# Patient Record
Sex: MALE | Race: ASIAN | NOT HISPANIC OR LATINO | Employment: OTHER | URBAN - METROPOLITAN AREA
[De-identification: names, ages, dates, MRNs, and addresses within clinical notes are randomized per-mention and may not be internally consistent; named-entity substitution may affect disease eponyms.]

---

## 2017-01-02 ENCOUNTER — ALLIED HEALTH/NURSE VISIT (OUTPATIENT)
Dept: SURGERY | Facility: CLINIC | Age: 77
End: 2017-01-02

## 2017-01-02 ENCOUNTER — OFFICE VISIT (OUTPATIENT)
Dept: SURGERY | Facility: CLINIC | Age: 77
End: 2017-01-02

## 2017-01-02 VITALS
TEMPERATURE: 98.1 F | BODY MASS INDEX: 25.27 KG/M2 | HEIGHT: 62 IN | HEART RATE: 70 BPM | DIASTOLIC BLOOD PRESSURE: 79 MMHG | SYSTOLIC BLOOD PRESSURE: 153 MMHG | WEIGHT: 137.3 LBS | RESPIRATION RATE: 12 BRPM | OXYGEN SATURATION: 95 %

## 2017-01-02 DIAGNOSIS — Z01.818 PRE-OP EXAMINATION: ICD-10-CM

## 2017-01-02 DIAGNOSIS — J98.59 MEDIASTINAL MASS: Primary | ICD-10-CM

## 2017-01-02 DIAGNOSIS — J98.59 MEDIASTINAL MASS: ICD-10-CM

## 2017-01-02 RX ORDER — IBUPROFEN 600 MG/1
TABLET, FILM COATED ORAL EVERY 6 HOURS PRN
COMMUNITY
End: 2017-01-04

## 2017-01-02 RX ORDER — IBUPROFEN 400 MG/1
400-600 TABLET, FILM COATED ORAL EVERY 6 HOURS PRN
Status: ON HOLD | COMMUNITY
End: 2019-09-21

## 2017-01-02 NOTE — H&P
Pre-Operative H & P     CC:  Preoperative exam to assess for increased cardiopulmonary risk while undergoing surgery and anesthesia.    Date of Encounter: 1/2/2017  Primary Care Physician:  Karol Alvarez  Travis Peres is a 76 year old male who presents for pre-operative H & P in preparation for Transcervical Extended Mediastinal Lymphadenectomy, Possible Thymectomy with Dr. Armstrong on 1/3/17 at [facility]. Mr. Peres was diagnosed with Papillary thyroid cancer in 2007 and underwent thyroidectomy followed by radioactive iodine therapy.  Subsequently, he underwent left cervical lymph node dissection for management of metastatic nodes.  He has had a mediastinal mass that has been followed with an increase in size over time with now increasing levels of T4 and low TSH.   Of significance, Mr. Peres was evaluated on 10/4/16 in the ED with c/o chest pain.  Serial troponins were negative and dobutamine stress echo did not show any evidence of ischemia.  Patient was started on Protonix, recommended follow-up with PCP and GI as outpatient and discharged to home.    Mr. Peres presents to PAC clinic with his wife and Chinese  and reports to be in his usual state of health.  Mr. Peres is very hard of hearing so communication somewhat difficult.  He reports that he is no longer on Cozaar as blood pressure has been stable and infrequently uses his rescue inhaler and does not use his combivent inhaler at all for his COPD. He has been holding his aspirin in preparation for his surgery    PAC referral for risk assessment and optimization of anesthesia with comorbid conditions of: HTN; HLD; COPD; GERD; history of macro-pituitary adenoma, s/p resection and  shunt placed for hydrocephalus with resultant secondary adrenal insufficiency and hypogonadotropic hypogonadism; hypothyroidism 2/2 thyroidectomy; osteopenia; depression/anxiety/insomnia and BPH.    History is obtained  from the patient, patient's wife and EMR.    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     Vocal cord paralysis      right     Hypertension      no current meds     Uncomplicated asthma      Arthritis        Past Surgical History  Past Surgical History   Procedure Laterality Date     Thyroidectomy  11/27/07     Hernia repair Right      Esophagoscopy, gastroscopy, duodenoscopy (egd), combined  6/20/2013     Procedure: COMBINED ESOPHAGOSCOPY, GASTROSCOPY, DUODENOSCOPY (EGD), BIOPSY SINGLE OR MULTIPLE;  gastroscopy;  Surgeon: Leslie Guadarrama MD;  Location: [facility]     Right selective neck dissection level 2, 3, 4  11/3/09     Transnasal endoscopic resection of pituitary adenoma  8/13/2007     Right  shunt placement  6/28/07     Lipoma resection chest wall Right 11/09     Cymetra injection         Hx of Blood transfusions/reactions: denies     Hx of abnormal bleeding or anti-platelet use: yes, but on hold for surgery    Menstrual history: No LMP for male patient.: na    Steroid use in the last year: deneis    Personal or FH with difficulty with Anesthesia:  denies    Prior to Admission Medications  Current Outpatient Prescriptions   Medication Sig Dispense Refill     ibuprofen (ADVIL/MOTRIN) 400 MG tablet Take 400 mg by mouth every 6 hours as needed for moderate pain       ibuprofen (ADVIL/MOTRIN) 600 MG tablet Take by mouth every 6 hours as needed for moderate pain       levothyroxine (SYNTHROID/LEVOTHROID) 137 MCG tablet Take 1 tablet (137 mcg) by  mouth daily (Patient taking differently: Take 137 mcg by mouth every morning ) 120 tablet 3     hydrocortisone (CORTEF) 10 MG tablet Take one tablet by mouth daily in the morning, 1/2 tablet at 2  tablet 3     alendronate (FOSAMAX) 70 MG tablet Take 1 tablet (70 mg) by mouth every 7 days 16 tablet 3     Ipratropium-Albuterol (COMBIVENT RESPIMAT)  MCG/ACT inhaler Inhale 1 puff into the lungs as needed        traZODone (DESYREL) 100 MG tablet Take 100 mg by mouth At Bedtime        GuaiFENesin (MUCUS RELIEF ADULT PO) Take by mouth as needed        fluticasone (VERAMYST) 27.5 MCG/SPRAY nasal spray Spray 2 sprays into both nostrils At Bedtime        TAMSULOSIN HCL PO Take 0.4 mg by mouth At Bedtime        AMITRIPTYLINE HCL PO Take 25 mg by mouth At Bedtime       ranitidine HCl 300 MG CAPS Take 1 capsule by mouth nightly as needed        DIPHENHYDRAMINE HCL 50 mg take one every 6 hours as needed for itching         Allergies  Allergies   Allergen Reactions     Metoprolol Unknown and Shortness Of Breath     Not true allergy.  Bradycardia, fatigue, COPD worsening.       Fenofibric Acid      Lisinopril Cough     Losartan Cough     Niacin      Simvastatin Cramps       Social History  Social History     Social History     Marital Status:      Spouse Name: N/A     Number of Children: N/A     Years of Education: N/A     Occupational History     Not on file.     Social History Main Topics     Smoking status: Never Smoker      Smokeless tobacco: Not on file     Alcohol Use: No     Drug Use: No     Sexual Activity: Not on file     Other Topics Concern     Not on file     Social History Narrative    Mom  at age 93 from a fall    Dad  at age 98 from old age     40 plus years    Two adult children in good health.    Occupation: retired from running a [employer]    Originally from [place]           Family History  Family History   Problem Relation Age of Onset     CANCER No family hx of      no skin  "cancer     Glaucoma No family hx of      Macular Degeneration No family hx of      ROS/MED HX    ENT/Pulmonary: Comment: Mediastinal mass  Right vocal cord paralysis  Xerostomia 2/2 radiation    (+)Intermittent COPD, , . .    Neurologic:  - neg neurologic ROS     Cardiovascular:     (+) Dyslipidemia, hypertension----. : . . . :. . Previous cardiac testing date:results:Stress Testdate:10/04/16 results:ECG reviewed date:10/04/16 results:   date: results:          METS/Exercise Tolerance:  METs >4   Hematologic:  - neg hematologic  ROS       Musculoskeletal:  - neg musculoskeletal ROS       GI/Hepatic:     (+) GERD Asymptomatic on medication,       Renal/Genitourinary:  - ROS Renal section negative       Endo: Comment: Hypopituitarism s/p resection  Adrenal insufficiency 2/2 hypopituitarism    (+) thyroid problem (papillary thyroid ca s/p resection and radioactive iodine) hypothyroidism, Chronic steroid usage for Other Date most recently used: Daily. history of macro-pituitary adenoma, s/p resection and  shunt placed for hydrocephalus with resultant secondary adrenal insufficiency and hypogonadotropic hypogonadism,.      Psychiatric:  - neg psychiatric ROS       Infectious Disease:  - neg infectious disease ROS       Malignancy:   (+) Malignancy History of Other  Other CA papillary thyroid ca s/p resection and radiation Remission status post Surgery and Radiation         Other:    (+) No chance of pregnancy C-spine cleared: N/A, no H/O Chronic Pain,no other significant disability   - neg other ROS       Temp: 98.1  F (36.7  C) Temp src: Oral BP: 153/79 mmHg Pulse: 70   Resp: 12 SpO2: 95 %         137 lbs 4.8 oz  5' 2\"   Body mass index is 25.11 kg/(m^2).       Physical Exam  Constitutional: Awake, alert, cooperative, no apparent distress, and appears stated age.  Eyes: Pupils equal, round and reactive to light, extra ocular muscles intact, sclera clear, conjunctiva normal.  HENT: Normocephalic, oral pharynx with " moist mucus membranes, dentition in poor repair with multiple missing upper teeth with removal upper partial plate.  Bottom removal denture.  No goiter appreciated.   Respiratory: Clear to auscultation bilaterally, no crackles or wheezing.  Cardiovascular: Regular rate and rhythm, normal S1 and S2, and no murmur noted.  Carotids +2, no bruits. No edema. Palpable pulses to radial  DP and PT arteries.   GI: Normal bowel sounds, soft, non-distended, non-tender, no masses palpated, no hepatosplenomegaly.  Well healed right upper quadrant transverse scar ~ 3 cm.  Lymph/Hematologic: No cervical lymphadenopathy and no supraclavicular lymphadenopathy.  Genitourinary:  na  Skin: Warm and dry.  No rashes at anticipated surgical site.   Musculoskeletal: Full ROM of neck. There is no redness, warmth, or swelling of the joints. Gross motor strength is normal.    Neurologic: Awake, alert, oriented to name, place and time. Cranial nerves II-XII are grossly intact. Gait is normal.   Neuropsychiatric: Calm, cooperative. Normal affect.     Labs: (personally reviewed)  WBC      5.1   10/4/2016  RBC     5.20   10/4/2016  HGB     13.5   10/4/2016  HCT     40.5   10/4/2016  MCV       78   10/4/2016  MCH     26.0   10/4/2016  MCHC     33.3   10/4/2016  RDW     13.3   10/4/2016  PLT      140   10/4/2016    Last Basic Metabolic Panel:  NA      140   10/4/2016   POTASSIUM      3.4   10/4/2016  CHLORIDE      104   10/4/2016  NIHARIKA      8.4   10/4/2016  CO2       31   10/4/2016  BUN       14   10/4/2016  CR     0.93   10/4/2016  GLC      109   10/4/2016  GLC       98   6/19/2013    TSH <0.01 (L), T4 free 1.46 (12/9/16)  Type and Screen completed    Procedures:    ECG: Sinus rhythm 60 BPM.  Voltage criteria for left ventricular hypertrophy.  Prolonged QT QT/QTc 462/462    Dobutamine Stress Echocardiogram 10/4/16  Interpretation Summary  Negative dobutamine stress echocardiogram.  No wall motion abnormalities at rest and with infusion of  dobutamine.  Normal LV size and function. The LVEF is 55-60% at rest and increases  appropriately with dobutamine infusion to approximately 70-75%.  Low risk dobutamine stress echocardiogram.  Normal blood pressure response to dobutamine infusion.  Negative T waves noted in V4-V6 at rest with dynamic changes during  dobutamine infusion.  No chest pain at rest and with infusion of dobutamine.  No significant valvular disease noted on routine screening color flow Doppler  and pulsed Doppler examination. The ascending aortic segment is normal.  Echocardiogram 7/30/2014  Interpretation Summary  Left ventricular function is normal.  The EF is 55-60%. Left ventricular size   is normal. The right ventricle is normal size. Aortic valve is normal in   structure and function. Right ventricular systolic pressure is 21mmHg above   the right atrial pressure. The inferior vena cava was normal in size with   preserved respiratory variability. No pericardial effusion is present.  Pulmonary Function Tests (10/28/2009)  INTERPRETATION:  The FVC, FEV1, FEV1/FVC ratio and XZK55-20% are reduced indicating airway  obstruction.  Following administration of bronchodilators, there is a  significant response.  Decreased diffusing capacity even when corrected for  hemoglobin.   Flow volume loop suggests airflow obstruction.  No previous exam   for comparison.  TLC normal and RV elevated - suggesting airtrapping.  IMPRESSION:  Severe Airflow Obstruction  with significant response to bronchodilators.  Overdistended with air trapping.  Decreased diffusion capacity that corrects for alveolar ventilation.    Brain/ Pituitary MRI without and with contrast 12/17/16  Impression:  1. Stable findings since 7/15/2014. Postsurgical changes from  transsphenoidal sellar mass resection with unchanged residual mass in  the left aspect of the sella.  2. Stable position of the right posterior approach ventriculostomy  catheter with unchanged ventricular size.  No hydrocephalus.    EXAM:  CT CHEST W/O CONTRAST . 12/2/2016 11:22 AM    Impression:    1. Waxing and waning nodular and tree-in-bud pulmonary opacities  involving predominantly the posterior aspect of the right upper lobe  and lateral right lower lobe. These likely represents infection  including atypical infection like mycobacterial infection.  2. No significant change in the 2.6 x 1.5 cm hypodense cystic lesion  in the anterior mediastinum as compared to 12/16/2015, however it  increased in size as compared to 6/2/2009 exam, recommend continued  follow-up to ensure stability.  3. Stable calcified and noncalcified pulmonary nodules.  4. Cholelithiasis without CT evidence of acute cholecystitis.       ASSESSMENT and PLAN  Travis Peres is a 76 year old male scheduled to undergo Transcervical Extended Mediastinal Lymphadenectomy, Possible Thymectomy with Dr. Armstrong on 1/3/17 at the [facility] under General anesthesia with Block..     Pre-operative considerations:  - RCRI : No serious cardiac risks.  0.4% risk of major adverse cardiac event.   - Anesthesia considerations:  Refer to PAC assessment in anesthesia records  - Functional Status: METs>4  - Airway: history of right sided vocal cord paralysis  - VTE risk: 1.8%  - FILEMON # of risks 2/8 = low risk  - Risk of PONV score = 1.  If > 2, anti-emetic intervention recommended.  - Post-op delirium risk: elevated given age.    He has the following specific operation considerations:  - Cardiac:  HTN- patient reports no longer taking ARB as BP has improved. Recommend closely monitoring blood pressure and reassess need for antihypertensive therapy. Patient has been holding his ASA (primary prevention) in preparation for surgery.  HLD-not treated as intolerant to statins because of myopathy.  Dobutamine stress Echo 10/14/16 is within normal limits with EF 55-60%.  - Pulmonary:  No smoking history.  COPD, take inhalers as prescribed DOS.  Bring inhalers with to  hospital. Last PFT's 2009 with significnat response to bronchodilators.  - GERD:  take [id number] DOS  - History of macro-pituitary adenoma, s/p resection and  shunt placed for hydrocephalus with resultant secondary adrenal insufficiency and hypogonadotropic hypogonadism, take hydrocortisone DOS.  Stress dose steroids will be considered by anesthesia DOS.  - Hypothyroidism 2/2 thyroidectomy:  take levothyroxine DOS.  TSH <0.01, T4free 1.46.  - Osteopenia:  take alendronate as prescribed.  Hold calcium and vitamin D for 7 days prior to procedure.  - Depression/anxiety/insomnia: take amitriptyline, trazodone and ambien as prescribed  - BPH: take tamsulosin DOS.  Patient was discussed with Dr DEL Vela.    GEORGIA Mc CNP  Preoperative Assessment Center  [facility]  Phone: [phone]  Fax: [phone]

## 2017-01-02 NOTE — PATIENT INSTRUCTIONS
Preparing for Your Surgery      Name:  Travis Peres   MRN:  [id number]   :  1940   Today's Date:  2017     Arriving for surgery:  Surgery date: 1/3/2017  Surgery time:  830 am  Arrival time:  630 am    Please come to:       [facility] [address]  [address] [place]  [address]    -   parking is available in front of the hospital from 5:15 am to 8:00 pm    -  Stop at the Information Desk in the lobby    -   Inform the information person that you are here for surgery. An escort to 3c will be provided. If you would not like an escort, please proceed to 3C on the 3rd floor. [phone]     What can I eat or drink?  -  You may have solid food or milk products until 8 hours prior to your surgery; until 12 midnight  -  You may have water, apple juice or 7up/Sprite until 2 hours prior to your surgery; until 630 am    Which medicines can I take    -  Stop all vitamins one week before surgery  -  Please take these medications the day of surgery:  Levothyroxine, hydrocortisone,         How do I prepare myself?  -  Take two showers: one the night before surgery; and one the morning of surgery.         Use Scrubcare or Hibiclens to wash from neck down.  You may use your own shampoo and conditioner. No other hair products.   -  Do NOT use lotion, powder, deodorant, or antiperspirant the day of your surgery.  -  Do NOT wear any makeup, fingernail polish or jewelry.  -  Begin using Incentive Spirometer 1 week prior to surgery.  Use 4 times per day, up to 5-10 breaths each time.  Bring Incentive Spirometer to hospital.  -Do not bring your own medications to the hospital, except for inhalers and eye drops.  -  Bring your ID and insurance card.    Questions or Concerns:  If you have questions or concerns, please call the  Preoperative Assessment Center, Monday-Friday 7AM-7PM:  [phone]    AFTER YOUR SURGERY  Breathing exercises   Breathing exercises help you recover  faster. Take deep breaths and let the air out slowly. This will:     Help you wake up after surgery.    Help prevent complications like pneumonia.  Preventing complications will help you go home sooner.   We may give you a breathing device (incentive spirometer) to encourage you to breathe deeply.   Nausea and vomiting   You may feel sick to your stomach after surgery; if so, let your nurse know.    Pain control:  After surgery, you may have pain. Our goal is to help you manage your pain. Pain medicine will help you feel comfortable enough to do activities that will help you heal.  These activities may include breathing exercises, walking and physical therapy.   To help your health care team treat your pain we will ask: 1) If you have pain  2) where it is located 3) describe your pain in your words  Methods of pain control include medications given by mouth, vein or by nerve block for some surgeries.  We may give you a pain control pump that will:  1) Deliver the medicine through a tube placed in your vein  2) Control the amount of medicine you receive  3) Allow you to push a button to deliver a dose of pain medicine  Sequential Compression Device (SCD) or Pneumo Boots:  You may need to wear SCD S on your legs or feet. These are wraps connected to a machine that pumps in air and releases it. The repeated pumping helps prevent blood clots from forming.

## 2017-01-02 NOTE — MR AVS SNAPSHOT
After Visit Summary   2017    Travis Peres    MRN: [id number]           Patient Information     Date Of Birth          1940        Visit Information        Provider Department      2017 12:15 PM Anson Van; Rn, [facility]        Care Instructions    Preparing for Your Surgery      Name:  Travis Peres   MRN:  [id number]   :  1940   Today's Date:  2017     Arriving for surgery:  Surgery date: 1/3/2017  Surgery time:  830 am  Arrival time:  630 am    Please come to:       [facility] [address]  [address] [place]  [address]    -   parking is available in front of the hospital from 5:15 am to 8:00 pm    -  Stop at the Information Desk in the lobby    -   Inform the information person that you are here for surgery. An escort to 3c will be provided. If you would not like an escort, please proceed to 3C on the 3rd floor. [phone]     What can I eat or drink?  -  You may have solid food or milk products until 8 hours prior to your surgery; until 12 midnight  -  You may have water, apple juice or 7up/Sprite until 2 hours prior to your surgery; until 630 am    Which medicines can I take    -  Stop all vitamins one week before surgery  -  Please take these medications the day of surgery:  Levothyroxine, hydrocortisone,         How do I prepare myself?  -  Take two showers: one the night before surgery; and one the morning of surgery.         Use Scrubcare or Hibiclens to wash from neck down.  You may use your own shampoo and conditioner. No other hair products.   -  Do NOT use lotion, powder, deodorant, or antiperspirant the day of your surgery.  -  Do NOT wear any makeup, fingernail polish or jewelry.  -  Begin using Incentive Spirometer 1 week prior to surgery.  Use 4 times per day, up to 5-10 breaths each time.  Bring Incentive Spirometer to hospital.  -Do not bring your own medications to the hospital,  except for inhalers and eye drops.  -  Bring your ID and insurance card.    Questions or Concerns:  If you have questions or concerns, please call the  Preoperative [facility] Center, Monday-Friday 7AM-7PM:  [phone]    AFTER YOUR SURGERY  Breathing exercises   Breathing exercises help you recover faster. Take deep breaths and let the air out slowly. This will:     Help you wake up after surgery.    Help prevent complications like pneumonia.  Preventing complications will help you go home sooner.   We may give you a breathing device (incentive spirometer) to encourage you to breathe deeply.   Nausea and vomiting   You may feel sick to your stomach after surgery; if so, let your nurse know.    Pain control:  After surgery, you may have pain. Our goal is to help you manage your pain. Pain medicine will help you feel comfortable enough to do activities that will help you heal.  These activities may include breathing exercises, walking and physical therapy.   To help your health care team treat your pain we will ask: 1) If you have pain  2) where it is located 3) describe your pain in your words  Methods of pain control include medications given by mouth, vein or by nerve block for some surgeries.  We may give you a pain control pump that will:  1) Deliver the medicine through a tube placed in your vein  2) Control the amount of medicine you receive  3) Allow you to push a button to deliver a dose of pain medicine  Sequential Compression Device (SCD) or Pneumo Boots:  You may need to wear SCD S on your legs or feet. These are wraps connected to a machine that pumps in air and releases it. The repeated pumping helps prevent blood clots from forming.               Follow-ups after your visit        Your next 10 appointments already scheduled     Jan 02, 2017  2:00 PM   PAC Anesthesia Consult with  Pac Anesthesiologist   [facility] ([facility])    [address]  4th Floor  [facility] [id number] [phone]            Jan 02, 2017  2:45 PM   LAB with  LAB    Health Lab ([facility])    [address] [facility]  [address] [facility] [id number] [phone]           Patient must bring picture ID.  Patient should be prepared to give a urine specimen  Please do not eat 10-12 hours before your appointment if you are coming in fasting for labs on lipids, cholesterol, or glucose (sugar).  Pregnant women should follow their Care Team instructions. Water with medications is okay. Do not drink coffee or other fluids.   If you have concerns about taking  your medications, please ask at office or if scheduling via [employer], send a message by clicking on Secure Messaging, Message Your Care Team.            Jan 03, 2017  6:30 AM   Test/Procedure with Devan Stephen    Services Department ([facility])    [address] [place] [phone]               Jan 03, 2017   Procedure with Ernesto Armstrong MD   [facility] [place], Same Day Surgery (--)    [address] [facility] [id number] [phone]            Jan 03, 2017 11:30 AM   [facility]patient with Devan Stephen    Services Department ([facility])    [address] [place] [phone]               Jan 06, 2017  9:30 AM   RETURN RETINA with Rola Robert MD, Anson Van   Eye Clinic ([facility])    [address]  [address] [facility]  [address]th [address] Clin [address]  [facility] [id number] [phone]              Who to contact     Please call your clinic at [phone] to:    Ask questions about your health    Make or cancel appointments    Discuss your medicines    Learn about your test results    Speak to your doctor   If you have compliments or concerns about an experience at your clinic, or if you wish to file a complaint, please  contact [facility] Patient Relations at [phone] or email us at [email or web address]         Additional Information About Your Visit        [employer] Information     [employer] is an electronic gateway that provides easy, online access to your medical records. With [employer], you can request a clinic appointment, read your test results, renew a prescription or communicate with your care team.     To sign up for [employer] visit the website at [email or web address]   You will be asked to enter the access code listed below, as well as some personal information. Please follow the directions to create your username and password.     Your access code is: [email or web address]  Expires: 2017  6:30 AM     Your access code will  in 90 days. If you need help or a new code, please contact your [facility] or call [phone] for assistance.         Blood Pressure from Last 3 Encounters:   17 160/88   16 154/76   16 145/85    Weight from Last 3 Encounters:   17 62.279 kg (137 lb 4.8 oz)   16 62.551 kg (137 lb 14.4 oz)   16 62.052 kg (136 lb 12.8 oz)              Today, you had the following     No orders found for display         Today's Medication Changes          These changes are accurate as of: 17  1:19 PM.  If you have any questions, ask your nurse or doctor.               These medicines have changed or have updated prescriptions.        Dose/Directions    levothyroxine 137 MCG tablet   Commonly known as:  SYNTHROID/LEVOTHROID   This may have changed:  when to take this   Used for:  Postsurgical hypothyroidism, Osteopenia, Hypopituitarism (H), Pituitary adenoma (H), Papillary thyroid carcinoma (H)        Dose:  137 mcg   Take 1 tablet (137 mcg) by mouth daily   Quantity:  120 tablet   Refills:  3                Primary Care Provider Office Phone # Fax #    Karol Kim [phone]        [address] [address]        Thank you!     Thank you for choosing [facility]  for your care. Our goal is always to provide you with excellent care. Hearing back from our patients is one way we can continue to improve our services. Please take a few minutes to complete the written survey that you may receive in the mail after your visit with us. Thank you!             Your Updated Medication List - Protect others around you: Learn how to safely use, store and throw away your medicines at www.disposemymeds.org.          This list is accurate as of: 1/2/17  1:19 PM.  Always use your most recent med list.                   Brand Name Dispense Instructions for use    alendronate 70 MG tablet    FOSAMAX    16 tablet    Take 1 tablet (70 mg) by mouth every 7 days       AMITRIPTYLINE HCL PO      Take 25 mg by mouth At Bedtime       COMBIVENT RESPIMAT  MCG/ACT inhaler   Generic drug:  Ipratropium-Albuterol      Inhale 1 puff into the lungs as needed       DIPHENHYDRAMINE HCL      50 mg take one every 6 hours as needed for itching       fluticasone 27.5 MCG/SPRAY spray    VERAMYST     Spray 2 sprays into both nostrils At Bedtime       hydrocortisone 10 MG tablet    CORTEF    180 tablet    Take one tablet by mouth daily in the morning, 1/2 tablet at 2 PM       * ibuprofen 400 MG tablet    ADVIL/MOTRIN     Take 400 mg by mouth every 6 hours as needed for moderate pain       * ibuprofen 600 MG tablet    ADVIL/MOTRIN     Take by mouth every 6 hours as needed for moderate pain       levothyroxine 137 MCG tablet    SYNTHROID/LEVOTHROID    120 tablet    Take 1 tablet (137 mcg) by mouth daily       MUCUS RELIEF ADULT PO      Take by mouth as needed       ranitidine HCl 300 MG Caps      Take 1 capsule by mouth nightly as needed       TAMSULOSIN HCL PO      Take 0.4 mg by mouth At Bedtime       traZODone 100 MG tablet    DESYREL     Take 100 mg by mouth At  Bedtime       * Notice:  This list has 2 medication(s) that are the same as other medications prescribed for you. Read the directions carefully, and ask your doctor or other care provider to review them with you.

## 2017-01-03 ENCOUNTER — ANESTHESIA (OUTPATIENT)
Dept: SURGERY | Facility: CLINIC | Age: 77
DRG: 853 | End: 2017-01-03
Payer: MEDICARE

## 2017-01-03 ENCOUNTER — APPOINTMENT (OUTPATIENT)
Dept: GENERAL RADIOLOGY | Facility: CLINIC | Age: 77
DRG: 853 | End: 2017-01-03
Attending: THORACIC SURGERY (CARDIOTHORACIC VASCULAR SURGERY)
Payer: MEDICARE

## 2017-01-03 LAB
ABO + RH BLD: NORMAL
ABO + RH BLD: NORMAL
BLD GP AB SCN SERPL QL: NORMAL
BLD PROD TYP BPU: NORMAL
BLOOD BANK CMNT PATIENT-IMP: NORMAL
NUM BPU REQUESTED: 2
SPECIMEN EXP DATE BLD: NORMAL

## 2017-01-03 PROCEDURE — 0WBC0ZZ EXCISION OF MEDIASTINUM, OPEN APPROACH: ICD-10-PCS | Performed by: THORACIC SURGERY (CARDIOTHORACIC VASCULAR SURGERY)

## 2017-01-03 RX ORDER — EPHEDRINE SULFATE 50 MG/ML
INJECTION, SOLUTION INTRAMUSCULAR; INTRAVENOUS; SUBCUTANEOUS PRN
Status: DISCONTINUED | OUTPATIENT
Start: 2017-01-03 | End: 2017-01-03

## 2017-01-03 RX ORDER — ONDANSETRON 2 MG/ML
INJECTION INTRAMUSCULAR; INTRAVENOUS PRN
Status: DISCONTINUED | OUTPATIENT
Start: 2017-01-03 | End: 2017-01-03

## 2017-01-03 RX ORDER — LIDOCAINE HYDROCHLORIDE 20 MG/ML
INJECTION, SOLUTION INFILTRATION; PERINEURAL PRN
Status: DISCONTINUED | OUTPATIENT
Start: 2017-01-03 | End: 2017-01-03

## 2017-01-03 RX ORDER — NITROGLYCERIN 10 MG/100ML
INJECTION INTRAVENOUS PRN
Status: DISCONTINUED | OUTPATIENT
Start: 2017-01-03 | End: 2017-01-03

## 2017-01-03 RX ORDER — SODIUM CHLORIDE, SODIUM LACTATE, POTASSIUM CHLORIDE, CALCIUM CHLORIDE 600; 310; 30; 20 MG/100ML; MG/100ML; MG/100ML; MG/100ML
INJECTION, SOLUTION INTRAVENOUS CONTINUOUS PRN
Status: DISCONTINUED | OUTPATIENT
Start: 2017-01-03 | End: 2017-01-03

## 2017-01-03 RX ORDER — NALOXONE HYDROCHLORIDE 0.4 MG/ML
INJECTION, SOLUTION INTRAMUSCULAR; INTRAVENOUS; SUBCUTANEOUS PRN
Status: DISCONTINUED | OUTPATIENT
Start: 2017-01-03 | End: 2017-01-03

## 2017-01-03 RX ORDER — PROPOFOL 10 MG/ML
INJECTION, EMULSION INTRAVENOUS PRN
Status: DISCONTINUED | OUTPATIENT
Start: 2017-01-03 | End: 2017-01-03

## 2017-01-03 RX ADMIN — ROCURONIUM BROMIDE 100 MG: 10 INJECTION INTRAVENOUS at 08:44

## 2017-01-03 RX ADMIN — FENTANYL CITRATE 50 MCG: 50 INJECTION, SOLUTION INTRAMUSCULAR; INTRAVENOUS at 10:15

## 2017-01-03 RX ADMIN — NALOXONE HYDROCHLORIDE 0.02 MG: 0.4 INJECTION, SOLUTION INTRAMUSCULAR; INTRAVENOUS; SUBCUTANEOUS at 12:29

## 2017-01-03 RX ADMIN — PHENYLEPHRINE HYDROCHLORIDE 50 MCG: 10 INJECTION, SOLUTION INTRAMUSCULAR; INTRAVENOUS; SUBCUTANEOUS at 11:20

## 2017-01-03 RX ADMIN — FENTANYL CITRATE 50 MCG: 50 INJECTION, SOLUTION INTRAMUSCULAR; INTRAVENOUS at 10:56

## 2017-01-03 RX ADMIN — PHENYLEPHRINE HYDROCHLORIDE 50 MCG: 10 INJECTION, SOLUTION INTRAMUSCULAR; INTRAVENOUS; SUBCUTANEOUS at 11:24

## 2017-01-03 RX ADMIN — FENTANYL CITRATE 50 MCG: 50 INJECTION, SOLUTION INTRAMUSCULAR; INTRAVENOUS at 09:51

## 2017-01-03 RX ADMIN — SUGAMMADEX 240 MG: 100 INJECTION, SOLUTION INTRAVENOUS at 12:00

## 2017-01-03 RX ADMIN — PHENYLEPHRINE HYDROCHLORIDE 100 MCG: 10 INJECTION, SOLUTION INTRAMUSCULAR; INTRAVENOUS; SUBCUTANEOUS at 08:53

## 2017-01-03 RX ADMIN — NITROGLYCERIN 100 MCG: 10 INJECTION INTRAVENOUS at 12:27

## 2017-01-03 RX ADMIN — NITROGLYCERIN 100 MCG: 10 INJECTION INTRAVENOUS at 12:24

## 2017-01-03 RX ADMIN — FENTANYL CITRATE 100 MCG: 50 INJECTION, SOLUTION INTRAMUSCULAR; INTRAVENOUS at 08:44

## 2017-01-03 RX ADMIN — CEFAZOLIN SODIUM 1 G: 2 INJECTION, SOLUTION INTRAVENOUS at 11:03

## 2017-01-03 RX ADMIN — FENTANYL CITRATE 50 MCG: 50 INJECTION, SOLUTION INTRAMUSCULAR; INTRAVENOUS at 11:12

## 2017-01-03 RX ADMIN — SODIUM CHLORIDE, POTASSIUM CHLORIDE, SODIUM LACTATE AND CALCIUM CHLORIDE: 600; 310; 30; 20 INJECTION, SOLUTION INTRAVENOUS at 11:22

## 2017-01-03 RX ADMIN — HYDROMORPHONE HYDROCHLORIDE 0.2 MG: 1 INJECTION, SOLUTION INTRAMUSCULAR; INTRAVENOUS; SUBCUTANEOUS at 11:05

## 2017-01-03 RX ADMIN — FENTANYL CITRATE 50 MCG: 50 INJECTION, SOLUTION INTRAMUSCULAR; INTRAVENOUS at 11:33

## 2017-01-03 RX ADMIN — PHENYLEPHRINE HYDROCHLORIDE 100 MCG: 10 INJECTION, SOLUTION INTRAMUSCULAR; INTRAVENOUS; SUBCUTANEOUS at 09:23

## 2017-01-03 RX ADMIN — PROPOFOL 70 MG: 10 INJECTION, EMULSION INTRAVENOUS at 11:12

## 2017-01-03 RX ADMIN — Medication 10 MG: at 11:38

## 2017-01-03 RX ADMIN — SODIUM CHLORIDE, POTASSIUM CHLORIDE, SODIUM LACTATE AND CALCIUM CHLORIDE: 600; 310; 30; 20 INJECTION, SOLUTION INTRAVENOUS at 08:30

## 2017-01-03 RX ADMIN — ROCURONIUM BROMIDE 20 MG: 10 INJECTION INTRAVENOUS at 09:46

## 2017-01-03 RX ADMIN — ROCURONIUM BROMIDE 10 MG: 10 INJECTION INTRAVENOUS at 10:32

## 2017-01-03 RX ADMIN — NALOXONE HYDROCHLORIDE 0.02 MG: 0.4 INJECTION, SOLUTION INTRAMUSCULAR; INTRAVENOUS; SUBCUTANEOUS at 12:23

## 2017-01-03 RX ADMIN — PROPOFOL 20 MG: 10 INJECTION, EMULSION INTRAVENOUS at 11:16

## 2017-01-03 RX ADMIN — ONDANSETRON 4 MG: 2 INJECTION INTRAMUSCULAR; INTRAVENOUS at 12:00

## 2017-01-03 RX ADMIN — FENTANYL CITRATE 50 MCG: 50 INJECTION, SOLUTION INTRAMUSCULAR; INTRAVENOUS at 10:16

## 2017-01-03 RX ADMIN — PROPOFOL 30 MG: 10 INJECTION, EMULSION INTRAVENOUS at 11:14

## 2017-01-03 RX ADMIN — CEFAZOLIN SODIUM 2 G: 2 INJECTION, SOLUTION INTRAVENOUS at 08:55

## 2017-01-03 RX ADMIN — PROPOFOL 10 MG: 10 INJECTION, EMULSION INTRAVENOUS at 11:28

## 2017-01-03 RX ADMIN — PROPOFOL 100 MG: 10 INJECTION, EMULSION INTRAVENOUS at 08:44

## 2017-01-03 RX ADMIN — NITROGLYCERIN 100 MCG: 10 INJECTION INTRAVENOUS at 12:31

## 2017-01-03 RX ADMIN — PHENYLEPHRINE HYDROCHLORIDE 100 MCG: 10 INJECTION, SOLUTION INTRAMUSCULAR; INTRAVENOUS; SUBCUTANEOUS at 08:54

## 2017-01-03 RX ADMIN — ROCURONIUM BROMIDE 10 MG: 10 INJECTION INTRAVENOUS at 11:10

## 2017-01-03 RX ADMIN — LIDOCAINE HYDROCHLORIDE 100 MG: 20 INJECTION, SOLUTION INFILTRATION; PERINEURAL at 08:44

## 2017-01-03 RX ADMIN — PROPOFOL 30 MG: 10 INJECTION, EMULSION INTRAVENOUS at 11:07

## 2017-01-03 RX ADMIN — FENTANYL CITRATE 50 MCG: 50 INJECTION, SOLUTION INTRAMUSCULAR; INTRAVENOUS at 10:39

## 2017-01-03 RX ADMIN — FENTANYL CITRATE 50 MCG: 50 INJECTION, SOLUTION INTRAMUSCULAR; INTRAVENOUS at 09:53

## 2017-01-03 RX ADMIN — Medication 10 MG: at 10:44

## 2017-01-03 RX ADMIN — HYDROMORPHONE HYDROCHLORIDE 0.4 MG: 1 INJECTION, SOLUTION INTRAMUSCULAR; INTRAVENOUS; SUBCUTANEOUS at 10:23

## 2017-01-04 ENCOUNTER — APPOINTMENT (OUTPATIENT)
Dept: CT IMAGING | Facility: CLINIC | Age: 77
End: 2017-01-04
Attending: EMERGENCY MEDICINE
Payer: MEDICARE

## 2017-01-04 ENCOUNTER — HOSPITAL ENCOUNTER (EMERGENCY)
Facility: CLINIC | Age: 77
Discharge: ANOTHER HOSPITAL | End: 2017-01-04
Attending: EMERGENCY MEDICINE | Admitting: EMERGENCY MEDICINE
Payer: MEDICARE

## 2017-01-04 ENCOUNTER — APPOINTMENT (OUTPATIENT)
Dept: GENERAL RADIOLOGY | Facility: CLINIC | Age: 77
End: 2017-01-04
Attending: EMERGENCY MEDICINE
Payer: MEDICARE

## 2017-01-04 ENCOUNTER — HOSPITAL ENCOUNTER (INPATIENT)
Facility: CLINIC | Age: 77
LOS: 5 days | Discharge: HOME HEALTH | DRG: 853 | End: 2017-01-09
Attending: STUDENT IN AN ORGANIZED HEALTH CARE EDUCATION/TRAINING PROGRAM | Admitting: INTERNAL MEDICINE
Payer: MEDICARE

## 2017-01-04 VITALS
BODY MASS INDEX: 22.2 KG/M2 | HEART RATE: 111 BPM | TEMPERATURE: 101.8 F | WEIGHT: 133.4 LBS | RESPIRATION RATE: 19 BRPM | DIASTOLIC BLOOD PRESSURE: 53 MMHG | OXYGEN SATURATION: 98 % | SYSTOLIC BLOOD PRESSURE: 82 MMHG

## 2017-01-04 DIAGNOSIS — R65.21 SEPTIC SHOCK (H): ICD-10-CM

## 2017-01-04 DIAGNOSIS — E27.40 ADRENAL INSUFFICIENCY (H): ICD-10-CM

## 2017-01-04 DIAGNOSIS — R79.89 ELEVATED TROPONIN: ICD-10-CM

## 2017-01-04 DIAGNOSIS — C73 PAPILLARY THYROID CARCINOMA (H): ICD-10-CM

## 2017-01-04 DIAGNOSIS — N17.9 ACUTE RENAL FAILURE, UNSPECIFIED ACUTE RENAL FAILURE TYPE (H): ICD-10-CM

## 2017-01-04 DIAGNOSIS — A41.9 SEPTIC SHOCK (H): ICD-10-CM

## 2017-01-04 DIAGNOSIS — G89.18 ACUTE POST-OPERATIVE PAIN: ICD-10-CM

## 2017-01-04 DIAGNOSIS — J98.59 MEDIASTINAL MASS: Primary | ICD-10-CM

## 2017-01-04 LAB
ALBUMIN SERPL-MCNC: 3.4 G/DL (ref 3.4–5)
ALBUMIN UR-MCNC: 30 MG/DL
ALP SERPL-CCNC: 97 U/L (ref 40–150)
ALT SERPL W P-5'-P-CCNC: 145 U/L (ref 0–70)
AMORPH CRY #/AREA URNS HPF: ABNORMAL /HPF
ANION GAP SERPL CALCULATED.3IONS-SCNC: 13 MMOL/L (ref 3–14)
APPEARANCE UR: CLEAR
AST SERPL W P-5'-P-CCNC: 258 U/L (ref 0–45)
BASOPHILS # BLD AUTO: 0 10E9/L (ref 0–0.2)
BASOPHILS NFR BLD AUTO: 0.2 %
BILIRUB SERPL-MCNC: 1.4 MG/DL (ref 0.2–1.3)
BILIRUB UR QL STRIP: ABNORMAL
BUN SERPL-MCNC: 25 MG/DL (ref 7–30)
CALCIUM SERPL-MCNC: 8.3 MG/DL (ref 8.5–10.1)
CHLORIDE SERPL-SCNC: 104 MMOL/L (ref 94–109)
CO2 BLDCOV-SCNC: 21 MMOL/L (ref 21–28)
CO2 BLDCOV-SCNC: 23 MMOL/L (ref 21–28)
CO2 SERPL-SCNC: 24 MMOL/L (ref 20–32)
COLOR UR AUTO: YELLOW
CREAT SERPL-MCNC: 3.45 MG/DL (ref 0.66–1.25)
DIFFERENTIAL METHOD BLD: ABNORMAL
EOSINOPHIL # BLD AUTO: 0.1 10E9/L (ref 0–0.7)
EOSINOPHIL NFR BLD AUTO: 0.7 %
ERYTHROCYTE [DISTWIDTH] IN BLOOD BY AUTOMATED COUNT: 13.8 % (ref 10–15)
GFR SERPL CREATININE-BSD FRML MDRD: 17 ML/MIN/1.7M2
GLUCOSE BLDC GLUCOMTR-MCNC: 69 MG/DL (ref 70–99)
GLUCOSE SERPL-MCNC: 90 MG/DL (ref 70–99)
GLUCOSE UR STRIP-MCNC: NEGATIVE MG/DL
HCT VFR BLD AUTO: 44.3 % (ref 40–53)
HGB BLD-MCNC: 14.8 G/DL (ref 13.3–17.7)
HGB UR QL STRIP: ABNORMAL
HYALINE CASTS #/AREA URNS LPF: ABNORMAL /LPF (ref 0–2)
IMM GRANULOCYTES # BLD: 0.1 10E9/L (ref 0–0.4)
IMM GRANULOCYTES NFR BLD: 0.7 %
KETONES UR STRIP-MCNC: NEGATIVE MG/DL
LACTATE BLD-SCNC: 2.4 MMOL/L (ref 0.7–2.1)
LACTATE SERPL-SCNC: 5.3 MMOL/L (ref 0.4–2)
LEUKOCYTE ESTERASE UR QL STRIP: NEGATIVE
LYMPHOCYTES # BLD AUTO: 2.7 10E9/L (ref 0.8–5.3)
LYMPHOCYTES NFR BLD AUTO: 18.4 %
MCH RBC QN AUTO: 27.1 PG (ref 26.5–33)
MCHC RBC AUTO-ENTMCNC: 33.4 G/DL (ref 31.5–36.5)
MCV RBC AUTO: 81 FL (ref 78–100)
MONOCYTES # BLD AUTO: 0.8 10E9/L (ref 0–1.3)
MONOCYTES NFR BLD AUTO: 5.6 %
MUCOUS THREADS #/AREA URNS LPF: PRESENT /LPF
NEUTROPHILS # BLD AUTO: 11.1 10E9/L (ref 1.6–8.3)
NEUTROPHILS NFR BLD AUTO: 74.4 %
NITRATE UR QL: NEGATIVE
NRBC # BLD AUTO: 0 10*3/UL
NRBC BLD AUTO-RTO: 0 /100
NT-PROBNP SERPL-MCNC: ABNORMAL PG/ML (ref 0–1800)
PCO2 BLDV: 54 MM HG (ref 40–50)
PCO2 BLDV: 60 MM HG (ref 40–50)
PH BLDV: 7.15 PH (ref 7.32–7.43)
PH BLDV: 7.24 PH (ref 7.32–7.43)
PH UR STRIP: 5 PH (ref 5–7)
PLATELET # BLD AUTO: 189 10E9/L (ref 150–450)
PO2 BLDV: 62 MM HG (ref 25–47)
PO2 BLDV: 63 MM HG (ref 25–47)
POTASSIUM SERPL-SCNC: 3.7 MMOL/L (ref 3.4–5.3)
PROT SERPL-MCNC: 7.5 G/DL (ref 6.8–8.8)
RBC # BLD AUTO: 5.47 10E12/L (ref 4.4–5.9)
RBC #/AREA URNS AUTO: ABNORMAL /HPF (ref 0–2)
SAO2 % BLDV FROM PO2: 84 %
SAO2 % BLDV FROM PO2: 87 %
SODIUM SERPL-SCNC: 141 MMOL/L (ref 133–144)
SP GR UR STRIP: 1.02 (ref 1–1.03)
TROPONIN I SERPL-MCNC: 12.13 UG/L (ref 0–0.04)
URN SPEC COLLECT METH UR: ABNORMAL
UROBILINOGEN UR STRIP-ACNC: 0.2 EU/DL (ref 0.2–1)
WBC # BLD AUTO: 14.9 10E9/L (ref 4–11)
WBC #/AREA URNS AUTO: ABNORMAL /HPF (ref 0–2)

## 2017-01-04 PROCEDURE — 87641 MR-STAPH DNA AMP PROBE: CPT | Performed by: SURGERY

## 2017-01-04 PROCEDURE — 96375 TX/PRO/DX INJ NEW DRUG ADDON: CPT

## 2017-01-04 PROCEDURE — 85025 COMPLETE CBC W/AUTO DIFF WBC: CPT | Performed by: EMERGENCY MEDICINE

## 2017-01-04 PROCEDURE — 83880 ASSAY OF NATRIURETIC PEPTIDE: CPT | Performed by: EMERGENCY MEDICINE

## 2017-01-04 PROCEDURE — 93005 ELECTROCARDIOGRAM TRACING: CPT

## 2017-01-04 PROCEDURE — 00000146 ZZHCL STATISTIC GLUCOSE BY METER IP

## 2017-01-04 PROCEDURE — 25000125 ZZHC RX 250: Performed by: EMERGENCY MEDICINE

## 2017-01-04 PROCEDURE — 87640 STAPH A DNA AMP PROBE: CPT | Performed by: SURGERY

## 2017-01-04 PROCEDURE — 84484 ASSAY OF TROPONIN QUANT: CPT | Performed by: EMERGENCY MEDICINE

## 2017-01-04 PROCEDURE — 86900 BLOOD TYPING SEROLOGIC ABO: CPT | Performed by: SURGERY

## 2017-01-04 PROCEDURE — 80053 COMPREHEN METABOLIC PANEL: CPT | Performed by: EMERGENCY MEDICINE

## 2017-01-04 PROCEDURE — 99223 1ST HOSP IP/OBS HIGH 75: CPT | Mod: GC | Performed by: INTERNAL MEDICINE

## 2017-01-04 PROCEDURE — 25000128 H RX IP 250 OP 636: Performed by: EMERGENCY MEDICINE

## 2017-01-04 PROCEDURE — 74176 CT ABD & PELVIS W/O CONTRAST: CPT

## 2017-01-04 PROCEDURE — 70450 CT HEAD/BRAIN W/O DYE: CPT

## 2017-01-04 PROCEDURE — 36415 COLL VENOUS BLD VENIPUNCTURE: CPT | Performed by: EMERGENCY MEDICINE

## 2017-01-04 PROCEDURE — 96365 THER/PROPH/DIAG IV INF INIT: CPT

## 2017-01-04 PROCEDURE — 99285 EMERGENCY DEPT VISIT HI MDM: CPT | Mod: 25

## 2017-01-04 PROCEDURE — 85027 COMPLETE CBC AUTOMATED: CPT | Performed by: SURGERY

## 2017-01-04 PROCEDURE — 96366 THER/PROPH/DIAG IV INF ADDON: CPT

## 2017-01-04 PROCEDURE — 25000125 ZZHC RX 250

## 2017-01-04 PROCEDURE — 83605 ASSAY OF LACTIC ACID: CPT

## 2017-01-04 PROCEDURE — 87081 CULTURE SCREEN ONLY: CPT | Performed by: INTERNAL MEDICINE

## 2017-01-04 PROCEDURE — 96361 HYDRATE IV INFUSION ADD-ON: CPT

## 2017-01-04 PROCEDURE — A9270 NON-COVERED ITEM OR SERVICE: HCPCS | Mod: GY | Performed by: EMERGENCY MEDICINE

## 2017-01-04 PROCEDURE — 20000004 ZZH R&B ICU UMMC

## 2017-01-04 PROCEDURE — 71010 XR CHEST PORT 1 VW: CPT

## 2017-01-04 PROCEDURE — 25000132 ZZH RX MED GY IP 250 OP 250 PS 637: Mod: GY | Performed by: EMERGENCY MEDICINE

## 2017-01-04 PROCEDURE — 81001 URINALYSIS AUTO W/SCOPE: CPT | Performed by: EMERGENCY MEDICINE

## 2017-01-04 PROCEDURE — 93010 ELECTROCARDIOGRAM REPORT: CPT | Performed by: INTERNAL MEDICINE

## 2017-01-04 PROCEDURE — 36415 COLL VENOUS BLD VENIPUNCTURE: CPT

## 2017-01-04 PROCEDURE — 87040 BLOOD CULTURE FOR BACTERIA: CPT | Performed by: EMERGENCY MEDICINE

## 2017-01-04 PROCEDURE — 83605 ASSAY OF LACTIC ACID: CPT | Mod: 91 | Performed by: EMERGENCY MEDICINE

## 2017-01-04 PROCEDURE — 96367 TX/PROPH/DG ADDL SEQ IV INF: CPT

## 2017-01-04 PROCEDURE — 82803 BLOOD GASES ANY COMBINATION: CPT

## 2017-01-04 RX ORDER — POTASSIUM CHLORIDE 7.45 MG/ML
10 INJECTION INTRAVENOUS
Status: DISCONTINUED | OUTPATIENT
Start: 2017-01-04 | End: 2017-01-07

## 2017-01-04 RX ORDER — NALOXONE HYDROCHLORIDE 0.4 MG/ML
INJECTION, SOLUTION INTRAMUSCULAR; INTRAVENOUS; SUBCUTANEOUS
Status: COMPLETED
Start: 2017-01-04 | End: 2017-01-04

## 2017-01-04 RX ORDER — ACETAMINOPHEN 650 MG/1
650 SUPPOSITORY RECTAL ONCE
Status: COMPLETED | OUTPATIENT
Start: 2017-01-04 | End: 2017-01-04

## 2017-01-04 RX ORDER — SODIUM CHLORIDE 9 MG/ML
INJECTION, SOLUTION INTRAVENOUS CONTINUOUS
Status: DISCONTINUED | OUTPATIENT
Start: 2017-01-04 | End: 2017-01-04 | Stop reason: HOSPADM

## 2017-01-04 RX ORDER — PIPERACILLIN SODIUM, TAZOBACTAM SODIUM 2; .25 G/10ML; G/10ML
2.25 INJECTION, POWDER, LYOPHILIZED, FOR SOLUTION INTRAVENOUS ONCE
Status: COMPLETED | OUTPATIENT
Start: 2017-01-04 | End: 2017-01-04

## 2017-01-04 RX ORDER — MAGNESIUM SULFATE HEPTAHYDRATE 40 MG/ML
4 INJECTION, SOLUTION INTRAVENOUS EVERY 4 HOURS PRN
Status: DISCONTINUED | OUTPATIENT
Start: 2017-01-04 | End: 2017-01-09 | Stop reason: HOSPADM

## 2017-01-04 RX ORDER — ONDANSETRON 4 MG/1
4 TABLET, ORALLY DISINTEGRATING ORAL EVERY 6 HOURS PRN
Status: DISCONTINUED | OUTPATIENT
Start: 2017-01-04 | End: 2017-01-05

## 2017-01-04 RX ORDER — FLUTICASONE PROPIONATE 50 MCG
2 SPRAY, SUSPENSION (ML) NASAL DAILY
Status: ON HOLD | COMMUNITY
End: 2020-10-29

## 2017-01-04 RX ORDER — HYDROMORPHONE HCL/0.9% NACL/PF 0.2MG/0.2
0.2 SYRINGE (ML) INTRAVENOUS
Status: DISCONTINUED | OUTPATIENT
Start: 2017-01-04 | End: 2017-01-05

## 2017-01-04 RX ORDER — LIDOCAINE 40 MG/G
CREAM TOPICAL
Status: DISCONTINUED | OUTPATIENT
Start: 2017-01-04 | End: 2017-01-04 | Stop reason: HOSPADM

## 2017-01-04 RX ORDER — POTASSIUM CHLORIDE 29.8 MG/ML
20 INJECTION INTRAVENOUS
Status: DISCONTINUED | OUTPATIENT
Start: 2017-01-04 | End: 2017-01-07

## 2017-01-04 RX ORDER — POTASSIUM CHLORIDE 750 MG/1
20-40 TABLET, EXTENDED RELEASE ORAL
Status: DISCONTINUED | OUTPATIENT
Start: 2017-01-04 | End: 2017-01-07

## 2017-01-04 RX ORDER — NALOXONE HYDROCHLORIDE 0.4 MG/ML
0.4 INJECTION, SOLUTION INTRAMUSCULAR; INTRAVENOUS; SUBCUTANEOUS ONCE
Status: COMPLETED | OUTPATIENT
Start: 2017-01-04 | End: 2017-01-04

## 2017-01-04 RX ORDER — ONDANSETRON 2 MG/ML
4 INJECTION INTRAMUSCULAR; INTRAVENOUS EVERY 6 HOURS PRN
Status: DISCONTINUED | OUTPATIENT
Start: 2017-01-04 | End: 2017-01-05

## 2017-01-04 RX ORDER — ONDANSETRON 2 MG/ML
INJECTION INTRAMUSCULAR; INTRAVENOUS
Status: COMPLETED
Start: 2017-01-04 | End: 2017-01-04

## 2017-01-04 RX ORDER — POTASSIUM CHLORIDE 1.5 G/1.58G
20-40 POWDER, FOR SOLUTION ORAL
Status: DISCONTINUED | OUTPATIENT
Start: 2017-01-04 | End: 2017-01-07

## 2017-01-04 RX ORDER — ONDANSETRON 2 MG/ML
4 INJECTION INTRAMUSCULAR; INTRAVENOUS ONCE
Status: COMPLETED | OUTPATIENT
Start: 2017-01-04 | End: 2017-01-04

## 2017-01-04 RX ORDER — NALOXONE HYDROCHLORIDE 0.4 MG/ML
.1-.4 INJECTION, SOLUTION INTRAMUSCULAR; INTRAVENOUS; SUBCUTANEOUS
Status: DISCONTINUED | OUTPATIENT
Start: 2017-01-04 | End: 2017-01-09 | Stop reason: HOSPADM

## 2017-01-04 RX ORDER — PIPERACILLIN SODIUM, TAZOBACTAM SODIUM 2; .25 G/10ML; G/10ML
2.25 INJECTION, POWDER, LYOPHILIZED, FOR SOLUTION INTRAVENOUS EVERY 6 HOURS
Status: DISCONTINUED | OUTPATIENT
Start: 2017-01-05 | End: 2017-01-05

## 2017-01-04 RX ADMIN — NALOXONE HYDROCHLORIDE 0.4 MG: 0.4 INJECTION, SOLUTION INTRAMUSCULAR; INTRAVENOUS; SUBCUTANEOUS at 16:16

## 2017-01-04 RX ADMIN — ONDANSETRON 4 MG: 2 INJECTION INTRAMUSCULAR; INTRAVENOUS at 16:36

## 2017-01-04 RX ADMIN — PIPERACILLIN SODIUM,TAZOBACTAM SODIUM 2.25 G: 2; .25 INJECTION, POWDER, FOR SOLUTION INTRAVENOUS at 18:32

## 2017-01-04 RX ADMIN — ONDANSETRON HYDROCHLORIDE 4 MG: 2 SOLUTION INTRAMUSCULAR; INTRAVENOUS at 16:36

## 2017-01-04 RX ADMIN — Medication 650 MG: at 18:04

## 2017-01-04 RX ADMIN — SODIUM CHLORIDE 1000 ML: 9 INJECTION, SOLUTION INTRAVENOUS at 19:35

## 2017-01-04 RX ADMIN — VANCOMYCIN HYDROCHLORIDE 1500 MG: 5 INJECTION, POWDER, LYOPHILIZED, FOR SOLUTION INTRAVENOUS at 19:14

## 2017-01-04 RX ADMIN — SODIUM CHLORIDE 1000 ML: 9 INJECTION, SOLUTION INTRAVENOUS at 16:59

## 2017-01-04 RX ADMIN — HYDROCORTISONE SODIUM SUCCINATE 100 MG: 100 INJECTION, POWDER, FOR SOLUTION INTRAMUSCULAR; INTRAVENOUS at 18:34

## 2017-01-04 RX ADMIN — SODIUM CHLORIDE 2000 ML: 9 INJECTION, SOLUTION INTRAVENOUS at 17:23

## 2017-01-04 RX ADMIN — SODIUM CHLORIDE: 9 INJECTION, SOLUTION INTRAVENOUS at 18:48

## 2017-01-04 NOTE — ED PROVIDER NOTES
"  History     Chief Complaint:  Altered mental status       HPI   Travis Peres is a Cantonese speaking 76 year old male who presents via EMS for evaluation of altered mental status. The patient underwent a same-day Transcervical Extended Mediastinal Lymphadenectomy, Possible Thymectomy yesterday as performed by Dr. Armstrong at [facility]. The patient was discharged yesterday and per the EMS report, he became unresponsive today. When they arrived to the house, he was laying on the floor incontinent of urine and stool. En route, he had a systolic blood pressure of 96 which his wife later states is his baseline.     The patient's wife arrived later during the patient's ED course and explained that the patient received a combination of a pain pill and a sleeping pill earlier this morning, as the patient was complaining of pain to his incision site. He otherwise was not complaining of abdominal pain, chest pain, shortness of breath, diarrhea, or other symptoms.     Through an over the phone interpretor the following history was obtained:    The patient underwent surgery from 0900 to 1200. He awoke from surgery around 1230 and had itching at 1300. Upon waking from the anesthesia, the patient was complaining of pain and itching; he was provided Benadryl and was discharged to home later in the evening. The patient asked to be discharged with something to help him sleep, though he was told that this would be unable to provide because the patient would, \"sleep too much.\" He was discharged to home around 1700 and at discharge, he was complaining of itching and pain. Upon returning to home, the patient was complaining of pain and was given 2 oxycodone and 1 sleeping pill. No more medications were provided today. The patient feel asleep around 2300 last night.  He was found sleeping at 0900 this morning. He has been in bed since that time until around 1500 when he seemed to wake and " "needed to have a bowel movement. She attempted to assist him to the bathroom, however he was too \"confused and weak,\" and he subsequently feel to the ground. She was unable to get him to stand and EMS was subsequently called.     Allergies:  Metoprolol  Fenofibric Acid  Lisinopril, cough   Losartan, cough  Niacin  Simvastatin, cramps      Medications:    Levothyroxine   Alendronate  Trazodone  Amitriptyline     Past Medical History:    Asthma  Hypertension  Depression   Papillary thyroid carcinoma     Past Surgical History:    Transcervical Extended Mediastinal Lymphadenectomy 01/03/2017  Hernia repair  Neck dissection level 2,3,4   shunt, right    Family History:    No past pertinent family history.     Social History:  The patient is negative for tobacco and alcohol use.    Marital Status:   [2]    Review of Systems   Unable to perform ROS: Mental status change     Physical Exam   First Vitals:  Patient Vitals for the past 24 hrs:   BP Temp Temp src Pulse Heart Rate Resp SpO2 Weight   01/04/17 2130 (!) 82/53 mmHg - - - - - - -   01/04/17 1905 91/62 mmHg - - - 117 19 96 % -   01/04/17 1900 95/55 mmHg - - - 115 16 100 % -   01/04/17 1857 106/58 mmHg - - - 117 17 100 % -   01/04/17 1850 99/63 mmHg - - - 124 23 (!) 89 % -   01/04/17 1700 (!) 86/64 mmHg - - - 115 21 - -   01/04/17 1658 - 101.8  F (38.8  C) Rectal - - - - 60.51 kg (133 lb 6.4 oz)   01/04/17 1654 (!) 80/59 mmHg - - - 116 23 - -   01/04/17 1610 104/68 mmHg - - - 125 20 90 % -   01/04/17 1606 - - - - 126 24 - -   01/04/17 1605 104/68 mmHg - - - - - - -      Physical Exam  GENERAL: confused, incontinent of urine and stool  HEAD: atraumatic  EYES: pinpoint pupils reactive, extraocular muscles intact, conjunctivae normal  ENT:  mucus membranes moist  NECK:  trachea midline, normal range of motion  RESPIRATORY: no tachypnea, breath sounds clear to auscultation   CVS: normal S1/S2, no murmurs, intact distal pulses  ABDOMEN: soft, nontender, " nondistention  MUSCULOSKELETAL: no deformities  SKIN: warm and dry, Recent horizontal sternotomy over the upper sternum with glue and no signs of infection   NEURO: confused, moves all extremities spontaneously     PSYCH:  Unable to assess      Emergency Department Course   ECG:  Indication: altered level of consciousness.   Time: 1609  Vent. Rate 126 bpm. WV interval 138. QRS duration 92. QT/QTc 402/582. P-R-T axis 66 78 30. Sinus tachycardia, right  bundle branch block incomplete, nonspecific ST abnormality. Abnormal ECG. Read time: 1613.      Imaging:  Radiographic findings were communicated with the patient's wife who voiced understanding of the findings.    XR Chest, portable:   Negative. As per radiology.     CT Head without contrast:   1. Atrophy and chronic white matter disease.  2. Shunt tube in place. No evidence for hydrocephalus.   3. No interval change.  4. Sinus disease as described, also not significantly changed. As per radiology.    CT Chest/Abdomen/Pelvis without IV contrast:   1. Findings concerning for possible infectious or inflammatory  colitis. Colon is decompressed, but there does appear to be colonic  wall thickening. No significant mesenteric inflammation is  appreciated. Follow up as clinically warranted.  2. Postoperative changes from anterior mediastinal mass resection. No  evidence of mediastinal hematoma.  3. Subtle reticulonodular changes in the right lung as described.  Findings may be related to old granulomatous disease or atypical  infection. No significant infiltrate or lung consolidation is present  to suggest pneumonia. As per radiology.     Laboratory:  CBC: WBC: 14.9(H), HGB: 14.8, PLT: 189  CMP: Creatinine 3.45(H), GFR 17(L), Calcium 8.3(L), Bilirubin total 1.4(H), (H), (H), o/w WNL.     Troponin: 12.135(HH)   BNP: 89290(H)    1626 Lactic acid: 5.3(HH)  1644 ISTAT VBG: pH 7.24(L) / PCO2 54(H) / PO2 63(H) / Bicarb 23    2002 ISTAT VBG: pH 7.15(LL) / PCO2 60(H) /  PO2 63(H) / Bicarb 21   Lactic acid: 2.4    UA with micro: small bilin, moderate blood, protein albumin 30, hyaline casts 5-10, moderate amorphous crystals, mucous present, o/w negative.      Blood cultures: pending X2 (drawn prior to antibiotic administration)      Interventions:  1616 Narcan 0.4mg IV  1636 Zofran, 4 mg, IV injection  1659 NS 1L IV     1723 NS 2L IV  1804 Tylenol 650mg suppository    1832 Zosyn 2.25g IV  1834 Solu-CORTEF 100mg IV   1848 NS 150mL/HR    1914 Vancocin 1500mg IV   1935 NS 1L IV    Emergency Department Course:    1602 I met the patient in stabilization room 02    The patient was transferred to the hospital bed and a hands off history was obtained   En route the patient had a 22 ana rosa established in his left hand, they was unable to draw blood.    The patient was found to be incontinent of urine and stool en route.     History; the patient had a thoracic surgery yesterday at the [facility]     1604 The patient was placed on continuous blood pressure monitoring and pulse oximetry.    HR: 126, BP: 104/68    Preparations made for EKG   RT presented to the room  1605 Blood drawn. This was sent to the lab for further testing, results above.   Initial examination performed    1606 Outside records reviewed from the [facility]  1608 Detailed examination performed.   1612 0.4mg of Narcan IV     1614 The patient's wife presented to the ED and I attempted to obtain a more thorough history with use of an over-the-phone interpretor.  See above:     1627 Portable chest xray performed.     1634 The patient began to vomit and have profuse diarrhea; this was sent for a C. Diff culture.     1636 Zofran, 4 mg, IV injection    1751 I consulted with Dr. Campa, cardiothoracic surgeon.     1811 I had a conversation regarding the patient's code status with the patient's wife.     The patient provided a urine sample here in the emergency department. This was sent for laboratory testing, findings above.     The patient was  sent for a CT scan of his abdomen/pelvis and head while in the emergency department, findings above.      1920 I had a discussion with the patient's wife though the  regarding the patient's ED course. Blood pressures have been running lower and they have since stopped his blood pressure medications. She states that latterly, the patient has had difficulty keeping his pressures above 100 systolic.     2154 I consulted with EMS regarding this patient and medications appropriate for transfer.     Findings and plan explained to the patient's wife. Patient will be transferred to the [facility] via EMS. Discussed the case with Dr. Campa, who will admit the patient to a monitored bed for further monitoring, evaluation, and treatment.      Impression & Plan    CMS Diagnoses:   The patient has signs of Septic Shock as evidenced by:    1. 2 SIRS criteria, AND  2. Suspected infection, AND   3. Hypotension despite adequate fluid administration or Lactic Acid level >4  Time sepsis present = present on arrival    3 Hour Septic Shock Bundle Completion:  1. Initial Lactic Acid Result:   Lab Test  01/04/17 2002 01/04/17   1626   LACT  2.4*  5.3*     2. Blood Cultures before Antibiotics: Yes  3. Broad Spectrum Antibiotics Administered: Yes; Vancomycin and Zosyn     4. 3000 ml of IV fluids.    Medical Decision Making:  Travis Peres is a 76 year old male who presents with altered mental status and fever. History is gathered through an  and the wife. The patient had surgery yesterday, operative note is not completed. I did speak with Dr. Campa and then the thoracic surgical resident multiple times. Labs show an elevation of troponin without any ST changes indicating ischemia or an acute myocardial infarction, BNP elevated as well. CT chest/abdomen/pelvis without IV contrast show possible colitis, however the surgical team was unclear how significant this is as there is some mild motion artifact as well. Creatinine is also  quite elevated. The patient is on daily steroids and I can not see a stress dose given during surgery and this was provided here with Solu-cortef. He was given IV fluids per sepsis protocol and repeat lactic shows improvement.     His blood pressures have been slightly low, although clinically he is looking much improved. He is now asking for something to eat and asking if we have any pancakes. He is much more alert, looking around, not having any specific complaints. The patient was given broad spectrum antibiotics and IV fluids. I contemplated doing a central line, but with him clinically improving and his wife stating that his blood pressure is less than 100 recently and that his doctors have told him to stop taking any blood pressure medications; I have held off on a central line given that he is improving both clinically and with his mental status. The patient is full-code. He does not look to be having any increase in his work of breathing.  I did count out his oxycodone and counted 3 pills missing.      Overall, it is an unusual presentation.  With lack of specific findings on CT chest abdomen pelvis and no clear source of infection, adrenal crisis seems most likely.  Up to date lists adrenal crisis findings including dehydration, hypotension and sepsis out of proportion to clinical scenario, unexplained fever, nausea and vomiting, amongst other signs and symptoms.     The elevated troponin is likely secondary to stress response.  No mention of pericardial findings on CT and speaking to thoracic surgery, this wasn't a concern with the procedure performed.      >> Critical Care time: was 75 minutes for this patient excluding procedures <<    Diagnosis:  1. (A41.9,  R65.21) Septic shock (H)  2. (E27.40) Adrenal insufficiency (H), possible adrenal crisis  3. (R79.89) Elevated troponin  4. (N17.9) Acute renal failure, unspecified acute renal failure type (H)  5. Recent mediastinal mass  resection    Plan:  Transfer to the [facility] to the ICU for further care and treatment.     I, Cooper Carmona, am serving as a scribe on 1/4/2017 at 4:03 PM to personally document services performed by Ruddy Grayson MD based on my observations and the provider's statements to me.      Cooper Carmona  1/4/2017    EMERGENCY DEPARTMENT        Ruddy Grayson MD  01/04/17 0106

## 2017-01-04 NOTE — LETTER
Hand-off for Care Transitions to Next Level of Care Provider  Name: Travis Peres  MRN #: [id number]    Primary Care Provider: Karol Alvarez  Primary Clinic: [address] [address]        Reason for Hospitalization:  Sepsis  Elevated troponin  Admit Date/Time: 1/4/2017 10:32 PM  Discharge Date: 1/9/2017    Reason for Communication Hand-off Referral: Other continuity of care    Discharge Plan:  Discharged to: Home with homecare                   Patient agreeable to post-hospital support suggestions:  Yes  Discharge Plan:             Patient is on new medications:   Yes                 Patient will receive  HOME CARE  at:  home    Follow-up specialty is recommended: Yes  This would communicate all referrals e.g. , CORE clinic, Homecare, Cardiac Rehab    Follow-up plan:  Future Appointments  Date Time Provider Department Center   1/9/2017 1:30 PM Open, [facility]   1/9/2017 2:00 PM Syeda Beasley, PT [facility]   1/9/2017 8:00 PM Open, [facility]   1/10/2017 8:00 AM Open, [facility]   1/10/2017 1:00 PM Devan [facility]   1/11/2017 8:00 AM Needed, MD [facility]   1/11/2017 1:30 PM Needed, MD [facility]   1/12/2017 8:00 AM Needed, MD [facility]   1/12/2017 1:30 PM Needed, MD [facility]   1/13/2017 8:00 AM Needed, MD [facility]   1/13/2017 1:30 PM Needed, MD [facility]   1/18/2017 2:30 PM Nadiya Allen MD [facility]       Any outstanding tests or procedures:        Referrals     Future Labs/Procedures    Home Care OT Referral for Hospital Discharge     Comments:    _______________________  [facility]  Phone  [phone]  Fax  [phone]  ______________________    OT to eval and treat    Patient speaks Cantonese and will need an .      Your  provider has ordered home care - occupational therapy. If you have not been contacted within 2 days of your discharge please call the department phone number listed on the top of this document.    Home Care PT Referral for Hospital Discharge     Comments:    _______________________  [facility]  Phone  [phone]  Fax  [phone]  ______________________      PT to eval and treat    Patient speaks Cantonese and will need an .     Your provider has ordered home care - physical therapy. If you have not been contacted within 2 days of your discharge please call the department phone number listed on the top of this document.            Key Recommendations:    Diet       Regular            Discharge Instructions      THORACIC SURGERY DISCHARGE INSTRUCTIONS       If your plans upon discharge include prolonged periods of sitting (i.e a lengthy car or plane ride), it is highly recommended to get up and walk at least once per hour to help prevent swelling and blood clots.     You may get incision wet. Do not submerge, soak, or scrub incision or swim until seen in follow-up or after two weeks.     Activity as tolerated, no strenous activity until seen in follow-up, no lifting greater than 20 pounds for the next 2 weeks.     Stay hydrated. Take over the counter fiber (metamucil or benefiber) and stool softeners (Miralax, docusate or senna) if becoming constipated with narcotic use.       Call for fever greater than 101.5, chills, increased size of incision, red skin around incision, vision changes, muscle strength changes, sensation changes, shortness of breath, or other concerns.     No driving while taking narcotic pain medication.     Transition to ibuprofen or tylenol/acetaminophen for pain control. Do not take tylenol/acetaminophen and acetaminophen containing narcotic (e.g., percocet or vicodin) at the same time. If you have known ulcer problems, or kidney trouble (elevated creatinine) do not take  the ibuprofen.     In emergencies, call [phone]     For other Questions or Concerns;   A.) During weekday working hours (Monday through Friday 8am to 4:30pm) call [phone] and ask to speak to a clinical nurse specialist.     B.) At nights (after 4:30pm), on weekends, or if urgent call [phone] and tell the   I would like to page job code [id number], the thoracic surgery fellow on call, please.        1. Follow up will be arranged by the Thoracic office, we will contact you to set up the appointment.  You may call [phone] if you wish to schedule before you are contacted.                     Follow-up Appointments      Adult [facility] Follow-up and recommended labs and tests      Follow up with Dr. Armstrong in one week for hospital follow- up. No follow up labs or test are needed.     Appointments on [place] and/or [facility] (with [facility] or [facility] provider or service). Call [phone] if you haven't heard regarding these appointments within 7 days of discharge               Manju Hernandez, RN Care Coordinator  [phone]

## 2017-01-04 NOTE — IP AVS SNAPSHOT
[address] [address]    Phone:  [phone]                                       After Visit Summary   1/4/2017    Travis Peres    MRN: [id number]           After Visit Summary Signature Page     I have received my discharge instructions, and my questions have been answered. I have discussed any challenges I see with this plan with the nurse or doctor.    ..........................................................................................................................................  Patient/Patient Representative Signature      ..........................................................................................................................................  Patient Representative Print Name and Relationship to Patient    ..................................................               ................................................  Date                                            Time    ..........................................................................................................................................  Reviewed by Signature/Title    ...................................................              ..............................................  Date                                                            Time

## 2017-01-04 NOTE — ED NOTES
Spouse at bedside. States took a pain pill and sleeping pill last night.    Per spouse patient had a 3 hour surgery under general anesthesia yesterday. Patient woke up from surgery and complained of pain and itching, was administered benadryl at that time. Wife states she tried to encourage patient to stay the night but patient insisted on going home after the procedure. Last night patient was complaining of pain and difficulty falling asleep. Wife gave patient 2 pain pills and a sleeping pill around 1900. Patient has been in bed since then per wife.    Today patient became confused and weak and went to the ground. Wife was unable to assist patient in getting up. 911 called and patient presented to ED.

## 2017-01-04 NOTE — ED NOTES
DATE:  1/4/2017   TIME OF RECEIPT FROM LAB:  6257  LAB TEST:  Troponin  LAB VALUE:  12.135  RESULTS GIVEN WITH READ-BACK TO (PROVIDER):  Dr. Grayson  TIME LAB VALUE REPORTED TO PROVIDER:   9707

## 2017-01-04 NOTE — LETTER
[address] [facility] [address] [address]  Phone: [phone]    January 8, 2017        Travis Peres  [address]  [facility] [address]          To whom it may concern:    RE: Travis Peres    Patient was treated in the hospital for adrenal insufficiency and global hypoperfusion from 1/4/2017 to 1/9/2017. He would not be medically cleared for flight for the next two weeks. Please reschedule his flight as needed.    Please contact me for questions or concerns.      Sincerely,        Osmani Schaeffer MD

## 2017-01-04 NOTE — IP AVS SNAPSHOT
MRN:[id number]                      After Visit Summary   1/4/2017    Travis Peres    MRN: [id number]           Thank you!     Thank you for choosing [facility] for your care. Our goal is always to provide you with excellent care. Hearing back from our patients is one way we can continue to improve our services. Please take a few minutes to complete the written survey that you may receive in the mail after you visit with us. Thank you!        Patient Information     Date Of Birth          1940        About your hospital stay     You were admitted on:  January 4, 2017 You last received care in the:  [address] [facility]    You were discharged on:  January 9, 2017       Who to Call     For medical emergencies, please call 911.  For non-urgent questions about your medical care, please call your primary care provider or clinic, [phone]          Attending Provider     Provider    Jerri Campa MD Andrade, Rafael Santiago, MD       Primary Care Provider Office Phone # Fax #    Karol Alvarez [phone]       [place] [address]        After Care Instructions     Diet       Regular            Discharge Instructions       THORACIC SURGERY DISCHARGE INSTRUCTIONS      If your plans upon discharge include prolonged periods of sitting (i.e a lengthy car or plane ride), it is highly recommended to get up and walk at least once per hour to help prevent swelling and blood clots.     You may get incision wet. Do not submerge, soak, or scrub incision or swim until seen in follow-up or after two weeks.     Activity as tolerated, no strenous activity until seen in follow-up, no lifting greater than 20 pounds for the next 2 weeks.    Stay hydrated. Take over the counter fiber (metamucil or benefiber) and stool softeners (Miralax, docusate or senna) if becoming constipated with narcotic use.      Call for fever greater than 101.5, chills,  increased size of incision, red skin around incision, vision changes, muscle strength changes, sensation changes, shortness of breath, or other concerns.    No driving while taking narcotic pain medication.    Transition to ibuprofen or tylenol/acetaminophen for pain control. Do not take tylenol/acetaminophen and acetaminophen containing narcotic (e.g., percocet or vicodin) at the same time. If you have known ulcer problems, or kidney trouble (elevated creatinine) do not take the ibuprofen.    In emergencies, call [phone]    For other Questions or Concerns;   A.) During weekday working hours (Monday through Friday 8am to 4:30pm) call [phone] and ask to speak to a clinical nurse specialist.     B.) At nights (after 4:30pm), on weekends, or if urgent call [phone] and tell the   I would like to page job code [phone], the thoracic surgery fellow on call, please.       1. Follow up will be arranged by the Thoracic office, we will contact you to set up the appointment.  You may call [phone] if you wish to schedule before you are contacted.                  Follow-up Appointments     Adult [facility] Follow-up and recommended labs and tests       Follow up with Dr. Armstrong in one week for hospital follow- up. No follow up labs or test are needed.    Appointments on [place] and/or [facility] (with [facility] or [facility] provider or service). Call [phone] if you haven't heard regarding these appointments within 7 days of discharge.                  Your next 10 appointments already scheduled     Jan 09, 2017  1:30 PM   [place] Inpatient with Assignments Open    Services Department ([facility])    [address] [place] [phone]               Jan 09, 2017  8:00 PM   [place] Inpatient with Assignments Open    Services Department ([facility])    [address] [place] [address]  [facility] [address]               [place] 10, 2017  8:00 AM   [place] Inpatient with Assignments Open    Services Department ([facility])    [address] [place] [address]               [place] 10, 2017  1:00 PM   [place] Inpatient with Bin Stephen    Services Department ([facility])    [address] [place] [phone]               Jan 11, 2017  8:00 AM   [place] Inpatient with  Needed, MD    Services Department ([facility])    [address] [place] [address]               Jan 11, 2017  1:30 PM   [place] Inpatient with  Needed, MD    Services Department ([facility])    [address] [place] [address]               Jan 12, 2017  8:00 AM   [place] Inpatient with  Needed, MD    Services Department ([facility])    [address] [place] [phone]               Jan 12, 2017  1:30 PM   [place] Inpatient with  Needed, MD    Services Department ([facility])    [address] [place] [address]               Jan 13, 2017  8:00 AM   [place] Inpatient with  Needed, MD    Services Department ([facility])    [address] [place] [address]               Jan 13, 2017  1:30 PM   [place] Inpatient with  Needed, MD    Services Department ([facility])    [address] [place] [address]                 Additional Services     Home Care OT Referral for Hospital  "Discharge       _______________________  [facility]  Phone  [phone]  Fax  [phone]  ______________________    OT to eval and treat    Patient speaks Cantonese and will need an .      Your provider has ordered home care - occupational therapy. If you have not been contacted within 2 days of your discharge please call the department phone number listed on the top of this document.            Home Care PT Referral for Hospital Discharge       _______________________  [facility]  Phone  [phone]  Fax  [phone]  ______________________      PT to eval and treat    Patient speaks Cantonese and will need an .     Your provider has ordered home care - physical therapy. If you have not been contacted within 2 days of your discharge please call the department phone number listed on the top of this document.                  Pending Results     Date and Time Order Name Status Description    1/8/2017 0753 EKG 12-lead, tracing only Preliminary     1/4/2017 1809 Blood culture Preliminary     1/4/2017 1651 Blood culture Preliminary             Statement of Approval     Ordered          01/09/17 0727  I have reviewed and agree with all the recommendations and orders detailed in this document.   EFFECTIVE NOW     Approved and electronically signed by:  Osmani Schaeffer MD             Admission Information        Provider Department Dept Phone    1/4/2017 Ernesto Armstrong MD [facility] [phone]      Your Vitals Were     Blood Pressure Temperature Respirations    166/101 mmHg 98.4  F (36.9  C) (Oral) 16    Height Weight BMI (Body Mass Index)    1.651 m (5' 5\") 63.6 kg (140 lb 3.4 oz) 23.33 kg/m2    Pulse Oximetry          96%        MyChart Information     [employer] lets you send messages to your doctor, view your test results, renew your prescriptions, schedule appointments and more. To sign up, go to [email or web address] . Click on \"Log in\" on the left side of the " "screen, which will take you to the Welcome page. Then click on \"Sign up Now\" on the right side of the page.     You will be asked to enter the access code listed below, as well as some personal information. Please follow the directions to create your username and password.     Your access code is: [email or web address]  Expires: 2017  6:30 AM     Your access code will  in 90 days. If you need help or a new code, please call your [facility] or [phone].        Care EveryWhere ID     This is your Care EveryWhere ID. This could be used by other organizations to access your [facility] medical records  [id number]           Review of your medicines      CONTINUE these medicines which may have CHANGED, or have new prescriptions. If we are uncertain of the size of tablets/capsules you have at home, strength may be listed as something that might have changed.        Dose / Directions    levothyroxine 137 MCG tablet   Commonly known as:  SYNTHROID/LEVOTHROID   This may have changed:  when to take this   Used for:  Postsurgical hypothyroidism, Osteopenia, Hypopituitarism (H), Pituitary adenoma (H), Papillary thyroid carcinoma (H)        Dose:  137 mcg   Take 1 tablet (137 mcg) by mouth daily   Quantity:  120 tablet   Refills:  3       oxyCODONE 5 MG IR tablet   Commonly known as:  ROXICODONE   This may have changed:    - how much to take  - when to take this   Used for:  Acute post-operative pain        Dose:  5 mg   Take 1 tablet (5 mg) by mouth 3 times daily as needed for pain or other (Moderate to Severe)   Quantity:  30 tablet   Refills:  0         CONTINUE these medicines which have NOT CHANGED        Dose / Directions    alendronate 70 MG tablet   Commonly known as:  FOSAMAX   Used for:  Postsurgical hypothyroidism, Papillary thyroid carcinoma (H), Pituitary adenoma (H), Osteopenia, Hypopituitarism (H)        Dose:  70 mg   Take 1 tablet (70 mg) by mouth every 7 days   Quantity:  16 tablet   Refills:  3       " AMITRIPTYLINE HCL PO        Dose:  25 mg   Take 25 mg by mouth At Bedtime   Refills:  0       COMBIVENT RESPIMAT  MCG/ACT inhaler   Generic drug:  Ipratropium-Albuterol        Dose:  1 puff   Inhale 1 puff into the lungs 4 times daily as needed   Refills:  0       DIPHENHYDRAMINE HCL   Used for:  Cancer of thyroid (H), Postsurgical hypothyroidism, Hypopituitarism (H), Osteopenia, Polyglandular dysfunction, unspecified (H)        50 mg take one every 6 hours as needed for itching   Refills:  0       fluticasone 50 MCG/ACT spray   Commonly known as:  FLONASE        Dose:  2 spray   Spray 2 sprays into both nostrils daily   Refills:  0       hydrocortisone 10 MG tablet   Commonly known as:  CORTEF   Used for:  Postsurgical hypothyroidism, Hypopituitarism (H), Osteopenia, Pituitary adenoma (H), Papillary thyroid carcinoma (H)        Take one tablet by mouth daily in the morning, 1/2 tablet at 2 PM   Quantity:  180 tablet   Refills:  3       ibuprofen 400 MG tablet   Commonly known as:  ADVIL/MOTRIN        Dose:  400-600 mg   Take 400-600 mg by mouth every 6 hours as needed for moderate pain   Refills:  0       MUCUS RELIEF ADULT PO   Used for:  Papillary thyroid carcinoma (H), Postsurgical hypothyroidism, Malignant neoplasm of thyroid gland (H), Cancer of thyroid (H), 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   Quantity:  30 tablet   Refills:  0       TAMSULOSIN HCL PO        Dose:  0.4  mg   Take 0.4 mg by mouth At Bedtime   Refills:  0         STOP taking     traZODone 100 MG tablet   Commonly known as:  DESYREL                    Protect others around you: Learn how to safely use, store and throw away your medicines at www.disposemymeds.org.             Medication List: This is a list of all your medications and when to take them. Check marks below indicate your daily home schedule. Keep this list as a reference.      Medications           Morning Afternoon Evening Bedtime As Needed    alendronate 70 MG tablet   Commonly known as:  FOSAMAX   Take 1 tablet (70 mg) by mouth every 7 days                                AMITRIPTYLINE HCL PO   Take 25 mg by mouth At Bedtime                                COMBIVENT RESPIMAT  MCG/ACT inhaler   Inhale 1 puff into the lungs 4 times daily as needed   Generic drug:  Ipratropium-Albuterol                                DIPHENHYDRAMINE HCL   50 mg take one every 6 hours as needed for itching                                fluticasone 50 MCG/ACT spray   Commonly known as:  FLONASE   Spray 2 sprays into both nostrils daily                                hydrocortisone 10 MG tablet   Commonly known as:  CORTEF   Take one tablet by mouth daily in the morning, 1/2 tablet at 2 PM   Last time this was given:  10 mg on 1/9/2017  8:31 AM                                ibuprofen 400 MG tablet   Commonly known as:  ADVIL/MOTRIN   Take 400-600 mg by mouth every 6 hours as needed for moderate pain                                levothyroxine 137 MCG tablet   Commonly known as:  SYNTHROID/LEVOTHROID   Take 1 tablet (137 mcg) by mouth daily   Last time this was given:  137 mcg on 1/9/2017  8:31 AM                                MUCUS RELIEF ADULT PO   Take 400 mg by mouth 2 times daily as needed                                oxyCODONE 5 MG IR tablet   Commonly known as:  ROXICODONE   Take 1 tablet (5 mg) by mouth 3 times daily as needed for pain or other (Moderate to  Severe)   Last time this was given:  2.5 mg on 1/7/2017  7:16 PM                                ranitidine HCl 300 MG Caps   Take 300 mg by mouth nightly as needed                                senna-docusate 8.6-50 MG per tablet   Commonly known as:  SENOKOT-S;PERICOLACE   Take 1-2 tablets by mouth 2 times daily Take while on oral narcotics to prevent or treat constipation.                                TAMSULOSIN HCL PO   Take 0.4 mg by mouth At Bedtime

## 2017-01-04 NOTE — ED NOTES
Bed: ST02  Expected date:   Expected time:   Means of arrival:   Comments:  411  77 m altered /glucose 96  1600

## 2017-01-05 ENCOUNTER — APPOINTMENT (OUTPATIENT)
Dept: CARDIOLOGY | Facility: CLINIC | Age: 77
DRG: 853 | End: 2017-01-05
Attending: STUDENT IN AN ORGANIZED HEALTH CARE EDUCATION/TRAINING PROGRAM
Payer: MEDICARE

## 2017-01-05 LAB
ABO + RH BLD: NORMAL
ABO + RH BLD: NORMAL
ALBUMIN SERPL-MCNC: 2.4 G/DL (ref 3.4–5)
ALBUMIN UR-MCNC: 10 MG/DL
ALP SERPL-CCNC: 64 U/L (ref 40–150)
ALT SERPL W P-5'-P-CCNC: 114 U/L (ref 0–70)
ANION GAP SERPL CALCULATED.3IONS-SCNC: 10 MMOL/L (ref 3–14)
ANION GAP SERPL CALCULATED.3IONS-SCNC: 10 MMOL/L (ref 3–14)
APPEARANCE UR: ABNORMAL
AST SERPL W P-5'-P-CCNC: 227 U/L (ref 0–45)
BACTERIA #/AREA URNS HPF: ABNORMAL /HPF
BACTERIA SPEC CULT: NORMAL
BASE DEFICIT BLDA-SCNC: 6.4 MMOL/L
BASE DEFICIT BLDA-SCNC: 7.2 MMOL/L
BILIRUB SERPL-MCNC: 0.6 MG/DL (ref 0.2–1.3)
BILIRUB UR QL STRIP: NEGATIVE
BLD GP AB SCN SERPL QL: NORMAL
BLOOD BANK CMNT PATIENT-IMP: NORMAL
BUN SERPL-MCNC: 21 MG/DL (ref 7–30)
BUN SERPL-MCNC: 22 MG/DL (ref 7–30)
C DIFF TOX B STL QL: NORMAL
CA-I BLD-MCNC: 4 MG/DL (ref 4.4–5.2)
CALCIUM SERPL-MCNC: 6.4 MG/DL (ref 8.5–10.1)
CALCIUM SERPL-MCNC: 6.6 MG/DL (ref 8.5–10.1)
CHLORIDE SERPL-SCNC: 113 MMOL/L (ref 94–109)
CHLORIDE SERPL-SCNC: 115 MMOL/L (ref 94–109)
CK SERPL-CCNC: 3938 U/L (ref 30–300)
CO2 SERPL-SCNC: 21 MMOL/L (ref 20–32)
CO2 SERPL-SCNC: 21 MMOL/L (ref 20–32)
COLOR UR AUTO: YELLOW
CREAT SERPL-MCNC: 2.21 MG/DL (ref 0.66–1.25)
CREAT SERPL-MCNC: 2.42 MG/DL (ref 0.66–1.25)
ERYTHROCYTE [DISTWIDTH] IN BLOOD BY AUTOMATED COUNT: 13.9 % (ref 10–15)
ERYTHROCYTE [DISTWIDTH] IN BLOOD BY AUTOMATED COUNT: 14.1 % (ref 10–15)
FIBRINOGEN PPP-MCNC: 398 MG/DL (ref 200–420)
GFR SERPL CREATININE-BSD FRML MDRD: 26 ML/MIN/1.7M2
GFR SERPL CREATININE-BSD FRML MDRD: 29 ML/MIN/1.7M2
GLUCOSE BLDC GLUCOMTR-MCNC: 112 MG/DL (ref 70–99)
GLUCOSE BLDC GLUCOMTR-MCNC: 182 MG/DL (ref 70–99)
GLUCOSE BLDC GLUCOMTR-MCNC: 196 MG/DL (ref 70–99)
GLUCOSE BLDC GLUCOMTR-MCNC: 200 MG/DL (ref 70–99)
GLUCOSE SERPL-MCNC: 118 MG/DL (ref 70–99)
GLUCOSE SERPL-MCNC: 184 MG/DL (ref 70–99)
GLUCOSE UR STRIP-MCNC: 30 MG/DL
HCO3 BLD-SCNC: 20 MMOL/L (ref 21–28)
HCO3 BLD-SCNC: 20 MMOL/L (ref 21–28)
HCT VFR BLD AUTO: 36.1 % (ref 40–53)
HCT VFR BLD AUTO: 36.6 % (ref 40–53)
HGB BLD-MCNC: 11.4 G/DL (ref 13.3–17.7)
HGB BLD-MCNC: 11.5 G/DL (ref 13.3–17.7)
HGB UR QL STRIP: ABNORMAL
INR PPP: 1.51 (ref 0.86–1.14)
KETONES UR STRIP-MCNC: 10 MG/DL
LACTATE BLD-SCNC: 1.3 MMOL/L (ref 0.7–2.1)
LACTATE BLD-SCNC: 1.3 MMOL/L (ref 0.7–2.1)
LACTATE BLD-SCNC: 1.5 MMOL/L (ref 0.7–2.1)
LACTATE BLD-SCNC: 1.9 MMOL/L (ref 0.7–2.1)
LEUKOCYTE ESTERASE UR QL STRIP: NEGATIVE
LMWH PPP CHRO-ACNC: 0.1 IU/ML
LMWH PPP CHRO-ACNC: 0.24 IU/ML
MAGNESIUM SERPL-MCNC: 1.8 MG/DL (ref 1.6–2.3)
MAGNESIUM SERPL-MCNC: 1.8 MG/DL (ref 1.6–2.3)
MCH RBC QN AUTO: 25.8 PG (ref 26.5–33)
MCH RBC QN AUTO: 25.9 PG (ref 26.5–33)
MCHC RBC AUTO-ENTMCNC: 31.4 G/DL (ref 31.5–36.5)
MCHC RBC AUTO-ENTMCNC: 31.6 G/DL (ref 31.5–36.5)
MCV RBC AUTO: 82 FL (ref 78–100)
MCV RBC AUTO: 82 FL (ref 78–100)
MICRO REPORT STATUS: NORMAL
MRSA DNA SPEC QL NAA+PROBE: NORMAL
MUCOUS THREADS #/AREA URNS LPF: PRESENT /LPF
NITRATE UR QL: NEGATIVE
O2/TOTAL GAS SETTING VFR VENT: 35 %
O2/TOTAL GAS SETTING VFR VENT: ABNORMAL %
OXYHGB MFR BLD: 96 % (ref 92–100)
PCO2 BLD: 47 MM HG (ref 35–45)
PCO2 BLD: 52 MM HG (ref 35–45)
PH BLD: 7.2 PH (ref 7.35–7.45)
PH BLD: 7.25 PH (ref 7.35–7.45)
PH UR STRIP: 5 PH (ref 5–7)
PHOSPHATE SERPL-MCNC: 2.9 MG/DL (ref 2.5–4.5)
PHOSPHATE SERPL-MCNC: 3.2 MG/DL (ref 2.5–4.5)
PLATELET # BLD AUTO: 119 10E9/L (ref 150–450)
PLATELET # BLD AUTO: 125 10E9/L (ref 150–450)
PO2 BLD: 107 MM HG (ref 80–105)
PO2 BLD: 122 MM HG (ref 80–105)
POTASSIUM SERPL-SCNC: 4.1 MMOL/L (ref 3.4–5.3)
POTASSIUM SERPL-SCNC: 4.4 MMOL/L (ref 3.4–5.3)
PROT SERPL-MCNC: 5.4 G/DL (ref 6.8–8.8)
RBC # BLD AUTO: 4.4 10E12/L (ref 4.4–5.9)
RBC # BLD AUTO: 4.45 10E12/L (ref 4.4–5.9)
RBC #/AREA URNS AUTO: 4 /HPF (ref 0–2)
SODIUM SERPL-SCNC: 144 MMOL/L (ref 133–144)
SODIUM SERPL-SCNC: 146 MMOL/L (ref 133–144)
SP GR UR STRIP: 1.01 (ref 1–1.03)
SPECIMEN EXP DATE BLD: NORMAL
SPECIMEN SOURCE: NORMAL
SQUAMOUS #/AREA URNS AUTO: <1 /HPF (ref 0–1)
TRANS CELLS #/AREA URNS HPF: <1 /HPF (ref 0–1)
TROPONIN I SERPL-MCNC: 12.13 UG/L (ref 0–0.04)
TROPONIN I SERPL-MCNC: 15.18 UG/L (ref 0–0.04)
TROPONIN I SERPL-MCNC: 9.23 UG/L (ref 0–0.04)
TROPONIN I SERPL-MCNC: 9.73 UG/L (ref 0–0.04)
URN SPEC COLLECT METH UR: ABNORMAL
UROBILINOGEN UR STRIP-MCNC: NORMAL MG/DL (ref 0–2)
VANCOMYCIN SERPL-MCNC: 7.6 MG/L
WBC # BLD AUTO: 18.4 10E9/L (ref 4–11)
WBC # BLD AUTO: 20.1 10E9/L (ref 4–11)
WBC #/AREA URNS AUTO: 10 /HPF (ref 0–2)

## 2017-01-05 PROCEDURE — 25500064 ZZH RX 255 OP 636: Performed by: THORACIC SURGERY (CARDIOTHORACIC VASCULAR SURGERY)

## 2017-01-05 PROCEDURE — 84484 ASSAY OF TROPONIN QUANT: CPT | Performed by: SURGERY

## 2017-01-05 PROCEDURE — 99233 SBSQ HOSP IP/OBS HIGH 50: CPT | Mod: GC | Performed by: INTERNAL MEDICINE

## 2017-01-05 PROCEDURE — 82330 ASSAY OF CALCIUM: CPT | Performed by: SURGERY

## 2017-01-05 PROCEDURE — 85610 PROTHROMBIN TIME: CPT | Performed by: THORACIC SURGERY (CARDIOTHORACIC VASCULAR SURGERY)

## 2017-01-05 PROCEDURE — 85520 HEPARIN ASSAY: CPT | Performed by: INTERNAL MEDICINE

## 2017-01-05 PROCEDURE — 25000128 H RX IP 250 OP 636: Performed by: THORACIC SURGERY (CARDIOTHORACIC VASCULAR SURGERY)

## 2017-01-05 PROCEDURE — 25000125 ZZHC RX 250: Performed by: STUDENT IN AN ORGANIZED HEALTH CARE EDUCATION/TRAINING PROGRAM

## 2017-01-05 PROCEDURE — 82550 ASSAY OF CK (CPK): CPT | Performed by: THORACIC SURGERY (CARDIOTHORACIC VASCULAR SURGERY)

## 2017-01-05 PROCEDURE — 27210136 ZZH KIT CATH ARTERIAL EXT SUPPLY

## 2017-01-05 PROCEDURE — 25000132 ZZH RX MED GY IP 250 OP 250 PS 637: Mod: GY | Performed by: SURGERY

## 2017-01-05 PROCEDURE — 25000125 ZZHC RX 250

## 2017-01-05 PROCEDURE — 87493 C DIFF AMPLIFIED PROBE: CPT | Performed by: SURGERY

## 2017-01-05 PROCEDURE — 80048 BASIC METABOLIC PNL TOTAL CA: CPT | Performed by: SURGERY

## 2017-01-05 PROCEDURE — 40000014 ZZH STATISTIC ARTERIAL MONITORING DAILY

## 2017-01-05 PROCEDURE — 94640 AIRWAY INHALATION TREATMENT: CPT | Mod: 76

## 2017-01-05 PROCEDURE — 40000275 ZZH STATISTIC RCP TIME EA 10 MIN

## 2017-01-05 PROCEDURE — 93005 ELECTROCARDIOGRAM TRACING: CPT

## 2017-01-05 PROCEDURE — 25000125 ZZHC RX 250: Performed by: SURGERY

## 2017-01-05 PROCEDURE — 86850 RBC ANTIBODY SCREEN: CPT | Performed by: THORACIC SURGERY (CARDIOTHORACIC VASCULAR SURGERY)

## 2017-01-05 PROCEDURE — 80053 COMPREHEN METABOLIC PANEL: CPT | Performed by: THORACIC SURGERY (CARDIOTHORACIC VASCULAR SURGERY)

## 2017-01-05 PROCEDURE — 85520 HEPARIN ASSAY: CPT | Performed by: SURGERY

## 2017-01-05 PROCEDURE — 83735 ASSAY OF MAGNESIUM: CPT | Performed by: INTERNAL MEDICINE

## 2017-01-05 PROCEDURE — 00000146 ZZHCL STATISTIC GLUCOSE BY METER IP

## 2017-01-05 PROCEDURE — 84484 ASSAY OF TROPONIN QUANT: CPT | Performed by: THORACIC SURGERY (CARDIOTHORACIC VASCULAR SURGERY)

## 2017-01-05 PROCEDURE — 93010 ELECTROCARDIOGRAM REPORT: CPT | Performed by: INTERNAL MEDICINE

## 2017-01-05 PROCEDURE — 80202 ASSAY OF VANCOMYCIN: CPT | Performed by: THORACIC SURGERY (CARDIOTHORACIC VASCULAR SURGERY)

## 2017-01-05 PROCEDURE — 82805 BLOOD GASES W/O2 SATURATION: CPT | Performed by: SURGERY

## 2017-01-05 PROCEDURE — 81001 URINALYSIS AUTO W/SCOPE: CPT | Performed by: SURGERY

## 2017-01-05 PROCEDURE — 94640 AIRWAY INHALATION TREATMENT: CPT

## 2017-01-05 PROCEDURE — A9270 NON-COVERED ITEM OR SERVICE: HCPCS | Mod: GY | Performed by: SURGERY

## 2017-01-05 PROCEDURE — 86901 BLOOD TYPING SEROLOGIC RH(D): CPT | Performed by: THORACIC SURGERY (CARDIOTHORACIC VASCULAR SURGERY)

## 2017-01-05 PROCEDURE — 25000125 ZZHC RX 250: Performed by: THORACIC SURGERY (CARDIOTHORACIC VASCULAR SURGERY)

## 2017-01-05 PROCEDURE — 83605 ASSAY OF LACTIC ACID: CPT | Performed by: SURGERY

## 2017-01-05 PROCEDURE — 85384 FIBRINOGEN ACTIVITY: CPT | Performed by: THORACIC SURGERY (CARDIOTHORACIC VASCULAR SURGERY)

## 2017-01-05 PROCEDURE — 83605 ASSAY OF LACTIC ACID: CPT | Performed by: STUDENT IN AN ORGANIZED HEALTH CARE EDUCATION/TRAINING PROGRAM

## 2017-01-05 PROCEDURE — 86900 BLOOD TYPING SEROLOGIC ABO: CPT | Performed by: THORACIC SURGERY (CARDIOTHORACIC VASCULAR SURGERY)

## 2017-01-05 PROCEDURE — 85027 COMPLETE CBC AUTOMATED: CPT | Performed by: SURGERY

## 2017-01-05 PROCEDURE — 84100 ASSAY OF PHOSPHORUS: CPT | Performed by: INTERNAL MEDICINE

## 2017-01-05 PROCEDURE — 84100 ASSAY OF PHOSPHORUS: CPT | Performed by: THORACIC SURGERY (CARDIOTHORACIC VASCULAR SURGERY)

## 2017-01-05 PROCEDURE — 40000264 ECHO COMPLETE WITH OPTISON

## 2017-01-05 PROCEDURE — 85027 COMPLETE CBC AUTOMATED: CPT | Performed by: INTERNAL MEDICINE

## 2017-01-05 PROCEDURE — 25000128 H RX IP 250 OP 636: Performed by: SURGERY

## 2017-01-05 PROCEDURE — 93306 TTE W/DOPPLER COMPLETE: CPT | Mod: 26 | Performed by: INTERNAL MEDICINE

## 2017-01-05 PROCEDURE — 85027 COMPLETE CBC AUTOMATED: CPT | Performed by: THORACIC SURGERY (CARDIOTHORACIC VASCULAR SURGERY)

## 2017-01-05 PROCEDURE — 87086 URINE CULTURE/COLONY COUNT: CPT | Performed by: INTERNAL MEDICINE

## 2017-01-05 PROCEDURE — 84100 ASSAY OF PHOSPHORUS: CPT | Performed by: SURGERY

## 2017-01-05 PROCEDURE — 20000004 ZZH R&B ICU UMMC

## 2017-01-05 PROCEDURE — 94660 CPAP INITIATION&MGMT: CPT

## 2017-01-05 PROCEDURE — 99291 CRITICAL CARE FIRST HOUR: CPT | Mod: GC | Performed by: SURGERY

## 2017-01-05 PROCEDURE — 83735 ASSAY OF MAGNESIUM: CPT | Performed by: SURGERY

## 2017-01-05 PROCEDURE — 83735 ASSAY OF MAGNESIUM: CPT | Performed by: THORACIC SURGERY (CARDIOTHORACIC VASCULAR SURGERY)

## 2017-01-05 PROCEDURE — 82803 BLOOD GASES ANY COMBINATION: CPT | Performed by: SURGERY

## 2017-01-05 PROCEDURE — 25000132 ZZH RX MED GY IP 250 OP 250 PS 637: Mod: GY | Performed by: STUDENT IN AN ORGANIZED HEALTH CARE EDUCATION/TRAINING PROGRAM

## 2017-01-05 PROCEDURE — 80048 BASIC METABOLIC PNL TOTAL CA: CPT | Performed by: INTERNAL MEDICINE

## 2017-01-05 RX ORDER — ACETAMINOPHEN 10 MG/ML
1000 INJECTION, SOLUTION INTRAVENOUS EVERY 8 HOURS PRN
Status: DISCONTINUED | OUTPATIENT
Start: 2017-01-05 | End: 2017-01-05

## 2017-01-05 RX ORDER — ASPIRIN 81 MG/1
324 TABLET, CHEWABLE ORAL DAILY
Status: DISCONTINUED | OUTPATIENT
Start: 2017-01-05 | End: 2017-01-05

## 2017-01-05 RX ORDER — HEPARIN SODIUM 10000 [USP'U]/100ML
0-3500 INJECTION, SOLUTION INTRAVENOUS CONTINUOUS
Status: DISCONTINUED | OUTPATIENT
Start: 2017-01-05 | End: 2017-01-06

## 2017-01-05 RX ORDER — DEXTROSE MONOHYDRATE 25 G/50ML
25-50 INJECTION, SOLUTION INTRAVENOUS
Status: DISCONTINUED | OUTPATIENT
Start: 2017-01-05 | End: 2017-01-09 | Stop reason: HOSPADM

## 2017-01-05 RX ORDER — ASPIRIN 81 MG/1
81 TABLET, CHEWABLE ORAL DAILY
Status: DISCONTINUED | OUTPATIENT
Start: 2017-01-05 | End: 2017-01-09 | Stop reason: HOSPADM

## 2017-01-05 RX ORDER — NICOTINE POLACRILEX 4 MG
15-30 LOZENGE BUCCAL
Status: DISCONTINUED | OUTPATIENT
Start: 2017-01-05 | End: 2017-01-09 | Stop reason: HOSPADM

## 2017-01-05 RX ORDER — ATORVASTATIN CALCIUM 20 MG/1
20 TABLET, FILM COATED ORAL DAILY
Status: DISCONTINUED | OUTPATIENT
Start: 2017-01-05 | End: 2017-01-09 | Stop reason: HOSPADM

## 2017-01-05 RX ORDER — PIPERACILLIN SODIUM, TAZOBACTAM SODIUM 3; .375 G/15ML; G/15ML
3.38 INJECTION, POWDER, LYOPHILIZED, FOR SOLUTION INTRAVENOUS EVERY 6 HOURS
Status: DISCONTINUED | OUTPATIENT
Start: 2017-01-05 | End: 2017-01-06

## 2017-01-05 RX ORDER — ASPIRIN 300 MG/1
300 SUPPOSITORY RECTAL DAILY
Status: DISCONTINUED | OUTPATIENT
Start: 2017-01-06 | End: 2017-01-05

## 2017-01-05 RX ORDER — ACETAMINOPHEN 10 MG/ML
1000 INJECTION, SOLUTION INTRAVENOUS EVERY 8 HOURS
Status: DISCONTINUED | OUTPATIENT
Start: 2017-01-05 | End: 2017-01-05

## 2017-01-05 RX ORDER — ASPIRIN 325 MG
325 TABLET ORAL DAILY
Status: DISCONTINUED | OUTPATIENT
Start: 2017-01-06 | End: 2017-01-05

## 2017-01-05 RX ORDER — ACETAMINOPHEN 10 MG/ML
650 INJECTION, SOLUTION INTRAVENOUS ONCE
Status: COMPLETED | OUTPATIENT
Start: 2017-01-05 | End: 2017-01-05

## 2017-01-05 RX ORDER — LEVOTHYROXINE SODIUM ANHYDROUS 100 UG/5ML
68 INJECTION, POWDER, LYOPHILIZED, FOR SOLUTION INTRAVENOUS DAILY
Status: DISCONTINUED | OUTPATIENT
Start: 2017-01-05 | End: 2017-01-06

## 2017-01-05 RX ORDER — IPRATROPIUM BROMIDE AND ALBUTEROL SULFATE 2.5; .5 MG/3ML; MG/3ML
3 SOLUTION RESPIRATORY (INHALATION) EVERY 6 HOURS
Status: DISCONTINUED | OUTPATIENT
Start: 2017-01-05 | End: 2017-01-06

## 2017-01-05 RX ORDER — LEVOTHYROXINE SODIUM 137 UG/1
137 TABLET ORAL EVERY MORNING
Status: DISCONTINUED | OUTPATIENT
Start: 2017-01-05 | End: 2017-01-05

## 2017-01-05 RX ADMIN — DEXTROSE AND SODIUM CHLORIDE: 5; 900 INJECTION, SOLUTION INTRAVENOUS at 00:00

## 2017-01-05 RX ADMIN — PANTOPRAZOLE SODIUM 40 MG: 40 INJECTION, POWDER, FOR SOLUTION INTRAVENOUS at 08:55

## 2017-01-05 RX ADMIN — HEPARIN SODIUM 900 UNITS/HR: 10000 INJECTION, SOLUTION INTRAVENOUS at 22:40

## 2017-01-05 RX ADMIN — HEPARIN SODIUM 750 UNITS/HR: 10000 INJECTION, SOLUTION INTRAVENOUS at 01:07

## 2017-01-05 RX ADMIN — IPRATROPIUM BROMIDE AND ALBUTEROL SULFATE 3 ML: .5; 3 SOLUTION RESPIRATORY (INHALATION) at 20:23

## 2017-01-05 RX ADMIN — PIPERACILLIN AND TAZOBACTAM 3.38 G: 3; .375 INJECTION, POWDER, FOR SOLUTION INTRAVENOUS at 19:38

## 2017-01-05 RX ADMIN — PIPERACILLIN AND TAZOBACTAM 3.38 G: 3; .375 INJECTION, POWDER, FOR SOLUTION INTRAVENOUS at 13:59

## 2017-01-05 RX ADMIN — DEXTROSE AND SODIUM CHLORIDE: 5; 900 INJECTION, SOLUTION INTRAVENOUS at 05:29

## 2017-01-05 RX ADMIN — HYDROCORTISONE SODIUM SUCCINATE 50 MG: 100 INJECTION, POWDER, FOR SOLUTION INTRAMUSCULAR; INTRAVENOUS at 01:58

## 2017-01-05 RX ADMIN — ASPIRIN 81 MG CHEWABLE TABLET 324 MG: 81 TABLET CHEWABLE at 05:17

## 2017-01-05 RX ADMIN — HUMAN ALBUMIN MICROSPHERES AND PERFLUTREN 6 ML: 10; .22 INJECTION, SOLUTION INTRAVENOUS at 11:45

## 2017-01-05 RX ADMIN — Medication 2 G: at 05:29

## 2017-01-05 RX ADMIN — ACETAMINOPHEN 650 MG: 10 INJECTION, SOLUTION INTRAVENOUS at 21:42

## 2017-01-05 RX ADMIN — INSULIN ASPART 2 UNITS: 100 INJECTION, SOLUTION INTRAVENOUS; SUBCUTANEOUS at 18:06

## 2017-01-05 RX ADMIN — INSULIN ASPART 1 UNITS: 100 INJECTION, SOLUTION INTRAVENOUS; SUBCUTANEOUS at 21:09

## 2017-01-05 RX ADMIN — PIPERACILLIN AND TAZOBACTAM 3.38 G: 3; .375 INJECTION, POWDER, FOR SOLUTION INTRAVENOUS at 08:55

## 2017-01-05 RX ADMIN — INSULIN ASPART 2 UNITS: 100 INJECTION, SOLUTION INTRAVENOUS; SUBCUTANEOUS at 09:36

## 2017-01-05 RX ADMIN — HYDROCORTISONE SODIUM SUCCINATE 50 MG: 100 INJECTION, POWDER, FOR SOLUTION INTRAMUSCULAR; INTRAVENOUS at 08:55

## 2017-01-05 RX ADMIN — IPRATROPIUM BROMIDE AND ALBUTEROL SULFATE 3 ML: .5; 3 SOLUTION RESPIRATORY (INHALATION) at 13:45

## 2017-01-05 RX ADMIN — SODIUM CHLORIDE 500 ML: 9 INJECTION, SOLUTION INTRAVENOUS at 22:40

## 2017-01-05 RX ADMIN — INSULIN ASPART 1 UNITS: 100 INJECTION, SOLUTION INTRAVENOUS; SUBCUTANEOUS at 14:03

## 2017-01-05 RX ADMIN — VANCOMYCIN HYDROCHLORIDE 1250 MG: 10 INJECTION, POWDER, LYOPHILIZED, FOR SOLUTION INTRAVENOUS at 18:06

## 2017-01-05 RX ADMIN — HYDROCORTISONE SODIUM SUCCINATE 50 MG: 100 INJECTION, POWDER, FOR SOLUTION INTRAMUSCULAR; INTRAVENOUS at 13:59

## 2017-01-05 RX ADMIN — LEVOTHYROXINE SODIUM ANHYDROUS 68 MCG: 100 INJECTION, POWDER, LYOPHILIZED, FOR SOLUTION INTRAVENOUS at 12:46

## 2017-01-05 RX ADMIN — IPRATROPIUM BROMIDE AND ALBUTEROL SULFATE 3 ML: .5; 3 SOLUTION RESPIRATORY (INHALATION) at 08:47

## 2017-01-05 RX ADMIN — PIPERACILLIN SODIUM,TAZOBACTAM SODIUM 2.25 G: 2; .25 INJECTION, POWDER, FOR SOLUTION INTRAVENOUS at 01:31

## 2017-01-05 RX ADMIN — HYDROCORTISONE SODIUM SUCCINATE 50 MG: 100 INJECTION, POWDER, FOR SOLUTION INTRAMUSCULAR; INTRAVENOUS at 19:38

## 2017-01-05 RX ADMIN — DEXTROSE AND SODIUM CHLORIDE: 5; 900 INJECTION, SOLUTION INTRAVENOUS at 18:00

## 2017-01-05 ASSESSMENT — ACTIVITIES OF DAILY LIVING (ADL)
AMBULATION: 0-->INDEPENDENT
DRESS: 0-->INDEPENDENT
SWALLOWING: 2-->DIFFICULTY SWALLOWING FOODS
FALL_HISTORY_WITHIN_LAST_SIX_MONTHS: NO
TRANSFERRING: 0-->INDEPENDENT
RETIRED_COMMUNICATION: 0-->UNDERSTANDS/COMMUNICATES WITHOUT DIFFICULTY
RETIRED_EATING: 0-->INDEPENDENT
BATHING: 0-->INDEPENDENT
TOILETING: 0-->INDEPENDENT
COGNITION: 0 - NO COGNITION ISSUES REPORTED

## 2017-01-05 NOTE — H&P
THORACIC SURGERY HISTORY AND PHYSICAL    ASSESSMENT/PLAN: Mr. Travis Peres is a 76-year-old male with history of thyroid cancer and pituitary macroadenoma now POD#1 after transcervical resection of retrosternal mediastinal lesion with Dr. Armstrong who presents in transfer with concern for septic shock of unclear source, colitis (ischemic vs infectious), elevated troponin likely secondary to demand ischemia, acute kidney injury, and altered mental status. His overall clinical picture is consistent with septic shock and global hypoperfusion though no clear infectious source is identified other than his colitis. His incision site looks clean and there is no mediastinal pathology on CT scan.    -- Continue ICU cares, fluid resuscitation  -- Trend lactate, WBC  -- STAT cardiology consult  -- Continue stress dose steroids  -- Continue broad-spectrum antibiotics  -- Monitor MILI, Cr trend, urine output    Seen and discussed with Dr. Adams, who will discuss with staff.    Cooper Martinez MD  PGY-2, General Surgery  Pager: [phone]    - - - - - - - - - -    CHIEF COMPLAINT: altered mental status    HISTORY OF PRESENT ILLNESS: Mr. Travis Peres is a 76-year-old male with history of thyroid cancer and pituitary macroadenoma now POD#1 after transcervical resection of retrosternal mediastinal lesion with Dr. Armstrong who presents in transfer with shock and elevated troponin. He was discharged from same-day surgery without issues. He took 10 mg oxycodone and a sleeping pill overnight and was in bed until this afternoon. He became weak and seemed confused when he tried to get up to go to the bathroom. His wife was unable to move him and he soiled himself. EMS were called and he was brought to the [facility] ED. He was hypotensive and tachycardic and was febrile to 101.8 F. His white count was elevated to 14,000. He was given 3 L of normal saline, naloxone, and stress-dose steroids (100 mg hydrocortisone) due to concern for adrenal crisis.  He was also started on Zosyn and vancomycin. His troponin was found to be grossly elevated at 12 and CT scan did not show mediastinal or cardiac pathology but was concerning for colitis. He was prepared for transfer to [facility]. This was delayed and he became more hypotensive. He was started on dopamine en route by EMS. He had decreased respiratory rate to 4-6 breaths per minute, hypoxia, and was bagged. He was diverted to the ED due to concern for airway compromise. He was placed on non-rebreather mask and given naloxone and had sats in the high 90s. At this point he was brought up to the ICU for further cares.     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)      Pituitary macroadenoma with extrasellar extension (H) 2007     causing obstructive hydrocephalus     Hypovitaminosis D      Depression      BPH (benign prostatic hyperplasia)      Xerostomia      due to radiation exposures     Vocal cord paralysis      right     Hypertension      no current meds     Uncomplicated asthma      Arthritis        SURGICAL HISTORY:   Past Surgical History   Procedure Laterality Date     Thyroidectomy  11/27/07     Hernia repair Right      Esophagoscopy, gastroscopy, duodenoscopy (egd), combined  6/20/2013     Procedure: COMBINED ESOPHAGOSCOPY, GASTROSCOPY, DUODENOSCOPY (EGD), BIOPSY SINGLE OR MULTIPLE;  gastroscopy;  Surgeon: Leslie Guadarrama MD;  Location: [facility]     Right selective neck dissection level 2, 3, 4  11/3/09     Transnasal endoscopic resection of pituitary adenoma  8/13/2007     Right  shunt placement  6/28/07     Lipoma resection chest wall Right 11/09     Cymetra injection       Transcervical extended mediastinal lymphadenectomy  N/A 1/3/2017     Procedure: TRANSCERVICAL EXTENDED MEDIASTINAL LYMPHADENECTOMY;  Surgeon: Ernesto Armstrong MD;  Location: [facility]     Thymectomy N/A 1/3/2017     Procedure: THYMECTOMY;  Surgeon: Ernesto Armstrong MD;  Location: [facility]       SOCIAL HISTORY:   Social History     Social History     Marital Status:      Spouse Name: N/A     Number of Children: N/A     Years of Education: N/A     Social History Main Topics     Smoking status: Never Smoker      Smokeless tobacco: Not on file     Alcohol Use: No     Drug Use: No     Sexual Activity: Not on file     Other Topics Concern     Not on file     Social History Narrative    Mom  at age 93 from a fall    Dad  at age 98 from old age     40 plus years    Two adult children in good health.    Occupation: retired from running a [employer]    Originally from [place]             FAMILY HISTORY:   Family History   Problem Relation Age of Onset     CANCER No family hx of      no skin cancer     Glaucoma No family hx of      Macular Degeneration No family hx of        ALLERGIES:      Allergies   Allergen Reactions     Metoprolol Unknown and Shortness Of Breath     Not true allergy.  Bradycardia, fatigue, COPD worsening.       Fenofibric Acid      Lisinopril Cough     Losartan Cough     Niacin      Simvastatin Cramps       MEDICATIONS:    Current Facility-Administered Medications on File Prior to Encounter:  [COMPLETED] naloxone (NARCAN) injection 0.4 mg   lidocaine 1 % 1 mL   lidocaine (LMX4) cream   sodium chloride (PF) 0.9% PF flush 3 mL   sodium chloride (PF) 0.9% PF flush 3 mL   [COMPLETED] ondansetron (ZOFRAN) injection 4 mg   [COMPLETED] 0.9% sodium chloride BOLUS   [COMPLETED] 0.9% sodium chloride BOLUS   0.9% sodium chloride infusion   [COMPLETED] vancomycin (VANCOCIN) 1500 mg in 0.9% NaCl 250 mL PREMIX   [COMPLETED] acetaminophen (TYLENOL) Suppository 650 mg   [COMPLETED] piperacillin-tazobactam (ZOSYN) 2.25 g vial to attach to NS  100 ml bag   [COMPLETED] hydrocortisone sodium succinate PF (Solu-CORTEF) injection 100 mg   [COMPLETED] 0.9% sodium chloride BOLUS   [DISCONTINUED] piperacillin-tazobactam (ZOSYN) intermittent infusion 2.25 g     Current Outpatient Prescriptions on File Prior to Encounter:  fluticasone (FLONASE) 50 MCG/ACT spray Spray 2 sprays into both nostrils daily   oxyCODONE (ROXICODONE) 5 MG IR tablet Take 1-2 tablets (5-10 mg) by mouth every 3 hours as needed for pain or other (Moderate to Severe)   senna-docusate (SENOKOT-S;PERICOLACE) 8.6-50 MG per tablet Take 1-2 tablets by mouth 2 times daily Take while on oral narcotics to prevent or treat constipation.   ibuprofen (ADVIL/MOTRIN) 400 MG tablet Take 400-600 mg by mouth every 6 hours as needed for moderate pain    levothyroxine (SYNTHROID/LEVOTHROID) 137 MCG tablet Take 1 tablet (137 mcg) by mouth daily (Patient taking differently: Take 137 mcg by mouth every morning )   hydrocortisone (CORTEF) 10 MG tablet Take one tablet by mouth daily in the morning, 1/2 tablet at 2 PM   alendronate (FOSAMAX) 70 MG tablet Take 1 tablet (70 mg) by mouth every 7 days   Ipratropium-Albuterol (COMBIVENT RESPIMAT)  MCG/ACT inhaler Inhale 1 puff into the lungs 4 times daily as needed    traZODone (DESYREL) 100 MG tablet Take 100 mg by mouth At Bedtime    GuaiFENesin (MUCUS RELIEF ADULT PO) Take 400 mg by mouth 2 times daily as needed    TAMSULOSIN HCL PO Take 0.4 mg by mouth At Bedtime    AMITRIPTYLINE HCL PO Take 25 mg by mouth At Bedtime   ranitidine HCl 300 MG CAPS Take 300 mg by mouth nightly as needed    DIPHENHYDRAMINE HCL 50 mg take one every 6 hours as needed for itching     PHYSICAL EXAMINATION:  Temp:  [97.4  F (36.3  C)-101.8  F (38.8  C)] 97.4  F (36.3  C)  Pulse:  [105-111] 111  Heart Rate:  [] 92  Resp:  [11-27] 13  BP: ()/() 96/61 mmHg  MAP:  [69 mmHg-98 mmHg] 83 mmHg  Arterial Line BP: ()/(53-88) 115/62 mmHg  FiO2 (%):  [35 %] 35 %  SpO2:  [89  %-100 %] 99 %  General: Male, lethargic.  HEENT: Normocephalic, atraumatic. Patent nares. PERRLA.  Neck: Trachea midline. Cervical incision without erythema or redness.  Chest wall: Symmetric thorax.    Respiratory: Non-labored breathing. Lung sounds clear to auscultation bilaterally.    Cardiovascular: Tachycardic, regular rhythm.    Gastrointestinal: Abdomen soft, non-distended, mildly tender to palpation. No rebound or guarding.  Genitourinary: Guzman in place  Extremities: Moving all four extremities. No limb deformities. No pedal edema.    Skin: As noted above. No rashes or lesions appreciated.    LABS: Reviewed.   Arterial Blood Gases     Recent Labs  Lab 01/05/17  0347 01/05/17  0018 01/03/17  1252 01/03/17  0903   PH 7.25* 7.20* 7.35 7.42   PCO2 47* 52* 49* 41   PO2 122* 107* 296* 172*   HCO3 20* 20* 27 27     Complete Blood Count     Recent Labs  Lab 01/05/17 0018 01/04/17  1626 01/03/17  1252 01/03/17  0903 01/03/17  0736   WBC 20.1* 14.9*  --   --  7.0   HGB 11.4* 14.8 11.6* 11.9* 13.1*   * 189  --   --  170     Basic Metabolic Panel    Recent Labs  Lab 01/05/17  0018 01/04/17  1626 01/03/17  1252 01/03/17  0903 01/03/17  0736    141 142 143 142   POTASSIUM 4.1 3.7 3.3* 3.9 3.6   CHLORIDE 113* 104  --   --  107   CO2 21 24  --   --  29   BUN 22 25  --   --  14   CR 2.42* 3.45*  --   --  1.00   * 90 102* 106* 95     Liver Function Tests    Recent Labs  Lab 01/05/17 0018 01/04/17  1626   * 258*   * 145*   ALKPHOS 64 97   BILITOTAL 0.6 1.4*   ALBUMIN 2.4* 3.4   INR 1.51*  --      Pancreatic Enzymes  No lab results found in last 7 days.  Coagulation Profile    Recent Labs  Lab 01/05/17 0018   INR 1.51*     Lactate  Invalid input(s): LACTATE    IMAGING:  Results for orders placed or performed during the hospital encounter of 01/04/17   XR Chest Port 1 View    Narrative    CHEST PORTABLE ONE VIEW   1/4/2017 4:36 PM     HISTORY: Chest pain.    COMPARISON:  1/3/2017.    FINDINGS: Shunt tubing projected over the right hemithorax. Heart and  lungs negative. No interval change.      Impression    IMPRESSION: Negative.    ABRAM NIELSON MD   CT Chest Abdomen Pelvis w/o Contrast    Narrative    CT CHEST, ABDOMEN AND PELVIS WITHOUT CONTRAST 1/4/2017 6:06 PM     HISTORY: Fever, mediastinal surgery yesterday, unclear source,  vomiting/diarrhea, altered mental status and confusion, sepsis,  elevated troponin.    COMPARISON: CT chest 12/2/2016.    TECHNIQUE: Axial images from the thoracic inlet to the symphysis are  performed with additional coronal reformatted images. No contrast is  utilized. Radiation dose for this scan was reduced using automated  exposure control, adjustment of the mA and/or kV according to patient  size, or iterative reconstruction technique.    FINDINGS:     Chest: Mild reticulonodular changes are noted in the posterior right  upper lobe possibly due to mucoid impactions or atypical infection.  Calcified granuloma in the anterior left upper lobe is stable. Mild  reticulonodular changes are also noted within the lateral right lower  lobe. Left lung is clear. No infiltrate or consolidation is otherwise  evident. A trace amount of pleural fluid is noted bilaterally.  Postsurgical changes are noted from resection of the patient's  anterior mediastinal lesion as seen on prior CT. Residual subcutaneous  air and mediastinal air is noted, consistent with recent surgery. No  enlarged lymph nodes. Heart is normal in size. No pericardial fluid.  No evidence of mediastinal hematoma.    Abdomen: Prominent upper pole left renal cyst is present, likely a  simple cyst. Calcified gallstones are noted, but no significant  surrounding gallbladder inflammation is appreciated. Hyperdense  gallbladder fluid is noted. This may be due to vicarious excretion of  intravenous contrast or possibly gallbladder sludge. Aorta  demonstrates scattered calcified plaque. No evidence of  aneurysm. No  enlarged lymph nodes. No evidence of bowel obstruction or  diverticulitis. Ventriculoperitoneal shunt catheter terminates in the  peritoneal cavity. Colon is decompressed, but there may be colonic  wall thickening from the ascending colon through the sigmoid region.  Correlate clinically for the possibility of an infectious or  inflammatory colitis. No free intraperitoneal air, ascites or abscess  collection.    Pelvis: The bladder and rectum are unremarkable. No enlarged pelvic  lymph nodes or free pelvic fluid. Bone window examination is within  normal limits.      Impression    IMPRESSION:  1. Findings concerning for possible infectious or inflammatory  colitis. Colon is decompressed, but there does appear to be colonic  wall thickening. No significant mesenteric inflammation is  appreciated. Follow up as clinically warranted.  2. Postoperative changes from anterior mediastinal mass resection. No  evidence of mediastinal hematoma.  3. Subtle reticulonodular changes in the right lung as described.  Findings may be related to old granulomatous disease or atypical  infection. No significant infiltrate or lung consolidation is present  to suggest pneumonia.    KRUPA BOOTHE MD   CT Head w/o Contrast    Narrative    CT HEAD WITHOUT CONTRAST  1/4/2017 6:00 PM    HISTORY: Confusion, altered mental status    TECHNIQUE: Scans were obtained through the head without IV contrast.  Radiation dose for this scan was reduced using automated exposure  control, adjustment of the mA and/or kV according to patient size, or  iterative reconstruction technique.    COMPARISON: CT 8/21/2015.    FINDINGS: Moderate atrophy. Shunt tube in place from a right parietal  approach with tip extending minimally beyond the left frontal horn in  the left frontal lobe, unchanged in the interval. Hydrocephalus. No  hemorrhage, mass lesion, or focal area of acute infarction identified.  Extensive membrane thickening in the sphenoid sinus.  No bony  abnormality. No change since previous CT.      Impression    IMPRESSION:   1. Atrophy and chronic white matter disease.  2. Shunt tube in place. No evidence for hydrocephalus.   3. No interval change.  4. Sinus disease as described, also not significantly changed.    ABRAM NIELSON MD       STAFF ADDENDUM:  I saw and examined Mr. Peres  and reviewed the assessment and plan with Dr. Martinez.  In summary, Mr. Peres is severely hypotensive for unclear reasons. The clinic picture does not entirely clear, but he needs to be admitted to ICU to sort it our and support him. No worrisome findings on chest CT.  I spent a total of 40 minutes with Mr. Peres, 35 of which were spent in counseling and coordination of care. The patient had all questions answered and was in agreement with the plan.  Ernesto Armstrong MD

## 2017-01-05 NOTE — H&P
SURGICAL ICU ADMISSION NOTE  1/4/2017      ASSESSMENT: Mr. Travis Peres is a 76-year-old male with history of thyroid cancer and pituitary macroadenoma now POD#1 after transcervical resection of retrosternal mediastinal lesion with Dr. Armstrong who presents in transfer with concern for septic shock of unclear source, colitis (ischemic vs infectious), elevated troponin likely secondary to demand ischemia, acute kidney injury, and altered mental status. His overall clinical picture is consistent with septic shock and global hypoperfusion.    PLAN:   Neuro/ pain/ sedation:  -Monitor neurological status. Notify the MD for any acute changes in exam.  -Low-dose hydromorphone for pain; avoid oversedation, may need more naloxone.  -Head CT without acute findings     Pulmonary care:   -Supplemental oxygen to keep saturation above 92 %.  -BiPAP given elevated CO2 on ABG     Cardiovascular:    -Monitor hemodynamic status.   -Place arterial line  -Not currently requiring pressors, MAP goal >65  -Cardiology consult STAT, appreciate recommendations: aspirin, heparin drip  -Bedside echo performed by cardiology, see note for details; formal echocardiogram in AM     GI care:   -NPO except ice chips and medications.  -Serial abdominal exams, trend lactate given concern for ischemic colitis vs infectious colitis     Fluids/ Electrolytes/ Nutrition:   -D5 LR at 125 cc for IV fluid hydration  -ICU electrolyte replacement protocol  -No indication for parenteral nutrition.     Renal/ Fluid Balance:    -Urine output is adequate so far.  -Will continue to monitor intake and output.  -Daily Cr to monitor MILI, currently downtrending  -Guzman in place for strict I/O     Endocrine:    -Continue home levothyroxine  -Stress dose steroids with 50 mg hydrocortisone Q6H  -Sliding scale for diabetes management.      ID/ Antibiotics:  -Zosyn/Vancomycin; renal dosing given MILI  -Trend WBC     Heme:     -Hemoglobin stable     Prophylaxis:    -Mechanical  prophylaxis for DVT.   -Heparin drip     Miscellaneous:    -PT/OT     Lines/ tubes/ drains:  -Guzman, PIVx2, arterial line     Disposition:  -Surgical ICU.     Patient seen, findings and plan discussed with surgical ICU staff Dr. Sky.    Cooper Martinez MD  PGY-2, General Surgery  [phone]    Attending note:  Patient seen, examined and discussed with the Resident physician. All data reviewed.  Agree with assessment and plan as outlined in the above note.  S/P biopsy of mediastinal mass om 1-3-17. Biopsy results negative.  Found by family to have decreased mental status, transferred to ED and found to have elevated troponin and concerns for ischemic bowel on CT.     Eboyn Sky MD  [phone]      - - - - - - - - - - - - - - - - - - - - - - - - - - - - - - - - - - - - - - - - - - - - - - - - - - - - - - - - - - - - - - - - - - - - - - - -     PRIMARY TEAM: Thoracic Surgery  PRIMARY PHYSICIAN: Dr. Armstrong    REASON FOR CRITICAL CARE ADMISSION: sepsis, elevated troponin   ADMITTING PHYSICIAN: Dr. Sky    HISTORY PRESENTING ILLNESS: Mr. Travis Peres is a 76-year-old male with history of thyroid cancer and pituitary macroadenoma now POD#1 after transcervical resection of retrosternal mediastinal lesion with Dr. Armstrong who presents in transfer with shock and elevated troponin. He was discharged from same-day surgery without issues. He took 10 mg oxycodone and a sleeping pill overnight and was in bed until this afternoon. He became weak and seemed confused when he tried to get up to go to the bathroom. His wife was unable to move him and he soiled himself. EMS were called and he was brought to the [facility] ED. He was hypotensive and tachycardic and was febrile to 101.8 F. His white count was elevated to 14,000. He was given 3 L of normal saline, naloxone, and stress-dose steroids (100 mg hydrocortisone) due to concern for adrenal crisis. He was also started on Zosyn and vancomycin. His troponin was found to be grossly  elevated at 12 and CT scan did not show mediastinal or cardiac pathology but was concerning for colitis. He was prepared for transfer to [facility]. This was delayed and he became more hypotensive. He was started on dopamine en route by EMS. He had decreased respiratory rate to 4-6 breaths per minute, hypoxia, and was bagged. He was diverted to the ED due to concern for airway compromise. He was placed on non-rebreather mask and given naloxone and had sats in the high 90s. At this point he was brought up to the ICU for further cares.     History limited by patient condition and language barrier.     REVIEW OF SYSTEMS: Unable to obtain.    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)      Pituitary macroadenoma with extrasellar extension (H) 2007     causing obstructive hydrocephalus     Hypovitaminosis D      Depression      BPH (benign prostatic hyperplasia)      Xerostomia      due to radiation exposures     Vocal cord paralysis      right     Hypertension      no current meds     Uncomplicated asthma      Arthritis        SURGICAL HISTORY:   Past Surgical History   Procedure Laterality Date     Thyroidectomy  11/27/07     Hernia repair Right      Esophagoscopy, gastroscopy, duodenoscopy (egd), combined  6/20/2013     Procedure: COMBINED ESOPHAGOSCOPY, GASTROSCOPY, DUODENOSCOPY (EGD), BIOPSY SINGLE OR MULTIPLE;  gastroscopy;  Surgeon: Leslie Guadarrama MD;  Location: [facility]     Right selective neck dissection level 2, 3, 4  11/3/09     Transnasal endoscopic resection of pituitary adenoma  8/13/2007     Right  shunt placement  6/28/07     Lipoma resection chest wall Right 11/09     Cymetra injection       Transcervical extended mediastinal lymphadenectomy N/A 1/3/2017      Procedure: TRANSCERVICAL EXTENDED MEDIASTINAL LYMPHADENECTOMY;  Surgeon: Ernesto Armstrong MD;  Location: [facility]     Thymectomy N/A 1/3/2017     Procedure: THYMECTOMY;  Surgeon: Ernesto Armstrong MD;  Location:  OR       SOCIAL HISTORY: Unable to obtain    FAMILY HISTORY: Unable to obtain    ALLERGIES:      Allergies   Allergen Reactions     Metoprolol Unknown and Shortness Of Breath     Not true allergy.  Bradycardia, fatigue, COPD worsening.       Fenofibric Acid      Lisinopril Cough     Losartan Cough     Niacin      Simvastatin Cramps       MEDICATIONS:    Current Facility-Administered Medications on File Prior to Encounter:  [COMPLETED] naloxone (NARCAN) injection 0.4 mg   [COMPLETED] ondansetron (ZOFRAN) injection 4 mg   [COMPLETED] 0.9% sodium chloride BOLUS   [COMPLETED] 0.9% sodium chloride BOLUS   [COMPLETED] vancomycin (VANCOCIN) 1500 mg in 0.9% NaCl 250 mL PREMIX   [COMPLETED] acetaminophen (TYLENOL) Suppository 650 mg   [COMPLETED] piperacillin-tazobactam (ZOSYN) 2.25 g vial to attach to  ml bag   [COMPLETED] hydrocortisone sodium succinate PF (Solu-CORTEF) injection 100 mg   [COMPLETED] 0.9% sodium chloride BOLUS   [DISCONTINUED] lidocaine 1 % 1 mL   [DISCONTINUED] lidocaine (LMX4) cream   [DISCONTINUED] sodium chloride (PF) 0.9% PF flush 3 mL   [DISCONTINUED] sodium chloride (PF) 0.9% PF flush 3 mL   [DISCONTINUED] 0.9% sodium chloride infusion   [DISCONTINUED] piperacillin-tazobactam (ZOSYN) intermittent infusion 2.25 g     Current Outpatient Prescriptions on File Prior to Encounter:  fluticasone (FLONASE) 50 MCG/ACT spray Spray 2 sprays into both nostrils daily   oxyCODONE (ROXICODONE) 5 MG IR tablet Take 1-2 tablets (5-10 mg) by mouth every 3 hours as needed for pain or other (Moderate to Severe)   senna-docusate (SENOKOT-S;PERICOLACE) 8.6-50 MG per tablet Take 1-2 tablets by mouth 2 times daily Take while on oral narcotics to prevent or treat constipation.   ibuprofen  (ADVIL/MOTRIN) 400 MG tablet Take 400-600 mg by mouth every 6 hours as needed for moderate pain    levothyroxine (SYNTHROID/LEVOTHROID) 137 MCG tablet Take 1 tablet (137 mcg) by mouth daily (Patient taking differently: Take 137 mcg by mouth every morning )   hydrocortisone (CORTEF) 10 MG tablet Take one tablet by mouth daily in the morning, 1/2 tablet at 2 PM   alendronate (FOSAMAX) 70 MG tablet Take 1 tablet (70 mg) by mouth every 7 days   Ipratropium-Albuterol (COMBIVENT RESPIMAT)  MCG/ACT inhaler Inhale 1 puff into the lungs 4 times daily as needed    traZODone (DESYREL) 100 MG tablet Take 100 mg by mouth At Bedtime    GuaiFENesin (MUCUS RELIEF ADULT PO) Take 400 mg by mouth 2 times daily as needed    TAMSULOSIN HCL PO Take 0.4 mg by mouth At Bedtime    AMITRIPTYLINE HCL PO Take 25 mg by mouth At Bedtime   ranitidine HCl 300 MG CAPS Take 300 mg by mouth nightly as needed    DIPHENHYDRAMINE HCL 50 mg take one every 6 hours as needed for itching       PHYSICAL EXAMINATION:  Temp:  [97.4  F (36.3  C)-101.8  F (38.8  C)] 97.4  F (36.3  C)  Pulse:  [105-111] 111  Heart Rate:  [] 92  Resp:  [11-27] 13  BP: ()/() 96/61 mmHg  MAP:  [69 mmHg-98 mmHg] 83 mmHg  Arterial Line BP: ()/(53-88) 115/62 mmHg  FiO2 (%):  [35 %] 35 %  SpO2:  [89 %-100 %] 99 %  General: Male, lethargic.  HEENT: Normocephalic, atraumatic. Patent nares. PERRLA.  Neck: Trachea midline. Cervical incision without erythema or redness.  Chest wall: Symmetric thorax.   Respiratory: Non-labored breathing. Lung sounds clear to auscultation bilaterally.   Cardiovascular: Tachycardic, regular rhythm.   Gastrointestinal: Abdomen soft, non-distended, mildly tender to palpation. No rebound or guarding.  Genitourinary: Guzman in place  Extremities: Moving all four extremities. No limb deformities. No pedal edema.   Skin: As noted above. No rashes or lesions appreciated.    LABS: Reviewed.   Arterial Blood Gases     Recent Labs  Lab  01/05/17  0018 01/03/17  1252 01/03/17  0903   PH 7.20* 7.35 7.42   PCO2 52* 49* 41   PO2 107* 296* 172*   HCO3 20* 27 27     Complete Blood Count     Recent Labs  Lab 01/05/17  0018 01/04/17  1626 01/03/17  1252 01/03/17  0903 01/03/17  0736   WBC 20.1* 14.9*  --   --  7.0   HGB 11.4* 14.8 11.6* 11.9* 13.1*   * 189  --   --  170     Basic Metabolic Panel    Recent Labs  Lab 01/05/17  0018 01/04/17  1626 01/03/17  1252 01/03/17  0903 01/03/17  0736    141 142 143 142   POTASSIUM 4.1 3.7 3.3* 3.9 3.6   CHLORIDE 113* 104  --   --  107   CO2 21 24  --   --  29   BUN 22 25  --   --  14   CR 2.42* 3.45*  --   --  1.00   * 90 102* 106* 95     Liver Function Tests    Recent Labs  Lab 01/05/17  0018 01/04/17  1626   * 258*   * 145*   ALKPHOS 64 97   BILITOTAL 0.6 1.4*   ALBUMIN 2.4* 3.4   INR 1.51*  --      Pancreatic Enzymes  No lab results found in last 7 days.  Coagulation Profile    Recent Labs  Lab 01/05/17  0018   INR 1.51*     Lactate  Invalid input(s): LACTATE    IMAGING:  Results for orders placed or performed during the hospital encounter of 01/03/17   XR Chest Port 1 View    Narrative    Exam:  XR CHEST PORT 1 VW, 1/3/2017 1:20 PM    History: Post TEMLA, eval for pneumothorax    Comparison:  Chest radiograph 10/4/2016    Findings:  Single AP view of the chest was obtained. Partially  visualized  shunt.    The cardiomediastinal silhouette and pulmonary vasculature are  unremarkable. No significant pleural effusion or pneumothorax. No  acute airspace opacities. No acute osseous abnormality.       Impression    Impression:  No acute cardiopulmonary abnormality.    I have personally reviewed the examination and initial interpretation  and I agree with the findings.    MANA TUCKER MD

## 2017-01-05 NOTE — PLAN OF CARE
Problem: Goal Outcome Summary  Goal: Goal Outcome Summary  PT evaluation cx due to femoral line.  Will attempt to evaluate on 1/6.

## 2017-01-05 NOTE — PHARMACY-VANCOMYCIN DOSING SERVICE
Pharmacy Vancomycin Note  Date of Service 2017  Patient's  1940   76 year old male    Indication: Sepsis  Goal Trough Level: 15-20 mg/L  Day of Therapy: 2  Current Vancomycin regimen: intermittent dosing due to MILI, first dose given last night at 19:14 was 1500mg (25mg/kg) IV vancomycin    Current estimated CrCl = Estimated Creatinine Clearance: 24.4 mL/min (based on Cr of 2.21).    Creatinine for last 3 days  1/3/2017:  7:36 AM Creatinine 1.00 mg/dL  2017:  4:26 PM Creatinine 3.45 mg/dL*  2017:  3:47 AM Creatinine 2.21 mg/dL*; 12:18 AM Creatinine 2.42 mg/dL*    Recent Vancomycin Levels (past 3 days)  2017:  1:17 PM Vancomycin Level 7.6 mg/L    Vancomycin IV Administrations (past 72 hours)                   vancomycin (VANCOCIN) 1500 mg in 0.9% NaCl 250 mL PREMIX (mg) 1,500 mg New Bag 17 1914                Nephrotoxins and other renal medications (Future)    Start     Dose/Rate Route Frequency Ordered Stop    17 1730  vancomycin (VANCOCIN) 1,250 mg in NaCl 0.9 % 250 mL intermittent infusion      1,250 mg Intravenous EVERY 18 HOURS 17 1624      17 0800  piperacillin-tazobactam (ZOSYN) 3.375 g vial to attach to  mL bag      3.375 g  over 1 Hours Intravenous EVERY 6 HOURS 17 0717          Contrast Orders - past 72 hours (72h ago through future)    Start     Dose/Rate Route Frequency Ordered Stop    17 1145  perflutren diluted 1mL to 2mL with saline (OPTISON) diluted injection 6 mL      6 mL Intravenous ONCE 17 1135 17 1145        Interpretation of levels and current regimen:  Level of 7.6mg/L approximately 18 hours post-dose is below goal, however patient has only received one dose thus far and has resolving MILI.    Has serum creatinine changed > 50% in last 72 hours: Yes    Urine output:  good urine output, 1mL/kg/hr through 16 hours today    Renal Function: Improving based on SCr trend and urine output    Plan:  1.  Initiate  scheduled vancomycin dosing with 1250mg (20mg/kg) IV vancomycin q18h.   2.  Pharmacy will check trough levels as appropriate in 2-4 days.    3. Serum creatinine levels will be ordered daily for the first week of therapy and at least twice weekly for subsequent weeks.      Claribel Villasenor, PharmD  PGY-2 Critical Care Pharmacy Resident

## 2017-01-05 NOTE — PROGRESS NOTES
"Cardiology Progress Note  January 5, 2017    Assessment/Plan: Travis Peres is a 76 year old Cantanese speaking man with PMH of hypertension, hyperlipidemia, COPD, GERD and thyroid cancer s/p thyroidectomy and radioactive iodine therapy in 2007 who underwent same-day transcervical mediastinal lymphadenectomy on 1/3 at [facility]. He returned to the hospital on 1/4 with severe sepsis, also noted to have a significantly elevated troponin which peaked at 15 however no cardiac symptoms, EKG changes or wall motion abnormalities on echo. Per chart review patient has no cardiac history, he underwent stress test in October 2016 which was negative for inducible ischemia.     Elevated troponin: Low suspicion for ACS in the absence of cardiac symptoms, ECG changes or wall motion abnormalities on echo. Likely type II MI secondary to supply demand mismatch in the setting of severe sepsis however can not definitively rule out NSTEMI. Recommend ongoing medical management of NSTEMI.   -- Continue heparin infusion for 48 hours  -- Initiate ASA 81 mg daily and atorvastatin 20 mg daily  -- When sepsis resolves start low dose metoprolol  -- Reconsider further ischemic evaluation as outpatient once recovered from acute illness  -- Will sign off please call with questions or concerns    Ayala Doty NP-C  [facility] Cardiology Consult Team  [phone] or [phone]    Physical exam:   /70 mmHg  Temp(Src) 97.8  F (36.6  C) (Oral)  Resp 12  Ht 1.651 m (5' 5\")  Wt 61.553 kg (135 lb 11.2 oz)  BMI 22.58 kg/m2  SpO2 98%    Intake/Output Summary (Last 24 hours) at 01/05/17 1610  Last data filed at 01/05/17 1600   Gross per 24 hour   Intake 1633.63 ml   Output   1120 ml   Net 513.63 ml     General: Alert, NAD  Respiratory: Breathing non-labored on face mask  CV: RRR  Extr: Warm, well perfused, no edema    Diagnostics:  Basic Metabolic Panel:  NA      146   1/5/2017   POTASSIUM      4.4   1/5/2017  CHLORIDE      115   1/5/2017  NIHARIKA      6.4   " 1/5/2017  CO2       21   1/5/2017  BUN       21   1/5/2017  CR     2.21   1/5/2017  GLC      184   1/5/2017  GLC       98   6/19/2013    Lab Results   Component Value Date    TROPI 9.235* 01/05/2017    TROPI 9.726* 01/05/2017    TROPI 12.129* 01/05/2017    TROPI 15.181* 01/05/2017    TROPI 12.135* 01/04/2017    TROPONIN <0.04 08/26/2007    TROPONIN <0.04 08/14/2007    TROPONIN 0.04 08/14/2007    TROPONIN 0.04 08/14/2007    TROPONIN <0.04 08/13/2007     Echo 1/5/17:  Interpretation Summary  Global and regional left ventricular function is normal with an EF of 55-60%.  No regional wall motion abnormalities are seen.  Mild right ventricular dilation is present. The inferior vena cava is normal.  Estimated mean right atrial pressure is 3-8 mmHg.  No pericardial effusion is present.  This study was compared with the study from 7/30/2014, there has been no change.    Dobutamine stress echo 10/4/16:  Interpretation Summary  Negative dobutamine stress echocardiogram.  No wall motion abnormalities at rest and with infusion of dobutamine.  Normal LV size and function. The LVEF is 55-60% at rest and increases  appropriately with dobutamine infusion to approximately 70-75%.  Low risk dobutamine stress echocardiogram.  Normal blood pressure response to dobutamine infusion.  Negative T waves noted in V4-V6 at rest with dynamic changes during  dobutamine infusion.  No chest pain at rest and with infusion of dobutamine.  No significant valvular disease noted on routine screening color flow Doppler  and pulsed Doppler examination. The ascending aortic segment is normal.    I interviewed and examined the patient with the house staff.  I agree with the assessment and plan as documented.    Nehemias Rodas MD, PhD  Professor of Medicine  Division of Cardiology

## 2017-01-05 NOTE — ED PROVIDER NOTES
[place] EMERGENCY DEPARTMENT ([facility])  January 4, 2017  10:18 PM   MRN# [id number]     Pt was brought directly to the [facility] ER due to concern for airway compromise.       Travis Peres presents with altered mental status and decreased respiration a history of thyroid cancer, pituitary macroadenoma and right vocal cord paralysis. He underwent same day surgery (transcervical extended mediastinal lymphadenectomy and thyroidectomy) with Dr. Nathaniel reilly yesterday and was discharged at 5pm yesterday. He was given 2 oxycodone and 1 sleeping pill last night when he got home. He slept through until 3pm today and got up to use the bathroom. Patient became confused and weak and fell to the ground. Family was unable to help patient up and EMS was called. He was brought to [facility] ER. He was found to be septic. He received 3 liters of normal saline, IV Zofran, Narcan at 4:16 PM, solu-cortef 100 mg, rectal Tylenol 650 mg at 6:04 pm, zosyn 2.25 mg at 7:05 pm and finally vancomycin 1500 mg. Plan was made to transfer from [facility] to our ICU as a direct admit. When paramedics arrived to [facility] they found that he was hypotensive and his blood pressure continued to drop while EMS was getting report from [facility] staff. Patient was not alert but easily arousable. When he was loaded into cart EMS started him on dopamine. En route his blood pressure continued to drop as well as his level of mentation. At one point his respiratory rate dropped to 4-6 breaths a minute and his end tidal O2 dropped as well. Paramedics started bagging patient en route and elected to divert to the ED due to concern for airway compromise.     Patient was placed in Stab Room 2 and placed on non-rebreather mask. He was given Narcan with improvement of his mentation and respiration.   Pt was able to moan and had normal sats and was breathing spontaneously.   Pt has not clinical indication for intubation.    The ICU team saw the pt in the ER and agreed that he did not need to be intubated.   Pt was transferred to the ICU without incident.     Signed:  Yenni Wills MD  January 5, 2017 at 12:25 AM               Arlette PEREZ, am serving as a trained medical scribe to document services personally performed by Yenni Wills MD, based on the provider's statements to me on January 4, 2017.  This document has been checked and approved by Dr. Wills.     Yenni PEREZ MD, was physically present and have reviewed and verified the accuracy of this note documented by Arlette Pham, medical scribe.      Yenni Wills MD  01/05/17 0026

## 2017-01-05 NOTE — PHARMACY-AMINOGLYCOSIDE DOSING SERVICE
Pharmacy Vancomycin Initial Note  Date of Service 2017  Patient's  1940  76 year old, male    Indication: Sepsis    Current estimated CrCl = Estimated Creatinine Clearance: 15.6 mL/min (based on Cr of 3.45).    Creatinine for last 3 days  1/3/2017:  7:36 AM Creatinine 1.00 mg/dL  2017:  4:26 PM Creatinine 3.45 mg/dL*    Recent Vancomycin Level(s) for last 3 days  No results found for requested labs within last 3 days.      Vancomycin IV Administrations (past 72 hours)                   vancomycin (VANCOCIN) 1500 mg in 0.9% NaCl 250 mL PREMIX (mg) 1,500 mg New Bag 17 191                Nephrotoxins and other renal medications (Future)    Start     Dose/Rate Route Frequency Ordered Stop    17 0030  piperacillin-tazobactam (ZOSYN) 2.25 g vial to attach to  ml bag      2.25 g  over 30 Minutes Intravenous EVERY 6 HOURS 17 2343      17 2352  vancomycin place curiel - receiving intermittent dosing      1 each Does not apply SEE ADMIN INSTRUCTIONS 17 2352            Contrast Orders - past 72 hours     None            Plan:  1.  Start vancomycin  1500 mg IV x 1 dose  2.  Goal Trough Level: 15-20 mg/L   3.  Pharmacy will check trough levels as appropriate in 1-3 Days.    4. Serum creatinine levels will be ordered daily for the first week of therapy and at least twice weekly for subsequent weeks.    5. Boncarbo method utilized to dose vancomycin therapy: Method 2    Mckenna Fox, pharmD, BCPS

## 2017-01-05 NOTE — ED NOTES
IV attempted 3 times without success. Phlebotomy stick attempted one time to collect second blood culture without success. Patient to CT scan and then lab to collect second set of blood cultures.

## 2017-01-05 NOTE — PROGRESS NOTES
SURGICAL ICU PROGRESS NOTE  January 5, 2017      CO-MORBIDITIES:   No diagnosis found.    ASSESSMENT: Mr. Travis Peres is a 76-year-old male with history of thyroid cancer and pituitary macroadenoma now POD#2 after transcervical resection of retrosternal mediastinal lesion with Dr. Armstrong who presents in transfer with concern for septic shock of unclear source, colitis (ischemic vs infectious), elevated troponin likely secondary to demand ischemia, acute kidney injury, and altered mental status. His overall clinical picture is consistent with  global hypoperfusion of unknown etiology. Clinically improving overnight with improvement in MAPs and downtrending HR. MILI is slowly improving as well, afebrile this morning.    PLAN:   Neuro/ pain/ sedation:  -Monitor neurological status. Notify the MD for any acute changes in exam.  -Low-dose hydromorphone for pain; no naloxone needed overnight.     Pulmonary care:   -Supplemental oxygen to keep saturation above 92 %.  -patient is off of BIPAP now.      Cardiovascular:    -Monitor hemodynamic status.    -Not currently requiring pressors, MAP goal >65  -formal echo this morning, no acute issues.  -monitoring troponins Q4h, appreciate cardiology recs    GI care:   -NPO except ice chips and medications.  -Serial abdominal exams, trend lactate given concern for ischemic colitis vs infectious colitis, lactate downtrending.  -thoracic surgery is following, appreciate recs     Fluids/ Electrolytes/ Nutrition:   -D5 NS at 125 cc for IV fluid hydration  -ICU electrolyte replacement protocol  -No indication for parenteral nutrition.    Renal/ Fluid Balance:    -Urine output is adequate so far.  -Will continue to monitor intake and output.  -Daily Cr to monitor MILI, currently downtrending  -Guzman in place for strict I/O     Endocrine:    -Continue home levothyroxine but at IV dose  -Stress dose steroids with 50 mg hydrocortisone Q6H  -Sliding scale for diabetes management. No basal  insulin while NPO     ID/ Antibiotics:  -Zosyn/Vancomycin; renal dosing given MILI  -Trend WBC 14.4 -> 20.1       Heme:     -Hemoglobin 14.8-> 11.4 which is likely dilutional, will follow up with another later this afternoon     Prophylaxis:    -Mechanical prophylaxis for DVT.    -Heparin drip Low intensity  - Protonix     Miscellaneous:     -PT/OT     Lines/ tubes/ drains:  -Guzman, PIVx2, femoral arterial line     Disposition:  -Surgical ICU.     Patient seen, findings and plan discussed with surgical ICU staff Dr. Boni Gonzáles MD  [facility]  [id number]    ====================================    TODAY'S PROGRESS:   SUBJECTIVE:   - clinically improving overnight, patient in no distress. Verbal and responsive but unable to communicate due to language barrier.        OBJECTIVE:   1. VITAL SIGNS:   Temp:  [97.3  F (36.3  C)-101.8  F (38.8  C)] 97.3  F (36.3  C)  Pulse:  [105-111] 111  Heart Rate:  [] 82  Resp:  [10-27] 10  BP: ()/() 110/70 mmHg  MAP:  [68 mmHg-99 mmHg] 68 mmHg  Arterial Line BP: ()/(49-88) 99/49 mmHg  FiO2 (%):  [35 %] 35 %  SpO2:  [89 %-100 %] 98 %  FiO2 (%): 35 %  Resp: 10    2. INTAKE/ OUTPUT:   I/O last 3 completed shifts:  In: 824.13 [I.V.:824.13]  Out: 620 [Urine:620]    3. PHYSICAL EXAMINATION:   General:   Neuro: Alert and responsive and conversive (in canontese), moving all extremities  Resp: Breathing non-labored, on face mask  CV: RRR  Abdomen: Soft, Non-tender, mildly distended  Incisions: c/d/i on neck  Extremities: warm and well perfused    4. INVESTIGATIONS:   Arterial Blood Gases     Recent Labs  Lab 01/05/17  0347 01/05/17  0018 01/03/17  1252 01/03/17  0903   PH 7.25* 7.20* 7.35 7.42   PCO2 47* 52* 49* 41   PO2 122* 107* 296* 172*   HCO3 20* 20* 27 27     Complete Blood Count     Recent Labs  Lab 01/05/17  0018 01/04/17  1626 01/03/17  1252 01/03/17  0903 01/03/17  0736   WBC 20.1* 14.9*  --   --  7.0   HGB 11.4* 14.8 11.6* 11.9* 13.1*   * 189  --   --   170     Basic Metabolic Panel    Recent Labs  Lab 01/05/17  0018 01/04/17  1626 01/03/17  1252 01/03/17  0903 01/03/17  0736    141 142 143 142   POTASSIUM 4.1 3.7 3.3* 3.9 3.6   CHLORIDE 113* 104  --   --  107   CO2 21 24  --   --  29   BUN 22 25  --   --  14   CR 2.42* 3.45*  --   --  1.00   * 90 102* 106* 95     Liver Function Tests    Recent Labs  Lab 01/05/17  0018 01/04/17  1626   * 258*   * 145*   ALKPHOS 64 97   BILITOTAL 0.6 1.4*   ALBUMIN 2.4* 3.4   INR 1.51*  --      Pancreatic Enzymes  No lab results found in last 7 days.  Coagulation Profile    Recent Labs  Lab 01/05/17  0018   INR 1.51*     Lactate  Invalid input(s): LACTATE    5. RADIOLOGY:   Recent Results (from the past 24 hour(s))   XR Chest Port 1 View    Narrative    CHEST PORTABLE ONE VIEW   1/4/2017 4:36 PM     HISTORY: Chest pain.    COMPARISON: 1/3/2017.    FINDINGS: Shunt tubing projected over the right hemithorax. Heart and  lungs negative. No interval change.      Impression    IMPRESSION: Negative.    ABRAM NIELSON MD   CT Head w/o Contrast    Narrative    CT HEAD WITHOUT CONTRAST  1/4/2017 6:00 PM    HISTORY: Confusion, altered mental status    TECHNIQUE: Scans were obtained through the head without IV contrast.  Radiation dose for this scan was reduced using automated exposure  control, adjustment of the mA and/or kV according to patient size, or  iterative reconstruction technique.    COMPARISON: CT 8/21/2015.    FINDINGS: Moderate atrophy. Shunt tube in place from a right parietal  approach with tip extending minimally beyond the left frontal horn in  the left frontal lobe, unchanged in the interval. Hydrocephalus. No  hemorrhage, mass lesion, or focal area of acute infarction identified.  Extensive membrane thickening in the sphenoid sinus. No bony  abnormality. No change since previous CT.      Impression    IMPRESSION:   1. Atrophy and chronic white matter disease.  2. Shunt tube in place. No evidence  for hydrocephalus.   3. No interval change.  4. Sinus disease as described, also not significantly changed.    ABRAM NIELSON MD   CT Chest Abdomen Pelvis w/o Contrast    Narrative    CT CHEST, ABDOMEN AND PELVIS WITHOUT CONTRAST 1/4/2017 6:06 PM     HISTORY: Fever, mediastinal surgery yesterday, unclear source,  vomiting/diarrhea, altered mental status and confusion, sepsis,  elevated troponin.    COMPARISON: CT chest 12/2/2016.    TECHNIQUE: Axial images from the thoracic inlet to the symphysis are  performed with additional coronal reformatted images. No contrast is  utilized. Radiation dose for this scan was reduced using automated  exposure control, adjustment of the mA and/or kV according to patient  size, or iterative reconstruction technique.    FINDINGS:     Chest: Mild reticulonodular changes are noted in the posterior right  upper lobe possibly due to mucoid impactions or atypical infection.  Calcified granuloma in the anterior left upper lobe is stable. Mild  reticulonodular changes are also noted within the lateral right lower  lobe. Left lung is clear. No infiltrate or consolidation is otherwise  evident. A trace amount of pleural fluid is noted bilaterally.  Postsurgical changes are noted from resection of the patient's  anterior mediastinal lesion as seen on prior CT. Residual subcutaneous  air and mediastinal air is noted, consistent with recent surgery. No  enlarged lymph nodes. Heart is normal in size. No pericardial fluid.  No evidence of mediastinal hematoma.    Abdomen: Prominent upper pole left renal cyst is present, likely a  simple cyst. Calcified gallstones are noted, but no significant  surrounding gallbladder inflammation is appreciated. Hyperdense  gallbladder fluid is noted. This may be due to vicarious excretion of  intravenous contrast or possibly gallbladder sludge. Aorta  demonstrates scattered calcified plaque. No evidence of aneurysm. No  enlarged lymph nodes. No evidence of  bowel obstruction or  diverticulitis. Ventriculoperitoneal shunt catheter terminates in the  peritoneal cavity. Colon is decompressed, but there may be colonic  wall thickening from the ascending colon through the sigmoid region.  Correlate clinically for the possibility of an infectious or  inflammatory colitis. No free intraperitoneal air, ascites or abscess  collection.    Pelvis: The bladder and rectum are unremarkable. No enlarged pelvic  lymph nodes or free pelvic fluid. Bone window examination is within  normal limits.      Impression    IMPRESSION:  1. Findings concerning for possible infectious or inflammatory  colitis. Colon is decompressed, but there does appear to be colonic  wall thickening. No significant mesenteric inflammation is  appreciated. Follow up as clinically warranted.  2. Postoperative changes from anterior mediastinal mass resection. No  evidence of mediastinal hematoma.  3. Subtle reticulonodular changes in the right lung as described.  Findings may be related to old granulomatous disease or atypical  infection. No significant infiltrate or lung consolidation is present  to suggest pneumonia.    KRUPA BOOTHE MD       =========================================

## 2017-01-05 NOTE — CONSULTS
Cardiology History and Physical      Patient Name: Travis Peres MRN# [id number]   Age: 76 year old YOB: 1940     Date of Admission:1/4/2017             Assessment and Plan:     76 year old Cantanese speaking man with PMH of HTN, HPL, thyroid ca s/p thyroidectomy in past, with lymph node resection again due to recurrence in past and now admitted for medistinal mass resection on 1/3.  He has developed shock in perioperative setting. We were consulted for elevated troponin of 12.    1. Hypotension-unclear etiology, likely sepsis from colitis,   2. Elevated troponin-likely demand ischemia, less likely ACS  - talked with CT fellow. No contraindications for anticoagulation from their perspective. Given degree of troponin elevation (in case of small chance of ACS), suggest Aspirin 324mg and heparin drip until troponin trends down (can be checked every 4 hours)  -please obtain comprehensive echo in morning to assess EF and any possible RWMA    3. Acute kidney injury    Mr. Peres's case will be discussed with Dr. Rodas in morning  Angel Rivero  General Cardiology Fellow, PGY4  Consult pager [phone]           Chief Complaint:   Elevated troponin         HPI:   76 year old Cantanese speaking man with PMH of HTN, HPL, thyroid ca s/p thyroidectomy in past, with lymph node resection again due to recurrence in past and now admitted for medistinal mass resection on 1/3. He was confused to history obtained from calling wife and chart.  After he went home, per wife he slept all day and looked very lethargic. He didn't complain of chest pain or dyspnea. When EMS arrived to the house, he was laying on the floor incontinent of urine and stool. Here he has fever, tachycardia and hypotension SBP 80s. Leucocytosis 14 Lactate 5 initially. CT showed possible colitis. Got 4L of fluid On vanco/zonsyn. Has MILI Cr 3  ECG now shows TWI in inferior leads and V3-V6 with ST segment depression in V5-V6. Troponin 12. Bedside echo by me  showed normal EF, no pericardial effusion and no obvious RWMA   Noted that he had normal dobutamine stress echo in October. At that time he had TWI in V4-V6 and flat T waves in inferior leads.     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     Hypovitaminosis D      Depression      BPH (benign prostatic hyperplasia)      Xerostomia      due to radiation exposures     Vocal cord paralysis      right     Hypertension      no current meds     Uncomplicated asthma      Arthritis               Past Surgical History:     Past Surgical History   Procedure Laterality Date     Thyroidectomy  11/27/07     Hernia repair Right      Esophagoscopy, gastroscopy, duodenoscopy (egd), combined  6/20/2013     Procedure: COMBINED ESOPHAGOSCOPY, GASTROSCOPY, DUODENOSCOPY (EGD), BIOPSY SINGLE OR MULTIPLE;  gastroscopy;  Surgeon: Leslie Guadarrama MD;  Location:  GI     Right selective neck dissection level 2, 3, 4  11/3/09     Transnasal endoscopic resection of pituitary adenoma  8/13/2007     Right  shunt placement  6/28/07     Lipoma resection chest wall Right 11/09     Cymetra injection       Transcervical extended mediastinal lymphadenectomy N/A 1/3/2017     Procedure: TRANSCERVICAL EXTENDED MEDIASTINAL LYMPHADENECTOMY;  Surgeon: Ernesto Armstrong MD;  Location: [facility] OR     Thymectomy N/A 1/3/2017     Procedure: THYMECTOMY;  Surgeon: Ernesto Armstrong MD;  Location: [facility] OR              Social History:     Social History     Social History     Marital Status:      Spouse Name: N/A     Number of Children: N/A     Years of  Education: N/A     Occupational History     Not on file.     Social History Main Topics     Smoking status: Never Smoker      Smokeless tobacco: Not on file     Alcohol Use: No     Drug Use: No     Sexual Activity: Not on file     Other Topics Concern     Not on file     Social History Narrative    Mom  at age 93 from a fall    Dad  at age 98 from old age     40 plus years    Two adult children in good health.    Occupation: retired from running a restaurant    Originally from [place]                Family History:     Family History   Problem Relation Age of Onset     CANCER No family hx of      no skin cancer     Glaucoma No family hx of      Macular Degeneration No family hx of                 Allergies:      Allergies   Allergen Reactions     Metoprolol Unknown and Shortness Of Breath     Not true allergy.  Bradycardia, fatigue, COPD worsening.       Fenofibric Acid      Lisinopril Cough     Losartan Cough     Niacin      Simvastatin Cramps            Medications:     (Not in a hospital admission)          Review of Systems:   A 14 point ROS was couldn't be obtained         Physical Exam:   Blood pressure 82/53, pulse 111, temperature 101.8  F (38.8  C), temperature source Rectal, resp. rate 19, weight 60.51 kg (133 lb 6.4 oz), SpO2 98 %.  Gen: in no acute distress, confused, somewhat drowsy    Head: atraumatic   Eyes: EOM intact   Mouth: no pharyngeal exudate   Lymph node: not enlarged on head or neck   CV: tachy, no murmurs/gallops, no peripheral edema  Lungs: crackles in bases b/l anteriorly. Otherwise clear   Abd: soft, BS present, he did seem to have generalized abd tenderness  Skin: warm        Tubes/Lines/Devices:   Peripheral IV 17 Left Hand (Active)   Site Assessment WDL 2017 11:00 PM   Line Status Infusing 2017 11:00 PM   Phlebitis Scale 0-->no symptoms 2017 11:00 PM   Infiltration Scale 0 2017  4:59 PM   Number of days:0       Peripheral IV 17 Left (Active)    Site Assessment WDL 1/4/2017 11:00 PM   Line Status Infusing 1/4/2017 11:00 PM   Phlebitis Scale 0-->no symptoms 1/4/2017 11:00 PM   Number of days:0            Data:   Laboratory data reviewed.     Cultures:   Invalid input(s): BC       Recent Labs  Lab 01/04/17  1831 01/04/17  1626   CULT No growth after 1 hour No growth after 1 hour       No results for input(s): [facility] in the last 168 hours.    Unresulted Labs Ordered in the Past 30 Days of this Admission     Date and Time Order Name Status Description    1/4/2017 2255 Methicillin resistant staph aureus cult In process     1/4/2017 1809 Blood culture Preliminary     1/4/2017 1651 Blood culture Preliminary           Imaging/Studies reviewed.       I interviewed and examined the patient with the house staff.  I agree with the assessment and plan as documented.    Nehemias Rodas MD, PhD  Professor of Medicine  Division of Cardiology

## 2017-01-05 NOTE — PROGRESS NOTES
"Thoracic surgery progress note  Date 01/05/2017    S/I: Transferred from [facility] last evening with hypotension, elevated troponin. Hemodynamically stable overnight. Urine output adequate.     /70 mmHg  Temp(Src) 97.3  F (36.3  C) (Axillary)  Resp 10  Ht 1.651 m (5' 5\")  Wt 61.553 kg (135 lb 11.2 oz)  BMI 22.58 kg/m2  SpO2 98%  Resting in bed, on BiPAP, wakes easily  Abdomen soft, non-tender  Incision clean and dry, no hematoma  Non-labored breathing  Regular heart rate, rhythm normal    I/O last 3 completed shifts:  In: 824.13 [I.V.:824.13]  Out: 620 [Urine:620]   since midnight    Labs reviewed: Notable for troponin 15 (12), Cr 2.4 (3.4), WBC 20 (14), lactate 1.3    ABG this morning 7.25/47/122    CT head, C/A/P reviewed      A/P: 76 year old man with history of thyroid cancer and adrenal insufficiency. He underwent transcervical resection of retrosternal mass on 1/3/2017. Reported weakness at home and presented to outside hospital with altered mental status and hypotension, now improved with fluids, IV steroids, and narcan.     Altered mental status: avoid sedating medications, narcotics  Elevated troponin: cardiology consulted, heparin infusion and ,formal echo today  Adrenal insufficiency: continue IV steroids, will give short taper if continuing to improve  MILI: monitor creatinine/UOP, avoid nephrotoxic agents  Colitis on CT: NPO this morning, possible slow advancement of diet if doing well    Mohan Weeks  [facility] surgery resident        "

## 2017-01-05 NOTE — ED NOTES
Dr kim updated NS bolus completed, 500 ml urine output. B/P 87/69. Pt lips pink, pt asleep camryn awakens to stimuli, interacts with wife. Awaiting further orders. Continue to monitor.

## 2017-01-05 NOTE — ED NOTES
Pt arrived to ED at 2215 and was being bagged by EMS [facility] staff. Report was not called to ED. EMS reported pt was a direct admit to  but RR had dropped to 5-6 breaths/min upon arrival to ED. EMS began bagging patient who was also difficult to arouse. Pt also arrived with a dopamine gt infusing. With Dr. Lindo and Dr. Wills at bedside, dopamine gtt was stopped upon arrival to ED per Dr. Wills verbal order. Respiratory was in ED and came to room for possible intubation. Upon removing the bamboo bag pt's sats were 99%. Pt was placed on rebreather mask. Dr. Wills ordered Narcan 0.8mg. This was given at 2220. Pt instantly became alert and talking. Surgery residents came down to ED to assess patient. Patient was stable to continue with direct admission to . Pt placed on transport monitor and brought upstairs by ZAHEER Henriquez and respiratory.     VSS:  10:15- T98, BP92/54, P104, 02sat 100%  10:18- BP 91/73, P107, 02sat 100%  10:20-  77, P110, 02sats 100%

## 2017-01-05 NOTE — ED NOTES
DATE:  1/4/2017   TIME OF RECEIPT FROM LAB:  2020  LAB TEST:  Venous PH   LAB VALUE:  7.15  RESULTS GIVEN WITH READ-BACK TO (PROVIDER):  Dr Grayson notified, no new orders at this time.  TIME LAB VALUE REPORTED TO PROVIDER:   2021

## 2017-01-05 NOTE — PLAN OF CARE
Problem: Goal Outcome Summary  Goal: Goal Outcome Summary  OT4A: Hold: Pt's trops elevated and pt still have femoral line. Not appropriate for evaluation today, will check back tomorrow.

## 2017-01-05 NOTE — PROCEDURES
PROCEDURE NOTE: Arterial Line    Mr. Peres required placement of an arterial line for hemodynamic monitoring due to shock.    The procedure was performed emergently and two-physician need for procedure was agreed to by Dr. Adams and myself. The left radial artery was chosen. The site was prepped and draped. US was utilized to identify the radial artery. Access was attempted x4 and we were unable to puncture the artery. The left femoral artery was selected as a backup site. The artery was accessed under ultrasound guidance with a large bore needle; placement was confirmed with pressurized filling of the syringe with blood and pulsatile bleeding from hub with removal of syringe. Guidewire was placed through the needle, and the needle was removed. The arterial catheter was placed over the guidewire until it was hubbed at the skin. The arterial waveform on the monitor was adequate. The hub was secured to the skin with silk suture. A sterile dressing were applied. The patient tolerated the procedure well.     Dr. Sky was immediately available.    Cooper Martinez MD  PGY-2, General Surgery

## 2017-01-05 NOTE — PLAN OF CARE
Problem: Goal Outcome Summary  Goal: Goal Outcome Summary  Outcome: Improving  Patient arrived near 2230 last evening direct from [facility] ER.  Mr Peres was given narcan in the ER for   Low respiratory rate and somnolence.  He arrived moaning and using rambling words in broken English with  slurred speech.   His blood pressure remains 100-110/60-70s without any further IV bolus' or pressors.  Thoracic fellow here and evaluated patient at bedside.  An oracio was placed, an EKG done and and ECHO   Was completed by cardiology.  His troponins and CK remains elevated, but creatinine, lactate and liver enzymes  Slowly improving.    Began IV antibiotics, solumedrol, and MIV fluid of 125 cc/hr.  Pt. Placed on Bipap due to acidosis of PH 7.20.  Lungs are diminished but patient's resp rate has been 12-14.  Baby aspirin given as well as Heparin gtt began.  Bedside swallow study was passed per this RN, and he chewed aspirin and swallowed without problems.   Labs are to be followed q 4-6 hours. Wife coming in this morning and  planned to come in some  Time today.

## 2017-01-05 NOTE — ED NOTES
Dr Grayson bedside, noted low B/P and assessing pt. With  Dr discussed Pt's LOC and wife reports Pt seems within his Baseline. Awaiting further orders

## 2017-01-05 NOTE — PHARMACY-ADMISSION MEDICATION HISTORY
Admission medication history interview status for the 1/4/2017  admission is complete. See EPIC admission navigator for prior to admission medications     Medication history source reliability:Moderate    Actions taken by pharmacist (provider contacted, etc): Wife brought in medications - discussed with her via . He has not taken meds except pain medication for last couple days given surgery 1/3/17.     Additional medication history information not noted on PTA med list :None    Medication reconciliation/reorder completed by provider prior to medication history? No    Time spent in this activity: 20 min    Prior to Admission medications    Medication Sig Last Dose Taking? Auth Provider   fluticasone (FLONASE) 50 MCG/ACT spray Spray 2 sprays into both nostrils daily Past Week at Unknown time Yes Unknown, Entered By History   oxyCODONE (ROXICODONE) 5 MG IR tablet Take 1-2 tablets (5-10 mg) by mouth every 3 hours as needed for pain or other (Moderate to Severe) 1/3/2017 at Unknown time Yes Ernesto Armstrong MD   senna-docusate (SENOKOT-S;PERICOLACE) 8.6-50 MG per tablet Take 1-2 tablets by mouth 2 times daily Take while on oral narcotics to prevent or treat constipation. Past Week at Unknown time Yes Ernesto Armstrong MD   ibuprofen (ADVIL/MOTRIN) 400 MG tablet Take 400-600 mg by mouth every 6 hours as needed for moderate pain  Past Week at Unknown time Yes Reported, Patient   levothyroxine (SYNTHROID/LEVOTHROID) 137 MCG tablet Take 1 tablet (137 mcg) by mouth daily  Patient taking differently: Take 137 mcg by mouth every morning  Past Week at Unknown time Yes Corina Potts MD   hydrocortisone (CORTEF) 10 MG tablet Take one tablet by mouth daily in the morning, 1/2 tablet at 2 PM Past Week at Unknown time Yes Corina Potts MD   alendronate (FOSAMAX) 70 MG tablet Take 1 tablet (70 mg) by mouth every 7 days Past Week at Unknown time Yes Corina Potts MD   Ipratropium-Albuterol  (COMBIVENT RESPIMAT)  MCG/ACT inhaler Inhale 1 puff into the lungs 4 times daily as needed  Past Week at Unknown time Yes Reported, Patient   traZODone (DESYREL) 100 MG tablet Take 100 mg by mouth At Bedtime  Past Week at Unknown time Yes Reported, Patient   GuaiFENesin (MUCUS RELIEF ADULT PO) Take 400 mg by mouth 2 times daily as needed  Past Week at Unknown time Yes Reported, Patient   TAMSULOSIN HCL PO Take 0.4 mg by mouth At Bedtime  Past Week at Unknown time Yes Reported, Patient   AMITRIPTYLINE HCL PO Take 25 mg by mouth At Bedtime Past Week at Unknown time Yes Reported, Patient   ranitidine HCl 300 MG CAPS Take 300 mg by mouth nightly as needed  Past Week at Unknown time Yes Reported, Patient   DIPHENHYDRAMINE HCL 50 mg take one every 6 hours as needed for itching Past Week at Unknown time Yes Reported, Patient

## 2017-01-06 ENCOUNTER — APPOINTMENT (OUTPATIENT)
Dept: SPEECH THERAPY | Facility: CLINIC | Age: 77
DRG: 853 | End: 2017-01-06
Attending: STUDENT IN AN ORGANIZED HEALTH CARE EDUCATION/TRAINING PROGRAM
Payer: MEDICARE

## 2017-01-06 ENCOUNTER — APPOINTMENT (OUTPATIENT)
Dept: OCCUPATIONAL THERAPY | Facility: CLINIC | Age: 77
DRG: 853 | End: 2017-01-06
Attending: STUDENT IN AN ORGANIZED HEALTH CARE EDUCATION/TRAINING PROGRAM
Payer: MEDICARE

## 2017-01-06 ENCOUNTER — APPOINTMENT (OUTPATIENT)
Dept: PHYSICAL THERAPY | Facility: CLINIC | Age: 77
DRG: 853 | End: 2017-01-06
Attending: STUDENT IN AN ORGANIZED HEALTH CARE EDUCATION/TRAINING PROGRAM
Payer: MEDICARE

## 2017-01-06 LAB
ANION GAP SERPL CALCULATED.3IONS-SCNC: 7 MMOL/L (ref 3–14)
ANION GAP SERPL CALCULATED.3IONS-SCNC: 8 MMOL/L (ref 3–14)
BACTERIA SPEC CULT: NO GROWTH
BLD PROD TYP BPU: NORMAL
BLD PROD TYP BPU: NORMAL
BLD UNIT ID BPU: 0
BLD UNIT ID BPU: 0
BLOOD PRODUCT CODE: NORMAL
BLOOD PRODUCT CODE: NORMAL
BPU ID: NORMAL
BPU ID: NORMAL
BUN SERPL-MCNC: 18 MG/DL (ref 7–30)
BUN SERPL-MCNC: 19 MG/DL (ref 7–30)
CALCIUM SERPL-MCNC: 6.5 MG/DL (ref 8.5–10.1)
CALCIUM SERPL-MCNC: 6.7 MG/DL (ref 8.5–10.1)
CHLORIDE SERPL-SCNC: 118 MMOL/L (ref 94–109)
CHLORIDE SERPL-SCNC: 119 MMOL/L (ref 94–109)
CO2 SERPL-SCNC: 23 MMOL/L (ref 20–32)
CO2 SERPL-SCNC: 23 MMOL/L (ref 20–32)
CREAT SERPL-MCNC: 1.41 MG/DL (ref 0.66–1.25)
CREAT SERPL-MCNC: 1.47 MG/DL (ref 0.66–1.25)
ERYTHROCYTE [DISTWIDTH] IN BLOOD BY AUTOMATED COUNT: 14.1 % (ref 10–15)
ERYTHROCYTE [DISTWIDTH] IN BLOOD BY AUTOMATED COUNT: 14.2 % (ref 10–15)
GFR SERPL CREATININE-BSD FRML MDRD: 46 ML/MIN/1.7M2
GFR SERPL CREATININE-BSD FRML MDRD: 49 ML/MIN/1.7M2
GLUCOSE BLDC GLUCOMTR-MCNC: 118 MG/DL (ref 70–99)
GLUCOSE BLDC GLUCOMTR-MCNC: 129 MG/DL (ref 70–99)
GLUCOSE BLDC GLUCOMTR-MCNC: 140 MG/DL (ref 70–99)
GLUCOSE BLDC GLUCOMTR-MCNC: 148 MG/DL (ref 70–99)
GLUCOSE BLDC GLUCOMTR-MCNC: 166 MG/DL (ref 70–99)
GLUCOSE BLDC GLUCOMTR-MCNC: 167 MG/DL (ref 70–99)
GLUCOSE SERPL-MCNC: 171 MG/DL (ref 70–99)
GLUCOSE SERPL-MCNC: 172 MG/DL (ref 70–99)
HCT VFR BLD AUTO: 30.2 % (ref 40–53)
HCT VFR BLD AUTO: 32 % (ref 40–53)
HGB BLD-MCNC: 10.3 G/DL (ref 13.3–17.7)
HGB BLD-MCNC: 9.9 G/DL (ref 13.3–17.7)
INTERPRETATION ECG - MUSE: NORMAL
INTERPRETATION ECG - MUSE: NORMAL
LMWH PPP CHRO-ACNC: 0.16 IU/ML
LMWH PPP CHRO-ACNC: 0.17 IU/ML
Lab: NORMAL
MAGNESIUM SERPL-MCNC: 2.6 MG/DL (ref 1.6–2.3)
MAGNESIUM SERPL-MCNC: 2.6 MG/DL (ref 1.6–2.3)
MCH RBC QN AUTO: 25.8 PG (ref 26.5–33)
MCH RBC QN AUTO: 26.1 PG (ref 26.5–33)
MCHC RBC AUTO-ENTMCNC: 32.2 G/DL (ref 31.5–36.5)
MCHC RBC AUTO-ENTMCNC: 32.8 G/DL (ref 31.5–36.5)
MCV RBC AUTO: 80 FL (ref 78–100)
MCV RBC AUTO: 80 FL (ref 78–100)
MICRO REPORT STATUS: NORMAL
PHOSPHATE SERPL-MCNC: 1.4 MG/DL (ref 2.5–4.5)
PHOSPHATE SERPL-MCNC: 1.4 MG/DL (ref 2.5–4.5)
PLATELET # BLD AUTO: 88 10E9/L (ref 150–450)
PLATELET # BLD AUTO: 93 10E9/L (ref 150–450)
POTASSIUM SERPL-SCNC: 3.7 MMOL/L (ref 3.4–5.3)
POTASSIUM SERPL-SCNC: 3.8 MMOL/L (ref 3.4–5.3)
RBC # BLD AUTO: 3.8 10E12/L (ref 4.4–5.9)
RBC # BLD AUTO: 4 10E12/L (ref 4.4–5.9)
SODIUM SERPL-SCNC: 148 MMOL/L (ref 133–144)
SODIUM SERPL-SCNC: 150 MMOL/L (ref 133–144)
SPECIMEN SOURCE: NORMAL
TRANSFUSION STATUS PATIENT QL: NORMAL
WBC # BLD AUTO: 13.6 10E9/L (ref 4–11)
WBC # BLD AUTO: 14.9 10E9/L (ref 4–11)

## 2017-01-06 PROCEDURE — 25000125 ZZHC RX 250: Performed by: SURGERY

## 2017-01-06 PROCEDURE — 97165 OT EVAL LOW COMPLEX 30 MIN: CPT | Mod: GO | Performed by: OCCUPATIONAL THERAPIST

## 2017-01-06 PROCEDURE — 25000132 ZZH RX MED GY IP 250 OP 250 PS 637: Mod: GY | Performed by: SURGERY

## 2017-01-06 PROCEDURE — 25000125 ZZHC RX 250: Performed by: THORACIC SURGERY (CARDIOTHORACIC VASCULAR SURGERY)

## 2017-01-06 PROCEDURE — 25000132 ZZH RX MED GY IP 250 OP 250 PS 637: Mod: GY | Performed by: STUDENT IN AN ORGANIZED HEALTH CARE EDUCATION/TRAINING PROGRAM

## 2017-01-06 PROCEDURE — 85027 COMPLETE CBC AUTOMATED: CPT | Performed by: SURGERY

## 2017-01-06 PROCEDURE — 80048 BASIC METABOLIC PNL TOTAL CA: CPT | Performed by: SURGERY

## 2017-01-06 PROCEDURE — A9270 NON-COVERED ITEM OR SERVICE: HCPCS | Mod: GY | Performed by: STUDENT IN AN ORGANIZED HEALTH CARE EDUCATION/TRAINING PROGRAM

## 2017-01-06 PROCEDURE — A9270 NON-COVERED ITEM OR SERVICE: HCPCS | Mod: GY | Performed by: SURGERY

## 2017-01-06 PROCEDURE — 25000125 ZZHC RX 250: Performed by: STUDENT IN AN ORGANIZED HEALTH CARE EDUCATION/TRAINING PROGRAM

## 2017-01-06 PROCEDURE — 92610 EVALUATE SWALLOWING FUNCTION: CPT | Mod: GN | Performed by: SPEECH-LANGUAGE PATHOLOGIST

## 2017-01-06 PROCEDURE — 40000193 ZZH STATISTIC PT WARD VISIT: Performed by: PHYSICAL THERAPIST

## 2017-01-06 PROCEDURE — 25000132 ZZH RX MED GY IP 250 OP 250 PS 637: Mod: GY | Performed by: THORACIC SURGERY (CARDIOTHORACIC VASCULAR SURGERY)

## 2017-01-06 PROCEDURE — 84100 ASSAY OF PHOSPHORUS: CPT | Performed by: SURGERY

## 2017-01-06 PROCEDURE — 40000133 ZZH STATISTIC OT WARD VISIT: Performed by: OCCUPATIONAL THERAPIST

## 2017-01-06 PROCEDURE — 40000225 ZZH STATISTIC SLP WARD VISIT: Performed by: SPEECH-LANGUAGE PATHOLOGIST

## 2017-01-06 PROCEDURE — 12000001 ZZH R&B MED SURG/OB UMMC

## 2017-01-06 PROCEDURE — 00000146 ZZHCL STATISTIC GLUCOSE BY METER IP

## 2017-01-06 PROCEDURE — 97161 PT EVAL LOW COMPLEX 20 MIN: CPT | Mod: GP | Performed by: PHYSICAL THERAPIST

## 2017-01-06 PROCEDURE — 85520 HEPARIN ASSAY: CPT | Performed by: SURGERY

## 2017-01-06 PROCEDURE — 97530 THERAPEUTIC ACTIVITIES: CPT | Mod: GP | Performed by: PHYSICAL THERAPIST

## 2017-01-06 PROCEDURE — A9270 NON-COVERED ITEM OR SERVICE: HCPCS | Mod: GY | Performed by: THORACIC SURGERY (CARDIOTHORACIC VASCULAR SURGERY)

## 2017-01-06 PROCEDURE — 83735 ASSAY OF MAGNESIUM: CPT | Performed by: SURGERY

## 2017-01-06 PROCEDURE — 97535 SELF CARE MNGMENT TRAINING: CPT | Mod: GO | Performed by: OCCUPATIONAL THERAPIST

## 2017-01-06 RX ORDER — LEVOTHYROXINE SODIUM 137 UG/1
137 TABLET ORAL
Status: DISCONTINUED | OUTPATIENT
Start: 2017-01-07 | End: 2017-01-09 | Stop reason: HOSPADM

## 2017-01-06 RX ORDER — PANTOPRAZOLE SODIUM 40 MG/1
40 TABLET, DELAYED RELEASE ORAL EVERY MORNING
Status: DISCONTINUED | OUTPATIENT
Start: 2017-01-07 | End: 2017-01-09 | Stop reason: HOSPADM

## 2017-01-06 RX ORDER — ACETAMINOPHEN 325 MG/1
325-650 TABLET ORAL EVERY 4 HOURS PRN
Status: DISCONTINUED | OUTPATIENT
Start: 2017-01-06 | End: 2017-01-09 | Stop reason: HOSPADM

## 2017-01-06 RX ORDER — HYDROCORTISONE 5 MG/1
5 TABLET ORAL DAILY
Status: DISCONTINUED | OUTPATIENT
Start: 2017-01-06 | End: 2017-01-09 | Stop reason: HOSPADM

## 2017-01-06 RX ORDER — FENTANYL CITRATE 50 UG/ML
25 INJECTION, SOLUTION INTRAMUSCULAR; INTRAVENOUS
Status: DISCONTINUED | OUTPATIENT
Start: 2017-01-06 | End: 2017-01-06

## 2017-01-06 RX ORDER — ACETAMINOPHEN 10 MG/ML
650 INJECTION, SOLUTION INTRAVENOUS ONCE
Status: COMPLETED | OUTPATIENT
Start: 2017-01-06 | End: 2017-01-06

## 2017-01-06 RX ORDER — HYDROCORTISONE 10 MG/1
10 TABLET ORAL EVERY MORNING
Status: DISCONTINUED | OUTPATIENT
Start: 2017-01-07 | End: 2017-01-09 | Stop reason: HOSPADM

## 2017-01-06 RX ADMIN — INSULIN ASPART 1 UNITS: 100 INJECTION, SOLUTION INTRAVENOUS; SUBCUTANEOUS at 12:29

## 2017-01-06 RX ADMIN — HYDROCORTISONE SODIUM SUCCINATE 50 MG: 100 INJECTION, POWDER, FOR SOLUTION INTRAMUSCULAR; INTRAVENOUS at 01:40

## 2017-01-06 RX ADMIN — POTASSIUM CHLORIDE 10 MEQ: 14.9 INJECTION, SOLUTION, CONCENTRATE PARENTERAL at 09:54

## 2017-01-06 RX ADMIN — SODIUM PHOSPHATE, MONOBASIC, MONOHYDRATE AND SODIUM PHOSPHATE, DIBASIC, ANHYDROUS 20 MMOL: 276; 142 INJECTION, SOLUTION INTRAVENOUS at 09:52

## 2017-01-06 RX ADMIN — DEXTROSE AND SODIUM CHLORIDE: 5; 900 INJECTION, SOLUTION INTRAVENOUS at 05:35

## 2017-01-06 RX ADMIN — INSULIN ASPART 1 UNITS: 100 INJECTION, SOLUTION INTRAVENOUS; SUBCUTANEOUS at 04:20

## 2017-01-06 RX ADMIN — HYDROCORTISONE SODIUM SUCCINATE 50 MG: 100 INJECTION, POWDER, FOR SOLUTION INTRAMUSCULAR; INTRAVENOUS at 08:04

## 2017-01-06 RX ADMIN — ACETAMINOPHEN 650 MG: 325 TABLET, FILM COATED ORAL at 19:49

## 2017-01-06 RX ADMIN — Medication 2.5 MG: at 22:50

## 2017-01-06 RX ADMIN — PIPERACILLIN AND TAZOBACTAM 3.38 G: 3; .375 INJECTION, POWDER, FOR SOLUTION INTRAVENOUS at 08:08

## 2017-01-06 RX ADMIN — POTASSIUM CHLORIDE 20 MEQ: 29.8 INJECTION, SOLUTION INTRAVENOUS at 06:10

## 2017-01-06 RX ADMIN — PANTOPRAZOLE SODIUM 40 MG: 40 INJECTION, POWDER, FOR SOLUTION INTRAVENOUS at 08:05

## 2017-01-06 RX ADMIN — ACETAMINOPHEN 650 MG: 10 INJECTION, SOLUTION INTRAVENOUS at 06:14

## 2017-01-06 RX ADMIN — PIPERACILLIN AND TAZOBACTAM 3.38 G: 3; .375 INJECTION, POWDER, FOR SOLUTION INTRAVENOUS at 01:38

## 2017-01-06 RX ADMIN — LEVOTHYROXINE SODIUM ANHYDROUS 68 MCG: 100 INJECTION, POWDER, LYOPHILIZED, FOR SOLUTION INTRAVENOUS at 09:56

## 2017-01-06 RX ADMIN — POTASSIUM CHLORIDE 10 MEQ: 14.9 INJECTION, SOLUTION, CONCENTRATE PARENTERAL at 06:21

## 2017-01-06 RX ADMIN — HYDROCORTISONE 5 MG: 5 TABLET ORAL at 16:58

## 2017-01-06 NOTE — PROGRESS NOTES
"Thoracic surgery progress note  Date 01/06/2017    S/I: Hemodynamically stable overnight.      /87 mmHg  Temp(Src) 97.2  F (36.2  C) (Axillary)  Resp 13  Ht 1.651 m (5' 5\")  Wt 63.6 kg (140 lb 3.4 oz)  BMI 23.33 kg/m2  SpO2 96%  Resting in chair, on RA, responds to questions via   Abdomen soft, non-tender  Incision clean and dry   Non-labored breathing  Regular heart rate, rhythm normal    I/O last 3 completed shifts:  In: 3121 [I.V.:2621; IV Piggyback:500]  Out: 1078 [Urine:1078]  UOP WNL    Labs reviewed: Trops, WBC, & Cr normalizing    A/P: 76 year old man with history of thyroid cancer and adrenal insufficiency. He underwent transcervical resection of retrosternal mass on 1/3/2017. Reported weakness at home and presented to outside hospital with altered mental status and hypotension, now improved with fluids, IV steroids, and narcan. Also with trop elevated to 15.2 likely secondary to demand ischemia per cards note, now downtrending.    Altered mental status: avoid sedating medications, narcotics  CV: cardiology consulted, heparin infusion x 48hrs, , formal echo unremarkable  Adrenal insufficiency: continue IV steroids, will give short taper if continuing to improve  MILI: monitor creatinine/UOP, avoid nephrotoxic agents  Colitis on CT: Lactate normalized, NPO for now    Fawad Lamb PA-C  P: [phone]         "

## 2017-01-06 NOTE — PROGRESS NOTES
01/06/17 1000   Quick Adds   Type of Visit Initial PT Evaluation       Present yes   Language (cantonese)   Living Environment   Lives With spouse   Living Arrangements house   Home Accessibility bed and bath are not on the first floor;stairs to enter home;stairs within home   Number of Stairs to Enter Home 10  (with rail)   Number of Stairs Within Home 7  (with rail, to access different floors)   Transportation Available family or friend will provide;other (see comments)  (wife drives patient as needed)   Living Environment Comment wife present to help at home   Self-Care   Usual Activity Tolerance good   Current Activity Tolerance fair   Regular Exercise yes   Activity/Exercise Type other (see comments)  (per chart does [place] daily)   Equipment Currently Used at Home walker, rolling;other (see comments)  (has walker but does not use it)   Functional Level Prior   Ambulation 0-->independent   Transferring 0-->independent   Fall history within last six months yes   Number of times patient has fallen within last six months 1   Prior Functional Level Comment independent with all functional mobility   General Information   Onset of Illness/Injury or Date of Surgery - Date 01/04/17   Referring Physician Cooper Martinez MD   Patient/Family Goals Statement return home   Pertinent History of Current Problem (include personal factors and/or comorbidities that impact the POC) Mr. Travis Peres is a 76-year-old male with history of thyroid cancer and pituitary macroadenoma now POD#2 after transcervical resection of retrosternal mediastinal lesion with Dr. Armstrong who presents in transfer with concern for septic shock of unclear source, colitis (ischemic vs infectious), elevated troponin likely secondary to demand ischemia, acute kidney injury, and altered mental status. His overall clinical picture is consistent with  global hypoperfusion of unknown etiology. Continuing to improve, troponin downtrending  "and stopped, lactate normal x 3 and exams normal, hemodynamics stable. Mentation improving and without focal neuro deficits.    General Info Comments extremely [place]   Cognitive Status Examination   Orientation person;place   Level of Consciousness lethargic/somnolent   Follows Commands and Answers Questions other (see comments)  (difficult to assess due to [place])   Personal Safety and Judgment impulsive   Posture    Posture Forward head position;Protracted shoulders   Range of Motion (ROM)   ROM Comment B LE grossly WNL   Strength   Strength Comments B LE appears grossly at least 4/5   Bed Mobility   Bed Mobility Comments supine > sit min assist   Transfer Skills   Transfer Comments sit > stand with min assist,    Gait   Gait Comments a few steps in room with min assist   Balance   Balance Comments requires UE support and wide SAL to maintain standing balance   Sensory Examination   Sensory Perception no deficits were identified   Clinical Impression   Criteria for Skilled Therapeutic Intervention yes, treatment indicated   PT Diagnosis impaired functional mobility   Influenced by the following impairments decreased balance, decreased functional endurance   Functional limitations due to impairments impaired bed mobility, impaired transfers, impaired gait   Clinical Presentation Stable/Uncomplicated   Clinical Presentation Rationale romberg, clinical judgment   Clinical Decision Making (Complexity) Low complexity   Therapy Frequency` daily   Predicted Duration of Therapy Intervention (days/wks) 1 week   Anticipated Discharge Disposition Transitional Care Facility   Risk & Benefits of therapy have been explained Yes   Patient, Family & other staff in agreement with plan of care Yes   [facility] AM-PAC  \"6 Clicks\" V.2 Basic Mobility Inpatient Short Form   1. Turning from your back to your side while in a flat bed without using bedrails? 3 - A Little   2. Moving from lying on your back to sitting on the side of a " flat bed without using bedrails? 3 - A Little   3. Moving to and from a bed to a chair (including a wheelchair)? 3 - A Little   4. Standing up from a chair using your arms (e.g., wheelchair, or bedside chair)? 3 - A Little   5. To walk in hospital room? 2 - A Lot   6. Climbing 3-5 steps with a railing? 2 - A Lot   Basic Mobility Raw Score (Score out of 24.Lower scores equate to lower levels of function) 16   Total Evaluation Time   Total Evaluation Time (Minutes) 10

## 2017-01-06 NOTE — PLAN OF CARE
"Problem: Goal Outcome Summary  Goal: Goal Outcome Summary  Outcome: Improving  Mayrae  finally arrived at 2000 from  Services.  Got an opportunity to finally examine patient   Thoroughly, discuss his needs, and connect Patient with MDs for any concerns.   Patient had his days and nights  Switched around thinking it was morning instead of evening.  Found out he has difficulty hearing in left ear, and he  Says his vision is always blurry at baseline.  He did not remember going home and coming back to the ER, and  Resident was able to explain what happened to him.    He said he had painful lower legs because they felt \"swollen.\"  Tylenol IV given to him and he is now sleeping well.  It was evident that he had a short term memory loss as the  needed to repeat herself over and over.  He knows he is NPO until a swallow study is to be performed tomorrow.  Education done related to pneumoboots,  And equipment such as oximeter probes, Yankauer suction, bed alarm, etc.   Will try to get  again for swallow study exam in am.        "

## 2017-01-06 NOTE — PROGRESS NOTES
01/06/17 1050   Quick Adds   Type of Visit Initial Occupational Therapy Evaluation   Living Environment   Lives With spouse  (Simultaneous filing. User may not have seen previous data.)   Living Arrangements house   Home Accessibility tub/shower is not walk in;stairs within home;stairs to enter home   Number of Stairs to Enter Home 7   Number of Stairs Within Home 18  (split level)   Transportation Available family or friend will provide   Living Environment Comment wife is home to help prn   Self-Care   Dominant Hand right   Usual Activity Tolerance moderate   Current Activity Tolerance fair   Regular Exercise yes   Activity/Exercise Type walking;swimming  ([employer])   Exercise Amount/Frequency 3-5 times/wk   Equipment Currently Used at Home shower chair   Activity/Exercise/Self-Care Comment very [place]   Functional Level Prior   Ambulation 0-->independent   Transferring 0-->independent   Toileting 0-->independent   Bathing 0-->independent   Dressing 0-->independent   Eating 0-->independent   Communication 0-->understands/communicates without difficulty   Swallowing 0-->swallows foods/liquids without difficulty   Cognition 0 - no cognition issues reported   Fall history within last six months yes   Number of times patient has fallen within last six months 1  (the fall that brought him to the hospital)   Which of the above functional risks had a recent onset or change? none       Present yes   General Information   Onset of Illness/Injury or Date of Surgery - Date 01/04/17   Referring Physician Cooper Martinez MD   Patient/Family Goals Statement To be I and get home   Additional Occupational Profile Info/Pertinent History of Current Problem Mr. Travis Peres is a 76-year-old male with history of thyroid cancer and pituitary macroadenoma now POD#2 after transcervical resection of retrosternal mediastinal lesion with Dr. Armstrong who presents in transfer with concern for septic shock of unclear source,  colitis (ischemic vs infectious), elevated troponin likely secondary to demand ischemia, acute kidney injury, and altered mental status. His overall clinical picture is consistent with  global hypoperfusion of unknown etiology. Continuing to improve, troponin downtrending and stopped, lactate normal x 3 and exams normal, hemodynamics stable. Mentation improving and without focal neuro deficits.    Precautions/Limitations no known precautions/limitations   Cognitive Status Examination   Orientation orientation to person, place and time   Level of Consciousness lethargic/somnolent   Visual Perception   Visual Perception No deficits were identified   Sensory Examination   Sensory Comments Pt denies deficits   Pain Assessment   Patient Currently in Pain No   Integumentary/Edema   Integumentary/Edema no deficits were identifed   Range of Motion (ROM)   ROM Comment WNL   Strength   Strength Comments WFL, generalized weakness   Muscle Tone Assessment   Muscle Tone Comments WNL   Coordination   Upper Extremity Coordination No deficits were identified   Transfer Skill: Sit to Stand   Level of [place]: Sit/Stand minimum assist (75% patients effort)   Lower Body Dressing   Level of [place]: Dress Lower Body maximum assist (25% patients effort)   Toileting   Level of [place]: Toilet maximum assist (25% patients effort)   Grooming   Level of [place]: Grooming stand-by assist   Instrumental Activities of Daily Living (IADL)   Previous Responsibilities (has A, wife does most)   Activities of Daily Living Analysis   Impairments Contributing to Impaired Activities of Daily Living strength decreased;cognition impaired;pain   General Therapy Interventions   Planned Therapy Interventions ADL retraining;home program guidelines;progressive activity/exercise   Clinical Impression   Criteria for Skilled Therapeutic Interventions Met yes, treatment indicated   OT Diagnosis decreased ADL I and tolerance   Influenced by  "the following impairments weakness, cognition, balance   Assessment of Occupational Performance 1-3 Performance Deficits   Identified Performance Deficits home mgmt, ADLs, leisure   Clinical Decision Making (Complexity) Low complexity   Therapy Frequency 5 times/wk   Predicted Duration of Therapy Intervention (days/wks) 1/20/17   Anticipated Equipment Needs at Discharge (TBD)   Anticipated Discharge Disposition Transitional Care Facility   Risks and Benefits of Treatment have been explained. Yes   Patient, Family & other staff in agreement with plan of care Yes   Clinical Impression Comments Pt would benefit from skilled OT to help increase ADL I and tolerance post op.    [facility] AM-PAC TM \"6 Clicks\"   2016, Trustees of [facility], under license to [employer].  All rights reserved.   6 Clicks Short Forms Daily Activity Inpatient Short Form   [facility] AM-PAC  \"6 Clicks\" Daily Activity Inpatient Short Form   1. Putting on and taking off regular lower body clothing? 2 - A Lot   2. Bathing (including washing, rinsing, drying)? 2 - A Lot   3. Toileting, which includes using toilet, bedpan or urinal? 2 - A Lot   4. Putting on and taking off regular upper body clothing? 3 - A Little   5. Taking care of personal grooming such as brushing teeth? 3 - A Little   6. Eating meals? 4 - None   Daily Activity Raw Score (Score out of 24.Lower scores equate to lower levels of function) 16   Total Evaluation Time   Total Evaluation Time (Minutes) 10     "

## 2017-01-06 NOTE — PROGRESS NOTES
01/06/17 1055   General Information   Onset Date 01/04/17   Start of Care Date 01/06/17   Referring Physician Dr. Trevin Brower   Patient Profile Review/OT: Additional Occupational Profile Info See Profile for full history and prior level of function   Patient/Family Goals Statement Pt demonstrated desire to eat/drink.    Swallowing Evaluation Bedside swallow evaluation   Behaviorial Observations Alert;Distractible   Mode of current nutrition NPO   Comments Orders received for clinical swallowing evaluation. 76-year-old male with history of thyroid cancer and pituitary macroadenoma now POD#2 after transcervical resection of retrosternal mediastinal lesion with Dr. Armstrong who presents in transfer with concern for septic shock of unclear source, colitis (ischemic vs infectious), elevated troponin likely secondary to demand ischemia, acute kidney injury, and altered mental status. His overall clinical picture is consistent with  global hypoperfusion of unknown etiology. Continuing to improve, troponin downtrending and stopped, lactate normal x 3 and exams normal, hemodynamics stable. Mentation improving and without focal neuro deficits.    Clinical Swallow Evaluation   Oral Musculature generally intact   Dentition present and adequate  (Some missing molars)   Mucosal Quality good   Mandibular Strength and Mobility intact   Oral Labial Strength and Mobility WFL   Lingual Strength and Mobility WFL   Velar Elevation intact   Buccal Strength and Mobility intact   Laryngeal Function Cough;Throat clear;Swallow;Voicing initiated   Clinical Swallow Eval: Thin Liquid Texture Trial   Mode of Presentation, Thin Liquids spoon;fed by clinician;cup;self-fed   Volume of Liquid or Food Presented tsp x3, cup x17   Oral Phase of Swallow WFL   Pharyngeal Phase of Swallow repeated swallows   Diagnostic Statement No overt clinical s/sx of aspiration/penetration noted on thin liquid trials.    Clinical Swallow Eval: Puree Solid  Texture Trial   Mode of Presentation, Puree spoon;fed by clinician;self-fed   Volume of Puree Presented 2 oz via tsp   Oral Phase, Puree WFL   Pharyngeal Phase, Puree repeated swallows   Diagnostic Statement No overt clinical s/sx of aspiration/penetration noted on puree consistency trials.    Clinical Swallow Eval: Solid Food Texture Trial   Mode of Presentation, Solid self-fed   Volume of Solid Food Presented half kina cracker in small bites   Oral Phase, Solid other (see comments)  (Prolonged mastication)   Pharyngeal Phase, Solid repeated swallows   Diagnostic Statement No overt clinical s/sx of aspiration/penetration demonstrated. Pt noted to take extra time to masticate and used liquid to clear from oral cavity.    Swallow Compensations   Swallow Compensations Alternate viscosity of consistencies   Results Oral difficulties only   Esophageal Phase of Swallow   Patient reports or presents with symptoms of esophageal dysphagia No   General Therapy Interventions   Planned Therapy Interventions Dysphagia Treatment   Dysphagia treatment Instruction of safe swallow strategies   Swallow Eval: Clinical Impressions   Skilled Criteria for Therapy Intervention Skilled criteria met.  Treatment indicated.   Functional Assessment Scale (FAS) 5   Treatment Diagnosis Mild oropharyngeal dysphagia   Diet texture recommendations Regular diet;Thin liquids   Recommended Feeding/Eating Techniques alternate between small bites and sips of food/liquid;maintain upright posture during/after eating for 30 mins;small sips/bites   Therapy Frequency 3 times/wk   Predicted Duration of Therapy Intervention (days/wks) 1 week   Anticipated Discharge Disposition other (see comments)  (Per PT/OT)   Risks and Benefits of Treatment have been explained. Yes   Patient, family and/or staff in agreement with Plan of Care Yes   Clinical Impression Comments Clinical swallowing evaluation completed per MD orders. Pt demonstrates mild oropharyngeal  dysphagia as characterized by prolonged mastication of cracker and multiple swallows. No overt clinical s/sx of aspiration/penetration noted. Adequate ROM and strength of oral mechanism. Prolonged mastication as pt with limited molars but he is able to compensate. Pt alternates bites/sips and uses slow rate when eating/drinking. Recommend regular consistency diet with thin liquids. Sit pt upright for po intake. If possible in a chair. Encourage small bites/sips and slow rate. SLP to follow per POC.    Total Evaluation Time   Total Evaluation Time (Minutes) 20

## 2017-01-06 NOTE — PLAN OF CARE
Problem: Goal Outcome Summary  Goal: Goal Outcome Summary  Clinical swallowing evaluation completed per MD orders. Pt demonstrates mild oropharyngeal dysphagia as characterized by prolonged mastication of cracker and multiple swallows. No overt clinical s/sx of aspiration/penetration noted. Adequate ROM and strength of oral mechanism. Prolonged mastication as pt with limited molars but he is able to compensate. Pt alternates bites/sips and uses slow rate when eating/drinking. Recommend regular consistency diet with thin liquids. Sit pt upright for po intake. If possible in a chair. Encourage small bites/sips and slow rate. SLP to follow per POC.

## 2017-01-06 NOTE — PROGRESS NOTES
SURGICAL ICU PROGRESS NOTE  January 6, 2017      CO-MORBIDITIES:   Mediastinal mass  (primary encounter diagnosis)    ASSESSMENT: Mr. Travis Peres is a 76-year-old male with history of thyroid cancer and pituitary macroadenoma now POD#2 after transcervical resection of retrosternal mediastinal lesion with Dr. Armstrong who presents in transfer with concern for septic shock of unclear source, colitis (ischemic vs infectious), elevated troponin likely secondary to demand ischemia, acute kidney injury, and altered mental status. His overall clinical picture is consistent with  global hypoperfusion of unknown etiology. Continuing to improve, troponin downtrending and stopped, lactate normal x 3 and exams normal, hemodynamics stable. Mentation improving and without focal neuro deficits.     PLAN:   Neuro/ pain/ sedation:  -Monitor neurological status. Notify the MD for any acute changes in exam.  -will switch to PO pain meds once he passes swallow eval.     Pulmonary care:   -Supplemental oxygen to keep saturation above 92 %.  -patient is off of any O2     Cardiovascular:    -Monitor hemodynamic status.    -Not currently requiring pressors, MAP goal >65  -Cardiology recommended stopping heparin drip at 48h, keep asa, and starting a statin with ischemia workup as an outpatient.     GI care:   -NPO except ice chips and medications. Enteral feeds once swallow eval is complete  -thoracic surgery is following, appreciate recs     Fluids/ Electrolytes/ Nutrition:   -will stop IVF, if not tolerating PO can switch to D5 1/2 NS because of hypernatremia   -ICU electrolyte replacement protocol  -No indication for parenteral nutrition, start a diet after swallow eval is complete.     Renal/ Fluid Balance:    -Urine output is adequate so far.  -Will continue to monitor intake and output.  -remove calhoun     Endocrine:    -levothyroxine IV, will switch to PO dose once tolerating PO  -Stop stress dose steroids, restart home steroid  regimen   -Sliding scale for diabetes management. No basal insulin while NPO, will need return to home regimen once eating.     ID/ Antibiotics:  -stop antibiotics.   - infection appears to be not ongoing.     Heme:     -Hemoglobin 9.9, no transfusion needed.      Prophylaxis:    -Mechanical prophylaxis for DVT.    -stopped heparin, just on aspirin per cardiology  - Protonix, stop once eating.     Miscellaneous:     -PT/OT     Lines/ tubes/ drains:  -plan to D/C Guzman     Disposition:  -TTF    Patient seen, findings and plan discussed with surgical ICU staff Dr. Boni Gonzáles MD  [facility]  [id number]  ====================================    TODAY'S PROGRESS:   SUBJECTIVE:   - no acute events overnight, some mild to moderate pain which is tolerable with tylenol but the patient has been off of opioids to facilitate neurological exams given the concern for global hypoperfusion and cerebrovascular insult. Otherwise having no worsening pain or issues.        OBJECTIVE:   1. VITAL SIGNS:   Temp:  [97.5  F (36.4  C)-98.1  F (36.7  C)] 98  F (36.7  C)  Heart Rate:  [] 96  Resp:  [9-23] 23  BP: ()/(59-92) 127/84 mmHg  MAP:  [67 mmHg-114 mmHg] 114 mmHg  Arterial Line BP: ()/(49-78) 140/76 mmHg  FiO2 (%):  [35 %] 35 %  SpO2:  [90 %-99 %] 98 %  FiO2 (%): 35 %  Resp: 23    2. INTAKE/ OUTPUT:   I/O last 3 completed shifts:  In: 2900.13 [I.V.:2900.13]  Out: 1320 [Urine:1320]    3. PHYSICAL EXAMINATION:   General:   Neuro: Alert and responsive, exam limited by language barrier. Moves all extremities, able to demonstrate CN II-XII intact bilaterally.  Resp: Breathing non-labored  CV: RRR  Abdomen: Soft, Non-distended, Non-tender  Incisions: c/d/i   Extremities: warm and well perfused    4. INVESTIGATIONS:   Arterial Blood Gases     Recent Labs  Lab 01/05/17  0347 01/05/17  0018 01/03/17  1252 01/03/17  0903   PH 7.25* 7.20* 7.35 7.42   PCO2 47* 52* 49* 41   PO2 122* 107* 296* 172*   HCO3 20* 20* 27 27     Complete  Blood Count     Recent Labs  Lab 01/06/17  0453 01/05/17  0347 01/05/17  0040 01/05/17  0018   WBC 13.6* 18.4* 14.9* 20.1*   HGB 9.9* 11.5* 10.3* 11.4*   PLT 88* 125* 93* 119*     Basic Metabolic Panel    Recent Labs  Lab 01/06/17  0453 01/05/17  0347 01/05/17  0040 01/05/17  0018   * 146* 148* 144   POTASSIUM 3.8 4.4 3.7 4.1   CHLORIDE 119* 115* 118* 113*   CO2 23 21 23 21   BUN 19 21 18 22   CR 1.41* 2.21* 1.47* 2.42*   * 184* 172* 118*     Liver Function Tests    Recent Labs  Lab 01/05/17  0018 01/04/17  1626   * 258*   * 145*   ALKPHOS 64 97   BILITOTAL 0.6 1.4*   ALBUMIN 2.4* 3.4   INR 1.51*  --      Pancreatic Enzymes  No lab results found in last 7 days.  Coagulation Profile    Recent Labs  Lab 01/05/17  0018   INR 1.51*     Lactate  Invalid input(s): LACTATE    5. RADIOLOGY:   No results found for this or any previous visit (from the past 24 hour(s)).    =========================================

## 2017-01-06 NOTE — PLAN OF CARE
Problem: Sepsis (Adult)  Goal: Signs and Symptoms of Listed Potential Problems Will be Absent or Manageable (Sepsis)  Signs and symptoms of listed potential problems will be absent or manageable by discharge/transition of care (reference Sepsis (Adult) CPG).   Patient transferred to  this afternoon. Patient had one incontinent bm. Guzman patent. Borderline urine output. Cantonese speaking. No English. Bed alarm on for safety. Impulsive per 4A RN. Neck incision with dermabond dry and intact.

## 2017-01-06 NOTE — PLAN OF CARE
Problem: Goal Outcome Summary  Goal: Goal Outcome Summary  PT 4A: Evaluation completed, treatment initiated.  Patient requiring min assist to transfer OOB.   Requires wide SAL and UE support to maintain standing balance.  Needed min assist to take a few small steps to transfer bed > chair.    Recommend transfer to TCU setting when stable due to dependence with functional mobility.

## 2017-01-06 NOTE — PLAN OF CARE
Problem: Goal Outcome Summary  Goal: Goal Outcome Summary  OT4A: Pt may benefit from short TCU stay before returning home. Pt limited by weakness, balance and cognition. Pt was able to stand with Min A, required Max A for hygiene after bowel incontinence. VSS on RA during seated ADLs.

## 2017-01-06 NOTE — PLAN OF CARE
Problem: Goal Outcome Summary  Goal: Goal Outcome Summary  Outcome: Improving  Appears to be neurologically intact.  Attempted to use  phone, unable to get a physical .  Patient is [place] and has slurred speech so it is difficult to understand.  Patient s wife at bedside and speaks some English.  Lung sounds clear, diminished in bases, on RA.  NSR, no BP issues.  NPO, formal swallow evaluation ordered.  Nurse attempted bedside swallow evaluation and patient continued to cough with minimal amount of water.  Guzman patent, adequate UOP.  Stool sample sent, cdiff negative.  No c/o pain, continues to ask for water and states he is hungry.  Femoral a-line d/c d.  Heparin gtt infusing at 900 units/hr, last recheck therapeutic. Up to the chair with AX2.

## 2017-01-06 NOTE — PLAN OF CARE
Problem: Goal Outcome Summary  Goal: Goal Outcome Summary  Outcome: Improving  N:  in room this morning to help communicate. No complaints of pain. Patient seemed to fluctuate between being oriented x 4 and slightly confused. Wife at bedside as well. Worked with PT/OT this am - up to chair.  CV: BP: slightly hypertensive (Systolic in 150's-160's - MD notified). NSR.   R: Maintaining sats > 92% on room air. Good, productive cough.  GI/: Guzman in place for borderline low urine output. Multiple loose BM's. Electrolytes monitored and replaced per protocol. Passed swallow study with speech - clear liquid diet - to be advanced as tolerated.  Lines: 2 piv's    Plan: Patient transferred to  by wheelchair. Wife notified. Verbal report given to 7B RN. Medications and belongings sent with.

## 2017-01-07 ENCOUNTER — APPOINTMENT (OUTPATIENT)
Dept: PHYSICAL THERAPY | Facility: CLINIC | Age: 77
DRG: 853 | End: 2017-01-07
Attending: STUDENT IN AN ORGANIZED HEALTH CARE EDUCATION/TRAINING PROGRAM
Payer: MEDICARE

## 2017-01-07 LAB
GLUCOSE BLDC GLUCOMTR-MCNC: 104 MG/DL (ref 70–99)
GLUCOSE BLDC GLUCOMTR-MCNC: 110 MG/DL (ref 70–99)
GLUCOSE BLDC GLUCOMTR-MCNC: 120 MG/DL (ref 70–99)
GLUCOSE BLDC GLUCOMTR-MCNC: 133 MG/DL (ref 70–99)
GLUCOSE BLDC GLUCOMTR-MCNC: 137 MG/DL (ref 70–99)
GLUCOSE BLDC GLUCOMTR-MCNC: 95 MG/DL (ref 70–99)

## 2017-01-07 PROCEDURE — 97530 THERAPEUTIC ACTIVITIES: CPT | Mod: GP

## 2017-01-07 PROCEDURE — 25000132 ZZH RX MED GY IP 250 OP 250 PS 637: Mod: GY | Performed by: STUDENT IN AN ORGANIZED HEALTH CARE EDUCATION/TRAINING PROGRAM

## 2017-01-07 PROCEDURE — 25000132 ZZH RX MED GY IP 250 OP 250 PS 637: Mod: GY | Performed by: THORACIC SURGERY (CARDIOTHORACIC VASCULAR SURGERY)

## 2017-01-07 PROCEDURE — 40000193 ZZH STATISTIC PT WARD VISIT

## 2017-01-07 PROCEDURE — A9270 NON-COVERED ITEM OR SERVICE: HCPCS | Mod: GY | Performed by: SURGERY

## 2017-01-07 PROCEDURE — A9270 NON-COVERED ITEM OR SERVICE: HCPCS | Mod: GY | Performed by: THORACIC SURGERY (CARDIOTHORACIC VASCULAR SURGERY)

## 2017-01-07 PROCEDURE — A9270 NON-COVERED ITEM OR SERVICE: HCPCS | Mod: GY | Performed by: STUDENT IN AN ORGANIZED HEALTH CARE EDUCATION/TRAINING PROGRAM

## 2017-01-07 PROCEDURE — 97116 GAIT TRAINING THERAPY: CPT | Mod: GP

## 2017-01-07 PROCEDURE — 12000008 ZZH R&B INTERMEDIATE UMMC

## 2017-01-07 PROCEDURE — 25000132 ZZH RX MED GY IP 250 OP 250 PS 637: Mod: GY | Performed by: SURGERY

## 2017-01-07 PROCEDURE — 00000146 ZZHCL STATISTIC GLUCOSE BY METER IP

## 2017-01-07 RX ORDER — OXYCODONE HYDROCHLORIDE 5 MG/1
5 TABLET ORAL 3 TIMES DAILY PRN
Qty: 30 TABLET | Refills: 0 | COMMUNITY
Start: 2017-01-07 | End: 2017-01-18

## 2017-01-07 RX ORDER — TRAZODONE HYDROCHLORIDE 100 MG/1
100 TABLET ORAL AT BEDTIME
Status: DISCONTINUED | OUTPATIENT
Start: 2017-01-07 | End: 2017-01-09 | Stop reason: HOSPADM

## 2017-01-07 RX ADMIN — ATORVASTATIN CALCIUM 20 MG: 20 TABLET, FILM COATED ORAL at 08:10

## 2017-01-07 RX ADMIN — Medication 3 MG: at 00:30

## 2017-01-07 RX ADMIN — Medication 2.5 MG: at 19:16

## 2017-01-07 RX ADMIN — LEVOTHYROXINE SODIUM 137 MCG: 137 TABLET ORAL at 08:10

## 2017-01-07 RX ADMIN — TRAZODONE HYDROCHLORIDE 100 MG: 100 TABLET ORAL at 22:18

## 2017-01-07 RX ADMIN — ASPIRIN 81 MG CHEWABLE TABLET 81 MG: 81 TABLET CHEWABLE at 08:10

## 2017-01-07 RX ADMIN — Medication 2.5 MG: at 02:47

## 2017-01-07 RX ADMIN — HYDROCORTISONE 5 MG: 5 TABLET ORAL at 13:52

## 2017-01-07 RX ADMIN — Medication 3 MG: at 21:40

## 2017-01-07 RX ADMIN — HYDROCORTISONE 10 MG: 10 TABLET ORAL at 08:10

## 2017-01-07 RX ADMIN — PANTOPRAZOLE SODIUM 40 MG: 40 TABLET, DELAYED RELEASE ORAL at 08:10

## 2017-01-07 ASSESSMENT — PAIN DESCRIPTION - DESCRIPTORS
DESCRIPTORS: DISCOMFORT
DESCRIPTORS: HEADACHE

## 2017-01-07 NOTE — PLAN OF CARE
Problem: Individualization  Goal: Patient Preferences  Afeb, vitals stable, 02 sats 95-97% on room air, c/o neck pain mid shift, oral oxycodone po with some relief, c/o not being able to sleep, melatonin given, did appear to rest/sleep at short peroids, neck incision dry, intact and dermabonded, glucoses 137 and 104, no insulin coverage needed, difficult to communicate with pt, using  phone, iv infusing, able to drink water without choking, offers no further c/o,

## 2017-01-07 NOTE — PLAN OF CARE
Problem: Goal Outcome Summary  Goal: Goal Outcome Summary  Outcome: No Change  Afebrile. VSS. A/Ox4. Primary language cantonese, able to speak some words of english.  phone used. C/o pain in neck and abd, tylenol given X1, oxycodone X1 w/adequate relief. Passed swallow eval today w/recommendations of regular consistency diet and thin liquids. Tolerating regular diet, denies nausea. Guzman catheter w/adequate urine output. Multiple loose stools. Neck incision CDI, w/dermabond. MIV infusing. Able to call appropriately, continue POC.

## 2017-01-07 NOTE — PROGRESS NOTES
"Thoracic surgery progress note  Date 01/07/2017    S/I: Uneventful evening, tolerating diet, pain control appears adequate    /88 mmHg  Temp(Src) 95.9  F (35.5  C) (Oral)  Resp 16  Ht 1.651 m (5' 5\")  Wt 63.6 kg (140 lb 3.4 oz)  BMI 23.33 kg/m2  SpO2 97%  Resting in bed, on RA, responds to questions appropriately  Incision clean and dry   Non-labored breathing  Regular heart rate, rhythm normal    I/O last 3 completed shifts:  In: 2626.05 [P.O.:600; I.V.:2026.05]  Out: 1085 [Urine:1085]  UOP WNL    Labs reviewed    A/P: 76 year old man with history of thyroid cancer and adrenal insufficiency. He underwent transcervical resection of retrosternal mass on 1/3/2017. Reported weakness at home and presented to outside hospital with altered mental status and hypotension, now improved with fluids, IV steroids, and narcan. Also with trop elevated to 15.2 likely secondary to demand ischemia per cards note, now downtrending.    Considerable improvement over past 24 hours. Now with regular diet, ambulating with therapy, mentation improved. Will discontinue calhoun and have trial of void. Anticipate discharge home this evening or tomorrow pending stability. Plan for one week follow up with Dr Nathaniel Weeks  [facility] surgery resident        "

## 2017-01-07 NOTE — PLAN OF CARE
Problem: Goal Outcome Summary  Goal: Goal Outcome Summary  PT 7B: Ambulates 10ft with B handhold on IV pole and CGAx1 with occasional min A to maintain balance.  Pt demonstrates impulsivity and poor command following.  Supine<>sit IND.  STS and bed to chair transfers with IV pole and CGAx1.  PT recommends TCU in order to maximize functional mobility.

## 2017-01-08 LAB
GLUCOSE BLDC GLUCOMTR-MCNC: 138 MG/DL (ref 70–99)
GLUCOSE BLDC GLUCOMTR-MCNC: 161 MG/DL (ref 70–99)
GLUCOSE BLDC GLUCOMTR-MCNC: 78 MG/DL (ref 70–99)
GLUCOSE BLDC GLUCOMTR-MCNC: 90 MG/DL (ref 70–99)
GLUCOSE BLDC GLUCOMTR-MCNC: 90 MG/DL (ref 70–99)
GLUCOSE BLDC GLUCOMTR-MCNC: 93 MG/DL (ref 70–99)
TROPONIN I SERPL-MCNC: 2.43 UG/L (ref 0–0.04)

## 2017-01-08 PROCEDURE — 25000132 ZZH RX MED GY IP 250 OP 250 PS 637: Mod: GY | Performed by: STUDENT IN AN ORGANIZED HEALTH CARE EDUCATION/TRAINING PROGRAM

## 2017-01-08 PROCEDURE — A9270 NON-COVERED ITEM OR SERVICE: HCPCS | Mod: GY | Performed by: STUDENT IN AN ORGANIZED HEALTH CARE EDUCATION/TRAINING PROGRAM

## 2017-01-08 PROCEDURE — 84484 ASSAY OF TROPONIN QUANT: CPT | Performed by: STUDENT IN AN ORGANIZED HEALTH CARE EDUCATION/TRAINING PROGRAM

## 2017-01-08 PROCEDURE — 25000132 ZZH RX MED GY IP 250 OP 250 PS 637: Mod: GY | Performed by: THORACIC SURGERY (CARDIOTHORACIC VASCULAR SURGERY)

## 2017-01-08 PROCEDURE — A9270 NON-COVERED ITEM OR SERVICE: HCPCS | Mod: GY | Performed by: SURGERY

## 2017-01-08 PROCEDURE — 36415 COLL VENOUS BLD VENIPUNCTURE: CPT | Performed by: STUDENT IN AN ORGANIZED HEALTH CARE EDUCATION/TRAINING PROGRAM

## 2017-01-08 PROCEDURE — T1013 SIGN LANG/ORAL INTERPRETER: HCPCS | Mod: U3

## 2017-01-08 PROCEDURE — 00000146 ZZHCL STATISTIC GLUCOSE BY METER IP

## 2017-01-08 PROCEDURE — A9270 NON-COVERED ITEM OR SERVICE: HCPCS | Mod: GY | Performed by: THORACIC SURGERY (CARDIOTHORACIC VASCULAR SURGERY)

## 2017-01-08 PROCEDURE — 12000008 ZZH R&B INTERMEDIATE UMMC

## 2017-01-08 PROCEDURE — 25000132 ZZH RX MED GY IP 250 OP 250 PS 637: Mod: GY | Performed by: SURGERY

## 2017-01-08 PROCEDURE — 93005 ELECTROCARDIOGRAM TRACING: CPT

## 2017-01-08 RX ORDER — AMOXICILLIN 250 MG
1-2 CAPSULE ORAL 2 TIMES DAILY
Status: DISCONTINUED | OUTPATIENT
Start: 2017-01-08 | End: 2017-01-08

## 2017-01-08 RX ORDER — POLYETHYLENE GLYCOL 3350 17 G/17G
17 POWDER, FOR SOLUTION ORAL DAILY PRN
Status: DISCONTINUED | OUTPATIENT
Start: 2017-01-08 | End: 2017-01-09 | Stop reason: HOSPADM

## 2017-01-08 RX ORDER — AMOXICILLIN 250 MG
1-2 CAPSULE ORAL 2 TIMES DAILY PRN
Status: DISCONTINUED | OUTPATIENT
Start: 2017-01-08 | End: 2017-01-09 | Stop reason: HOSPADM

## 2017-01-08 RX ORDER — POLYETHYLENE GLYCOL 3350 17 G/17G
17 POWDER, FOR SOLUTION ORAL DAILY
Status: DISCONTINUED | OUTPATIENT
Start: 2017-01-08 | End: 2017-01-08

## 2017-01-08 RX ADMIN — HYDROCORTISONE 5 MG: 5 TABLET ORAL at 14:33

## 2017-01-08 RX ADMIN — TRAZODONE HYDROCHLORIDE 100 MG: 100 TABLET ORAL at 21:27

## 2017-01-08 RX ADMIN — PANTOPRAZOLE SODIUM 40 MG: 40 TABLET, DELAYED RELEASE ORAL at 07:59

## 2017-01-08 RX ADMIN — ASPIRIN 81 MG CHEWABLE TABLET 81 MG: 81 TABLET CHEWABLE at 08:03

## 2017-01-08 RX ADMIN — HYDROCORTISONE 10 MG: 10 TABLET ORAL at 08:04

## 2017-01-08 RX ADMIN — LEVOTHYROXINE SODIUM 137 MCG: 137 TABLET ORAL at 08:03

## 2017-01-08 RX ADMIN — ATORVASTATIN CALCIUM 20 MG: 20 TABLET, FILM COATED ORAL at 08:03

## 2017-01-08 RX ADMIN — Medication 12.5 MG: at 11:30

## 2017-01-08 NOTE — PLAN OF CARE
Problem: Individualization  Goal: Patient Preferences  Afeb, vitals stable, 02 sats 98% on room air, unable to assess pain, no outward signs of pain, neck incision dry, intact and dermabonded, belly soft, positive bowel sounds and gas, no stools this shift, lungs clear, able to make needs known, up to commode with assist of 1-2, voiding in good amts, glucoses 90 and 78, no coverage needed, appeared to rest/sleep between cares,

## 2017-01-08 NOTE — PLAN OF CARE
Problem: Goal Outcome Summary  Goal: Goal Outcome Summary  Outcome: No Change  Afebrile, hypertensive. Providers updated. Hydralazine prn ordered x1 given. Sats 95-98%RA. Denies pain. Neck incision cdi.  Abdomen rounded soft, Passing gas and stool.  BG 90 and 160.  Up to commode with assist of 1. Plan for discharge tomorrow. Cont with POC

## 2017-01-08 NOTE — PLAN OF CARE
Problem: Goal Outcome Summary  Goal: Goal Outcome Summary  PT 7B: Cancel.  Per discussion with RN, pt not appropriate today due to significantly elevated BP values.  Will reschedule tomorrow .

## 2017-01-08 NOTE — PLAN OF CARE
Problem: Goal Outcome Summary  Goal: Goal Outcome Summary  Outcome: No Change  Afebrile,VSS,sats  95-98%RA. Denies pain. Neck incision cdi. Guzman removed voiding adequately using urinal.  Passing gas loose stool x3.  Tolerating regular diet. Ambulating with assist of 1.  BG with in normal range. Cont with POC

## 2017-01-08 NOTE — PROGRESS NOTES
"Thoracic surgery progress note  Date 01/08/2017    S/I: Uneventful evening, tolerating diet, pain control appears adequate    /91 mmHg  Temp(Src) 97.7  F (36.5  C) (Oral)  Resp 16  Ht 1.651 m (5' 5\")  Wt 63.6 kg (140 lb 3.4 oz)  BMI 23.33 kg/m2  SpO2 96%  Resting in bed, on RA, responds to questions appropriately  Incision clean and dry   Non-labored breathing  Regular heart rate, rhythm normal    I/O last 3 completed shifts:  In: 720 [P.O.:720]  Out: 2450 [Urine:1775; Emesis/NG output:625; Stool:50]  UOP WNL    Labs reviewed    A/P: 76 year old man with history of thyroid cancer and adrenal insufficiency. He underwent transcervical resection of retrosternal mass on 1/3/2017. Reported weakness at home and presented to outside hospital with altered mental status and hypotension, now improved with fluids, IV steroids, and narcan. Also with trop elevated to 15.2 likely secondary to demand ischemia per cards note, now downtrending, 2.4 from peak of 15.    Considerable improvement over past 48 hours. Now with regular diet, ambulating with therapy, mentation improved.     - Patient/family does not want to go to TCU for discharge  - Anticipate d/c home tomorrow AM with home PT  - Plan for one week follow up with Dr Nathaniel Schaeffer MD   Surgery PGY-1            "

## 2017-01-08 NOTE — PLAN OF CARE
Problem: Goal Outcome Summary  Goal: Goal Outcome Summary  Outcome: No Change  Afebrile, hypertensive on and off this evening, moonlighter notified.  Reports mild headache at shift change for which oxy was given x 1.  Mepilex placed on left heel as area very reddened.  Voiding frequently since Guzman DC'd.  Trazodone and melatonin given @ HS.

## 2017-01-09 ENCOUNTER — APPOINTMENT (OUTPATIENT)
Dept: OCCUPATIONAL THERAPY | Facility: CLINIC | Age: 77
DRG: 853 | End: 2017-01-09
Attending: STUDENT IN AN ORGANIZED HEALTH CARE EDUCATION/TRAINING PROGRAM
Payer: MEDICARE

## 2017-01-09 ENCOUNTER — APPOINTMENT (OUTPATIENT)
Dept: SPEECH THERAPY | Facility: CLINIC | Age: 77
DRG: 853 | End: 2017-01-09
Attending: STUDENT IN AN ORGANIZED HEALTH CARE EDUCATION/TRAINING PROGRAM
Payer: MEDICARE

## 2017-01-09 VITALS
DIASTOLIC BLOOD PRESSURE: 101 MMHG | HEIGHT: 65 IN | WEIGHT: 140.21 LBS | OXYGEN SATURATION: 96 % | RESPIRATION RATE: 16 BRPM | TEMPERATURE: 98.4 F | BODY MASS INDEX: 23.36 KG/M2 | SYSTOLIC BLOOD PRESSURE: 166 MMHG

## 2017-01-09 LAB
GLUCOSE BLDC GLUCOMTR-MCNC: 76 MG/DL (ref 70–99)
GLUCOSE BLDC GLUCOMTR-MCNC: 79 MG/DL (ref 70–99)
GLUCOSE BLDC GLUCOMTR-MCNC: 80 MG/DL (ref 70–99)

## 2017-01-09 PROCEDURE — A9270 NON-COVERED ITEM OR SERVICE: HCPCS | Mod: GY | Performed by: THORACIC SURGERY (CARDIOTHORACIC VASCULAR SURGERY)

## 2017-01-09 PROCEDURE — 25000132 ZZH RX MED GY IP 250 OP 250 PS 637: Mod: GY | Performed by: THORACIC SURGERY (CARDIOTHORACIC VASCULAR SURGERY)

## 2017-01-09 PROCEDURE — 40000225 ZZH STATISTIC SLP WARD VISIT

## 2017-01-09 PROCEDURE — 25000132 ZZH RX MED GY IP 250 OP 250 PS 637: Mod: GY | Performed by: SURGERY

## 2017-01-09 PROCEDURE — 97535 SELF CARE MNGMENT TRAINING: CPT | Mod: GO

## 2017-01-09 PROCEDURE — A9270 NON-COVERED ITEM OR SERVICE: HCPCS | Mod: GY | Performed by: STUDENT IN AN ORGANIZED HEALTH CARE EDUCATION/TRAINING PROGRAM

## 2017-01-09 PROCEDURE — 25000132 ZZH RX MED GY IP 250 OP 250 PS 637: Mod: GY | Performed by: STUDENT IN AN ORGANIZED HEALTH CARE EDUCATION/TRAINING PROGRAM

## 2017-01-09 PROCEDURE — 00000146 ZZHCL STATISTIC GLUCOSE BY METER IP

## 2017-01-09 PROCEDURE — A9270 NON-COVERED ITEM OR SERVICE: HCPCS | Mod: GY | Performed by: SURGERY

## 2017-01-09 PROCEDURE — T1013 SIGN LANG/ORAL INTERPRETER: HCPCS | Mod: U3

## 2017-01-09 PROCEDURE — 92526 ORAL FUNCTION THERAPY: CPT | Mod: GN

## 2017-01-09 PROCEDURE — 40000133 ZZH STATISTIC OT WARD VISIT

## 2017-01-09 RX ORDER — MAGNESIUM CARB/ALUMINUM HYDROX 105-160MG
296 TABLET,CHEWABLE ORAL ONCE
Status: COMPLETED | OUTPATIENT
Start: 2017-01-09 | End: 2017-01-09

## 2017-01-09 RX ADMIN — Medication 296 ML: at 08:31

## 2017-01-09 RX ADMIN — HYDROCORTISONE 10 MG: 10 TABLET ORAL at 08:31

## 2017-01-09 RX ADMIN — ATORVASTATIN CALCIUM 20 MG: 20 TABLET, FILM COATED ORAL at 08:31

## 2017-01-09 RX ADMIN — ASPIRIN 81 MG CHEWABLE TABLET 81 MG: 81 TABLET CHEWABLE at 08:31

## 2017-01-09 RX ADMIN — LEVOTHYROXINE SODIUM 137 MCG: 137 TABLET ORAL at 08:31

## 2017-01-09 RX ADMIN — PANTOPRAZOLE SODIUM 40 MG: 40 TABLET, DELAYED RELEASE ORAL at 08:31

## 2017-01-09 NOTE — PLAN OF CARE
Problem: Goal Outcome Summary  Goal: Goal Outcome Summary  Occupational Therapy Discharge Summary    Reason for therapy discharge:    Discharged to home.    Progress towards therapy goal(s). See goals on Care Plan in [facility] electronic health record for goal details.  Goals partially met.  Barriers to achieving goals:   discharge from facility.    Therapy recommendation(s):    Continued therapy is recommended.  Rationale/Recommendations:  pt continues to demonstrate decreased strength and activity tolerance as well as balance deficits impacting pt's ability to complete ADLs.

## 2017-01-09 NOTE — PROGRESS NOTES
Brief Social Work Note    Per chart review TCU is recommended for pt but pt/family are refusing. Per conversation with Thoracic team team discussed with pt the TCU recommendations and what TCU is but pt/family still refused and feel they have enough family support for pt to d/c to home.     Stella Kenney, REINIER, MSW  7B   [phone] (pager) [phone]  1/9/2017

## 2017-01-09 NOTE — PLAN OF CARE
Problem: Goal Outcome Summary  Goal: Goal Outcome Summary  SLP NOTE: Recommend the pt continue a regular texture diet and thin liquids. Pt generally eats soft foods from home (ie. Soup and rice). Discharge for home is anticipated today. Continued SLP tx at home will not be warranted as swallowing function is intact with use of safety strategies (small bites sips, slow pacing) and c/w baseline function.      Speech Language Therapy Discharge Summary    Reason for therapy discharge:    All goals and outcomes met, no further needs identified.  No further expectations of functional progress.    Progress towards therapy goal(s). See goals on Care Plan in [facility] electronic health record for goal details.  Goals met    Therapy recommendation(s):    No further therapy is recommended.

## 2017-01-09 NOTE — DISCHARGE SUMMARY
"NAME: Travis Peres   MRN: [id number]   : 1940     DATE OF ADMISSION: 2017     PRE/POSTOPERATIVE DIAGNOSES: Adrenal insufficiency, hypotension    PROCEDURES PERFORMED: None    PATHOLOGY RESULTS: None     CULTURE RESULTS: None     INTRAOPERATIVE COMPLICATIONS: None     POSTOPERATIVE COMPLICATIONS: None     DRAINS/TUBES PRESENT AT DISCHARGE: None    DATE OF DISCHARGE:  2017    HOSPITAL COURSE: Travis Peres is a 76 year old male who on 2017 was admitted to SICU for unresponsiveness, elevated cardiac enzymes, and presumed sepsis from unknown source. Further workup including cardiac echo is consistent with adrenal insufficiency leading to global hypoperfusion and demand ischemia of the heart. He was transferred out onto surgical floor on . Prior to discharge, his pain was controlled well, he was able to perform ADLs without difficulty, and had full return of bowel and bladder function. PT recommended TCU on discharge for rehab but patient and family refused. On 2017, he was discharged to home in stable condition.    Physical Exam:  /101 mmHg  Temp(Src) 98.4  F (36.9  C) (Oral)  Resp 16  Ht 1.651 m (5' 5\")  Wt 63.6 kg (140 lb 3.4 oz)  BMI 23.33 kg/m2  SpO2 96%  NAD, AOx3  Incision clean and dry    Non-labored breathing on RA  Regular heart rate, rhythm normal    DISCHARGE INSTRUCTIONS:    Discharge Procedure Orders  Home Care PT Referral for Hospital Discharge   Referral Type: Home Health Therapies & Aides     Home Care OT Referral for Hospital Discharge     Discharge Instructions   Order Comments: THORACIC SURGERY DISCHARGE INSTRUCTIONS      If your plans upon discharge include prolonged periods of sitting (i.e a lengthy car or plane ride), it is highly recommended to get up and walk at least once per hour to help prevent swelling and blood clots.     You may get incision wet. Do not submerge, soak, or scrub incision or swim until seen in follow-up or after two weeks.     Activity as " tolerated, no strenous activity until seen in follow-up, no lifting greater than 20 pounds for the next 2 weeks.    Stay hydrated. Take over the counter fiber (metamucil or benefiber) and stool softeners (Miralax, docusate or senna) if becoming constipated with narcotic use.      Call for fever greater than 101.5, chills, increased size of incision, red skin around incision, vision changes, muscle strength changes, sensation changes, shortness of breath, or other concerns.    No driving while taking narcotic pain medication.    Transition to ibuprofen or tylenol/acetaminophen for pain control. Do not take tylenol/acetaminophen and acetaminophen containing narcotic (e.g., percocet or vicodin) at the same time. If you have known ulcer problems, or kidney trouble (elevated creatinine) do not take the ibuprofen.    In emergencies, call [phone]    For other Questions or Concerns;  A.) During weekday working hours (Monday through Friday 8am to 4:30pm) call [phone] and ask to speak to a clinical nurse specialist.    B.) At nights (after 4:30pm), on weekends, or if urgent call [phone] and tell the   I would like to page job code [phone], the thoracic surgery fellow on call, please.       1. Follow up will be arranged by the Thoracic office, we will contact you to set up the appointment.  You may call [phone] if you wish to schedule before you are contacted.     Adult [facility] Follow-up and recommended labs and tests   Order Comments: Follow up with Dr. Armstrong in one week for hospital follow- up. No follow up labs or test are needed.    Appointments on [place] and/or [facility] (with [facility] or [facility] provider or service). Call [phone] if you haven't heard regarding these appointments within 7 days of discharge.     Diet   Order Comments: Regular   Order Specific Question Answer Comments   Is discharge order? Yes          DISCHARGE MEDICATIONS:    Travis Peres   Home Medication Instructions  [facility]:[id number]    Printed on:01/09/17 0518   Medication Information                      alendronate (FOSAMAX) 70 MG tablet  Take 1 tablet (70 mg) by mouth every 7 days             AMITRIPTYLINE HCL PO  Take 25 mg by mouth At Bedtime             DIPHENHYDRAMINE HCL  50 mg take one every 6 hours as needed for itching             fluticasone (FLONASE) 50 MCG/ACT spray  Spray 2 sprays into both nostrils daily             GuaiFENesin (MUCUS RELIEF ADULT PO)  Take 400 mg by mouth 2 times daily as needed              hydrocortisone (CORTEF) 10 MG tablet  Take one tablet by mouth daily in the morning, 1/2 tablet at 2 PM             ibuprofen (ADVIL/MOTRIN) 400 MG tablet  Take 400-600 mg by mouth every 6 hours as needed for moderate pain              Ipratropium-Albuterol (COMBIVENT RESPIMAT)  MCG/ACT inhaler  Inhale 1 puff into the lungs 4 times daily as needed              levothyroxine (SYNTHROID/LEVOTHROID) 137 MCG tablet  Take 1 tablet (137 mcg) by mouth daily             oxyCODONE (ROXICODONE) 5 MG IR tablet  Take 1 tablet (5 mg) by mouth 3 times daily as needed for pain or other (Moderate to Severe)             ranitidine HCl 300 MG CAPS  Take 300 mg by mouth nightly as needed              senna-docusate (SENOKOT-S;PERICOLACE) 8.6-50 MG per tablet  Take 1-2 tablets by mouth 2 times daily Take while on oral narcotics to prevent or treat constipation.             TAMSULOSIN HCL PO  Take 0.4 mg by mouth At Bedtime

## 2017-01-09 NOTE — PLAN OF CARE
Problem: Goal Outcome Summary  Goal: Goal Outcome Summary  Outcome: No Change  Afebrile, hypertensive, other VSS. O2 sats 96% on RA. A& Ox4. Denies SOB. Denies pain/discomfort. Up w/ assist-1. Ambulated to bathroom. Voiding adequately in urinal and bedside commode. + BS, + flatus. Incision on neck dermabonded, CDI. Reg diet, tolerating well, good appetite. Denies N/V. Per MD pt is adequate for discharge but before discharge pt will need to have a BM. Mag citrate ordered. Pt had 2 medium soft/loose BM. Care coordinator set for PT and OT at home. Pt and wife was agreeable. Pt showered before home. Discharge instructions were reviewed w/ pt and wife. Both stated understanding and questions and concerns were addressed. No further questions and concerns.  was utilized in all conversations.

## 2017-01-09 NOTE — PLAN OF CARE
Problem: Goal Outcome Summary  Goal: Goal Outcome Summary  Outcome: No Change  Afebrile, hypertensive. Sats 95-98%RA. Denies pain. Neck incision cdi. Voiding spont.  Abdomen rounded, soft. Passing gas no stool. Tolerating diet. Plan for discharge tomorrow.

## 2017-01-09 NOTE — PLAN OF CARE
Problem: Goal Outcome Summary  Goal: Goal Outcome Summary  Physical Therapy Discharge Summary    Reason for therapy discharge:    Discharged to home.    Progress towards therapy goal(s). See goals on Care Plan in [facility] electronic health record for goal details.  Goals not met.  Barriers to achieving goals:   discharge from facility.    Therapy recommendation(s):    Continued therapy is recommended.  Rationale/Recommendations:  to maximize IND and safety with functional mobility and gait..

## 2017-01-09 NOTE — PLAN OF CARE
Problem: Goal Outcome Summary  Goal: Goal Outcome Summary  OT/7B: Facilitated ADLs for increased independence. Pt SBA for UB and LB dressing, CGA for sit <> stand and close CGA for short walk to bathroom due to balance deficits. Able to complete standing grooming/hygiene with SBA and setup. Educated pt on safe bathing upon return home, verbalized understanding. Educated pt to have wife present for all transfers and mobility due to balance deficits/weakness, pt verbalized understanding.    Recommendation: Pt continues to be limited by balance deficits and decreased strength, would benefit from TCU stay to progress strength and activity tolerance needed to complete ADLs independently. Pt currently declining TCU stay, would recommend pt have 24 hour assist for all self cares and transfers.

## 2017-01-09 NOTE — PROGRESS NOTES
Care Coordinator Progress Note     Admission Date/Time:  1/4/2017  Attending MD:  Ernesto Armstrong*     Data  Chart reviewed, discussed with interdisciplinary team.   Patient was admitted for:    Mediastinal mass  Acute post-operative pain  Papillary thyroid carcinoma (H).    Concerns with insurance coverage for discharge needs: None.  Current Living Situation: Patient lives with spouse.  Support System: Supportive and Involved  Services Involved: no services involved prior to admission  Transportation: Family or Friend will provide  Barriers to Discharge: no barriers identified    Coordination of Care and Referrals: Provided patient/family with options for Home Care.        Assessment  Patient is a 76 year old man with history of thyroid cancer and adrenal insufficiency who is s/p resection of retrosternal mass.  PT/OT recommending TCU stay but patient and his family are declining.  Met with patient and spouse at bedside to introduce RNCC role and discuss discharge planning.  Patient is Cantonese speaking and  was present for whole interaction to assist with translation.  Discussed PT/OT recommendations, patient and wife feel like they have enough support at home to discharge to home.  Patients spouse will be available to assist with patient as needed.  Patient does have a walker at home.  Discussed home PT/OT for continued therapy and a home safety evaluation.  Patient agreeable to this plan.  After giving patient choice of agencies patient chose [facility](P: [phone], F: [phone]).    Referral made and orders placed.  Will continue to follow and assist with discharge planning as needed.        Plan  Anticipated Discharge Date: 1/9/2017  Anticipated Discharge Plan:  Home with [facility] for home PT/OT    Manju Hernandez, RNCC  [phone]

## 2017-01-09 NOTE — PLAN OF CARE
Problem: Individualization  Goal: Patient Preferences  Afeb, vitals stable, 02 sats 96% on room air, no c/o pain, neck incision dry, intact and dermabonded, belly soft, positive bowel sounds, no gas or stool, lungs clear, glucoses 80 and 76, no coverage needed, appeared to rest/sleep after 0100, used  for communication, hopes for dc today to home

## 2017-01-10 LAB
BACTERIA SPEC CULT: NO GROWTH
BACTERIA SPEC CULT: NO GROWTH
INTERPRETATION ECG - MUSE: NORMAL
Lab: NORMAL
Lab: NORMAL
MICRO REPORT STATUS: NORMAL
MICRO REPORT STATUS: NORMAL
SPECIMEN SOURCE: NORMAL
SPECIMEN SOURCE: NORMAL

## 2017-01-14 NOTE — ADDENDUM NOTE
Encounter addended by: Thierry Nguyen on: 1/14/2017  4:31 PM<BR>     Documentation filed: Charges VN, Flowsheet VN

## 2017-01-17 NOTE — PROGRESS NOTES
THORACIC SURGERY FOLLOW UP VISIT    Dear Dr. Alvarez,  I saw Mr. Travis Peres in follow-up today. The clinical summary follows:     PREOP DIAGNOSIS   1.  Papillary thyroid cancer, gradually rising thyroglobulinemia.    2.  Slowly growing anterior mediastinal cystic lesion.      PROCEDURE   Transcervical resection of retrosternal mediastinal lesion.    PATHOLOGY  Benign thymic cyst    DATE OF PROCEDURE  01/03/2016    COMPLICATIONS  Post op readmission unresponsiveness, hypotension, elevated troponins. This was believed to be due to adrenal insufficiency and demand ischemia.     INTERVAL STUDIES  1/4/17: CT head showed no evidence of acute disease. CT chest/abdomen/pelvis showed expected post operative intrathoracic changes.     ETOH NEGATIVE  TOB NEGATIVE  BMI 22    SUBJECTIVE  Mr. Peres is having a very hard time recovering. He was unsteady at the time of discharge and inpatient rehabilitation was recommended due to his problems with basic ADLs. However, he and his wife opted to go home. Unfortunately, 1 or 2 days after discharge, he fell and hurt his right lower rib cage. He has also noticed a significant RIGHT foot drop of unclear etiology. He says that it developed during his hospital stay, but I could not find any documentation to that avail in the PT notes. In summary, his problems are:    1) RIGHT foot drop with difficulty to walk, he is using a walker  2) Diarrhea: they have not been able to obtain a stool sample yet  3) RIGHT lower rib cage pain that is interfering with sleep. He is currently only taking 1 tylenol pill twice/day    Overall he does not seem to be thriving at home. Dr. Alvarez recommended going to the ER with a plan to admit him 2 days ago, but he was afraid of missing his Ophthalmology clinic appointment later today.  I conveyed the results of pathology and reassured him that it was benign.    From a personal perspective, july is here with his wife, Nazanin, they seem overwhelmed with  everything that is going on.    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 Travis Peres, his wife, and the , more than 50% of which were spent in counseling, coordination of care, and face-to-face time. I reviewed the plan as follows:  Follow-up care:  As we discussed on the phone today, Mr. Peres needs a comprehensive approach to his health problems and will likely need to be in-house as you recommended 2 days ago. He will see you the day after tomorrow and both our offices will call him tomorrow to relay that message.   They had all their questions answered and were in agreement with the plan.  I appreciate the opportunity to participate in the care of your patient and will keep you updated.  Sincerely,

## 2017-01-18 ENCOUNTER — OFFICE VISIT (OUTPATIENT)
Dept: SURGERY | Facility: CLINIC | Age: 77
End: 2017-01-18
Attending: THORACIC SURGERY (CARDIOTHORACIC VASCULAR SURGERY)
Payer: MEDICARE

## 2017-01-18 ENCOUNTER — OFFICE VISIT (OUTPATIENT)
Dept: OPHTHALMOLOGY | Facility: CLINIC | Age: 77
End: 2017-01-18
Attending: OPHTHALMOLOGY
Payer: MEDICARE

## 2017-01-18 VITALS
WEIGHT: 134.9 LBS | HEIGHT: 65 IN | OXYGEN SATURATION: 96 % | SYSTOLIC BLOOD PRESSURE: 160 MMHG | DIASTOLIC BLOOD PRESSURE: 89 MMHG | TEMPERATURE: 97.4 F | BODY MASS INDEX: 22.48 KG/M2 | HEART RATE: 77 BPM

## 2017-01-18 DIAGNOSIS — R19.7 DIARRHEA, UNSPECIFIED TYPE: ICD-10-CM

## 2017-01-18 DIAGNOSIS — H35.3290 EXUDATIVE SENILE MACULAR DEGENERATION OF RETINA (H): ICD-10-CM

## 2017-01-18 DIAGNOSIS — G57.31 PERONEAL NERVE PALSY, RIGHT: ICD-10-CM

## 2017-01-18 DIAGNOSIS — H35.3220 EXUDATIVE AGE-RELATED MACULAR DEGENERATION, LEFT EYE, STAGE UNSPECIFIED (H): Primary | ICD-10-CM

## 2017-01-18 DIAGNOSIS — E32.8 THYMIC CYST (H): Primary | ICD-10-CM

## 2017-01-18 PROCEDURE — 92134 CPTRZ OPH DX IMG PST SGM RTA: CPT | Mod: ZF | Performed by: OPHTHALMOLOGY

## 2017-01-18 PROCEDURE — 67028 INJECTION EYE DRUG: CPT | Mod: ZF | Performed by: OPHTHALMOLOGY

## 2017-01-18 PROCEDURE — C9257 BEVACIZUMAB INJECTION: HCPCS

## 2017-01-18 PROCEDURE — 99213 OFFICE O/P EST LOW 20 MIN: CPT | Mod: 25,ZF

## 2017-01-18 PROCEDURE — 99212 OFFICE O/P EST SF 10 MIN: CPT | Mod: 25,27

## 2017-01-18 PROCEDURE — 25000128 H RX IP 250 OP 636

## 2017-01-18 RX ORDER — METOPROLOL SUCCINATE 100 MG/1
TABLET, EXTENDED RELEASE ORAL
COMMUNITY
Start: 2016-05-10 | End: 2017-01-18

## 2017-01-18 RX ORDER — AMLODIPINE BESYLATE 10 MG/1
TABLET ORAL
COMMUNITY
Start: 2016-06-15 | End: 2017-01-18

## 2017-01-18 RX ORDER — TRAZODONE HYDROCHLORIDE 100 MG/1
TABLET ORAL
Refills: 4 | COMMUNITY
Start: 2016-12-17 | End: 2017-01-18

## 2017-01-18 RX ORDER — PILOCARPINE HYDROCHLORIDE 5 MG/1
TABLET, FILM COATED ORAL
COMMUNITY
Start: 2016-05-11 | End: 2017-01-18

## 2017-01-18 RX ORDER — ZOLPIDEM TARTRATE 10 MG/1
TABLET ORAL
COMMUNITY
Start: 2016-12-16 | End: 2017-01-18

## 2017-01-18 RX ORDER — LOSARTAN POTASSIUM 50 MG/1
TABLET ORAL
COMMUNITY
Start: 2016-06-07 | End: 2017-01-18

## 2017-01-18 RX ORDER — CHLORAL HYDRATE 500 MG
2 CAPSULE ORAL DAILY
Status: ON HOLD | COMMUNITY
End: 2020-10-29

## 2017-01-18 RX ORDER — IPRATROPIUM BROMIDE AND ALBUTEROL 20; 100 UG/1; UG/1
SPRAY, METERED RESPIRATORY (INHALATION)
Refills: 1 | COMMUNITY
Start: 2016-11-14 | End: 2019-07-02

## 2017-01-18 RX ORDER — OXYCODONE AND ACETAMINOPHEN 5; 325 MG/1; MG/1
TABLET ORAL
COMMUNITY
Start: 2016-07-27 | End: 2017-01-18

## 2017-01-18 RX ORDER — PAROXETINE 20 MG/1
TABLET, FILM COATED ORAL
COMMUNITY
Start: 2016-06-03 | End: 2017-01-18

## 2017-01-18 ASSESSMENT — VISUAL ACUITY
OD_PH_CC: 20/40
CORRECTION_TYPE: GLASSES
METHOD: SNELLEN - LINEAR
OS_CC: 20/125
OD_CC: 20/50
OD_CC+: +1

## 2017-01-18 ASSESSMENT — REFRACTION_WEARINGRX
OS_SPHERE: +1.00
SPECS_TYPE: BIFOCAL
OS_CYLINDER: +0.50
OD_ADD: +3.00
OD_CYLINDER: +0.75
OS_ADD: +3.00
OD_AXIS: 115
OS_AXIS: 005
OD_SPHERE: +3.75

## 2017-01-18 ASSESSMENT — ENCOUNTER SYMPTOMS
POOR WOUND HEALING: 1
MUSCLE WEAKNESS: 0
SKIN CHANGES: 0
BACK PAIN: 0
NECK PAIN: 0
EYE PAIN: 0
DOUBLE VISION: 0
ARTHRALGIAS: 0
NAIL CHANGES: 0
EYE REDNESS: 0
MUSCLE CRAMPS: 0
EYE WATERING: 0
JOINT SWELLING: 0
EYE IRRITATION: 0
STIFFNESS: 0
MYALGIAS: 0

## 2017-01-18 ASSESSMENT — SLIT LAMP EXAM - LIDS
COMMENTS: NORMAL
COMMENTS: NORMAL

## 2017-01-18 ASSESSMENT — CUP TO DISC RATIO
OD_RATIO: 0.5
OS_RATIO: 0.4

## 2017-01-18 ASSESSMENT — TONOMETRY
IOP_METHOD: TONOPEN
OS_IOP_MMHG: 14
OD_IOP_MMHG: 17

## 2017-01-18 ASSESSMENT — EXTERNAL EXAM - RIGHT EYE: OD_EXAM: NORMAL

## 2017-01-18 ASSESSMENT — PAIN SCALES - GENERAL: PAINLEVEL: MODERATE PAIN (5)

## 2017-01-18 ASSESSMENT — EXTERNAL EXAM - LEFT EYE: OS_EXAM: NORMAL

## 2017-01-18 NOTE — Clinical Note
1/18/2017      RE: Travis Peres  [address]  [place] [facility] [address]       THORACIC SURGERY FOLLOW UP VISIT    Dear Dr. Alvarez,  I saw Mr. Travis Peres in follow-up today. The clinical summary follows:     PREOP DIAGNOSIS   1.  Papillary thyroid cancer, gradually rising thyroglobulinemia.    2.  Slowly growing anterior mediastinal cystic lesion.      PROCEDURE   Transcervical resection of retrosternal mediastinal lesion.    PATHOLOGY  Benign thymic cyst    DATE OF PROCEDURE  01/03/2016    COMPLICATIONS  Post op readmission unresponsiveness, hypotension, elevated troponins. This was believed to be due to adrenal insufficiency and demand ischemia.     INTERVAL STUDIES  1/4/17: CT head showed no evidence of acute disease. CT chest/abdomen/pelvis showed expected post operative intrathoracic changes.     ETOH NEGATIVE  TOB NEGATIVE  BMI 22    SUBJECTIVE  Mr. Peres is having a very hard time recovering. He was unsteady at the time of discharge and inpatient rehabilitation was recommended due to his problems with basic ADLs. However, he and his wife opted to go home. Unfortunately, 1 or 2 days after discharge, he fell and hurt his right lower rib cage. He has also noticed a significant RIGHT foot drop of unclear etiology. He says that it developed during his hospital stay, but I could not find any documentation to that avail in the PT notes. In summary, his problems are:    1) RIGHT foot drop with difficulty to walk, he is using a walker  2) Diarrhea: they have not been able to obtain a stool sample yet  3) RIGHT lower rib cage pain that is interfering with sleep. He is currently only taking 1 tylenol pill twice/day    Overall he does not seem to be thriving at home. Dr. Alvarez recommended going to the ER with a plan to admit him 2 days ago, but he was afraid of missing his Ophthalmology clinic appointment later today.  I conveyed the results of pathology and reassured him that it was benign.    From a  personal perspective, july is here with his wife, Nazanin, they seem overwhelmed with everything that is going on.    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 Travis Peres, his wife, and the , more than 50% of which were spent in counseling, coordination of care, and face-to-face time. I reviewed the plan as follows:  Follow-up care:  As we discussed on the phone today, Mr. Peres needs a comprehensive approach to his health problems and will likely need to be in-house as you recommended 2 days ago. He will see you the day after tomorrow and both our offices will call him tomorrow to relay that message.   They had all their questions answered and were in agreement with the plan.  I appreciate the opportunity to participate in the care of your patient and will keep you updated.  Sincerely,    Ernesto Armstrong MD

## 2017-01-18 NOTE — ADDENDUM NOTE
Encounter addended by: Ernesto Armstrong MD on: 1/18/2017  8:01 AM<BR>     Documentation filed: Fast Note

## 2017-01-18 NOTE — MR AVS SNAPSHOT
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)            Follow-ups after your visit        Your next 10 appointments already scheduled     Mar 01, 2017  2:45 PM   RETURN RETINA with Nadiya Allen MD   Eye Clinic ([facility])    [address]  [address]  [place] [facility] [id number] [phone]              Who to contact     Please call your clinic at [phone] to:    Ask questions about your health    Make or cancel appointments    Discuss your medicines    Learn about your test results    Speak to your doctor   If you have compliments or concerns about an experience at your clinic, or if you wish to file a complaint, please contact [facility] Patient Relations at [phone] or email us at [email or web address]         Additional Information About Your Visit        MyChart Information     [employer] is an electronic gateway that provides easy, online access to your medical records. With Therative, you can request a clinic appointment, read your test results, renew a prescription or communicate with your care team.     To sign up for Gen[employer] visit the website at [email or web address]   You will be asked to enter the access code listed below, as well as some personal information. Please follow the directions to create your username and password.     Your access code is: [email or web address]  Expires: 2017  6:30 AM     Your access code will  in 90 days. If you need help or a new code, please contact your [facility] or call [phone] for assistance.        Care EveryWhere ID     This is your Care EveryWhere ID. This could be used by other organizations to access your [facility] medical records  [id number]         Blood Pressure from Last 3 Encounters:   01/18/17 160/89   01/09/17 166/101   01/04/17 82/53    Weight from Last 3 Encounters:   01/18/17 61.19 kg (134 lb 14.4 oz)   01/06/17 63.6 kg (140 lb 3.4 oz)   01/04/17 60.51 kg (133 lb 6.4 oz)              We Performed the Following     Avastin (Bevacizumab) 1.25MG Intravitreal Injection OS (left eye)     OCT Retina Spectralis OU (both eyes)          Today's Medication Changes          These changes are accurate as of: 1/18/17  4:41 PM.  If you have any questions, ask your nurse or doctor.               These medicines have changed or have updated prescriptions.        Dose/Directions    levothyroxine 137 MCG tablet   Commonly known as:  SYNTHROID/LEVOTHROID   This may have changed:  when to take this   Used for:  Postsurgical hypothyroidism, Osteopenia, Hypopituitarism (H), Pituitary adenoma (H), Papillary thyroid carcinoma (H)        Dose:  137 mcg   Take 1 tablet (137 mcg) by mouth daily   Quantity:  120 tablet   Refills:  3                Primary Care Provider Office Phone # Fax #    Karol Alvarez [phone]       [place] [address]        Thank you!     Thank you for choosing EYE CLINIC  for your care. Our goal is always to provide you with excellent care. Hearing back from our patients is one way we can continue to improve our services. Please take a few minutes to complete the written survey that you may receive in the mail after your visit with us. Thank you!             Your Updated Medication List - Protect others around you: Learn how to safely use, store and throw away your medicines at www.disposemymeds.org.          This list is accurate as of: 1/18/17  4:41 PM.  Always  use your most recent med list.                   Brand Name Dispense Instructions for use    alendronate 70 MG tablet    FOSAMAX    16 tablet    Take 1 tablet (70 mg) by mouth every 7 days       COMBIVENT RESPIMAT  MCG/ACT inhaler   Generic drug:  Ipratropium-Albuterol      INL 1 PUFF PO QID       fish oil-omega-3 fatty acids 1000 MG capsule      Take 2 g by mouth daily       fluticasone 50 MCG/ACT spray    FLONASE     Spray 2 sprays into both nostrils daily       hydrocortisone 10 MG tablet    CORTEF    180 tablet    Take one tablet by mouth daily in the morning, 1/2 tablet at 2 PM       ibuprofen 400 MG tablet    ADVIL/MOTRIN     Take 400-600 mg by mouth every 6 hours as needed for moderate pain       levothyroxine 137 MCG tablet    SYNTHROID/LEVOTHROID    120 tablet    Take 1 tablet (137 mcg) by mouth daily       MUCUS RELIEF ADULT PO      Take 400 mg by mouth 2 times daily as needed       ranitidine 300 MG tablet    ZANTAC         TAMSULOSIN HCL PO      Take 0.4 mg by mouth At Bedtime       VOLTAREN 1 % Gel topical gel   Generic drug:  diclofenac      Place onto the skin 4 times daily

## 2017-01-18 NOTE — Clinical Note
1/18/2017       RE: Travis Peres  [address] [place] [address]     Dear Colleague,    Thank you for referring your patient, Travis Peres, to the [facility]. Please see a copy of my visit note below.    THORACIC SURGERY FOLLOW UP VISIT    Dear  ***,  I saw M***. Travis Peres in follow-up today. The clinical summary follows:     PREOP DIAGNOSIS   ***  PROCEDURE   ***  DATE OF PROCEDURE  ***    COMPLICATIONS  None    INTERVAL STUDIES  ***  ETOH***  TOB***  BMI***  SUBJECTIVE  ***    From a personal perspective, ***    IMPRESSION No diagnosis found.  *** year-old *** with      PLAN  I spent a total of *** minutes with M***. Travis Peres and ***, more than 50% of which were spent in counseling, coordination of care, and face-to-face time. I reviewed the plan as follows:  1) ***  Necessary Tests & Appointments: ***  Pain Control Plan: ***  Anticoagulation Plan: ***  Smoking Cessation: ***  They had all their questions answered and were in agreement with the plan.  I appreciate the opportunity to participate in the care of your patient and will keep you updated.  Sincerely,    Again, thank you for allowing me to participate in the care of your patient.      Sincerely,    Ernesto Armstrong MD

## 2017-01-18 NOTE — PROGRESS NOTES
CC -  Wet AMD    HPI -   No change noted since last eye exam  Travis Peres is a  76 year old year-old patient presenting for referral for wet AMD OS from Dr. NADIYA Alford.  Noted vision loss OS for past few months leading up to August.  S/p Avastin injections x 4 left eye, last 12/6/16.    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 Reports stable vision since last eye exam.    H/o brain tumor seen by NADIYA Alford with VFD  OK with resident injections    PAST OCULAR HISTORY  No surgeries    RETINAL IMAGING:  OCT 1-18-17  right eye-  normal  left eye - large FVPED, likely subretinal CNVM, ?scant SRF, slightly improved    FA (10.7.16)  OD: normal  OS: (transit) early hyperfluorescence with late leakage c/w classic CNVM temporal to fovea with diffuse late leakage superior to fovea c/w occult CNVM    ICG (10.7.16)  OD: normal  OS: early hyperfluorescence with late leakage temporal to the fovea, likely CNVM net, no polyps     ASSESSMENT & PLAN  1. 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      - patient will be traveling to [place] Jan-May 2017   - advised to find local doctor to be seen 1-2 months after eval here in January    2.  PVD OU   - RT/RD precautions    3.  NS OU   - likely visually significant   -consider surgery in the future    4.  Brain tumor   - sees NADIYA Alford   - VFD OD > OS per family    RTC 6 weeks for Optical Coherence Tomography with Yesica  if traveling to [place] will need to find local doctor for further evaluation and treatment    Negrito Jackson MD  PGY3, Dept of  Ophthalmology  ~~~~~~~~~~~~~~~~~~~~~~~~~~~~~~~~~~~~~~~~~~~~~~~~~~~~~~~~~~~~~~~~~~~~~~~~~~~~~~~~~~  Attending Physician Attestation:  Complete documentation of historical and exam elements from today's encounter can be found in the full encounter summary report (not reduplicated in this progress note).  I personally obtained the chief complaint(s) and history of present illness.  I confirmed and edited as necessary the review of systems, past medical/surgical history, family history, social history, and examination findings as documented by others; and I examined the patient myself.  I personally reviewed the relevant tests, images, and reports as documented above and I agree with the interpretation as documented by the resident/fellow and edited by me.  I formulated and edited as necessary the assessment and plan and discussed the findings and management plan with the patient and family . In addition, when a procedure was performed, I was present for critical portions of the procedure and was immediately available for the entire procedure. - Nadiya Allen M.D.

## 2017-01-18 NOTE — NURSING NOTE
Chief Complaints and History of Present Illnesses   Patient presents with     Follow Up For     6 week follow up CNVM OS     HPI    Affected eye(s):  Both   Symptoms:     No floaters   No flashes   No redness   No Dryness         Do you have eye pain now?:  No      Comments:  Pt states vision is the same as last visit, blurry. No eye pain today.    Yenifer PATEL January 18, 2017 3:32 PM

## 2017-01-18 NOTE — NURSING NOTE
"Travis Peres is a 76 year old male who presents for:  Chief Complaint   Patient presents with     Oncology Clinic Visit     thyroid cancer, renal insufficiency         Initial Vitals:  /89 mmHg  Pulse 77  Temp(Src) 97.4  F (36.3  C) (Tympanic)  Ht 1.651 m (5' 5\")  Wt 61.19 kg (134 lb 14.4 oz)  BMI 22.45 kg/m2  SpO2 96% Estimated body mass index is 22.45 kg/(m^2) as calculated from the following:    Height as of this encounter: 1.651 m (5' 5\").    Weight as of this encounter: 61.19 kg (134 lb 14.4 oz).. Body surface area is 1.68 meters squared. BP completed using cuff size: regular  Moderate Pain (5) No LMP for male patient. Allergies and medications reviewed.     Medications: Medication refills not needed today.  Pharmacy name entered into [facility]: [facility] [address] [place] [address] & [address]    Comments: medication list reconciled with list provided by patient.    7 minutes for nursing intake (face to face time)   Yaneth Osborn CMA          "

## 2017-02-02 ENCOUNTER — OFFICE VISIT (OUTPATIENT)
Dept: NEUROSURGERY | Facility: CLINIC | Age: 77
End: 2017-02-02

## 2017-02-02 VITALS
OXYGEN SATURATION: 95 % | BODY MASS INDEX: 22.49 KG/M2 | RESPIRATION RATE: 20 BRPM | HEIGHT: 65 IN | DIASTOLIC BLOOD PRESSURE: 73 MMHG | WEIGHT: 135 LBS | SYSTOLIC BLOOD PRESSURE: 135 MMHG | TEMPERATURE: 97.6 F | HEART RATE: 70 BPM

## 2017-02-02 DIAGNOSIS — Z98.2 VP (VENTRICULOPERITONEAL) SHUNT STATUS: Primary | ICD-10-CM

## 2017-02-02 ASSESSMENT — PAIN SCALES - GENERAL: PAINLEVEL: SEVERE PAIN (6)

## 2017-02-02 NOTE — Clinical Note
2/2/2017       RE: Travis Peres  [address]  [place] [facility] [address]     Dear Colleague,    Thank you for referring your patient, Travis Peres, to the [facility]UROS[facility] at [facility]. Please see a copy of my visit note below.      NEUROSURGERY SHUNT CHECK  DATE OF VISIT   2/2/17    SHUNT TYPE  Strata  LAST SETTINGS:  2.5       DATE:        2015       SETTING   2.5    CC:  Recheck after MRI.    HPI: Entire interview very jumbled, done thru an online , plus pt very [place].  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  He is also status post ventriculoperitoneal shunt placement for hydrocephalus with the valve at 2.5.  He is being follolwed by serial MRI in about 5 year intervals and due again in about 2020.  He had an MRI brain at the request of Dr Potts in December 2016 for another issue.  He was supposed to come get his shunt re-set but his wife forgot about it.  His only .    New imaging>  stable post op changes at left sella, not changed.  Is asking for pain killer for headache pain in the back of head where there is a bonespur.   Explained thru  we will defer this medication request to the PCP.    EXAM:  INTERROGATED AT:  1.5  RE-SET TO:  2.5  Tender bump on back of head. Fixed, bony feeling.   CT imaging shows small bonespur there, No evidence neoplasm..      IMPRESSION/PLAN  *   Shunt dependent HCP  *   Stable imaging Re: Pituitary.  We can See him again in 5 years.  *    Reminder to wife to come in 1-2 days after any MRI.  I did show the new and correct setting to his wife.  *   He is discharged to PRN FU for the shunt, and 5 year FU for the MRI.       Alondra Martinez PA-C  [facility]  Department of Neurosurgery  Phone: [phone]  Fax: [phone]

## 2017-02-02 NOTE — NURSING NOTE
Chief Complaint   Patient presents with     RECHECK     [facility]- SHUNT ADJUSTED F/U     PT IS HAVING HEADACHES.

## 2017-02-02 NOTE — PROGRESS NOTES
NEUROSURGERY SHUNT CHECK  DATE OF VISIT   2/2/17    SHUNT TYPE  Strata  LAST SETTINGS:  2.5       DATE:        2015       SETTING   2.5    CC:  Recheck after MRI.    HPI: Entire interview very jumbled, done thru an online , plus pt very [place].  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  He is also status post ventriculoperitoneal shunt placement for hydrocephalus with the valve at 2.5.  He is being follolwed by serial MRI in about 5 year intervals and due again in about 2020.  He had an MRI brain at the request of Dr Potts in December 2016 for another issue.  He was supposed to come get his shunt re-set but his wife forgot about it.  His only .    New imaging>  stable post op changes at left sella, not changed.  Is asking for pain killer for headache pain in the back of head where there is a bonespur.   Explained thru  we will defer this medication request to the PCP.    EXAM:  INTERROGATED AT:  1.5  RE-SET TO:  2.5  Tender bump on back of head. Fixed, bony feeling.   CT imaging shows small bonespur there, No evidence neoplasm..      IMPRESSION/PLAN  *   Shunt dependent HCP  *   Stable imaging Re: Pituitary.  We can See him again in 5 years.  *    Reminder to wife to come in 1-2 days after any MRI.  I did show the new and correct setting to his wife.  *   He is discharged to PRN FU for the shunt, and 5 year FU for the MRI.       Alondra Martinez PA-C  [facility]  Department of Neurosurgery  Phone: [phone]  Fax: [phone]

## 2017-02-06 ENCOUNTER — HOSPITAL ENCOUNTER (OUTPATIENT)
Dept: PHYSICAL THERAPY | Facility: CLINIC | Age: 77
Setting detail: THERAPIES SERIES
End: 2017-02-06
Attending: THORACIC SURGERY (CARDIOTHORACIC VASCULAR SURGERY)
Payer: MEDICARE

## 2017-02-06 PROCEDURE — G8979 MOBILITY GOAL STATUS: HCPCS | Mod: GP,CJ | Performed by: PHYSICAL THERAPIST

## 2017-02-06 PROCEDURE — 40000185 ZZHC STATISTIC PT OUTPT VISIT: Performed by: PHYSICAL THERAPIST

## 2017-02-06 PROCEDURE — 97162 PT EVAL MOD COMPLEX 30 MIN: CPT | Mod: GP | Performed by: PHYSICAL THERAPIST

## 2017-02-06 PROCEDURE — 97112 NEUROMUSCULAR REEDUCATION: CPT | Mod: GP | Performed by: PHYSICAL THERAPIST

## 2017-02-06 PROCEDURE — G8978 MOBILITY CURRENT STATUS: HCPCS | Mod: GP,CK | Performed by: PHYSICAL THERAPIST

## 2017-02-07 NOTE — PROGRESS NOTES
[facility]        OUTPATIENT PHYSICAL THERAPY FUNCTIONAL EVALUATION  PLAN OF TREATMENT FOR OUTPATIENT REHABILITATION  (COMPLETE FOR INITIAL CLAIMS ONLY)  Patient's Last Name, First Name, M.I.  YOB: 1940  Travis Peres     Provider's Name   [facility]   Medical Record No.  [id number]     Start of Care Date:  02/06/17   Onset Date:  01/04/17   Type:     _X__PT   ____OT  ____SLP Medical Diagnosis:   Foot drop, R     PT Diagnosis:  R foot drop, deconditioning, difficulty with gait Visits from SOC:  1                              __________________________________________________________________________________  Plan of Treatment/Functional Goals:  ADL retraining, balance training, bed mobility training, gait training, neuromuscular re-education, ROM, strengthening, stretching, transfer training, manual therapy     Electrical Stimulation/Russion Stimulation     GOALS  TUG  The pt will complete the TUG in less than 45 seconds in order to demonstrate improved functional mobility and decreased risk of falling.  03/10/17    6MWT  The patient will improve his score on the 6MWT by 150' in order to demonstrate an increase in activity tolerance.  03/10/17                Therapy Frequency:  2 times/Week   Predicted Duration of Therapy Intervention:  60 days    Rafia Sanders, PT                                    I CERTIFY THE NEED FOR THESE SERVICES FURNISHED UNDER        THIS PLAN OF TREATMENT AND WHILE UNDER MY CARE .             Physician Signature               Date    X_____________________________________________________                      Certification Date From:      Certification Date To:       Referring Provider:  Karol Alvarez MD    Initial Assessment  See Epic Evaluation- Start of Care Date: 02/06/17

## 2017-02-07 NOTE — PROGRESS NOTES
02/06/17 1400   Quick Adds   Type of Visit Initial OP PT Evaluation       Present Yes   Language (Cantonese)   General Information   Start of Care Date 02/06/17   Referring Physician Karol Alvarez MD   Orders Evaluate and Treat as Indicated   Order Date 02/01/17   Medical Diagnosis Foot drop, R   Onset of illness/injury or Date of Surgery 01/04/17   Surgical/Medical history reviewed Yes   Pertinent history of current problem (include personal factors and/or comorbidities that impact the POC) The pt underwent a resection of a mediastinal lesion and was discharged home the same day, but was hospitalized the following day due to hypotension, adrenal insufficiency, weakness, and confusion after a fall.  In the hospital, the pt reports that he had another fall and developed R foot drop.  He was discharged home from the hospital on 1/9/17 and received home care PT.  His medical history is significant for thyroid cancer s/p transcervical resection of retrosternal mediastinal lesion, pituitary macroadenoma, colitis, COPD, GERD, depression, and hydrocephalus s/p   shunt placement.   Prior level of function comment Independent without AD   Patient role/Employment history Retired  (restaurant )   Living environment House/[facility]   Home/Community Accessibility Comments Stairs to get into house and once inside   Current Assistive Devices Front Wheeled Walker   Assistive Devices Comments Also owns cane, bedrail, grab bars, bath bench   Patient/Family Goals Statement The pt would like to be back to normal prior to his extended trip to [place] in one month   Fall Risk Screen   Fall screen completed by PT   Per patient - Fall 2 or more times in past year? Yes   Per patient - Fall with injury in past year? Yes   Timed Up and Go score (seconds) 53.43   Is patient a fall risk? Yes   Pain   Patient currently in pain Unable to assess   Cognitive Status Examination   Orientation orientation to person,  "place and time   Level of Consciousness alert   Follows Commands and Answers Questions 50% of the time;able to follow multistep instructions   Personal Safety and Judgment impulsive   Cognitive Comment Difficulty to assess due to language barrier   Range of Motion (ROM)   ROM Comment WNL except R ankle DF   Strength   Strength Comments Grossly 4/5 B hips, knees, and L ankle. R ankle dorsiflexion 0/5   Transfer Skills   Transfer Comments Able to sit to stand without use of hands   Gait   Gait Comments Slow speed, decreased foot clearance, using 2WW   Balance   Balance Comments Able to stand WBOS with no UE support   Balance Special Tests   Balance Special Tests Sit to stand reps   Balance Special Tests Sit to Stand Reps in 30 Seconds   Reps in 30 seconds 7   Height 18\"   Sensory Examination   Sensory Perception Comments Pt reports numbness in R lower leg, but is able to identify light touch   Modality Interventions   Planned Modality Interventions Electrical Stimulation/Russion Stimulation   Planned Therapy Interventions   Planned Therapy Interventions ADL retraining;balance training;bed mobility training;gait training;neuromuscular re-education;ROM;strengthening;stretching;transfer training;manual therapy   Clinical Impression   Criteria for Skilled Therapeutic Interventions Met yes, treatment indicated   PT Diagnosis R foot drop, deconditioning, difficulty with gait   Influenced by the following impairments Weakness, instability, limited activity tolerance   Functional limitations due to impairments Difficulty with ambulation, transfers, stairs, balance, and other functional mobility   Clinical Presentation Evolving/Changing   Clinical Presentation Rationale Unknown etiology of sudden foot drop, recent medical emergencies and hospitalization, hypotension, difficulty communicating   Clinical Decision Making (Complexity) Moderate complexity   Therapy Frequency 2 times/Week   Predicted Duration of Therapy Intervention " (days/wks) 60 days   Risk & Benefits of therapy have been explained Yes   Patient, Family & other staff in agreement with plan of care Yes   Clinical Impression Comments Required more time due to patient frustration with recent hospitalization and need for    Education Assessment   Preferred Learning Style Demonstration;Listening   Barriers to Learning Hearing;Cognitive;Cultural;Other  (Language)   GOALS   PT Eval Goals 1;2   Goal 1   Goal Identifier TUG   Goal Description The pt will complete the TUG in less than 45 seconds in order to demonstrate improved functional mobility and decreased risk of falling.   Target Date 03/10/17   Goal 2   Goal Identifier 6MWT   Goal Description The patient will improve his score on the 6MWT by 150' in order to demonstrate an increase in activity tolerance.   Target Date 03/10/17   Total Evaluation Time   Total Evaluation Time (Minutes) 50

## 2017-02-08 DIAGNOSIS — H35.3220 EXUDATIVE AGE-RELATED MACULAR DEGENERATION, LEFT EYE, STAGE UNSPECIFIED (H): Primary | ICD-10-CM

## 2017-02-09 ENCOUNTER — HOSPITAL ENCOUNTER (OUTPATIENT)
Dept: PHYSICAL THERAPY | Facility: CLINIC | Age: 77
Setting detail: THERAPIES SERIES
End: 2017-02-09
Attending: THORACIC SURGERY (CARDIOTHORACIC VASCULAR SURGERY)
Payer: MEDICARE

## 2017-02-09 PROCEDURE — 40000185 ZZHC STATISTIC PT OUTPT VISIT: Performed by: PHYSICAL THERAPIST

## 2017-02-09 PROCEDURE — 97112 NEUROMUSCULAR REEDUCATION: CPT | Mod: GP | Performed by: PHYSICAL THERAPIST

## 2017-02-09 PROCEDURE — 97110 THERAPEUTIC EXERCISES: CPT | Mod: GP | Performed by: PHYSICAL THERAPIST

## 2017-02-14 ENCOUNTER — HOSPITAL ENCOUNTER (OUTPATIENT)
Dept: PHYSICAL THERAPY | Facility: CLINIC | Age: 77
Setting detail: THERAPIES SERIES
End: 2017-02-14
Attending: THORACIC SURGERY (CARDIOTHORACIC VASCULAR SURGERY)
Payer: MEDICARE

## 2017-02-14 PROCEDURE — 97116 GAIT TRAINING THERAPY: CPT | Mod: GP | Performed by: PHYSICAL THERAPIST

## 2017-02-14 PROCEDURE — 40000185 ZZHC STATISTIC PT OUTPT VISIT: Performed by: PHYSICAL THERAPIST

## 2017-02-14 PROCEDURE — 97110 THERAPEUTIC EXERCISES: CPT | Mod: GP | Performed by: PHYSICAL THERAPIST

## 2017-02-14 PROCEDURE — 97112 NEUROMUSCULAR REEDUCATION: CPT | Mod: GP | Performed by: PHYSICAL THERAPIST

## 2017-02-16 ENCOUNTER — HOSPITAL ENCOUNTER (OUTPATIENT)
Dept: PHYSICAL THERAPY | Facility: CLINIC | Age: 77
Setting detail: THERAPIES SERIES
End: 2017-02-16
Attending: THORACIC SURGERY (CARDIOTHORACIC VASCULAR SURGERY)
Payer: MEDICARE

## 2017-02-16 PROCEDURE — 97110 THERAPEUTIC EXERCISES: CPT | Mod: GP | Performed by: PHYSICAL THERAPIST

## 2017-02-16 PROCEDURE — 97116 GAIT TRAINING THERAPY: CPT | Mod: GP | Performed by: PHYSICAL THERAPIST

## 2017-02-16 PROCEDURE — 40000185 ZZHC STATISTIC PT OUTPT VISIT: Performed by: PHYSICAL THERAPIST

## 2017-02-21 ENCOUNTER — HOSPITAL ENCOUNTER (OUTPATIENT)
Dept: PHYSICAL THERAPY | Facility: CLINIC | Age: 77
Setting detail: THERAPIES SERIES
End: 2017-02-21
Attending: THORACIC SURGERY (CARDIOTHORACIC VASCULAR SURGERY)
Payer: MEDICARE

## 2017-02-21 PROCEDURE — 97116 GAIT TRAINING THERAPY: CPT | Mod: GP | Performed by: PHYSICAL THERAPIST

## 2017-02-21 PROCEDURE — 97110 THERAPEUTIC EXERCISES: CPT | Mod: GP | Performed by: PHYSICAL THERAPIST

## 2017-02-21 PROCEDURE — 40000185 ZZHC STATISTIC PT OUTPT VISIT: Performed by: PHYSICAL THERAPIST

## 2017-02-22 ENCOUNTER — MEDICAL CORRESPONDENCE (OUTPATIENT)
Dept: HEALTH INFORMATION MANAGEMENT | Facility: CLINIC | Age: 77
End: 2017-02-22

## 2017-02-22 ENCOUNTER — TRANSFERRED RECORDS (OUTPATIENT)
Dept: HEALTH INFORMATION MANAGEMENT | Facility: CLINIC | Age: 77
End: 2017-02-22

## 2017-02-23 ENCOUNTER — HOSPITAL ENCOUNTER (OUTPATIENT)
Dept: PHYSICAL THERAPY | Facility: CLINIC | Age: 77
Setting detail: THERAPIES SERIES
End: 2017-02-23
Attending: THORACIC SURGERY (CARDIOTHORACIC VASCULAR SURGERY)
Payer: MEDICARE

## 2017-02-23 ENCOUNTER — TELEPHONE (OUTPATIENT)
Dept: DERMATOLOGY | Facility: CLINIC | Age: 77
End: 2017-02-23

## 2017-02-23 PROCEDURE — 97116 GAIT TRAINING THERAPY: CPT | Mod: GP | Performed by: PHYSICAL THERAPIST

## 2017-02-23 PROCEDURE — 40000185 ZZHC STATISTIC PT OUTPT VISIT: Performed by: PHYSICAL THERAPIST

## 2017-02-23 PROCEDURE — 97110 THERAPEUTIC EXERCISES: CPT | Mod: GP | Performed by: PHYSICAL THERAPIST

## 2017-02-23 NOTE — TELEPHONE ENCOUNTER
Records received from [facility]. Records placed in scanning for Dr. Sheffield to review. Appointment scheduled for 5-23.

## 2017-02-28 ENCOUNTER — HOSPITAL ENCOUNTER (OUTPATIENT)
Dept: PHYSICAL THERAPY | Facility: CLINIC | Age: 77
Setting detail: THERAPIES SERIES
End: 2017-02-28
Attending: THORACIC SURGERY (CARDIOTHORACIC VASCULAR SURGERY)
Payer: MEDICARE

## 2017-02-28 PROCEDURE — 40000185 ZZHC STATISTIC PT OUTPT VISIT: Performed by: PHYSICAL THERAPIST

## 2017-02-28 PROCEDURE — 97112 NEUROMUSCULAR REEDUCATION: CPT | Mod: GP | Performed by: PHYSICAL THERAPIST

## 2017-02-28 PROCEDURE — 97110 THERAPEUTIC EXERCISES: CPT | Mod: GP | Performed by: PHYSICAL THERAPIST

## 2017-02-28 PROCEDURE — 97116 GAIT TRAINING THERAPY: CPT | Mod: GP | Performed by: PHYSICAL THERAPIST

## 2017-03-01 ENCOUNTER — OFFICE VISIT (OUTPATIENT)
Dept: OPHTHALMOLOGY | Facility: CLINIC | Age: 77
End: 2017-03-01
Attending: OPHTHALMOLOGY
Payer: MEDICARE

## 2017-03-01 DIAGNOSIS — H35.3220 EXUDATIVE AGE-RELATED MACULAR DEGENERATION, LEFT EYE, STAGE UNSPECIFIED (H): ICD-10-CM

## 2017-03-01 PROCEDURE — 99213 OFFICE O/P EST LOW 20 MIN: CPT | Mod: 25,ZF

## 2017-03-01 PROCEDURE — 25000128 H RX IP 250 OP 636: Mod: ZF

## 2017-03-01 PROCEDURE — 92134 CPTRZ OPH DX IMG PST SGM RTA: CPT | Mod: ZF | Performed by: OPHTHALMOLOGY

## 2017-03-01 PROCEDURE — 67028 INJECTION EYE DRUG: CPT | Mod: ZF | Performed by: OPHTHALMOLOGY

## 2017-03-01 PROCEDURE — C9257 BEVACIZUMAB INJECTION: HCPCS | Mod: ZF

## 2017-03-01 ASSESSMENT — REFRACTION_WEARINGRX
OD_AXIS: 115
OS_AXIS: 005
OD_SPHERE: +3.75
OS_ADD: +3.00
OS_SPHERE: +1.00
OD_ADD: +3.00
OS_CYLINDER: +0.50
OD_CYLINDER: +0.75
SPECS_TYPE: BIFOCAL

## 2017-03-01 ASSESSMENT — TONOMETRY
IOP_METHOD: TONOPEN
OD_IOP_MMHG: 20
OS_IOP_MMHG: 15

## 2017-03-01 ASSESSMENT — VISUAL ACUITY
OS_CC: 20/200
OD_CC+: -2
METHOD: SNELLEN - LINEAR
CORRECTION_TYPE: GLASSES
OD_CC: 20/40
OD_PH_CC: 20/40+2

## 2017-03-01 ASSESSMENT — EXTERNAL EXAM - LEFT EYE: OS_EXAM: NORMAL

## 2017-03-01 ASSESSMENT — SLIT LAMP EXAM - LIDS
COMMENTS: NORMAL
COMMENTS: NORMAL

## 2017-03-01 ASSESSMENT — CUP TO DISC RATIO
OS_RATIO: 0.4
OD_RATIO: 0.5

## 2017-03-01 ASSESSMENT — EXTERNAL EXAM - RIGHT EYE: OD_EXAM: NORMAL

## 2017-03-01 NOTE — NURSING NOTE
Chief Complaints and History of Present Illnesses   Patient presents with     Follow Up For      6 week follow up CNVM OS     HPI    Affected eye(s):  Both   Symptoms:     No floaters   No flashes   No redness   No Dryness         Do you have eye pain now?:  No      Comments:  Pt states vision is the same as last visit.    Yenifer PATEL March 1, 2017 2:59 PM

## 2017-03-01 NOTE — LETTER
3/1/2017       RE: Travis Peres  [address]  [facility] [address]     Dear Colleague,    Thank you for referring your patient, Travis Peres, to the EYE CLINIC at [facility]. Please see a copy of my visit note below.    CC -  Wet AMD    HPI -   No change noted since last eye exam  Travis Peres is a  76 year old year-old patient presenting for referral for wet AMD OS from Dr. NADIYA lAford.  Noted vision loss OS for past few months leading up to August.  S/p Avastin injections x 4 left eye, last 12/6/16.    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 Reports stable vision since last eye exam.    H/o brain tumor seen by NADIYA Alford with VFD  OK with resident injections    PAST OCULAR HISTORY  No surgeries    RETINAL IMAGING:  OCT (3.1.17)  right eye-  normal  left eye - large FVPED, likely subretinal CNVM, ?scant SRF, improved    FA (10.7.16)  OD: normal  OS: (transit) early hyperfluorescence with late leakage c/w classic CNVM temporal to fovea with diffuse late leakage superior to fovea c/w occult CNVM    ICG (10.7.16)  OD: normal  OS: early hyperfluorescence with late leakage temporal to the fovea, likely CNVM net, no polyps     ASSESSMENT & PLAN  1. 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      - patient will be traveling to [place] 6 Mar- 15 May 2017   - advised to find local retina specialist in [place] to be evaluated in mid-April for likely avastin OS    2.  PVD OU   - RT/RD precautions    3.  NS OU   - likely visually significant - especially given little change in OCT, but declining  acuity   - TACO today: 20/60 OS   - consider cataract surgery OS in near future (after return from trip to [place])    4.  Brain tumor   - sees NADIYA Alford   - VFD OD > OS per family    RTC: May 2017 on return from trip to [place] (advised to find local retina specialist in [place] to be evaluated in mid-April for likely avastin OS)    Mohan West MD  Retina Fellow    ~~~~~~~~~~~~~~~~~~~~~~~~~~~~~~~~~~~~~~~~~~~~~~~~~~~~~~~~~~~~~~~~~~~~~~~~~~~~~~~~~~  Attending Physician Attestation:  Complete documentation of historical and exam elements from today's encounter can be found in the full encounter summary report (not reduplicated in this progress note).  I personally obtained the chief complaint(s) and history of present illness.  I confirmed and edited as necessary the review of systems, past medical/surgical history, family history, social history, and examination findings as documented by others; and I examined the patient myself.  I personally reviewed the relevant tests, images, and reports as documented above and I agree with the interpretation as documented by the resident/fellow and edited by me.  I formulated and edited as necessary the assessment and plan and discussed the findings and management plan with the patient and family . In addition, when a procedure was performed, I was present for critical portions of the procedure and was immediately available for the entire procedure. - Nadiya Allen M.D.

## 2017-03-01 NOTE — PROGRESS NOTES
CC -  Wet AMD    HPI -   No change noted since last eye exam  Travis Peres is a  76 year old year-old patient presenting for referral for wet AMD OS from Dr. NADIYA Alford.  Noted vision loss OS for past few months leading up to August.  S/p Avastin injections x 4 left eye, last 12/6/16.    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 Reports stable vision since last eye exam.    H/o brain tumor seen by NADIYA Alford with VFD  OK with resident injections    PAST OCULAR HISTORY  No surgeries    RETINAL IMAGING:  OCT (3.1.17)  right eye-  normal  left eye - large FVPED, likely subretinal CNVM, ?scant SRF, improved    FA (10.7.16)  OD: normal  OS: (transit) early hyperfluorescence with late leakage c/w classic CNVM temporal to fovea with diffuse late leakage superior to fovea c/w occult CNVM    ICG (10.7.16)  OD: normal  OS: early hyperfluorescence with late leakage temporal to the fovea, likely CNVM net, no polyps     ASSESSMENT & PLAN  1. 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      - patient will be traveling to [place] 6 Mar- 15 May 2017   - advised to find local retina specialist in [place] to be evaluated in mid-April for likely avastin OS    2.  PVD OU   - RT/RD precautions    3.  NS OU   - likely visually significant - especially given little change in OCT, but declining acuity   - TACO today: 20/60 OS   - consider cataract surgery OS in near future (after return from trip to [place])    4.  Brain tumor   - sees NADIYA Alford   - VFD OD > OS per family    RTC: May 2017 on return from trip to [place] (advised to find local retina specialist in  [place] to be evaluated in mid-April for likely avastin OS)    Mohan West MD  Retina Fellow    ~~~~~~~~~~~~~~~~~~~~~~~~~~~~~~~~~~~~~~~~~~~~~~~~~~~~~~~~~~~~~~~~~~~~~~~~~~~~~~~~~~  Attending Physician Attestation:  Complete documentation of historical and exam elements from today's encounter can be found in the full encounter summary report (not reduplicated in this progress note).  I personally obtained the chief complaint(s) and history of present illness.  I confirmed and edited as necessary the review of systems, past medical/surgical history, family history, social history, and examination findings as documented by others; and I examined the patient myself.  I personally reviewed the relevant tests, images, and reports as documented above and I agree with the interpretation as documented by the resident/fellow and edited by me.  I formulated and edited as necessary the assessment and plan and discussed the findings and management plan with the patient and family . In addition, when a procedure was performed, I was present for critical portions of the procedure and was immediately available for the entire procedure. - Nadiya Allen M.D.

## 2017-03-01 NOTE — MR AVS SNAPSHOT
After Visit Summary   3/1/2017    Travis Peres    MRN: [id number]           Patient Information     Date Of Birth          1940        Visit Information        Provider Department      3/1/2017 2:30 PM Nadiya Allen MD; [facility]        Today's Diagnoses     Exudative age-related macular degeneration, left eye, stage unspecified - Left Eye           Follow-ups after your visit        Follow-up notes from your care team     Return in about 3 months (around 6/1/2017) for OCT.      Your next 10 appointments already scheduled     Mar 02, 2017 11:30 AM CST   Treatment 45 with Rafia Sanders PT, Devan Stephen   [facility] ([facility])    [address] [address]   [phone]            May 18, 2017 10:00 AM CDT   RETURN NEURO with Fer Alford MD   Eye Clinic ([facility])    [address]  [address] [address] [phone]            May 18, 2017 11:00 AM CDT   RETURN RETINA with Nadiya Allen MD   Eye Clinic ([facility])    [address]  [address] [address] [phone]            May 23, 2017  2:30 PM CDT   (Arrive by 2:15 PM)   NEW [place] with Tram Marcum MD   [facility] ([facility])    [address] [facility]  [address] [facility] [address] [phone]              Who to contact     Please call your clinic at [phone] to:    Ask questions about your health    Make or cancel appointments    Discuss your medicines    Learn about your test results    Speak to your doctor   If you have compliments or concerns about an experience at your clinic, or if you wish to file a complaint, please contact [facility] Patient Relations at [phone] or email us at  [email or web address]         Additional Information About Your Visit        Uncovethart Information     Cubeyou is an electronic gateway that provides easy, online access to your medical records. With Cubeyou, you can request a clinic appointment, read your test results, renew a prescription or communicate with your care team.     To sign up for Cubeyou visit the website at [email or web address]   You will be asked to enter the access code listed below, as well as some personal information. Please follow the directions to create your username and password.     Your access code is: [email or web address]  Expires: 2017  8:30 AM     Your access code will  in 90 days. If you need help or a new code, please contact your [facility] or call [phone] for assistance.        Care EveryWhere ID     This is your Care EveryWhere ID. This could be used by other organizations to access your [facility] medical records  [id number]         Blood Pressure from Last 3 Encounters:   17 135/73   17 160/89   17 (!) 166/101    Weight from Last 3 Encounters:   17 61.2 kg (135 lb)   17 61.2 kg (134 lb 14.4 oz)   17 63.6 kg (140 lb 3.4 oz)              We Performed the Following     Avastin (Bevacizumab) 1.25MG Intravitreal Injection OS (left eye)     OCT Retina Spectralis OU (both eyes)          Today's Medication Changes          These changes are accurate as of: 3/1/17  4:29 PM.  If you have any questions, ask your nurse or doctor.               These medicines have changed or have updated prescriptions.        Dose/Directions    levothyroxine 137 MCG tablet   Commonly known as:  SYNTHROID/LEVOTHROID   This may have changed:  when to take this   Used for:  Postsurgical hypothyroidism, Osteopenia, Hypopituitarism (H), Pituitary adenoma (H), Papillary thyroid carcinoma (H)        Dose:  137 mcg   Take 1 tablet (137 mcg) by mouth daily   Quantity:  120  tablet   Refills:  3                Primary Care Provider Office Phone # Fax #    Karol Alvarez [phone]       [address] [address]        Thank you!     Thank you for choosing EYE CLINIC  for your care. Our goal is always to provide you with excellent care. Hearing back from our patients is one way we can continue to improve our services. Please take a few minutes to complete the written survey that you may receive in the mail after your visit with us. Thank you!             Your Updated Medication List - Protect others around you: Learn how to safely use, store and throw away your medicines at www.disposemymeds.org.          This list is accurate as of: 3/1/17  4:29 PM.  Always use your most recent med list.                   Brand Name Dispense Instructions for use    alendronate 70 MG tablet    FOSAMAX    16 tablet    Take 1 tablet (70 mg) by mouth every 7 days       COMBIVENT RESPIMAT  MCG/ACT inhaler   Generic drug:  Ipratropium-Albuterol      INL 1 PUFF PO QID       fish oil-omega-3 fatty acids 1000 MG capsule      Take 2 g by mouth daily       fluticasone 50 MCG/ACT spray    FLONASE     Spray 2 sprays into both nostrils daily       hydrocortisone 10 MG tablet    CORTEF    180 tablet    Take one tablet by mouth daily in the morning, 1/2 tablet at 2 PM       ibuprofen 400 MG tablet    ADVIL/MOTRIN     Take 400-600 mg by mouth every 6 hours as needed for moderate pain       levothyroxine 137 MCG tablet    SYNTHROID/LEVOTHROID    120 tablet    Take 1 tablet (137 mcg) by mouth daily       MUCUS RELIEF ADULT PO      Take 400 mg by mouth 2 times daily as needed       ranitidine 300 MG tablet    ZANTAC         TAMSULOSIN HCL PO      Take 0.4 mg by mouth At Bedtime       VOLTAREN 1 % Gel topical gel   Generic drug:  diclofenac      Place onto the skin 4 times daily

## 2017-03-02 ENCOUNTER — HOSPITAL ENCOUNTER (OUTPATIENT)
Dept: PHYSICAL THERAPY | Facility: CLINIC | Age: 77
Setting detail: THERAPIES SERIES
End: 2017-03-02
Attending: THORACIC SURGERY (CARDIOTHORACIC VASCULAR SURGERY)
Payer: MEDICARE

## 2017-03-02 PROCEDURE — 40000185 ZZHC STATISTIC PT OUTPT VISIT: Performed by: PHYSICAL THERAPIST

## 2017-03-02 PROCEDURE — G8979 MOBILITY GOAL STATUS: HCPCS | Mod: GP,CJ | Performed by: PHYSICAL THERAPIST

## 2017-03-02 PROCEDURE — 97110 THERAPEUTIC EXERCISES: CPT | Mod: GP | Performed by: PHYSICAL THERAPIST

## 2017-03-02 PROCEDURE — G8980 MOBILITY D/C STATUS: HCPCS | Mod: GP,CJ | Performed by: PHYSICAL THERAPIST

## 2017-03-02 PROCEDURE — T1013 SIGN LANG/ORAL INTERPRETER: HCPCS | Mod: U3

## 2017-03-03 NOTE — PROGRESS NOTES
Outpatient Physical Therapy Discharge Note     Patient: Travis Peres  : 1940    Beginning/End Dates of Reporting Period:  2017 to 3/3/2017    Referring Provider: Karol Alvarez MD    Therapy Diagnosis: R foot drop, deconditioning, difficulty with gait     Client Self Report: States that he is doing well. Leaving for [place] early next week. Wishes to return to therapy fallowing.    Objective Measurements:  Objective Measure: 6MWT  Details: 850' (with no AD)  Objective Measure: TUG  Details: 16.16 (with no AD)         Outcome Measures (most recent score):  Not performed- difficulty with i pad and .   Goals:  Goal Identifier TUG   Goal Description The pt will complete the TUG in less than 45 seconds in order to demonstrate improved functional mobility and decreased risk of falling.   Target Date 03/10/17   Date Met   3/2/17   Progress:     Goal Identifier 6MWT   Goal Description The patient will improve his score on the 6MWT by 150' in order to demonstrate an increase in activity tolerance.   Target Date 03/10/17   Date Met   3/2/17   Progress:   Progress Toward Goals:   Progress this reporting period: The pt has made excellent progress with balance, strength and endurance. He greatly improved both his times on the TUG and distance on the 6MWT. He transitioned from using a walker to using a cane. He continues to require R AFO due to foot drop however understands a HEP focusing on improving strength in the R ankle as well as LE strength and balance.     Plan:  Discharge from therapy.    Discharge:    Reason for Discharge: Pt leaving for [place] until May. Goals met.      Discharge Plan: Patient to continue home program.

## 2017-05-10 DIAGNOSIS — H53.10 SUBJECTIVE VISUAL DISTURBANCE: Primary | ICD-10-CM

## 2017-05-13 DIAGNOSIS — M85.80 OSTEOPENIA: ICD-10-CM

## 2017-05-13 DIAGNOSIS — E23.0 HYPOPITUITARISM (H): ICD-10-CM

## 2017-05-13 DIAGNOSIS — D35.2 PITUITARY ADENOMA (H): ICD-10-CM

## 2017-05-13 DIAGNOSIS — C73 PAPILLARY THYROID CARCINOMA (H): ICD-10-CM

## 2017-05-13 DIAGNOSIS — E89.0 POSTSURGICAL HYPOTHYROIDISM: ICD-10-CM

## 2017-05-13 RX ORDER — HYDROCORTISONE 10 MG/1
TABLET ORAL
Qty: 180 TABLET | Refills: 3 | Status: SHIPPED | OUTPATIENT
Start: 2017-05-13 | End: 2018-04-16

## 2017-05-16 DIAGNOSIS — H35.052 CHOROIDAL NEOVASCULAR MEMBRANE, LEFT: Primary | ICD-10-CM

## 2017-05-18 ENCOUNTER — OFFICE VISIT (OUTPATIENT)
Dept: OPHTHALMOLOGY | Facility: CLINIC | Age: 77
End: 2017-05-18
Attending: OPHTHALMOLOGY
Payer: MEDICARE

## 2017-05-18 DIAGNOSIS — H34.8392 VENOUS TRIBUTARY (BRANCH) OCCLUSION OF RETINA (H): Primary | ICD-10-CM

## 2017-05-18 DIAGNOSIS — D49.6 BRAIN TUMOR (H): Primary | ICD-10-CM

## 2017-05-18 DIAGNOSIS — H35.052 CHOROIDAL NEOVASCULAR MEMBRANE, LEFT: ICD-10-CM

## 2017-05-18 DIAGNOSIS — H53.40 VISUAL FIELD DEFECT: ICD-10-CM

## 2017-05-18 DIAGNOSIS — H53.10 SUBJECTIVE VISUAL DISTURBANCE: ICD-10-CM

## 2017-05-18 PROCEDURE — T1013 SIGN LANG/ORAL INTERPRETER: HCPCS | Mod: U3,ZF

## 2017-05-18 PROCEDURE — 67028 INJECTION EYE DRUG: CPT | Mod: ZF | Performed by: OPHTHALMOLOGY

## 2017-05-18 PROCEDURE — C9257 BEVACIZUMAB INJECTION: HCPCS | Mod: ZF | Performed by: OPHTHALMOLOGY

## 2017-05-18 PROCEDURE — 99213 OFFICE O/P EST LOW 20 MIN: CPT | Mod: 25

## 2017-05-18 PROCEDURE — 92134 CPTRZ OPH DX IMG PST SGM RTA: CPT | Mod: ZF | Performed by: OPHTHALMOLOGY

## 2017-05-18 PROCEDURE — 25000128 H RX IP 250 OP 636: Mod: ZF | Performed by: OPHTHALMOLOGY

## 2017-05-18 PROCEDURE — 40000269 ZZH STATISTIC NO CHARGE FACILITY FEE: Mod: ZF

## 2017-05-18 PROCEDURE — 92083 EXTENDED VISUAL FIELD XM: CPT | Mod: ZF | Performed by: OPHTHALMOLOGY

## 2017-05-18 RX ADMIN — BEVACIZUMAB 1.25 MG: 100 INJECTION, SOLUTION INTRAVENOUS at 12:50

## 2017-05-18 ASSESSMENT — EXTERNAL EXAM - RIGHT EYE
OD_EXAM: NORMAL
OD_EXAM: NORMAL

## 2017-05-18 ASSESSMENT — CONF VISUAL FIELD
OS_INFERIOR_NASAL_RESTRICTION: 1
OS_SUPERIOR_NASAL_RESTRICTION: 3
OD_SUPERIOR_TEMPORAL_RESTRICTION: 1
OD_INFERIOR_TEMPORAL_RESTRICTION: 1
OD_INFERIOR_TEMPORAL_RESTRICTION: 1
OD_SUPERIOR_TEMPORAL_RESTRICTION: 1
OS_SUPERIOR_NASAL_RESTRICTION: 3
OS_INFERIOR_NASAL_RESTRICTION: 1

## 2017-05-18 ASSESSMENT — SLIT LAMP EXAM - LIDS
COMMENTS: NORMAL

## 2017-05-18 ASSESSMENT — VISUAL ACUITY
OD_PH_CC: 20/40
OS_CC: 20/500
OD_CC: 20/50
OS_PH_CC: 20/400
OD_CC: 20/50
OD_PH_CC: 20/40
METHOD: SNELLEN - LINEAR
OS_PH_CC: 20/400
OS_CC: 20/500
CORRECTION_TYPE: GLASSES
METHOD: SNELLEN - LINEAR
CORRECTION_TYPE: GLASSES

## 2017-05-18 ASSESSMENT — EXTERNAL EXAM - LEFT EYE
OS_EXAM: NORMAL
OS_EXAM: NORMAL

## 2017-05-18 ASSESSMENT — REFRACTION_WEARINGRX
OD_ADD: +3.00
OS_CYLINDER: +0.50
OD_SPHERE: +3.75
SPECS_TYPE: BIFOCAL
OS_SPHERE: +1.00
OD_AXIS: 115
OS_CYLINDER: +0.50
OD_CYLINDER: +0.75
OD_ADD: +3.00
SPECS_TYPE: BIFOCAL
OD_AXIS: 115
OS_ADD: +3.00
OD_CYLINDER: +0.75
OS_SPHERE: +1.00
OS_AXIS: 005
OS_AXIS: 005
OD_SPHERE: +3.75
OS_ADD: +3.00

## 2017-05-18 ASSESSMENT — TONOMETRY
OD_IOP_MMHG: 12
IOP_METHOD: TONOPEN
OS_IOP_MMHG: 16
OD_IOP_MMHG: 12
OS_IOP_MMHG: 16
IOP_METHOD: TONOPEN

## 2017-05-18 ASSESSMENT — CUP TO DISC RATIO
OD_RATIO: 0.5
OS_RATIO: 0.4
OS_RATIO: 0.4
OD_RATIO: 0.5

## 2017-05-18 NOTE — NURSING NOTE
Chief Complaints and History of Present Illnesses   Patient presents with     Follow Up For     CNVM OS     HPI    Affected eye(s):  Both   Symptoms:     No blurred vision   No decreased vision            Comments:  Macarena URIARTE 10:23 AM May 18, 2017

## 2017-05-18 NOTE — NURSING NOTE
Chief Complaints and History of Present Illnesses   Patient presents with     Follow Up For     Brain tumor (H). Hx of subjective visual disturbance     HPI    Affected eye(s):  Both   Symptoms:     Blurred vision (Comment: intermittent)   No decreased vision         Do you have eye pain now?:  No      Comments:  Macarena URIARTE 10:08 AM May 18, 2017

## 2017-05-18 NOTE — PROGRESS NOTES
Assessment & Plan     Travis Peres is a 76 year old male with the following diagnoses:   1. Brain tumor (H)    2. Subjective visual disturbance    3. Visual field defect       History of pituitary adenoma status-post resection.  Last visit was in November 2016. At that time he had a right homonymous hemianopia.  His visual acuity remains stable from November 2017.  Today his visual field is better both eyes.  His last MRI was in December 2016 and this was stable.  Recommend follow up 1 year sooner as needed for worsening symptoms.             Attending Physician Attestation:  I have seen and examined this patient.  I have confirmed and edited as necessary the chief complaint(s), history of present illness, review of systems, relevant history, and examination findings as documented by others.  I have personally reviewed the relevant tests, images, and reports as documented above.  I have confirmed and edited as necessary the assessment and plan and agree with this note.  - Fer Alford MD 12:57 PM 5/18/2017

## 2017-05-18 NOTE — MR AVS SNAPSHOT
After Visit Summary   5/18/2017    Travis Peres    MRN: [id number]           Patient Information     Date Of Birth          1940        Visit Information        Provider Department      5/18/2017 11:00 AM Nadiya Allen MD [facility]        Today's Diagnoses     Venous tributary (branch) occlusion of retina    -  1    Choroidal neovascular membrane, left           Follow-ups after your visit        Follow-up notes from your care team     Return in about 6 weeks (around 6/29/2017).      Your next 10 appointments already scheduled     May 23, 2017  2:30 PM CDT   (Arrive by 2:15 PM)   NEW [place]IR[place] with Tram Marcum MD   [facility] ([facility])    [address]  [address] [place] [facility] [address] [phone]            Jun 29, 2017 10:00 AM CDT   RETURN RETINA with Nadiya Allen MD   Eye Clinic ([facility])    [address]  [address]  [address]  [place] [facility] [id number] [phone]              Future tests that were ordered for you today     Open Future Orders        Priority Expected Expires Ordered    OCT Retina Spectralis OU (both eyes) Routine  11/19/2018 5/18/2017    OCT Retina Spectralis OU (both eyes) Routine  11/19/2018 5/18/2017            Who to contact     Please call your clinic at [phone] to:    Ask questions about your health    Make or cancel appointments    Discuss your medicines    Learn about your test results    Speak to your doctor   If you have compliments or concerns about an experience at your clinic, or if you wish to file a complaint, please contact [facility] Patient Relations at [phone] or email us at [email or web address]         Additional Information About Your Visit        MyChart Information     Tour Raiser is an electronic gateway that provides easy, online access to your medical records. With Tour Raiser, you can  request a clinic appointment, read your test results, renew a prescription or communicate with your care team.     To sign up for WebNotes visit the website at [email or web address]   You will be asked to enter the access code listed below, as well as some personal information. Please follow the directions to create your username and password.     Your access code is: [email or web address]  Expires: 2017  9:30 AM     Your access code will  in 90 days. If you need help or a new code, please contact your [facility] or call [phone] for assistance.        Care EveryWhere ID     This is your Care EveryWhere ID. This could be used by other organizations to access your [place] medical records  [id number]         Blood Pressure from Last 3 Encounters:   17 135/73   17 160/89   17 (!) 166/101    Weight from Last 3 Encounters:   17 61.2 kg (135 lb)   17 61.2 kg (134 lb 14.4 oz)   17 63.6 kg (140 lb 3.4 oz)              We Performed the Following     Avastin (Bevacizumab) 1.25MG Intravitreal Injection OS (left eye)     OCT Retina Spectralis OU (both eyes)          Today's Medication Changes          These changes are accurate as of: 17 12:54 PM.  If you have any questions, ask your nurse or doctor.               These medicines have changed or have updated prescriptions.        Dose/Directions    levothyroxine 137 MCG tablet   Commonly known as:  SYNTHROID/LEVOTHROID   This may have changed:  when to take this   Used for:  Postsurgical hypothyroidism, Osteopenia, Hypopituitarism (H), Pituitary adenoma (H), Papillary thyroid carcinoma (H)        Dose:  137 mcg   Take 1 tablet (137 mcg) by mouth daily   Quantity:  120 tablet   Refills:  3                Primary Care Provider Office Phone # Fax #    Akrol Kim [phone]       [address] [address]        Thank you!     Thank you for  choosing EYE CLINIC  for your care. Our goal is always to provide you with excellent care. Hearing back from our patients is one way we can continue to improve our services. Please take a few minutes to complete the written survey that you may receive in the mail after your visit with us. Thank you!             Your Updated Medication List - Protect others around you: Learn how to safely use, store and throw away your medicines at www.disposemymeds.org.          This list is accurate as of: 5/18/17 12:54 PM.  Always use your most recent med list.                   Brand Name Dispense Instructions for use    alendronate 70 MG tablet    FOSAMAX    16 tablet    Take 1 tablet (70 mg) by mouth every 7 days       COMBIVENT RESPIMAT  MCG/ACT inhaler   Generic drug:  Ipratropium-Albuterol      INL 1 PUFF PO QID       fish oil-omega-3 fatty acids 1000 MG capsule      Take 2 g by mouth daily       fluticasone 50 MCG/ACT spray    FLONASE     Spray 2 sprays into both nostrils daily       hydrocortisone 10 MG tablet    CORTEF    180 tablet    Take one tablet by mouth daily in the morning, 1/2 tablet at 2 PM       ibuprofen 400 MG tablet    ADVIL/MOTRIN     Take 400-600 mg by mouth every 6 hours as needed for moderate pain       levothyroxine 137 MCG tablet    SYNTHROID/LEVOTHROID    120 tablet    Take 1 tablet (137 mcg) by mouth daily       MUCUS RELIEF ADULT PO      Take 400 mg by mouth 2 times daily as needed       ranitidine 300 MG tablet    ZANTAC         TAMSULOSIN HCL PO      Take 0.4 mg by mouth At Bedtime       VOLTAREN 1 % Gel topical gel   Generic drug:  diclofenac      Place onto the skin 4 times daily

## 2017-05-18 NOTE — PROGRESS NOTES
CC -  Wet AMD  HPI -   Vision more blurry since seen here last. He did not see a retina specialist in [place] due to availability so no injections.   Travis Peres is a  76 year old year-old patient presenting for referral for wet AMD OS from Dr. NADIYA Alford.  Noted vision loss OS for past few months leading up to August.  S/p Avastin injections x 4 left eye, last 12/6/16.    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     2.  PVD OU   - RT/RD precautions    3.  NS OU   - likely visually significant - especially given little change in OCT, but declining acuity   - PAMs to 20/60 OS   - consider cataract surgery OS in near future    Will re-evaluate cataract surgery after series of 3 injections    4.  Brain tumor  History of pituitary adenoma status-post resection.  R homonimous hemianopsia   - sees NADIYA Alford   - VFD OD > OS per family    RTC: 6 weeks for OCT, likely avastin OS    Oliver Vargas MD MPH   Ophthalmology Resident PGY-3    ~~~~~~~~~~~~~~~~~~~~~~~~~~~~~~~~~~   Complete documentation of historical and exam elements  from today's encounter can be found in the full encounter summary report (not reduplicated in this progress note).  I personally obtained the chief complaint(s) and history of present illness.  I confirmed and edited as necessary the review of systems, past medical/surgical history, family history, social history, and examination findings as documented by others; and I examined the patient myself.  I personally reviewed the relevant tests, images, and reports as documented above.  I formulated and edited as necessary the assessment and plan and discussed the findings and management plan with the patient and family    Nadiya Allen MD  .  Retina Service   Department of Ophthalmology and Visual Neurosciences   [facility]  Phone: [phone]   Fax: [phone]

## 2017-05-18 NOTE — MR AVS SNAPSHOT
After Visit Summary   5/18/2017    Travis Peres    MRN: [id number]           Patient Information     Date Of Birth          1940        Visit Information        Provider Department      5/18/2017 9:45 AM Devan Stephen; Fer Alford MD [facility]        Today's Diagnoses     Brain tumor (H)    -  1    Subjective visual disturbance        Visual field defect           Follow-ups after your visit        Your next 10 appointments already scheduled     May 23, 2017  2:30 PM CDT   (Arrive by 2:15 PM)   [place] with Tram Marcum MD   [facility] ([facility])    [address]  [address] [place] [facility] [id number] [phone]            Jun 29, 2017 10:00 AM CDT   RETURN RETINA with Nadiya Allen MD   Eye Clinic ([facility])    [address]  [address] [address]  [place] [facility] [id number] [phone]              Future tests that were ordered for you today     Open Future Orders        Priority Expected Expires Ordered    OCT Retina Spectralis OU (both eyes) Routine  11/19/2018 5/18/2017    OCT Retina Spectralis OU (both eyes) Routine  11/19/2018 5/18/2017            Who to contact     Please call your clinic at [phone] to:    Ask questions about your health    Make or cancel appointments    Discuss your medicines    Learn about your test results    Speak to your doctor   If you have compliments or concerns about an experience at your clinic, or if you wish to file a complaint, please contact [facility] Patient Relations at [phone] or email us at [email or web address]         Additional Information About Your Visit        MyChart Information     Pumant is an electronic gateway that provides easy, online access to your medical records. With Pumant, you can request a clinic appointment, read your test results, renew a prescription or communicate  with your care team.     To sign up for AppSamet visit the website at [email or web address]   You will be asked to enter the access code listed below, as well as some personal information. Please follow the directions to create your username and password.     Your access code is: [email or web address]  Expires: 2017  9:30 AM     Your access code will  in 90 days. If you need help or a new code, please contact your [facility] or call [phone] for assistance.        Care EveryWhere ID     This is your Care EveryWhere ID. This could be used by other organizations to access your [place] medical records  [id number]         Blood Pressure from Last 3 Encounters:   17 135/73   17 160/89   17 (!) 166/101    Weight from Last 3 Encounters:   17 61.2 kg (135 lb)   17 61.2 kg (134 lb 14.4 oz)   17 63.6 kg (140 lb 3.4 oz)              We Performed the Following     Glaucoma Top OU          Today's Medication Changes          These changes are accurate as of: 17 12:59 PM.  If you have any questions, ask your nurse or doctor.               These medicines have changed or have updated prescriptions.        Dose/Directions    levothyroxine 137 MCG tablet   Commonly known as:  SYNTHROID/LEVOTHROID   This may have changed:  when to take this   Used for:  Postsurgical hypothyroidism, Osteopenia, Hypopituitarism (H), Pituitary adenoma (H), Papillary thyroid carcinoma (H)        Dose:  137 mcg   Take 1 tablet (137 mcg) by mouth daily   Quantity:  120 tablet   Refills:  3                Primary Care Provider Office Phone # Fax #    Karol MelorembertoOrenChino [phone]       [address] [address]        Thank you!     Thank you for choosing [facility]YE CLINIC  for your care. Our goal is always to provide you with excellent care. Hearing back from our patients is one way we can continue to improve our services. Please  take a few minutes to complete the written survey that you may receive in the mail after your visit with us. Thank you!             Your Updated Medication List - Protect others around you: Learn how to safely use, store and throw away your medicines at www.disposemymeds.org.          This list is accurate as of: 5/18/17 12:59 PM.  Always use your most recent med list.                   Brand Name Dispense Instructions for use    alendronate 70 MG tablet    FOSAMAX    16 tablet    Take 1 tablet (70 mg) by mouth every 7 days       COMBIVENT RESPIMAT  MCG/ACT inhaler   Generic drug:  Ipratropium-Albuterol      INL 1 PUFF PO QID       fish oil-omega-3 fatty acids 1000 MG capsule      Take 2 g by mouth daily       fluticasone 50 MCG/ACT spray    FLONASE     Spray 2 sprays into both nostrils daily       hydrocortisone 10 MG tablet    CORTEF    180 tablet    Take one tablet by mouth daily in the morning, 1/2 tablet at 2 PM       ibuprofen 400 MG tablet    ADVIL/MOTRIN     Take 400-600 mg by mouth every 6 hours as needed for moderate pain       levothyroxine 137 MCG tablet    SYNTHROID/LEVOTHROID    120 tablet    Take 1 tablet (137 mcg) by mouth daily       MUCUS RELIEF ADULT PO      Take 400 mg by mouth 2 times daily as needed       ranitidine 300 MG tablet    ZANTAC         TAMSULOSIN HCL PO      Take 0.4 mg by mouth At Bedtime       VOLTAREN 1 % Gel topical gel   Generic drug:  diclofenac      Place onto the skin 4 times daily

## 2017-05-18 NOTE — LETTER
5/18/2017       RE: Travis Peres  [address]  [place] [address]     Dear Colleague,    Thank you for referring your patient, Travis Peres, to the EYE CLINIC at [facility]. Please see a copy of my visit note below.    CC -  Wet AMD  HPI -   Vision more blurry since seen here last. He did not see a retina specialist in [place] due to availability so no injections.   Travis Peres is a  76 year old year-old patient presenting for referral for wet AMD OS from Dr. NADIYA Alford.  Noted vision loss OS for past few months leading up to August.  S/p Avastin injections x 4 left eye, last 12/6/16.    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     2.  PVD OU   - RT/RD precautions    3.  NS OU   - likely visually significant - especially given little change in OCT, but declining acuity   - PAMs to 20/60 OS   - consider cataract surgery OS in near future    Will re-evaluate cataract surgery after series of 3 injections    4.  Brain tumor  History of pituitary adenoma status-post  resection.  R homonimous hemianopsia   - sees M Prashanth   - VFD OD > OS per family    RTC: 6 weeks for OCT, likely avastin OS    Oliver Vargas MD MPH   Ophthalmology Resident PGY-3    ~~~~~~~~~~~~~~~~~~~~~~~~~~~~~~~~~~   Complete documentation of historical and exam elements from today's encounter can be found in the full encounter summary report (not reduplicated in this progress note).  I personally obtained the chief complaint(s) and history of present illness.  I confirmed and edited as necessary the review of systems, past medical/surgical history, family history, social history, and examination findings as documented by others; and I examined the patient myself.  I personally reviewed the relevant tests, images, and reports as documented above.  I formulated and edited as necessary the assessment and plan and discussed the findings and management plan with the patient and family    Nadiya Allen MD  .  Retina Service   Department of Ophthalmology and Visual Neurosciences   [facility]  Phone: [phone]   Fax: [phone]

## 2017-06-20 DIAGNOSIS — H35.052 CNVM (CHOROIDAL NEOVASCULAR MEMBRANE), LEFT: Primary | ICD-10-CM

## 2017-06-29 ENCOUNTER — OFFICE VISIT (OUTPATIENT)
Dept: OPHTHALMOLOGY | Facility: CLINIC | Age: 77
End: 2017-06-29
Attending: OPHTHALMOLOGY
Payer: MEDICARE

## 2017-06-29 DIAGNOSIS — H35.3290 EXUDATIVE SENILE MACULAR DEGENERATION OF RETINA (H): Primary | ICD-10-CM

## 2017-06-29 DIAGNOSIS — H35.052 CHOROIDAL NEOVASCULAR MEMBRANE, LEFT: ICD-10-CM

## 2017-06-29 PROCEDURE — 40000269 ZZH STATISTIC NO CHARGE FACILITY FEE: Mod: ZF | Performed by: OPHTHALMOLOGY

## 2017-06-29 PROCEDURE — 99212 OFFICE O/P EST SF 10 MIN: CPT | Mod: 25 | Performed by: OPHTHALMOLOGY

## 2017-06-29 PROCEDURE — 25000128 H RX IP 250 OP 636: Mod: ZF | Performed by: OPHTHALMOLOGY

## 2017-06-29 PROCEDURE — 92134 CPTRZ OPH DX IMG PST SGM RTA: CPT | Mod: ZF | Performed by: OPHTHALMOLOGY

## 2017-06-29 PROCEDURE — 67028 INJECTION EYE DRUG: CPT | Mod: ZF | Performed by: OPHTHALMOLOGY

## 2017-06-29 RX ADMIN — RANIBIZUMAB 0.5 MG: 10 INJECTION, SOLUTION INTRAVITREAL at 11:58

## 2017-06-29 ASSESSMENT — REFRACTION_WEARINGRX
OD_ADD: +3.00
SPECS_TYPE: BIFOCAL
OS_AXIS: 005
OD_SPHERE: +3.75
OS_ADD: +3.00
OS_CYLINDER: +0.50
OD_AXIS: 115
OD_CYLINDER: +0.75
OS_SPHERE: +1.00

## 2017-06-29 ASSESSMENT — VISUAL ACUITY
METHOD: SNELLEN - LINEAR
CORRECTION_TYPE: GLASSES
OD_CC: 20/40+1
OS_CC: 20/150 ECC

## 2017-06-29 ASSESSMENT — TONOMETRY
IOP_METHOD: TONOPEN
OS_IOP_MMHG: 17
OD_IOP_MMHG: 19

## 2017-06-29 ASSESSMENT — SLIT LAMP EXAM - LIDS
COMMENTS: NORMAL
COMMENTS: NORMAL

## 2017-06-29 ASSESSMENT — CUP TO DISC RATIO
OS_RATIO: 0.4
OD_RATIO: 0.5

## 2017-06-29 ASSESSMENT — CONF VISUAL FIELD
OS_INFERIOR_NASAL_RESTRICTION: 1
OD_SUPERIOR_TEMPORAL_RESTRICTION: 1
OS_SUPERIOR_NASAL_RESTRICTION: 1
OD_INFERIOR_TEMPORAL_RESTRICTION: 1

## 2017-06-29 ASSESSMENT — EXTERNAL EXAM - RIGHT EYE: OD_EXAM: NORMAL

## 2017-06-29 ASSESSMENT — EXTERNAL EXAM - LEFT EYE: OS_EXAM: NORMAL

## 2017-06-29 NOTE — PROGRESS NOTES
CC -  Wet AMD  HPI -   Vision more blurry since seen here last. He did not see a retina specialist in [place] due to availability so no injections.   Travis Peres is a  76 year old year-old patient presenting for referral for wet AMD OS from Dr. NADIYA Alford.  Noted vision loss OS for past few months leading up to August.  S/p Avastin injections x 4 left eye, last 12/6/16.    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     2.  PVD OU   - RT/RD precautions    3.  NS OU   - likely visually significant - especially given little change in OCT, but declining acuity   - PAMs to 20/60 OS   - consider cataract surgery OS in near future    Will re-evaluate cataract surgery after series of 3 injections    4.  Brain tumor  History of pituitary adenoma status-post resection.  R homonimous hemianopsia   - sees NADIYA Alford   - VFD OD > OS per family    RTC: 6 weeks for OCT, likely Lucentis OS    Oliver Vargas MD MPH   Ophthalmology Resident PGY-3    ~~~~~~~~~~~~~~~~~~~~~~~~~~~~~~~~~~   Complete documentation of historical and exam  elements from today's encounter can be found in the full encounter summary report (not reduplicated in this progress note).  I personally obtained the chief complaint(s) and history of present illness.  I confirmed and edited as necessary the review of systems, past medical/surgical history, family history, social history, and examination findings as documented by others; and I examined the patient myself.  I personally reviewed the relevant tests, images, and reports as documented above.  I formulated and edited as necessary the assessment and plan and discussed the findings and management plan with the patient and family and I was present for the entire procedure performed by the resident/fellow.    Nadiya Allen MD  .  Retina Service   Department of Ophthalmology and Visual Neurosciences   [facility]  Phone: [phone]   Fax: [phone]

## 2017-06-29 NOTE — NURSING NOTE
Chief Complaints and History of Present Illnesses   Patient presents with     Follow Up For     CNVM LE     HPI    Affected eye(s):  Left   Symptoms:     Blurred vision   No decreased vision   No flashes   No Dryness         Do you have eye pain now?:  No      Comments:  [facility] the same. LE blurry and bothersome.   Zina URIARTE June 29, 2017 10:31 AM

## 2017-06-29 NOTE — MR AVS SNAPSHOT
After Visit Summary   6/29/2017    Travis Peres    MRN: [id number]           Patient Information     Date Of Birth          1940        Visit Information        Provider Department      6/29/2017 9:45 AM Nadiya Allen MD; LANGUAGE 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      Your next 10 appointments already scheduled     Aug 09, 2017 10:00 AM CDT   RETURN RETINA with Nadiya Allen MD   Eye Clinic ([facility])    [address]  [address] [facility]  [address] [address]  [facility] [address] [phone]            Sep 18, 2017  2:00 PM CDT   (Arrive by 1:45 PM)   RETURN ENDOCRINE with Corina Potts MD   [facility] Endocrinology ([facility])    [address]  3rd [facility] [address] [phone]              Future tests that were ordered for you today     Open Future Orders        Priority Expected Expires Ordered    OCT Retina Spectralis OU (both eyes) Routine  12/31/2018 6/29/2017            Who to contact     Please call your clinic at [phone] to:    Ask questions about your health    Make or cancel appointments    Discuss your medicines    Learn about your test results    Speak to your doctor   If you have compliments or concerns about an experience at your clinic, or if you wish to file a complaint, please contact [facility] Patient Relations at [phone] or email us at [email or web address]         Additional Information About Your Visit        MyChart Information     Tecogen is an electronic gateway that provides easy, online access to your medical records. With Tecogen, you can request a clinic appointment, read your test  results, renew a prescription or communicate with your care team.     To sign up for Post-A-Voxhart visit the website at [email or web address]   You will be asked to enter the access code listed below, as well as some personal information. Please follow the directions to create your username and password.     Your access code is: [email or web address]  Expires: 2017  6:30 AM     Your access code will  in 90 days. If you need help or a new code, please contact your [facility] or call [phone] for assistance.        Care EveryWhere ID     This is your Care EveryWhere ID. This could be used by other organizations to access your [place] medical records  [id number]         Blood Pressure from Last 3 Encounters:   17 135/73   17 160/89   17 (!) 166/101    Weight from Last 3 Encounters:   17 61.2 kg (135 lb)   17 61.2 kg (134 lb 14.4 oz)   17 63.6 kg (140 lb 3.4 oz)              We Performed the Following     Lucentis (Ranibizumab) 0.5MG Intravitreal Injection OS (left eye)     OCT Retina Spectralis OU (both eyes)          Today's Medication Changes          These changes are accurate as of: 17 12:03 PM.  If you have any questions, ask your nurse or doctor.               These medicines have changed or have updated prescriptions.        Dose/Directions    levothyroxine 137 MCG tablet   Commonly known as:  SYNTHROID/LEVOTHROID   This may have changed:  when to take this   Used for:  Postsurgical hypothyroidism, Osteopenia, Hypopituitarism (H), Pituitary adenoma (H), Papillary thyroid carcinoma (H)        Dose:  137 mcg   Take 1 tablet (137 mcg) by mouth daily   Quantity:  120 tablet   Refills:  3                Primary Care Provider Office Phone # Fax #    Karol Kim [phone]       [address] [address]        Equal Access to Services     LAYLA LUCAS AH: Sesar lyon  Alexandroali, naomida luqadaha, qajpta kasandra cooper, roxanne cortez deajoe laminervarama stephen. So [facility] [phone].    ATENCIÓN: Si barbara abel, tiene a reese disposición servicios gratuitos de asistencia lingüística. Tiffanie al [phone].    We comply with applicable federal civil rights laws and [place] laws. We do not discriminate on the basis of race, color, national origin, age, disability sex, sexual orientation or gender identity.            Thank you!     Thank you for choosing EYE CLINIC  for your care. Our goal is always to provide you with excellent care. Hearing back from our patients is one way we can continue to improve our services. Please take a few minutes to complete the written survey that you may receive in the mail after your visit with us. Thank you!             Your Updated Medication List - Protect others around you: Learn how to safely use, store and throw away your medicines at www.disposemymeds.org.          This list is accurate as of: 6/29/17 12:03 PM.  Always use your most recent med list.                   Brand Name Dispense Instructions for use Diagnosis    aflibercept 2 MG/0.05ML Soln injection    EYLEA    0.05 mL    0.05 mLs (2 mg) by Intravitreal route every 28 days    CNVM (choroidal neovascular membrane), left       alendronate 70 MG tablet    FOSAMAX    16 tablet    Take 1 tablet (70 mg) by mouth every 7 days    Postsurgical hypothyroidism, Papillary thyroid carcinoma (H), Pituitary adenoma (H), Osteopenia, Hypopituitarism (H)       COMBIVENT RESPIMAT  MCG/ACT inhaler   Generic drug:  Ipratropium-Albuterol      INL 1 PUFF PO QID        fish oil-omega-3 fatty acids 1000 MG capsule      Take 2 g by mouth daily        fluticasone 50 MCG/ACT spray    FLONASE     Spray 2 sprays into both nostrils daily        hydrocortisone 10 MG tablet    CORTEF    180 tablet    Take one tablet by mouth daily in the morning, 1/2 tablet at 2 PM    Postsurgical hypothyroidism,  Hypopituitarism (H), Osteopenia, Pituitary adenoma (H), Papillary thyroid carcinoma (H)       ibuprofen 400 MG tablet    ADVIL/MOTRIN     Take 400-600 mg by mouth every 6 hours as needed for moderate pain        levothyroxine 137 MCG tablet    SYNTHROID/LEVOTHROID    120 tablet    Take 1 tablet (137 mcg) by mouth daily    Postsurgical hypothyroidism, Osteopenia, Hypopituitarism (H), Pituitary adenoma (H), Papillary thyroid carcinoma (H)       MUCUS RELIEF ADULT PO      Take 400 mg by mouth 2 times daily as needed    Papillary thyroid carcinoma (H), Postsurgical hypothyroidism, Malignant neoplasm of thyroid gland (H), Cancer of thyroid (H), 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

## 2017-07-20 NOTE — ADDENDUM NOTE
Encounter addended by: Estella Flores MD on: 7/20/2017 11:30 AM<BR>     Actions taken: Cosign clinical note with attestation

## 2017-08-09 ENCOUNTER — OFFICE VISIT (OUTPATIENT)
Dept: OPHTHALMOLOGY | Facility: CLINIC | Age: 77
End: 2017-08-09
Attending: OPHTHALMOLOGY
Payer: MEDICARE

## 2017-08-09 DIAGNOSIS — H35.052 CHOROIDAL NEOVASCULAR MEMBRANE, LEFT: ICD-10-CM

## 2017-08-09 DIAGNOSIS — H34.8392 VENOUS TRIBUTARY (BRANCH) OCCLUSION OF RETINA (H): ICD-10-CM

## 2017-08-09 PROCEDURE — 99213 OFFICE O/P EST LOW 20 MIN: CPT | Mod: 25,ZF

## 2017-08-09 PROCEDURE — 25000128 H RX IP 250 OP 636: Mod: ZF | Performed by: OPHTHALMOLOGY

## 2017-08-09 PROCEDURE — T1013 SIGN LANG/ORAL INTERPRETER: HCPCS | Mod: U3,ZF

## 2017-08-09 PROCEDURE — 92134 CPTRZ OPH DX IMG PST SGM RTA: CPT | Mod: ZF | Performed by: OPHTHALMOLOGY

## 2017-08-09 PROCEDURE — 67028 INJECTION EYE DRUG: CPT | Mod: ZF | Performed by: OPHTHALMOLOGY

## 2017-08-09 RX ADMIN — RANIBIZUMAB 0.5 MG: 10 INJECTION, SOLUTION INTRAVITREAL at 12:36

## 2017-08-09 ASSESSMENT — TONOMETRY
IOP_METHOD: TONOPEN
OD_IOP_MMHG: 18
OS_IOP_MMHG: 16

## 2017-08-09 ASSESSMENT — CUP TO DISC RATIO
OD_RATIO: 0.5
OS_RATIO: 0.4

## 2017-08-09 ASSESSMENT — VISUAL ACUITY
OD_PH_SC: 20/40
OS_SC: 20/250
OD_SC: 20/50
METHOD: SNELLEN - LINEAR
OS_PH_SC: 20/150

## 2017-08-09 ASSESSMENT — CONF VISUAL FIELD
OD_INFERIOR_TEMPORAL_RESTRICTION: 1
OS_SUPERIOR_NASAL_RESTRICTION: 1
OS_INFERIOR_NASAL_RESTRICTION: 1
OD_SUPERIOR_TEMPORAL_RESTRICTION: 1

## 2017-08-09 ASSESSMENT — EXTERNAL EXAM - LEFT EYE: OS_EXAM: NORMAL

## 2017-08-09 ASSESSMENT — EXTERNAL EXAM - RIGHT EYE: OD_EXAM: NORMAL

## 2017-08-09 ASSESSMENT — SLIT LAMP EXAM - LIDS
COMMENTS: NORMAL
COMMENTS: NORMAL

## 2017-08-09 NOTE — PROGRESS NOTES
CC -  Wet AMD  HPI -   Vision stable since last visit.   Travis Peres is a  76 year old year-old patient presenting for referral for wet AMD OS from Dr. NADIYA Alford.  Noted vision loss OS for past few months leading up to August.  S/p Avastin injections x 4 left eye, last 12/6/16.    H/o brain tumor seen by NADIYA Alford with VFD  OK with resident injections    PAST OCULAR HISTORY  No surgeries    RETINAL IMAGING:  OCT 8-9-17  right eye-  normal  left eye - large FVPED, likely subretinal CNVM, mild IRF; slightly improved IRF    FA (10.7.16)  OD: normal  OS: (transit) early hyperfluorescence with late leakage c/w classic CNVM temporal to fovea with diffuse late leakage superior to fovea c/w occult CNVM    ICG (10.7.16)  OD: normal  OS: early hyperfluorescence with late leakage temporal to the fovea, likely CNVM net, no polyps     ASSESSMENT & PLAN  1. 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     2.  PVD OU   - RT/RD precautions    3.  NS OU   - likely visually significant - especially given little change in OCT, but declining acuity   - PAMs to 20/60 OS   - consider cataract surgery OS in near future    Will re-evaluate cataract surgery after series of 2 more injections    4.  Brain tumor  History of pituitary adenoma status-post resection.  R homonimous hemianopsia   - sees NADIYA Alford   - VFD OD > OS per family    RTC: 6- 7 weeks injection only Lucentis OS    Luca Anderson MD  PGY3, Dept of Ophthalmology  Pager [phone]        ~~~~~~~~~~~~~~~~~~~~~~~~~~~~~~~~~~   Complete documentation of historical and exam  elements from today's encounter can be found in the full encounter summary report (not reduplicated in this progress note).  I personally obtained the chief complaint(s) and history of present illness.  I confirmed and edited as necessary the review of systems, past medical/surgical history, family history, social history, and examination findings as documented by others; and I examined the patient myself.  I personally reviewed the relevant tests, images, and reports as documented above.  I formulated and edited as necessary the assessment and plan and discussed the findings and management plan with the patient and family and I was present for critical portions of the procedure carried out by the resident/fellow and immediately available for the entire procedure    Nadiya Allen MD  .  Retina Service   Department of Ophthalmology and Visual Neurosciences   [facility]  Phone: [phone]   Fax: [phone]

## 2017-08-09 NOTE — MR AVS SNAPSHOT
After Visit Summary   8/9/2017    Travis Peres    MRN: [id number]           Patient Information     Date Of Birth          1940        Visit Information        Provider Department      8/9/2017 10:00 AM Devan Stephen; Nadiya Allen MD Eye Clinic        Today's Diagnoses     Choroidal neovascular membrane, left        Venous tributary (branch) occlusion of retina           Follow-ups after your visit        Follow-up notes from your care team     Return in about 7 weeks (around 9/27/2017) for inject only Lucentis OS, Inj only/V/T 12-3 PM appointment if possible.      Your next 10 appointments already scheduled     Sep 18, 2017  2:00 PM CDT   (Arrive by 1:45 PM)   RETURN ENDOCRINE with Corina Potts MD   [facility] Endocrinology ([facility])    [address] [address] [phone]            Sep 27, 2017 10:00 AM CDT   RETURN RETINA with Nadiya Allen MD   Eye Clinic ([facility])    [address]  [facility] [place] [address] [phone]              Who to contact     Please call your clinic at [phone] to:    Ask questions about your health    Make or cancel appointments    Discuss your medicines    Learn about your test results    Speak to your doctor   If you have compliments or concerns about an experience at your clinic, or if you wish to file a complaint, please contact [facility] Patient Relations at [phone] or email us at [email or web address]         Additional Information About Your Visit        kooabahart Information     Tictail is an electronic gateway that provides easy, online access to your medical records. With Tictail, you can request a clinic appointment, read your test results, renew a prescription or communicate with your care team.     To sign up for Tictail visit the website at  [email or web address]   You will be asked to enter the access code listed below, as well as some personal information. Please follow the directions to create your username and password.     Your access code is: [email or web address]  Expires: 2017  6:30 AM     Your access code will  in 90 days. If you need help or a new code, please contact your [facility] or call [phone] for assistance.        Care EveryWhere ID     This is your Care EveryWhere ID. This could be used by other organizations to access your [place] medical records  [id number]         Blood Pressure from Last 3 Encounters:   17 135/73   17 160/89   17 (!) 166/101    Weight from Last 3 Encounters:   17 61.2 kg (135 lb)   17 61.2 kg (134 lb 14.4 oz)   17 63.6 kg (140 lb 3.4 oz)              We Performed the Following     Lucentis (Ranibizumab) 0.3 mg Intravitreal Injection OS (left eye)     OCT Retina Spectralis OU (both eyes)          Today's Medication Changes          These changes are accurate as of: 17 12:39 PM.  If you have any questions, ask your nurse or doctor.               These medicines have changed or have updated prescriptions.        Dose/Directions    levothyroxine 137 MCG tablet   Commonly known as:  SYNTHROID/LEVOTHROID   This may have changed:  when to take this   Used for:  Postsurgical hypothyroidism, Osteopenia, Hypopituitarism (H), Pituitary adenoma (H), Papillary thyroid carcinoma (H)        Dose:  137 mcg   Take 1 tablet (137 mcg) by mouth daily   Quantity:  120 tablet   Refills:  3                Primary Care Provider Office Phone # Fax #    Karol FayhCino [phone]       [address] [address]        Equal Access to Services     LAYLA LUCAS AH: Sesar Rodriguez, susana luqbecky, qapedro pablo kaalroxanne garcia. So [facility]  [phone].    ATENCIÓN: Si barbara abel, tiene a reese disposición servicios gratuitos de asistencia lingüística. Tiffanie fong [phone].    We comply with applicable federal civil rights laws and [place] laws. We do not discriminate on the basis of race, color, national origin, age, disability sex, sexual orientation or gender identity.            Thank you!     Thank you for choosing EYE CLINIC  for your care. Our goal is always to provide you with excellent care. Hearing back from our patients is one way we can continue to improve our services. Please take a few minutes to complete the written survey that you may receive in the mail after your visit with us. Thank you!             Your Updated Medication List - Protect others around you: Learn how to safely use, store and throw away your medicines at www.disposemymeds.org.          This list is accurate as of: 8/9/17 12:39 PM.  Always use your most recent med list.                   Brand Name Dispense Instructions for use Diagnosis    aflibercept 2 MG/0.05ML Soln injection    EYLEA    0.05 mL    0.05 mLs (2 mg) by Intravitreal route every 28 days    CNVM (choroidal neovascular membrane), left       alendronate 70 MG tablet    FOSAMAX    16 tablet    Take 1 tablet (70 mg) by mouth every 7 days    Postsurgical hypothyroidism, Papillary thyroid carcinoma (H), Pituitary adenoma (H), Osteopenia, Hypopituitarism (H)       COMBIVENT RESPIMAT  MCG/ACT inhaler   Generic drug:  Ipratropium-Albuterol      INL 1 PUFF PO QID        fish oil-omega-3 fatty acids 1000 MG capsule      Take 2 g by mouth daily        fluticasone 50 MCG/ACT spray    FLONASE     Spray 2 sprays into both nostrils daily        hydrocortisone 10 MG tablet    CORTEF    180 tablet    Take one tablet by mouth daily in the morning, 1/2 tablet at 2 PM    Postsurgical hypothyroidism, Hypopituitarism (H), Osteopenia, Pituitary adenoma (H), Papillary thyroid carcinoma (H)       ibuprofen 400 MG tablet     ADVIL/MOTRIN     Take 400-600 mg by mouth every 6 hours as needed for moderate pain        levothyroxine 137 MCG tablet    SYNTHROID/LEVOTHROID    120 tablet    Take 1 tablet (137 mcg) by mouth daily    Postsurgical hypothyroidism, Osteopenia, Hypopituitarism (H), Pituitary adenoma (H), Papillary thyroid carcinoma (H)       MUCUS RELIEF ADULT PO      Take 400 mg by mouth 2 times daily as needed    Papillary thyroid carcinoma (H), Postsurgical hypothyroidism, Malignant neoplasm of thyroid gland (H), Cancer of thyroid (H), 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

## 2017-08-09 NOTE — NURSING NOTE
Chief Complaints and History of Present Illnesses   Patient presents with     Follow Up For     Wet AMD     HPI    Affected eye(s):  Both   Symptoms:        Frequency:  Constant       Do you have eye pain now?:  No      Comments:  States va is the same since last visit  .No F&F  occ will see a spot on the ground  Lupe DEE 10:30 AM August 9, 2017

## 2017-09-18 ENCOUNTER — OFFICE VISIT (OUTPATIENT)
Dept: ENDOCRINOLOGY | Facility: CLINIC | Age: 77
End: 2017-09-18

## 2017-09-18 VITALS
HEIGHT: 65 IN | HEART RATE: 65 BPM | BODY MASS INDEX: 21.79 KG/M2 | DIASTOLIC BLOOD PRESSURE: 80 MMHG | SYSTOLIC BLOOD PRESSURE: 163 MMHG | WEIGHT: 130.8 LBS

## 2017-09-18 DIAGNOSIS — C73 PAPILLARY THYROID CARCINOMA (H): ICD-10-CM

## 2017-09-18 DIAGNOSIS — E23.0 HYPOPITUITARISM (H): ICD-10-CM

## 2017-09-18 DIAGNOSIS — E89.0 POSTSURGICAL HYPOTHYROIDISM: ICD-10-CM

## 2017-09-18 DIAGNOSIS — C73 PAPILLARY THYROID CARCINOMA (H): Primary | ICD-10-CM

## 2017-09-18 DIAGNOSIS — D35.2 PITUITARY ADENOMA (H): ICD-10-CM

## 2017-09-18 DIAGNOSIS — R91.8 MASS OF LEFT LUNG: ICD-10-CM

## 2017-09-18 LAB
CREAT SERPL-MCNC: 1.04 MG/DL (ref 0.66–1.25)
GFR SERPL CREATININE-BSD FRML MDRD: 69 ML/MIN/1.7M2
POTASSIUM SERPL-SCNC: 3.9 MMOL/L (ref 3.4–5.3)
SODIUM SERPL-SCNC: 136 MMOL/L (ref 133–144)
T4 FREE SERPL-MCNC: 1.4 NG/DL (ref 0.76–1.46)
TSH SERPL DL<=0.005 MIU/L-ACNC: <0.01 MU/L (ref 0.4–4)

## 2017-09-18 RX ORDER — ALENDRONATE SODIUM 70 MG/1
70 TABLET ORAL
Qty: 16 TABLET | Refills: 3 | Status: SHIPPED | OUTPATIENT
Start: 2017-09-18 | End: 2018-08-30

## 2017-09-18 ASSESSMENT — PAIN SCALES - GENERAL: PAINLEVEL: NO PAIN (0)

## 2017-09-18 NOTE — MR AVS SNAPSHOT
After Visit Summary   9/18/2017    Travis Peres    MRN: [id number]           Patient Information     Date Of Birth          1940        Visit Information        Provider Department      9/18/2017 1:45 PM Corina Potts MD; [place] [employer] [facility] Endocrinology        Today's Diagnoses     Papillary thyroid carcinoma (HCC)    -  1    Postsurgical hypothyroidism        Hypopituitarism (H)        Pituitary adenoma (H)          Care Instructions    You should arrange to see me about once every 6 months.                  Follow-ups after your visit        Your next 10 appointments already scheduled     Sep 25, 2017  1:00 PM CDT   (Arrive by 12:45 PM)   CT CHEST W/O CONTRAST with UCCT1   [facility] CT ([facility])    [address] [address] [phone]           Please bring any scans or X-rays taken at other hospitals, if similar tests were done. Also bring a list of your medicines, including vitamins, minerals and over-the-counter drugs. It is safest to leave personal items at home.  Be sure to tell your doctor:   If you have any allergies.   If there s any chance you are pregnant.   If you are breastfeeding.   If you have any special needs.  You do not need to do anything special to prepare.  Please wear loose clothing, such as a sweat suit or jogging clothes. Avoid snaps, zippers and other metal. We may ask you to undress and put on a hospital gown.            Sep 25, 2017  1:30 PM CDT   US HEAD NECK SOFT TISSUE with UCUS2   [facility] US ([facility])    [address] [address] [phone]           Please bring a list of your medicines (including vitamins, minerals and over-the-counter drugs). Also, tell your doctor about any allergies you may have. Wear comfortable clothes and leave your valuables at home.  You do not need to do  anything special to prepare for your exam.  Please call the Imaging Department at your exam site with any questions.            Sep 27, 2017 10:00 AM CDT   RETURN RETINA with Nadiya Allen MD   [facility] Clinic ([facility])    [address]  [address] [facility] [address]  [address]  [facility] [address]   [phone]              Future tests that were ordered for you today     Open Future Orders        Priority Expected Expires Ordered    CT Chest w/o contrast Routine  4/16/2018 9/18/2017    US head neck soft tissue Routine  4/16/2018 9/18/2017    TSH Routine  9/18/2018 9/18/2017    T4 free Routine  9/18/2018 9/18/2017    Thyroglobulin (Total, antibody & recovery %) Routine  9/18/2018 9/18/2017    Sodium Routine  9/18/2018 9/18/2017    Potassium Routine  9/18/2018 9/18/2017    Creatinine Routine  9/18/2018 9/18/2017            Who to contact     Please call your clinic at [phone] to:    Ask questions about your health    Make or cancel appointments    Discuss your medicines    Learn about your test results    Speak to your doctor   If you have compliments or concerns about an experience at your clinic, or if you wish to file a complaint, please contact [facility] Patient Relations at [phone] or email us at [email or web address]         Additional Information About Your Visit        [employer] Information     [employer] is an electronic gateway that provides easy, online access to your medical records. With [employer], you can request a clinic appointment, read your test results, renew a prescription or communicate with your care team.     To sign up for [employer] visit the website at [email or web address]   You will be asked to enter the access code listed below, as well as some personal information. Please follow the directions to create your username and password.     Your access code is: [email or web address]  Expires: 12/3/2017  6:30 AM     Your access code  "will  in 90 days. If you need help or a new code, please contact your [facility] or call [phone] for assistance.        Care EveryWhere ID     This is your Care EveryWhere ID. This could be used by other organizations to access your [facility] medical records  [id number]        Your Vitals Were     Pulse Height BMI (Body Mass Index)             65 1.651 m (5' 5\") 21.77 kg/m2          Blood Pressure from Last 3 Encounters:   17 163/80   17 135/73   17 160/89    Weight from Last 3 Encounters:   17 59.3 kg (130 lb 12.8 oz)   17 61.2 kg (135 lb)   17 61.2 kg (134 lb 14.4 oz)                 Today's Medication Changes          These changes are accurate as of: 17  2:57 PM.  If you have any questions, ask your nurse or doctor.               These medicines have changed or have updated prescriptions.        Dose/Directions    levothyroxine 137 MCG tablet   Commonly known as:  SYNTHROID/LEVOTHROID   This may have changed:  when to take this   Used for:  Postsurgical hypothyroidism, Osteopenia, Hypopituitarism (H), Pituitary adenoma (H), Papillary thyroid carcinoma (H)        Dose:  137 mcg   Take 1 tablet (137 mcg) by mouth daily   Quantity:  120 tablet   Refills:  3            Where to get your medicines      These medications were sent to [facility] [address] [place] [address] & [address], [place] [address]     Phone:  [phone]     alendronate 70 MG tablet                Primary Care Provider Office Phone # Fax #    Karol Kim [phone]       [address] [address]        Equal Access to Services     LAYLA LUCAS AH: Sesar Rodriguez, wadianelysda luqadaha, qaybta kaalmada kenneth, roxanne mitchell. [facility] [phone].    ATENCIÓN: Si barbara abel, tiene a reese disposición servicios " idalia de asistencia lingüística. Tiffanie fong [phone].    We comply with applicable federal civil rights laws and [place] laws. We do not discriminate on the basis of race, color, national origin, age, disability sex, sexual orientation or gender identity.            Thank you!     Thank you for choosing [facility]  for your care. Our goal is always to provide you with excellent care. Hearing back from our patients is one way we can continue to improve our services. Please take a few minutes to complete the written survey that you may receive in the mail after your visit with us. Thank you!             Your Updated Medication List - Protect others around you: Learn how to safely use, store and throw away your medicines at www.disposemymeds.org.          This list is accurate as of: 9/18/17  2:57 PM.  Always use your most recent med list.                   Brand Name Dispense Instructions for use Diagnosis    aflibercept 2 MG/0.05ML Soln injection    EYLEA    0.05 mL    0.05 mLs (2 mg) by Intravitreal route every 28 days    CNVM (choroidal neovascular membrane), left       alendronate 70 MG tablet    FOSAMAX    16 tablet    Take 1 tablet (70 mg) by mouth every 7 days    Postsurgical hypothyroidism, Papillary thyroid carcinoma (H), Pituitary adenoma (H), Hypopituitarism (H)       COMBIVENT RESPIMAT  MCG/ACT inhaler   Generic drug:  Ipratropium-Albuterol      INL 1 PUFF PO QID        fish oil-omega-3 fatty acids 1000 MG capsule      Take 2 g by mouth daily        fluticasone 50 MCG/ACT spray    FLONASE     Spray 2 sprays into both nostrils daily        hydrocortisone 10 MG tablet    CORTEF    180 tablet    Take one tablet by mouth daily in the morning, 1/2 tablet at 2 PM    Postsurgical hypothyroidism, Hypopituitarism (H), Osteopenia, Pituitary adenoma (H), Papillary thyroid carcinoma (H)       ibuprofen 400 MG tablet    ADVIL/MOTRIN     Take 400-600 mg by mouth every 6 hours as needed for  moderate pain        levothyroxine 137 MCG tablet    SYNTHROID/LEVOTHROID    120 tablet    Take 1 tablet (137 mcg) by mouth daily    Postsurgical hypothyroidism, Osteopenia, Hypopituitarism (H), Pituitary adenoma (H), Papillary thyroid carcinoma (H)       MUCUS RELIEF ADULT PO      Take 400 mg by mouth 2 times daily as needed    Papillary thyroid carcinoma (H), Postsurgical hypothyroidism, Malignant neoplasm of thyroid gland (H), Cancer of thyroid (H), 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

## 2017-09-18 NOTE — PROGRESS NOTES
Very complicated endocrine patient/ problem set.   1. Papillary thyroid cancer 4.8 cm right, bilateral node positive. He has been treated with total thyroidectomy, 131I x 2 (cummulative dose 346.4 mCi) His last 131I TBS (2008) post therapy scan raised question of ? lung mets and also ? met above left kidney. Cervical adenopathy has been treated in the past with ETOH .    I have long suspected he had lung mets, but we haven't proven this.   Follow up chest cT following the appt raises question of progressoin   Neck US following the appt shows ? New right level 6 and 7 masses.  Labs to include Tg    Cervical adenopathy has been treated in the past with ETOH .  Neck US following the appt shows progression of right level 6 and 7 LNs.     Persistent thyroglobulinemia has been rising/worse, suggesting progression of thyroid cancer in some location. Current status unknown.  Addendum: Tg result following appt was much lower than in the past.  This is surprising given that the 1/17 removed tissue was NOT read as malignancy.  We also have suggestion of progression on the right level 6/7 Lymph nodes that were previously ETOH treatment.      2.  2.6 x 1.4 cm substernal anterior mediastinal mass .  Presumably this has been removed.  It was benign thymic tissue    3.  Hypopituitary with hypogonadotropic hypogonadism, probable secondary hypothyroidism, and secondary adrenal insufficiency, probable GH deficiency.   He is clinically stable --On LT4 and HC only    4.  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 The tumor has been significantly de bulked and He has completed XRT for  persistent tumor-. Tumor mass is stable on  MRI through 12/16.      5.  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   The TSH is not fully reliable.  We can treat up to high normal free T4.      Low BMD. He continues on alendronate .  Last DXA 7/15 is stable.  He is on alendronate.  Next DXA in  2017    Hypogonadism has been untreated (Due to patient noncompliance with treatment recommendations) - not addressed     Lung nodules - CT following the appt shows progression of left lung lingular nodule from 4 mm to 1.7 cm. Radiologist recommended tissue sampling and/or PET-CT. Given the significant morbidity he has typically experienced with procedures, I am recommending PET scan.     ? Lytic bone lesions -stable on serial imaging -- not addressed     Corina Potts MD     Cc/ HPI: Mr Peres returns today, along with his wife and also .  I last saw him . He has a history of multiple complicated endocrine issues, including thyroid cancer, surgical hypothyroidism, osteoporosis, pituitary macroadenoma with partial hypopituitarism and history of DI.  At our last visits we were concerned about rising Tg and an anterior mediastinal mass which had been slowly increasing in size since  .  On 17 he was treated with trasnscervical resection of retrosternal mediastinal lesions, as a same day procedure.  A cystic lesion was removed and drained.  Surgical path later showed it was only benign thymic tissue.  He was readmitted 17 with unresponsiveness, elevated cardiac enzymes, and presumed sepsis. Further workd up led to diagnosis of adrenal insufficiency?  I have reviewed the in hospital records in detail.Cortisol was not measured. Hydrocortisone was given at 100 mg IV on 17, 50 mg IV every 6 hours on 17  and than usual maintenance dose starting 17    Mr Peres has a complicated endocrine history includin. Papillary thyroid cancer 4.8 cm left, bilateral node positive (MACIS 6.88 minimum, pT3, pN1a (8), pMx, Stage III or IV). His course has included several operations and several 131I treatments  07 thyroidectomy  153.4 mCi 131I in . The radioiodine  was complicated by acute sialadenitis.     193 mCi 131I (with Thyrogen stimulation). The post therapy scan showed  activity in the thyroid bed, lung base on the right and also superior left kidney region   11/3/09  right selective neck dissection levels 2, 3 and 4, removing many positive LNs.   4/16/14 FNAB of right level 6 and 7 cervical lymph nodes were  positive for papillary thyroid cancer. Needle wash Tg was also high on both samples (see below). He had  hoarseness within one hour after the FNAB procedure. He was treated with cymetra on 5/12/14 and he restarted thickened liquids.  He  6/3/14  ETOH ablation procedure of the  2 LNs      His tumor marker data are as shown:   1/27/07 TX   6/19/08: Tg 56, KALINA < 1, .8   131I 153.4 mCi   7/22/08: Tg 0.94, KALINA< 1, TSH 0.09   10/24/08: Tg 1.7, KALINA <1, TSH 0.02   12.17/08: Tg 12, KALINA < 1,    12/08: 193 mCi 131I   3/17/09: Tg 0.94, KALINA < 0.4, TSH 0.01   8/18/09: Tg 1.9, KALINA < 0.4, TSH < 0.01   11/3/09: Right selective neck dissection level 2-4   11/24/09: Tg 0.14, KALINA < 0.4, TSH < 0.01   4/13/10: Tg 0.14, KALINA < 0.4, TSH 0.06, free T4 0.99, 25 OH vitamin D 24, Ca 8.5,   phos 4.5, PTH 17   8/17/10: tG 0.17, kalina < 0.4, tsh < 0.01   4/27/11: Tg 0.52, KALINA < 0.4, TSH 0.02   8/7/12: Tg 2.8, KALINA < 0.4, TSH   4/16/13: Tg 5.1, KALINA < 0.4, TSH   7/22/13: Tg 6.6, KALINA < 0.4, TSh   LN left level 4 and level 6 (#2) FNAB 9/13-- both benign with undetectable needle wash Tg. The right shoulder lipomatous mass was confirmed to be mature lipoma.   1/20/14: Tg 16.7, KALINA < 0.4, TSH < 0.02, free T4 1.67   2/24/14: Tg 25.6, KALINA < 0.4 TSH   4/16/14 FNAB right level 6 and 7 cervical LN + for PCT; level 7 LN Tg 782 ng/ml, level 6 LN Tg 838 ng/ml.   5/6/14 Tg 4.6, KALINA < 0.4, TSH   ETOH treatment of LN level 6 and 7 right   7/14/14: Tg < 0.1, KALINA < 0.4, TSH < 0.02  10/21/14: Tg 0.11, KALINA < 0.4, TSH <0.01, free T4 1.68  7/7/15 Tg 0.51 ng/ml with KALINA < 0.4, TSH < 0.01  4/18/16: Tg 1.4, KALINA < 0.4  9/27/16: Tg 3.9, KALINA < 0.4, TSH < 0.01   12/9/16: Tg 6.4, KALINA < 0.4, TSH < 0.01, free T4 1.46  --   1/3/17:  Mediastinal mass removed - surgical pathology thymic tissue, no malignancy found  9/18/17: Tg 0.41, FRANK < 0.4, TSH     images on PACs  9/25/17 neck US:   Right level 6 # 1 1.1 x 0. 1.2 x 1 cm (was 0.9 x 1 x 1.2 3/26/14)  Right level 7 # 1  1.2 x 0.9 x 0.9 cm (was 1.1 x 0.8 x 1 3/14; ETOH ablation 6/3/14; was 0.9 x 0.6 x 0.6  Without blood flow 7/7/14; ws 0.7 x 0.4 x 0.4 9/2/14; was 0.4 x 0.4 x 0.6 10/2/14; was 0.5 x 0.4 x 0.5 6/16/15;   Left level 4 # 1 0.6  X 0.3 x 0.8 cm (was 0.6 x 0.4 x 0.8 cm  Left level 4 # 2 0.5 x 0.5 x 1 cm (was 0.6 x 0.6 x 0.9 cm)  Left level 5 # 1 1.1 x 0.6 x 1.3 cm (1 x 0.6 x 1.4 cm )  Left level 5 # 2 1 x 0.5 x 1.3 cm       2. 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 His pituitary surgery post -op course was complicated by hypopituitarism and DI (which is probably partial by behavior). He has been treated with XRT to the residual pituitary tumor.   After the 4/11 labs I had declared the DI resolved based on testing that I thought had taken place OFF DDAVP. Pituitary MRI is stable on 7/10/15 compared with 7/14 ,which was stable compared with earlier studies.  He as seen in neuroophthalmology 9/3/15 - they recommended repeat VF in 6-12 months    3. Osteoporosis. The last BMD was  7/10/15 which showed the lowest T- score -2.2 at the 33% radius. The BMD is stable compared with 2011.    Labs on Care everywhere  7/25/16: 141, K 4, Cl 105, Co2 27, glucose 95, Ca 8.8, creatinine 1.09,     ROS:  He feels a left neck lump - present x 1 year- intermittent  Cardiac: negative  Respiratory: HR faster with walking on stairs sometimes; he continues to swim but  less frequently  GI:   Numbness in the right leg -from mid calf down and including back of feet.  He notes this every day. He has had PT for this.  He had special boot for this.  I can see he is  regularly seeing chiropractor at [facility] for this - who has suggested he needs further evaluation.   No back pain    Personal Hx   Behavioral history: No tobacco use.   Home environment: No secondhand tobacco smoke in home.   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)      (ventriculoperitoneal) shunt status     Hydrocephalus     Noncompliance with medication regimen     Pneumonia     High serum thyroglobulin     anterior mediastinal mass 2.6 cm     Elevated troponin     PMH   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)      Hypovitaminosis D      Multiple pulmonary nodules      Osteopenia      Papillary thyroid carcinoma (H) 2007    4.8 cm, right, node positive;      Pituitary macroadenoma with extrasellar extension (H) 2007    causing obstructive hydrocephalus     Post-surgical hypothyroidism 2007     Uncomplicated asthma      Vocal cord paralysis     right     Xerostomia     due to radiation exposures     Past Surgical History:   Procedure Laterality Date     cymetra injection       ESOPHAGOSCOPY, GASTROSCOPY, DUODENOSCOPY (EGD), COMBINED  6/20/2013    Procedure: COMBINED ESOPHAGOSCOPY, GASTROSCOPY, DUODENOSCOPY (EGD), BIOPSY SINGLE OR MULTIPLE;  gastroscopy;  Surgeon: Leslie Guadarrama MD;  Location: [facility]     HERNIA REPAIR Right      lipoma resection chest wall Right 11/09     right selective neck dissection level 2, 3, 4  11/3/09     right  shunt placement  6/28/07     THYMECTOMY N/A 1/3/2017     Procedure: THYMECTOMY;  Surgeon: Ernesto Armstrong MD;  Location: [facility]     THYROIDECTOMY  11/27/07     TRANSCERVICAL EXTENDED MEDIASTINAL LYMPHADENECTOMY N/A 1/3/2017    Procedure: TRANSCERVICAL EXTENDED MEDIASTINAL LYMPHADENECTOMY;  Surgeon: Ernesto Armstrong MD;  Location: [facility] OR     transnasal endoscopic resection of pituitary adenoma  8/13/2007     Lipoma resection 7/27/16 -right shoulder     Current Outpatient Prescriptions   Medication Sig Dispense Refill     alendronate (FOSAMAX) 70 MG tablet Take 1 tablet (70 mg) by mouth every 7 days 16 tablet 3     ranibizumab (LUCENTIS) 0.5 MG/0.05ML SOLN 0.05 mLs (0.5 mg) by Intravitreal route every 28 days 0.05 mL 11     aflibercept (EYLEA) 2 MG/0.05ML SOLN injection 0.05 mLs (2 mg) by Intravitreal route every 28 days 0.05 mL 11     hydrocortisone (CORTEF) 10 MG tablet Take one tablet by mouth daily in the morning, 1/2 tablet at 2  tablet 3     ranitidine (ZANTAC) 300 MG tablet        COMBIVENT RESPIMAT  MCG/ACT inhaler INL 1 PUFF PO QID  1     diclofenac (VOLTAREN) 1 % GEL topical gel Place onto the skin 4 times daily       fish oil-omega-3 fatty acids 1000 MG capsule Take 2 g by mouth daily       fluticasone (FLONASE) 50 MCG/ACT spray Spray 2 sprays into both nostrils daily       ibuprofen (ADVIL/MOTRIN) 400 MG tablet Take 400-600 mg by mouth every 6 hours as needed for moderate pain        levothyroxine (SYNTHROID/LEVOTHROID) 137 MCG tablet Take 1 tablet (137 mcg) by mouth daily (Patient taking differently: Take 137 mcg by mouth every morning ) 120 tablet 3     GuaiFENesin (MUCUS RELIEF ADULT PO) Take 400 mg by mouth 2 times daily as needed        TAMSULOSIN HCL PO Take 0.4 mg by mouth At Bedtime        [DISCONTINUED] alendronate (FOSAMAX) 70 MG tablet Take 1 tablet (70 mg) by mouth every 7 days 16 tablet 3       Physical Exam   GENERAL: elderly man in NAD; His wife is present . He is very [place]--his wife yells in his ear every question I  "ask  /80  Pulse 65  Ht 1.651 m (5' 5\")  Wt 59.3 kg (130 lb 12.8 oz)  BMI 21.77 kg/m2  SKIN: normal color , normal temperature  HEENT: no scleral icterus;  NECK: palpable pea sized nodule left level 5  LUNGS: clear bilaterally  CARDIAC: RRR S1 S2  NEURO: Alert, responds appropriately to questions, moves all extremities no tremor   Gait raises question of right foot drop      DATA REVIEW:  Results for BUCK CUEVAS (MRN [id number]) as of 9/25/2017 20:53   Ref. Range 9/18/2017 15:09   Sodium Latest Ref Range: 133 - 144 mmol/L 136   Potassium Latest Ref Range: 3.4 - 5.3 mmol/L 3.9   Creatinine Latest Ref Range: 0.66 - 1.25 mg/dL 1.04   GFR Estimate Latest Ref Range: >60 mL/min/1.7m2 69   GFR Estimate If Black Latest Ref Range: >60 mL/min/1.7m2 84   T4 Free Latest Ref Range: 0.76 - 1.46 ng/dL 1.40   TSH Latest Ref Range: 0.40 - 4.00 mU/L <0.01 (L)       EXAM:  CT CHEST W/O CONTRAST . 9/25/2017 1:12 PM      TECHNIQUE:  Helical CT images from the thoracic inlet through the  upper abdomen were obtained without intravenous contrast.      COMPARISON: CT [facility] 1/4/2017     HISTORY:   Malignant neoplasm of thyroid gland, Postprocedural  hypothyroidism, Hypopituitarism, Benign neoplasm of pituitary gland      FINDINGS:  LUNGS: Increased size of spiculated 1.7 x 1.5 lingular nodule (series  4, image 228). Unchanged 1.5 x 1.3 cm partially calcified left upper  lobe nodule (series 4, image 160). Innumerable right greater than left  reticulonodular opacities in a primarily perivascular centrilobular  distribution are similar to prior exam to slightly less conspicuous in  the right upper lobe, although direct comparison is limited by motion  artifact on prior exam. Unchanged consolidation along the right minor  fissure which extends to the right hilum, with air bronchograms. The  central airway is patent. No pleural effusion or pneumothorax.      MEDIASTINUM: Heart is within normal limits. No pericardial " effusion.  Atherosclerotic plaque of the thoracic aorta and coronary vessels.  Unchanged calcified mediastinal and right hilar lymph nodes. Otherwise  subcentimeter lymph nodes are similar to prior. Soft tissue  attenuation in the anterior mediastinum is similar to prior, most  likely postsurgical from anterior mediastinal mass resection.  Partially visualized line running through the anterior right chest  wall soft tissues, presumably a  shunt.     UPPER ABDOMEN: Large fluid attenuating left superior pole renal cyst  is unchanged. Cholelithiasis without evidence of cholecystitis. Tiny  sliding-type hiatal hernia.     BONES/SOFT TISSUES: Unchanged scattered sclerotic foci in the ribs.  Anterior osteophytic changes of the thoracic vertebral bodies, which  normal size criteria for diffuse idiopathic skeletal hyperostosis  (DISH).         IMPRESSION:   1. Increased size of a 1.7 cm lingular nodule compared to 1/4/2017  where it measured 4 mm. This is very suspicious for metastatic  disease. Recommend tissue sampling and/or PET-CT.  2. Additional reticulonodular densities bilaterally are unchanged,  most likely represent sequelae of prior infectious or granulomatous  disease.     I have personally reviewed the examination and initial interpretation  and I agree with the findings.     MANA TUCKER MD    EXAMINATION: US HEAD NECK SOFT TISSUE, 9/25/2017 2:35 PM      COMPARISON: 4/18/2016     HISTORY: Thyroid cancer. Status post thyroidectomy in 2007 with many  positive lymph nodes.  Right selective neck dissection on 12/08.      FINDINGS:      Lymph nodes are measured bilaterally with measurements given in  craniocaudal, transverse and AP dimensions as follows:     Right:     Level 2:   0.8 x 0.6 x 1.8 cm, previously 1.0 x 0.5 x 1.8 cm  0.6 x 0.4 x 0.6 cm, previously 0.6 x 0.4 x 0.7 cm  Level 6:   1.1 x 1.2 x 1.0 cm, not well visualized but slightly rounded with a  fatty hilum.  Level 7:  1.2 x 0.9 x 0.9 cm slightly  rounded, slightly echogenic, with  increased vascularity     Left:     Level 2:  0.6 x 0.3 x 0.8 cm, previously 0.7 x 0.5 x 1.0 cm  0.5 x 0.5 x 1.0 cm, previously 1.0 x 0.7 x 1.7 cm  Level 4:   0.6 x 0.3 x 0.8 cm, unchanged  0.5 x 0.5 x 1.0 cm, unchanged.  Level 5:   1.1 x 2.6 x 1.3 cm, previously 1.1 x 0.6 x 1.4 cm  1.0 x 0.5 x 1.3 cm, not previously imaged            IMPRESSION:  1. New prominent lymph node in the right level 6 and 7 which have a  suspicious morphology, in particular the level 7 lymph node which has  increased vascularity. Given the patient's history of node positive  papillary thyroid cancer, these is concerning for metastases.  The  right level 7 lymph node labeled #1 would likely be amenable to  ultrasound-guided biopsy.  2. Additional new lymph node on the left level 5, with prominent  cortex compared to the fatty hilum.  Indeterminate.      I have personally reviewed the examination and initial interpretation  and I agree with the findings.     MICHAEL PACKER MD

## 2017-09-18 NOTE — LETTER
9/18/2017       RE: Travis Peres  [address] [facility] [id number]     Dear Colleague,    Thank you for referring your patient, Travis Peres, to the [facility] ENDOCRINOLOGY at [facility]. Please see a copy of my visit note below.    Very complicated endocrine patient/ problem set.   1. Papillary thyroid cancer 4.8 cm right, bilateral node positive. He has been treated with total thyroidectomy, 131I x 2 (cummulative dose 346.4 mCi) His last 131I TBS (2008) post therapy scan raised question of ? lung mets and also ? met above left kidney. Cervical adenopathy has been treated in the past with ETOH .    I have long suspected he had lung mets, but we haven't proven this.   Follow up chest cT following the appt raises question of progressoin   Neck US following the appt shows ? New right level 6 and 7 masses.  Labs to include Tg    Cervical adenopathy has been treated in the past with ETOH .  Neck US following the appt shows progression of right level 6 and 7 LNs.     Persistent thyroglobulinemia has been rising/worse, suggesting progression of thyroid cancer in some location. Current status unknown.  Addendum: Tg result following appt was much lower than in the past.  This is surprising given that the 1/17 removed tissue was NOT read as malignancy.  We also have suggestion of progression on the right level 6/7 Lymph nodes that were previously ETOH treatment.      2.  2.6 x 1.4 cm substernal anterior mediastinal mass .  Presumably this has been removed.  It was benign thymic tissue    3.  Hypopituitary with hypogonadotropic hypogonadism, probable secondary hypothyroidism, and secondary adrenal insufficiency, probable GH deficiency.   He is clinically stable --On LT4 and HC only    4.  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 The tumor has been significantly de bulked and He has completed XRT for  persistent tumor-. Tumor mass is stable  on  MRI through .      5.  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   The TSH is not fully reliable.  We can treat up to high normal free T4.      Low BMD. He continues on alendronate .  Last DXA 7/15 is stable.  He is on alendronate.  Next DXA in 2017    Hypogonadism has been untreated (Due to patient noncompliance with treatment recommendations) - not addressed     Lung nodules - CT following the appt shows progression of left lung lingular nodule from 4 mm to 1.7 cm. Radiologist recommended tissue sampling and/or PET-CT. Given the significant morbidity he has typically experienced with procedures, I am recommending PET scan.     ? Lytic bone lesions -stable on serial imaging -- not addressed     Corina Potts MD     Cc/ HPI: Mr Peres returns today, along with his wife and also .  I last saw him . He has a history of multiple complicated endocrine issues, including thyroid cancer, surgical hypothyroidism, osteoporosis, pituitary macroadenoma with partial hypopituitarism and history of DI.  At our last visits we were concerned about rising Tg and an anterior mediastinal mass which had been slowly increasing in size since  .  On 17 he was treated with trasnscervical resection of retrosternal mediastinal lesions, as a same day procedure.  A cystic lesion was removed and drained.  Surgical path later showed it was only benign thymic tissue.  He was readmitted 17 with unresponsiveness, elevated cardiac enzymes, and presumed sepsis. Further workd up led to diagnosis of adrenal insufficiency?  I have reviewed the in hospital records in detail.Cortisol was not measured. Hydrocortisone was given at 100 mg IV on 17, 50 mg IV every 6 hours on 17  and than usual maintenance dose starting 17    Mr Peres has a complicated endocrine history includin. Papillary thyroid cancer 4.8 cm left, bilateral node positive (MACIS 6.88 minimum, pT3, pN1a (8), pMx, Stage  III or IV). His course has included several operations and several 131I treatments  11/27/07 thyroidectomy  153.4 mCi 131I in 6/08. The radioiodine  was complicated by acute sialadenitis.   12/08  193 mCi 131I (with Thyrogen stimulation). The post therapy scan showed activity in the thyroid bed, lung base on the right and also superior left kidney region   11/3/09  right selective neck dissection levels 2, 3 and 4, removing many positive LNs.   4/16/14 FNAB of right level 6 and 7 cervical lymph nodes were  positive for papillary thyroid cancer. Needle wash Tg was also high on both samples (see below). He had  hoarseness within one hour after the FNAB procedure. He was treated with cymetra on 5/12/14 and he restarted thickened liquids.  He  6/3/14  ETOH ablation procedure of the  2 LNs      His tumor marker data are as shown:   1/27/07 TX   6/19/08: Tg 56, KALINA < 1, .8   131I 153.4 mCi   7/22/08: Tg 0.94, KALINA< 1, TSH 0.09   10/24/08: Tg 1.7, KALINA <1, TSH 0.02   12.17/08: Tg 12, KALINA < 1,    12/08: 193 mCi 131I   3/17/09: Tg 0.94, KALINA < 0.4, TSH 0.01   8/18/09: Tg 1.9, KALINA < 0.4, TSH < 0.01   11/3/09: Right selective neck dissection level 2-4   11/24/09: Tg 0.14, KALINA < 0.4, TSH < 0.01   4/13/10: Tg 0.14, KALINA < 0.4, TSH 0.06, free T4 0.99, 25 OH vitamin D 24, Ca 8.5,   phos 4.5, PTH 17   8/17/10: tG 0.17, kalina < 0.4, tsh < 0.01   4/27/11: Tg 0.52, KALINA < 0.4, TSH 0.02   8/7/12: Tg 2.8, KALINA < 0.4, TSH   4/16/13: Tg 5.1, KALINA < 0.4, TSH   7/22/13: Tg 6.6, KALINA < 0.4, TSh   LN left level 4 and level 6 (#2) FNAB 9/13-- both benign with undetectable needle wash Tg. The right shoulder lipomatous mass was confirmed to be mature lipoma.   1/20/14: Tg 16.7, KALINA < 0.4, TSH < 0.02, free T4 1.67   2/24/14: Tg 25.6, KALINA < 0.4 TSH   4/16/14 FNAB right level 6 and 7 cervical LN + for PCT; level 7 LN Tg 782 ng/ml, level 6 LN Tg 838 ng/ml.   5/6/14 Tg 4.6, KALINA < 0.4, TSH   ETOH treatment of LN level 6 and 7 right   7/14/14: Tg <  0.1, FRANK < 0.4, TSH < 0.02  10/21/14: Tg 0.11, FRANK < 0.4, TSH <0.01, free T4 1.68  7/7/15 Tg 0.51 ng/ml with FRANK < 0.4, TSH < 0.01  4/18/16: Tg 1.4, FRANK < 0.4  9/27/16: Tg 3.9, FRANK < 0.4, TSH < 0.01   12/9/16: Tg 6.4, FRANK < 0.4, TSH < 0.01, free T4 1.46  --   1/3/17: Mediastinal mass removed - surgical pathology thymic tissue, no malignancy found  9/18/17: Tg 0.41, FRANK < 0.4, TSH     images on PACs  9/25/17 neck US:   Right level 6 # 1 1.1 x 0. 1.2 x 1 cm (was 0.9 x 1 x 1.2 3/26/14)  Right level 7 # 1  1.2 x 0.9 x 0.9 cm (was 1.1 x 0.8 x 1 3/14; ETOH ablation 6/3/14; was 0.9 x 0.6 x 0.6  Without blood flow 7/7/14; ws 0.7 x 0.4 x 0.4 9/2/14; was 0.4 x 0.4 x 0.6 10/2/14; was 0.5 x 0.4 x 0.5 6/16/15;   Left level 4 # 1 0.6  X 0.3 x 0.8 cm (was 0.6 x 0.4 x 0.8 cm  Left level 4 # 2 0.5 x 0.5 x 1 cm (was 0.6 x 0.6 x 0.9 cm)  Left level 5 # 1 1.1 x 0.6 x 1.3 cm (1 x 0.6 x 1.4 cm )  Left level 5 # 2 1 x 0.5 x 1.3 cm       2. 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 His pituitary surgery post -op course was complicated by hypopituitarism and DI (which is probably partial by behavior). He has been treated with XRT to the residual pituitary tumor.   After the 4/11 labs I had declared the DI resolved based on testing that I thought had taken place OFF DDAVP. Pituitary MRI is stable on 7/10/15 compared with 7/14 ,which was stable compared with earlier studies.  He as seen in neuroophthalmology 9/3/15 - they recommended repeat VF in 6-12 months    3. Osteoporosis. The last BMD was  7/10/15 which showed the lowest T- score -2.2 at the 33% radius. The BMD is stable compared with 2011.    Labs on Care everywhere  7/25/16: 141, K 4, Cl 105, Co2 27, glucose 95, Ca 8.8, creatinine 1.09,     ROS:  He feels a left neck lump - present x 1 year- intermittent  Cardiac: negative  Respiratory: HR  faster with walking on stairs sometimes; he continues to swim but  less frequently  GI:   Numbness in the right leg -from mid calf down and including back of feet.  He notes this every day. He has had PT for this.  He had special boot for this.  I can see he is regularly seeing chiropractor at [facility] for this - who has suggested he needs further evaluation.   No back pain    Personal Hx   Behavioral history: No tobacco use.   Home environment: No secondhand tobacco smoke in home.   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)      (ventriculoperitoneal) shunt status     Hydrocephalus     Noncompliance with medication regimen     Pneumonia     High serum thyroglobulin     anterior mediastinal mass 2.6 cm     Elevated troponin     PMH   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)      Hypovitaminosis D      Multiple pulmonary nodules      Osteopenia      Papillary thyroid carcinoma (H) 2007    4.8 cm, right, node positive;      Pituitary macroadenoma with extrasellar extension (H) 2007    causing obstructive hydrocephalus     Post-surgical hypothyroidism 2007     Uncomplicated asthma      Vocal cord paralysis     right     Xerostomia     due to radiation exposures     Past Surgical History:   Procedure Laterality Date     cymetra injection       ESOPHAGOSCOPY, GASTROSCOPY, DUODENOSCOPY (EGD), COMBINED  6/20/2013    Procedure: COMBINED ESOPHAGOSCOPY, GASTROSCOPY, DUODENOSCOPY (EGD), BIOPSY SINGLE OR MULTIPLE;   gastroscopy;  Surgeon: Leslie Guadarrama MD;  Location: [facility]     HERNIA REPAIR Right      lipoma resection chest wall Right 11/09     right selective neck dissection level 2, 3, 4  11/3/09     right  shunt placement  6/28/07     THYMECTOMY N/A 1/3/2017    Procedure: THYMECTOMY;  Surgeon: Ernesto Armstrong MD;  Location: [facility] OR     THYROIDECTOMY  11/27/07     TRANSCERVICAL EXTENDED MEDIASTINAL LYMPHADENECTOMY N/A 1/3/2017    Procedure: TRANSCERVICAL EXTENDED MEDIASTINAL LYMPHADENECTOMY;  Surgeon: Ernesto Armstrong MD;  Location: [facility] OR     transnasal endoscopic resection of pituitary adenoma  8/13/2007     Lipoma resection 7/27/16 -right shoulder     Current Outpatient Prescriptions   Medication Sig Dispense Refill     alendronate (FOSAMAX) 70 MG tablet Take 1 tablet (70 mg) by mouth every 7 days 16 tablet 3     ranibizumab (LUCENTIS) 0.5 MG/0.05ML SOLN 0.05 mLs (0.5 mg) by Intravitreal route every 28 days 0.05 mL 11     aflibercept (EYLEA) 2 MG/0.05ML SOLN injection 0.05 mLs (2 mg) by Intravitreal route every 28 days 0.05 mL 11     hydrocortisone (CORTEF) 10 MG tablet Take one tablet by mouth daily in the morning, 1/2 tablet at 2  tablet 3     ranitidine (ZANTAC) 300 MG tablet        COMBIVENT RESPIMAT  MCG/ACT inhaler INL 1 PUFF PO QID  1     diclofenac (VOLTAREN) 1 % GEL topical gel Place onto the skin 4 times daily       fish oil-omega-3 fatty acids 1000 MG capsule Take 2 g by mouth daily       fluticasone (FLONASE) 50 MCG/ACT spray Spray 2 sprays into both nostrils daily       ibuprofen (ADVIL/MOTRIN) 400 MG tablet Take 400-600 mg by mouth every 6 hours as needed for moderate pain        levothyroxine (SYNTHROID/LEVOTHROID) 137 MCG tablet Take 1 tablet (137 mcg) by mouth daily (Patient taking differently: Take 137 mcg by mouth every morning ) 120 tablet 3     GuaiFENesin (MUCUS RELIEF ADULT PO) Take 400 mg by mouth 2 times daily as needed        TAMSULOSIN HCL PO Take 0.4 mg by  "mouth At Bedtime        [DISCONTINUED] alendronate (FOSAMAX) 70 MG tablet Take 1 tablet (70 mg) by mouth every 7 days 16 tablet 3       Physical Exam   GENERAL: elderly man in NAD; His wife is present . He is very [place]--his wife yells in his ear every question I ask  /80  Pulse 65  Ht 1.651 m (5' 5\")  Wt 59.3 kg (130 lb 12.8 oz)  BMI 21.77 kg/m2  SKIN: normal color , normal temperature  HEENT: no scleral icterus;  NECK: palpable pea sized nodule left level 5  LUNGS: clear bilaterally  CARDIAC: RRR S1 S2  NEURO: Alert, responds appropriately to questions, moves all extremities no tremor   Gait raises question of right foot drop      DATA REVIEW:  Results for BUCK CUEVAS (MRN [id number]) as of 9/25/2017 20:53   Ref. Range 9/18/2017 15:09   Sodium Latest Ref Range: 133 - 144 mmol/L 136   Potassium Latest Ref Range: 3.4 - 5.3 mmol/L 3.9   Creatinine Latest Ref Range: 0.66 - 1.25 mg/dL 1.04   GFR Estimate Latest Ref Range: >60 mL/min/1.7m2 69   GFR Estimate If Black Latest Ref Range: >60 mL/min/1.7m2 84   T4 Free Latest Ref Range: 0.76 - 1.46 ng/dL 1.40   TSH Latest Ref Range: 0.40 - 4.00 mU/L <0.01 (L)       EXAM:  CT CHEST W/O CONTRAST . 9/25/2017 1:12 PM      TECHNIQUE:  Helical CT images from the thoracic inlet through the  upper abdomen were obtained without intravenous contrast.      COMPARISON: CT CAP 1/4/2017     HISTORY:   Malignant neoplasm of thyroid gland, Postprocedural  hypothyroidism, Hypopituitarism, Benign neoplasm of pituitary gland      FINDINGS:  LUNGS: Increased size of spiculated 1.7 x 1.5 lingular nodule (series  4, image 228). Unchanged 1.5 x 1.3 cm partially calcified left upper  lobe nodule (series 4, image 160). Innumerable right greater than left  reticulonodular opacities in a primarily perivascular centrilobular  distribution are similar to prior exam to slightly less conspicuous in  the right upper lobe, although direct comparison is limited by motion  artifact on prior exam. " Unchanged consolidation along the right minor  fissure which extends to the right hilum, with air bronchograms. The  central airway is patent. No pleural effusion or pneumothorax.      MEDIASTINUM: Heart is within normal limits. No pericardial effusion.  Atherosclerotic plaque of the thoracic aorta and coronary vessels.  Unchanged calcified mediastinal and right hilar lymph nodes. Otherwise  subcentimeter lymph nodes are similar to prior. Soft tissue  attenuation in the anterior mediastinum is similar to prior, most  likely postsurgical from anterior mediastinal mass resection.  Partially visualized line running through the anterior right chest  wall soft tissues, presumably a  shunt.     UPPER ABDOMEN: Large fluid attenuating left superior pole renal cyst  is unchanged. Cholelithiasis without evidence of cholecystitis. Tiny  sliding-type hiatal hernia.     BONES/SOFT TISSUES: Unchanged scattered sclerotic foci in the ribs.  Anterior osteophytic changes of the thoracic vertebral bodies, which  normal size criteria for diffuse idiopathic skeletal hyperostosis  (DISH).         IMPRESSION:   1. Increased size of a 1.7 cm lingular nodule compared to 1/4/2017  where it measured 4 mm. This is very suspicious for metastatic  disease. Recommend tissue sampling and/or PET-CT.  2. Additional reticulonodular densities bilaterally are unchanged,  most likely represent sequelae of prior infectious or granulomatous  disease.     I have personally reviewed the examination and initial interpretation  and I agree with the findings.     MANA TUCKER MD    EXAMINATION: US HEAD NECK SOFT TISSUE, 9/25/2017 2:35 PM      COMPARISON: 4/18/2016     HISTORY: Thyroid cancer. Status post thyroidectomy in 2007 with many  positive lymph nodes.  Right selective neck dissection on 12/08.      FINDINGS:      Lymph nodes are measured bilaterally with measurements given in  craniocaudal, transverse and AP dimensions as  follows:     Right:     Level 2:   0.8 x 0.6 x 1.8 cm, previously 1.0 x 0.5 x 1.8 cm  0.6 x 0.4 x 0.6 cm, previously 0.6 x 0.4 x 0.7 cm  Level 6:   1.1 x 1.2 x 1.0 cm, not well visualized but slightly rounded with a  fatty hilum.  Level 7:  1.2 x 0.9 x 0.9 cm slightly rounded, slightly echogenic, with  increased vascularity     Left:     Level 2:  0.6 x 0.3 x 0.8 cm, previously 0.7 x 0.5 x 1.0 cm  0.5 x 0.5 x 1.0 cm, previously 1.0 x 0.7 x 1.7 cm  Level 4:   0.6 x 0.3 x 0.8 cm, unchanged  0.5 x 0.5 x 1.0 cm, unchanged.  Level 5:   1.1 x 2.6 x 1.3 cm, previously 1.1 x 0.6 x 1.4 cm  1.0 x 0.5 x 1.3 cm, not previously imaged            IMPRESSION:  1. New prominent lymph node in the right level 6 and 7 which have a  suspicious morphology, in particular the level 7 lymph node which has  increased vascularity. Given the patient's history of node positive  papillary thyroid cancer, these is concerning for metastases.  The  right level 7 lymph node labeled #1 would likely be amenable to  ultrasound-guided biopsy.  2. Additional new lymph node on the left level 5, with prominent  cortex compared to the fatty hilum.  Indeterminate.      I have personally reviewed the examination and initial interpretation  and I agree with the findings.     MICHAEL PACKER MD

## 2017-09-27 ENCOUNTER — OFFICE VISIT (OUTPATIENT)
Dept: OPHTHALMOLOGY | Facility: CLINIC | Age: 77
End: 2017-09-27
Attending: OPHTHALMOLOGY
Payer: MEDICARE

## 2017-09-27 DIAGNOSIS — H35.052 CHOROIDAL NEOVASCULAR MEMBRANE, LEFT: Primary | ICD-10-CM

## 2017-09-27 PROCEDURE — 25000128 H RX IP 250 OP 636: Mod: ZF | Performed by: OPHTHALMOLOGY

## 2017-09-27 PROCEDURE — 40000269 ZZH STATISTIC NO CHARGE FACILITY FEE: Mod: ZF

## 2017-09-27 PROCEDURE — 67028 INJECTION EYE DRUG: CPT | Mod: ZF | Performed by: OPHTHALMOLOGY

## 2017-09-27 RX ADMIN — RANIBIZUMAB 0.5 MG: 10 INJECTION, SOLUTION INTRAVITREAL at 10:44

## 2017-09-27 ASSESSMENT — VISUAL ACUITY
OD_CC: 20/40
OD_PH_CC: 20/30-2
METHOD: SNELLEN - LINEAR
OS_CC: 20/150 ECC

## 2017-09-27 ASSESSMENT — REFRACTION_WEARINGRX
OD_ADD: +3.00
OS_AXIS: 005
OD_SPHERE: +3.75
OS_ADD: +3.00
SPECS_TYPE: BIFOCAL
OD_CYLINDER: +0.75
OS_SPHERE: +1.00
OS_CYLINDER: +0.50
OD_AXIS: 115

## 2017-09-27 ASSESSMENT — TONOMETRY
IOP_METHOD: ICARE
OD_IOP_MMHG: 14
OS_IOP_MMHG: 17

## 2017-09-27 NOTE — PROGRESS NOTES
CC: blurry vision  HPI: No significant changes in vision, no flashes and floaters   Plan for Injection only today (2 out of 3 series)  Follow up in 7 weeks  Post-op injection instructions given to the patient   ~~~~~~~~~~~~~~~~~~~~~~~~~~~~~~~~~~   Complete documentation of historical and exam elements from today's encounter can be found in the full encounter summary report (not reduplicated in this progress note).  I personally obtained the chief complaint(s) and history of present illness.  I confirmed and edited as necessary the review of systems, past medical/surgical history, family history, social history, and examination findings as documented by others; and I examined the patient myself.  I personally reviewed the relevant tests, images, and reports as documented above.  I formulated and edited as necessary the assessment and plan and discussed the findings and management plan with the patient and family and I was present for the entire procedure performed by the resident/fellow.    Nadiya Allen MD  .  Retina Service   Department of Ophthalmology and Visual Neurosciences   [facility]  Phone: [phone]   Fax: [phone]

## 2017-09-27 NOTE — NURSING NOTE
Chief Complaints and History of Present Illnesses   Patient presents with     Macular Degeneration Follow Up     inj only      HPI    Affected eye(s):  Both   Symptoms:     Blurred vision   No decreased vision   Distorted vision   No flashes         Do you have eye pain now?:  No      Comments:  VA seems better. Leaving for [place] 11/4 and coming back in April. Pt wondering why VA in RE is now better than LE, used to be LE was better    Zina URIARTE September 27, 2017 10:15 AM

## 2017-09-27 NOTE — MR AVS SNAPSHOT
After Visit Summary   2017    Travis Peres    MRN: [id number]           Patient Information     Date Of Birth          1940        Visit Information        Provider Department      2017 9:45 AM Nadiya Allen MD; [facility]        Today's Diagnoses     Choroidal neovascular membrane, left    -  1       Follow-ups after your visit        Follow-up notes from your care team     Return in about 7 weeks (around 11/15/2017) for OCT.      Your next 10 appointments already scheduled     2017 10:30 AM CDT   RETURN RETINA with Nadiya Allen MD   Eye Clinic ([facility])    [address]  [address] [facility]  [address]  [facility] [address] [phone]              Who to contact     Please call your clinic at [phone] to:    Ask questions about your health    Make or cancel appointments    Discuss your medicines    Learn about your test results    Speak to your doctor   If you have compliments or concerns about an experience at your clinic, or if you wish to file a complaint, please contact [facility] Patient Relations at [phone] or email us at [email or web address]         Additional Information About Your Visit        MyChart Information     [facility] is an electronic gateway that provides easy, online access to your medical records. With DocbookMD, you can request a clinic appointment, read your test results, renew a prescription or communicate with your care team.     To sign up for eGamest visit the website at [email or web address]   You will be asked to enter the access code listed below, as well as some personal information. Please follow the directions to create your username and password.     Your access code is: [email or web address]  Expires: 12/3/2017  6:30 AM     Your access code will  in 90 days. If you need help or a new code, please contact your  [facility] or call [phone] for assistance.        Care EveryWhere ID     This is your Care EveryWhere ID. This could be used by other organizations to access your [place] medical records  [id number]         Blood Pressure from Last 3 Encounters:   09/18/17 163/80   02/02/17 135/73   01/18/17 160/89    Weight from Last 3 Encounters:   09/18/17 59.3 kg (130 lb 12.8 oz)   02/02/17 61.2 kg (135 lb)   01/18/17 61.2 kg (134 lb 14.4 oz)              We Performed the Following     Lucentis (Ranibizumab) 0.3 mg Intravitreal Injection OS (left eye)          Today's Medication Changes          These changes are accurate as of: 9/27/17 12:45 PM.  If you have any questions, ask your nurse or doctor.               These medicines have changed or have updated prescriptions.        Dose/Directions    levothyroxine 137 MCG tablet   Commonly known as:  SYNTHROID/LEVOTHROID   This may have changed:  when to take this   Used for:  Postsurgical hypothyroidism, Osteopenia, Hypopituitarism (H), Pituitary adenoma (H), Papillary thyroid carcinoma (H)        Dose:  137 mcg   Take 1 tablet (137 mcg) by mouth daily   Quantity:  120 tablet   Refills:  3                Primary Care Provider Office Phone # Fax #    Karol FayMagana [phone]       [address] [address]        Equal Access to Services     LAYLA LUCAS AH: Hadii jake leeo Sokristal, waaxda luqadaha, qaybta kaalmada carlos manuelegyada, roxanne mitchell. So [facility] [place] [facility] [phone].    ATENCIÓN: Si habla español, tiene a reese disposición servicios gratuitos de asistencia lingüística. Karunaame al [phone].    We comply with applicable federal civil rights laws and [place] laws. We do not discriminate on the basis of race, color, national origin, age, disability sex, sexual orientation or gender identity.            Thank you!     Thank you for choosing EYE CLINIC  for your care.  Our goal is always to provide you with excellent care. Hearing back from our patients is one way we can continue to improve our services. Please take a few minutes to complete the written survey that you may receive in the mail after your visit with us. Thank you!             Your Updated Medication List - Protect others around you: Learn how to safely use, store and throw away your medicines at www.disposemymeds.org.          This list is accurate as of: 9/27/17 12:45 PM.  Always use your most recent med list.                   Brand Name Dispense Instructions for use Diagnosis    aflibercept 2 MG/0.05ML Soln injection    EYLEA    0.05 mL    0.05 mLs (2 mg) by Intravitreal route every 28 days    CNVM (choroidal neovascular membrane), left       alendronate 70 MG tablet    FOSAMAX    16 tablet    Take 1 tablet (70 mg) by mouth every 7 days    Postsurgical hypothyroidism, Papillary thyroid carcinoma (H), Pituitary adenoma (H), Hypopituitarism (H)       COMBIVENT RESPIMAT  MCG/ACT inhaler   Generic drug:  Ipratropium-Albuterol      INL 1 PUFF PO QID        fish oil-omega-3 fatty acids 1000 MG capsule      Take 2 g by mouth daily        fluticasone 50 MCG/ACT spray    FLONASE     Spray 2 sprays into both nostrils daily        hydrocortisone 10 MG tablet    CORTEF    180 tablet    Take one tablet by mouth daily in the morning, 1/2 tablet at 2 PM    Postsurgical hypothyroidism, Hypopituitarism (H), Osteopenia, Pituitary adenoma (H), Papillary thyroid carcinoma (H)       ibuprofen 400 MG tablet    ADVIL/MOTRIN     Take 400-600 mg by mouth every 6 hours as needed for moderate pain        levothyroxine 137 MCG tablet    SYNTHROID/LEVOTHROID    120 tablet    Take 1 tablet (137 mcg) by mouth daily    Postsurgical hypothyroidism, Osteopenia, Hypopituitarism (H), Pituitary adenoma (H), Papillary thyroid carcinoma (H)       MUCUS RELIEF ADULT PO      Take 400 mg by mouth 2 times daily as needed    Papillary thyroid  carcinoma (H), Postsurgical hypothyroidism, Malignant neoplasm of thyroid gland (H), Cancer of thyroid (H), 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

## 2017-09-29 LAB — LAB SCANNED RESULT: NORMAL

## 2017-10-10 ENCOUNTER — TELEPHONE (OUTPATIENT)
Dept: ENDOCRINOLOGY | Facility: CLINIC | Age: 77
End: 2017-10-10

## 2017-10-10 NOTE — TELEPHONE ENCOUNTER
I have attempted to reach Travis twice now. Yesterday his wife answered  but then laid the phone  down and 5 minutes later nobody came to the phone so I disconnected with the . Today  I tried and there was no answer on the  home  phone  so  the  left a detailed message on DocbookMD   with the information and to expect a call from someone to schedule the PET scan. My number was l;eft also if  they have  questions. Someone will call to set up the PET scan  for when he can see Dr Potts a few days after to discuss results.

## 2017-10-10 NOTE — TELEPHONE ENCOUNTER
----- Message from Love Vela RN sent at 10/9/2017  8:59 AM CDT -----  Regarding: FW:  call      ----- Message -----     From: Corina Potts MD     Sent: 10/9/2017   7:49 AM       To: [facility] Endo Triage-  Subject:  call                                 Please call him (and his wife; he Is very Hard of hearing  so this will be difficult not only because of the language but also because he can't hear).  Tell him.  1.  A left  lung nodule has gotten larger. From 0.4 cm to 1.7 cm .   I recommend we get a PET scan to look at it a different way. I have placed order for the PET scan . Schedule him to see me in follow up a few days after the PET scan is done.    2. The neck ultrasound showed that the 2 right sided lymph nodes we treated in the past with alcohol are larger again. We may need to address this as well but first we need to focus on the lung.  3.  The thyroglobulin was only 0.41, which is much lower than before, better, which is a surprise given the imaging findings.      Corina Potts

## 2017-11-02 ENCOUNTER — OFFICE VISIT (OUTPATIENT)
Dept: OPHTHALMOLOGY | Facility: CLINIC | Age: 77
End: 2017-11-02
Attending: OPHTHALMOLOGY
Payer: MEDICARE

## 2017-11-02 DIAGNOSIS — H35.3290 EXUDATIVE SENILE MACULAR DEGENERATION OF RETINA (H): ICD-10-CM

## 2017-11-02 DIAGNOSIS — H35.052 CHOROIDAL NEOVASCULAR MEMBRANE, LEFT: Primary | ICD-10-CM

## 2017-11-02 PROCEDURE — 25000128 H RX IP 250 OP 636: Mod: ZF | Performed by: OPHTHALMOLOGY

## 2017-11-02 PROCEDURE — 40000269 ZZH STATISTIC NO CHARGE FACILITY FEE: Mod: ZF

## 2017-11-02 PROCEDURE — 92134 CPTRZ OPH DX IMG PST SGM RTA: CPT | Mod: ZF | Performed by: OPHTHALMOLOGY

## 2017-11-02 PROCEDURE — T1013 SIGN LANG/ORAL INTERPRETER: HCPCS | Mod: U3,ZF

## 2017-11-02 PROCEDURE — 67028 INJECTION EYE DRUG: CPT | Mod: ZF | Performed by: OPHTHALMOLOGY

## 2017-11-02 RX ADMIN — RANIBIZUMAB 0.5 MG: 10 INJECTION, SOLUTION INTRAVITREAL at 12:24

## 2017-11-02 ASSESSMENT — EXTERNAL EXAM - RIGHT EYE: OD_EXAM: NORMAL

## 2017-11-02 ASSESSMENT — TONOMETRY
IOP_METHOD: TONOPEN
OS_IOP_MMHG: 10
OD_IOP_MMHG: 13

## 2017-11-02 ASSESSMENT — VISUAL ACUITY
OS_CC: 20/125-
OD_PH_CC: 20/40
CORRECTION_TYPE: GLASSES
METHOD: SNELLEN - LINEAR
OD_CC: 20/50-2+2

## 2017-11-02 ASSESSMENT — CUP TO DISC RATIO
OS_RATIO: 0.4
OD_RATIO: 0.5

## 2017-11-02 ASSESSMENT — REFRACTION_WEARINGRX
OS_AXIS: 005
OD_AXIS: 115
OD_SPHERE: +3.75
OS_SPHERE: +1.00
OS_CYLINDER: +0.50
OS_ADD: +3.00
OD_ADD: +3.00
SPECS_TYPE: BIFOCAL
OD_CYLINDER: +0.75

## 2017-11-02 ASSESSMENT — SLIT LAMP EXAM - LIDS
COMMENTS: NORMAL
COMMENTS: NORMAL

## 2017-11-02 ASSESSMENT — EXTERNAL EXAM - LEFT EYE: OS_EXAM: NORMAL

## 2017-11-02 NOTE — PROGRESS NOTES
CC -  Wet AMD  HPI -   Vision stable since last visit.   Travis Peres is a  76 year old year-old patient presenting for referral for wet AMD OS from Dr. NADIYA Alford.  Noted vision loss OS for past few months leading up to August.  S/p Avastin injections x 4 left eye, last 12/6/16.    H/o brain tumor seen by NADIYA Alford with VFD  OK with resident injections    PAST OCULAR HISTORY  No surgeries    RETINAL IMAGING:  OCT 11.2.17  right eye-  Few small drusen   left eye - large FVPED, likely subretinal CNVM, mild IRF; stable    FA (10.7.16)  OD: normal  OS: (transit) early hyperfluorescence with late leakage c/w classic CNVM temporal to fovea with diffuse late leakage superior to fovea c/w occult CNVM    ICG (10.7.16)  OD: normal  OS: early hyperfluorescence with late leakage temporal to the fovea, likely CNVM net, no polyps     ASSESSMENT & PLAN  1. 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 Recommend to continue with lucentis injectios Q7-8 weeks left eye    -  R/B/A to intravitreal injection previously d/w patient at length who wishes to proceed.   - follow up here when returning from [place]     2.  Dry Age related macular degeneration right eye  AREDS supplementation is recommended.  Weekly Amsler Grid use was discussed and training performed.  A diet of leafy green vegetables was encouraged.  Return precautions were given.    3. PVD OU   - RT/RD precautions    4.  NS OU   - likely visually significant - especially given little change in OCT, but declining acuity   - PAMs to 20/60 OS   - consider cataract surgery OS in near future  5.  Brain tumor  History of pituitary adenoma status-post resection.  R homonimous hemianopsia   - sees NADIYA Alford   - VFD OD > OS per  family    RTC: when returning from [place]    ~~~~~~~~~~~~~~~~~~~~~~~~~~~~~~~~~~   Complete documentation of historical and exam elements from today's encounter can be found in the full encounter summary report (not reduplicated in this progress note).  I personally obtained the chief complaint(s) and history of present illness.  I confirmed and edited as necessary the review of systems, past medical/surgical history, family history, social history, and examination findings as documented by others; and I examined the patient myself.  I personally reviewed the relevant tests, images, and reports as documented above.  I formulated and edited as necessary the assessment and plan and discussed the findings and management plan with the patient and family and I was present for the entire procedure performed by the resident/fellow.    Nadiya Allen MD  .  Retina Service   Department of Ophthalmology and Visual Neurosciences   [facility]  Phone: [phone]   Fax: [phone]

## 2017-11-02 NOTE — MR AVS SNAPSHOT
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      Your next 10 appointments already scheduled     Apr 09, 2018  2:20 PM CDT   (Arrive by 2:05 PM)   RETURN ENDOCRINE with Corina Potts MD   [facility] ([facility])    [address]  [address] [facility] [id number] [phone]            Apr 18, 2018  9:00 AM CDT   RETURN RETINA with Nadiya Allen MD   Eye Clinic ([facility])    [address]  [address] [place] [address] [phone]            Apr 18, 2018 12:45 PM CDT   NEW GENERAL with Danni Mccall MD   Eye Clinic ([facility])    [address]  [address] [address] [phone]              Who to contact     Please call your clinic at [phone] to:    Ask questions about your health    Make or cancel appointments    Discuss your medicines    Learn about your test results    Speak to your doctor   If you have compliments or concerns about an experience at your clinic, or if you wish to file a complaint, please contact [facility] Patient Relations at [phone] or email us at [email or web address]         Additional Information About Your Visit        MyDream Interactivehart Information     "Mosec, Mobile Secretary" is an electronic gateway that provides easy, online access to your medical records. With "Mosec, Mobile Secretary", you can request a clinic  appointment, read your test results, renew a prescription or communicate with your care team.     To sign up for Marketo Japant visit the website at [email or web address]   You will be asked to enter the access code listed below, as well as some personal information. Please follow the directions to create your username and password.     Your access code is: [email or web address]  Expires: 12/3/2017  6:30 AM     Your access code will  in 90 days. If you need help or a new code, please contact your [facility] or call [phone] for assistance.        Care EveryWhere ID     This is your Care EveryWhere ID. This could be used by other organizations to access your [place] medical records  [id number]         Blood Pressure from Last 3 Encounters:   17 163/80   17 135/73   17 160/89    Weight from Last 3 Encounters:   17 59.3 kg (130 lb 12.8 oz)   17 61.2 kg (135 lb)   17 61.2 kg (134 lb 14.4 oz)              We Performed the Following     Lucentis (Ranibizumab) 0.5MG Intravitreal Injection OS (left eye)     OCT Retina Spectralis OU (both eyes)          Today's Medication Changes          These changes are accurate as of: 17 12:32 PM.  If you have any questions, ask your nurse or doctor.               These medicines have changed or have updated prescriptions.        Dose/Directions    levothyroxine 137 MCG tablet   Commonly known as:  SYNTHROID/LEVOTHROID   This may have changed:  when to take this   Used for:  Postsurgical hypothyroidism, Osteopenia, Hypopituitarism (H), Pituitary adenoma (H), Papillary thyroid carcinoma (H)        Dose:  137 mcg   Take 1 tablet (137 mcg) by mouth daily   Quantity:  120 tablet   Refills:  3                Primary Care Provider Office Phone # Fax #    Karol Kim [phone]       [address] [address]        Equal Access to Services     LAYLA PANCHAL: Sesar  jake Rodriguez, wadianelysda phiadaha, qaybta kasandra cooper, waxfarhana idiin hayfroyrama malinkennrosana smith stephen. So [facility] [phone].    ATENCIÓN: Si barbara abel, tiene a reese disposición servicios gratuitos de asistencia lingüística. Tiffanie al [phone].    We comply with applicable federal civil rights laws and [place] laws. We do not discriminate on the basis of race, color, national origin, age, disability, sex, sexual orientation, or gender identity.            Thank you!     Thank you for choosing EYE CLINIC  for your care. Our goal is always to provide you with excellent care. Hearing back from our patients is one way we can continue to improve our services. Please take a few minutes to complete the written survey that you may receive in the mail after your visit with us. Thank you!             Your Updated Medication List - Protect others around you: Learn how to safely use, store and throw away your medicines at www.disposemymeds.org.          This list is accurate as of: 11/2/17 12:32 PM.  Always use your most recent med list.                   Brand Name Dispense Instructions for use Diagnosis    aflibercept 2 MG/0.05ML Soln injection    EYLEA    0.05 mL    0.05 mLs (2 mg) by Intravitreal route every 28 days    CNVM (choroidal neovascular membrane), left       alendronate 70 MG tablet    FOSAMAX    16 tablet    Take 1 tablet (70 mg) by mouth every 7 days    Postsurgical hypothyroidism, Papillary thyroid carcinoma (H), Pituitary adenoma (H), Hypopituitarism (H)       COMBIVENT RESPIMAT  MCG/ACT inhaler   Generic drug:  Ipratropium-Albuterol      INL 1 PUFF PO QID        fish oil-omega-3 fatty acids 1000 MG capsule      Take 2 g by mouth daily        fluticasone 50 MCG/ACT spray    FLONASE     Spray 2 sprays into both nostrils daily        hydrocortisone 10 MG tablet    CORTEF    180 tablet    Take one tablet by mouth daily in the morning, 1/2 tablet at 2 PM    Postsurgical hypothyroidism,  Hypopituitarism (H), Osteopenia, Pituitary adenoma (H), Papillary thyroid carcinoma (H)       ibuprofen 400 MG tablet    ADVIL/MOTRIN     Take 400-600 mg by mouth every 6 hours as needed for moderate pain        levothyroxine 137 MCG tablet    SYNTHROID/LEVOTHROID    120 tablet    Take 1 tablet (137 mcg) by mouth daily    Postsurgical hypothyroidism, Osteopenia, Hypopituitarism (H), Pituitary adenoma (H), Papillary thyroid carcinoma (H)       MUCUS RELIEF ADULT PO      Take 400 mg by mouth 2 times daily as needed    Papillary thyroid carcinoma (H), Postsurgical hypothyroidism, Malignant neoplasm of thyroid gland (H), Cancer of thyroid (H), 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

## 2017-11-02 NOTE — NURSING NOTE
Chief Complaints and History of Present Illnesses   Patient presents with     Follow Up For     CNVM OS     HPI    Affected eye(s):  Both   Symptoms:     Blurred vision   No decreased vision   No floaters   No flashes         Do you have eye pain now?:  No      Comments:  No noticeable VA changes.     Zina URIARTE November 2, 2017 10:57 AM

## 2018-04-09 ENCOUNTER — OFFICE VISIT (OUTPATIENT)
Dept: ENDOCRINOLOGY | Facility: CLINIC | Age: 78
End: 2018-04-09
Payer: COMMERCIAL

## 2018-04-09 VITALS
HEIGHT: 65 IN | SYSTOLIC BLOOD PRESSURE: 135 MMHG | HEART RATE: 65 BPM | BODY MASS INDEX: 22.73 KG/M2 | DIASTOLIC BLOOD PRESSURE: 67 MMHG | WEIGHT: 136.4 LBS

## 2018-04-09 DIAGNOSIS — E89.0 POSTSURGICAL HYPOTHYROIDISM: ICD-10-CM

## 2018-04-09 DIAGNOSIS — C73 PAPILLARY THYROID CARCINOMA (H): Primary | ICD-10-CM

## 2018-04-09 DIAGNOSIS — Z60.3 LANGUAGE BARRIER AFFECTING HEALTH CARE: ICD-10-CM

## 2018-04-09 DIAGNOSIS — D35.2 PITUITARY ADENOMA (H): ICD-10-CM

## 2018-04-09 DIAGNOSIS — C73 PAPILLARY THYROID CARCINOMA (H): ICD-10-CM

## 2018-04-09 DIAGNOSIS — Z75.8 LANGUAGE BARRIER AFFECTING HEALTH CARE: ICD-10-CM

## 2018-04-09 LAB
POTASSIUM SERPL-SCNC: 4.4 MMOL/L (ref 3.4–5.3)
SODIUM SERPL-SCNC: 139 MMOL/L (ref 133–144)
T4 FREE SERPL-MCNC: 1.52 NG/DL (ref 0.76–1.46)
TSH SERPL DL<=0.005 MIU/L-ACNC: <0.01 MU/L (ref 0.4–4)

## 2018-04-09 RX ORDER — TRAZODONE HYDROCHLORIDE 100 MG/1
TABLET ORAL
Refills: 0 | COMMUNITY
Start: 2017-11-01 | End: 2019-10-15 | Stop reason: ALTCHOICE

## 2018-04-09 RX ORDER — CARBOXYMETHYLCELLULOSE SODIUM 5 MG/ML
1 SOLUTION/ DROPS OPHTHALMIC 3 TIMES DAILY PRN
COMMUNITY
End: 2019-08-31

## 2018-04-09 RX ORDER — AMLODIPINE BESYLATE 2.5 MG/1
TABLET ORAL
Refills: 4 | COMMUNITY
Start: 2017-10-28 | End: 2019-08-31

## 2018-04-09 RX ORDER — ZOLPIDEM TARTRATE 10 MG/1
10 TABLET ORAL DAILY
COMMUNITY
Start: 2017-09-26 | End: 2019-10-15 | Stop reason: ALTCHOICE

## 2018-04-09 SDOH — SOCIAL STABILITY - SOCIAL INSECURITY: ACCULTURATION DIFFICULTY: Z60.3

## 2018-04-09 ASSESSMENT — PAIN SCALES - GENERAL: PAINLEVEL: NO PAIN (0)

## 2018-04-09 NOTE — PATIENT INSTRUCTIONS
To expedite your medication refill(s), please contact your pharmacy and have them fax a refill request to: [phone].  *Please allow 3 business days for routine medication refills.  *Please allow 5 business days for controlled substance medication refills.  --------------------  For scheduling appointments (including lab work), please request an appointment through [employer], or call: [phone].    For questions for your provider or the endocrine nurse, please send a Excorda message.  For after-hours urgent issues, please dial [phone], and ask to speak with the Endocrinologist On-Call.  --------------------  Please Note: If you are active on [employer], all future test results will be sent by [employer] message only and will no longer be sent by mail. You may also receive communication directly from your physician.

## 2018-04-09 NOTE — LETTER
4/9/2018       RE: Travis Peres  [address]  [facility] [place] [id number]     Dear Colleague,    Thank you for referring your patient, Travis Peres, to the [facility] ENDOCRINOLOGY at [facility]. Please see a copy of my visit note below.    Very complicated endocrine patient/ problem set.   1. Papillary thyroid cancer 4.8 cm right, bilateral node positive. He has been treated with total thyroidectomy, 131I x 2 (cummulative dose 346.4 mCi) His last 131I TBS (2008) post therapy scan raised question of ? lung mets and also ? met above left kidney. Cervical adenopathy has been treated in the past with ETOH .    I have long suspected he had lung mets, but we haven't proven this. Chest CT 9/17 was worse.  Discussed  PET  Labs to include Tg    Cervical adenopathy has been treated in the past with ETOH .  Neck US 9/17 shows progression of right level 6 and 7 LNs. Plan as per #1    Persistent thyroglobulinemia has been rising/worse, suggesting progression of thyroid cancer in some location. Current status unknown.  Labs today     Lung nodules - 9/17 CT  shows progression of left lung lingular nodule from 4 mm to 1.7 cm. Radiologist recommended tissue sampling and/or PET-CT. Given the significant morbidity he has typically experienced with procedures, I am recommending PET scan. I ordered it and we tried to schedule it 6 months ago but it hasn't been done.  Discussed today.      2.6 x 1.4 cm substernal anterior mediastinal mass .  Presumably this has been removed.  It was benign thymic tissue    Hypopituitary with hypogonadotropic hypogonadism, probable secondary hypothyroidism, and secondary adrenal insufficiency, probable GH deficiency.   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 The tumor has been significantly de bulked and He has completed XRT for  persistent tumor-. Tumor mass is stable on  MRI  through .      5.  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   The TSH is not fully reliable.  We can treat up to high normal free T4.      Low BMD. He continues on alendronate .  Last DXA 7/15 is stable.  He is on alendronate.  Next DXA in 2017    Hypogonadism has been untreated (Due to patient noncompliance with treatment recommendations) - not addressed     Lytic bone lesions -stable on serial imaging -- not addressed    Language barrier, extremely [place] - the combination makes communication very ineffective. This is adversely affecting his health care.     Greater than 50% of 40 minute appt on counseling and coordination of care .     Corina Potts MD     Cc/ HPI: Mr Peers returns today, along with his wife and also .  I last saw him  . He has a history of multiple complicated endocrine issues, including thyroid cancer, surgical hypothyroidism, osteoporosis, pituitary macroadenoma with partial hypopituitarism and history of DI.      Mr Peres has a complicated endocrine history includin. Papillary thyroid cancer 4.8 cm left, bilateral node positive (MACIS 6.88 minimum, pT3, pN1a (8), pMx, Stage III or IV). His course has included several operations and several 131I treatments  07 thyroidectomy  153.4 mCi 131I in . The radioiodine  was complicated by acute sialadenitis.     193 mCi 131I (with Thyrogen stimulation). The post therapy scan showed activity in the thyroid bed, lung base on the right and also superior left kidney region   11/3/09  right selective neck dissection levels 2, 3 and 4, removing many positive LNs.   14 FNAB of right level 6 and 7 cervical lymph nodes were  positive for papillary thyroid cancer. Needle wash Tg was also high on both samples (see below). He had  hoarseness within one hour after the FNAB procedure. He was treated with cymetra on 14 and he restarted thickened liquids.    6/3/14  ETOH ablation procedure of the  2  LNs    1/31/17  trasnscervical resection of retrosternal mediastinal lesions.  A cystic lesion was removed and drained.  Surgical path  benign thymic tissue.    His tumor marker data are as shown:   1/27/07 TX   6/19/08: Tg 56, KALINA < 1, .8   131I 153.4 mCi   7/22/08: Tg 0.94, KALINA< 1, TSH 0.09   10/24/08: Tg 1.7, KALINA <1, TSH 0.02   12.17/08: Tg 12, KALINA < 1,    12/08: 193 mCi 131I   3/17/09: Tg 0.94, KALINA < 0.4, TSH 0.01   8/18/09: Tg 1.9, KALINA < 0.4, TSH < 0.01   11/3/09: Right selective neck dissection level 2-4   11/24/09: Tg 0.14, KALINA < 0.4, TSH < 0.01   4/13/10: Tg 0.14, KALINA < 0.4, TSH 0.06, free T4 0.99, 25 OH vitamin D 24, Ca 8.5,   phos 4.5, PTH 17   8/17/10: tG 0.17, kalina < 0.4, tsh < 0.01   4/27/11: Tg 0.52, KALINA < 0.4, TSH 0.02   8/7/12: Tg 2.8, KALINA < 0.4, TSH   4/16/13: Tg 5.1, KALINA < 0.4, TSH   7/22/13: Tg 6.6, KALINA < 0.4, TSh   LN left level 4 and level 6 (#2) FNAB 9/13-- both benign with undetectable needle wash Tg. The right shoulder lipomatous mass was confirmed to be mature lipoma.   1/20/14: Tg 16.7, KALINA < 0.4, TSH < 0.02, free T4 1.67   2/24/14: Tg 25.6, KALINA < 0.4 TSH   4/16/14 FNAB right level 6 and 7 cervical LN + for PCT; level 7 LN Tg 782 ng/ml, level 6 LN Tg 838 ng/ml.   5/6/14 Tg 4.6, KALINA < 0.4, TSH   ETOH treatment of LN level 6 and 7 right   7/14/14: Tg < 0.1, KALINA < 0.4, TSH < 0.02  10/21/14: Tg 0.11, KALINA < 0.4, TSH <0.01, free T4 1.68  7/7/15 Tg 0.51 ng/ml with KALINA < 0.4, TSH < 0.01  4/18/16: Tg 1.4, KALINA < 0.4  9/27/16: Tg 3.9, KALINA < 0.4, TSH < 0.01   12/9/16: Tg 6.4, KALINA < 0.4, TSH < 0.01, free T4 1.46  --   1/3/17: Mediastinal mass removed - surgical pathology thymic tissue, no malignancy found  9/18/17: Tg 0.41, KALINA < 0.4, TSH     images on PACs  9/25/17 neck US:   Right level 6 # 1 1.1 x 0. 1.2 x 1 cm (was 0.9 x 1 x 1.2 3/26/14)  Right level 7 # 1  1.2 x 0.9 x 0.9 cm (was 1.1 x 0.8 x 1 3/14; ETOH ablation 6/3/14; was 0.9 x 0.6 x 0.6  Without blood flow 7/7/14; ws 0.7 x 0.4 x  0.4 9/2/14; was 0.4 x 0.4 x 0.6 10/2/14; was 0.5 x 0.4 x 0.5 6/16/15;   Left level 4 # 1 0.6  X 0.3 x 0.8 cm (was 0.6 x 0.4 x 0.8 cm  Left level 4 # 2 0.5 x 0.5 x 1 cm (was 0.6 x 0.6 x 0.9 cm)  Left level 5 # 1 1.1 x 0.6 x 1.3 cm (1 x 0.6 x 1.4 cm )  Left level 5 # 2 1 x 0.5 x 1.3 cm       2. 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 His pituitary surgery post -op course was complicated by hypopituitarism and DI (which is probably partial by behavior). He has been treated with XRT to the residual pituitary tumor.   After the 4/11 labs I had declared the DI resolved based on testing that I thought had taken place OFF DDAVP. Pituitary MRI is stable on 7/10/15 compared with 7/14 ,which was stable compared with earlier studies.  He as seen in neuroophthalmology 9/3/15 - they recommended repeat VF in 6-12 months    3. Osteoporosis. The last BMD was  7/10/15 which showed the lowest T- score -2.2 at the 33% radius. The BMD is stable compared with 2011.    Labs on Care everywhere  7/25/16: 141, K 4, Cl 105, Co2 27, glucose 95, Ca 8.8, creatinine 1.09,     ROS:  Reduced function of 5 senses  Hearing is worse  Vision is the same  Swallow is OK  Mouth very thirsty, can't sleep at night due to it.  Cardiac: negatve  Respiratory: OK; can't smell or taste; teeth decaying.    Sometimes headaches.     Personal Hx   Behavioral history: No tobacco use.   Home environment: No secondhand tobacco smoke in home.   ; returned from [place] about one month ago; was there x 3 months.     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), wet (H)     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     Language barrier affecting health care     PMH   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)      Hypovitaminosis D      Multiple pulmonary nodules      Osteopenia      Papillary thyroid carcinoma (H) 2007    4.8 cm, right, node positive;      Pituitary macroadenoma with extrasellar extension (H) 2007    causing obstructive hydrocephalus     Post-surgical hypothyroidism 2007     Uncomplicated asthma      Vocal cord paralysis     right     Xerostomia     due to radiation exposures     Past Surgical History:   Procedure Laterality Date     cymetra injection       ESOPHAGOSCOPY, GASTROSCOPY, DUODENOSCOPY (EGD), COMBINED  6/20/2013    Procedure: COMBINED ESOPHAGOSCOPY, GASTROSCOPY, DUODENOSCOPY (EGD), BIOPSY SINGLE OR MULTIPLE;  gastroscopy;  Surgeon: Leslie Guadarrama MD;  Location: [facility]     HERNIA REPAIR Right      lipoma resection chest wall Right 11/09     right selective neck dissection level 2, 3, 4  11/3/09     right  shunt placement  6/28/07     THYMECTOMY N/A 1/3/2017    Procedure: THYMECTOMY;  Surgeon: Ernesto Armstrong MD;  Location: [facility]     THYROIDECTOMY  11/27/07     TRANSCERVICAL EXTENDED MEDIASTINAL LYMPHADENECTOMY N/A 1/3/2017    Procedure: TRANSCERVICAL EXTENDED MEDIASTINAL LYMPHADENECTOMY;  Surgeon: Ernesto Armstrong MD;  Location: [facility] OR     transnasal endoscopic resection of pituitary adenoma  8/13/2007     Lipoma resection 7/27/16 -right shoulder  "    Current Outpatient Prescriptions   Medication Sig Dispense Refill     zolpidem (AMBIEN) 10 MG tablet Take 10 mg by mouth       traZODone (DESYREL) 100 MG tablet take 1 tab of 50 mg daily  0     Carboxymethylcellulose Sod PF (REFRESH PLUS) 0.5 % SOLN ophthalmic solution Place 1 drop into both eyes 3 times daily as needed for dry eyes       amLODIPine (NORVASC) 2.5 MG tablet TK 1 T PO QD  4     alendronate (FOSAMAX) 70 MG tablet Take 1 tablet (70 mg) by mouth every 7 days 16 tablet 3     ranibizumab (LUCENTIS) 0.5 MG/0.05ML SOLN 0.05 mLs (0.5 mg) by Intravitreal route every 28 days 0.05 mL 11     aflibercept (EYLEA) 2 MG/0.05ML SOLN injection 0.05 mLs (2 mg) by Intravitreal route every 28 days 0.05 mL 11     hydrocortisone (CORTEF) 10 MG tablet Take one tablet by mouth daily in the morning, 1/2 tablet at 2  tablet 3     ranitidine (ZANTAC) 300 MG tablet        COMBIVENT RESPIMAT  MCG/ACT inhaler INL 1 PUFF PO QID  1     fish oil-omega-3 fatty acids 1000 MG capsule Take 2 g by mouth daily       ibuprofen (ADVIL/MOTRIN) 400 MG tablet Take 400-600 mg by mouth every 6 hours as needed for moderate pain        levothyroxine (SYNTHROID/LEVOTHROID) 137 MCG tablet Take 1 tablet (137 mcg) by mouth daily (Patient taking differently: Take 137 mcg by mouth every morning ) 120 tablet 3     TAMSULOSIN HCL PO Take 0.4 mg by mouth At Bedtime        diclofenac (VOLTAREN) 1 % GEL topical gel Place onto the skin 4 times daily       fluticasone (FLONASE) 50 MCG/ACT spray Spray 2 sprays into both nostrils daily       GuaiFENesin (MUCUS RELIEF ADULT PO) Take 400 mg by mouth 2 times daily as needed        He confirms he is taking LT4, HC,    Physical Exam   GENERAL: elderly man in NAD; His wife is present . He is very [place]--his wife yells in his ear   /67  Pulse 65  Ht 1.651 m (5' 5\")  Wt 61.9 kg (136 lb 6.4 oz)  BMI 22.7 kg/m2  SKIN: normal color , normal temperature  HEENT: no scleral icterus;  LUNGS: clear " bilaterally  CARDIAC: RRR S1 S2  NEURO: Alert, responds appropriately to questions, moves all extremities no tremor     DATA REVIEW:  Results for BUCK CUEVAS (MRN [id number]) as of 9/25/2017 20:53   Ref. Range 9/18/2017 15:09   Sodium Latest Ref Range: 133 - 144 mmol/L 136   Potassium Latest Ref Range: 3.4 - 5.3 mmol/L 3.9   Creatinine Latest Ref Range: 0.66 - 1.25 mg/dL 1.04   GFR Estimate Latest Ref Range: >60 mL/min/1.7m2 69   GFR Estimate If Black Latest Ref Range: >60 mL/min/1.7m2 84   T4 Free Latest Ref Range: 0.76 - 1.46 ng/dL 1.40   TSH Latest Ref Range: 0.40 - 4.00 mU/L <0.01 (L)     EXAM:  CT CHEST W/O CONTRAST . 9/25/2017 1:12 PM      TECHNIQUE:  Helical CT images from the thoracic inlet through the  upper abdomen were obtained without intravenous contrast.      COMPARISON: CT [facility] 1/4/2017     HISTORY:   Malignant neoplasm of thyroid gland, Postprocedural  hypothyroidism, Hypopituitarism, Benign neoplasm of pituitary gland      FINDINGS:  LUNGS: Increased size of spiculated 1.7 x 1.5 lingular nodule (series  4, image 228). Unchanged 1.5 x 1.3 cm partially calcified left upper  lobe nodule (series 4, image 160). Innumerable right greater than left  reticulonodular opacities in a primarily perivascular centrilobular  distribution are similar to prior exam to slightly less conspicuous in  the right upper lobe, although direct comparison is limited by motion  artifact on prior exam. Unchanged consolidation along the right minor  fissure which extends to the right hilum, with air bronchograms. The  central airway is patent. No pleural effusion or pneumothorax.      MEDIASTINUM: Heart is within normal limits. No pericardial effusion.  Atherosclerotic plaque of the thoracic aorta and coronary vessels.  Unchanged calcified mediastinal and right hilar lymph nodes. Otherwise  subcentimeter lymph nodes are similar to prior. Soft tissue  attenuation in the anterior mediastinum is similar to prior, most  likely  postsurgical from anterior mediastinal mass resection.  Partially visualized line running through the anterior right chest  wall soft tissues, presumably a  shunt.     UPPER ABDOMEN: Large fluid attenuating left superior pole renal cyst  is unchanged. Cholelithiasis without evidence of cholecystitis. Tiny  sliding-type hiatal hernia.     BONES/SOFT TISSUES: Unchanged scattered sclerotic foci in the ribs.  Anterior osteophytic changes of the thoracic vertebral bodies, which  normal size criteria for diffuse idiopathic skeletal hyperostosis  (DISH).         IMPRESSION:   1. Increased size of a 1.7 cm lingular nodule compared to 1/4/2017  where it measured 4 mm. This is very suspicious for metastatic  disease. Recommend tissue sampling and/or PET-CT.  2. Additional reticulonodular densities bilaterally are unchanged,  most likely represent sequelae of prior infectious or granulomatous  disease.     I have personally reviewed the examination and initial interpretation  and I agree with the findings.     MANA TUCKER MD    EXAMINATION: US HEAD NECK SOFT TISSUE, 9/25/2017 2:35 PM      COMPARISON: 4/18/2016     HISTORY: Thyroid cancer. Status post thyroidectomy in 2007 with many  positive lymph nodes.  Right selective neck dissection on 12/08.      FINDINGS:      Lymph nodes are measured bilaterally with measurements given in  craniocaudal, transverse and AP dimensions as follows:     Right:     Level 2:   0.8 x 0.6 x 1.8 cm, previously 1.0 x 0.5 x 1.8 cm  0.6 x 0.4 x 0.6 cm, previously 0.6 x 0.4 x 0.7 cm  Level 6:   1.1 x 1.2 x 1.0 cm, not well visualized but slightly rounded with a  fatty hilum.  Level 7:  1.2 x 0.9 x 0.9 cm slightly rounded, slightly echogenic, with  increased vascularity     Left:     Level 2:  0.6 x 0.3 x 0.8 cm, previously 0.7 x 0.5 x 1.0 cm  0.5 x 0.5 x 1.0 cm, previously 1.0 x 0.7 x 1.7 cm  Level 4:   0.6 x 0.3 x 0.8 cm, unchanged  0.5 x 0.5 x 1.0 cm, unchanged.  Level 5:   1.1 x 2.6 x 1.3 cm,  previously 1.1 x 0.6 x 1.4 cm  1.0 x 0.5 x 1.3 cm, not previously imaged            IMPRESSION:  1. New prominent lymph node in the right level 6 and 7 which have a  suspicious morphology, in particular the level 7 lymph node which has  increased vascularity. Given the patient's history of node positive  papillary thyroid cancer, these is concerning for metastases.  The  right level 7 lymph node labeled #1 would likely be amenable to  ultrasound-guided biopsy.  2. Additional new lymph node on the left level 5, with prominent  cortex compared to the fatty hilum.  Indeterminate.      I have personally reviewed the examination and initial interpretation  and I agree with the findings.     MICHAEL PACKER MD

## 2018-04-09 NOTE — MR AVS SNAPSHOT
After Visit Summary   4/9/2018    Travis Peres    MRN: [id number]           Patient Information     Date Of Birth          1940        Visit Information        Provider Department      4/9/2018 2:05 PM Corina Potts MD; LANGUAGE [facility] Endocrinology        Today's Diagnoses     Papillary thyroid carcinoma (HCC)    -  1    Postsurgical hypothyroidism        Pituitary adenoma (H)          Care Instructions    To expedite your medication refill(s), please contact your pharmacy and have them fax a refill request to: [phone].  *Please allow 3 business days for routine medication refills.  *Please allow 5 business days for controlled substance medication refills.  --------------------  For scheduling appointments (including lab work), please request an appointment through [employer], or call: [phone].    For questions for your provider or the endocrine nurse, please send a Amind message.  For after-hours urgent issues, please dial [phone], and ask to speak with the Endocrinologist On-Call.  --------------------  Please Note: If you are active on [employer]ind, all future test results will be sent by Amind message only and will no longer be sent by mail. You may also receive communication directly from your physician.            Follow-ups after your visit        Your next 10 appointments already scheduled     Apr 09, 2018  3:45 PM CDT   LAB with [facility] ([facility])    [address] [place] [id number] [phone]           Please do not eat 10-12 hours before your appointment if you are coming in fasting for labs on lipids, cholesterol, or glucose (sugar). This does not apply to pregnant women. Water, hot tea and black coffee (with nothing added) are okay. Do not drink other fluids, diet soda or chew gum.            Apr 12, 2018  2:15 PM CDT   (Arrive by 2:00 PM)   PE NPET ONCOLOGY (EYES TO THIGHS) with UUPET1    [facility] PET [facility] ([facility])    [address] [facility] [address]   [phone]           Tell your doctor:   If there is any chance you may be pregnant or if you are breastfeeding.   If you have problems lying in small spaces (claustrophobia). If you do, your doctor may give you medicine to help you relax. If you have diabetes:   Have your exam early in the morning. Your blood glucose will go up as the day goes by.   Your glucose level must be 180 or less at the start of the exam. Please take any medicines you need to ensure this blood glucose level. 24 hours before your scan: Don t do any heavy exercise. (No jogging, aerobics or other workouts.) Exercise will make your pictures less accurate. 6 hours before your scan:   Stop all food and liquids (except water).   Do not chew gum or suck on mints.   If you need to take medicine with food, you may take it with a few crackers.  Please call your Imaging Department at your exam site with any questions.            Apr 18, 2018  9:00 AM CDT   RETURN RETINA with Nadiya Allen MD   Eye Clinic ([facility])    [address] [facility] [address] [place] [address]   [phone]            Apr 18, 2018 12:45 PM CDT   NEW GENERAL with Danni Mccall MD   Eye Clinic ([facility])    [address] [address]   [phone]              Future tests that were ordered for you today     Open Future Orders        Priority Expected Expires Ordered    Potassium Routine  4/8/2019 4/8/2018    Sodium Routine  4/8/2019 4/8/2018    TSH Routine  4/8/2019 4/8/2018    T4 free Routine  4/8/2019 4/8/2018    Thyroglobulin (Total, antibody & recovery %) Routine  4/8/2019 4/8/2018            Who to contact     Please call your clinic at [phone] to:    Ask questions about your health    Make or cancel  "appointments    Discuss your medicines    Learn about your test results    Speak to your doctor            Additional Information About Your Visit        MyChart Information     [facility] is an electronic gateway that provides easy, online access to your medical records. With [facility], you can request a clinic appointment, read your test results, renew a prescription or communicate with your care team.     To sign up for [facility] visit the website at [email or web address]   You will be asked to enter the access code listed below, as well as some personal information. Please follow the directions to create your username and password.     Your access code is: [email or web address]  Expires: 6/3/2018 10:21 PM     Your access code will  in 90 days. If you need help or a new code, please contact your [facility] or call [phone] for assistance.        Care EveryWhere ID     This is your Care EveryWhere ID. This could be used by other organizations to access your [place] medical records  [id number]        Your Vitals Were     Pulse Height BMI (Body Mass Index)             65 1.651 m (5' 5\") 22.7 kg/m2          Blood Pressure from Last 3 Encounters:   18 135/67   17 163/80   17 135/73    Weight from Last 3 Encounters:   18 61.9 kg (136 lb 6.4 oz)   17 59.3 kg (130 lb 12.8 oz)   17 61.2 kg (135 lb)                 Today's Medication Changes          These changes are accurate as of 18  3:07 PM.  If you have any questions, ask your nurse or doctor.               These medicines have changed or have updated prescriptions.        Dose/Directions    levothyroxine 137 MCG tablet   Commonly known as:  SYNTHROID/LEVOTHROID   This may have changed:  when to take this   Used for:  Postsurgical hypothyroidism, Osteopenia, Hypopituitarism (H), Pituitary adenoma (H), Papillary thyroid carcinoma (H)        Dose:  137 mcg   Take 1 tablet (137 mcg) by mouth daily "   Quantity:  120 tablet   Refills:  3                Primary Care Provider Office Phone # Fax #    Karol Alvarez [phone]       [address] [address]        Equal Access to Services     LAYLA LUCAS : Sesar jake mondragon rosao Soomaali, waaxda luqadaha, qaybta kaalmada adeegyada, roxanne martitain hayaarama horowitzrobyn duncan la'froyrama mitchell. So [facility] [phone].    ATENCIÓN: Si habla español, tiene a reese disposición servicios gratuitos de asistencia lingüística. Llame al [phone].    We comply with applicable federal civil rights laws and [place] laws. We do not discriminate on the basis of race, color, national origin, age, disability, sex, sexual orientation, or gender identity.            Thank you!     Thank you for choosing [facility] ENDOCRINOLOGY  for your care. Our goal is always to provide you with excellent care. Hearing back from our patients is one way we can continue to improve our services. Please take a few minutes to complete the written survey that you may receive in the mail after your visit with us. Thank you!             Your Updated Medication List - Protect others around you: Learn how to safely use, store and throw away your medicines at www.disposemymeds.org.          This list is accurate as of 4/9/18  3:07 PM.  Always use your most recent med list.                   Brand Name Dispense Instructions for use Diagnosis    aflibercept 2 MG/0.05ML Soln injection    EYLEA    0.05 mL    0.05 mLs (2 mg) by Intravitreal route every 28 days    CNVM (choroidal neovascular membrane), left       alendronate 70 MG tablet    FOSAMAX    16 tablet    Take 1 tablet (70 mg) by mouth every 7 days    Postsurgical hypothyroidism, Papillary thyroid carcinoma (H), Pituitary adenoma (H), Hypopituitarism (H)       amLODIPine 2.5 MG tablet    NORVASC     TK 1 T PO QD        Carboxymethylcellulose Sod PF 0.5 % Soln ophthalmic solution    REFRESH PLUS     Place 1 drop into both  eyes 3 times daily as needed for dry eyes        COMBIVENT RESPIMAT  MCG/ACT inhaler   Generic drug:  Ipratropium-Albuterol      INL 1 PUFF PO QID        fish oil-omega-3 fatty acids 1000 MG capsule      Take 2 g by mouth daily        fluticasone 50 MCG/ACT spray    FLONASE     Spray 2 sprays into both nostrils daily        hydrocortisone 10 MG tablet    CORTEF    180 tablet    Take one tablet by mouth daily in the morning, 1/2 tablet at 2 PM    Postsurgical hypothyroidism, Hypopituitarism (H), Osteopenia, Pituitary adenoma (H), Papillary thyroid carcinoma (H)       ibuprofen 400 MG tablet    ADVIL/MOTRIN     Take 400-600 mg by mouth every 6 hours as needed for moderate pain        levothyroxine 137 MCG tablet    SYNTHROID/LEVOTHROID    120 tablet    Take 1 tablet (137 mcg) by mouth daily    Postsurgical hypothyroidism, Osteopenia, Hypopituitarism (H), Pituitary adenoma (H), Papillary thyroid carcinoma (H)       MUCUS RELIEF ADULT PO      Take 400 mg by mouth 2 times daily as needed    Papillary thyroid carcinoma (H), Postsurgical hypothyroidism, Malignant neoplasm of thyroid gland (H), Cancer of thyroid (H), 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), Osteopenia, Hypopituitarism (H), Pituitary adenoma (H), Noncompliance with medication regimen       ranibizumab 0.5 MG/0.05ML Soln    LUCENTIS    0.05 mL    0.05 mLs (0.5 mg) by Intravitreal route every 28 days    CNVM (choroidal neovascular membrane), left       ranitidine 300 MG tablet    ZANTAC          TAMSULOSIN HCL PO      Take 0.4 mg by mouth At Bedtime        traZODone 100 MG tablet    DESYREL     take 1 tab of 50 mg daily        VOLTAREN 1 % Gel topical gel   Generic drug:  diclofenac      Place onto the skin 4 times daily        zolpidem 10 MG tablet    AMBIEN     Take 10 mg by mouth

## 2018-04-09 NOTE — PROGRESS NOTES
Very complicated endocrine patient/ problem set.   1. Papillary thyroid cancer 4.8 cm right, bilateral node positive. He has been treated with total thyroidectomy, 131I x 2 (cummulative dose 346.4 mCi) His last 131I TBS (2008) post therapy scan raised question of ? lung mets and also ? met above left kidney. Cervical adenopathy has been treated in the past with ETOH .    I have long suspected he had lung mets, but we haven't proven this. Chest CT 9/17 was worse.  Discussed  PET  Labs to include Tg    Cervical adenopathy has been treated in the past with ETOH .  Neck US 9/17 shows progression of right level 6 and 7 LNs. Plan as per #1    Persistent thyroglobulinemia has been rising/worse, suggesting progression of thyroid cancer in some location. Current status unknown.  Labs today     Lung nodules - 9/17 CT  shows progression of left lung lingular nodule from 4 mm to 1.7 cm. Radiologist recommended tissue sampling and/or PET-CT. Given the significant morbidity he has typically experienced with procedures, I am recommending PET scan. I ordered it and we tried to schedule it 6 months ago but it hasn't been done.  Discussed today.      2.6 x 1.4 cm substernal anterior mediastinal mass .  Presumably this has been removed.  It was benign thymic tissue    Hypopituitary with hypogonadotropic hypogonadism, probable secondary hypothyroidism, and secondary adrenal insufficiency, probable GH deficiency.   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 The tumor has been significantly de bulked and He has completed XRT for  persistent tumor-. Tumor mass is stable on  MRI through 12/16.      5.  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   The TSH is not fully reliable.  We can treat up to high normal free T4.      Low BMD. He continues on alendronate .  Last DXA 7/15 is stable.  He is on alendronate.  Next DXA in  2017    Hypogonadism has been untreated (Due to patient noncompliance with treatment recommendations) - not addressed     Lytic bone lesions -stable on serial imaging -- not addressed    Language barrier, extremely [place] - the combination makes communication very ineffective. This is adversely affecting his health care.     Greater than 50% of 40 minute appt on counseling and coordination of care .     Corina Potts MD     Cc/ HPI: Mr Peres returns today, along with his wife and also .  I last saw him  . He has a history of multiple complicated endocrine issues, including thyroid cancer, surgical hypothyroidism, osteoporosis, pituitary macroadenoma with partial hypopituitarism and history of DI.      Mr Peres has a complicated endocrine history includin. Papillary thyroid cancer 4.8 cm left, bilateral node positive (MACIS 6.88 minimum, pT3, pN1a (8), pMx, Stage III or IV). His course has included several operations and several 131I treatments  07 thyroidectomy  153.4 mCi 131I in . The radioiodine  was complicated by acute sialadenitis.     193 mCi 131I (with Thyrogen stimulation). The post therapy scan showed activity in the thyroid bed, lung base on the right and also superior left kidney region   11/3/09  right selective neck dissection levels 2, 3 and 4, removing many positive LNs.   14 FNAB of right level 6 and 7 cervical lymph nodes were  positive for papillary thyroid cancer. Needle wash Tg was also high on both samples (see below). He had  hoarseness within one hour after the FNAB procedure. He was treated with cymetra on 14 and he restarted thickened liquids.    6/3/14  ETOH ablation procedure of the  2 LNs    17  trasnscervical resection of retrosternal mediastinal lesions.  A cystic lesion was removed and drained.  Surgical path  benign thymic tissue.    His tumor marker data are as shown:   07 TX   08: Tg 56, FRANK < 1, .8   131I 153.4 mCi    7/22/08: Tg 0.94, KALINA< 1, TSH 0.09   10/24/08: Tg 1.7, KALINA <1, TSH 0.02   12.17/08: Tg 12, KALINA < 1,    12/08: 193 mCi 131I   3/17/09: Tg 0.94, KALINA < 0.4, TSH 0.01   8/18/09: Tg 1.9, KALINA < 0.4, TSH < 0.01   11/3/09: Right selective neck dissection level 2-4   11/24/09: Tg 0.14, KALINA < 0.4, TSH < 0.01   4/13/10: Tg 0.14, KALINA < 0.4, TSH 0.06, free T4 0.99, 25 OH vitamin D 24, Ca 8.5,   phos 4.5, PTH 17   8/17/10: tG 0.17, kalina < 0.4, tsh < 0.01   4/27/11: Tg 0.52, KALINA < 0.4, TSH 0.02   8/7/12: Tg 2.8, KALINA < 0.4, TSH   4/16/13: Tg 5.1, KALINA < 0.4, TSH   7/22/13: Tg 6.6, KALINA < 0.4, TSh   LN left level 4 and level 6 (#2) FNAB 9/13-- both benign with undetectable needle wash Tg. The right shoulder lipomatous mass was confirmed to be mature lipoma.   1/20/14: Tg 16.7, KALINA < 0.4, TSH < 0.02, free T4 1.67   2/24/14: Tg 25.6, KALINA < 0.4 TSH   4/16/14 FNAB right level 6 and 7 cervical LN + for PCT; level 7 LN Tg 782 ng/ml, level 6 LN Tg 838 ng/ml.   5/6/14 Tg 4.6, KALINA < 0.4, TSH   ETOH treatment of LN level 6 and 7 right   7/14/14: Tg < 0.1, KALINA < 0.4, TSH < 0.02  10/21/14: Tg 0.11, KALINA < 0.4, TSH <0.01, free T4 1.68  7/7/15 Tg 0.51 ng/ml with KALINA < 0.4, TSH < 0.01  4/18/16: Tg 1.4, KALINA < 0.4  9/27/16: Tg 3.9, KALINA < 0.4, TSH < 0.01   12/9/16: Tg 6.4, KALINA < 0.4, TSH < 0.01, free T4 1.46  --   1/3/17: Mediastinal mass removed - surgical pathology thymic tissue, no malignancy found  9/18/17: Tg 0.41, KALINA < 0.4, TSH     images on PACs  9/25/17 neck US:   Right level 6 # 1 1.1 x 0. 1.2 x 1 cm (was 0.9 x 1 x 1.2 3/26/14)  Right level 7 # 1  1.2 x 0.9 x 0.9 cm (was 1.1 x 0.8 x 1 3/14; ETOH ablation 6/3/14; was 0.9 x 0.6 x 0.6  Without blood flow 7/7/14; ws 0.7 x 0.4 x 0.4 9/2/14; was 0.4 x 0.4 x 0.6 10/2/14; was 0.5 x 0.4 x 0.5 6/16/15;   Left level 4 # 1 0.6  X 0.3 x 0.8 cm (was 0.6 x 0.4 x 0.8 cm  Left level 4 # 2 0.5 x 0.5 x 1 cm (was 0.6 x 0.6 x 0.9 cm)  Left level 5 # 1 1.1 x 0.6 x 1.3 cm (1 x 0.6 x 1.4 cm )  Left level 5 #  2 1 x 0.5 x 1.3 cm       2. 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 His pituitary surgery post -op course was complicated by hypopituitarism and DI (which is probably partial by behavior). He has been treated with XRT to the residual pituitary tumor.   After the 4/11 labs I had declared the DI resolved based on testing that I thought had taken place OFF DDAVP. Pituitary MRI is stable on 7/10/15 compared with 7/14 ,which was stable compared with earlier studies.  He as seen in neuroophthalmology 9/3/15 - they recommended repeat VF in 6-12 months    3. Osteoporosis. The last BMD was  7/10/15 which showed the lowest T- score -2.2 at the 33% radius. The BMD is stable compared with 2011.    Labs on Care everywhere  7/25/16: 141, K 4, Cl 105, Co2 27, glucose 95, Ca 8.8, creatinine 1.09,     ROS:  Reduced function of 5 senses  Hearing is worse  Vision is the same  Swallow is OK  Mouth very thirsty, can't sleep at night due to it.  Cardiac: negatve  Respiratory: OK; can't smell or taste; teeth decaying.    Sometimes headaches.     Personal Hx   Behavioral history: No tobacco use.   Home environment: No secondhand tobacco smoke in home.   ; returned from [place] about one month ago; was there x 3 months.     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-related  macular degeneration), wet (H)     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     Language barrier affecting health care     PMH   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)      Hypovitaminosis D      Multiple pulmonary nodules      Osteopenia      Papillary thyroid carcinoma (H) 2007    4.8 cm, right, node positive;      Pituitary macroadenoma with extrasellar extension (H) 2007    causing obstructive hydrocephalus     Post-surgical hypothyroidism 2007     Uncomplicated asthma      Vocal cord paralysis     right     Xerostomia     due to radiation exposures     Past Surgical History:   Procedure Laterality Date     cymetra injection       ESOPHAGOSCOPY, GASTROSCOPY, DUODENOSCOPY (EGD), COMBINED  6/20/2013    Procedure: COMBINED ESOPHAGOSCOPY, GASTROSCOPY, DUODENOSCOPY (EGD), BIOPSY SINGLE OR MULTIPLE;  gastroscopy;  Surgeon: Leslie Guadarrama MD;  Location: [facility]     HERNIA REPAIR Right      lipoma resection chest wall Right 11/09     right selective neck dissection level 2, 3, 4  11/3/09     right  shunt placement  6/28/07     THYMECTOMY N/A 1/3/2017    Procedure: THYMECTOMY;  Surgeon: Ernesto Armstrong MD;  Location: [facility]     THYROIDECTOMY  11/27/07     TRANSCERVICAL EXTENDED MEDIASTINAL LYMPHADENECTOMY N/A 1/3/2017    Procedure: TRANSCERVICAL EXTENDED MEDIASTINAL LYMPHADENECTOMY;  Surgeon: Ernesto Armstrong MD;  Location: [facility] OR     transnasal endoscopic resection of pituitary adenoma  8/13/2007     Lipoma resection 7/27/16 -right shoulder     Current Outpatient Prescriptions   Medication Sig Dispense Refill     zolpidem (AMBIEN) 10 MG tablet Take 10 mg by mouth       traZODone (DESYREL) 100 MG tablet take 1 tab of 50 mg daily  0     Carboxymethylcellulose Sod PF (REFRESH PLUS) 0.5 % SOLN ophthalmic  "solution Place 1 drop into both eyes 3 times daily as needed for dry eyes       amLODIPine (NORVASC) 2.5 MG tablet TK 1 T PO QD  4     alendronate (FOSAMAX) 70 MG tablet Take 1 tablet (70 mg) by mouth every 7 days 16 tablet 3     ranibizumab (LUCENTIS) 0.5 MG/0.05ML SOLN 0.05 mLs (0.5 mg) by Intravitreal route every 28 days 0.05 mL 11     aflibercept (EYLEA) 2 MG/0.05ML SOLN injection 0.05 mLs (2 mg) by Intravitreal route every 28 days 0.05 mL 11     hydrocortisone (CORTEF) 10 MG tablet Take one tablet by mouth daily in the morning, 1/2 tablet at 2  tablet 3     ranitidine (ZANTAC) 300 MG tablet        COMBIVENT RESPIMAT  MCG/ACT inhaler INL 1 PUFF PO QID  1     fish oil-omega-3 fatty acids 1000 MG capsule Take 2 g by mouth daily       ibuprofen (ADVIL/MOTRIN) 400 MG tablet Take 400-600 mg by mouth every 6 hours as needed for moderate pain        levothyroxine (SYNTHROID/LEVOTHROID) 137 MCG tablet Take 1 tablet (137 mcg) by mouth daily (Patient taking differently: Take 137 mcg by mouth every morning ) 120 tablet 3     TAMSULOSIN HCL PO Take 0.4 mg by mouth At Bedtime        diclofenac (VOLTAREN) 1 % GEL topical gel Place onto the skin 4 times daily       fluticasone (FLONASE) 50 MCG/ACT spray Spray 2 sprays into both nostrils daily       GuaiFENesin (MUCUS RELIEF ADULT PO) Take 400 mg by mouth 2 times daily as needed        He confirms he is taking LT4, HC,    Physical Exam   GENERAL: elderly man in NAD; His wife is present . He is very [place]--his wife yells in his ear   /67  Pulse 65  Ht 1.651 m (5' 5\")  Wt 61.9 kg (136 lb 6.4 oz)  BMI 22.7 kg/m2  SKIN: normal color , normal temperature  HEENT: no scleral icterus;  LUNGS: clear bilaterally  CARDIAC: RRR S1 S2  NEURO: Alert, responds appropriately to questions, moves all extremities no tremor     DATA REVIEW:  Results for CUEVASBUCK MARY (MRN [id number]) as of 9/25/2017 20:53   Ref. Range 9/18/2017 15:09   Sodium Latest Ref Range: 133 - 144 mmol/L " 136   Potassium Latest Ref Range: 3.4 - 5.3 mmol/L 3.9   Creatinine Latest Ref Range: 0.66 - 1.25 mg/dL 1.04   GFR Estimate Latest Ref Range: >60 mL/min/1.7m2 69   GFR Estimate If Black Latest Ref Range: >60 mL/min/1.7m2 84   T4 Free Latest Ref Range: 0.76 - 1.46 ng/dL 1.40   TSH Latest Ref Range: 0.40 - 4.00 mU/L <0.01 (L)     EXAM:  CT CHEST W/O CONTRAST . 9/25/2017 1:12 PM      TECHNIQUE:  Helical CT images from the thoracic inlet through the  upper abdomen were obtained without intravenous contrast.      COMPARISON: CT [facility] 1/4/2017     HISTORY:   Malignant neoplasm of thyroid gland, Postprocedural  hypothyroidism, Hypopituitarism, Benign neoplasm of pituitary gland      FINDINGS:  LUNGS: Increased size of spiculated 1.7 x 1.5 lingular nodule (series  4, image 228). Unchanged 1.5 x 1.3 cm partially calcified left upper  lobe nodule (series 4, image 160). Innumerable right greater than left  reticulonodular opacities in a primarily perivascular centrilobular  distribution are similar to prior exam to slightly less conspicuous in  the right upper lobe, although direct comparison is limited by motion  artifact on prior exam. Unchanged consolidation along the right minor  fissure which extends to the right hilum, with air bronchograms. The  central airway is patent. No pleural effusion or pneumothorax.      MEDIASTINUM: Heart is within normal limits. No pericardial effusion.  Atherosclerotic plaque of the thoracic aorta and coronary vessels.  Unchanged calcified mediastinal and right hilar lymph nodes. Otherwise  subcentimeter lymph nodes are similar to prior. Soft tissue  attenuation in the anterior mediastinum is similar to prior, most  likely postsurgical from anterior mediastinal mass resection.  Partially visualized line running through the anterior right chest  wall soft tissues, presumably a  shunt.     UPPER ABDOMEN: Large fluid attenuating left superior pole renal cyst  is unchanged. Cholelithiasis without  evidence of cholecystitis. Tiny  sliding-type hiatal hernia.     BONES/SOFT TISSUES: Unchanged scattered sclerotic foci in the ribs.  Anterior osteophytic changes of the thoracic vertebral bodies, which  normal size criteria for diffuse idiopathic skeletal hyperostosis  (DISH).         IMPRESSION:   1. Increased size of a 1.7 cm lingular nodule compared to 1/4/2017  where it measured 4 mm. This is very suspicious for metastatic  disease. Recommend tissue sampling and/or PET-CT.  2. Additional reticulonodular densities bilaterally are unchanged,  most likely represent sequelae of prior infectious or granulomatous  disease.     I have personally reviewed the examination and initial interpretation  and I agree with the findings.     MANA TUCKER MD    EXAMINATION: US HEAD NECK SOFT TISSUE, 9/25/2017 2:35 PM      COMPARISON: 4/18/2016     HISTORY: Thyroid cancer. Status post thyroidectomy in 2007 with many  positive lymph nodes.  Right selective neck dissection on 12/08.      FINDINGS:      Lymph nodes are measured bilaterally with measurements given in  craniocaudal, transverse and AP dimensions as follows:     Right:     Level 2:   0.8 x 0.6 x 1.8 cm, previously 1.0 x 0.5 x 1.8 cm  0.6 x 0.4 x 0.6 cm, previously 0.6 x 0.4 x 0.7 cm  Level 6:   1.1 x 1.2 x 1.0 cm, not well visualized but slightly rounded with a  fatty hilum.  Level 7:  1.2 x 0.9 x 0.9 cm slightly rounded, slightly echogenic, with  increased vascularity     Left:     Level 2:  0.6 x 0.3 x 0.8 cm, previously 0.7 x 0.5 x 1.0 cm  0.5 x 0.5 x 1.0 cm, previously 1.0 x 0.7 x 1.7 cm  Level 4:   0.6 x 0.3 x 0.8 cm, unchanged  0.5 x 0.5 x 1.0 cm, unchanged.  Level 5:   1.1 x 2.6 x 1.3 cm, previously 1.1 x 0.6 x 1.4 cm  1.0 x 0.5 x 1.3 cm, not previously imaged            IMPRESSION:  1. New prominent lymph node in the right level 6 and 7 which have a  suspicious morphology, in particular the level 7 lymph node which has  increased vascularity. Given the  patient's history of node positive  papillary thyroid cancer, these is concerning for metastases.  The  right level 7 lymph node labeled #1 would likely be amenable to  ultrasound-guided biopsy.  2. Additional new lymph node on the left level 5, with prominent  cortex compared to the fatty hilum.  Indeterminate.      I have personally reviewed the examination and initial interpretation  and I agree with the findings.     MICHAEL PACKER MD

## 2018-04-11 DIAGNOSIS — H35.3220 EXUDATIVE AGE-RELATED MACULAR DEGENERATION, LEFT EYE, STAGE UNSPECIFIED (H): Primary | ICD-10-CM

## 2018-04-12 ENCOUNTER — HOSPITAL ENCOUNTER (OUTPATIENT)
Dept: PET IMAGING | Facility: CLINIC | Age: 78
Discharge: HOME | End: 2018-04-12
Payer: MEDICARE

## 2018-04-12 DIAGNOSIS — R91.8 MASS OF LEFT LUNG: ICD-10-CM

## 2018-04-12 LAB — GLUCOSE BLDC GLUCOMTR-MCNC: 108 MG/DL (ref 70–99)

## 2018-04-12 PROCEDURE — A9552 F18 FDG: HCPCS

## 2018-04-12 PROCEDURE — 71250 CT THORAX DX C-: CPT

## 2018-04-12 PROCEDURE — 34300033 ZZH RX 343

## 2018-04-12 PROCEDURE — 82962 GLUCOSE BLOOD TEST: CPT

## 2018-04-12 RX ADMIN — FLUDEOXYGLUCOSE F-18 10.21 MCI.: 500 INJECTION, SOLUTION INTRAVENOUS at 14:15

## 2018-04-16 ENCOUNTER — TELEPHONE (OUTPATIENT)
Dept: ENDOCRINOLOGY | Facility: CLINIC | Age: 78
End: 2018-04-16

## 2018-04-16 ENCOUNTER — OFFICE VISIT (OUTPATIENT)
Dept: ENDOCRINOLOGY | Facility: CLINIC | Age: 78
End: 2018-04-16
Payer: COMMERCIAL

## 2018-04-16 ENCOUNTER — RADIANT APPOINTMENT (OUTPATIENT)
Dept: ULTRASOUND IMAGING | Facility: CLINIC | Age: 78
End: 2018-04-16
Payer: COMMERCIAL

## 2018-04-16 VITALS
HEART RATE: 74 BPM | BODY MASS INDEX: 22.37 KG/M2 | WEIGHT: 134.4 LBS | DIASTOLIC BLOOD PRESSURE: 76 MMHG | SYSTOLIC BLOOD PRESSURE: 140 MMHG

## 2018-04-16 DIAGNOSIS — R93.89 ABNORMAL FINDING ON IMAGING: ICD-10-CM

## 2018-04-16 DIAGNOSIS — C73 PAPILLARY THYROID CARCINOMA (H): ICD-10-CM

## 2018-04-16 DIAGNOSIS — R59.0 CERVICAL ADENOPATHY: ICD-10-CM

## 2018-04-16 DIAGNOSIS — C73 PAPILLARY THYROID CARCINOMA (H): Primary | ICD-10-CM

## 2018-04-16 DIAGNOSIS — Z75.8 LANGUAGE BARRIER AFFECTING HEALTH CARE: ICD-10-CM

## 2018-04-16 DIAGNOSIS — E89.0 POSTSURGICAL HYPOTHYROIDISM: ICD-10-CM

## 2018-04-16 DIAGNOSIS — Z60.3 LANGUAGE BARRIER AFFECTING HEALTH CARE: ICD-10-CM

## 2018-04-16 DIAGNOSIS — R91.8 PULMONARY NODULES: ICD-10-CM

## 2018-04-16 DIAGNOSIS — R91.8 PULMONARY NODULES/LESIONS, MULTIPLE: ICD-10-CM

## 2018-04-16 DIAGNOSIS — R93.89 ABNORMAL FINDING ON IMAGING: Primary | ICD-10-CM

## 2018-04-16 LAB — PSA SERPL-MCNC: <0.01 UG/L (ref 0–4)

## 2018-04-16 RX ORDER — LEVOTHYROXINE SODIUM 137 UG/1
137 TABLET ORAL DAILY
Qty: 90 TABLET | Refills: 3 | Status: SHIPPED | OUTPATIENT
Start: 2018-04-16 | End: 2019-07-16

## 2018-04-16 RX ORDER — HYDROCORTISONE 10 MG/1
TABLET ORAL
Qty: 180 TABLET | Refills: 3 | Status: ON HOLD | OUTPATIENT
Start: 2018-04-16 | End: 2018-08-21

## 2018-04-16 SDOH — SOCIAL STABILITY - SOCIAL INSECURITY: ACCULTURATION DIFFICULTY: Z60.3

## 2018-04-16 ASSESSMENT — PAIN SCALES - GENERAL: PAINLEVEL: MILD PAIN (2)

## 2018-04-16 NOTE — LETTER
4/16/2018       RE: Travis Peres  [address]  [facility] [id number]     Dear Colleague,    Thank you for referring your patient, Travis Peres, to the [facility] ENDOCRINOLOGY at [facility]. Please see a copy of my visit note below.    Very complicated endocrine patient/ problem set.   1. Papillary thyroid cancer 4.8 cm right, bilateral node positive. He has been treated with total thyroidectomy, 131I x 2 (cummulative dose 346.4 mCi) His last 131I TBS (2008) post therapy scan raised question of ? lung mets and also ? met above left kidney. Cervical adenopathy has been treated in the past with ETOH .    I have long suspected he had lung mets, but we haven't proven this.    Tg from last week is still  Pending    2.  Abnormal PET with abnormal focal  FDG activity prostate.   Labs today PSA. Refer back to urologist and PCP.    I should note Mr Peres was dismissive of this recommendation today.  It did not come out until after the appt that he had not seen his urologist since 2009, 9 years ago.    Referral to urology placed by me to get him back to his past urologist.     3.  Cervical adenopathy has been treated in the past with ETOH .  Neck US 9/17 shows progression of right level 6 and 7 LNs. PET shows high FDG right neck.  Repeat US following the appt suggests minimal progression in size since 9/17.  The 2 LNs likely correlate to the high FDG regions on the PET.   I am going to recommend US guided FNAB of both nodules - we will have  notify them by phone.      Lung nodules - biopsy has not been performed.  BAL cytology (8/15) did not show malignancy. Spiculated Lingular nodule to 1.7 x 1.5 cm on 9/17 CT measured only 7 mm on 4/12/18 PET, lacking FDG activity.       Persistent thyroglobulinemia has been rising/worse, suggesting progression of thyroid cancer in some location. Current status unknown.  As per # 1      2.6 x 1.4 cm substernal anterior mediastinal mass .   Presumably this has been removed.  It was benign thymic tissue      Hypopituitary with hypogonadotropic hypogonadism, probable secondary hypothyroidism, and secondary adrenal insufficiency, probable GH deficiency.   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 The tumor has been significantly de bulked and He has completed XRT for  persistent tumor-. Tumor mass is stable on  MRI through 12/16.       5.  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   The TSH is not fully reliable.  We can treat up to high normal free T4.       Low BMD. He continues on alendronate .  Last DXA 7/15 is stable.  He is on alendronate.    He is due for follow up DXA.  Not addressed.    Hypogonadism has been untreated (Due to patient noncompliance with treatment recommendations) - not addressed      ? Lytic bone lesions -stable on serial imaging -- not addressed     Language barrier ;hearing barrier - both of these make his care very difficult and are interfering with his best health care. RTC 2 months to be sure things move along as recommended    Greater than 50% of 25 minute appt on counseling.  I    Corina Potts MD      Cc/ HPI: Mr Peres returns today, along with his wife and also .  I last saw him 4/9/18.  He returns today to discuss test results. See my 4/9/18 note for his detailed history.    He has a history of multiple complicated endocrine issues, including thyroid cancer, surgical hypothyroidism, osteoporosis, pituitary macroadenoma with partial hypopituitarism and history of DI.  On 4/12/18 he had a PET/CT .  I have reviewed the images and report today. To my eye, the study shows the following:  Right low neck /superior mediastinal high FDG lateral and more midline. The paratracheal mass is somewhat posterior and below the level of the clavicle (read as 1.4 cm, larger than before, SUB 68), but above the  sternum  Tiny focus of fdg left lung  Radiologists report also describes left sided cervical adenopathy with high FDG, left axillary, left sella, left iliac lymph nodes, right iliac LN , posterior prostate gland   Spiculated nodule lingula left upper lobe 7 mm (was larger before), no abnormal FDG.     Overall, I am reassured by the images, contrary to my fears and expectations based on the CT from fall, 2017.  I showed the images to Mr Peres and his wife today.       Labs   4/9/18: Tg pending; TSH  ,0.01, free T4 1.52, Na 139, K 4.4  4/12/18: glucose 108    Neck US, as read by me, obtained following the appt 4/16/18:   Right level 6 thyroid bed 1.3 x 0.95 x 1.2 cm - suspicous (was 1.2 x 0.93 x 0.9  cm 9/17, read as level 7 then;   Right level 7 # 1 1.4 x 1.5 x 1.5 cm (was 1.1 x 1.2 x 1 - read as level 6 )  Left level 4  0.3 x 0.8 x 1 cm (was 0.6 x 0.3 x 0.8 )  Left level 4 # 2 0.8 x 0.5 x 0.6 cm (was 0.5 x 0.5 x 1 )  Left level 5 1 1.0 x 0.5 x 1.5 cm (was 1.1 x 0.6 x 1.3   Left level 5 2 1 x 0.5 x 1.3      ROS:                    Reduced function of 5 senses  Hearing is worse  Vision is the same  Swallow is OK  Mouth very thirsty, can't sleep at night due to it.  Cardiac: negatve  Respiratory: OK; can't smell or taste; teeth decaying.    Sometimes headaches.      Personal Hx   Behavioral history: No tobacco use.   Home environment: No secondhand tobacco smoke in home.            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), wet (H)     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      PMH    Past Medical History         Past Medical History:   Diagnosis Date     Arthritis       BPH (benign prostatic hyperplasia)       Depression       Hyperlipidemia       Hypertension       no current meds     Hypopituitarism after adenoma resection (H)       Hypovitaminosis D       Multiple pulmonary nodules       Osteopenia       Papillary thyroid carcinoma (H) 2007     4.8 cm, right, node positive;      Pituitary macroadenoma with extrasellar extension (H) 2007     causing obstructive hydrocephalus     Post-surgical hypothyroidism 2007     Uncomplicated asthma       Vocal cord paralysis       right     Xerostomia       due to radiation exposures          Past Surgical History          Past Surgical History:   Procedure Laterality Date     cymetra injection         ESOPHAGOSCOPY, GASTROSCOPY, DUODENOSCOPY (EGD), COMBINED   6/20/2013     Procedure: COMBINED ESOPHAGOSCOPY, GASTROSCOPY, DUODENOSCOPY (EGD), BIOPSY SINGLE OR MULTIPLE;  gastroscopy;  Surgeon: Leslie Guadarrama MD;  Location: [facility]     HERNIA REPAIR Right       lipoma resection chest wall Right 11/09     right selective neck dissection level 2, 3, 4   11/3/09     right  shunt placement   6/28/07     THYMECTOMY N/A 1/3/2017     Procedure: THYMECTOMY;  Surgeon: Ernesto Armstrong MD;  Location: [facility]     THYROIDECTOMY   11/27/07     TRANSCERVICAL EXTENDED MEDIASTINAL LYMPHADENECTOMY N/A 1/3/2017     Procedure: TRANSCERVICAL EXTENDED MEDIASTINAL LYMPHADENECTOMY;  Surgeon: Ernesto Armstrong MD;  Location: [facility] OR     transnasal endoscopic resection of pituitary adenoma   8/13/2007         Lipoma resection 7/27/16 -right shoulder       Current Outpatient Prescriptions           Current Outpatient Prescriptions   Medication Sig Dispense Refill     zolpidem (AMBIEN) 10 MG tablet Take 10 mg by mouth          traZODone (DESYREL) 100 MG tablet take 1 tab of 50 mg daily   0     Carboxymethylcellulose Sod PF (REFRESH PLUS) 0.5 % SOLN ophthalmic solution Place 1 drop into both eyes 3 times daily as needed for dry eyes         amLODIPine (NORVASC) 2.5 MG tablet TK 1 T PO QD   4     alendronate (FOSAMAX) 70 MG tablet Take 1 tablet (70 mg) by mouth every 7 days 16 tablet 3     ranibizumab (LUCENTIS) 0.5 MG/0.05ML SOLN 0.05 mLs (0.5 mg) by Intravitreal route every 28 days 0.05 mL 11     aflibercept (EYLEA) 2 MG/0.05ML SOLN injection 0.05 mLs (2 mg) by Intravitreal route every 28 days 0.05 mL 11     hydrocortisone (CORTEF) 10 MG tablet Take one tablet by mouth daily in the morning, 1/2 tablet at 2  tablet 3     ranitidine (ZANTAC) 300 MG tablet           COMBIVENT RESPIMAT  MCG/ACT inhaler INL 1 PUFF PO QID   1     diclofenac (VOLTAREN) 1 % GEL topical gel Place onto the skin 4 times daily         fish oil-omega-3 fatty acids 1000 MG capsule Take 2 g by mouth daily         fluticasone (FLONASE) 50 MCG/ACT spray Spray 2 sprays into both nostrils daily         ibuprofen (ADVIL/MOTRIN) 400 MG tablet Take 400-600 mg by mouth every 6 hours as needed for moderate pain          levothyroxine (SYNTHROID/LEVOTHROID) 137 MCG tablet Take 1 tablet (137 mcg) by mouth daily (Patient taking differently: Take 137 mcg by mouth every morning ) 120 tablet 3     GuaiFENesin (MUCUS RELIEF ADULT PO) Take 400 mg by mouth 2 times daily as needed          TAMSULOSIN HCL PO Take 0.4 mg by mouth At Bedtime              He confirms he is taking LT4, HC,     Physical Exam   GENERAL: elderly man in NAD; His wife is present . He is very [place]--his wife yells in his ear every question I ask  There were no vitals taken for this visit.  SKIN: normal color , normal temperature  HEENT: no scleral icterus  NEURO: Alert, responds appropriately to questions, moves all extremities no tremor         DATA REVIEW:  Combined Report of:    PET and CT on   4/12/2018 4:01 PM :      1. PET of the neck, chest, abdomen, and pelvis.  2. PET CT Fusion for Attenuation Correction and Anatomical  Localization:    3. Diagnostic CT scan of the chest, abdomen, and pelvis without  intravenous contrast for interpretation.  3. CT of the chest, abdomen and pelvis obtained for diagnostic  interpretation.  4. 3D MIP and PET-CT fused images were processed on an independent  workstation and archived to PACS and reviewed by a radiologist.      Technique:      1. PET: The patient received 10.21 mCi of F-18-FDG; the serum glucose  was 108 prior to administration, body weight was 61.9 kg. Images were  evaluated in the axial, sagittal, and coronal planes as well as the  rotational whole body MIP. Images were acquired from the Vertex to the  Feet.      UPTAKE WAS MEASURED AT 60 MINUTES.       BACKGROUND:  Liver SUV max= 3.50,   Aorta Blood SUV Max: 2.70.       2. CT: Volumetric acquisition for clinical interpretation of the  chest, abdomen, and pelvis acquired at 3 mm sections . The chest,  abdomen, and pelvis were evaluated at 5 mm sections in bone, soft  tissue, and lung windows.        --      3. 3D MIP and PET-CT fused images were processed on an independent  workstation and archived to PACS and reviewed by a radiologist.      INDICATION: ; Mass of left lung      ADDITIONAL INFORMATION OBTAINED FROM EMR: History of papillary thyroid  cancer, bilateral node positive and treated with total thyroidectomy  and I-131 x2. History of cervical adenopathy treated with EtOH.      COMPARISON: CT chest 9/25/2017, 12/2/2016, 12/16/2015, 7/29/2015,  8/27/2014, 5/6/2014; CT cap 9/25/2017; PET CT 8/20/2009      FINDINGS:       HEAD/NECK:          Numerous other FDG avid cervical lymph nodes, with example lymph nodes  described below from series 3:  Image 82: Mild FDG uptake in a left submandibular lymph node measuring  7 mm with SUV max of 3.34.  Image 80: Mild FDG uptake in a 7 mm left level 5a cervical  lymph node  with SUV max of 3.50.  Image 81: Mild FDG uptake in a cluster of several small left level 4  cervical lymph nodes with SUV max of 3.49.      Intense FDG uptake in a right retroclavicular lymph node measuring 9  mm (series 3 image 102) with SUV max of 23.77.      Unchanged mild FDG uptake in an occipital lymph node (series 3 image  69) measuring 5 mm, SUV max of 2.33, previously 2.05 on 8/20/2009.  Additional unchanged (since prior PET/CT a 22,009) mild FDG uptake in  the area of the left sella, likely related to the patient's residual  pituitary adenoma.      Stable right parietal approach ventriculostomy catheter with the tip  slightly protruding into the left frontal lobe anterior to the  anterior horn of the left lateral ventricle. The ventricles are normal  size and proportionate to the cerebral sulci. No extra-axial fluid  collection.      The paranasal sinuses are clear. The mastoid air cells are clear.   Intense FDG uptake and mild enlargement of the tonsillar tissue.  The mucosal pharyngeal space, the , prevertebral and carotid  spaces are within normal limits.       The thyroid gland is surgically absent.      Right vocal cord paralysis with physiologic left vocal cord FDG  uptake.       CHEST:  Intense FDG uptake within a right upper paratracheal lymph node  measuring 1.4 cm (series 3 image 106) with SUV Max 68.43, which has  increased in size since 1/4/2017 when it measured 8 mm. FDG uptake  within several normal sized left axillary lymph nodes, with the  largest measuring 7 mm and with a max SUV of 5.02 (series 3 image  114).      There is a 1.4 x 1.1 cm left upper lobe partially calcified granuloma,  which does not demonstrate significant FDG uptake, and is not  significantly changed in size since at least 5/6/2014.      The heart size is within normal limits. No pericardial effusion.  Scattered mild coronary artery calcifications. The thoracic aorta and  main pulmonary artery are  normal caliber. Several unchanged  mediastinal and right hilar calcified lymph nodes.      Central airways patent. No pleural effusion or pneumothorax. No large  consolidation. The previously seen spiculated nodule in the lingula of  the left upper lobe has decreased in size and now measures 7 mm  (series 6 image 122) and does not demonstrate any suspicious FDG  uptake. Several scattered tree-in-bud opacities in the right upper  lobe and right middle lobe (for example series 6 image 86 and 78) are  overall less conspicuous in comparison to the prior CT of the chest on  9/25/2017.      ABDOMEN AND PELVIS:  Normal-sized 4 mm left iliac lymph node with mild FDG uptake, max SUV  3.39. Additional small right iliac lymph node measuring 4 mm (series 3  image 257, with mild FDG uptake SUV max 4.40. Focal uptake within the  posterior peripheral prostate gland in the area of calcifications, but  otherwise no CT correlate lesion is identified, max SUV 6.27.       shunt extending down the right thorax and into the peritoneal  cavity with the tip in the lower central anterior abdomen.      No suspicious hepatic lesion. Cholelithiasis without evidence of  intrahepatic or extra hepatic biliary dilation. The spleen is normal  size. The pancreas and adrenal glands are normal. Large simple renal  cyst extending from the superior pole of left kidney. No renal mass.  No hydronephrosis. Urinary bladder is decompressed, but otherwise  unremarkable. The large and small bowel are normal caliber. Colonic  diverticulosis without evidence of diverticulitis. The appendix is  normal. No enlarged intraperitoneal or inguinal lymph nodes by CT  short axis size criteria. Abdominal aorta is normal caliber with  scattered mild atherosclerotic calcifications. No intraperitoneal free  air or free fluid.      LOWER EXTREMITIES:   No abnormal masses or hypermetabolic lesions.      BONES:   There are no suspicious lytic or blastic osseous lesions.   There is no  abnormal FDG uptake in the skeleton. Multilevel degenerative changes  of the spine, most pronounced in the lumbar spine where there is disc  space narrowing from L2 through S1 and mild lower lumbar facet  arthropathy. Small sclerotic foci in the intertrochanteric region of  the right femur is likely a bone island. Similar sclerotic foci in the  right iliac, without FDG uptake in either focus.              IMPRESSION: In this patient with history of papillary thyroid  carcinoma status post resection and ablation x 2:  1. Suspicious intense FDG uptake in an enlarging right upper  paratracheal lymph node measuring up to 1.4 cm, previously 8 mm on  1/4/2017. Additional FDG avid right retroclavicular lymph node also  suggestive of metastasis. Recommend biopsy.   2. Focal FDG uptake in the peripheral and posterior prostate gland.  Recommend obtaining a prostate-specific antigen.  3. Numerous indeterminate nonenlarged but mildly FDG avid cervical,  left axillary, and biiliac lymph nodes.  4. The spiculated nodule previously seen in the lingula of the left  upper lobe has decreased in size and now measures 7 mm (previously  measured up to 1.7 cm) does not demonstrate increased FDG uptake and  is likely benign.  5. Improving tree-in-bud opacities predominantly in the right lung  since the prior CT, most consistent with a resolving infectious or  inflammatory process. Additionally, there is sequela of prior  granulomatous disease including a stable 1.4 cm left upper lobe  granuloma.      I have personally reviewed the examination and initial interpretation  and I agree with the findings.      STEVEN ROWLAND MD      EXAMINATION: Soft tissue neck ultrasound, 4/16/2018 2:42 PM      COMPARISON: Ultrasound 9/25/2017, PET/CT 4/12/2018     HISTORY: Suspicious finding on PET/CT     FINDINGS:     Lymph nodes are measured bilaterally with measurements given in  craniocaudal, transverse and AP dimensions as  follows:     Right:  Level 1: No lymphadenopathy  Level 2: 7 x 4 x 18 mm lymph node with a preserved fatty hilum, and a  5 x 4 x 5 mm lymph node with a preserved fatty hilum  Level 3: No lymphadenopathy  Level 4: No lymphadenopathy  Level 5: No lymphadenopathy  Level 6: 1.3 x 1.0 x 1.2 cm solid nodule with heterogeneous  echotexture and internal blood flow, previously 1.1 x 1.2 x 1.0 cm  Level 7:1.4 x 1.5 x 1.5 cm solid nodule with heterogeneous echotexture  and no hypervascularity, previously 1.2 x 0.9 x 0.9 cm     Left:  Level 1: No lymphadenopathy  Level 2: 1.2 x 0.7 x 2.1 cm lymph node with a preserved fatty hilum  Level 3: Lymphadenopathy  Level 4: 8 x 3 x 10 mm lymph node without a clear fatty hilum; 8 x 5 x  6 mm lymph node with a vascular pedicle. These are unchanged.  Level 5: 10 x 5 x 15 mm lymph node without a clear fatty hilum, not  substantially changed.  Level 6: No lymphadenopathy  Level 7: No lymphadenopathy         IMPRESSION:   1. Suspicious right level 6 and 7 nodules which were hypermetabolic on  PET/CT 4/12/2018. The level 7 lymph node has increased in size from  ultrasound 9/25/2017. Findings are concerning for recurrent/metastatic  disease. Consider tissue sampling.  2. Additional lymph node measurements as described above.     I have personally reviewed the examination and initial interpretation  and I agree with the findings.     KEENA GUTIERREZ MD

## 2018-04-16 NOTE — MR AVS SNAPSHOT
After Visit Summary   4/16/2018    Travis Peres    MRN: [id number]           Patient Information     Date Of Birth          1940        Visit Information        Provider Department      4/16/2018 12:45 PM Corina Potts MD; LANGUAGE [facility]        Today's Diagnoses     Postsurgical hypothyroidism        Hypopituitarism (H)        Pituitary adenoma (H)        Papillary thyroid carcinoma (HCC)          Care Instructions    Labs today   Neck ultrasound     See your urologist about the prostate activity seen on the PET scan.    I will measure prostate cancer blood test (PSA)      See your primary care doctor about the right side pain                 Follow-ups after your visit        Follow-up notes from your care team     Return in about 2 months (around 6/16/2018).      Your next 10 appointments already scheduled     Apr 16, 2018  3:00 PM CDT   (Arrive by 2:45 PM)   US HEAD NECK SOFT TISSUE with [address] [place] [address] Imaging Center [facility] ([facility])    [address] [id number] [phone]           Please bring a list of your medicines (including vitamins, minerals and over-the-counter drugs). Also, tell your doctor about any allergies you may have. Wear comfortable clothes and leave your valuables at home.  You do not need to do anything special to prepare for your exam.  Please call the Imaging Department at your exam site with any questions.            Apr 18, 2018  9:00 AM CDT   RETURN RETINA with Nadiya Allen MD   Eye Clinic ([facility])    [address] [facility] [address] [place] [id number]   [phone]            Apr 18, 2018 12:30 PM CDT   NEW GENERAL with Danni Mccall MD   Eye Clinic ([facility])    [address] [address]   [phone]            May 24, 2018  3:20 PM CDT    (Arrive by 3:05 PM)   New Patient Visit with Faraz Amato MD   [facility] ([facility])    [address] [facility] [facility]  4th Floor  [facility] [id number] [phone]            2018  2:50 PM CDT   (Arrive by 2:35 PM)   RETURN ENDOCRINE with Corina Potts MD   [facility] ([facility])    [address]  3rd Floor  [facility] [address] [phone]              Who to contact     Please call your clinic at [phone] to:    Ask questions about your health    Make or cancel appointments    Discuss your medicines    Learn about your test results    Speak to your doctor            Additional Information About Your Visit        [employer] Information     [employer] is an electronic gateway that provides easy, online access to your medical records. With [employer], you can request a clinic appointment, read your test results, renew a prescription or communicate with your care team.     To sign up for [employer] visit the website at [email or web address]   You will be asked to enter the access code listed below, as well as some personal information. Please follow the directions to create your username and password.     Your access code is: [email or web address]  Expires: 6/3/2018 10:21 PM     Your access code will  in 90 days. If you need help or a new code, please contact your [facility] or call [phone] for assistance.        Care EveryWhere ID     This is your Care EveryWhere ID. This could be used by other organizations to access your [facility] medical records  [id number]        Your Vitals Were     Pulse BMI (Body Mass Index)                74 22.37 kg/m2           Blood Pressure from Last 3 Encounters:   18 140/76   18 135/67   17 163/80    Weight from Last 3 Encounters:   18 61 kg (134 lb 6.4 oz)   18 61.9 kg (136  lb 6.4 oz)   09/18/17 59.3 kg (130 lb 12.8 oz)              Today, you had the following     No orders found for display         Today's Medication Changes          These changes are accurate as of 4/16/18  2:44 PM.  If you have any questions, ask your nurse or doctor.               These medicines have changed or have updated prescriptions.        Dose/Directions    levothyroxine 137 MCG tablet   Commonly known as:  SYNTHROID/LEVOTHROID   This may have changed:  when to take this   Used for:  Postsurgical hypothyroidism, Hypopituitarism (H), Pituitary adenoma (H), Papillary thyroid carcinoma (H)        Dose:  137 mcg   Take 1 tablet (137 mcg) by mouth daily   Quantity:  90 tablet   Refills:  3            Where to get your medicines      These medications were sent to [facility] [address] [place] [address] & [address], [place] [address]     Phone:  [phone]     hydrocortisone 10 MG tablet    levothyroxine 137 MCG tablet                Primary Care Provider Office Phone # Fax #    Karol FayChino [phone]       [address] [address]        Equal Access to Services     LAYLA LUCAS : Sesar leeo Sokristal, waaxda luqadaha, qaybta kaalmada adeegyada, roxanne mitchell. So [facility] [phone].    ATENCIÓN: Si habla español, tiene a reese disposición servicios gratuitos de asistencia lingüística. Tiffanie al [phone].    We comply with applicable federal civil rights laws and [place] laws. We do not discriminate on the basis of race, color, national origin, age, disability, sex, sexual orientation, or gender identity.            Thank you!     Thank you for choosing [facility] END[facility]  for your care. Our goal is always to provide you with excellent care. Hearing back from our patients is one way we can continue to improve our services. Please take a few minutes to  complete the written survey that you may receive in the mail after your visit with us. Thank you!             Your Updated Medication List - Protect others around you: Learn how to safely use, store and throw away your medicines at www.disposemymeds.org.          This list is accurate as of 4/16/18  2:44 PM.  Always use your most recent med list.                   Brand Name Dispense Instructions for use Diagnosis    aflibercept 2 MG/0.05ML Soln injection    EYLEA    0.05 mL    0.05 mLs (2 mg) by Intravitreal route every 28 days    CNVM (choroidal neovascular membrane), left       alendronate 70 MG tablet    FOSAMAX    16 tablet    Take 1 tablet (70 mg) by mouth every 7 days    Postsurgical hypothyroidism, Papillary thyroid carcinoma (H), Pituitary adenoma (H), Hypopituitarism (H)       amLODIPine 2.5 MG tablet    NORVASC     TK 1 T PO QD        Carboxymethylcellulose Sod PF 0.5 % Soln ophthalmic solution    REFRESH PLUS     Place 1 drop into both eyes 3 times daily as needed for dry eyes        COMBIVENT RESPIMAT  MCG/ACT inhaler   Generic drug:  Ipratropium-Albuterol      INL 1 PUFF PO QID        fish oil-omega-3 fatty acids 1000 MG capsule      Take 2 g by mouth daily        fluticasone 50 MCG/ACT spray    FLONASE     Spray 2 sprays into both nostrils daily        hydrocortisone 10 MG tablet    CORTEF    180 tablet    Take one tablet by mouth daily in the morning, 1/2 tablet at 2 PM    Postsurgical hypothyroidism, Hypopituitarism (H), Pituitary adenoma (H), Papillary thyroid carcinoma (H)       ibuprofen 400 MG tablet    ADVIL/MOTRIN     Take 400-600 mg by mouth every 6 hours as needed for moderate pain        levothyroxine 137 MCG tablet    SYNTHROID/LEVOTHROID    90 tablet    Take 1 tablet (137 mcg) by mouth daily    Postsurgical hypothyroidism, Hypopituitarism (H), Pituitary adenoma (H), Papillary thyroid carcinoma (H)       MUCUS RELIEF ADULT PO      Take 400 mg by mouth 2 times daily as needed     Papillary thyroid carcinoma (H), Postsurgical hypothyroidism, Malignant neoplasm of thyroid gland (H), Cancer of thyroid (H), 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), Osteopenia, Hypopituitarism (H), Pituitary adenoma (H), Noncompliance with medication regimen       ranibizumab 0.5 MG/0.05ML Soln    LUCENTIS    0.05 mL    0.05 mLs (0.5 mg) by Intravitreal route every 28 days    CNVM (choroidal neovascular membrane), left       ranitidine 300 MG tablet    ZANTAC          TAMSULOSIN HCL PO      Take 0.4 mg by mouth At Bedtime        traZODone 100 MG tablet    DESYREL     take 1 tab of 50 mg daily        VOLTAREN 1 % Gel topical gel   Generic drug:  diclofenac      Place onto the skin 4 times daily        zolpidem 10 MG tablet    AMBIEN     Take 10 mg by mouth

## 2018-04-16 NOTE — NURSING NOTE
"Chief Complaint   Patient presents with     RECHECK     Thyroid carcinoma       Initial /76  Pulse 74  Wt 61 kg (134 lb 6.4 oz)  BMI 22.37 kg/m2 Estimated body mass index is 22.37 kg/(m^2) as calculated from the following:    Height as of 4/9/18: 1.651 m (5' 5\").    Weight as of this encounter: 61 kg (134 lb 6.4 oz).  Medication Reconciliation: complete    "

## 2018-04-16 NOTE — TELEPHONE ENCOUNTER
----- Message from Corina Potts MD sent at 4/16/2018  6:48 AM CDT -----  Regarding: appt today?  I know this is short notice but I see that I have an opening at 1 PM today. See if they can come at 1240 (ie 20 minutes before my opening for a potential 40 minute appt ending at 120 today?  If not, then try for 630 today or tomorrow? (and tell them to come early in case we have cancellation and can get the in early) .  Thanks    Corina Potts

## 2018-04-16 NOTE — PATIENT INSTRUCTIONS
Labs today   Neck ultrasound     See your urologist about the prostate activity seen on the PET scan.    I will measure prostate cancer blood test (PSA)      See your primary care doctor about the right side pain

## 2018-04-16 NOTE — PROGRESS NOTES
Very complicated endocrine patient/ problem set.   1. Papillary thyroid cancer 4.8 cm right, bilateral node positive. He has been treated with total thyroidectomy, 131I x 2 (cummulative dose 346.4 mCi) His last 131I TBS (2008) post therapy scan raised question of ? lung mets and also ? met above left kidney. Cervical adenopathy has been treated in the past with ETOH .    I have long suspected he had lung mets, but we haven't proven this.    Tg from last week is still  Pending; addendum: Tg is much worse - higher -     2.  Abnormal PET with abnormal focal  FDG activity prostate.   Labs today PSA. Refer back to urologist and PCP.    I should note Mr Larisa was dismissive of this recommendation today.  It did not come out until after the appt that he had not seen his urologist since 2009, 9 years ago.    Referral to urology placed by me to get him back to his past urologist.     3.  Cervical adenopathy has been treated in the past with ETOH .  Neck US 9/17 shows progression of right level 6 and 7 LNs. PET shows high FDG right neck.  Repeat US following the appt suggests minimal progression in size since 9/17.  The 2 LNs likely correlate to the high FDG regions on the PET.   I am going to recommend US guided FNAB of both nodules - we will have  notify them by phone.      Addendum: FNAB of abnormal LN is + for papillary thyroid Ca .   I will try to get him back for semi-urgent appt to discuss.     Lung nodules - biopsy has not been performed.  BAL cytology (8/15) did not show malignancy. Spiculated Lingular nodule to 1.7 x 1.5 cm on 9/17 CT measured only 7 mm on 4/12/18 PET, lacking FDG activity.       Persistent thyroglobulinemia has been rising/worse, suggesting progression of thyroid cancer in some location. Current status unknown.  As per # 1      2.6 x 1.4 cm substernal anterior mediastinal mass .  Presumably this has been removed.  It was benign thymic tissue      Hypopituitary with hypogonadotropic  hypogonadism, probable secondary hypothyroidism, and secondary adrenal insufficiency, probable GH deficiency.   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 The tumor has been significantly de bulked and He has completed XRT for  persistent tumor-. Tumor mass is stable on  MRI through 12/16.       5.  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   The TSH is not fully reliable.  We can treat up to high normal free T4.       Low BMD. He continues on alendronate .  Last DXA 7/15 is stable.  He is on alendronate.    He is due for follow up DXA.  Not addressed.    Hypogonadism has been untreated (Due to patient noncompliance with treatment recommendations) - not addressed      ? Lytic bone lesions -stable on serial imaging -- not addressed     Language barrier ;hearing barrier - both of these make his care very difficult and are interfering with his best health care. RTC 2 months to be sure things move along as recommended    Greater than 50% of 25 minute appt on counseling.  I    Corina Potts MD      Cc/ HPI: Mr Peres returns today, along with his wife and also .  I last saw him 4/9/18.  He returns today to discuss test results. See my 4/9/18 note for his detailed history.    He has a history of multiple complicated endocrine issues, including thyroid cancer, surgical hypothyroidism, osteoporosis, pituitary macroadenoma with partial hypopituitarism and history of DI.  On 4/12/18 he had a PET/CT .  I have reviewed the images and report today. To my eye, the study shows the following:  Right low neck /superior mediastinal high FDG lateral and more midline. The paratracheal mass is somewhat posterior and below the level of the clavicle (read as 1.4 cm, larger than before, SUB 68), but above the sternum  Tiny focus of fdg left lung  Radiologists report also describes left sided cervical adenopathy with  high FDG, left axillary, left sella, left iliac lymph nodes, right iliac LN , posterior prostate gland   Spiculated nodule lingula left upper lobe 7 mm (was larger before), no abnormal FDG.     Overall, I am reassured by the images, contrary to my fears and expectations based on the CT from fall, 2017.  I showed the images to Mr Peres and his wife today.       Labs   4/9/18: Tg pending; TSH  ,0.01, free T4 1.52, Na 139, K 4.4; addendum: Tg 18.2, FRANK < 0.4 - results following appt, not discussed at appt.  The Tg is significantly WORSE - higher.    4/12/18: glucose 108    Neck US, as read by me, obtained following the appt 4/16/18:   Right level 6 thyroid bed 1.3 x 0.95 x 1.2 cm - suspicous (was 1.2 x 0.93 x 0.9  cm 9/17, read as level 7 then;   Right level 7 # 1 1.4 x 1.5 x 1.5 cm (was 1.1 x 1.2 x 1 - read as level 6 )  Left level 4  0.3 x 0.8 x 1 cm (was 0.6 x 0.3 x 0.8 )  Left level 4 # 2 0.8 x 0.5 x 0.6 cm (was 0.5 x 0.5 x 1 )  Left level 5 1 1.0 x 0.5 x 1.5 cm (was 1.1 x 0.6 x 1.3   Left level 5 2 1 x 0.5 x 1.3      ROS:                    Reduced function of 5 senses  Hearing is worse  Vision is the same  Swallow is OK  Mouth very thirsty, can't sleep at night due to it.  Cardiac: negatve  Respiratory: OK; can't smell or taste; teeth decaying.    Sometimes headaches.      Personal Hx   Behavioral history: No tobacco use.   Home environment: No secondhand tobacco smoke in home.            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), wet (H)     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      PMH    Past Medical History         Past Medical History:   Diagnosis Date     Arthritis       BPH (benign prostatic hyperplasia)       Depression       Hyperlipidemia       Hypertension       no current meds     Hypopituitarism after adenoma resection (H)       Hypovitaminosis D       Multiple pulmonary nodules       Osteopenia       Papillary thyroid carcinoma (H) 2007     4.8 cm, right, node positive;      Pituitary macroadenoma with extrasellar extension (H) 2007     causing obstructive hydrocephalus     Post-surgical hypothyroidism 2007     Uncomplicated asthma       Vocal cord paralysis       right     Xerostomia       due to radiation exposures          Past Surgical History          Past Surgical History:   Procedure Laterality Date     cymetra injection         ESOPHAGOSCOPY, GASTROSCOPY, DUODENOSCOPY (EGD), COMBINED   6/20/2013     Procedure: COMBINED ESOPHAGOSCOPY, GASTROSCOPY, DUODENOSCOPY (EGD), BIOPSY SINGLE OR MULTIPLE;  gastroscopy;  Surgeon: Leslie Guadarrama MD;  Location: [facility]     HERNIA REPAIR Right       lipoma resection chest wall Right 11/09     right selective neck dissection level 2, 3, 4   11/3/09     right  shunt placement   6/28/07     THYMECTOMY N/A 1/3/2017     Procedure: THYMECTOMY;  Surgeon: Ernesto Armstrong MD;  Location: [facility]     THYROIDECTOMY   11/27/07     TRANSCERVICAL EXTENDED MEDIASTINAL LYMPHADENECTOMY N/A 1/3/2017     Procedure: TRANSCERVICAL EXTENDED MEDIASTINAL LYMPHADENECTOMY;  Surgeon: Ernesto Armstrong MD;  Location: [facility]     transnasal endoscopic resection of pituitary adenoma   8/13/2007         Lipoma resection 7/27/16 -right shoulder       Current Outpatient Prescriptions           Current Outpatient Prescriptions   Medication Sig Dispense Refill     zolpidem (AMBIEN) 10 MG tablet Take 10 mg by  mouth         traZODone (DESYREL) 100 MG tablet take 1 tab of 50 mg daily   0     Carboxymethylcellulose Sod PF (REFRESH PLUS) 0.5 % SOLN ophthalmic solution Place 1 drop into both eyes 3 times daily as needed for dry eyes         amLODIPine (NORVASC) 2.5 MG tablet TK 1 T PO QD   4     alendronate (FOSAMAX) 70 MG tablet Take 1 tablet (70 mg) by mouth every 7 days 16 tablet 3     ranibizumab (LUCENTIS) 0.5 MG/0.05ML SOLN 0.05 mLs (0.5 mg) by Intravitreal route every 28 days 0.05 mL 11     aflibercept (EYLEA) 2 MG/0.05ML SOLN injection 0.05 mLs (2 mg) by Intravitreal route every 28 days 0.05 mL 11     hydrocortisone (CORTEF) 10 MG tablet Take one tablet by mouth daily in the morning, 1/2 tablet at 2  tablet 3     ranitidine (ZANTAC) 300 MG tablet           COMBIVENT RESPIMAT  MCG/ACT inhaler INL 1 PUFF PO QID   1     diclofenac (VOLTAREN) 1 % GEL topical gel Place onto the skin 4 times daily         fish oil-omega-3 fatty acids 1000 MG capsule Take 2 g by mouth daily         fluticasone (FLONASE) 50 MCG/ACT spray Spray 2 sprays into both nostrils daily         ibuprofen (ADVIL/MOTRIN) 400 MG tablet Take 400-600 mg by mouth every 6 hours as needed for moderate pain          levothyroxine (SYNTHROID/LEVOTHROID) 137 MCG tablet Take 1 tablet (137 mcg) by mouth daily (Patient taking differently: Take 137 mcg by mouth every morning ) 120 tablet 3     GuaiFENesin (MUCUS RELIEF ADULT PO) Take 400 mg by mouth 2 times daily as needed          TAMSULOSIN HCL PO Take 0.4 mg by mouth At Bedtime              He confirms he is taking LT4, HC,     Physical Exam   GENERAL: elderly man in NAD; His wife is present . He is very [place]--his wife yells in his ear every question I ask  There were no vitals taken for this visit.  SKIN: normal color , normal temperature  HEENT: no scleral icterus  NEURO: Alert, responds appropriately to questions, moves all extremities no tremor         DATA REVIEW:  Combined Report of:    PET and CT  on  4/12/2018 4:01 PM :      1. PET of the neck, chest, abdomen, and pelvis.  2. PET CT Fusion for Attenuation Correction and Anatomical  Localization:    3. Diagnostic CT scan of the chest, abdomen, and pelvis without  intravenous contrast for interpretation.  3. CT of the chest, abdomen and pelvis obtained for diagnostic  interpretation.  4. 3D MIP and PET-CT fused images were processed on an independent  workstation and archived to PACS and reviewed by a radiologist.      Technique:      1. PET: The patient received 10.21 mCi of F-18-FDG; the serum glucose  was 108 prior to administration, body weight was 61.9 kg. Images were  evaluated in the axial, sagittal, and coronal planes as well as the  rotational whole body MIP. Images were acquired from the Vertex to the  Feet.      UPTAKE WAS MEASURED AT 60 MINUTES.       BACKGROUND:  Liver SUV max= 3.50,   Aorta Blood SUV Max: 2.70.       2. CT: Volumetric acquisition for clinical interpretation of the  chest, abdomen, and pelvis acquired at 3 mm sections . The chest,  abdomen, and pelvis were evaluated at 5 mm sections in bone, soft  tissue, and lung windows.        --      3. 3D MIP and PET-CT fused images were processed on an independent  workstation and archived to PACS and reviewed by a radiologist.      INDICATION: ; Mass of left lung      ADDITIONAL INFORMATION OBTAINED FROM EMR: History of papillary thyroid  cancer, bilateral node positive and treated with total thyroidectomy  and I-131 x2. History of cervical adenopathy treated with EtOH.      COMPARISON: CT chest 9/25/2017, 12/2/2016, 12/16/2015, 7/29/2015,  8/27/2014, 5/6/2014; CT cap 9/25/2017; PET CT 8/20/2009      FINDINGS:       HEAD/NECK:          Numerous other FDG avid cervical lymph nodes, with example lymph nodes  described below from series 3:  Image 82: Mild FDG uptake in a left submandibular lymph node measuring  7 mm with SUV max of 3.34.  Image 80: Mild FDG uptake in a 7 mm left level 5a  cervical lymph node  with SUV max of 3.50.  Image 81: Mild FDG uptake in a cluster of several small left level 4  cervical lymph nodes with SUV max of 3.49.      Intense FDG uptake in a right retroclavicular lymph node measuring 9  mm (series 3 image 102) with SUV max of 23.77.      Unchanged mild FDG uptake in an occipital lymph node (series 3 image  69) measuring 5 mm, SUV max of 2.33, previously 2.05 on 8/20/2009.  Additional unchanged (since prior PET/CT a 22,009) mild FDG uptake in  the area of the left sella, likely related to the patient's residual  pituitary adenoma.      Stable right parietal approach ventriculostomy catheter with the tip  slightly protruding into the left frontal lobe anterior to the  anterior horn of the left lateral ventricle. The ventricles are normal  size and proportionate to the cerebral sulci. No extra-axial fluid  collection.      The paranasal sinuses are clear. The mastoid air cells are clear.   Intense FDG uptake and mild enlargement of the tonsillar tissue.  The mucosal pharyngeal space, the , prevertebral and carotid  spaces are within normal limits.       The thyroid gland is surgically absent.      Right vocal cord paralysis with physiologic left vocal cord FDG  uptake.       CHEST:  Intense FDG uptake within a right upper paratracheal lymph node  measuring 1.4 cm (series 3 image 106) with SUV Max 68.43, which has  increased in size since 1/4/2017 when it measured 8 mm. FDG uptake  within several normal sized left axillary lymph nodes, with the  largest measuring 7 mm and with a max SUV of 5.02 (series 3 image  114).      There is a 1.4 x 1.1 cm left upper lobe partially calcified granuloma,  which does not demonstrate significant FDG uptake, and is not  significantly changed in size since at least 5/6/2014.      The heart size is within normal limits. No pericardial effusion.  Scattered mild coronary artery calcifications. The thoracic aorta and  main pulmonary  artery are normal caliber. Several unchanged  mediastinal and right hilar calcified lymph nodes.      Central airways patent. No pleural effusion or pneumothorax. No large  consolidation. The previously seen spiculated nodule in the lingula of  the left upper lobe has decreased in size and now measures 7 mm  (series 6 image 122) and does not demonstrate any suspicious FDG  uptake. Several scattered tree-in-bud opacities in the right upper  lobe and right middle lobe (for example series 6 image 86 and 78) are  overall less conspicuous in comparison to the prior CT of the chest on  9/25/2017.      ABDOMEN AND PELVIS:  Normal-sized 4 mm left iliac lymph node with mild FDG uptake, max SUV  3.39. Additional small right iliac lymph node measuring 4 mm (series 3  image 257, with mild FDG uptake SUV max 4.40. Focal uptake within the  posterior peripheral prostate gland in the area of calcifications, but  otherwise no CT correlate lesion is identified, max SUV 6.27.       shunt extending down the right thorax and into the peritoneal  cavity with the tip in the lower central anterior abdomen.      No suspicious hepatic lesion. Cholelithiasis without evidence of  intrahepatic or extra hepatic biliary dilation. The spleen is normal  size. The pancreas and adrenal glands are normal. Large simple renal  cyst extending from the superior pole of left kidney. No renal mass.  No hydronephrosis. Urinary bladder is decompressed, but otherwise  unremarkable. The large and small bowel are normal caliber. Colonic  diverticulosis without evidence of diverticulitis. The appendix is  normal. No enlarged intraperitoneal or inguinal lymph nodes by CT  short axis size criteria. Abdominal aorta is normal caliber with  scattered mild atherosclerotic calcifications. No intraperitoneal free  air or free fluid.      LOWER EXTREMITIES:   No abnormal masses or hypermetabolic lesions.      BONES:   There are no suspicious lytic or blastic osseous  lesions.  There is no  abnormal FDG uptake in the skeleton. Multilevel degenerative changes  of the spine, most pronounced in the lumbar spine where there is disc  space narrowing from L2 through S1 and mild lower lumbar facet  arthropathy. Small sclerotic foci in the intertrochanteric region of  the right femur is likely a bone island. Similar sclerotic foci in the  right iliac, without FDG uptake in either focus.              IMPRESSION: In this patient with history of papillary thyroid  carcinoma status post resection and ablation x 2:  1. Suspicious intense FDG uptake in an enlarging right upper  paratracheal lymph node measuring up to 1.4 cm, previously 8 mm on  1/4/2017. Additional FDG avid right retroclavicular lymph node also  suggestive of metastasis. Recommend biopsy.   2. Focal FDG uptake in the peripheral and posterior prostate gland.  Recommend obtaining a prostate-specific antigen.  3. Numerous indeterminate nonenlarged but mildly FDG avid cervical,  left axillary, and biiliac lymph nodes.  4. The spiculated nodule previously seen in the lingula of the left  upper lobe has decreased in size and now measures 7 mm (previously  measured up to 1.7 cm) does not demonstrate increased FDG uptake and  is likely benign.  5. Improving tree-in-bud opacities predominantly in the right lung  since the prior CT, most consistent with a resolving infectious or  inflammatory process. Additionally, there is sequela of prior  granulomatous disease including a stable 1.4 cm left upper lobe  granuloma.      I have personally reviewed the examination and initial interpretation  and I agree with the findings.      STEVEN ROWLAND MD      EXAMINATION: Soft tissue neck ultrasound, 4/16/2018 2:42 PM      COMPARISON: Ultrasound 9/25/2017, PET/CT 4/12/2018     HISTORY: Suspicious finding on PET/CT     FINDINGS:     Lymph nodes are measured bilaterally with measurements given in  craniocaudal, transverse and AP dimensions as  follows:     Right:  Level 1: No lymphadenopathy  Level 2: 7 x 4 x 18 mm lymph node with a preserved fatty hilum, and a  5 x 4 x 5 mm lymph node with a preserved fatty hilum  Level 3: No lymphadenopathy  Level 4: No lymphadenopathy  Level 5: No lymphadenopathy  Level 6: 1.3 x 1.0 x 1.2 cm solid nodule with heterogeneous  echotexture and internal blood flow, previously 1.1 x 1.2 x 1.0 cm  Level 7:1.4 x 1.5 x 1.5 cm solid nodule with heterogeneous echotexture  and no hypervascularity, previously 1.2 x 0.9 x 0.9 cm     Left:  Level 1: No lymphadenopathy  Level 2: 1.2 x 0.7 x 2.1 cm lymph node with a preserved fatty hilum  Level 3: Lymphadenopathy  Level 4: 8 x 3 x 10 mm lymph node without a clear fatty hilum; 8 x 5 x  6 mm lymph node with a vascular pedicle. These are unchanged.  Level 5: 10 x 5 x 15 mm lymph node without a clear fatty hilum, not  substantially changed.  Level 6: No lymphadenopathy  Level 7: No lymphadenopathy         IMPRESSION:   1. Suspicious right level 6 and 7 nodules which were hypermetabolic on  PET/CT 4/12/2018. The level 7 lymph node has increased in size from  ultrasound 9/25/2017. Findings are concerning for recurrent/metastatic  disease. Consider tissue sampling.  2. Additional lymph node measurements as described above.     I have personally reviewed the examination and initial interpretation  and I agree with the findings.     KEENA GUTIERREZ MD    Copath Report 04/23/2018  2:05 PM 88      Patient Name: BUCK CUEVAS   MR#: [id number]   Specimen #: [id number]   Collected: 4/23/2018   Received: 4/23/2018   Reported: 4/23/2018 16:08   Ordering Phy(s): TENA BLANC     For improved result formatting, select 'View Enhanced Report Format' under    Linked Documents section.     SPECIMEN/STAIN PROCESS:   A: FNA-lymph node, Right Level 6        Pap-Cyto x 3, Diff Quick Stain-cyto x 3   B: FNA-lymph node, Right level 7        Pap-Cyto x 1, Diff Quick Stain-cyto x 1      ----------------------------------------------------------------     CYTOLOGIC INTERPRETATION:     A. Lymph Node, Right Level 6, Ultrasound-Guided Fine Needle Aspiration:   Positive for malignancy, morphologically consistent with metastatic   papillary thyroid carcinoma.   Specimen Adequacy: Satisfactory for evaluation.     B. Lymph Node, Right level 7, Ultrasound-Guided Fine Needle Aspiration:   Positive for malignancy, morphologically consistent with metastatic   papillary thyroid carcinoma.   Specimen Adequacy: Satisfactory for evaluation.     I have personally reviewed all specimens and/or slides, including the   listed special stains, and used them   with my medical judgement to determine or confirm the final diagnosis.     Electronically signed out by:   Ron Holley M.D., Physicians     Processed and screened at [facility]     CLINICAL HISTORY:   The patient is a 77-year-old male with a history of thyroidectomy for   papillary thyroid cancer, now with   abnormal cervical lymph node imaging.     ,     GROSS:   A. FNA-lymph node, Right Level 6: Received are 3 fixed slides, processed   for Pap stain, and 3 air dried   slides, processed for Diff Quik stain. Needle washes into saline were   performed and sent directly from   Radiology for thyroglobulin assay.     B. FNA-lymph node, Right level 7: Received are 1 fixed slide, processed   for Pap stain, and 1 air dried slide,   processed for Diff Quik stain. Needle washes into saline were performed   and sent directly from Radiology for   thyroglobulin assay.     INTRAOPERATIVE CONSULTATION:   FNA Performance: Fine needle aspiration was not performed by [facility],    Pathology staff.     Immediate Adequacy: On site specimen adequacy evaluation was performed by   Dr. RAYRAY Holley III, MD via   telepathology.     Onsite adequacy/interpretation:   Pass A1: Adequate. Pass A2: Put directly into saline. Passes  A3:A4:   Adequate   Pass B1: Adequate: Pass B2: Put directly into saline.     MICROSCOPIC:   Microscopic examination performed.     Gui Henao MD, [facility]          Ron Holley MD     CPT Codes:   A: 42806-[id number]-LBD, 26138-BLES-KZM, 91814-JFWV   B: 92124-[id number], 99934-DWIO-AOF, 89490-TDBN     TESTING LAB LOCATION:   [facility], [address]   [id number] [phone]     COLLECTION SITE:   Client[facility]   Location:  [facility] (B)     Resident   IF

## 2018-04-16 NOTE — TELEPHONE ENCOUNTER
Spoke w/ Pts wife Via Cantonese speaking  who accepted and confirmed appt for 1:00pm - they will come 20 minutes early.  requested, not guaranteed.

## 2018-04-17 ENCOUNTER — TELEPHONE (OUTPATIENT)
Dept: ENDOCRINOLOGY | Facility: CLINIC | Age: 78
End: 2018-04-17

## 2018-04-17 PROBLEM — R91.8 PULMONARY NODULES: Status: ACTIVE | Noted: 2018-04-17

## 2018-04-17 NOTE — TELEPHONE ENCOUNTER
----- Message from Corina Potts MD sent at 4/17/2018 10:00 AM CDT -----  Regarding:  call  Please have  call and read him the letter I wrote today.  Schedule US guided FNAB lymph nodes in radiology.    Corina Potts

## 2018-04-17 NOTE — TELEPHONE ENCOUNTER
Spoke w/ Pt and Pts wife via Cantonese speaking : read The following from Dr Potts    Wife states understanding about follow up with Urologist and asks we call her to schedule.    April 17, 2018     Dear ,     We are writing to inform you of your test results.     The prostate cancer test was not high, which is good.  You should still see the urologist about the abnormal prostate activity on the PET scan.     The neck ultrasound showed 2 abnormal lymph nodes which likely correlate to the abnormal areas on the right neck of the PET scan.  I recommend we get a needle biopsy.  I am placing orders for this. Please schedule this at your earliest convenience ([phone] to schedule).

## 2018-04-18 ENCOUNTER — OFFICE VISIT (OUTPATIENT)
Dept: OPHTHALMOLOGY | Facility: CLINIC | Age: 78
End: 2018-04-18
Attending: OPHTHALMOLOGY
Payer: MEDICARE

## 2018-04-18 ENCOUNTER — PRE VISIT (OUTPATIENT)
Dept: UROLOGY | Facility: CLINIC | Age: 78
End: 2018-04-18

## 2018-04-18 DIAGNOSIS — H26.9 NUCLEAR CATARACT, NONSENILE: Primary | ICD-10-CM

## 2018-04-18 DIAGNOSIS — H35.052 CHOROIDAL NEOVASCULAR MEMBRANE, LEFT: ICD-10-CM

## 2018-04-18 DIAGNOSIS — H35.3220 EXUDATIVE AGE-RELATED MACULAR DEGENERATION, LEFT EYE, STAGE UNSPECIFIED (H): ICD-10-CM

## 2018-04-18 LAB — LAB SCANNED RESULT: NORMAL

## 2018-04-18 PROCEDURE — 92134 CPTRZ OPH DX IMG PST SGM RTA: CPT | Mod: ZF | Performed by: OPHTHALMOLOGY

## 2018-04-18 PROCEDURE — 25000128 H RX IP 250 OP 636: Mod: ZF | Performed by: OPHTHALMOLOGY

## 2018-04-18 PROCEDURE — 40000269 ZZH STATISTIC NO CHARGE FACILITY FEE: Mod: ZF | Performed by: OPHTHALMOLOGY

## 2018-04-18 PROCEDURE — T1013 SIGN LANG/ORAL INTERPRETER: HCPCS | Mod: U3,ZF

## 2018-04-18 PROCEDURE — 76519 ECHO EXAM OF EYE: CPT | Mod: ZF | Performed by: OPHTHALMOLOGY

## 2018-04-18 PROCEDURE — 67028 INJECTION EYE DRUG: CPT | Mod: ZF | Performed by: OPHTHALMOLOGY

## 2018-04-18 RX ADMIN — RANIBIZUMAB 0.5 MG: 10 INJECTION, SOLUTION INTRAVITREAL at 11:13

## 2018-04-18 ASSESSMENT — TONOMETRY
IOP_METHOD: TONOPEN
OS_IOP_MMHG: 15
OD_IOP_MMHG: 14

## 2018-04-18 ASSESSMENT — VISUAL ACUITY
CORRECTION_TYPE: GLASSES
OD_CC: 20/50
OD_PH_CC: 20/40-2
OS_CC: 20/500 ECC
METHOD: SNELLEN - LINEAR

## 2018-04-18 ASSESSMENT — EXTERNAL EXAM - RIGHT EYE: OD_EXAM: NORMAL

## 2018-04-18 ASSESSMENT — CUP TO DISC RATIO
OS_RATIO: 0.4
OD_RATIO: 0.5

## 2018-04-18 ASSESSMENT — SLIT LAMP EXAM - LIDS
COMMENTS: NORMAL
COMMENTS: NORMAL

## 2018-04-18 ASSESSMENT — EXTERNAL EXAM - LEFT EYE: OS_EXAM: NORMAL

## 2018-04-18 NOTE — NURSING NOTE
Chief Complaints and History of Present Illnesses   Patient presents with     Follow Up For     Wet Age related macular degeneration left eye with CNVM OS. Dry AMD right eye.     HPI    Affected eye(s):  Both   Symptoms:     No blurred vision   No decreased vision         Do you have eye pain now?:  No      Comments:  No changes in vision or eyes  Per last note, pt PAMs to 20/60 OS  Macarena URIARTE 9:32 AM April 18, 2018            '

## 2018-04-18 NOTE — PROGRESS NOTES
CC -  Wet AMD  HPI -   Vision stable since last visit.   Travis Peres is a  76 year old year-old patient presenting for referral for wet AMD OS from Dr. NADIYA Alford.  Noted vision loss OS for past few months leading up to August.  S/p Avastin injections x 4 left eye, last 12/6/16.    H/o brain tumor seen by NADIYA Alford with VFD  OK with resident injections    PAST OCULAR HISTORY  No surgeries    RETINAL IMAGING:  Optical Coherence Tomography 4-18-18  right eye-  Few small drusen   left eye - large FVPED, mild intraretinal fluid and subretinal fluid; stable    FA (10.7.16)  OD: normal  OS: (transit) early hyperfluorescence with late leakage c/w classic CNVM temporal to fovea with diffuse late leakage superior to fovea c/w occult CNVM    ICG (10.7.16)  OD: normal  OS: early hyperfluorescence with late leakage temporal to the fovea, likely CNVM net, no polyps     intraocular lens calcs 04/18/18   Right eye: 22.49  Left eye: 23.38    ASSESSMENT & PLAN  1. 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    2.  Dry Age related macular degeneration right eye  AREDS supplementation is recommended.  Weekly Amsler Grid use was discussed and training performed.  A diet of leafy green vegetables was encouraged.  Return precautions were given.    3. PVD OU   - RT/RD precautions    4.  visually significant cataract left eye    - patient with declining acuity   - PAMs to 20/60 OS   - plan for Cataract extraction intraocular lens left eye   I reviewed the indications, risks, benefits, and alternatives of the proposed surgical procedure including, but not limited to, failure to  improve vision or further loss of vision,  and need for additional surgery, bleeding, infection, loss of vision and the remote possibility of complications of anesthesia. 1:1000 risk of infection/bleed/loss of eye; 1:100 risk of RD and need for further surgeryPatient agreed to proceed with surgery.  I provided multiple opportunities for the questions, answered all questions to the best of my ability, and confirmed that my answers and my discussion were understood.     5.  Brain tumor  History of pituitary adenoma status-post resection.  R homonimous hemianopsia   - sees M Prashanth   - VFD OD > OS per family    RTC: after Cataract extraction intraocular lens     Patient experienced pain again with injection after waiting extra time and gel. Will think about lido subconj next visit.     ~~~~~~~~~~~~~~~~~~~~~~~~~~~~~~~~~~   Complete documentation of historical and exam elements from today's encounter can be found in the full encounter summary report (not reduplicated in this progress note).  I personally obtained the chief complaint(s) and history of present illness.  I confirmed and edited as necessary the review of systems, past medical/surgical history, family history, social history, and examination findings as documented by others; and I examined the patient myself.  I personally reviewed the relevant tests, images, and reports as documented above.  I formulated and edited as necessary the assessment and plan and discussed the findings and management plan with the patient and family and No resident or fellow assisted with the procedures performed.  I performed the procedures myself.    Nadiya Allen MD  .  Retina Service   Department of Ophthalmology and Visual Neurosciences   [facility]  Phone: [phone]   Fax: [phone]

## 2018-04-18 NOTE — MR AVS SNAPSHOT
After Visit Summary   4/18/2018    Travis Peres    MRN: [id number]           Patient Information     Date Of Birth          1940        Visit Information        Provider Department      4/18/2018 9:00 AM Devan Stephen; Nadiya Allen MD [facility]        Today's Diagnoses     Nuclear cataract, nonsenile    -  1    Exudative age-related macular degeneration, left eye, stage unspecified (H)        Choroidal neovascular membrane, left           Follow-ups after your visit        Your next 10 appointments already scheduled     May 08, 2018 10:30 AM CDT   Post-Op with Nadiya Allen MD   Eye Clinic ([facility]    [facility]  [address]  [address]  [facility] [address] [phone]            May 17, 2018  1:00 PM CDT   Post-Op with Nadiya Allen MD   Eye Clinic ([facility])    [facility] Building  [address]  [address]  [facility] [address] [phone]            May 24, 2018  3:20 PM CDT   (Arrive by 3:05 PM)   New Patient Visit with Faraz Amato MD   [facility] for Prostate and Urologic Cancers ([facility] [place])    [address]  4th Floor  [facility] [id number] [phone]            Jun 20, 2018  2:50 PM CDT   (Arrive by 2:35 PM)   RETURN ENDOCRINE with Corina Potts MD   [facility] Endocrinology ([facility])    [address]  3rd Floor  [facility] [address] [phone]              Who to contact     Please call your clinic at [phone] to:    Ask questions about your health    Make or cancel appointments    Discuss your medicines    Learn about your test results    Speak to your doctor            Additional Information About Your Visit        PrintLess Plans Information     PrintLess Plans is an electronic gateway that provides easy, online access to your medical records. With PrintLess Plans, you can request a  clinic appointment, read your test results, renew a prescription or communicate with your care team.     To sign up for GoPlaceIthart visit the website at [email or web address]   You will be asked to enter the access code listed below, as well as some personal information. Please follow the directions to create your username and password.     Your access code is: [email or web address]  Expires: 6/3/2018 10:21 PM     Your access code will  in 90 days. If you need help or a new code, please contact your [facility] or call [phone] for assistance.        Care EveryWhere ID     This is your Care EveryWhere ID. This could be used by other organizations to access your [place] medical records  [id number]         Blood Pressure from Last 3 Encounters:   18 140/76   18 135/67   17 163/80    Weight from Last 3 Encounters:   18 61 kg (134 lb 6.4 oz)   18 61.9 kg (136 lb 6.4 oz)   17 59.3 kg (130 lb 12.8 oz)              We Performed the Following     IOL Biometry w/ IOL calc OU (both eye)     Lucentis (Ranibizumab) 0.5MG Intravitreal Injection OS (left eye)     OCT Retina Spectralis OU (both eyes)     Emmanuelle-Operative Worksheet (Retina)        Primary Care Provider Office Phone # Fax #    Karol Alvarez [phone]       [place] [address]        Equal Access to Services     LAYLA LUCAS : Hadii jake mondragon hadasho Soomaali, waaxda luqadaha, qaybta kaalmada adeegyada, roxanne mitchell. So [facility] [phone].    ATENCIÓN: Si habla español, tiene a reese disposición servicios gratuitos de asistencia lingüística. Llame al [phone].    We comply with applicable federal civil rights laws and [place] laws. We do not discriminate on the basis of race, color, national origin, age, disability, sex, sexual orientation, or gender identity.            Thank you!     Thank you for choosing  EYE CLINIC  for your care. Our goal is always to provide you with excellent care. Hearing back from our patients is one way we can continue to improve our services. Please take a few minutes to complete the written survey that you may receive in the mail after your visit with us. Thank you!             Your Updated Medication List - Protect others around you: Learn how to safely use, store and throw away your medicines at www.disposemymeds.org.          This list is accurate as of 4/18/18 11:49 AM.  Always use your most recent med list.                   Brand Name Dispense Instructions for use Diagnosis    aflibercept 2 MG/0.05ML Soln injection    EYLEA    0.05 mL    0.05 mLs (2 mg) by Intravitreal route every 28 days    CNVM (choroidal neovascular membrane), left       alendronate 70 MG tablet    FOSAMAX    16 tablet    Take 1 tablet (70 mg) by mouth every 7 days    Postsurgical hypothyroidism, Papillary thyroid carcinoma (H), Pituitary adenoma (H), Hypopituitarism (H)       amLODIPine 2.5 MG tablet    NORVASC     TK 1 T PO QD        Carboxymethylcellulose Sod PF 0.5 % Soln ophthalmic solution    REFRESH PLUS     Place 1 drop into both eyes 3 times daily as needed for dry eyes        COMBIVENT RESPIMAT  MCG/ACT inhaler   Generic drug:  Ipratropium-Albuterol      INL 1 PUFF PO QID        fish oil-omega-3 fatty acids 1000 MG capsule      Take 2 g by mouth daily        fluticasone 50 MCG/ACT spray    FLONASE     Spray 2 sprays into both nostrils daily        hydrocortisone 10 MG tablet    CORTEF    180 tablet    Take one tablet by mouth daily in the morning, 1/2 tablet at 2 PM    Postsurgical hypothyroidism, Papillary thyroid carcinoma (H), Abnormal finding on imaging       ibuprofen 400 MG tablet    ADVIL/MOTRIN     Take 400-600 mg by mouth every 6 hours as needed for moderate pain        levothyroxine 137 MCG tablet    SYNTHROID/LEVOTHROID    90 tablet    Take 1 tablet (137 mcg) by mouth daily    Postsurgical  hypothyroidism, Papillary thyroid carcinoma (H), Abnormal finding on imaging       MUCUS RELIEF ADULT PO      Take 400 mg by mouth 2 times daily as needed    Papillary thyroid carcinoma (H), Postsurgical hypothyroidism, Malignant neoplasm of thyroid gland (H), Cancer of thyroid (H), 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), Osteopenia, Hypopituitarism (H), Pituitary adenoma (H), Noncompliance with medication regimen       ranibizumab 0.5 MG/0.05ML Soln    LUCENTIS    0.05 mL    0.05 mLs (0.5 mg) by Intravitreal route every 28 days    CNVM (choroidal neovascular membrane), left       ranitidine 300 MG tablet    ZANTAC          TAMSULOSIN HCL PO      Take 0.4 mg by mouth At Bedtime        traZODone 100 MG tablet    DESYREL     take 1 tab of 50 mg daily        VOLTAREN 1 % Gel topical gel   Generic drug:  diclofenac      Place onto the skin 4 times daily        zolpidem 10 MG tablet    AMBIEN     Take 10 mg by mouth

## 2018-04-18 NOTE — TELEPHONE ENCOUNTER
MEDICAL RECORDS REQUEST   [facility]  Urology Clinic  [address] [place] [address]  PHONE: [phone]  Fax: [phone]        FUTURE VISIT INFORMATION                                                   Travis Peres, : 1940 scheduled for future visit at [facility]    APPOINTMENT INFORMATION:    Date: 2018    Provider:  RODNEY MILTON    Reason for Visit/Diagnosis: ABNORMAL PET SCAN PROSTATE ACTIVITY    REFERRAL INFORMATION:    Referring provider:  SELF    Specialty: SELF    Referring providers clinic:  SELF    Clinic contact number:  SELF    RECORDS REQUESTED FOR VISIT                                                     NOTES  STATUS/DETAILS   OFFICE NOTE from referring provider  no   OFFICE NOTE from other specialist  no   DISCHARGE SUMMARY from hospital  no   DISCHARGE REPORT from the ER  no   OPERATIVE REPORT  no   MEDICATION LIST  yes       PRE-VISIT CHECKLIST      Record collection complete Yes ALL RECORDS IN EPIC .. CDK   Appointment appropriately scheduled           (right time/right provider) Yes   Joint diagnostic appointment coordinated correctly          (ensure right order & amount of time) Yes   MyChart activation If no, please explain IN PROCESS   Questionnaire complete If no, please explain IN PROCESS     Completed by: Leslie Gurrola

## 2018-04-20 NOTE — TELEPHONE ENCOUNTER
I see the biopsy hasn't been scheduled yet.  Please connect with them again.  Read them the follow up letter I wrote today.  Get the biopsy scheduled for ASAP.    Corina Potts

## 2018-04-23 ENCOUNTER — RADIANT APPOINTMENT (OUTPATIENT)
Dept: ULTRASOUND IMAGING | Facility: CLINIC | Age: 78
End: 2018-04-23
Payer: COMMERCIAL

## 2018-04-23 DIAGNOSIS — R59.0 CERVICAL ADENOPATHY: ICD-10-CM

## 2018-04-23 DIAGNOSIS — C73 PAPILLARY THYROID CARCINOMA (H): ICD-10-CM

## 2018-04-23 DIAGNOSIS — R93.89 ABNORMAL FINDING ON IMAGING: ICD-10-CM

## 2018-04-23 LAB — COPATH REPORT: NORMAL

## 2018-04-23 RX ORDER — LIDOCAINE HYDROCHLORIDE 10 MG/ML
5 INJECTION, SOLUTION INFILTRATION; PERINEURAL ONCE
Status: COMPLETED | OUTPATIENT
Start: 2018-04-23 | End: 2018-04-23

## 2018-04-23 RX ADMIN — LIDOCAINE HYDROCHLORIDE 3 ML: 10 INJECTION, SOLUTION INFILTRATION; PERINEURAL at 11:52

## 2018-04-23 NOTE — MR AVS SNAPSHOT
MRN:[id number]                      After Visit Summary   4/23/2018    Travis Peres    MRN: [id number]           Visit Information        Provider Department      4/23/2018 10:45 AM LANGUAGE BANC;  PROCEDURAL RAD;  IMAGING NURSE; [address] [facility]           Review of your medicines          These changes are accurate as of 4/23/18 10:40 AM.  If you have any questions, ask your nurse or doctor.               CONTINUE these medicines which have NOT CHANGED        Dose / Directions    aflibercept 2 MG/0.05ML Soln injection   Commonly known as:  EYLEA   Used for:  CNVM (choroidal neovascular membrane), left        Dose:  2 mg   0.05 mLs (2 mg) by Intravitreal route every 28 days   Quantity:  0.05 mL   Refills:  11       alendronate 70 MG tablet   Commonly known as:  FOSAMAX   Used for:  Postsurgical hypothyroidism, Papillary thyroid carcinoma (H), Pituitary adenoma (H), Hypopituitarism (H)        Dose:  70 mg   Take 1 tablet (70 mg) by mouth every 7 days   Quantity:  16 tablet   Refills:  3       amLODIPine 2.5 MG tablet   Commonly known as:  NORVASC        TK 1 T PO QD   Refills:  4       Carboxymethylcellulose Sod PF 0.5 % Soln ophthalmic solution   Commonly known as:  REFRESH PLUS        Dose:  1 drop   Place 1 drop into both eyes 3 times daily as needed for dry eyes   Refills:  0       COMBIVENT RESPIMAT  MCG/ACT inhaler   Generic drug:  Ipratropium-Albuterol        INL 1 PUFF PO QID   Refills:  1       fish oil-omega-3 fatty acids 1000 MG capsule        Dose:  2 g   Take 2 g by mouth daily   Refills:  0       fluticasone 50 MCG/ACT spray   Commonly known as:  FLONASE        Dose:  2 spray   Spray 2 sprays into both nostrils daily   Refills:  0       hydrocortisone 10 MG tablet   Commonly known as:  CORTEF   Used for:  Postsurgical hypothyroidism, Papillary thyroid carcinoma (H), Abnormal finding on imaging        Take one tablet by mouth daily in the morning, 1/2 tablet at 2  PM   Quantity:  180 tablet   Refills:  3       ibuprofen 400 MG tablet   Commonly known as:  ADVIL/MOTRIN        Dose:  400-600 mg   Take 400-600 mg by mouth every 6 hours as needed for moderate pain   Refills:  0       levothyroxine 137 MCG tablet   Commonly known as:  SYNTHROID/LEVOTHROID   Used for:  Postsurgical hypothyroidism, Papillary thyroid carcinoma (H), Abnormal finding on imaging        Dose:  137 mcg   Take 1 tablet (137 mcg) by mouth daily   Quantity:  90 tablet   Refills:  3       MUCUS RELIEF ADULT PO   Used for:  Papillary thyroid carcinoma (H), Postsurgical hypothyroidism, Malignant neoplasm of thyroid gland (H), Cancer of thyroid (H), 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), Osteopenia, Hypopituitarism (H), Pituitary adenoma (H), Noncompliance with medication regimen        Dose:  400 mg   Take 400 mg by mouth 2 times daily as needed   Refills:  0       ranibizumab 0.5 MG/0.05ML Soln   Commonly known as:  LUCENTIS   Used for:  CNVM (choroidal neovascular membrane), left        Dose:  0.5 mg   0.05 mLs (0.5 mg) by Intravitreal route every 28 days   Quantity:  0.05 mL   Refills:  11       ranitidine 300 MG tablet   Commonly known as:  ZANTAC        Refills:  0       TAMSULOSIN HCL PO        Dose:  0.4 mg   Take 0.4 mg by mouth At Bedtime   Refills:  0       traZODone 100 MG tablet   Commonly known as:  DESYREL        take 1 tab of 50 mg daily   Refills:  0       VOLTAREN 1 % Gel topical gel   Generic drug:  diclofenac        Place onto the skin 4 times daily   Refills:  0       zolpidem 10 MG tablet   Commonly known as:  AMBIEN        Dose:  10 mg   Take 10 mg by mouth   Refills:  0                Protect others around you: Learn how to safely use, store and throw away your medicines at www.disposemymeds.org.         Follow-ups after your visit        Your next 10 appointments already scheduled     Apr 23, 2018 10:45 AM CDT   US BIOPSY FINE NEEDLE ASPIRATION LYMPH NODE with UCUS3, VICTORIA IMAGING NURSE, UC  PROCEDURAL RAD   [facility] ([facility] [place])    [address]  1st Floor  [facility] [address] [phone]           Bring a list of your medicines to the exam. Include vitamins, minerals and over-the-counter drugs.  Tell your doctor in advance:   If you are or may be pregnant.   If you are taking Coumadin (or any other blood thinners) 5 days prior to the exam for any special instructions.  IF YOUR DOCTOR HAS TOLD YOU THAT YOU WILL BE RECEIVING SEDATION (medicine to help you relax): (Typically sedation is only for liver exams at [facility] and Renal Biopsy exams in Pediatrics)   See your family doctor for an exam within 30 days of treatment.   Plan for an adult to drive you home and stay with you for at least 24 hours.   No eating or drinking for 4 hours before your test. You may take medicine with small sips of water.   If you have diabetes:If you take insulin, call your diabetes care team for any special instructions for this exam.  Please call the Imaging Department at your exam site with any questions.             May 07, 2018   Procedure with Nadiya Allen MD   [facility] PeriOP Services (--)    [address]  [facility] [address]   [phone]            May 08, 2018 10:30 AM CDT   Post-Op with Nadiya Allen MD   Eye Clinic ([facility])    [address] [address]   [phone]            May 17, 2018  1:00 PM CDT   Post-Op with Nadiya Allen MD   Eye Clinic ([facility])    [address] [address]   [phone]            May 24, 2018  3:20 PM CDT   (Arrive by 3:05 PM)   New Patient Visit with Faraz Amato MD   [facility] for Prostate and Urologic Cancers ([facility] [place])    [address] [place] [address]  [facility] [address] [phone]            2018  2:50 PM CDT   (Arrive by 2:35 PM)   RETURN ENDOCRINE with Corina Potts MD   [facility] End[facility]ology ([facility] [place])    [address]  3rd [facility] [id number] [phone]               Care Instructions        Further instructions from your care team       Directions for after your Thyroid Fine Needle Aspiration:    You can remove your bandage within a few hours.  The site of the biopsy may be sore for a day or two after the procedure. You can take over-the-counter pain medicine if needed.  Notify your doctor if you have any of the following:    Fever above 101 degrees    Swelling in the area of the biopsy    Redness or leaking from the biopsy site  Your doctor will notify you of the results in 2-3 days.     Additional Information About Your Visit        RealMassivehart Information     Mpax is an electronic gateway that provides easy, online access to your medical records. With Mpax, you can request a clinic appointment, read your test results, renew a prescription or communicate with your care team.     To sign up for Mpax visit the website at [email or web address]   You will be asked to enter the access code listed below, as well as some personal information. Please follow the directions to create your username and password.     Your access code is: [email or web address]  Expires: 6/3/2018 10:21 PM     Your access code will  in 90 days. If you need help or a new code, please contact your [facility] or call [phone] for assistance.        Care EveryWhere ID     This is your Care EveryWhere ID. This could be used by other organizations to access your [place] medical records  [id number]         Primary Care Provider Office Phone # Fax #    Karol FayChino [phone]      Equal Access to Services     LAYLA LUCAS AH: Sesar lyon  Soamarilisali, wadianelysda luqadaha, qaybta kaalmada kenneth, roxanne straussrama stephen. So [facility] [phone].    ATENCIÓN: Si barbara abel, tiene a reese disposición servicios gratuitos de asistencia lingüística. Tiffanie al [phone].    We comply with applicable federal civil rights laws and [place] laws. We do not discriminate on the basis of race, color, national origin, age, disability, sex, sexual orientation, or gender identity.            Thank you!     Thank you for choosing [facility] for your care. Our goal is always to provide you with excellent care. Hearing back from our patients is one way we can continue to improve our services. Please take a few minutes to complete the written survey that you may receive in the mail after you visit with us. Thank you!             Medication List: This is a list of all your medications and when to take them. Check marks below indicate your daily home schedule. Keep this list as a reference.          This list is accurate as of 4/23/18 10:40 AM.  Always use your most recent med list.               Medications           Morning Afternoon Evening Bedtime As Needed    aflibercept 2 MG/0.05ML Soln injection   Commonly known as:  EYLEA   0.05 mLs (2 mg) by Intravitreal route every 28 days                                alendronate 70 MG tablet   Commonly known as:  FOSAMAX   Take 1 tablet (70 mg) by mouth every 7 days                                amLODIPine 2.5 MG tablet   Commonly known as:  NORVASC   TK 1 T PO QD                                Carboxymethylcellulose Sod PF 0.5 % Soln ophthalmic solution   Commonly known as:  REFRESH PLUS   Place 1 drop into both eyes 3 times daily as needed for dry eyes                                COMBIVENT RESPIMAT  MCG/ACT inhaler   INL 1 PUFF PO QID   Generic drug:  Ipratropium-Albuterol                                fish oil-omega-3 fatty acids 1000 MG capsule   Take 2 g by mouth daily                                 fluticasone 50 MCG/ACT spray   Commonly known as:  FLONASE   Spray 2 sprays into both nostrils daily                                hydrocortisone 10 MG tablet   Commonly known as:  CORTEF   Take one tablet by mouth daily in the morning, 1/2 tablet at 2 PM                                ibuprofen 400 MG tablet   Commonly known as:  ADVIL/MOTRIN   Take 400-600 mg by mouth every 6 hours as needed for moderate pain                                levothyroxine 137 MCG tablet   Commonly known as:  SYNTHROID/LEVOTHROID   Take 1 tablet (137 mcg) by mouth daily                                MUCUS RELIEF ADULT PO   Take 400 mg by mouth 2 times daily as needed                                ranibizumab 0.5 MG/0.05ML Soln   Commonly known as:  LUCENTIS   0.05 mLs (0.5 mg) by Intravitreal route every 28 days                                ranitidine 300 MG tablet   Commonly known as:  ZANTAC                                TAMSULOSIN HCL PO   Take 0.4 mg by mouth At Bedtime                                traZODone 100 MG tablet   Commonly known as:  DESYREL   take 1 tab of 50 mg daily                                VOLTAREN 1 % Gel topical gel   Place onto the skin 4 times daily   Generic drug:  diclofenac                                zolpidem 10 MG tablet   Commonly known as:  AMBIEN   Take 10 mg by mouth

## 2018-04-27 LAB
LAB SCANNED RESULT: ABNORMAL
LAB SCANNED RESULT: ABNORMAL

## 2018-04-30 ENCOUNTER — TELEPHONE (OUTPATIENT)
Dept: ENDOCRINOLOGY | Facility: CLINIC | Age: 78
End: 2018-04-30

## 2018-04-30 NOTE — TELEPHONE ENCOUNTER
Travis PCP  was notified to give hydrocortisone  mg  before sedation for cataract  surgery. She will make sure that happens.

## 2018-04-30 NOTE — TELEPHONE ENCOUNTER
----- Message from Corina Potts MD sent at 4/30/2018  3:30 PM CDT -----  Regarding: RE: cataract surgery need hydrocortisone    Contact: [phone]  Knowing how much trouble he has had in the past, I suggest they give stress dose hydrocortisone 100 mg IV prior to sedating him.     Corina Potts    ----- Message -----     From: Love Vela RN     Sent: 4/30/2018   3:13 PM       To: Corina Potts MD  Subject: cataract surgery need hydrocortisone             Cataract surgery next week   5/7/18 at [facility]  On hydrocortisone  Does he need to take extra hydrocortisone  on that day . He will be NPO  After midnight  but he can  take this with  Sips of water that morning and will be  Sedation with IV running   Main clinic phone  [phone] Vitaly salgado the PCP

## 2018-05-01 ENCOUNTER — TELEPHONE (OUTPATIENT)
Dept: OPHTHALMOLOGY | Facility: CLINIC | Age: 78
End: 2018-05-01

## 2018-05-01 NOTE — TELEPHONE ENCOUNTER
Pt scheduled for surgery Monday with dr. Allen    PHACOEMULSIFICATION CLEAR CORNEA WITH STANDARD INTRAOCULAR LENS IMPLANT Left Combined MAC with Retrobulbar   LEFT PHACOEMULSIFICATION CLEAR CORNEA WITH STANDARD INTRAOCULAR LENS IMPLANT (MAC/RETRO) (LANGUAGE:  CANTONESE)        PCP/RN left message on triage line at 1 PM about specific instructions for sedation for procedure.    Called at 1321    Specifications per PCP/endocrinology-- dr. Potts     Stress dose steroid     100 mg IV hydrocortisone prior to sedation    Panhypopituitary normally maintained on oral hydrocortisone 10 mg AM, 5 mg afternoon per dr. 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 Kim Knight RN to place orders also on pre-form and fax to [facility]  Will forward to surgery scheduler to help forward to appropriate personal in anesthesia   Will forward to dr. Allen/dr. Justin Matamoros RN 1:33 PM 05/01/18

## 2018-05-03 RX ORDER — MIRTAZAPINE 15 MG/1
15 TABLET, FILM COATED ORAL AT BEDTIME
Status: ON HOLD | COMMUNITY
End: 2020-10-29

## 2018-05-07 ENCOUNTER — SURGERY (OUTPATIENT)
Age: 78
End: 2018-05-07

## 2018-05-07 ENCOUNTER — ANESTHESIA (OUTPATIENT)
Dept: SURGERY | Facility: CLINIC | Age: 78
End: 2018-05-07
Payer: MEDICARE

## 2018-05-07 ENCOUNTER — HOSPITAL ENCOUNTER (OUTPATIENT)
Facility: CLINIC | Age: 78
Discharge: HOME | End: 2018-05-07
Attending: OPHTHALMOLOGY | Admitting: OPHTHALMOLOGY
Payer: MEDICARE

## 2018-05-07 ENCOUNTER — ANESTHESIA EVENT (OUTPATIENT)
Dept: SURGERY | Facility: CLINIC | Age: 78
End: 2018-05-07
Payer: MEDICARE

## 2018-05-07 VITALS
SYSTOLIC BLOOD PRESSURE: 158 MMHG | DIASTOLIC BLOOD PRESSURE: 82 MMHG | WEIGHT: 132.94 LBS | HEIGHT: 65 IN | RESPIRATION RATE: 16 BRPM | BODY MASS INDEX: 22.15 KG/M2 | OXYGEN SATURATION: 97 %

## 2018-05-07 DIAGNOSIS — H26.9 CATARACT OF LEFT EYE, UNSPECIFIED CATARACT TYPE: Primary | ICD-10-CM

## 2018-05-07 PROCEDURE — A9270 NON-COVERED ITEM OR SERVICE: HCPCS | Performed by: OPHTHALMOLOGY

## 2018-05-07 PROCEDURE — V2632 POST CHMBR INTRAOCULAR LENS: HCPCS | Performed by: OPHTHALMOLOGY

## 2018-05-07 PROCEDURE — 40000170 ZZH STATISTIC PRE-PROCEDURE ASSESSMENT II: Performed by: OPHTHALMOLOGY

## 2018-05-07 PROCEDURE — 25000128 H RX IP 250 OP 636: Performed by: OPHTHALMOLOGY

## 2018-05-07 PROCEDURE — 27210995 ZZH RX 272: Performed by: OPHTHALMOLOGY

## 2018-05-07 PROCEDURE — 25000128 H RX IP 250 OP 636: Performed by: ANESTHESIOLOGY

## 2018-05-07 PROCEDURE — 25000125 ZZHC RX 250: Performed by: OPHTHALMOLOGY

## 2018-05-07 PROCEDURE — 25000125 ZZHC RX 250: Performed by: ANESTHESIOLOGY

## 2018-05-07 PROCEDURE — 25000125 ZZHC RX 250: Performed by: NURSE ANESTHETIST, CERTIFIED REGISTERED

## 2018-05-07 PROCEDURE — 37000008 ZZH ANESTHESIA TECHNICAL FEE, 1ST 30 MIN: Performed by: OPHTHALMOLOGY

## 2018-05-07 PROCEDURE — 36000101 ZZH EYE SURGERY LEVEL 3 1ST 30 MIN: Performed by: OPHTHALMOLOGY

## 2018-05-07 PROCEDURE — 71000028 ZZH EYE RECOVERY PHASE 2 EACH 15 MINS: Performed by: OPHTHALMOLOGY

## 2018-05-07 PROCEDURE — 25000128 H RX IP 250 OP 636: Performed by: NURSE ANESTHETIST, CERTIFIED REGISTERED

## 2018-05-07 PROCEDURE — 27210794 ZZH OR GENERAL SUPPLY STERILE: Performed by: OPHTHALMOLOGY

## 2018-05-07 DEVICE — EYE IMP IOL ALCON PCL SN60WF ACRYSOF IQ 19.5: Type: IMPLANTABLE DEVICE | Site: EYE | Status: FUNCTIONAL

## 2018-05-07 RX ORDER — OFLOXACIN 3 MG/ML
1 SOLUTION/ DROPS OPHTHALMIC 4 TIMES DAILY
Qty: 5 ML | Refills: 0 | Status: SHIPPED | OUTPATIENT
Start: 2018-05-07 | End: 2018-06-27

## 2018-05-07 RX ORDER — PREDNISOLONE ACETATE 10 MG/ML
1 SUSPENSION/ DROPS OPHTHALMIC 4 TIMES DAILY
Qty: 5 ML | Refills: 0 | Status: SHIPPED | OUTPATIENT
Start: 2018-05-07 | End: 2018-05-17

## 2018-05-07 RX ORDER — PHENYLEPHRINE HYDROCHLORIDE 25 MG/ML
1 SOLUTION/ DROPS OPHTHALMIC
Status: COMPLETED | OUTPATIENT
Start: 2018-05-07 | End: 2018-05-07

## 2018-05-07 RX ORDER — BALANCED SALT SOLUTION 6.4; .75; .48; .3; 3.9; 1.7 MG/ML; MG/ML; MG/ML; MG/ML; MG/ML; MG/ML
SOLUTION OPHTHALMIC PRN
Status: DISCONTINUED | OUTPATIENT
Start: 2018-05-07 | End: 2018-05-07 | Stop reason: HOSPADM

## 2018-05-07 RX ORDER — TROPICAMIDE 10 MG/ML
1 SOLUTION/ DROPS OPHTHALMIC
Status: COMPLETED | OUTPATIENT
Start: 2018-05-07 | End: 2018-05-07

## 2018-05-07 RX ORDER — DEXAMETHASONE SODIUM PHOSPHATE 4 MG/ML
INJECTION, SOLUTION INTRA-ARTICULAR; INTRALESIONAL; INTRAMUSCULAR; INTRAVENOUS; SOFT TISSUE PRN
Status: DISCONTINUED | OUTPATIENT
Start: 2018-05-07 | End: 2018-05-07 | Stop reason: HOSPADM

## 2018-05-07 RX ORDER — LIDOCAINE HYDROCHLORIDE 20 MG/ML
INJECTION, SOLUTION INFILTRATION; PERINEURAL PRN
Status: DISCONTINUED | OUTPATIENT
Start: 2018-05-07 | End: 2018-05-07

## 2018-05-07 RX ORDER — PROPOFOL 10 MG/ML
INJECTION, EMULSION INTRAVENOUS CONTINUOUS PRN
Status: DISCONTINUED | OUTPATIENT
Start: 2018-05-07 | End: 2018-05-07

## 2018-05-07 RX ORDER — PROPOFOL 10 MG/ML
INJECTION, EMULSION INTRAVENOUS PRN
Status: DISCONTINUED | OUTPATIENT
Start: 2018-05-07 | End: 2018-05-07

## 2018-05-07 RX ORDER — KETOROLAC TROMETHAMINE 4 MG/ML
1 SOLUTION/ DROPS OPHTHALMIC 4 TIMES DAILY
Qty: 5 ML | Refills: 0 | Status: SHIPPED | OUTPATIENT
Start: 2018-05-07 | End: 2018-05-17

## 2018-05-07 RX ORDER — SODIUM CHLORIDE, SODIUM LACTATE, POTASSIUM CHLORIDE, CALCIUM CHLORIDE 600; 310; 30; 20 MG/100ML; MG/100ML; MG/100ML; MG/100ML
500 INJECTION, SOLUTION INTRAVENOUS CONTINUOUS
Status: DISCONTINUED | OUTPATIENT
Start: 2018-05-07 | End: 2018-05-07 | Stop reason: HOSPADM

## 2018-05-07 RX ORDER — CYCLOPENTOLATE HYDROCHLORIDE 10 MG/ML
1 SOLUTION/ DROPS OPHTHALMIC
Status: COMPLETED | OUTPATIENT
Start: 2018-05-07 | End: 2018-05-07

## 2018-05-07 RX ADMIN — DEXAMETHASONE SODIUM PHOSPHATE 2 MG: 4 INJECTION, SOLUTION INTRA-ARTICULAR; INTRALESIONAL; INTRAMUSCULAR; INTRAVENOUS; SOFT TISSUE at 10:58

## 2018-05-07 RX ADMIN — NEOMYCIN SULFATE, POLYMYXIN B SULFATE, AND DEXAMETHASONE 1 TUBE: 3.5; 10000; 1 OINTMENT OPHTHALMIC at 10:58

## 2018-05-07 RX ADMIN — PHENYLEPHRINE HYDROCHLORIDE 1 DROP: 2.5 SOLUTION/ DROPS OPHTHALMIC at 09:00

## 2018-05-07 RX ADMIN — CYCLOPENTOLATE HYDROCHLORIDE 1 DROP: 10 SOLUTION/ DROPS OPHTHALMIC at 09:00

## 2018-05-07 RX ADMIN — EPINEPHRINE 500 ML: 1 INJECTION, SOLUTION, CONCENTRATE INTRAVENOUS at 10:49

## 2018-05-07 RX ADMIN — TRYPAN BLUE 0.5 ML: 0.3 INJECTION, SOLUTION INTRAOCULAR; OPHTHALMIC at 10:49

## 2018-05-07 RX ADMIN — DEXMEDETOMIDINE HYDROCHLORIDE 8 MCG: 100 INJECTION, SOLUTION INTRAVENOUS at 10:38

## 2018-05-07 RX ADMIN — WATER 0.5 ML: 1 INJECTION INTRAMUSCULAR; INTRAVENOUS; SUBCUTANEOUS at 10:58

## 2018-05-07 RX ADMIN — TROPICAMIDE 1 DROP: 10 SOLUTION/ DROPS OPHTHALMIC at 09:16

## 2018-05-07 RX ADMIN — PHENYLEPHRINE HYDROCHLORIDE 1 DROP: 2.5 SOLUTION/ DROPS OPHTHALMIC at 09:16

## 2018-05-07 RX ADMIN — TROPICAMIDE 1 DROP: 10 SOLUTION/ DROPS OPHTHALMIC at 09:10

## 2018-05-07 RX ADMIN — HYDROCORTISONE SODIUM SUCCINATE 100 MG: 100 INJECTION, POWDER, FOR SOLUTION INTRAMUSCULAR; INTRAVENOUS at 09:54

## 2018-05-07 RX ADMIN — LIDOCAINE HYDROCHLORIDE 50 MG: 20 INJECTION, SOLUTION INFILTRATION; PERINEURAL at 10:39

## 2018-05-07 RX ADMIN — PROPOFOL 50 MCG/KG/MIN: 10 INJECTION, EMULSION INTRAVENOUS at 10:48

## 2018-05-07 RX ADMIN — SODIUM CHLORIDE, SODIUM LACTATE, POTASSIUM CHLORIDE, CALCIUM CHLORIDE 500 ML: 600; 310; 30; 20 INJECTION, SOLUTION INTRAVENOUS at 09:28

## 2018-05-07 RX ADMIN — Medication 1 APPLICATOR: at 10:49

## 2018-05-07 RX ADMIN — SODIUM CHLORIDE, SODIUM LACTATE, POTASSIUM CHLORIDE, CALCIUM CHLORIDE: 600; 310; 30; 20 INJECTION, SOLUTION INTRAVENOUS at 10:35

## 2018-05-07 RX ADMIN — BALANCED SALT SOLUTION 15 ML: 6.4; .75; .48; .3; 3.9; 1.7 SOLUTION OPHTHALMIC at 10:48

## 2018-05-07 RX ADMIN — LIDOCAINE HYDROCHLORIDE,EPINEPHRINE BITARTRATE 5 ML GIVEN: 20; .005 INJECTION, SOLUTION EPIDURAL; INFILTRATION; INTRACAUDAL; PERINEURAL at 10:49

## 2018-05-07 RX ADMIN — CYCLOPENTOLATE HYDROCHLORIDE 1 DROP: 10 SOLUTION/ DROPS OPHTHALMIC at 09:10

## 2018-05-07 RX ADMIN — TROPICAMIDE 1 DROP: 10 SOLUTION/ DROPS OPHTHALMIC at 09:00

## 2018-05-07 RX ADMIN — CYCLOPENTOLATE HYDROCHLORIDE 1 DROP: 10 SOLUTION/ DROPS OPHTHALMIC at 09:16

## 2018-05-07 RX ADMIN — PROPOFOL 40 MG: 10 INJECTION, EMULSION INTRAVENOUS at 10:39

## 2018-05-07 RX ADMIN — LIDOCAINE HYDROCHLORIDE 1 ML: 10 INJECTION, SOLUTION EPIDURAL; INFILTRATION; INTRACAUDAL; PERINEURAL at 09:28

## 2018-05-07 RX ADMIN — PHENYLEPHRINE HYDROCHLORIDE 1 DROP: 2.5 SOLUTION/ DROPS OPHTHALMIC at 09:10

## 2018-05-07 ASSESSMENT — ENCOUNTER SYMPTOMS
SEIZURES: 0
DYSRHYTHMIAS: 0
ORTHOPNEA: 0

## 2018-05-07 ASSESSMENT — COPD QUESTIONNAIRES
CAT_SEVERITY: MILD
COPD: 1

## 2018-05-07 ASSESSMENT — LIFESTYLE VARIABLES: TOBACCO_USE: 1

## 2018-05-07 NOTE — IP AVS SNAPSHOT
MRN:[id number]                      After Visit Summary   5/7/2018    Travis Peres    MRN: [id number]           Thank you!     Thank you for choosing [facility] for your care. Our goal is always to provide you with excellent care. Hearing back from our patients is one way we can continue to improve our services. Please take a few minutes to complete the written survey that you may receive in the mail after you visit with us. Thank you!        Patient Information     Date Of Birth          1940        About your hospital stay     You were admitted on:  May 7, 2018 You last received care in the:  [facility]    You were discharged on:  May 7, 2018       Who to Call     For medical emergencies, please call 911.  For non-urgent questions about your medical care, please call your primary care provider or clinic, [phone]  For questions related to your surgery, please call your surgery clinic        Attending Provider     Provider Specialty    Nadiya Allen MD Ophthalmology       Primary Care Provider Office Phone # Fax #    Karol Alvarez [phone]      After Care Instructions     Activity       Avoid strenuous activities the next several days.                  Your next 10 appointments already scheduled     May 08, 2018 10:30 AM CDT   Post-Op with Nadiya Allen MD   Eye Clinic ([facility])    [place] [address] [address]   [phone]            May 17, 2018  1:00 PM CDT   Post-Op with Nadiya Allen MD   Eye Clinic ([facility])    [address] [address]   [phone]            May 24, 2018  3:20 PM CDT   (Arrive by 3:05 PM)   New Patient Visit with Faraz Amato MD   [facility] ([facility])    [address]  4th Floor  [facility] [id number] [phone]            Jun 20, 2018  3:10 PM CDT   (Arrive by 2:55 PM)   RETURN ENDOCRINE with Corina Potts MD   [facility] ([facility])    [address]  3rd Floor  [facility] [id number] [phone]              Further instructions from your care team       [facility] Discharge  Instructions  Anesthesia ([facility] Center)   Adult Discharge Instructions    For 24 hours after surgery    1. Get plenty of rest.  Make arrangements to have a responsible adult stay with you for at least 6 hours after you leave the hospital.  2. Do not drive or use heavy equipment for 24 hours.    3. Do not drink alcohol for 24 hours.  4. Do not sign legal documents or make important decisions for 24 hours.  5. Avoid strenuous or risky activities. You may feel lightheaded.  If so, sit for a few minutes before standing.  Have someone help you get up.   6. Conscious sedation patients may resume a regular diet..  7. Any questions of medical nature, call your physician.    Dr. Nadiya Allen  [facility]  [phone]  Post Operative Cataract Instructions      If you have a gauze eye patch on, please do not remove it until it is removed by your physician at your first post-operative visit.  You will start your eye drops the next day.      Wear the clear eye shield when sleeping for protection for 5 days.      Do not rub the operated eye.      Light sensitivity may be noticed. Sunglasses may be worn for comfort.      Some discomfort and irritation may be noticed. Acetaminophen (Tylenol) or Ibuprofen (Advil) may be taken for discomfort. If pain persists please call Dr. Allen's office.      Keep the operated eye dry. You may wash your hair, bathe or shower, but keep the operated eye closed while doing so.       If you take glaucoma medications, bring them with you to the clinic on  "your first post operative visit.      Bring your prescribed eye drops with you to your scheduled post-operative appointment.      Use medication exactly as prescribed by your doctor. You may restart your regular home medications.       Call Dr. Allen's office at [phone] if any of the following should occur:    - Any sudden vision changes, including decreased vision  - Nausea or severe headache  - Increase in pain not controlled  - Signs of infection (pus, increasing redness or tenderness)  - Severe sensitivity to light    Pending Results     No orders found from 2018 to 2018.            Admission Information     Date & Time Provider Department Dept. Phone    2018 Nadiya Allen MD [facility] [phone]      Your Vitals Were     Blood Pressure Respirations Height Weight Pulse Oximetry BMI (Body Mass Index)    108/69 16 1.651 m (5' 5\") 60.3 kg (132 lb 15 oz) 95% 22.12 kg/m2      MyChart Information     [facility] lets you send messages to your doctor, view your test results, renew your prescriptions, schedule appointments and more. To sign up, go to [email or web address] . Click on \"Log in\" on the left side of the screen, which will take you to the Welcome page. Then click on \"Sign up Now\" on the right side of the page.     You will be asked to enter the access code listed below, as well as some personal information. Please follow the directions to create your username and password.     Your access code is: [email or web address]  Expires: 6/3/2018 10:21 PM     Your access code will  in 90 days. If you need help or a new code, please call your [facility] clinic or [phone].        Care EveryWhere ID     This is your Care EveryWhere ID. This could be used by other organizations to access your [facility] medical records  [id number]        Equal Access to Services     LAYLA LUCAS AH: Sesar Rodriguez, susana singer, roxanne pool " roscoe aburto'aan ah. So [facility] [phone].    ATENCIÓN: Si ayshala page, tiene a reese disposición servicios gratuitos de asistencia lingüística. Tiffanie al [phone].    We comply with applicable federal civil rights laws and [place] laws. We do not discriminate on the basis of race, color, national origin, age, disability, sex, sexual orientation, or gender identity.               Review of your medicines      START taking        Dose / Directions    ketorolac tromethamine 0.4 % Soln ophthalmic solution   Commonly known as:  ACULAR-LS   Used for:  Cataract of left eye, unspecified cataract type        Dose:  1 drop   Apply 1 drop to eye 4 times daily Instill into operative eye(s) per physician instructions.   Quantity:  5 mL   Refills:  0       ofloxacin 0.3 % ophthalmic solution   Commonly known as:  OCUFLOX   Used for:  Cataract of left eye, unspecified cataract type        Dose:  1 drop   Apply 1 drop to eye 4 times daily Instill into operative eye(s) per physician instructions.   Quantity:  5 mL   Refills:  0       prednisoLONE acetate 1 % ophthalmic susp   Commonly known as:  PRED FORTE   Used for:  Cataract of left eye, unspecified cataract type        Dose:  1 drop   Apply 1 drop to eye 4 times daily Instill into operative eye(s) per physician instructions.   Quantity:  5 mL   Refills:  0         CONTINUE these medicines which have NOT CHANGED        Dose / Directions    aflibercept 2 MG/0.05ML Soln injection   Commonly known as:  EYLEA   Used for:  CNVM (choroidal neovascular membrane), left        Dose:  2 mg   0.05 mLs (2 mg) by Intravitreal route every 28 days   Quantity:  0.05 mL   Refills:  11       alendronate 70 MG tablet   Commonly known as:  FOSAMAX   Used for:  Postsurgical hypothyroidism, Papillary thyroid carcinoma (H), Pituitary adenoma (H), Hypopituitarism (H)        Dose:  70 mg   Take 1 tablet (70 mg) by mouth every 7 days   Quantity:  16 tablet   Refills:  3       amLODIPine 2.5  MG tablet   Commonly known as:  NORVASC        TK 1 T PO QD   Refills:  4       Carboxymethylcellulose Sod PF 0.5 % Soln ophthalmic solution   Commonly known as:  REFRESH PLUS        Dose:  1 drop   Place 1 drop into both eyes 3 times daily as needed for dry eyes   Refills:  0       COMBIVENT RESPIMAT  MCG/ACT inhaler   Generic drug:  Ipratropium-Albuterol        INL 1 PUFF PO QID   Refills:  1       fish oil-omega-3 fatty acids 1000 MG capsule        Dose:  2 g   Take 2 g by mouth daily   Refills:  0       fluticasone 50 MCG/ACT spray   Commonly known as:  FLONASE        Dose:  2 spray   Spray 2 sprays into both nostrils daily   Refills:  0       hydrocortisone 10 MG tablet   Commonly known as:  CORTEF   Used for:  Postsurgical hypothyroidism, Papillary thyroid carcinoma (H), Abnormal finding on imaging        Take one tablet by mouth daily in the morning, 1/2 tablet at 2 PM   Quantity:  180 tablet   Refills:  3       ibuprofen 400 MG tablet   Commonly known as:  ADVIL/MOTRIN        Dose:  400-600 mg   Take 400-600 mg by mouth every 6 hours as needed for moderate pain   Refills:  0       levothyroxine 137 MCG tablet   Commonly known as:  SYNTHROID/LEVOTHROID   Used for:  Postsurgical hypothyroidism, Papillary thyroid carcinoma (H), Abnormal finding on imaging        Dose:  137 mcg   Take 1 tablet (137 mcg) by mouth daily   Quantity:  90 tablet   Refills:  3       MIRTAZAPINE PO        Dose:  15 mg   Take 15 mg by mouth At Bedtime   Refills:  0       MUCUS RELIEF ADULT PO   Used for:  Papillary thyroid carcinoma (H), Postsurgical hypothyroidism, Malignant neoplasm of thyroid gland (H), Cancer of thyroid (H), 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), Osteopenia, Hypopituitarism (H), Pituitary adenoma (H), Noncompliance with medication regimen        Dose:  400 mg   Take 400 mg by mouth 2 times daily as needed   Refills:  0       ranibizumab 0.5 MG/0.05ML Soln   Commonly known as:  LUCENTIS   Used for:  CNVM  (choroidal neovascular membrane), left        Dose:  0.5 mg   0.05 mLs (0.5 mg) by Intravitreal route every 28 days   Quantity:  0.05 mL   Refills:  11       ranitidine 300 MG tablet   Commonly known as:  ZANTAC        Refills:  0       RIBOFLAVIN PO        Dose:  100 mg   Take 100 mg by mouth   Refills:  0       TAMSULOSIN HCL PO        Dose:  0.4 mg   Take 0.4 mg by mouth At Bedtime   Refills:  0       traZODone 100 MG tablet   Commonly known as:  DESYREL        take 1 tab of 50 mg daily   Refills:  0       vitamin B complex with vitamin C Tabs tablet        Dose:  1 tablet   Take 1 tablet by mouth daily   Refills:  0       VOLTAREN 1 % Gel topical gel   Generic drug:  diclofenac        Place onto the skin 4 times daily   Refills:  0       zolpidem 10 MG tablet   Commonly known as:  AMBIEN        Dose:  10 mg   Take 10 mg by mouth   Refills:  0            Where to get your medicines      These medications were sent to [facility] [place]  [place] - [address]  [address] [facility] [address], [facility] [address]     Phone:  [phone]     ketorolac tromethamine 0.4 % Soln ophthalmic solution    ofloxacin 0.3 % ophthalmic solution    prednisoLONE acetate 1 % ophthalmic susp                Protect others around you: Learn how to safely use, store and throw away your medicines at www.disposemymeds.org.             Medication List: This is a list of all your medications and when to take them. Check marks below indicate your daily home schedule. Keep this list as a reference.      Medications           Morning Afternoon Evening Bedtime As Needed    aflibercept 2 MG/0.05ML Soln injection   Commonly known as:  EYLEA   0.05 mLs (2 mg) by Intravitreal route every 28 days                                alendronate 70 MG tablet   Commonly known as:  FOSAMAX   Take 1 tablet (70 mg) by mouth every 7 days                                amLODIPine 2.5 MG tablet   Commonly known as:  NORVASC   TK 1 T PO QD                                 Carboxymethylcellulose Sod PF 0.5 % Soln ophthalmic solution   Commonly known as:  REFRESH PLUS   Place 1 drop into both eyes 3 times daily as needed for dry eyes                                COMBIVENT RESPIMAT  MCG/ACT inhaler   INL 1 PUFF PO QID   Generic drug:  Ipratropium-Albuterol                                fish oil-omega-3 fatty acids 1000 MG capsule   Take 2 g by mouth daily                                fluticasone 50 MCG/ACT spray   Commonly known as:  FLONASE   Spray 2 sprays into both nostrils daily                                hydrocortisone 10 MG tablet   Commonly known as:  CORTEF   Take one tablet by mouth daily in the morning, 1/2 tablet at 2 PM                                ibuprofen 400 MG tablet   Commonly known as:  ADVIL/MOTRIN   Take 400-600 mg by mouth every 6 hours as needed for moderate pain                                ketorolac tromethamine 0.4 % Soln ophthalmic solution   Commonly known as:  ACULAR-LS   Apply 1 drop to eye 4 times daily Instill into operative eye(s) per physician instructions.                                levothyroxine 137 MCG tablet   Commonly known as:  SYNTHROID/LEVOTHROID   Take 1 tablet (137 mcg) by mouth daily                                MIRTAZAPINE PO   Take 15 mg by mouth At Bedtime                                MUCUS RELIEF ADULT PO   Take 400 mg by mouth 2 times daily as needed                                ofloxacin 0.3 % ophthalmic solution   Commonly known as:  OCUFLOX   Apply 1 drop to eye 4 times daily Instill into operative eye(s) per physician instructions.                                prednisoLONE acetate 1 % ophthalmic susp   Commonly known as:  PRED FORTE   Apply 1 drop to eye 4 times daily Instill into operative eye(s) per physician instructions.                                ranibizumab 0.5 MG/0.05ML Soln   Commonly known as:  LUCENTIS   0.05 mLs (0.5 mg) by Intravitreal route every 28 days                                 ranitidine 300 MG tablet   Commonly known as:  ZANTAC                                RIBOFLAVIN PO   Take 100 mg by mouth                                TAMSULOSIN HCL PO   Take 0.4 mg by mouth At Bedtime                                traZODone 100 MG tablet   Commonly known as:  DESYREL   take 1 tab of 50 mg daily                                vitamin B complex with vitamin C Tabs tablet   Take 1 tablet by mouth daily                                VOLTAREN 1 % Gel topical gel   Place onto the skin 4 times daily   Generic drug:  diclofenac                                zolpidem 10 MG tablet   Commonly known as:  AMBIEN   Take 10 mg by mouth

## 2018-05-07 NOTE — ANESTHESIA POSTPROCEDURE EVALUATION
Patient: Travis Peres    Procedure(s):  LEFT PHACOEMULSIFICATION CLEAR CORNEA WITH STANDARD INTRAOCULAR LENS IMPLANT  - Wound Class: I-Clean    Diagnosis:LEFT EYE CATARACT   Diagnosis Additional Information: No value filed.    Anesthesia Type:  MAC    Note:  Anesthesia Post Evaluation    Patient location during evaluation: PACU  Patient participation: Able to fully participate in evaluation  Level of consciousness: awake  Pain management: adequate  Airway patency: patent  Cardiovascular status: acceptable  Respiratory status: acceptable  Hydration status: acceptable  PONV: none     Anesthetic complications: None          Last vitals:  Vitals:    05/07/18 1106 05/07/18 1123   BP: 108/69 158/82   Resp: 16 16   SpO2: 95% 97%         Electronically Signed By: Marquez Benson MD  May 7, 2018  12:25 PM

## 2018-05-07 NOTE — OP NOTE
SURGEON:   JAD SCOTT   Assistant surgeon:   PREOPERATIVE DIAGNOSIS:   1. visually significant cataract LEFT eye  POSTOPERATIVE DIAGNOSIS: same  NAME OF THE PROCEDURE: LEFT eye Phacoemulsification with intraocular lens implantation, SN60WF 19.5 D SN [id number]  ANESTHESIA: Monitored anesthesia care and peribulbar block   COMPLICATIONS: none  INDICATIONS: Travis Peres is a 77 year old with diagnosis of visually significant cataract, here for cataract surgery    DESCRIPTION OF THE PROCEDURE:  The patient was taken to the operative room where intravenous sedation was administered and a peribulbar block consisting of a 1:1 mixture of 2%lidocaine and 0.75% marcaine with epinephrine and wydase, was administered to the operative eye with adequate anesthesia and akinesia.    The operative eye was prepped and draped in the usual sterile surgical fashion for ophthalmic surgery, including the installation of one drop of 5% Povidone Iodine.  A sterile drape was placed over the face and body and a lid speculum was inserted.      With the use of a Supersharp blade and 0.12 forceps, a paracentesis was created at the infratemporal position. Trypan blue was placed in the anterior chamber and irrigated using BSS. Viscoelastic was injected into the anterior chamber using a canula.  A 2.4 mm keratome was then used to construct a clear corneal incision at the supratemporal position.  Using Utrata forceps and cystotome needle, a continuous curvilinear capsulorrhexis was created and hydrodissection was undertaken with the use of BSS.  The nucleus was found to be freely mobile and then removed by phacoemulsification using a stop and chop technique.  The remaining elements of cortex were then removed with irrigation/aspiration.  An IOL,was injected into the capsular bag and was rotated into a good position with a Sinskey hook. The remaining elements of viscoelastic were then removed with irrigation/aspiration. The wounds were  hydrodissect and were watertight.  one 10- nylon suture was placed  at the main wound.  The lid speculum was removed.  Subconjunctival injection of Dexamethasone and Ancef were administered. The eye was cleaned with wet and dry gauze. Maxitrol ointment was placed on the eye.  A patch and Whitney shield were placed over the eye.  The patient was discharge in stable condition having tolerated the procedure well    The surgery was assisted by Dr. Matthew Anderson, because no qualified resident was available on the day of the surgery. Dr. Matthew Anderson assisted with Cataract extraction intraocular lens placement. I was present for the entire surgery.

## 2018-05-07 NOTE — ANESTHESIA PREPROCEDURE EVALUATION
Procedure: Procedure(s):  PHACOEMULSIFICATION CLEAR CORNEA WITH STANDARD INTRAOCULAR LENS IMPLANT  Preop diagnosis: LEFT EYE CATARACT     Allergies   Allergen Reactions     Metoprolol Shortness Of Breath, Other (See Comments) and Unknown     Not true allergy.  Bradycardia, fatigue, COPD worsening.         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     Post-surgical hypothyroidism 2007     Uncomplicated asthma      Vocal cord paralysis     right     Xerostomia     due to radiation exposures     Past Surgical History:   Procedure Laterality Date     cymetra injection       ESOPHAGOSCOPY, GASTROSCOPY, DUODENOSCOPY (EGD), COMBINED  6/20/2013    Procedure: COMBINED ESOPHAGOSCOPY, GASTROSCOPY, DUODENOSCOPY (EGD), BIOPSY SINGLE OR MULTIPLE;  gastroscopy;  Surgeon: Leslie Guadarrama MD;  Location:  GI     HERNIA REPAIR Right      lipoma resection chest wall Right 11/09     right selective neck dissection level 2, 3, 4  11/3/09     right  shunt placement  6/28/07     THYMECTOMY N/A 1/3/2017    Procedure: THYMECTOMY;  Surgeon: Ernesto Armstrong MD;  Location: [facility] OR     THYROIDECTOMY  11/27/07     TRANSCERVICAL EXTENDED MEDIASTINAL LYMPHADENECTOMY N/A 1/3/2017    Procedure: TRANSCERVICAL EXTENDED MEDIASTINAL LYMPHADENECTOMY;  Surgeon: Ernesto Armstrong MD;  Location: [facility] OR     transnasal endoscopic resection of pituitary  adenoma  8/13/2007     Social History   Substance Use Topics     Smoking status: Never Smoker     Smokeless tobacco: Never Used     Alcohol use No     Prior to Admission medications    Medication Sig Start Date End Date Taking? Authorizing Provider   alendronate (FOSAMAX) 70 MG tablet Take 1 tablet (70 mg) by mouth every 7 days 9/18/17  Yes Corina Potts MD   amLODIPine (NORVASC) 2.5 MG tablet TK 1 T PO QD 10/28/17  Yes Reported, Patient   fish oil-omega-3 fatty acids 1000 MG capsule Take 2 g by mouth daily   Yes Reported, Patient   fluticasone (FLONASE) 50 MCG/ACT spray Spray 2 sprays into both nostrils daily   Yes Unknown, Entered By History   hydrocortisone (CORTEF) 10 MG tablet Take one tablet by mouth daily in the morning, 1/2 tablet at 2 PM 4/16/18  Yes Corina Potts MD   levothyroxine (SYNTHROID/LEVOTHROID) 137 MCG tablet Take 1 tablet (137 mcg) by mouth daily 4/16/18  Yes Corina Potts MD   MIRTAZAPINE PO Take 15 mg by mouth At Bedtime   Yes Reported, Patient   TAMSULOSIN HCL PO Take 0.4 mg by mouth At Bedtime    Yes Reported, Patient   aflibercept (EYLEA) 2 MG/0.05ML SOLN injection 0.05 mLs (2 mg) by Intravitreal route every 28 days 6/20/17 6/20/18  Alejandro, Nadiya Fletcher MD   Carboxymethylcellulose Sod PF (REFRESH PLUS) 0.5 % SOLN ophthalmic solution Place 1 drop into both eyes 3 times daily as needed for dry eyes    Reported, Patient   COMBIVENT RESPIMAT  MCG/ACT inhaler INL 1 PUFF PO QID 11/14/16   Reported, Patient   diclofenac (VOLTAREN) 1 % GEL topical gel Place onto the skin 4 times daily    Reported, Patient   GuaiFENesin (MUCUS RELIEF ADULT PO) Take 400 mg by mouth 2 times daily as needed     Reported, Patient   ibuprofen (ADVIL/MOTRIN) 400 MG tablet Take 400-600 mg by mouth every 6 hours as needed for moderate pain     Reported, Patient   ranibizumab (LUCENTIS) 0.5 MG/0.05ML SOLN 0.05 mLs (0.5 mg) by Intravitreal route every 28 days 6/20/17 6/20/18  Nadiya Allen  MD Justine   ranitidine (ZANTAC) 300 MG tablet  12/3/16   Reported, Patient   RIBOFLAVIN PO Take 100 mg by mouth    Reported, Patient   traZODone (DESYREL) 100 MG tablet take 1 tab of 50 mg daily 11/1/17   Reported, Patient   vitamin B complex with vitamin C (VITAMIN  B COMPLEX) TABS tablet Take 1 tablet by mouth daily    Reported, Patient   zolpidem (AMBIEN) 10 MG tablet Take 10 mg by mouth 9/26/17   Reported, Patient     Current Facility-Administered Medications Ordered in Epic   Medication Dose Route Frequency Last Rate Last Dose     bupivacaine 0.75% (pf) 4.5mL + lidocaine 2% w/EPI [id number] (pf) 4.5mL + hyaluronidase (HYLENEX) 150 units   Retrobulbar Once         hydrocortisone sodium succinate (Solu-CORTEF) PEDS/NICU  mg  100 mg Intravenous Once         lactated ringers infusion  500 mL Intravenous Continuous 25 mL/hr at 05/07/18 0928 500 mL at 05/07/18 0928     lidocaine 1 % 1 mL  1 mL Other Q1H PRN   1 mL at 05/07/18 0928     No current [facility]-ordered outpatient prescriptions on file.       lactated ringers 500 mL (05/07/18 0928)     Wt Readings from Last 1 Encounters:   05/03/18 60.3 kg (132 lb 15 oz)     Temp Readings from Last 1 Encounters:   02/02/17 36.4  C (97.6  F) (Oral)     BP Readings from Last 6 Encounters:   04/16/18 140/76   04/09/18 135/67   09/18/17 163/80   02/02/17 135/73   01/18/17 160/89   01/09/17 (!) 166/101     Pulse Readings from Last 4 Encounters:   04/16/18 74   04/09/18 65   09/18/17 65   02/02/17 70     Resp Readings from Last 1 Encounters:   02/02/17 20     SpO2 Readings from Last 1 Encounters:   02/02/17 95%     Recent Labs   Lab Test  04/09/18   1536  09/18/17   1509  01/06/17   0453  01/05/17   0347   NA  139  136  150*  146*   POTASSIUM  4.4  3.9  3.8  4.4   CHLORIDE   --    --   119*  115*   CO2   --    --   23  21   ANIONGAP   --    --   8  10   GLC   --    --   171*  184*   BUN   --    --   19  21   CR   --   1.04  1.41*  2.21*   NIHARIKA   --    --   6.5*  6.4*     Recent  Labs   Lab Test  01/05/17   0018  01/04/17   1626  10/04/16   0522   AST  227*  258*  15   ALT  114*  145*  25   ALKPHOS  64  97  63   BILITOTAL  0.6  1.4*  0.9   LIPASE   --    --   211     Recent Labs   Lab Test  01/06/17   0453  01/05/17   0347   WBC  13.6*  18.4*   HGB  9.9*  11.5*   PLT  88*  125*     Recent Labs   Lab Test  01/05/17   0018   ABO  B   RH   Pos     Recent Labs   Lab Test  01/05/17   0018  06/03/14   0729   INR  1.51*  <0.9      Recent Labs   Lab Test  01/08/17   0827  01/05/17   1317  01/05/17   0853   TROPI  2.434*  9.235*  9.726*     Recent Labs   Lab Test  01/05/17   0347  01/05/17   0018   PH  7.25*  7.20*   PCO2  47*  52*   PO2  122*  107*   HCO3  20*  20*     No results for input(s): HCG in the last 64174 hours.  Recent Results (from the past 744 hour(s))   PET Oncology Whole Body    Narrative    Combined Report of:    PET and CT on  4/12/2018 4:01 PM :    1. PET of the neck, chest, abdomen, and pelvis.  2. PET CT Fusion for Attenuation Correction and Anatomical  Localization:    3. Diagnostic CT scan of the chest, abdomen, and pelvis without  intravenous contrast for interpretation.  3. CT of the chest, abdomen and pelvis obtained for diagnostic  interpretation.  4. 3D MIP and PET-CT fused images were processed on an independent  workstation and archived to PACS and reviewed by a radiologist.    Technique:    1. PET: The patient received 10.21 mCi of F-18-FDG; the serum glucose  was 108 prior to administration, body weight was 61.9 kg. Images were  evaluated in the axial, sagittal, and coronal planes as well as the  rotational whole body MIP. Images were acquired from the Vertex to the  Feet.    UPTAKE WAS MEASURED AT 60 MINUTES.     BACKGROUND:  Liver SUV max= 3.50,   Aorta Blood SUV Max: 2.70.     2. CT: Volumetric acquisition for clinical interpretation of the  chest, abdomen, and pelvis acquired at 3 mm sections . The chest,  abdomen, and pelvis were evaluated at 5 mm sections in  bone, soft  tissue, and lung windows.      --    3. 3D MIP and PET-CT fused images were processed on an independent  workstation and archived to PACS and reviewed by a radiologist.    INDICATION: ; Mass of left lung    ADDITIONAL INFORMATION OBTAINED FROM EMR: History of papillary thyroid  cancer, bilateral node positive and treated with total thyroidectomy  and I-131 x2. History of cervical adenopathy treated with EtOH.    COMPARISON: CT chest 9/25/2017, 12/2/2016, 12/16/2015, 7/29/2015,  8/27/2014, 5/6/2014; CT cap 9/25/2017; PET CT 8/20/2009    FINDINGS:     HEAD/NECK:       Numerous other FDG avid cervical lymph nodes, with example lymph nodes  described below from series 3:  Image 82: Mild FDG uptake in a left submandibular lymph node measuring  7 mm with SUV max of 3.34.  Image 80: Mild FDG uptake in a 7 mm left level 5a cervical lymph node  with SUV max of 3.50.  Image 81: Mild FDG uptake in a cluster of several small left level 4  cervical lymph nodes with SUV max of 3.49.    Intense FDG uptake in a right retroclavicular lymph node measuring 9  mm (series 3 image 102) with SUV max of 23.77.    Unchanged mild FDG uptake in an occipital lymph node (series 3 image  69) measuring 5 mm, SUV max of 2.33, previously 2.05 on 8/20/2009.  Additional unchanged (since prior PET/CT a 22,009) mild FDG uptake in  the area of the left sella, likely related to the patient's residual  pituitary adenoma.    Stable right parietal approach ventriculostomy catheter with the tip  slightly protruding into the left frontal lobe anterior to the  anterior horn of the left lateral ventricle. The ventricles are normal  size and proportionate to the cerebral sulci. No extra-axial fluid  collection.    The paranasal sinuses are clear. The mastoid air cells are clear.   Intense FDG uptake and mild enlargement of the tonsillar tissue.  The mucosal pharyngeal space, the , prevertebral and carotid  spaces are within normal limits.      The thyroid gland is surgically absent.    Right vocal cord paralysis with physiologic left vocal cord FDG  uptake.     CHEST:  Intense FDG uptake within a right upper paratracheal lymph node  measuring 1.4 cm (series 3 image 106) with SUV Max 68.43, which has  increased in size since 1/4/2017 when it measured 8 mm. FDG uptake  within several normal sized left axillary lymph nodes, with the  largest measuring 7 mm and with a max SUV of 5.02 (series 3 image  114).    There is a 1.4 x 1.1 cm left upper lobe partially calcified granuloma,  which does not demonstrate significant FDG uptake, and is not  significantly changed in size since at least 5/6/2014.    The heart size is within normal limits. No pericardial effusion.  Scattered mild coronary artery calcifications. The thoracic aorta and  main pulmonary artery are normal caliber. Several unchanged  mediastinal and right hilar calcified lymph nodes.    Central airways patent. No pleural effusion or pneumothorax. No large  consolidation. The previously seen spiculated nodule in the lingula of  the left upper lobe has decreased in size and now measures 7 mm  (series 6 image 122) and does not demonstrate any suspicious FDG  uptake. Several scattered tree-in-bud opacities in the right upper  lobe and right middle lobe (for example series 6 image 86 and 78) are  overall less conspicuous in comparison to the prior CT of the chest on  9/25/2017.    ABDOMEN AND PELVIS:  Normal-sized 4 mm left iliac lymph node with mild FDG uptake, max SUV  3.39. Additional small right iliac lymph node measuring 4 mm (series 3  image 257, with mild FDG uptake SUV max 4.40. Focal uptake within the  posterior peripheral prostate gland in the area of calcifications, but  otherwise no CT correlate lesion is identified, max SUV 6.27.     shunt extending down the right thorax and into the peritoneal  cavity with the tip in the lower central anterior abdomen.    No suspicious hepatic lesion.  Cholelithiasis without evidence of  intrahepatic or extra hepatic biliary dilation. The spleen is normal  size. The pancreas and adrenal glands are normal. Large simple renal  cyst extending from the superior pole of left kidney. No renal mass.  No hydronephrosis. Urinary bladder is decompressed, but otherwise  unremarkable. The large and small bowel are normal caliber. Colonic  diverticulosis without evidence of diverticulitis. The appendix is  normal. No enlarged intraperitoneal or inguinal lymph nodes by CT  short axis size criteria. Abdominal aorta is normal caliber with  scattered mild atherosclerotic calcifications. No intraperitoneal free  air or free fluid.    LOWER EXTREMITIES:   No abnormal masses or hypermetabolic lesions.    BONES:   There are no suspicious lytic or blastic osseous lesions.  There is no  abnormal FDG uptake in the skeleton. Multilevel degenerative changes  of the spine, most pronounced in the lumbar spine where there is disc  space narrowing from L2 through S1 and mild lower lumbar facet  arthropathy. Small sclerotic foci in the intertrochanteric region of  the right femur is likely a bone island. Similar sclerotic foci in the  right iliac, without FDG uptake in either focus.        Impression    IMPRESSION: In this patient with history of papillary thyroid  carcinoma status post resection and ablation x 2:  1. Suspicious intense FDG uptake in an enlarging right upper  paratracheal lymph node measuring up to 1.4 cm, previously 8 mm on  1/4/2017. Additional FDG avid right retroclavicular lymph node also  suggestive of metastasis. Recommend biopsy.   2. Focal FDG uptake in the peripheral and posterior prostate gland.  Recommend obtaining a prostate-specific antigen.  3. Numerous indeterminate nonenlarged but mildly FDG avid cervical,  left axillary, and biiliac lymph nodes.  4. The spiculated nodule previously seen in the lingula of the left  upper lobe has decreased in size and now  measures 7 mm (previously  measured up to 1.7 cm) does not demonstrate increased FDG uptake and  is likely benign.  5. Improving tree-in-bud opacities predominantly in the right lung  since the prior CT, most consistent with a resolving infectious or  inflammatory process. Additionally, there is sequela of prior  granulomatous disease including a stable 1.4 cm left upper lobe  granuloma.    I have personally reviewed the examination and initial interpretation  and I agree with the findings.    STEVEN ROWLAND MD   US head neck soft tissue    Narrative    EXAMINATION: Soft tissue neck ultrasound, 4/16/2018 2:42 PM     COMPARISON: Ultrasound 9/25/2017, PET/CT 4/12/2018    HISTORY: Suspicious finding on PET/CT    FINDINGS:    Lymph nodes are measured bilaterally with measurements given in  craniocaudal, transverse and AP dimensions as follows:    Right:  Level 1: No lymphadenopathy  Level 2: 7 x 4 x 18 mm lymph node with a preserved fatty hilum, and a  5 x 4 x 5 mm lymph node with a preserved fatty hilum  Level 3: No lymphadenopathy  Level 4: No lymphadenopathy  Level 5: No lymphadenopathy  Level 6: 1.3 x 1.0 x 1.2 cm solid nodule with heterogeneous  echotexture and internal blood flow, previously 1.1 x 1.2 x 1.0 cm  Level 7:1.4 x 1.5 x 1.5 cm solid nodule with heterogeneous echotexture  and no hypervascularity, previously 1.2 x 0.9 x 0.9 cm    Left:  Level 1: No lymphadenopathy  Level 2: 1.2 x 0.7 x 2.1 cm lymph node with a preserved fatty hilum  Level 3: Lymphadenopathy  Level 4: 8 x 3 x 10 mm lymph node without a clear fatty hilum; 8 x 5 x  6 mm lymph node with a vascular pedicle. These are unchanged.  Level 5: 10 x 5 x 15 mm lymph node without a clear fatty hilum, not  substantially changed.  Level 6: No lymphadenopathy  Level 7: No lymphadenopathy      Impression    IMPRESSION:   1. Suspicious right level 6 and 7 nodules which were hypermetabolic on  PET/CT 4/12/2018. The level 7 lymph node has increased in size  from  ultrasound 9/25/2017. Findings are concerning for recurrent/metastatic  disease. Consider tissue sampling.  2. Additional lymph node measurements as described above.    I have personally reviewed the examination and initial interpretation  and I agree with the findings.    KEENA GUTIERREZ MD   US Lymph node biopsy/FNA    Narrative    EXAMINATION: US BIOPSY FINE NEEDLE ASPIRATION LYMPH NODE, 4/23/2018  11:55 AM    COMPARISON: 4/16/2018    HISTORY: Cervical adenopathy. History of thyroid malignancy.    FINDINGS: After obtaining informed consent, patient was sterilely  prepped and draped. 1% lidocaine was administered for local  anesthetic.  Using ultrasound guidance, the following aspirates were  obtained:    1 -  4  fine-needle aspirates were obtained of the right level 6 neck  lymph node.   2 -  2  fine-needle aspirates were obtained of the right level 7 neck  lymph node.     Procedure was well tolerated. There were no complications. Pathology  was present and confirmed sample adequacy.      Impression    IMPRESSION: Uncomplicated fine-needle aspiration of 2 lymph nodes on  the right as described above.    I, KEENA GUTIERREZ MD, attest that I was present for all critical  portions of the procedure and was immediately available to provide  guidance and assistance during the remainder of the procedure.    I have personally reviewed the examination and initial interpretation  and I agree with the findings.    KEENA GUTIERREZ MD       RECENT LABS:     ECG: reviewed     ECHO:   Interpretation Summary  Global and regional left ventricular function is normal with an EF of 55-60%.  No regional wall motion abnormalities are seen.  Mild right ventricular dilation is present. The inferior vena cava is normal.  Estimated mean right atrial pressure is 3-8 mmHg.  No pericardial effusion is present.  This study was compared with the study from 7/30/2014  There has been no change.    Anesthesia Evaluation     . Pt  has had prior anesthetic. Type: General    No history of anesthetic complications          ROS/MED HX    ENT/Pulmonary:     (+)tobacco use, Past use mild COPD, , . .   (-) sleep apnea and recent URI   Neurologic:     (+)other neuro Pituitary Dysfunction   (-) seizures and CVA   Cardiovascular:     (+) hypertension----. : . . . :. .      (-) angina, CAD, orthopnea/PND, syncope, arrhythmias, irregular heartbeat/palpitations and angina   METS/Exercise Tolerance:     Hematologic:         Musculoskeletal:         GI/Hepatic:     (+) GERD Asymptomatic on medication,      (-) liver disease   Renal/Genitourinary:         Endo:     (+) thyroid problem hypothyroidism Thyroid disease - Other, Chronic steroid usage for .      Psychiatric:         Infectious Disease:         Malignancy:   (+) Malignancy History of Other  Other CA Remission status post Surgery and Radiation         Other:                     Physical Exam      Airway   Mallampati: III  TM distance: >3 FB  Neck ROM: full    Dental   (+) upper dentures, lower dentures and partials    Cardiovascular   Rhythm and rate: regular and normal  (-) no murmur    Pulmonary    breath sounds clear to auscultation(-) no wheezes                    Anesthesia Plan      History & Physical Review  History and physical reviewed and following examination; no interval change.    ASA Status:  3 .    NPO Status:  > 8 hours    Plan for MAC Reason for MAC:  Deep or markedly invasive procedure (G8)  PONV prophylaxis:  Ondansetron (or other 5HT-3)  No Versed   Light on Fentanyl, Precedex   Slow titration of sedation   Zofran 4 mg only (slight QT prolongation)     Hydrocortisone in preop per preop recommendations      Postoperative Care  Postoperative pain management:  Multi-modal analgesia.      Consents  Anesthetic plan, risks, benefits and alternatives discussed with:  Patient and Spouse ( assistance throughout)..

## 2018-05-07 NOTE — ANESTHESIA CARE TRANSFER NOTE
Patient: Travis Peres    Procedure(s):  LEFT PHACOEMULSIFICATION CLEAR CORNEA WITH STANDARD INTRAOCULAR LENS IMPLANT  - Wound Class: I-Clean    Diagnosis: LEFT EYE CATARACT   Diagnosis Additional Information: No value filed.    Anesthesia Type:   MAC     Note:  Airway :Room Air  Patient transferred to:PACU  Handoff Report: Identifed the Patient, Identified the Reponsible Provider, Reviewed the pertinent medical history, Discussed the surgical course, Reviewed Intra-OP anesthesia mangement and issues during anesthesia, Set expectations for post-procedure period and Allowed opportunity for questions and acknowledgement of understanding      Vitals: (Last set prior to Anesthesia Care Transfer)    CRNA VITALS  5/7/2018 1031 - 5/7/2018 1105      5/7/2018             Resp Rate (set): 10                Electronically Signed By: GEORGIA Duron CRNA  May 7, 2018  11:05 AM

## 2018-05-07 NOTE — DISCHARGE INSTRUCTIONS
[facility] Discharge  Instructions  Anesthesia ([facility])   Adult Discharge Instructions    For 24 hours after surgery    1. Get plenty of rest.  Make arrangements to have a responsible adult stay with you for at least 6 hours after you leave the hospital.  2. Do not drive or use heavy equipment for 24 hours.    3. Do not drink alcohol for 24 hours.  4. Do not sign legal documents or make important decisions for 24 hours.  5. Avoid strenuous or risky activities. You may feel lightheaded.  If so, sit for a few minutes before standing.  Have someone help you get up.   6. Conscious sedation patients may resume a regular diet..  7. Any questions of medical nature, call your physician.    Dr. Nadiya Allen  [facility]  [phone]  Post Operative Cataract Instructions      If you have a gauze eye patch on, please do not remove it until it is removed by your physician at your first post-operative visit.  You will start your eye drops the next day.      Wear the clear eye shield when sleeping for protection for 5 days.      Do not rub the operated eye.      Light sensitivity may be noticed. Sunglasses may be worn for comfort.      Some discomfort and irritation may be noticed. Acetaminophen (Tylenol) or Ibuprofen (Advil) may be taken for discomfort. If pain persists please call Dr. Allen's office.      Keep the operated eye dry. You may wash your hair, bathe or shower, but keep the operated eye closed while doing so.       If you take glaucoma medications, bring them with you to the clinic on your first post operative visit.      Bring your prescribed eye drops with you to your scheduled post-operative appointment.      Use medication exactly as prescribed by your doctor. You may restart your regular home medications.       Call Dr. Allen's office at [phone] if any of the following should occur:    - Any sudden vision changes, including decreased  vision  - Nausea or severe headache  - Increase in pain not controlled  - Signs of infection (pus, increasing redness or tenderness)  - Severe sensitivity to light

## 2018-05-07 NOTE — IP AVS SNAPSHOT
[facility]    [address]    [place] [address]    Phone:  [phone]    Fax:  [phone]                                       After Visit Summary   5/7/2018    Travis Peres    MRN: [id number]           After Visit Summary Signature Page     I have received my discharge instructions, and my questions have been answered. I have discussed any challenges I see with this plan with the nurse or doctor.    ..........................................................................................................................................  Patient/Patient Representative Signature      ..........................................................................................................................................  Patient Representative Print Name and Relationship to Patient    ..................................................               ................................................  Date                                            Time    ..........................................................................................................................................  Reviewed by Signature/Title    ...................................................              ..............................................  Date                                                            Time

## 2018-05-08 ENCOUNTER — OFFICE VISIT (OUTPATIENT)
Dept: OPHTHALMOLOGY | Facility: CLINIC | Age: 78
End: 2018-05-08
Attending: OPHTHALMOLOGY
Payer: MEDICARE

## 2018-05-08 DIAGNOSIS — Z98.42 S/P CATARACT SURGERY, LEFT: Primary | ICD-10-CM

## 2018-05-08 PROCEDURE — G0463 HOSPITAL OUTPT CLINIC VISIT: HCPCS | Mod: ZF

## 2018-05-08 ASSESSMENT — CUP TO DISC RATIO
OS_RATIO: 0.4
OD_RATIO: 0.5

## 2018-05-08 ASSESSMENT — VISUAL ACUITY
METHOD: SNELLEN - LINEAR
OD_PH_SC: 20/50-3
OS_SC: 20/400
OD_SC: 20/100

## 2018-05-08 ASSESSMENT — TONOMETRY
OS_IOP_MMHG: 15
IOP_METHOD: TONOPEN
OD_IOP_MMHG: 17

## 2018-05-08 ASSESSMENT — SLIT LAMP EXAM - LIDS
COMMENTS: NORMAL
COMMENTS: NORMAL

## 2018-05-08 ASSESSMENT — EXTERNAL EXAM - RIGHT EYE: OD_EXAM: NORMAL

## 2018-05-08 ASSESSMENT — EXTERNAL EXAM - LEFT EYE: OS_EXAM: NORMAL

## 2018-05-08 NOTE — PROGRESS NOTES
Postoperative day 1 status post CE/PCIOL left eye 5/7/18    Slept well  Lens centered   IOP WNL  Doing well    Plan:  No heavy lifting   Whitney shield at all times  Retina detachment and endophthalmitis precautions were discussed with the patient and was asked to return if any of the those occur    Medications to operative eye  Pred Forte / ocuflox / acular qid    Follow up in one week       Complete documentation of historical and exam elements from today's encounter can be found in the full encounter summary report (not reduplicated in this progress note).  I personally obtained the chief complaint(s) and history of present illness.  I confirmed and edited as necessary the review of systems, past medical/surgical history, family history, social history, and examination findings as documented by others; and I examined the patient myself.  I personally reviewed the relevant tests, images, and reports as documented above.  I formulated and edited as necessary the assessment and plan and discussed the findings and management plan with the patient and family       Matthew Anderson MD PhD  Vitreoretinal Surgery Fellow  [facility]

## 2018-05-08 NOTE — MR AVS SNAPSHOT
After Visit Summary   5/8/2018    Travis Peres    MRN: [id number]           Patient Information     Date Of Birth          1940        Visit Information        Provider Department      5/8/2018 10:15 AM Nadiya Allen MD; LANGUAGE [facility]        Today's Diagnoses     S/P cataract surgery, left - Left Eye    -  1       Follow-ups after your visit        Follow-up notes from your care team     Return in about 1 week (around 5/15/2018) for DFE.      Your next 10 appointments already scheduled     May 17, 2018  1:00 PM CDT   Post-Op with Nadiya Allen MD   Eye Clinic ([facility])    [address]  [facility] [id number] [phone]            May 24, 2018  3:20 PM CDT   (Arrive by 3:05 PM)   New Patient Visit with Faraz Amato MD   [facility] Urology and [facility] Prostate and Urologic Cancers ([facility])    [address]  [address] [facility] [address] [phone]            Jun 20, 2018  3:10 PM CDT   (Arrive by 2:55 PM)   RETURN ENDOCRINE with Corina Potts MD   [facility] Endocrinology ([facility])    [address]  [address] [facility] [address] [phone]              Who to contact     Please call your clinic at [phone] to:    Ask questions about your health    Make or cancel appointments    Discuss your medicines    Learn about your test results    Speak to your doctor            Additional Information About Your Visit        MyChart Information     Timecros is an electronic gateway that provides easy, online access to your medical records. With Timecros, you can request a clinic appointment, read your test results, renew a prescription or communicate with your care team.     To sign up for Timecros visit the website at [email or web address]   You will be asked to enter the access code listed below, as  well as some personal information. Please follow the directions to create your username and password.     Your access code is: [email or web address]  Expires: 6/3/2018 10:21 PM     Your access code will  in 90 days. If you need help or a new code, please contact your [facility] or call [phone] for assistance.        Care EveryWhere ID     This is your Care EveryWhere ID. This could be used by other organizations to access your [facility] medical records  [id number]         Blood Pressure from Last 3 Encounters:   18 158/82   18 140/76   18 135/67    Weight from Last 3 Encounters:   18 60.3 kg (132 lb 15 oz)   18 61 kg (134 lb 6.4 oz)   18 61.9 kg (136 lb 6.4 oz)              Today, you had the following     No orders found for display       Primary Care Provider Office Phone # Fax #    Karol SchillingsOrenChino [phone]       [address] [address]        Equal Access to Services     [facility]: Hadii aad ku hadasho Soomaali, waaxda luqadaha, qaybta kaalmada adeegyada, roxanne sanford hayfroyn diego smith . So [facility] [phone].    ATENCIÓN: Si habla español, tiene a reese disposición servicios gratuitos de asistencia lingüística. [facility] [phone].    We comply with applicable federal civil rights laws and [place] laws. We do not discriminate on the basis of race, color, national origin, age, disability, sex, sexual orientation, or gender identity.            Thank you!     Thank you for choosing EYE CLINIC  for your care. Our goal is always to provide you with excellent care. Hearing back from our patients is one way we can continue to improve our services. Please take a few minutes to complete the written survey that you may receive in the mail after your visit with us. Thank you!             Your Updated Medication List - Protect others around you: Learn how to safely use, store and throw  away your medicines at www.disposemymeds.org.          This list is accurate as of 5/8/18 11:21 AM.  Always use your most recent med list.                   Brand Name Dispense Instructions for use Diagnosis    aflibercept 2 MG/0.05ML Soln injection    EYLEA    0.05 mL    0.05 mLs (2 mg) by Intravitreal route every 28 days    CNVM (choroidal neovascular membrane), left       alendronate 70 MG tablet    FOSAMAX    16 tablet    Take 1 tablet (70 mg) by mouth every 7 days    Postsurgical hypothyroidism, Papillary thyroid carcinoma (H), Pituitary adenoma (H), Hypopituitarism (H)       amLODIPine 2.5 MG tablet    NORVASC     TK 1 T PO QD        Carboxymethylcellulose Sod PF 0.5 % Soln ophthalmic solution    REFRESH PLUS     Place 1 drop into both eyes 3 times daily as needed for dry eyes        COMBIVENT RESPIMAT  MCG/ACT inhaler   Generic drug:  Ipratropium-Albuterol      INL 1 PUFF PO QID        fish oil-omega-3 fatty acids 1000 MG capsule      Take 2 g by mouth daily        fluticasone 50 MCG/ACT spray    FLONASE     Spray 2 sprays into both nostrils daily        hydrocortisone 10 MG tablet    CORTEF    180 tablet    Take one tablet by mouth daily in the morning, 1/2 tablet at 2 PM    Postsurgical hypothyroidism, Papillary thyroid carcinoma (H), Abnormal finding on imaging       ibuprofen 400 MG tablet    ADVIL/MOTRIN     Take 400-600 mg by mouth every 6 hours as needed for moderate pain        ketorolac tromethamine 0.4 % Soln ophthalmic solution    ACULAR-LS    5 mL    Apply 1 drop to eye 4 times daily Instill into operative eye(s) per physician instructions.    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    Cataract of left eye, unspecified cataract type       prednisoLONE acetate 1 % ophthalmic susp    PRED FORTE    5 mL    Apply 1 drop to eye 4 times daily Instill into operative eye(s) per physician instructions.    Cataract of left eye, unspecified cataract type       ranibizumab 0.5 MG/0.05ML Soln    LUCENTIS    0.05 mL    0.05 mLs (0.5 mg) by Intravitreal route every 28 days    CNVM (choroidal neovascular membrane), left       ranitidine 300 MG tablet    ZANTAC          RIBOFLAVIN PO      Take 100 mg by mouth        TAMSULOSIN HCL PO      Take 0.4 mg by mouth At Bedtime        traZODone 100 MG tablet    DESYREL     take 1 tab of 50 mg daily        vitamin B complex with vitamin C Tabs tablet      Take 1 tablet by mouth daily        VOLTAREN 1 % Gel topical gel   Generic drug:  diclofenac      Place onto the skin 4 times daily        zolpidem 10 MG tablet    AMBIEN     Take 10 mg by mouth

## 2018-05-09 ENCOUNTER — OFFICE VISIT (OUTPATIENT)
Dept: ENDOCRINOLOGY | Facility: CLINIC | Age: 78
End: 2018-05-09
Payer: COMMERCIAL

## 2018-05-09 VITALS
HEIGHT: 65 IN | BODY MASS INDEX: 22.39 KG/M2 | DIASTOLIC BLOOD PRESSURE: 94 MMHG | WEIGHT: 134.4 LBS | HEART RATE: 67 BPM | SYSTOLIC BLOOD PRESSURE: 160 MMHG

## 2018-05-09 DIAGNOSIS — C73 PAPILLARY THYROID CARCINOMA (H): Primary | ICD-10-CM

## 2018-05-09 DIAGNOSIS — E89.0 POSTSURGICAL HYPOTHYROIDISM: ICD-10-CM

## 2018-05-09 DIAGNOSIS — C77.0 METASTASIS TO CERVICAL LYMPH NODE (H): ICD-10-CM

## 2018-05-09 ASSESSMENT — PAIN SCALES - GENERAL: PAINLEVEL: NO PAIN (0)

## 2018-05-09 NOTE — MR AVS SNAPSHOT
After Visit Summary   5/9/2018    Travis Peres    MRN: [id number]           Patient Information     Date Of Birth          1940        Visit Information        Provider Department      5/9/2018 3:45 PM Corina Potts MD; MULTILINGUAL WORD [facility] Endocrinology        Today's Diagnoses     Papillary thyroid carcinoma (HCC)    -  1    Postsurgical hypothyroidism          Care Instructions    Keep the June appointment we have already scheduled            Follow-ups after your visit        Your next 10 appointments already scheduled     May 17, 2018  1:00 PM CDT   Post-Op with Nadiya Allen MD   Eye Clinic ([facility])    [address]  [address] [address] [facility] [address]  [facility] [address] [phone]            May 24, 2018  3:20 PM CDT   (Arrive by 3:05 PM)   New Patient Visit with Faraz Amato MD   [facility] Urology and [facility] for Prostate and Urologic Cancers ([facility])    [address]  4th [address]  [facility] [address] [phone]            Jun 20, 2018  3:10 PM CDT   (Arrive by 2:55 PM)   RETURN ENDOCRINE with Corina Potts MD   [facility] Endocrinology ([facility])    [address]  3rd Floor  [facility] [address] [phone]              Who to contact     Please call your clinic at [phone] to:    Ask questions about your health    Make or cancel appointments    Discuss your medicines    Learn about your test results    Speak to your doctor            Additional Information About Your Visit        MyChart Information     [employer] is an electronic gateway that provides easy, online access to your medical records. With Digital Folio, you can request a clinic appointment, read your test results, renew a prescription or communicate with your care team.     To sign up for [employer] visit the website at [email or web address]   You will be asked to enter the  "access code listed below, as well as some personal information. Please follow the directions to create your username and password.     Your access code is: [email or web address]  Expires: 6/3/2018 10:21 PM     Your access code will  in 90 days. If you need help or a new code, please contact your [facility] or call [phone] for assistance.        Care EveryWhere ID     This is your Care EveryWhere ID. This could be used by other organizations to access your [facility] medical records  [id number]        Your Vitals Were     Pulse Height BMI (Body Mass Index)             67 1.651 m (5' 5\") 22.37 kg/m2          Blood Pressure from Last 3 Encounters:   18 (!) 160/94   18 158/82   18 140/76    Weight from Last 3 Encounters:   18 61 kg (134 lb 6.4 oz)   18 60.3 kg (132 lb 15 oz)   18 61 kg (134 lb 6.4 oz)              Today, you had the following     No orders found for display       Primary Care Provider Office Phone # Fax #    Karol Alvarez [phone]       [place] [address]        Equal Access to Services     [facility] : Hadii jake ku hadasho Soomaali, waaxda luqadaha, qaybta kaalmada adeegyada, roxanne andersin hayrubio smith . So [facility] [phone].    ATENCIÓN: Si habla español, tiene a reese disposición servicios gratuitos de asistencia lingüística. Llame al [phone].    We comply with applicable federal civil rights laws and [place] laws. We do not discriminate on the basis of race, color, national origin, age, disability, sex, sexual orientation, or gender identity.            Thank you!     Thank you for choosing [facility]  for your care. Our goal is always to provide you with excellent care. Hearing back from our patients is one way we can continue to improve our services. Please take a few minutes to complete the written survey that you may receive in the " mail after your visit with us. Thank you!             Your Updated Medication List - Protect others around you: Learn how to safely use, store and throw away your medicines at www.disposemymeds.org.          This list is accurate as of 5/9/18  4:54 PM.  Always use your most recent med list.                   Brand Name Dispense Instructions for use Diagnosis    aflibercept 2 MG/0.05ML Soln injection    EYLEA    0.05 mL    0.05 mLs (2 mg) by Intravitreal route every 28 days    CNVM (choroidal neovascular membrane), left       alendronate 70 MG tablet    FOSAMAX    16 tablet    Take 1 tablet (70 mg) by mouth every 7 days    Postsurgical hypothyroidism, Papillary thyroid carcinoma (H), Pituitary adenoma (H), Hypopituitarism (H)       amLODIPine 2.5 MG tablet    NORVASC     TK 1 T PO QD        Carboxymethylcellulose Sod PF 0.5 % Soln ophthalmic solution    REFRESH PLUS     Place 1 drop into both eyes 3 times daily as needed for dry eyes        COMBIVENT RESPIMAT  MCG/ACT inhaler   Generic drug:  Ipratropium-Albuterol      INL 1 PUFF PO QID        fish oil-omega-3 fatty acids 1000 MG capsule      Take 2 g by mouth daily        fluticasone 50 MCG/ACT spray    FLONASE     Spray 2 sprays into both nostrils daily        hydrocortisone 10 MG tablet    CORTEF    180 tablet    Take one tablet by mouth daily in the morning, 1/2 tablet at 2 PM    Postsurgical hypothyroidism, Papillary thyroid carcinoma (H), Abnormal finding on imaging       ibuprofen 400 MG tablet    ADVIL/MOTRIN     Take 400-600 mg by mouth every 6 hours as needed for moderate pain        ketorolac tromethamine 0.4 % Soln ophthalmic solution    ACULAR-LS    5 mL    Apply 1 drop to eye 4 times daily Instill into operative eye(s) per physician instructions.    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    Cataract of left eye, unspecified cataract type       prednisoLONE acetate 1 % ophthalmic susp    PRED FORTE    5 mL    Apply 1 drop to eye 4 times daily Instill into operative eye(s) per physician instructions.    Cataract of left eye, unspecified cataract type       ranibizumab 0.5 MG/0.05ML Soln    LUCENTIS    0.05 mL    0.05 mLs (0.5 mg) by Intravitreal route every 28 days    CNVM (choroidal neovascular membrane), left       ranitidine 300 MG tablet    ZANTAC          RIBOFLAVIN PO      Take 100 mg by mouth        TAMSULOSIN HCL PO      Take 0.4 mg by mouth At Bedtime        traZODone 100 MG tablet    DESYREL     take 1 tab of 50 mg daily        vitamin B complex with vitamin C Tabs tablet      Take 1 tablet by mouth daily        VOLTAREN 1 % Gel topical gel   Generic drug:  diclofenac      Place onto the skin 4 times daily        zolpidem 10 MG tablet    AMBIEN     Take 10 mg by mouth

## 2018-05-09 NOTE — PROGRESS NOTES
Endocrinology Attending Physician Progress note    Very complicated endocrine patient/ problem set.   1. Papillary thyroid cancer 4.8 cm right, bilateral node positive. He has been treated with total thyroidectomy, 131I x 2 (cummulative dose 346.4 mCi) His last 131I TBS (2008) post therapy scan raised question of ? lung mets and also ? met above left kidney. Cervical adenopathy has been treated in the past with ETOH .    I have long suspected he had lung mets, but we haven't proven this.      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   Cervical adenopathy has been treated in the past with ETOH .  Neck US 9/17 shows progression of right level 6 and 7 LNs. PET shows high FDG right neck.  Repeat US following the appt suggests minimal progression in size since 9/17.  The 2 LNs likely correlate to the high FDG regions on the PET.   It is possible these are the same LNs that were treated with ETOH in the past (vs others in the same vicinity - it is hard to say)  Greater than 50% of 20 minute appt on counseling focussed on this.    Options  Watch it  Surgery  ETOh again.   Today he seemed much more interested in watching it. We have another upcoming appt soon so we can discuss again then as well.     Lung nodules - biopsy has not been performed.  BAL cytology (8/15) did not show malignancy. Spiculated Lingular nodule to 1.7 x 1.5 cm on 9/17 CT measured only 7 mm on 4/12/18 PET, lacking FDG activity.       Persistent thyroglobulinemia has been rising/worse, suggesting progression of thyroid cancer in some location. Current status unknown.  As per # 1      2.6 x 1.4 cm substernal anterior mediastinal mass .  Presumably this has been removed.  It was benign thymic tissue      Hypopituitary with hypogonadotropic hypogonadism, probable secondary hypothyroidism, and secondary adrenal insufficiency, probable GH deficiency.   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 The tumor has been significantly de bulked and He has completed XRT for  persistent tumor-. Tumor mass is stable on  MRI through 12/16.       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   The TSH is not fully reliable.  We can treat up to high normal free T4.       Low BMD. He continues on alendronate .  Last DXA 7/15 is stable.  He is on alendronate.    He is due for follow up DXA.  Not addressed.    Hypogonadism has been untreated (Due to patient noncompliance with treatment recommendations) - not addressed      ? Lytic bone lesions -stable on serial imaging -- not addressed     Language barrier ;hearing barrier - both of these make his care very difficult and are interfering with his best health care. RTC 2 months to be sure things move along as recommended    Greater than 50% of 25 minute appt on counseling.  I    Corina Potts MD      Cc/ HPI: Mr Peres returns today, along with his wife and also .  I last saw him 4/9/18.  He returns today to discuss test results. See my 4/9/18 note for his detailed history.    He has a history of multiple complicated endocrine issues, including thyroid cancer, surgical hypothyroidism, osteoporosis, pituitary macroadenoma with partial hypopituitarism and history of DI.      Papillary thyroid cancer 4.8 cm left, bilateral node positive (MACIS 6.88 minimum, pT3, pN1a (8), pMx, Stage III or IV). His course has included several operations and several 131I treatments  11/27/07 thyroidectomy  153.4 mCi 131I in 6/08. The radioiodine  was complicated by acute sialadenitis.   12/08  193 mCi 131I (with Thyrogen stimulation). The post therapy scan showed activity in the thyroid bed, lung base on the right and also superior left kidney region   11/3/09  right selective neck dissection levels 2, 3 and 4, removing many positive LNs.   4/16/14 FNAB of right level 6 and 7 cervical lymph nodes were  positive  for papillary thyroid cancer. Needle wash Tg was also high on both samples (see below). He had  hoarseness within one hour after the FNAB procedure. He was treated with cymetra on 5/12/14 and he restarted thickened liquids.    6/3/14  ETOH ablation procedure of the  2 LNs    1/31/17  trasnscervical resection of retrosternal mediastinal lesions.  A cystic lesion was removed and drained.  Surgical path  benign thymic tissue.     4/12/18  PET/CT .  Right low neck /superior mediastinal high FDG lateral and more midline. The paratracheal mass is somewhat posterior and below the level of the clavicle (read as 1.4 cm, larger than before, SUB 68), but above the sternum  Tiny focus of fdg left lung      Labs   4/9/18: Tg 18.2, FRANK < 0.4; TSH  ,0.01, free T4 1.52, Na 139, K 4.4;     Neck US, as read by me, obtained following the appt 4/16/18:   Right level 6 thyroid bed 1.3 x 0.95 x 1.2 cm - suspicous (was 1.2 x 0.93 x 0.9  cm 9/17, read as level 7 then;   Right level 7 # 1 1.4 x 1.5 x 1.5 cm (was 1.1 x 1.2 x 1 - read as level 6 )  Left level 4  0.3 x 0.8 x 1 cm (was 0.6 x 0.3 x 0.8 )  Left level 4 # 2 0.8 x 0.5 x 0.6 cm (was 0.5 x 0.5 x 1 )  Left level 5 1 1.0 x 0.5 x 1.5 cm (was 1.1 x 0.6 x 1.3   Left level 5 2 1 x 0.5 x 1.3      ROS:   had cataract surgery Monday   Throat irriatin  Can hear hoarse stuff  Not much tooth left.     PMH   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     Post-surgical hypothyroidism 2007     Uncomplicated asthma      Vocal cord paralysis     right      Xerostomia     due to radiation exposures     Past Surgical History:   Procedure Laterality Date     cymetra injection       ESOPHAGOSCOPY, GASTROSCOPY, DUODENOSCOPY (EGD), COMBINED  2013    Procedure: COMBINED ESOPHAGOSCOPY, GASTROSCOPY, DUODENOSCOPY (EGD), BIOPSY SINGLE OR MULTIPLE;  gastroscopy;  Surgeon: Leslie Guadarrama MD;  Location:  GI     HERNIA REPAIR Right      lipoma resection chest wall Right      PHACOEMULSIFICATION CLEAR CORNEA WITH STANDARD INTRAOCULAR LENS IMPLANT Left 2018    Procedure: PHACOEMULSIFICATION CLEAR CORNEA WITH STANDARD INTRAOCULAR LENS IMPLANT;  LEFT PHACOEMULSIFICATION CLEAR CORNEA WITH STANDARD INTRAOCULAR LENS IMPLANT ;  Surgeon: Nadiya Allen MD;  Location:  [facility]     right selective neck dissection level 2, 3, 4  11/3/09     right  shunt placement  07     THYMECTOMY N/A 1/3/2017    Procedure: THYMECTOMY;  Surgeon: Ernesto Armstrong MD;  Location: [facility]     THYROIDECTOMY  07     TRANSCERVICAL EXTENDED MEDIASTINAL LYMPHADENECTOMY N/A 1/3/2017    Procedure: TRANSCERVICAL EXTENDED MEDIASTINAL LYMPHADENECTOMY;  Surgeon: Ernesto Armstrong MD;  Location: [facility] OR     transnasal endoscopic resection of pituitary adenoma  2007     Family History   Problem Relation Age of Onset     CANCER No family hx of      no skin cancer     Glaucoma No family hx of      Macular Degeneration No family hx of      Social History     Social History     Marital status:      Spouse name: N/A     Number of children: N/A     Years of education: N/A     Occupational History     Not on file.     Social History Main Topics     Smoking status: Never Smoker     Smokeless tobacco: Never Used     Alcohol use No     Drug use: No     Sexual activity: Not on file     Other Topics Concern     Not on file     Social History Narrative    Mom  at age 93 from a fall    Dad  at age 98 from old age     40 plus years    Two adult children in good  "health.    Occupation: retired from running a [employer]    Originally from [place]         Physical Exam   GENERAL: elderly man in NAD; His wife is present . He is very [place]-  BP (!) 160/94  Pulse 67  Ht 1.651 m (5' 5\")  Wt 61 kg (134 lb 6.4 oz)  BMI 22.37 kg/m2  SKIN: normal color ,  HEENT: no scleral icterus  NEURO: Alert, responds appropriately to questions, seems more attentive and focused on the issue at hand toay than usual      DATA REVIEW:  Copath Report 04/23/2018  2:05 PM 88      Patient Name: BUCK CUEVAS   MR#: [id number]   Specimen #: [id number]   Collected: 4/23/2018   Received: 4/23/2018   Reported: 4/23/2018 16:08   Ordering Phy(s): TENA BLANC     For improved result formatting, select 'View Enhanced Report Format' under    Linked Documents section.     SPECIMEN/STAIN PROCESS:   A: FNA-lymph node, Right Level 6        Pap-Cyto x 3, Diff Quick Stain-cyto x 3   B: FNA-lymph node, Right level 7        Pap-Cyto x 1, Diff Quick Stain-cyto x 1     ----------------------------------------------------------------     CYTOLOGIC INTERPRETATION:     A. Lymph Node, Right Level 6, Ultrasound-Guided Fine Needle Aspiration:   Positive for malignancy, morphologically consistent with metastatic   papillary thyroid carcinoma.   Specimen Adequacy: Satisfactory for evaluation.     B. Lymph Node, Right level 7, Ultrasound-Guided Fine Needle Aspiration:   Positive for malignancy, morphologically consistent with metastatic   papillary thyroid carcinoma.   Specimen Adequacy: Satisfactory for evaluation.     I have personally reviewed all specimens and/or slides, including the   listed special stains, and used them   with my medical judgement to determine or confirm the final diagnosis.     Electronically signed out by:   Ron Holley M.D., [facility]     Processed and screened at [facility]     CLINICAL HISTORY:   The patient is a 77-year-old male " with a history of thyroidectomy for   papillary thyroid cancer, now with   abnormal cervical lymph node imaging.     ,     GROSS:   A. FNA-lymph node, Right Level 6: Received are 3 fixed slides, processed   for Pap stain, and 3 air dried   slides, processed for Diff Quik stain. Needle washes into saline were   performed and sent directly from   Radiology for thyroglobulin assay.     B. FNA-lymph node, Right level 7: Received are 1 fixed slide, processed   for Pap stain, and 1 air dried slide,   processed for Diff Quik stain. Needle washes into saline were performed   and sent directly from Radiology for   thyroglobulin assay.     INTRAOPERATIVE CONSULTATION:   FNA Performance: Fine needle aspiration was not performed by [facility],    Pathology staff.     Immediate Adequacy: On site specimen adequacy evaluation was performed by   Dr. RAYRAY Holley III, MD via   telepathology.     Onsite adequacy/interpretation:   Pass A1: Adequate. Pass A2: Put directly into saline. Passes A3:A4:   Adequate   Pass B1: Adequate: Pass B2: Put directly into saline.     MICROSCOPIC:   Microscopic examination performed.     Gui Henao MD, [facility]          Ron Holley MD     CPT Codes:   A: 78905-KEDP-KBN, 11134-DFLP-QID, 30145-QSMZ   B: 10313-VKCZ-ZSW, 39556-KIEK-QLT, 41811-WEPO     TESTING LAB LOCATION:   [facility], [address]   [address] [phone]     COLLECTION SITE:   Client:  [facility]   Location:  [facility] (B)     Resident   IF

## 2018-05-09 NOTE — LETTER
5/9/2018       RE: Travis Peres  [address]  [facility] [address]     Dear Colleague,    Thank you for referring your patient, Travis Peres, to the [facility] ENDOCRINOLOGY at [facility]. Please see a copy of my visit note below.    Endocrinology Attending Physician Progress note    Very complicated endocrine patient/ problem set.   1. Papillary thyroid cancer 4.8 cm right, bilateral node positive. He has been treated with total thyroidectomy, 131I x 2 (cummulative dose 346.4 mCi) His last 131I TBS (2008) post therapy scan raised question of ? lung mets and also ? met above left kidney. Cervical adenopathy has been treated in the past with ETOH .    I have long suspected he had lung mets, but we haven't proven this.      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   Cervical adenopathy has been treated in the past with ETOH .  Neck US 9/17 shows progression of right level 6 and 7 LNs. PET shows high FDG right neck.  Repeat US following the appt suggests minimal progression in size since 9/17.  The 2 LNs likely correlate to the high FDG regions on the PET.   It is possible these are the same LNs that were treated with ETOH in the past (vs others in the same vicinity - it is hard to say)  Greater than 50% of 20 minute appt on counseling focussed on this.    Options  Watch it  Surgery  ETOh again.   Today he seemed much more interested in watching it. We have another upcoming appt soon so we can discuss again then as well.     Lung nodules - biopsy has not been performed.  BAL cytology (8/15) did not show malignancy. Spiculated Lingular nodule to 1.7 x 1.5 cm on 9/17 CT measured only 7 mm on 4/12/18 PET, lacking FDG activity.       Persistent thyroglobulinemia has been rising/worse, suggesting progression of thyroid cancer in some location. Current status unknown.  As per # 1      2.6 x 1.4 cm substernal anterior mediastinal mass .  Presumably this has been  removed.  It was benign thymic tissue      Hypopituitary with hypogonadotropic hypogonadism, probable secondary hypothyroidism, and secondary adrenal insufficiency, probable GH deficiency.   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 The tumor has been significantly de bulked and He has completed XRT for  persistent tumor-. Tumor mass is stable on  MRI through 12/16.       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   The TSH is not fully reliable.  We can treat up to high normal free T4.       Low BMD. He continues on alendronate .  Last DXA 7/15 is stable.  He is on alendronate.    He is due for follow up DXA.  Not addressed.    Hypogonadism has been untreated (Due to patient noncompliance with treatment recommendations) - not addressed      ? Lytic bone lesions -stable on serial imaging -- not addressed     Language barrier ;hearing barrier - both of these make his care very difficult and are interfering with his best health care. RTC 2 months to be sure things move along as recommended    Greater than 50% of 25 minute appt on counseling.  I    Corina Potts MD      Cc/ HPI: Mr Peres returns today, along with his wife and also .  I last saw him 4/9/18.  He returns today to discuss test results. See my 4/9/18 note for his detailed history.    He has a history of multiple complicated endocrine issues, including thyroid cancer, surgical hypothyroidism, osteoporosis, pituitary macroadenoma with partial hypopituitarism and history of DI.      Papillary thyroid cancer 4.8 cm left, bilateral node positive (MACIS 6.88 minimum, pT3, pN1a (8), pMx, Stage III or IV). His course has included several operations and several 131I treatments  11/27/07 thyroidectomy  153.4 mCi 131I in 6/08. The radioiodine  was complicated by acute sialadenitis.   12/08  193 mCi 131I (with Thyrogen stimulation). The post  therapy scan showed activity in the thyroid bed, lung base on the right and also superior left kidney region   11/3/09  right selective neck dissection levels 2, 3 and 4, removing many positive LNs.   4/16/14 FNAB of right level 6 and 7 cervical lymph nodes were  positive for papillary thyroid cancer. Needle wash Tg was also high on both samples (see below). He had  hoarseness within one hour after the FNAB procedure. He was treated with cymetra on 5/12/14 and he restarted thickened liquids.    6/3/14  ETOH ablation procedure of the  2 LNs    1/31/17  trasnscervical resection of retrosternal mediastinal lesions.  A cystic lesion was removed and drained.  Surgical path  benign thymic tissue.     4/12/18  PET/CT .  Right low neck /superior mediastinal high FDG lateral and more midline. The paratracheal mass is somewhat posterior and below the level of the clavicle (read as 1.4 cm, larger than before, SUB 68), but above the sternum  Tiny focus of fdg left lung      Labs   4/9/18: Tg 18.2, FRANK < 0.4; TSH  ,0.01, free T4 1.52, Na 139, K 4.4;     Neck US, as read by me, obtained following the appt 4/16/18:   Right level 6 thyroid bed 1.3 x 0.95 x 1.2 cm - suspicous (was 1.2 x 0.93 x 0.9  cm 9/17, read as level 7 then;   Right level 7 # 1 1.4 x 1.5 x 1.5 cm (was 1.1 x 1.2 x 1 - read as level 6 )  Left level 4  0.3 x 0.8 x 1 cm (was 0.6 x 0.3 x 0.8 )  Left level 4 # 2 0.8 x 0.5 x 0.6 cm (was 0.5 x 0.5 x 1 )  Left level 5 1 1.0 x 0.5 x 1.5 cm (was 1.1 x 0.6 x 1.3   Left level 5 2 1 x 0.5 x 1.3      ROS:   had cataract surgery Monday   Throat irriatin  Can hear hoarse stuff  Not much tooth left.     PMH   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)      Hypovitaminosis D      Multiple pulmonary nodules      Osteopenia      Panhypopituitarism (H)       Papillary thyroid carcinoma (H) 2007    4.8 cm, right, node positive;      Pituitary macroadenoma with extrasellar extension (H) 2007    causing obstructive hydrocephalus     Post-surgical hypothyroidism 2007     Uncomplicated asthma      Vocal cord paralysis     right     Xerostomia     due to radiation exposures     Past Surgical History:   Procedure Laterality Date     cymetra injection       ESOPHAGOSCOPY, GASTROSCOPY, DUODENOSCOPY (EGD), COMBINED  6/20/2013    Procedure: COMBINED ESOPHAGOSCOPY, GASTROSCOPY, DUODENOSCOPY (EGD), BIOPSY SINGLE OR MULTIPLE;  gastroscopy;  Surgeon: Leslie Guadarrama MD;  Location:  GI     HERNIA REPAIR Right      lipoma resection chest wall Right 11/09     PHACOEMULSIFICATION CLEAR CORNEA WITH STANDARD INTRAOCULAR LENS IMPLANT Left 5/7/2018    Procedure: PHACOEMULSIFICATION CLEAR CORNEA WITH STANDARD INTRAOCULAR LENS IMPLANT;  LEFT PHACOEMULSIFICATION CLEAR CORNEA WITH STANDARD INTRAOCULAR LENS IMPLANT ;  Surgeon: Nadiya Allen MD;  Location:  [facility]     right selective neck dissection level 2, 3, 4  11/3/09     right  shunt placement  6/28/07     THYMECTOMY N/A 1/3/2017    Procedure: THYMECTOMY;  Surgeon: Ernesto Armstrong MD;  Location: [facility] OR     THYROIDECTOMY  11/27/07     TRANSCERVICAL EXTENDED MEDIASTINAL LYMPHADENECTOMY N/A 1/3/2017    Procedure: TRANSCERVICAL EXTENDED MEDIASTINAL LYMPHADENECTOMY;  Surgeon: Ernesto Armstrong MD;  Location: [facility] OR     transnasal endoscopic resection of pituitary adenoma  8/13/2007     Family History   Problem Relation Age of Onset     CANCER No family hx of      no skin cancer     Glaucoma No family hx of      Macular Degeneration No family hx of      Social History     Social History     Marital status:      Spouse name: N/A     Number of children: N/A     Years of education: N/A     Occupational History     Not on file.     Social History Main Topics     Smoking status: Never Smoker     Smokeless tobacco:  "Never Used     Alcohol use No     Drug use: No     Sexual activity: Not on file     Other Topics Concern     Not on file     Social History Narrative    Mom  at age 93 from a fall    Dad  at age 98 from old age     40 plus years    Two adult children in good health.    Occupation: retired from running a restaurant    Originally from [place]         Physical Exam   GENERAL: elderly man in NAD; His wife is present . He is very [place]-  BP (!) 160/94  Pulse 67  Ht 1.651 m (5' 5\")  Wt 61 kg (134 lb 6.4 oz)  BMI 22.37 kg/m2  SKIN: normal color ,  HEENT: no scleral icterus  NEURO: Alert, responds appropriately to questions, seems more attentive and focused on the issue at hand toay than usual      DATA REVIEW:  Copath Report 2018  2:05 PM 88      Patient Name: BUCK CUEVAS   MR#: [id number]   Specimen #: [id number]   Collected: 2018   Received: 2018   Reported: 2018 16:08   Ordering Phy(s): TENA BLANC     For improved result formatting, select 'View Enhanced Report Format' under    Linked Documents section.     SPECIMEN/STAIN PROCESS:   A: FNA-lymph node, Right Level 6        Pap-Cyto x 3, Diff Quick Stain-cyto x 3   B: FNA-lymph node, Right level 7        Pap-Cyto x 1, Diff Quick Stain-cyto x 1     ----------------------------------------------------------------     CYTOLOGIC INTERPRETATION:     A. Lymph Node, Right Level 6, Ultrasound-Guided Fine Needle Aspiration:   Positive for malignancy, morphologically consistent with metastatic   papillary thyroid carcinoma.   Specimen Adequacy: Satisfactory for evaluation.     B. Lymph Node, Right level 7, Ultrasound-Guided Fine Needle Aspiration:   Positive for malignancy, morphologically consistent with metastatic   papillary thyroid carcinoma.   Specimen Adequacy: Satisfactory for evaluation.     I have personally reviewed all specimens and/or slides, including the   listed special stains, and used them   with my medical judgement to " determine or confirm the final diagnosis.     Electronically signed out by:   Ron Holley M.D., [facility]     Processed and screened at [facility]     CLINICAL HISTORY:   The patient is a 77-year-old male with a history of thyroidectomy for   papillary thyroid cancer, now with   abnormal cervical lymph node imaging.     ,     GROSS:   A. FNA-lymph node, Right Level 6: Received are 3 fixed slides, processed   for Pap stain, and 3 air dried   slides, processed for Diff Quik stain. Needle washes into saline were   performed and sent directly from   Radiology for thyroglobulin assay.     B. FNA-lymph node, Right level 7: Received are 1 fixed slide, processed   for Pap stain, and 1 air dried slide,   processed for Diff Quik stain. Needle washes into saline were performed   and sent directly from Radiology for   thyroglobulin assay.     INTRAOPERATIVE CONSULTATION:   FNA Performance: Fine needle aspiration was not performed by [facility],    Pathology staff.     Immediate Adequacy: On site specimen adequacy evaluation was performed by   Dr. RAYRAY Holley III, MD via   telepathology.     Onsite adequacy/interpretation:   Pass A1: Adequate. Pass A2: Put directly into saline. Passes A3:A4:   Adequate   Pass B1: Adequate: Pass B2: Put directly into saline.     MICROSCOPIC:   Microscopic examination performed.     Gui Henao MD, Cytopathology [facility]          Ron Holley MD     CPT Codes:   A: 02171-NDJH-XKY, 44838-HPIZ-QUA, 93995-AMRR   B: 77453-JFSF-HWT, 93741-JEOF-EZH, 55772-HQOS     TESTING LAB LOCATION:   [facility], [address]   [address] [phone]     COLLECTION SITE:   Client:  [facility]   Location:  [facility] ([facility])     Resident   [id number]        Again, thank you for allowing me to participate in the care of your patient.       Sincerely,    Corina Potts MD

## 2018-05-14 ENCOUNTER — OFFICE VISIT (OUTPATIENT)
Dept: OPHTHALMOLOGY | Facility: CLINIC | Age: 78
End: 2018-05-14
Attending: OPTOMETRIST
Payer: COMMERCIAL

## 2018-05-14 DIAGNOSIS — H35.3290 EXUDATIVE SENILE MACULAR DEGENERATION OF RETINA (H): ICD-10-CM

## 2018-05-14 DIAGNOSIS — H25.11 NUCLEAR SENILE CATARACT OF RIGHT EYE: ICD-10-CM

## 2018-05-14 DIAGNOSIS — H53.142 VISUAL DISCOMFORT OF LEFT EYE: Primary | ICD-10-CM

## 2018-05-14 DIAGNOSIS — H35.3190 NONEXUDATIVE SENILE MACULAR DEGENERATION OF RETINA: ICD-10-CM

## 2018-05-14 ASSESSMENT — VISUAL ACUITY
METHOD: SNELLEN - LINEAR
OS_SC: 20/50
OD_SC: 20/50-2

## 2018-05-14 ASSESSMENT — CUP TO DISC RATIO: OS_RATIO: 0.65

## 2018-05-14 ASSESSMENT — EXTERNAL EXAM - LEFT EYE: OS_EXAM: NORMAL

## 2018-05-14 ASSESSMENT — SLIT LAMP EXAM - LIDS
COMMENTS: NORMAL
COMMENTS: NORMAL

## 2018-05-14 ASSESSMENT — EXTERNAL EXAM - RIGHT EYE: OD_EXAM: NORMAL

## 2018-05-14 NOTE — PROGRESS NOTES
HPI:  Patient had cataract surgery on the left eye on March 7th. He complains of pain and redness three days after surgery. He complains of pain with eye drops. Patient stopped ofloxacin, ketorolac, and pred forte. Vision is stable.     **Challenging exam due to language barrier and poor fixation. Telephone translater today.     Pertinent Medical History:    Pituitary adenoma s/p resection    Ocular History:    Right homonymous hemianopsia secondary to pituitary adenoma - sees Dr. Alford    Wet AMD CNVM OS. S/p lucentis injection last one on 04/18/18    Dry AMD OD.     PVD OU    NSC OU    Eye Medications:    Stopped drops    Assessment and Plan:  1.   1 week s/p CE, OS. Early PCO - watch.     Patient stopped his eye drops because of pain/stinging. I stressed the importance of eyedrops and re-assured patient that some stinging is normal.     Resume ocuflox QID x 5 days.    Resume ketorolac QID.    Resume pred forte QID.    Follow up Dr. Allen as planned.     2.   Borderline cupping, OS. Shallow cupping.     Consider baseline glaucoma work-up in the near future.     3.   Wet AMD OS    With central scar/fibrosis.     Continue lucentis injections    4.   Dry AMD OD    [employer]'s vitamins.     5.   PVD, OU.    Educated on signs and symptoms of a retinal detachment to return immediately.     6.   Cataract, OD.     Monitor.       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     Post-surgical hypothyroidism 2007     Uncomplicated asthma      Vocal cord paralysis     right      Xerostomia     due to radiation exposures       Medications:  Current Outpatient Prescriptions   Medication Sig Dispense Refill     aflibercept (EYLEA) 2 MG/0.05ML SOLN injection 0.05 mLs (2 mg) by Intravitreal route every 28 days 0.05 mL 11     alendronate (FOSAMAX) 70 MG tablet Take 1 tablet (70 mg) by mouth every 7 days 16 tablet 3     amLODIPine (NORVASC) 2.5 MG tablet TK 1 T PO QD  4     Carboxymethylcellulose Sod PF (REFRESH PLUS) 0.5 % SOLN ophthalmic solution Place 1 drop into both eyes 3 times daily as needed for dry eyes       COMBIVENT RESPIMAT  MCG/ACT inhaler INL 1 PUFF PO QID  1     diclofenac (VOLTAREN) 1 % GEL topical gel Place onto the skin 4 times daily       fish oil-omega-3 fatty acids 1000 MG capsule Take 2 g by mouth daily       fluticasone (FLONASE) 50 MCG/ACT spray Spray 2 sprays into both nostrils daily       GuaiFENesin (MUCUS RELIEF ADULT PO) Take 400 mg by mouth 2 times daily as needed        hydrocortisone (CORTEF) 10 MG tablet Take one tablet by mouth daily in the morning, 1/2 tablet at 2  tablet 3     ibuprofen (ADVIL/MOTRIN) 400 MG tablet Take 400-600 mg by mouth every 6 hours as needed for moderate pain        ketorolac tromethamine (ACULAR-LS) 0.4 % SOLN ophthalmic solution Apply 1 drop to eye 4 times daily Instill into operative eye(s) per physician instructions. 5 mL 0     levothyroxine (SYNTHROID/LEVOTHROID) 137 MCG tablet Take 1 tablet (137 mcg) by mouth daily 90 tablet 3     MIRTAZAPINE PO Take 15 mg by mouth At Bedtime       ofloxacin (OCUFLOX) 0.3 % ophthalmic solution Apply 1 drop to eye 4 times daily Instill into operative eye(s) per physician instructions. 5 mL 0     prednisoLONE acetate (PRED FORTE) 1 % ophthalmic susp Apply 1 drop to eye 4 times daily Instill into operative eye(s) per physician instructions. 5 mL 0     ranibizumab (LUCENTIS) 0.5 MG/0.05ML SOLN 0.05 mLs (0.5 mg) by Intravitreal route every 28 days 0.05 mL 11     ranitidine (ZANTAC) 300 MG  tablet        RIBOFLAVIN PO Take 100 mg by mouth       TAMSULOSIN HCL PO Take 0.4 mg by mouth At Bedtime        traZODone (DESYREL) 100 MG tablet take 1 tab of 50 mg daily  0     vitamin B complex with vitamin C (VITAMIN  B COMPLEX) TABS tablet Take 1 tablet by mouth daily       zolpidem (AMBIEN) 10 MG tablet Take 10 mg by mouth

## 2018-05-14 NOTE — PATIENT INSTRUCTIONS
1. Ocuflox (tan cap) - 4x/day for five days left eye.     2. Ketorolac (gray cap) - 4x/day left eye.     3. Prednisolone (white cap) - 4x/day left eye.     4. Use preservative free artificial tears, 4x/day left eye.    Keep all drops 5 minutes apart

## 2018-05-14 NOTE — MR AVS SNAPSHOT
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 Ocuflox (tan cap) - 4x/day for five days left eye.     2. Ketorolac (gray cap) - 4x/day left eye.     3. Prednisolone (white cap) - 4x/day left eye.     4. Use preservative free artificial tears, 4x/day left eye.    Keep all drops 5 minutes apart          Follow-ups after your visit        Follow-up notes from your care team     Return for Dr. Allen as planned.      Your next 10 appointments already scheduled     May 17, 2018  1:00 PM CDT   Post-Op with Nadiya Allen MD   Eye Clinic ([facility])    [address]  [facility] [address]   [phone]            May 24, 2018  3:20 PM CDT   (Arrive by 3:05 PM)   New Patient Visit with Faraz Amato MD   [facility]rology and Inst for Prostate and Urologic Cancers ([facility] [place])    [address]  [address] [facility] [id number] [phone]            Jun 20, 2018  3:10 PM CDT   (Arrive by 2:55 PM)   RETURN ENDOCRINE with Corina Potts MD   [facility] Endocrinology ([facility])    [address]  [address] [facility] [address] [phone]              Who to contact     Please call your clinic at [phone] to:    Ask questions about your health    Make or cancel appointments    Discuss your medicines    Learn about your test results    Speak to your doctor             Additional Information About Your Visit        [employer] Information     [employer] is an electronic gateway that provides easy, online access to your medical records. With [employer], you can request a clinic appointment, read your test results, renew a prescription or communicate with your care team.     To sign up for [employer] visit the website at [email or web address]   You will be asked to enter the access code listed below, as well as some personal information. Please follow the directions to create your username and password.     Your access code is: [email or web address]  Expires: 6/3/2018 10:21 PM     Your access code will  in 90 days. If you need help or a new code, please contact your [facility] or call [phone] for assistance.        Care EveryWhere ID     This is your Care EveryWhere ID. This could be used by other organizations to access your [place] medical records  [id number]         Blood Pressure from Last 3 Encounters:   18 (!) 160/94   18 158/82   18 140/76    Weight from Last 3 Encounters:   18 61 kg (134 lb 6.4 oz)   18 60.3 kg (132 lb 15 oz)   18 61 kg (134 lb 6.4 oz)              Today, you had the following     No orders found for display       Primary Care Provider Office Phone # Fax #    Karol Alvarez [phone]       [address] [address]        Equal Access to Services     LAYLA LUCAS AH: Hadii aad ku hadasho Soomaali, waaxda luqadaha, qaybta kaalmada adeegyada, waxay idiin hayfroyn carlos manueleg perla mitchell. So [facility] [phone].    ATENCIÓN: Si habla page, tiene a reese disposición servicios gratuitos de asistencia lingüística. Llame al [phone].    We comply with applicable federal civil rights laws and [place] laws. We do not discriminate on the basis of race, color, national origin, age, disability, sex, sexual orientation, or gender identity.             Thank you!     Thank you for choosing EYE CLINIC  for your care. Our goal is always to provide you with excellent care. Hearing back from our patients is one way we can continue to improve our services. Please take a few minutes to complete the written survey that you may receive in the mail after your visit with us. Thank you!             Your Updated Medication List - Protect others around you: Learn how to safely use, store and throw away your medicines at www.disposemymeds.org.          This list is accurate as of 5/14/18  5:23 PM.  Always use your most recent med list.                   Brand Name Dispense Instructions for use Diagnosis    aflibercept 2 MG/0.05ML Soln injection    EYLEA    0.05 mL    0.05 mLs (2 mg) by Intravitreal route every 28 days    CNVM (choroidal neovascular membrane), left       alendronate 70 MG tablet    FOSAMAX    16 tablet    Take 1 tablet (70 mg) by mouth every 7 days    Postsurgical hypothyroidism, Papillary thyroid carcinoma (H), Pituitary adenoma (H), Hypopituitarism (H)       amLODIPine 2.5 MG tablet    NORVASC     TK 1 T PO QD        Carboxymethylcellulose Sod PF 0.5 % Soln ophthalmic solution    REFRESH PLUS     Place 1 drop into both eyes 3 times daily as needed for dry eyes        COMBIVENT RESPIMAT  MCG/ACT inhaler   Generic drug:  Ipratropium-Albuterol      INL 1 PUFF PO QID        fish oil-omega-3 fatty acids 1000 MG capsule      Take 2 g by mouth daily        fluticasone 50 MCG/ACT spray    FLONASE     Spray 2 sprays into both nostrils daily        hydrocortisone 10 MG tablet    CORTEF    180 tablet    Take one tablet by mouth daily in the morning, 1/2 tablet at 2 PM    Postsurgical hypothyroidism, Papillary thyroid carcinoma (H), Abnormal finding on imaging       ibuprofen 400 MG tablet    ADVIL/MOTRIN     Take 400-600 mg by mouth every 6 hours as needed for moderate pain        ketorolac tromethamine 0.4 % Soln ophthalmic solution    ACULAR-LS    5 mL    Apply  1 drop to eye 4 times daily Instill into operative eye(s) per physician instructions.    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    Cataract of left eye, unspecified cataract type       prednisoLONE acetate 1 % ophthalmic susp    PRED FORTE    5 mL    Apply 1 drop to eye 4 times daily Instill into operative eye(s) per physician instructions.    Cataract of left eye, unspecified cataract type       ranibizumab 0.5 MG/0.05ML Soln    LUCENTIS    0.05 mL    0.05 mLs (0.5 mg) by Intravitreal route every 28 days    CNVM (choroidal neovascular membrane), left       ranitidine 300 MG tablet    ZANTAC          RIBOFLAVIN PO      Take 100 mg by mouth        TAMSULOSIN HCL PO      Take 0.4 mg by mouth At Bedtime        traZODone 100 MG tablet    DESYREL     take 1 tab of 50 mg daily        vitamin B complex with vitamin C Tabs tablet      Take 1 tablet by mouth daily        VOLTAREN 1 % Gel topical gel   Generic drug:  diclofenac      Place onto the skin 4 times daily        zolpidem 10 MG tablet    AMBIEN     Take 10 mg by mouth

## 2018-05-15 ENCOUNTER — PRE VISIT (OUTPATIENT)
Dept: UROLOGY | Facility: CLINIC | Age: 78
End: 2018-05-15

## 2018-05-17 ENCOUNTER — OFFICE VISIT (OUTPATIENT)
Dept: OPHTHALMOLOGY | Facility: CLINIC | Age: 78
End: 2018-05-17
Attending: OPHTHALMOLOGY
Payer: MEDICARE

## 2018-05-17 DIAGNOSIS — H25.11 AGE-RELATED NUCLEAR CATARACT OF RIGHT EYE: ICD-10-CM

## 2018-05-17 DIAGNOSIS — H26.9 CATARACT OF LEFT EYE, UNSPECIFIED CATARACT TYPE: ICD-10-CM

## 2018-05-17 DIAGNOSIS — Z48.810 AFTERCARE FOLLOWING SURGERY OF A SENSORY ORGAN: Primary | ICD-10-CM

## 2018-05-17 PROCEDURE — 92134 CPTRZ OPH DX IMG PST SGM RTA: CPT | Mod: ZF | Performed by: OPHTHALMOLOGY

## 2018-05-17 PROCEDURE — G0463 HOSPITAL OUTPT CLINIC VISIT: HCPCS | Mod: ZF

## 2018-05-17 RX ORDER — KETOROLAC TROMETHAMINE 4 MG/ML
1 SOLUTION/ DROPS OPHTHALMIC 3 TIMES DAILY
Qty: 5 ML | Refills: 0 | Status: SHIPPED | OUTPATIENT
Start: 2018-05-17 | End: 2018-06-27

## 2018-05-17 RX ORDER — PREDNISOLONE ACETATE 10 MG/ML
1 SUSPENSION/ DROPS OPHTHALMIC 3 TIMES DAILY
Qty: 5 ML | Refills: 0 | Status: SHIPPED | OUTPATIENT
Start: 2018-05-17 | End: 2018-06-27

## 2018-05-17 ASSESSMENT — VISUAL ACUITY
METHOD: SNELLEN - LINEAR
OD_SC: 20/70
OS_PH_SC: 20/500
OS_SC: 20/600
OD_PH_SC: 20/50

## 2018-05-17 ASSESSMENT — CUP TO DISC RATIO: OS_RATIO: 0.65

## 2018-05-17 ASSESSMENT — EXTERNAL EXAM - RIGHT EYE: OD_EXAM: NORMAL

## 2018-05-17 ASSESSMENT — SLIT LAMP EXAM - LIDS
COMMENTS: NORMAL
COMMENTS: NORMAL

## 2018-05-17 ASSESSMENT — EXTERNAL EXAM - LEFT EYE: OS_EXAM: NORMAL

## 2018-05-17 ASSESSMENT — TONOMETRY
OS_IOP_MMHG: 18
IOP_METHOD: TONOPEN
OD_IOP_MMHG: 18

## 2018-05-17 NOTE — MR AVS SNAPSHOT
After Visit Summary   5/17/2018    Travis Peres    MRN: [id number]           Patient Information     Date Of Birth          1940        Visit Information        Provider Department      5/17/2018 1:00 PM Nadiya Allen MD; LANGUAGE [facility]        Today's Diagnoses     Aftercare following surgery of a sensory organ    -  1    Cataract of left eye, unspecified cataract type        Age-related nuclear cataract of right eye          Care Instructions    Continue ocuflox three times a day x 3 days then stop    Continue prednsione and ketorolac 3 times a day for 1 week, then two times a day for one week, then once a day for one week, then stop          Follow-ups after your visit        Follow-up notes from your care team     Return in about 4 weeks (around 6/14/2018) for OCT, refraction next visit,  .      Your next 10 appointments already scheduled     May 24, 2018  3:20 PM CDT   (Arrive by 3:05 PM)   New Patient Visit with Faraz Amato MD   [facility]rology [facility] for Prostate and Urologic Cancers ([facility] [place])    [address] [place] [address]  [address] [facility] [id number] [phone]            Jun 20, 2018  3:10 PM CDT   (Arrive by 2:55 PM)   RETURN ENDOCRINE with Corina Potts MD   [facility] Endocrinology ([facility])    [address] [place] [id number] [phone]            Jun 27, 2018  2:45 PM CDT   RETURN RETINA with Nadiya Allen MD   Eye Clinic ([facility])    [address]  [facility] [id number] [phone]              Who to contact     Please call your clinic at [phone] to:    Ask questions about your health    Make or cancel appointments    Discuss your medicines    Learn about your test results    Speak to your doctor            Additional Information About Your Visit        MyChart Information      brick[employer]mobile is an electronic gateway that provides easy, online access to your medical records. With brick&mobile, you can request a clinic appointment, read your test results, renew a prescription or communicate with your care team.     To sign up for brick&mobile visit the website at [email or web address]   You will be asked to enter the access code listed below, as well as some personal information. Please follow the directions to create your username and password.     Your access code is: [email or web address]  Expires: 6/3/2018 10:21 PM     Your access code will  in 90 days. If you need help or a new code, please contact your [facility] or call [phone] for assistance.        Care EveryWhere ID     This is your Care EveryWhere ID. This could be used by other organizations to access your [place] medical records  [id number]         Blood Pressure from Last 3 Encounters:   18 (!) 160/94   18 158/82   18 140/76    Weight from Last 3 Encounters:   18 61 kg (134 lb 6.4 oz)   18 60.3 kg (132 lb 15 oz)   18 61 kg (134 lb 6.4 oz)              We Performed the Following     OCT Retina Spectralis OU (both eyes)     Emmanuelle-Operative Worksheet          Today's Medication Changes          These changes are accurate as of 18  2:39 PM.  If you have any questions, ask your nurse or doctor.               These medicines have changed or have updated prescriptions.        Dose/Directions    ketorolac tromethamine 0.4 % Soln ophthalmic solution   Commonly known as:  ACULAR-LS   This may have changed:  when to take this   Used for:  Cataract of left eye, unspecified cataract type   Changed by:  Nadiya Allen MD        Dose:  1 drop   Apply 1 drop to eye 3 times daily Instill into operative eye(s) per physician instructions.   Quantity:  5 mL   Refills:  0       prednisoLONE acetate 1 % ophthalmic susp   Commonly known as:  PRED FORTE   This may have  changed:  when to take this   Used for:  Cataract of left eye, unspecified cataract type   Changed by:  Nadiya Allen MD        Dose:  1 drop   Apply 1 drop to eye 3 times daily Instill into operative eye(s) per physician instructions.   Quantity:  5 mL   Refills:  0            Where to get your medicines      These medications were sent to [facility] [id number] [place] [address] & [address], [place] [address]     Phone:  [phone]     ketorolac tromethamine 0.4 % Soln ophthalmic solution    prednisoLONE acetate 1 % ophthalmic susp                Primary Care Provider Office Phone # Fax #    Karol Alvarez [phone]       [address] [address]        Equal Access to Services     LAYLA LUCAS : Hadii jake mondragon hadasho Sokristal, waaxda luqadaha, qaybta kaalmada adeorbynyada, roxanne smith . So [facility] [phone].    ATENCIÓN: Si habla español, tiene a reese disposición servicios gratuitos de asistencia lingüística. Karunaame al [phone].    We comply with applicable federal civil rights laws and [place] laws. We do not discriminate on the basis of race, color, national origin, age, disability, sex, sexual orientation, or gender identity.            Thank you!     Thank you for choosing EYE CLINIC  for your care. Our goal is always to provide you with excellent care. Hearing back from our patients is one way we can continue to improve our services. Please take a few minutes to complete the written survey that you may receive in the mail after your visit with us. Thank you!             Your Updated Medication List - Protect others around you: Learn how to safely use, store and throw away your medicines at www.disposemymeds.org.          This list is accurate as of 5/17/18  2:39 PM.  Always use your most recent med list.                   Brand Name Dispense Instructions  for use Diagnosis    aflibercept 2 MG/0.05ML Soln injection    EYLEA    0.05 mL    0.05 mLs (2 mg) by Intravitreal route every 28 days    CNVM (choroidal neovascular membrane), left       alendronate 70 MG tablet    FOSAMAX    16 tablet    Take 1 tablet (70 mg) by mouth every 7 days    Postsurgical hypothyroidism, Papillary thyroid carcinoma (H), Pituitary adenoma (H), Hypopituitarism (H)       amLODIPine 2.5 MG tablet    NORVASC     TK 1 T PO QD        Carboxymethylcellulose Sod PF 0.5 % Soln ophthalmic solution    REFRESH PLUS     Place 1 drop into both eyes 3 times daily as needed for dry eyes        COMBIVENT RESPIMAT  MCG/ACT inhaler   Generic drug:  Ipratropium-Albuterol      INL 1 PUFF PO QID        fish oil-omega-3 fatty acids 1000 MG capsule      Take 2 g by mouth daily        fluticasone 50 MCG/ACT spray    FLONASE     Spray 2 sprays into both nostrils daily        hydrocortisone 10 MG tablet    CORTEF    180 tablet    Take one tablet by mouth daily in the morning, 1/2 tablet at 2 PM    Postsurgical hypothyroidism, Papillary thyroid carcinoma (H), Abnormal finding on imaging       ibuprofen 400 MG tablet    ADVIL/MOTRIN     Take 400-600 mg by mouth every 6 hours as needed for moderate pain        ketorolac tromethamine 0.4 % Soln ophthalmic solution    ACULAR-LS    5 mL    Apply 1 drop to eye 3 times daily Instill into operative eye(s) per physician instructions.    Cataract of left eye, unspecified cataract type       levothyroxine 137 MCG tablet    SYNTHROID/LEVOTHROID    90 tablet    Take 1 tablet (137 mcg) by mouth daily    Postsurgical hypothyroidism, Papillary thyroid carcinoma (H), Abnormal finding on imaging       MIRTAZAPINE PO      Take 15 mg by mouth At Bedtime        MUCUS RELIEF ADULT PO      Take 400 mg by mouth 2 times daily as needed    Papillary thyroid carcinoma (H), Postsurgical hypothyroidism, Malignant neoplasm of thyroid gland (H), Cancer of thyroid (H), Metastasis to cervical  lymph node (H), Osteopenia, Hypopituitarism (H), Pituitary adenoma (H), Noncompliance with medication regimen       ofloxacin 0.3 % ophthalmic solution    OCUFLOX    5 mL    Apply 1 drop to eye 4 times daily Instill into operative eye(s) per physician instructions.    Cataract of left eye, unspecified cataract type       prednisoLONE acetate 1 % ophthalmic susp    PRED FORTE    5 mL    Apply 1 drop to eye 3 times daily Instill into operative eye(s) per physician instructions.    Cataract of left eye, unspecified cataract type       ranibizumab 0.5 MG/0.05ML Soln    LUCENTIS    0.05 mL    0.05 mLs (0.5 mg) by Intravitreal route every 28 days    CNVM (choroidal neovascular membrane), left       ranitidine 300 MG tablet    ZANTAC          RIBOFLAVIN PO      Take 100 mg by mouth        TAMSULOSIN HCL PO      Take 0.4 mg by mouth At Bedtime        traZODone 100 MG tablet    DESYREL     take 1 tab of 50 mg daily        vitamin B complex with vitamin C Tabs tablet      Take 1 tablet by mouth daily        VOLTAREN 1 % Gel topical gel   Generic drug:  diclofenac      Place onto the skin 4 times daily        zolpidem 10 MG tablet    AMBIEN     Take 10 mg by mouth

## 2018-05-17 NOTE — NURSING NOTE
Chief Complaints and History of Present Illnesses   Patient presents with     Post Op (Ophthalmology) Left Eye     LE vit     HPI    Affected eye(s):  Both   Symptoms:        Frequency:  Constant       Do you have eye pain now?:  No      Comments:  States va is the same since last visit  No F&F  Lupe Tavarez COT 1:19 PM May 17, 2018

## 2018-05-17 NOTE — PROGRESS NOTES
1. Postoperative day 10 status post CE/PCIOL left eye 5/7/18     Lens centered   IOP WNL  Doing well  Suture removed at slit    OCT Mac 5/17/18, subfoveal CNVM without subretinal fluid / intraretinal fluid improved      Plan:  No heavy lifting   Whitney shield at night  Retina detachment and endophthalmitis precautions were discussed with the patient and was asked to return if any of the those occur    Medications to operative eye  Continue ocuflox qid x 3 days then stop  Continue PF and acular tid x 1 w -> bid x 1w -> daily x 1w then stop    2. Cataract Right eye  Request surgery right eye  Will schedule      Follow up in one month with OCT Mac , due for lucentis next visit left eye and prescription   ~~~~~~~~~~~~~~~~~~~~~~~~~~~~~~~~~~   Complete documentation of historical and exam elements from today's encounter can be found in the full encounter summary report (not reduplicated in this progress note).  I personally obtained the chief complaint(s) and history of present illness.  I confirmed and edited as necessary the review of systems, past medical/surgical history, family history, social history, and examination findings as documented by others; and I examined the patient myself.  I personally reviewed the relevant tests, images, and reports as documented above.  I personally reviewed the ophthalmic test(s) associated with this encounter, agree with the interpretation(s) as documented by the resident/fellow, and have edited the corresponding report(s) as necessary.   I formulated and edited as necessary the assessment and plan and discussed the findings and management plan with the patient and family    Nadiya Allen MD  .  Retina Service   Department of Ophthalmology and Visual Neurosciences   [facility]  Phone: [phone]   Fax: [phone]

## 2018-05-17 NOTE — PATIENT INSTRUCTIONS
Continue ocuflox three times a day x 3 days then stop    Continue prednsione and ketorolac 3 times a day for 1 week, then two times a day for one week, then once a day for one week, then stop

## 2018-05-24 ENCOUNTER — OFFICE VISIT (OUTPATIENT)
Dept: UROLOGY | Facility: CLINIC | Age: 78
End: 2018-05-24
Payer: COMMERCIAL

## 2018-05-24 VITALS
HEART RATE: 62 BPM | DIASTOLIC BLOOD PRESSURE: 84 MMHG | WEIGHT: 133 LBS | HEIGHT: 65 IN | SYSTOLIC BLOOD PRESSURE: 156 MMHG | BODY MASS INDEX: 22.16 KG/M2

## 2018-05-24 DIAGNOSIS — N42.89 PROSTATIC MASS: Primary | ICD-10-CM

## 2018-05-24 DIAGNOSIS — R35.0 URINARY FREQUENCY: ICD-10-CM

## 2018-05-24 LAB
ALBUMIN UR-MCNC: NEGATIVE MG/DL
APPEARANCE UR: CLEAR
BILIRUB UR QL STRIP: NEGATIVE
COLOR UR AUTO: YELLOW
GLUCOSE UR STRIP-MCNC: NEGATIVE MG/DL
HGB UR QL STRIP: NEGATIVE
KETONES UR STRIP-MCNC: NEGATIVE MG/DL
LEUKOCYTE ESTERASE UR QL STRIP: NEGATIVE
MUCOUS THREADS #/AREA URNS LPF: PRESENT /LPF
NITRATE UR QL: NEGATIVE
PH UR STRIP: 7 PH (ref 5–7)
RBC #/AREA URNS AUTO: 2 /HPF (ref 0–2)
SOURCE: ABNORMAL
SP GR UR STRIP: 1.01 (ref 1–1.03)
UROBILINOGEN UR STRIP-MCNC: 0 MG/DL (ref 0–2)
WBC #/AREA URNS AUTO: <1 /HPF (ref 0–5)

## 2018-05-24 ASSESSMENT — PAIN SCALES - GENERAL: PAINLEVEL: NO PAIN (0)

## 2018-05-24 NOTE — LETTER
5/24/2018      RE: Travis Peres  [address]  [facility] [id number]     Dear Colleague,    Thank you for referring your patient, Travis Peres, to the [facility] UROLOGY AND INST FOR PROSTATE AND UROLOGIC CANCERS at [facility]. Please see a copy of my visit note below.    Chief Complaint   Patient presents with     Consult     Abnormal PET Scan     Lesa Esquivel RN  Care Coordinator- Reconstructive Urology              Chief Complaint:   LUTS, Abnormal PET Scan         Consult or Referral:     Mr. Travis Peres is a 77 year old male seen in consultation.         History of Present Illness:    Travis Peres is a very pleasant 77 year old male who presents with a history of lower urinary tract symptoms (LUTS) and abnormality visualized on recent PET scan    He reports significant voiding symptoms including urgency, frequency 10x per day, nocturia 2-3x per night, incomplete emptying, and dribbling. He is currently taking Flomax 0.4mg, which he has been taking for the last few years. He does not note any significant improvement in symptoms with this.     He denies any pain.          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     Post-surgical hypothyroidism 2007     Uncomplicated asthma      Vocal cord paralysis     right     Xerostomia     due to radiation exposures            Past Surgical History:     Past Surgical History:   Procedure Laterality Date     cymetra injection       ESOPHAGOSCOPY, GASTROSCOPY, DUODENOSCOPY (EGD),  COMBINED  6/20/2013    Procedure: COMBINED ESOPHAGOSCOPY, GASTROSCOPY, DUODENOSCOPY (EGD), BIOPSY SINGLE OR MULTIPLE;  gastroscopy;  Surgeon: Leslie Guadarrama MD;  Location:  GI     HERNIA REPAIR Right      lipoma resection chest wall Right 11/09     PHACOEMULSIFICATION CLEAR CORNEA WITH STANDARD INTRAOCULAR LENS IMPLANT Left 5/7/2018    Procedure: PHACOEMULSIFICATION CLEAR CORNEA WITH STANDARD INTRAOCULAR LENS IMPLANT;  LEFT PHACOEMULSIFICATION CLEAR CORNEA WITH STANDARD INTRAOCULAR LENS IMPLANT ;  Surgeon: Nadiya Allen MD;  Location:  EC     right selective neck dissection level 2, 3, 4  11/3/09     right  shunt placement  6/28/07     THYMECTOMY N/A 1/3/2017    Procedure: THYMECTOMY;  Surgeon: Ernesto Armstrong MD;  Location: [facility] OR     THYROIDECTOMY  11/27/07     TRANSCERVICAL EXTENDED MEDIASTINAL LYMPHADENECTOMY N/A 1/3/2017    Procedure: TRANSCERVICAL EXTENDED MEDIASTINAL LYMPHADENECTOMY;  Surgeon: Ernesto Armstrong MD;  Location: [facility] OR     transnasal endoscopic resection of pituitary adenoma  8/13/2007            Medications     Current Outpatient Prescriptions   Medication     aflibercept (EYLEA) 2 MG/0.05ML SOLN injection     alendronate (FOSAMAX) 70 MG tablet     amLODIPine (NORVASC) 2.5 MG tablet     Carboxymethylcellulose Sod PF (REFRESH PLUS) 0.5 % SOLN ophthalmic solution     COMBIVENT RESPIMAT  MCG/ACT inhaler     diclofenac (VOLTAREN) 1 % GEL topical gel     fish oil-omega-3 fatty acids 1000 MG capsule     fluticasone (FLONASE) 50 MCG/ACT spray     GuaiFENesin (MUCUS RELIEF ADULT PO)     hydrocortisone (CORTEF) 10 MG tablet     ibuprofen (ADVIL/MOTRIN) 400 MG tablet     ketorolac tromethamine (ACULAR-LS) 0.4 % SOLN ophthalmic solution     levothyroxine (SYNTHROID/LEVOTHROID) 137 MCG tablet     MIRTAZAPINE PO     ofloxacin (OCUFLOX) 0.3 % ophthalmic solution     prednisoLONE acetate (PRED FORTE) 1 % ophthalmic susp     ranibizumab (LUCENTIS) 0.5 MG/0.05ML SOLN  "    ranitidine (ZANTAC) 300 MG tablet     RIBOFLAVIN PO     TAMSULOSIN HCL PO     traZODone (DESYREL) 100 MG tablet     vitamin B complex with vitamin C (VITAMIN  B COMPLEX) TABS tablet     zolpidem (AMBIEN) 10 MG tablet     Current Facility-Administered Medications   Medication     ranibizumab (LUCENTIS) injection syringe 0.5 mg            Family History:     Family History   Problem Relation Age of Onset     CANCER No family hx of      no skin cancer     Glaucoma No family hx of      Macular Degeneration No family hx of             Social History:     Social History     Social History     Marital status:      Spouse name: N/A     Number of children: N/A     Years of education: N/A     Occupational History     Not on file.     Social History Main Topics     Smoking status: Never Smoker     Smokeless tobacco: Never Used     Alcohol use No     Drug use: No     Sexual activity: Not on file     Other Topics Concern     Not on file     Social History Narrative    Mom  at age 93 from a fall    Dad  at age 98 from old age     40 plus years    Two adult children in good health.    Occupation: retired from running a restaurant    Originally from [place]                Allergies:   Metoprolol; Fenofibrate; Fenofibric acid; Lisinopril; Losartan; Niacin; and Simvastatin         Review of Systems:  From intake questionnaire     Negative 14 system review except as noted on HPI, nurse's note.         Physical Exam:     Patient is a 77 year old  male   Vitals: Blood pressure 156/84, pulse 62, height 1.651 m (5' 5\"), weight 60.3 kg (133 lb).  General Appearance Adult: Alert, no acute distress, oriented  HENT: throat/mouth:normal, good dentition  Neck: No adenopathy,masses or thyromegaly  Lungs: no respiratory distress, or pursed lip breathing  Heart: No obvious jugular venous distension present  Abdomen: soft, nontender, no organomegaly or masses, Body mass index is 22.13 kg/(m^2).  Musculoskeltal: extremities " normal, no peripheral edema  Skin: no suspicious lesions or rashes  Neuro: Alert, oriented, speech and mentation normal  Psych: affect and mood normal  Gait: Normal  : ROB prostate smooth, no nodules or lesions       Labs and Pathology:    I reviewed all applicable laboratory and pathology data and went over findings with patient  Significant for   PSA   Date Value Ref Range Status   04/16/2018 <0.01 0 - 4 ug/L Final     Comment:     Assay Method:  Chemiluminescence using Siemens Vista analyzer   11/27/2007 0.82 0 - 4 ug/L Final   10/16/2007 0.25 0 - 4 ug/L Final           Imaging:    I reviewed all applicable imaging and went over findings with patient.  Significant for PET and CT 4/12/2018:    FINDINGS:      HEAD/NECK:         Numerous other FDG avid cervical lymph nodes, with example lymph nodes  described below from series 3:  Image 82: Mild FDG uptake in a left submandibular lymph node measuring  7 mm with SUV max of 3.34.  Image 80: Mild FDG uptake in a 7 mm left level 5a cervical lymph node  with SUV max of 3.50.  Image 81: Mild FDG uptake in a cluster of several small left level 4  cervical lymph nodes with SUV max of 3.49.     Intense FDG uptake in a right retroclavicular lymph node measuring 9  mm (series 3 image 102) with SUV max of 23.77.     Unchanged mild FDG uptake in an occipital lymph node (series 3 image  69) measuring 5 mm, SUV max of 2.33, previously 2.05 on 8/20/2009.  Additional unchanged (since prior PET/CT a 22,009) mild FDG uptake in  the area of the left sella, likely related to the patient's residual  pituitary adenoma.     Stable right parietal approach ventriculostomy catheter with the tip  slightly protruding into the left frontal lobe anterior to the  anterior horn of the left lateral ventricle. The ventricles are normal  size and proportionate to the cerebral sulci. No extra-axial fluid  collection.     The paranasal sinuses are clear. The mastoid air cells are clear.   Intense FDG  uptake and mild enlargement of the tonsillar tissue.  The mucosal pharyngeal space, the , prevertebral and carotid  spaces are within normal limits.      The thyroid gland is surgically absent.     Right vocal cord paralysis with physiologic left vocal cord FDG  uptake.      CHEST:  Intense FDG uptake within a right upper paratracheal lymph node  measuring 1.4 cm (series 3 image 106) with SUV Max 68.43, which has  increased in size since 1/4/2017 when it measured 8 mm. FDG uptake  within several normal sized left axillary lymph nodes, with the  largest measuring 7 mm and with a max SUV of 5.02 (series 3 image  114).     There is a 1.4 x 1.1 cm left upper lobe partially calcified granuloma,  which does not demonstrate significant FDG uptake, and is not  significantly changed in size since at least 5/6/2014.     The heart size is within normal limits. No pericardial effusion.  Scattered mild coronary artery calcifications. The thoracic aorta and  main pulmonary artery are normal caliber. Several unchanged  mediastinal and right hilar calcified lymph nodes.     Central airways patent. No pleural effusion or pneumothorax. No large  consolidation. The previously seen spiculated nodule in the lingula of  the left upper lobe has decreased in size and now measures 7 mm  (series 6 image 122) and does not demonstrate any suspicious FDG  uptake. Several scattered tree-in-bud opacities in the right upper  lobe and right middle lobe (for example series 6 image 86 and 78) are  overall less conspicuous in comparison to the prior CT of the chest on  9/25/2017.     ABDOMEN AND PELVIS:  Normal-sized 4 mm left iliac lymph node with mild FDG uptake, max SUV  3.39. Additional small right iliac lymph node measuring 4 mm (series 3  image 257, with mild FDG uptake SUV max 4.40. Focal uptake within the  posterior peripheral prostate gland in the area of calcifications, but  otherwise no CT correlate lesion is identified, max SUV  6.27.      shunt extending down the right thorax and into the peritoneal  cavity with the tip in the lower central anterior abdomen.     No suspicious hepatic lesion. Cholelithiasis without evidence of  intrahepatic or extra hepatic biliary dilation. The spleen is normal  size. The pancreas and adrenal glands are normal. Large simple renal  cyst extending from the superior pole of left kidney. No renal mass.  No hydronephrosis. Urinary bladder is decompressed, but otherwise  unremarkable. The large and small bowel are normal caliber. Colonic  diverticulosis without evidence of diverticulitis. The appendix is  normal. No enlarged intraperitoneal or inguinal lymph nodes by CT  short axis size criteria. Abdominal aorta is normal caliber with  scattered mild atherosclerotic calcifications. No intraperitoneal free  air or free fluid.     LOWER EXTREMITIES:   No abnormal masses or hypermetabolic lesions.     BONES:   There are no suspicious lytic or blastic osseous lesions.  There is no  abnormal FDG uptake in the skeleton. Multilevel degenerative changes  of the spine, most pronounced in the lumbar spine where there is disc  space narrowing from L2 through S1 and mild lower lumbar facet  arthropathy. Small sclerotic foci in the intertrochanteric region of  the right femur is likely a bone island. Similar sclerotic foci in the  right iliac, without FDG uptake in either focus.            IMPRESSION: In this patient with history of papillary thyroid  carcinoma status post resection and ablation x 2:  1. Suspicious intense FDG uptake in an enlarging right upper  paratracheal lymph node measuring up to 1.4 cm, previously 8 mm on  1/4/2017. Additional FDG avid right retroclavicular lymph node also  suggestive of metastasis. Recommend biopsy.   2. Focal FDG uptake in the peripheral and posterior prostate gland.  Recommend obtaining a prostate-specific antigen.  3. Numerous indeterminate nonenlarged but mildly FDG avid  "cervical,  left axillary, and biiliac lymph nodes.  4. The spiculated nodule previously seen in the lingula of the left  upper lobe has decreased in size and now measures 7 mm (previously  measured up to 1.7 cm) does not demonstrate increased FDG uptake and  is likely benign.  5. Improving tree-in-bud opacities predominantly in the right lung  since the prior CT, most consistent with a resolving infectious or  inflammatory process. Additionally, there is sequela of prior  granulomatous disease including a stable 1.4 cm left upper lobe  granuloma.        Outside and Past Medical records:    I spent 10 minutes reviewing outside and past medical records.     Standardized Questionnaire:      Not completed today.         Assessment and Plan:     Assessment:  77yoM w/ hx of LUTS (Lower Urinary Tract Symptoms) and Focal signal on a PET scan    Plan:    - Normal ROB today  - Obtain Prostate MRI to further evaluate abnormality visualized on PET scan  - Repeat PSA given previous undetectable PSA which would be unusual for someone with a prostate in place  - UA/UC today  - Follow up in 2-3 weeks    Orders  Orders Placed This Encounter   Procedures     MRI Prostate     PSA tumor marker     Routine UA with microscopic - No culture       Scribe Disclosure:   IArturo, am serving as a scribe; to document services personally performed by Faraz Amato MD based on data collection and the provider's statements to me.       Faraz Amato MD  Department of Urology Staff  [facility]    Attestation:  This patient was seen and evaluated by me, with a scribe taking notes.  I have reviewed the note above and agree.  The physical exam and or any procedures were performed by me and the pertinant details are outlined below.     Patient is a 77 year old  male   Vitals: Blood pressure 156/84, pulse 62, height 1.651 m (5' 5\"), weight 60.3 kg (133 lb).  General Appearance Adult: Alert, no acute distress, " oriented    Assessment:  77yoM w/ hx of LUTS (Lower Urinary Tract Symptoms) and Focal signal on a PET scan    Plan:    - Normal ROB today  - Obtain Prostate MRI to further evaluate abnormality visualized on PET scan  - Repeat PSA given previous undetectable PSA which would be unusual for someone with a prostate in place  - UA/UC today  - Follow up in 2-3 weeks    Faraz Amato MD  Department of Urology  [facility]      Note, dictation software may have been used for this note and the content was not proofread for accuracy    CC  Patient Care Team:  Karol Alvarez as PCP - General (Pediatrics)  Karol Alvarez (Pediatrics)  Corina Potts MD as MD (INTERNAL MEDICINE - ENDOCRINOLOGY, DIABETES & METABOLISM)  Eugene Rock MD as Referring Physician (Family Practice)  Fer Alford MD as MD (Ophthalmology)  Courtney gR MD as MD (Otolaryngology)  Faraz Amato MD as MD (Urology)  Brandee Pena, RN as Registered Nurse (Oncology)  SELF, REFERRED    Copy to patient  BUCK HERNANDEZ MASON  [address] [place] [facility] [id number]

## 2018-05-24 NOTE — MR AVS SNAPSHOT
After Visit Summary   5/24/2018    Travis Peres    MRN: [id number]           Patient Information     Date Of Birth          1940        Visit Information        Provider Department      5/24/2018 3:05 PM Thierry Nguyen; Faraz Amato MD [facility] and [facility] Prostate [facility] Urologic C[facility]        Today's Diagnoses     Prostatic mass    -  1    Urinary frequency          Care Instructions    Urine sent for testing today.    Schedule appointment for prostate MRI and repeat PSA.  Follow up with Dr. Amato to discuss the results.    It was a pleasure meeting with you today.  Thank you for allowing me and my team the privilege of caring for you today.  YOU are the reason we are here, and I truly hope we provided you with the excellent service you deserve.  Please let us know if there is anything else we can do for you so that we can be sure you are leaving completely satisfied with your care experience.        Kay Morgan ALIA          Follow-ups after your visit        Your next 10 appointments already scheduled     Jun 09, 2018  2:15 PM CDT   MR PROSTATE with [id number]   [facility] [place] [facility] ([facility])    [address] [place] [id number] [phone]           Take your medicines as usual, unless your doctor tells you not to. Bring a list of your current medicines to your exam (including vitamins, minerals and over-the-counter drugs).  You may or may not receive intravenous (IV) contrast for this exam pending the discretion of the Radiologist.  You do not need to do anything special to prepare.  The MRI machine uses a strong magnet. Please wear clothes without metal (snaps, zippers). A sweatsuit works well, or we may give you a hospital gown.  Please remove any body piercings and hair extensions before you arrive. You will also remove watches, jewelry, hairpins, wallets, dentures, partial dental plates and hearing aids.  You may wear contact lenses, and you may be able to wear your rings. We have a safe place to keep your personal items, but it is safer to leave them at home.  **IMPORTANT** THE INSTRUCTIONS BELOW ARE ONLY FOR THOSE PATIENTS WHO HAVE BEEN PRESCRIBED SEDATION OR GENERAL ANESTHESIA DURING THEIR MRI PROCEDURE:  IF YOUR DOCTOR PRESCRIBED ORAL SEDATION (take medicine to help you relax during your exam):   You must get the medicine from your doctor (oral medication) before you arrive. Bring the medicine to the exam. Do not take it at home. You ll be told when to take it upon arriving for your exam.   Arrive one hour early. Bring someone who can take you home after the test. Your medicine will make you sleepy. After the exam, you may not drive, take a bus or take a taxi by yourself.  IF YOUR DOCTOR PRESCRIBED IV SEDATION:   Arrive one hour early. Bring someone who can take you home after the test. Your medicine will make you sleepy. After the exam, you may not drive, take a bus or take a taxi by yourself.   No eating 6 hours before your exam. You may have clear liquids up until 4 hours before your exam. (Clear liquids include water, clear tea, black coffee and fruit juice without pulp.)  IF YOUR DOCTOR PRESCRIBED ANESTHESIA (be asleep for your exam):   Arrive 1 1/2 hours early. Bring someone who can take you home after the test. You may not drive, take a bus or take a taxi by yourself.   No eating 8 hours before your exam. You may have clear liquids up until 4 hours before your exam. (Clear liquids include water, clear tea, black coffee and fruit juice without pulp.)   You will spend four to five hours in the recovery room.  Please call the Imaging Department at your exam site with any questions.            Jun 14, 2018 11:00 AM CDT   (Arrive by 10:45 AM)   Return Visit with Faraz Amato MD   [facility] and [facility]state and Urologic Cancers ([facility] [place])    [address]  [address]th [address]  [place] [address]   [phone]            2018  3:10 PM CDT   (Arrive by 2:55 PM)   RETURN ENDOCRINE with Corina Potts MD   [facility] ([facility] [place])    [address]  [address]  [place] [id number] [phone]            2018  2:45 PM CDT   RETURN RETINA with Nadiya Allen MD   [facility] ([facility])    [address]  [address] [address]  [facility] [address] [phone]              Future tests that were ordered for you today     Open Future Orders        Priority Expected Expires Ordered    PSA tumor marker Routine 2018    MRI Prostate Routine  2019            Who to contact     Please call your clinic at [phone] to:    Ask questions about your health    Make or cancel appointments    Discuss your medicines    Learn about your test results    Speak to your doctor            Additional Information About Your Visit        Differential Information     Differential is an electronic gateway that provides easy, online access to your medical records. With Differential, you can request a clinic appointment, read your test results, renew a prescription or communicate with your care team.     To sign up for Differential visit the website at [email or web address]   You will be asked to enter the access code listed below, as well as some personal information. Please follow the directions to create your username and password.     Your access code is: [email or web address]  Expires: 6/3/2018 10:21 PM     Your access code will  in 90 days. If you need help or a new code, please contact your [facility] or call [phone] for assistance.        Care EveryWhere ID     This is your Care EveryWhere ID. This could be used by other organizations to access your [place] medical records  [id number]        Your Vitals Were   "   Pulse Height BMI (Body Mass Index)             62 1.651 m (5' 5\") 22.13 kg/m2          Blood Pressure from Last 3 Encounters:   05/24/18 156/84   05/09/18 (!) 160/94   05/07/18 158/82    Weight from Last 3 Encounters:   05/24/18 60.3 kg (133 lb)   05/09/18 61 kg (134 lb 6.4 oz)   05/03/18 60.3 kg (132 lb 15 oz)              We Performed the Following     Routine UA with microscopic - No culture     Urine Culture Aerobic Bacterial        Primary Care Provider Office Phone # Fax #    Karol Alvarez [phone]       [address] [address]        Equal Access to Services     LAYLA LUCAS : Sesar leeo Sokristal, waaxda luqadaha, qaybta kaalmada adeegyada, roxanne smith . So [place] [phone].    ATENCIÓN: Si habla español, tiene a reese disposición servicios gratuitos de asistencia lingüística. Llame al [phone].    We comply with applicable federal civil rights laws and [place] laws. We do not discriminate on the basis of race, color, national origin, age, disability, sex, sexual orientation, or gender identity.            Thank you!     Thank you for choosing [facility] UROLOGY AND [facility] FOR PROSTATE AND UROLOGIC CANCERS  for your care. Our goal is always to provide you with excellent care. Hearing back from our patients is one way we can continue to improve our services. Please take a few minutes to complete the written survey that you may receive in the mail after your visit with us. Thank you!             Your Updated Medication List - Protect others around you: Learn how to safely use, store and throw away your medicines at www.disposemymeds.org.          This list is accurate as of 5/24/18  4:52 PM.  Always use your most recent med list.                   Brand Name Dispense Instructions for use Diagnosis    aflibercept 2 MG/0.05ML Soln injection    EYLEA    0.05 mL    0.05 mLs (2 mg) by Intravitreal route every 28 days "    CNVM (choroidal neovascular membrane), left       alendronate 70 MG tablet    FOSAMAX    16 tablet    Take 1 tablet (70 mg) by mouth every 7 days    Postsurgical hypothyroidism, Papillary thyroid carcinoma (H), Pituitary adenoma (H), Hypopituitarism (H)       amLODIPine 2.5 MG tablet    NORVASC     TK 1 T PO QD        Carboxymethylcellulose Sod PF 0.5 % Soln ophthalmic solution    REFRESH PLUS     Place 1 drop into both eyes 3 times daily as needed for dry eyes        COMBIVENT RESPIMAT  MCG/ACT inhaler   Generic drug:  Ipratropium-Albuterol      INL 1 PUFF PO QID        fish oil-omega-3 fatty acids 1000 MG capsule      Take 2 g by mouth daily        fluticasone 50 MCG/ACT spray    FLONASE     Spray 2 sprays into both nostrils daily        hydrocortisone 10 MG tablet    CORTEF    180 tablet    Take one tablet by mouth daily in the morning, 1/2 tablet at 2 PM    Postsurgical hypothyroidism, Papillary thyroid carcinoma (H), Abnormal finding on imaging       ibuprofen 400 MG tablet    ADVIL/MOTRIN     Take 400-600 mg by mouth every 6 hours as needed for moderate pain        ketorolac tromethamine 0.4 % Soln ophthalmic solution    ACULAR-LS    5 mL    Apply 1 drop to eye 3 times daily Instill into operative eye(s) per physician instructions.    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    Cataract of left eye, unspecified cataract type       prednisoLONE acetate 1 % ophthalmic susp    PRED FORTE    5 mL    Apply 1 drop to eye 3 times daily Instill into operative eye(s) per physician instructions.    Cataract of left eye, unspecified cataract type       ranibizumab 0.5 MG/0.05ML Soln    LUCENTIS    0.05 mL    0.05 mLs (0.5 mg) by Intravitreal route every 28 days    CNVM (choroidal neovascular membrane), left       ranitidine 300 MG tablet    ZANTAC          RIBOFLAVIN PO      Take 100 mg by mouth        TAMSULOSIN HCL PO      Take 0.4 mg by mouth At Bedtime        traZODone 100 MG tablet    DESYREL     take 1 tab of 50 mg daily        vitamin B complex with vitamin C Tabs tablet      Take 1 tablet by mouth daily        VOLTAREN 1 % Gel topical gel   Generic drug:  diclofenac      Place onto the skin 4 times daily        zolpidem 10 MG tablet    AMBIEN     Take 10 mg by mouth

## 2018-05-24 NOTE — LETTER
June 13, 2018       TO: Travis Peres  [address] [facility] [address]       DearMr.Larisa,    We are writing to inform you of your test results.    No treatment necessary      Resulted Orders   Routine UA with microscopic - No culture   Result Value Ref Range    Color Urine Yellow     Appearance Urine Clear     Glucose Urine Negative NEG^Negative mg/dL    Bilirubin Urine Negative NEG^Negative    Ketones Urine Negative NEG^Negative mg/dL    Specific Gravity Urine 1.014 1.003 - 1.035    Blood Urine Negative NEG^Negative    pH Urine 7.0 5.0 - 7.0 pH    Protein Albumin Urine Negative NEG^Negative mg/dL    Urobilinogen mg/dL 0.0 0.0 - 2.0 mg/dL    Nitrite Urine Negative NEG^Negative    Leukocyte Esterase Urine Negative NEG^Negative    Source Midstream Urine     WBC Urine <1 0 - 5 /HPF    RBC Urine 2 0 - 2 /HPF    Mucous Urine Present (A) NEG^Negative /LPF   Urine Culture Aerobic Bacterial   Result Value Ref Range    Specimen Description Midstream Urine     Special Requests Specimen received in preservative     Culture Micro       <10,000 colonies/mL  urogenital yue  Susceptibility testing not routinely done         Dr Faraz Amato

## 2018-05-24 NOTE — PROGRESS NOTES
Chief Complaint:   LUTS, Abnormal PET Scan         Consult or Referral:     Mr. Travis Peres is a 77 year old male seen in consultation.         History of Present Illness:    Travis Peres is a very pleasant 77 year old male who presents with a history of lower urinary tract symptoms (LUTS) and abnormality visualized on recent PET scan    He reports significant voiding symptoms including urgency, frequency 10x per day, nocturia 2-3x per night, incomplete emptying, and dribbling. He is currently taking Flomax 0.4mg, which he has been taking for the last few years. He does not note any significant improvement in symptoms with this.     He denies any pain.          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     Post-surgical hypothyroidism 2007     Uncomplicated asthma      Vocal cord paralysis     right     Xerostomia     due to radiation exposures            Past Surgical History:     Past Surgical History:   Procedure Laterality Date     cymetra injection       ESOPHAGOSCOPY, GASTROSCOPY, DUODENOSCOPY (EGD), COMBINED  6/20/2013    Procedure: COMBINED ESOPHAGOSCOPY, GASTROSCOPY, DUODENOSCOPY (EGD), BIOPSY SINGLE OR MULTIPLE;  gastroscopy;  Surgeon: Leslie Guadarrama MD;  Location: [facility]     HERNIA REPAIR Right      lipoma resection chest wall Right 11/09     PHACOEMULSIFICATION CLEAR CORNEA WITH STANDARD INTRAOCULAR LENS IMPLANT Left 5/7/2018    Procedure: PHACOEMULSIFICATION CLEAR CORNEA WITH STANDARD INTRAOCULAR LENS IMPLANT;  LEFT  PHACOEMULSIFICATION CLEAR CORNEA WITH STANDARD INTRAOCULAR LENS IMPLANT ;  Surgeon: Nadiya Allen MD;  Location: [facility]     right selective neck dissection level 2, 3, 4  11/3/09     right  shunt placement  6/28/07     THYMECTOMY N/A 1/3/2017    Procedure: THYMECTOMY;  Surgeon: Ernesto Armstrong MD;  Location: [facility]     THYROIDECTOMY  11/27/07     TRANSCERVICAL EXTENDED MEDIASTINAL LYMPHADENECTOMY N/A 1/3/2017    Procedure: TRANSCERVICAL EXTENDED MEDIASTINAL LYMPHADENECTOMY;  Surgeon: Ernesto Armstrong MD;  Location: [facility]     transnasal endoscopic resection of pituitary adenoma  8/13/2007            Medications     Current Outpatient Prescriptions   Medication     aflibercept (EYLEA) 2 MG/0.05ML SOLN injection     alendronate (FOSAMAX) 70 MG tablet     amLODIPine (NORVASC) 2.5 MG tablet     Carboxymethylcellulose Sod PF (REFRESH PLUS) 0.5 % SOLN ophthalmic solution     COMBIVENT RESPIMAT  MCG/ACT inhaler     diclofenac (VOLTAREN) 1 % GEL topical gel     fish oil-omega-3 fatty acids 1000 MG capsule     fluticasone (FLONASE) 50 MCG/ACT spray     GuaiFENesin (MUCUS RELIEF ADULT PO)     hydrocortisone (CORTEF) 10 MG tablet     ibuprofen (ADVIL/MOTRIN) 400 MG tablet     ketorolac tromethamine (ACULAR-LS) 0.4 % SOLN ophthalmic solution     levothyroxine (SYNTHROID/LEVOTHROID) 137 MCG tablet     MIRTAZAPINE PO     ofloxacin (OCUFLOX) 0.3 % ophthalmic solution     prednisoLONE acetate (PRED FORTE) 1 % ophthalmic susp     ranibizumab (LUCENTIS) 0.5 MG/0.05ML SOLN     ranitidine (ZANTAC) 300 MG tablet     RIBOFLAVIN PO     TAMSULOSIN HCL PO     traZODone (DESYREL) 100 MG tablet     vitamin B complex with vitamin C (VITAMIN  B COMPLEX) TABS tablet     zolpidem (AMBIEN) 10 MG tablet     Current Facility-Administered Medications   Medication     ranibizumab (LUCENTIS) injection syringe 0.5 mg            Family History:     Family History   Problem Relation Age of Onset     CANCER No family hx  "of      no skin cancer     Glaucoma No family hx of      Macular Degeneration No family hx of             Social History:     Social History     Social History     Marital status:      Spouse name: N/A     Number of children: N/A     Years of education: N/A     Occupational History     Not on file.     Social History Main Topics     Smoking status: Never Smoker     Smokeless tobacco: Never Used     Alcohol use No     Drug use: No     Sexual activity: Not on file     Other Topics Concern     Not on file     Social History Narrative    Mom  at age 93 from a fall    Dad  at age 98 from old age     40 plus years    Two adult children in good health.    Occupation: retired from running a [employer]    Originally from [place]                Allergies:   Metoprolol; Fenofibrate; Fenofibric acid; Lisinopril; Losartan; Niacin; and Simvastatin         Review of Systems:  From intake questionnaire     Negative 14 system review except as noted on HPI, nurse's note.         Physical Exam:     Patient is a 77 year old  male   Vitals: Blood pressure 156/84, pulse 62, height 1.651 m (5' 5\"), weight 60.3 kg (133 lb).  General Appearance Adult: Alert, no acute distress, oriented  HENT: throat/mouth:normal, good dentition  Neck: No adenopathy,masses or thyromegaly  Lungs: no respiratory distress, or pursed lip breathing  Heart: No obvious jugular venous distension present  Abdomen: soft, nontender, no organomegaly or masses, Body mass index is 22.13 kg/(m^2).  Musculoskeltal: extremities normal, no peripheral edema  Skin: no suspicious lesions or rashes  Neuro: Alert, oriented, speech and mentation normal  Psych: affect and mood normal  Gait: Normal  : ROB prostate smooth, no nodules or lesions       Labs and Pathology:    I reviewed all applicable laboratory and pathology data and went over findings with patient  Significant for   PSA   Date Value Ref Range Status   2018 <0.01 0 - 4 ug/L Final     Comment: "     Assay Method:  Chemiluminescence using Siemens Vista analyzer   11/27/2007 0.82 0 - 4 ug/L Final   10/16/2007 0.25 0 - 4 ug/L Final           Imaging:    I reviewed all applicable imaging and went over findings with patient.  Significant for PET and CT 4/12/2018:    FINDINGS:      HEAD/NECK:         Numerous other FDG avid cervical lymph nodes, with example lymph nodes  described below from series 3:  Image 82: Mild FDG uptake in a left submandibular lymph node measuring  7 mm with SUV max of 3.34.  Image 80: Mild FDG uptake in a 7 mm left level 5a cervical lymph node  with SUV max of 3.50.  Image 81: Mild FDG uptake in a cluster of several small left level 4  cervical lymph nodes with SUV max of 3.49.     Intense FDG uptake in a right retroclavicular lymph node measuring 9  mm (series 3 image 102) with SUV max of 23.77.     Unchanged mild FDG uptake in an occipital lymph node (series 3 image  69) measuring 5 mm, SUV max of 2.33, previously 2.05 on 8/20/2009.  Additional unchanged (since prior PET/CT a 22,009) mild FDG uptake in  the area of the left sella, likely related to the patient's residual  pituitary adenoma.     Stable right parietal approach ventriculostomy catheter with the tip  slightly protruding into the left frontal lobe anterior to the  anterior horn of the left lateral ventricle. The ventricles are normal  size and proportionate to the cerebral sulci. No extra-axial fluid  collection.     The paranasal sinuses are clear. The mastoid air cells are clear.   Intense FDG uptake and mild enlargement of the tonsillar tissue.  The mucosal pharyngeal space, the , prevertebral and carotid  spaces are within normal limits.      The thyroid gland is surgically absent.     Right vocal cord paralysis with physiologic left vocal cord FDG  uptake.      CHEST:  Intense FDG uptake within a right upper paratracheal lymph node  measuring 1.4 cm (series 3 image 106) with SUV Max 68.43, which  has  increased in size since 1/4/2017 when it measured 8 mm. FDG uptake  within several normal sized left axillary lymph nodes, with the  largest measuring 7 mm and with a max SUV of 5.02 (series 3 image  114).     There is a 1.4 x 1.1 cm left upper lobe partially calcified granuloma,  which does not demonstrate significant FDG uptake, and is not  significantly changed in size since at least 5/6/2014.     The heart size is within normal limits. No pericardial effusion.  Scattered mild coronary artery calcifications. The thoracic aorta and  main pulmonary artery are normal caliber. Several unchanged  mediastinal and right hilar calcified lymph nodes.     Central airways patent. No pleural effusion or pneumothorax. No large  consolidation. The previously seen spiculated nodule in the lingula of  the left upper lobe has decreased in size and now measures 7 mm  (series 6 image 122) and does not demonstrate any suspicious FDG  uptake. Several scattered tree-in-bud opacities in the right upper  lobe and right middle lobe (for example series 6 image 86 and 78) are  overall less conspicuous in comparison to the prior CT of the chest on  9/25/2017.     ABDOMEN AND PELVIS:  Normal-sized 4 mm left iliac lymph node with mild FDG uptake, max SUV  3.39. Additional small right iliac lymph node measuring 4 mm (series 3  image 257, with mild FDG uptake SUV max 4.40. Focal uptake within the  posterior peripheral prostate gland in the area of calcifications, but  otherwise no CT correlate lesion is identified, max SUV 6.27.      shunt extending down the right thorax and into the peritoneal  cavity with the tip in the lower central anterior abdomen.     No suspicious hepatic lesion. Cholelithiasis without evidence of  intrahepatic or extra hepatic biliary dilation. The spleen is normal  size. The pancreas and adrenal glands are normal. Large simple renal  cyst extending from the superior pole of left kidney. No renal mass.  No  hydronephrosis. Urinary bladder is decompressed, but otherwise  unremarkable. The large and small bowel are normal caliber. Colonic  diverticulosis without evidence of diverticulitis. The appendix is  normal. No enlarged intraperitoneal or inguinal lymph nodes by CT  short axis size criteria. Abdominal aorta is normal caliber with  scattered mild atherosclerotic calcifications. No intraperitoneal free  air or free fluid.     LOWER EXTREMITIES:   No abnormal masses or hypermetabolic lesions.     BONES:   There are no suspicious lytic or blastic osseous lesions.  There is no  abnormal FDG uptake in the skeleton. Multilevel degenerative changes  of the spine, most pronounced in the lumbar spine where there is disc  space narrowing from L2 through S1 and mild lower lumbar facet  arthropathy. Small sclerotic foci in the intertrochanteric region of  the right femur is likely a bone island. Similar sclerotic foci in the  right iliac, without FDG uptake in either focus.            IMPRESSION: In this patient with history of papillary thyroid  carcinoma status post resection and ablation x 2:  1. Suspicious intense FDG uptake in an enlarging right upper  paratracheal lymph node measuring up to 1.4 cm, previously 8 mm on  1/4/2017. Additional FDG avid right retroclavicular lymph node also  suggestive of metastasis. Recommend biopsy.   2. Focal FDG uptake in the peripheral and posterior prostate gland.  Recommend obtaining a prostate-specific antigen.  3. Numerous indeterminate nonenlarged but mildly FDG avid cervical,  left axillary, and biiliac lymph nodes.  4. The spiculated nodule previously seen in the lingula of the left  upper lobe has decreased in size and now measures 7 mm (previously  measured up to 1.7 cm) does not demonstrate increased FDG uptake and  is likely benign.  5. Improving tree-in-bud opacities predominantly in the right lung  since the prior CT, most consistent with a resolving infectious  "or  inflammatory process. Additionally, there is sequela of prior  granulomatous disease including a stable 1.4 cm left upper lobe  granuloma.        Outside and Past Medical records:    I spent 10 minutes reviewing outside and past medical records.     Standardized Questionnaire:      Not completed today.         Assessment and Plan:     Assessment:  77yoM w/ hx of LUTS (Lower Urinary Tract Symptoms) and Focal signal on a PET scan    Plan:    - Normal ROB today  - Obtain Prostate MRI to further evaluate abnormality visualized on PET scan  - Repeat PSA given previous undetectable PSA which would be unusual for someone with a prostate in place  - UA/UC today  - Follow up in 2-3 weeks    Orders  Orders Placed This Encounter   Procedures     MRI Prostate     PSA tumor marker     Routine UA with microscopic - No culture       Scribe Disclosure:   I,Arturo Arriaga, am serving as a scribe; to document services personally performed by Faraz Amato MD based on data collection and the provider's statements to me.       Faraz Amato MD  Department of Urology [facility]    Attestation:  This patient was seen and evaluated by me, with a scribe taking notes.  I have reviewed the note above and agree.  The physical exam and or any procedures were performed by me and the pertinant details are outlined below.     Patient is a 77 year old  male   Vitals: Blood pressure 156/84, pulse 62, height 1.651 m (5' 5\"), weight 60.3 kg (133 lb).  General Appearance Adult: Alert, no acute distress, oriented    Assessment:  77yoM w/ hx of LUTS (Lower Urinary Tract Symptoms) and Focal signal on a PET scan    Plan:    - Normal ROB today  - Obtain Prostate MRI to further evaluate abnormality visualized on PET scan  - Repeat PSA given previous undetectable PSA which would be unusual for someone with a prostate in place  - UA/UC today  - Follow up in 2-3 weeks    Faraz Amato MD  Department of " Urology  [facility]      Note, dictation software may have been used for this note and the content was not proofread for accuracy    CC  Patient Care Team:  Karol Alvarez as PCP - General (Pediatrics)  Karol Alvarez (Pediatrics)  Corina Potts MD as MD (INTERNAL MEDICINE - ENDOCRINOLOGY, DIABETES & METABOLISM)  Eugene Rock MD as Referring Physician (Family Practice)  Fer Alford MD as MD (Ophthalmology)  Courtney Rg MD as MD (Otolaryngology)  Faraz Amato MD as MD (Urology)  Brandee Pena, RN as Registered Nurse (Oncology)  SELF, REFERRED    Copy to patient  BUCK CUEVAS  [address] [facility] [address]

## 2018-05-24 NOTE — PATIENT INSTRUCTIONS
Urine sent for testing today.    Schedule appointment for prostate MRI and repeat PSA.  Follow up with Dr. Amato to discuss the results.    It was a pleasure meeting with you today.  Thank you for allowing me and my team the privilege of caring for you today.  YOU are the reason we are here, and I truly hope we provided you with the excellent service you deserve.  Please let us know if there is anything else we can do for you so that we can be sure you are leaving completely satisfied with your care experience.        CAROLINA Vargas

## 2018-05-24 NOTE — PROGRESS NOTES
Chief Complaint   Patient presents with     Consult     Abnormal PET Scan     Lesa Esquivel RN  Care Coordinator- Reconstructive Urology

## 2018-05-25 LAB
BACTERIA SPEC CULT: NORMAL
Lab: NORMAL
SPECIMEN SOURCE: NORMAL

## 2018-06-09 ENCOUNTER — RADIANT APPOINTMENT (OUTPATIENT)
Dept: MRI IMAGING | Facility: CLINIC | Age: 78
End: 2018-06-09
Attending: UROLOGY
Payer: COMMERCIAL

## 2018-06-09 DIAGNOSIS — N42.89 PROSTATIC MASS: ICD-10-CM

## 2018-06-09 RX ORDER — GADOBUTROL 604.72 MG/ML
7.5 INJECTION INTRAVENOUS ONCE
Status: COMPLETED | OUTPATIENT
Start: 2018-06-09 | End: 2018-06-09

## 2018-06-09 RX ADMIN — GADOBUTROL 6 ML: 604.72 INJECTION INTRAVENOUS at 14:32

## 2018-06-09 NOTE — DISCHARGE INSTRUCTIONS
MRI Contrast Discharge Instructions    The IV contrast you received today will pass out of your body in your  urine. This will happen in the next 24 hours. You will not feel this process.  Your urine will not change color.    Drink at least 4 extra glasses of water or juice today (unless your doctor  has restricted your fluids). This reduces the stress on your kidneys.  You may take your regular medicines.    If you are on dialysis: It is best to have dialysis today.    If you have a reaction: Most reactions happen right away. If you have  any new symptoms after leaving the hospital (such as hives or swelling),  call your hospital at the correct number below. Or call your family doctor.  If you have breathing distress or wheezing, call 911.    Special instructions: ***    I have read and understand the above information.    Signature:______________________________________ Date:___________    Staff:__________________________________________ Date:___________     Time:__________    [facility] Departments:    ___[place]: [phone]  ___[facility]: [phone]  ___[place]: [phone] ___[facility]: [phone]  ___[facility]: [phone]  ___[facility]: [phone]  ___[phone]  ___[facility]: [phone]  ___[phone]

## 2018-06-11 ENCOUNTER — OFFICE VISIT (OUTPATIENT)
Dept: URGENT CARE | Facility: URGENT CARE | Age: 78
End: 2018-06-11
Payer: COMMERCIAL

## 2018-06-11 ENCOUNTER — PRE VISIT (OUTPATIENT)
Dept: UROLOGY | Facility: CLINIC | Age: 78
End: 2018-06-11

## 2018-06-11 VITALS
RESPIRATION RATE: 10 BRPM | SYSTOLIC BLOOD PRESSURE: 132 MMHG | WEIGHT: 135.3 LBS | TEMPERATURE: 99.3 F | DIASTOLIC BLOOD PRESSURE: 80 MMHG | BODY MASS INDEX: 22.52 KG/M2 | HEART RATE: 82 BPM | OXYGEN SATURATION: 93 %

## 2018-06-11 DIAGNOSIS — N42.89 PROSTATIC MASS: ICD-10-CM

## 2018-06-11 DIAGNOSIS — J18.9 PNEUMONIA OF LEFT LOWER LOBE DUE TO INFECTIOUS ORGANISM: Primary | ICD-10-CM

## 2018-06-11 LAB — PSA SERPL-MCNC: <0.01 UG/L (ref 0–4)

## 2018-06-11 PROCEDURE — 36415 COLL VENOUS BLD VENIPUNCTURE: CPT | Performed by: INTERNAL MEDICINE

## 2018-06-11 PROCEDURE — 84153 ASSAY OF PSA TOTAL: CPT | Performed by: INTERNAL MEDICINE

## 2018-06-11 PROCEDURE — 99214 OFFICE O/P EST MOD 30 MIN: CPT | Performed by: FAMILY MEDICINE

## 2018-06-11 RX ORDER — DOXYCYCLINE HYCLATE 100 MG
100 TABLET ORAL 2 TIMES DAILY
Qty: 20 TABLET | Refills: 0 | Status: SHIPPED | OUTPATIENT
Start: 2018-06-11 | End: 2018-06-21

## 2018-06-11 NOTE — MR AVS SNAPSHOT
After Visit Summary   6/11/2018    Travis Peres    MRN: [id number]           Patient Information     Date Of Birth          1940        Visit Information        Provider Department      6/11/2018 9:30 AM Phoenix Sanz DO [facility]        Today's Diagnoses     Pneumonia of left lower lobe due to infectious organism (H)    -  1       Follow-ups after your visit        Your next 10 appointments already scheduled     Jun 14, 2018 11:00 AM CDT   (Arrive by 10:45 AM)   Return Visit with Faraz Amato MD   [facility] Urology and [facility]rolog[facility] ([facility])    [address]  4th Floor  [facility] [place] [address]   [phone]            Jun 20, 2018  3:10 PM CDT   (Arrive by 2:55 PM)   RETURN ENDOCRINE with Corina Potts MD   [facility] Endocrinology ([facility])    [address]  3rd Floor  [facility] [place] [address] [phone]            Jun 27, 2018  2:45 PM CDT   RETURN RETINA with Nadiya Allen MD   [facility] ([facility])    [address]  [address] [address]  [facility] [place] [address] [phone]              Who to contact     If you have questions or need follow up information about today's clinic visit or your schedule please contact [facility] directly at [phone].  Normal or non-critical lab and imaging results will be communicated to you by MyChart, letter or phone within 4 business days after the clinic has received the results. If you do not hear from us within 7 days, please contact the clinic through MyChart or phone. If you have a critical or abnormal lab result, we will notify you by phone as soon as possible.  Submit refill requests through [employer] or call your pharmacy and they will forward the refill request to us. Please allow 3 business days for your refill to be  "completed.          Additional Information About Your Visit        [employer] Information     DLC lets you send messages to your doctor, view your test results, renew your prescriptions, schedule appointments and more. To sign up, go to [email or web address] . Click on \"Log in\" on the left side of the screen, which will take you to the Welcome page. Then click on \"Sign up Now\" on the right side of the page.     You will be asked to enter the access code listed below, as well as some personal information. Please follow the directions to create your username and password.     Your access code is: [email or web address]  Expires: 2018  6:30 AM     Your access code will  in 90 days. If you need help or a new code, please call your [facility] clinic or [phone].        Care EveryWhere ID     This is your Care EveryWhere ID. This could be used by other organizations to access your [facility] medical records  [id number]        Your Vitals Were     Pulse Temperature Respirations Pulse Oximetry BMI (Body Mass Index)       82 99.3  F (37.4  C) (Oral) 10 93% 22.52 kg/m2        Blood Pressure from Last 3 Encounters:   18 132/80   18 156/84   18 (!) 160/94    Weight from Last 3 Encounters:   18 135 lb 4.8 oz (61.4 kg)   18 133 lb (60.3 kg)   18 134 lb 6.4 oz (61 kg)              Today, you had the following     No orders found for display         Today's Medication Changes          These changes are accurate as of 18 10:00 AM.  If you have any questions, ask your nurse or doctor.               Start taking these medicines.        Dose/Directions    doxycycline 100 MG tablet   Commonly known as:  VIBRA-TABS   Used for:  Pneumonia of left lower lobe due to infectious organism (H)   Started by:  Phoenix Sanz DO        Dose:  100 mg   Take 1 tablet (100 mg) by mouth 2 times daily for 10 days   Quantity:  20 tablet   Refills:  0            Where to get your medicines      These " medications were sent to [facility] [address] [address] & [address], [place] [address]     Phone:  [phone]     doxycycline 100 MG tablet                Primary Care Provider Office Phone # Fax #    Karol Alvarez [phone]       [address] [address]        Equal Access to Services     LAYLA LUCAS : Hadii aad ku hadasho Soomaali, waaxda luqadaha, qaybta kaalmada adeegyada, waxay idiin hayaan adeeg khararosana lagucci mitchell. So [facility] [phone].    ATENCIÓN: Si habla español, tiene a reese disposición servicios gratuitos de asistencia lingüística. [facility] [phone].    We comply with applicable federal civil rights laws and [place] laws. We do not discriminate on the basis of race, color, national origin, age, disability, sex, sexual orientation, or gender identity.            Thank you!     Thank you for choosing [facility]  for your care. Our goal is always to provide you with excellent care. Hearing back from our patients is one way we can continue to improve our services. Please take a few minutes to complete the written survey that you may receive in the mail after your visit with us. Thank you!             Your Updated Medication List - Protect others around you: Learn how to safely use, store and throw away your medicines at www.disposemymeds.org.          This list is accurate as of 6/11/18 10:00 AM.  Always use your most recent med list.                   Brand Name Dispense Instructions for use Diagnosis    aflibercept 2 MG/0.05ML Soln injection    EYLEA    0.05 mL    0.05 mLs (2 mg) by Intravitreal route every 28 days    CNVM (choroidal neovascular membrane), left       alendronate 70 MG tablet    FOSAMAX    16 tablet    Take 1 tablet (70 mg) by mouth every 7 days    Postsurgical hypothyroidism, Papillary thyroid carcinoma (H), Pituitary adenoma (H),  Hypopituitarism (H)       amLODIPine 2.5 MG tablet    NORVASC     TK 1 T PO QD        Carboxymethylcellulose Sod PF 0.5 % Soln ophthalmic solution    REFRESH PLUS     Place 1 drop into both eyes 3 times daily as needed for dry eyes        COMBIVENT RESPIMAT  MCG/ACT inhaler   Generic drug:  Ipratropium-Albuterol      INL 1 PUFF PO QID        doxycycline 100 MG tablet    VIBRA-TABS    20 tablet    Take 1 tablet (100 mg) by mouth 2 times daily for 10 days    Pneumonia of left lower lobe due to infectious organism (H)       fish oil-omega-3 fatty acids 1000 MG capsule      Take 2 g by mouth daily        fluticasone 50 MCG/ACT spray    FLONASE     Spray 2 sprays into both nostrils daily        hydrocortisone 10 MG tablet    CORTEF    180 tablet    Take one tablet by mouth daily in the morning, 1/2 tablet at 2 PM    Postsurgical hypothyroidism, Papillary thyroid carcinoma (H), Abnormal finding on imaging       ibuprofen 400 MG tablet    ADVIL/MOTRIN     Take 400-600 mg by mouth every 6 hours as needed for moderate pain        ketorolac tromethamine 0.4 % Soln ophthalmic solution    ACULAR-LS    5 mL    Apply 1 drop to eye 3 times daily Instill into operative eye(s) per physician instructions.    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    Cataract of left eye, unspecified cataract type       prednisoLONE acetate 1 % ophthalmic susp    PRED FORTE    5 mL    Apply 1 drop to eye 3 times daily Instill into operative eye(s) per physician instructions.    Cataract of left eye, unspecified cataract type       ranibizumab 0.5 MG/0.05ML Soln    LUCENTIS    0.05 mL    0.05 mLs (0.5 mg) by Intravitreal route every 28 days    CNVM (choroidal neovascular membrane), left       ranitidine 300 MG tablet    ZANTAC          RIBOFLAVIN PO      Take 100 mg by mouth        TAMSULOSIN HCL PO      Take 0.4 mg by mouth At Bedtime        traZODone 100 MG tablet    DESYREL     take 1 tab of 50 mg daily        vitamin B complex with vitamin C Tabs tablet      Take 1 tablet by mouth daily        VOLTAREN 1 % Gel topical gel   Generic drug:  diclofenac      Place onto the skin 4 times daily        zolpidem 10 MG tablet    AMBIEN     Take 10 mg by mouth

## 2018-06-11 NOTE — TELEPHONE ENCOUNTER
Prostate mass follow up.  Discuss results of PSA/MRI.  Records available in [facility].

## 2018-06-11 NOTE — PROGRESS NOTES
SUBJECTIVE: Travis Peres is a 77 year old male presenting with a chief complaint of fever, nasal congestion, cough  and sore throat.  Onset of symptoms was 2 week(s) ago.  Course of illness is worsening.    Severity moderate  Current and Associated symptoms: cough - productive  Treatment measures tried include None tried.  Predisposing factors include None.    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  Bradycardia, fatigue, COPD worsening.         Fenofibrate Hives     Fenofibric Acid      Lisinopril Cough     Losartan Cough     Niacin      Simvastatin Cramps and Other (See Comments)     Social History   Substance Use Topics     Smoking status: Never Smoker     Smokeless tobacco: Never Used     Alcohol use No       ROS:  SKIN: no rash  GI: no vomiting    OBJECTIVE:  /80 (BP Location: Left arm, Patient Position: Chair, Cuff Size: Adult Regular)  Pulse 82  Temp 99.3  F (37.4  C) (Oral)  Resp 10  Wt 135 lb 4.8 oz (61.4 kg)  SpO2 93%  BMI 22.52 kg/r0QRCMLNH APPEARANCE: healthy, alert and no distress  EYES: EOMI,  PERRL, conjunctiva clear  HENT: ear canals and TM's normal.  Nose and mouth without ulcers, erythema or lesions  NECK: supple, nontender, no lymphadenopathy  RESP: rhonchi L lower  posterior  SKIN: no suspicious lesions or rashes      ICD-10-CM    1. Pneumonia of left lower lobe due to infectious organism (H) J18.1 doxycycline (VIBRA-TABS) 100 MG tablet       Fluids/Rest, f/u if worse/not any better

## 2018-06-14 ENCOUNTER — OFFICE VISIT (OUTPATIENT)
Dept: UROLOGY | Facility: CLINIC | Age: 78
End: 2018-06-14
Payer: COMMERCIAL

## 2018-06-14 VITALS
SYSTOLIC BLOOD PRESSURE: 158 MMHG | WEIGHT: 129.5 LBS | BODY MASS INDEX: 21.55 KG/M2 | HEART RATE: 70 BPM | DIASTOLIC BLOOD PRESSURE: 97 MMHG

## 2018-06-14 DIAGNOSIS — N42.89 PROSTATIC MASS: Primary | ICD-10-CM

## 2018-06-14 ASSESSMENT — PAIN SCALES - GENERAL: PAINLEVEL: NO PAIN (0)

## 2018-06-14 NOTE — MR AVS SNAPSHOT
After Visit Summary   6/14/2018    Travis Peres    MRN: [id number]           Patient Information     Date Of Birth          1940        Visit Information        Provider Department      6/14/2018 10:45 AM Devan Stephen; Faraz Amato MD [facility] Urology and [facility] Prostate and Urologic C[facility]        Today's Diagnoses     Prostatic mass    -  1      Care Instructions    Follow up with Dr. Amato as needed.    It was a pleasure meeting with you today.  Thank you for allowing me and my team the privilege of caring for you today.  YOU are the reason we are here, and I truly hope we provided you with the excellent service you deserve.  Please let us know if there is anything else we can do for you so that we can be sure you are leaving completely satisfied with your care experience.        CAROLINA Vargas          Follow-ups after your visit        Your next 10 appointments already scheduled     Jun 25, 2018 10:00 AM CDT   DX HIP/PELVIS/SPINE with UCDX1   [facility] ([facility])    [address] [place] [address] [phone]           Please do not take any of the following 24 hours prior to the day of your exam: vitamins, calcium tablets, antacids.  If possible, please wear clothes without metal (snaps, zippers). A sweatsuit works well.            Jun 27, 2018  2:45 PM CDT   RETURN RETINA with Nadiya Allen MD   Eye Clinic ([facility])    [address]  [facility] [address]   [phone]            Aug 29, 2018  1:30 PM CDT   (Arrive by 1:15 PM)   RETURN ENDOCRINE with Corina Potts MD   [facility] Endocrinology ([facility])    [address] [place] [address] [phone]              Who to contact     Please call your clinic at [phone] to:    Ask questions about your  health    Make or cancel appointments    Discuss your medicines    Learn about your test results    Speak to your doctor            Additional Information About Your Visit        MyChart Information     [employer] is an electronic gateway that provides easy, online access to your medical records. With [employer], you can request a clinic appointment, read your test results, renew a prescription or communicate with your care team.     To sign up for [employer] visit the website at [email or web address]   You will be asked to enter the access code listed below, as well as some personal information. Please follow the directions to create your username and password.     Your access code is: [email or web address]  Expires: 2018  6:30 AM     Your access code will  in 90 days. If you need help or a new code, please contact your [facility] or call [phone] for assistance.        Care EveryWhere ID     This is your Care EveryWhere ID. This could be used by other organizations to access your [place] medical records  [id number]        Your Vitals Were     Pulse BMI (Body Mass Index)                70 21.55 kg/m2           Blood Pressure from Last 3 Encounters:   18 130/80   18 146/80   18 (!) 158/97    Weight from Last 3 Encounters:   18 60.1 kg (132 lb 8 oz)   18 60.3 kg (133 lb)   18 58.7 kg (129 lb 8 oz)              Today, you had the following     No orders found for display       Primary Care Provider Office Phone # Fax #    Karol Kim [phone]       [address] [address]        Equal Access to Services     LAYLA LUCAS : Hadii aad ku hadashdebora Sokristal, waaxda luqadaha, qaybta kaalmada aderobynyada, roxanne mitchell. So [facility] [phone].    ATENCIÓN: Si habla español, tiene a reese disposición servicios gratuitos de asistencia lingüística. Llame al [phone].    We  comply with applicable federal civil rights laws and [place] laws. We do not discriminate on the basis of race, color, national origin, age, disability, sex, sexual orientation, or gender identity.            Thank you!     Thank you for choosing [facility] UROLOGY AND [facility] PROST[facility] UROLOGIC CANCERS  for your care. Our goal is always to provide you with excellent care. Hearing back from our patients is one way we can continue to improve our services. Please take a few minutes to complete the written survey that you may receive in the mail after your visit with us. Thank you!             Your Updated Medication List - Protect others around you: Learn how to safely use, store and throw away your medicines at www.disposemymeds.org.          This list is accurate as of 6/14/18 11:59 PM.  Always use your most recent med list.                   Brand Name Dispense Instructions for use Diagnosis    aflibercept 2 MG/0.05ML Soln injection    EYLEA    0.05 mL    0.05 mLs (2 mg) by Intravitreal route every 28 days    CNVM (choroidal neovascular membrane), left       alendronate 70 MG tablet    FOSAMAX    16 tablet    Take 1 tablet (70 mg) by mouth every 7 days    Postsurgical hypothyroidism, Papillary thyroid carcinoma (H), Pituitary adenoma (H), Hypopituitarism (H)       amLODIPine 2.5 MG tablet    NORVASC     TK 1 T PO QD        Carboxymethylcellulose Sod PF 0.5 % Soln ophthalmic solution    REFRESH PLUS     Place 1 drop into both eyes 3 times daily as needed for dry eyes        COMBIVENT RESPIMAT  MCG/ACT inhaler   Generic drug:  Ipratropium-Albuterol      INL 1 PUFF PO QID        doxycycline 100 MG tablet    VIBRA-TABS    20 tablet    Take 1 tablet (100 mg) by mouth 2 times daily for 10 days    Pneumonia of left lower lobe due to infectious organism (H)       fish oil-omega-3 fatty acids 1000 MG capsule      Take 2 g by mouth daily        fluticasone 50 MCG/ACT spray    FLONASE     Spray 2 sprays into both  nostrils daily        hydrocortisone 10 MG tablet    CORTEF    180 tablet    Take one tablet by mouth daily in the morning, 1/2 tablet at 2 PM    Postsurgical hypothyroidism, Papillary thyroid carcinoma (H), Abnormal finding on imaging       ibuprofen 400 MG tablet    ADVIL/MOTRIN     Take 400-600 mg by mouth every 6 hours as needed for moderate pain        ketorolac tromethamine 0.4 % Soln ophthalmic solution    ACULAR-LS    5 mL    Apply 1 drop to eye 3 times daily Instill into operative eye(s) per physician instructions.    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    Cataract of left eye, unspecified cataract type       prednisoLONE acetate 1 % ophthalmic susp    PRED FORTE    5 mL    Apply 1 drop to eye 3 times daily Instill into operative eye(s) per physician instructions.    Cataract of left eye, unspecified cataract type       ranibizumab 0.5 MG/0.05ML Soln    LUCENTIS    0.05 mL    0.05 mLs (0.5 mg) by Intravitreal route every 28 days    CNVM (choroidal neovascular membrane), left       ranitidine 300 MG tablet    ZANTAC          RIBOFLAVIN PO      Take 100 mg by mouth        TAMSULOSIN HCL PO      Take 0.4 mg by mouth At Bedtime        traZODone 100 MG  tablet    DESYREL     take 1 tab of 50 mg daily        vitamin B complex with vitamin C Tabs tablet      Take 1 tablet by mouth daily        VOLTAREN 1 % Gel topical gel   Generic drug:  diclofenac      Place onto the skin 4 times daily        zolpidem 10 MG tablet    AMBIEN     Take 10 mg by mouth

## 2018-06-14 NOTE — PROGRESS NOTES
LUTS, Abnormal PET Scan Follow-Up    Travis Peres is a very pleasant 77 year old male who presents with a history of lower urinary tract symptoms (LUTS) and abnormality visualized on recent PET scan. He also had an undetectable PSA which was unusual for someone with a prostate in place. He was last seen by me 3 weeks ago at which time we decided to obtain a Prostate MRI along with a repeat PSA.     His PSA came back undetectable once again. Prostate MRI was normal.     Today he continues to endorse urinary symptoms including dysuria, frequency, and leakage. He is currently taking Flomax but states that it does not improve his symptoms.     Physical Exam  Patient is a 77 year old  male   Vitals: Blood pressure (!) 158/97, pulse 70, weight 58.7 kg (129 lb 8 oz).  General Appearance Adult: Alert, no acute distress, oriented        Labs and Pathology:      I reviewed all applicable laboratory and pathology data and went over findings with patient  Significant for   PSA   Date Value Ref Range Status   06/11/2018 <0.01 0 - 4 ug/L Final     Comment:     Assay Method:  Chemiluminescence using Siemens Vista analyzer   04/16/2018 <0.01 0 - 4 ug/L Final     Comment:     Assay Method:  Chemiluminescence using Siemens Vista analyzer   11/27/2007 0.82 0 - 4 ug/L Final   10/16/2007 0.25 0 - 4 ug/L Final           Imaging:    I reviewed all applicable imaging and went over findings with patient.  Significant for Prostate MRI 6/9/18:    FINDINGS:  Prostate gland size: 3.6 x 4.0 x 3.5 cm  Volume: 26 cc      Peripheral zone:      Lesion 1: Multiple scattered T2 areas of hypoenhancement throughout  the peripheral zone without evidence of lytic disease ADC signal or  increased DWI. No suspicious contrast enhancement. No evidence of  extraprostatic extension. There is an area of increased DWI signal,  visualized only in this sequence, posterior to the prostate, possibly  at the level of the seminal vesicles. This area is indeterminate,  and  may represent a shine through artifact from pelvic free fluid,  although T2 sequences do not demonstrate fluid in that particular  area.     PI-RADS:  Peripheral zone T2: 2  Diffusion-weighted image: 2    Contrast-enhanced images: Negative       Overall assessment: 2     Transitional zone: There are BPH type changes without suspicious  lesion.     PI-RADS:  Transition zone T2: 2  Diffusion-weighted image: 2  Contrast-enhanced images: Negative       Overall assessment: 2     Remainder of the pelvis:  Mild circumferential thickening of the  bladder. Colon appears within normal limits. Free pelvic fluid. No  suspicious lymphadenopathy. No suspicious bone lesions.         IMPRESSION:   1. Based on the most suspicious abnormality, this exam is  characterized as PIRADS 2 - Low probability.  Clinically significant  cancer is unlikely to be present. Extensive T2 hypointensity of the  peripheral gland makes evaluation for early tumor suboptimal. No  high-grade malignancy is noted.  2. No evidence of extraprostatic malignancy. No suspicious adenopathy  or evidence of pelvic metastases.           Assessment and Plan:     Assessment:  77yoM w/ hx of LUTS (Lower Urinary Tract Symptoms) and Focal signal on a PET scan    Plan:    - Prostate MRI normal and PSA continues to be very low  - At this point he feels his urinary symptoms are not bothersome enough for further intervention - briefly discussed surgical intervention, but he is not interested in this. He will continue on Flomax.  - Follow up as needed.    Scribe Disclosure:   I,Arturo Arriaga, am serving as a scribe; to document services personally performed by Faraz Amato MD based on data collection and the provider's statements to me.       Faraz Amato MD  Department of Urology Staff  [facility]    Attestation:  This patient was seen and evaluated by me, with a scribe taking notes.  I have reviewed the note above and agree.  The  physical exam and or any procedures were performed by me and the pertinant details are outlined below.     Patient is a 77 year old  male   Vitals: Blood pressure (!) 158/97, pulse 70, weight 58.7 kg (129 lb 8 oz).  General Appearance Adult: Alert, no acute distress, oriented    Assessment:  77yoM w/ hx of LUTS (Lower Urinary Tract Symptoms) and Focal signal on a PET scan    Plan:    - Normal ROB   - normal Prostate MRI to further evaluate abnormality visualized on PET scan  -  undetectable PSA     Very low risk and would not recommend a biopsy at this time.    If issues in the future could consider checking a testoserone, but older and asymptomatic therefore no further testing recommended    Faraz Amato MD  Department of Urology  [facility]      Note, dictation software may have been used for this note and the content was not proofread for accuracy    CC  Patient Care Team:  Netta Alvarez as PCP - General (Pediatrics)  Netta Alvarez (Pediatrics)  Corina Potts MD as MD (INTERNAL MEDICINE - ENDOCRINOLOGY, DIABETES & METABOLISM)  Eugene Rock MD as Referring Physician (Family Practice)  Fer Alford MD as MD (Ophthalmology)  Courtney Rg MD as MD (Otolaryngology)  Faraz Amato MD as MD (Urology)  Brandee Pena, RN as Registered Nurse (Oncology)  NETTA ALVAREZ    Copy to patient  BUCK CUEVAS  [address]  [facility] [id number]

## 2018-06-14 NOTE — NURSING NOTE
Chief Complaint   Patient presents with     RECHECK     Patient is here for a follow up on scan     Theresae Keysha, SMA

## 2018-06-14 NOTE — PATIENT INSTRUCTIONS
Follow up with Dr. Amato as needed.    It was a pleasure meeting with you today.  Thank you for allowing me and my team the privilege of caring for you today.  YOU are the reason we are here, and I truly hope we provided you with the excellent service you deserve.  Please let us know if there is anything else we can do for you so that we can be sure you are leaving completely satisfied with your care experience.        CAROLINA Vargas

## 2018-06-14 NOTE — LETTER
6/14/2018       RE: Travis Peres  [address]  [facility] [address]     Dear Colleague,    Thank you for referring your patient, Travis Peres, to the [facility] UROLOGY AND INST FOR PROSTATE AND UROLOGIC CANCERS at [facility]. Please see a copy of my visit note below.    Duplicate      LUTS, Abnormal PET Scan Follow-Up    Travis Peres is a very pleasant 77 year old male who presents with a history of lower urinary tract symptoms (LUTS) and abnormality visualized on recent PET scan. He also had an undetectable PSA which was unusual for someone with a prostate in place. He was last seen by me 3 weeks ago at which time we decided to obtain a Prostate MRI along with a repeat PSA.     His PSA came back undetectable once again. Prostate MRI was normal.     Today he continues to endorse urinary symptoms including dysuria, frequency, and leakage. He is currently taking Flomax but states that it does not improve his symptoms.     Physical Exam  Patient is a 77 year old  male   Vitals: Blood pressure (!) 158/97, pulse 70, weight 58.7 kg (129 lb 8 oz).  General Appearance Adult: Alert, no acute distress, oriented        Labs and Pathology:      I reviewed all applicable laboratory and pathology data and went over findings with patient  Significant for   PSA   Date Value Ref Range Status   06/11/2018 <0.01 0 - 4 ug/L Final     Comment:     Assay Method:  Chemiluminescence using Siemens Vista analyzer   04/16/2018 <0.01 0 - 4 ug/L Final     Comment:     Assay Method:  Chemiluminescence using Siemens Vista analyzer   11/27/2007 0.82 0 - 4 ug/L Final   10/16/2007 0.25 0 - 4 ug/L Final           Imaging:    I reviewed all applicable imaging and went over findings with patient.  Significant for Prostate MRI 6/9/18:    FINDINGS:  Prostate gland size: 3.6 x 4.0 x 3.5 cm  Volume: 26 cc      Peripheral zone:      Lesion 1: Multiple scattered T2 areas of hypoenhancement throughout  the peripheral zone  without evidence of lytic disease ADC signal or  increased DWI. No suspicious contrast enhancement. No evidence of  extraprostatic extension. There is an area of increased DWI signal,  visualized only in this sequence, posterior to the prostate, possibly  at the level of the seminal vesicles. This area is indeterminate, and  may represent a shine through artifact from pelvic free fluid,  although T2 sequences do not demonstrate fluid in that particular  area.     PI-RADS:  Peripheral zone T2: 2  Diffusion-weighted image: 2    Contrast-enhanced images: Negative       Overall assessment: 2     Transitional zone: There are BPH type changes without suspicious  lesion.     PI-RADS:  Transition zone T2: 2  Diffusion-weighted image: 2  Contrast-enhanced images: Negative       Overall assessment: 2     Remainder of the pelvis:  Mild circumferential thickening of the  bladder. Colon appears within normal limits. Free pelvic fluid. No  suspicious lymphadenopathy. No suspicious bone lesions.         IMPRESSION:   1. Based on the most suspicious abnormality, this exam is  characterized as PIRADS 2 - Low probability.  Clinically significant  cancer is unlikely to be present. Extensive T2 hypointensity of the  peripheral gland makes evaluation for early tumor suboptimal. No  high-grade malignancy is noted.  2. No evidence of extraprostatic malignancy. No suspicious adenopathy  or evidence of pelvic metastases.           Assessment and Plan:     Assessment:  77yoM w/ hx of LUTS (Lower Urinary Tract Symptoms) and Focal signal on a PET scan    Plan:    - Prostate MRI normal and PSA continues to be very low  - At this point he feels his urinary symptoms are not bothersome enough for further intervention - briefly discussed surgical intervention, but he is not interested in this. He will continue on Flomax.  - Follow up as needed.    Scribe Disclosure:   Arturo PEREZ, am serving as a scribe; to document services personally  performed by Faraz Amato MD based on data collection and the provider's statements to me.       Faraz Amato MD  Department of Urology Staff  [facility]    Attestation:  This patient was seen and evaluated by me, with a scribe taking notes.  I have reviewed the note above and agree.  The physical exam and or any procedures were performed by me and the pertinant details are outlined below.     Patient is a 77 year old  male   Vitals: Blood pressure (!) 158/97, pulse 70, weight 58.7 kg (129 lb 8 oz).  General Appearance Adult: Alert, no acute distress, oriented    Assessment:  77yoM w/ hx of LUTS (Lower Urinary Tract Symptoms) and Focal signal on a PET scan    Plan:    - Normal ROB   - normal Prostate MRI to further evaluate abnormality visualized on PET scan  -  undetectable PSA     Very low risk and would not recommend a biopsy at this time.    If issues in the future could consider checking a testoserone, but older and asymptomatic therefore no further testing recommended    Faraz Amato MD  Department of Urology  [facility]      Note, dictation software may have been used for this note and the content was not proofread for accuracy    CC  Patient Care Team:  Karol Alvarez as PCP - General (Pediatrics)  Corina Potts MD as MD (INTERNAL MEDICINE - ENDOCRINOLOGY, DIABETES & METABOLISM)  Eugene Rock MD as Referring Physician (Family Practice)  Fer Alford MD as MD (Ophthalmology)  Courtney Rg MD as MD (Otolaryngology)  Brandee Pena, ZAHEER as Registered Nurse (Oncology)      Copy to patient  BUCK HERNANDEZ CUEVAS  [address] [facility] [address]

## 2018-06-16 ENCOUNTER — RADIANT APPOINTMENT (OUTPATIENT)
Dept: GENERAL RADIOLOGY | Facility: CLINIC | Age: 78
End: 2018-06-16
Attending: PEDIATRICS
Payer: COMMERCIAL

## 2018-06-16 ENCOUNTER — OFFICE VISIT (OUTPATIENT)
Dept: URGENT CARE | Facility: URGENT CARE | Age: 78
End: 2018-06-16
Payer: COMMERCIAL

## 2018-06-16 VITALS
SYSTOLIC BLOOD PRESSURE: 146 MMHG | HEART RATE: 72 BPM | WEIGHT: 133 LBS | DIASTOLIC BLOOD PRESSURE: 80 MMHG | TEMPERATURE: 98.1 F | BODY MASS INDEX: 22.13 KG/M2 | OXYGEN SATURATION: 92 % | RESPIRATION RATE: 22 BRPM

## 2018-06-16 DIAGNOSIS — J18.9 PNEUMONIA DUE TO INFECTIOUS ORGANISM, UNSPECIFIED LATERALITY, UNSPECIFIED PART OF LUNG: Primary | ICD-10-CM

## 2018-06-16 DIAGNOSIS — J18.9 PNEUMONIA DUE TO INFECTIOUS ORGANISM, UNSPECIFIED LATERALITY, UNSPECIFIED PART OF LUNG: ICD-10-CM

## 2018-06-16 PROCEDURE — 71046 X-RAY EXAM CHEST 2 VIEWS: CPT | Mod: FY

## 2018-06-16 PROCEDURE — 99214 OFFICE O/P EST MOD 30 MIN: CPT | Performed by: PEDIATRICS

## 2018-06-16 NOTE — MR AVS SNAPSHOT
After Visit Summary   6/16/2018    Travis Peres    MRN: [id number]           Patient Information     Date Of Birth          1940        Visit Information        Provider Department      6/16/2018 9:15 AM Dani Sandhu MD [facility]        Today's Diagnoses     Pneumonia due to infectious organism, unspecified laterality, unspecified part of lung    -  1       Follow-ups after your visit        Your next 10 appointments already scheduled     Jun 20, 2018  3:10 PM CDT   (Arrive by 2:55 PM)   RETURN ENDOCRINE with Corina Potts MD   [facility] End[facility]inology ([facility])    [address]  [address]rd [facility] [id number] [phone]            Jun 27, 2018  2:45 PM CDT   RETURN RETINA with Nadiya Allen MD   [facility] ([facility])    [address]  [facility] [id number] [phone]              Who to contact     If you have questions or need follow up information about today's clinic visit or your schedule please contact [facility] directly at [phone].  Normal or non-critical lab and imaging results will be communicated to you by MyChart, letter or phone within 4 business days after the clinic has received the results. If you do not hear from us within 7 days, please contact the clinic through [employer] or phone. If you have a critical or abnormal lab result, we will notify you by phone as soon as possible.  Submit refill requests through [employer] or call your pharmacy and they will forward the refill request to us. Please allow 3 business days for your refill to be completed.          Additional Information About Your Visit        MyChart Information     [employer] lets you send messages to your doctor, view your test results, renew your prescriptions, schedule appointments and more. To sign up, go to [email or web address] .  "Click on \"Log in\" on the left side of the screen, which will take you to the Welcome page. Then click on \"Sign up Now\" on the right side of the page.     You will be asked to enter the access code listed below, as well as some personal information. Please follow the directions to create your username and password.     Your access code is: [email or web address]  Expires: 2018  6:30 AM     Your access code will  in 90 days. If you need help or a new code, please call your [facility] or [phone].        Care EveryWhere ID     This is your Care EveryWhere ID. This could be used by other organizations to access your [facility] medical records  [id number]        Your Vitals Were     Pulse Temperature Respirations Pulse Oximetry BMI (Body Mass Index)       72 98.1  F (36.7  C) (Oral) 22 92% 22.13 kg/m2        Blood Pressure from Last 3 Encounters:   18 146/80   18 (!) 158/97   18 132/80    Weight from Last 3 Encounters:   18 133 lb (60.3 kg)   18 129 lb 8 oz (58.7 kg)   18 135 lb 4.8 oz (61.4 kg)                 Today's Medication Changes          These changes are accurate as of 18 10:28 AM.  If you have any questions, ask your nurse or doctor.               Start taking these medicines.        Dose/Directions    amoxicillin-clavulanate 875-125 MG per tablet   Commonly known as:  AUGMENTIN   Used for:  Pneumonia due to infectious organism, unspecified laterality, unspecified part of lung   Started by:  Dani Sandhu MD        Dose:  1 tablet   Take 1 tablet by mouth 2 times daily for 10 days   Quantity:  20 tablet   Refills:  0            Where to get your medicines      These medications were sent to [facility] [address] [place] [address] & [address], [place] [address]     Phone:  [phone]     amoxicillin-clavulanate 875-125 MG per tablet                Primary Care Provider Office Phone # Fax #    Karol " Kim [phone]       [address] [address]        Equal Access to Services     LAYLA LUCAS : Hadtita jake mondragon camron Rodriguez, wadianelysda luqadaha, qajpta kaalmada kenneth, roxanne malhotra carlos manuelrobyn duncan laminervarama mitchell. So [facility] [phone].    ATENCIÓN: Si habla español, tiene a reese disposición servicios gratuitos de asistencia lingüística. Llame al [phone].    We comply with applicable federal civil rights laws and [place] laws. We do not discriminate on the basis of race, color, national origin, age, disability, sex, sexual orientation, or gender identity.            Thank you!     Thank you for choosing [facility]  for your care. Our goal is always to provide you with excellent care. Hearing back from our patients is one way we can continue to improve our services. Please take a few minutes to complete the written survey that you may receive in the mail after your visit with us. Thank you!             Your Updated Medication List - Protect others around you: Learn how to safely use, store and throw away your medicines at www.disposemymeds.org.          This list is accurate as of 6/16/18 10:28 AM.  Always use your most recent med list.                   Brand Name Dispense Instructions for use Diagnosis    aflibercept 2 MG/0.05ML Soln injection    EYLEA    0.05 mL    0.05 mLs (2 mg) by Intravitreal route every 28 days    CNVM (choroidal neovascular membrane), left       alendronate 70 MG tablet    FOSAMAX    16 tablet    Take 1 tablet (70 mg) by mouth every 7 days    Postsurgical hypothyroidism, Papillary thyroid carcinoma (H), Pituitary adenoma (H), Hypopituitarism (H)       amLODIPine 2.5 MG tablet    NORVASC     TK 1 T PO QD        amoxicillin-clavulanate 875-125 MG per tablet    AUGMENTIN    20 tablet    Take 1 tablet by mouth 2 times daily for 10 days    Pneumonia due to infectious organism, unspecified laterality,  unspecified part of lung       Carboxymethylcellulose Sod PF 0.5 % Soln ophthalmic solution    REFRESH PLUS     Place 1 drop into both eyes 3 times daily as needed for dry eyes        COMBIVENT RESPIMAT  MCG/ACT inhaler   Generic drug:  Ipratropium-Albuterol      INL 1 PUFF PO QID        doxycycline 100 MG tablet    VIBRA-TABS    20 tablet    Take 1 tablet (100 mg) by mouth 2 times daily for 10 days    Pneumonia of left lower lobe due to infectious organism (H)       fish oil-omega-3 fatty acids 1000 MG capsule      Take 2 g by mouth daily        fluticasone 50 MCG/ACT spray    FLONASE     Spray 2 sprays into both nostrils daily        hydrocortisone 10 MG tablet    CORTEF    180 tablet    Take one tablet by mouth daily in the morning, 1/2 tablet at 2 PM    Postsurgical hypothyroidism, Papillary thyroid carcinoma (H), Abnormal finding on imaging       ibuprofen 400 MG tablet    ADVIL/MOTRIN     Take 400-600 mg by mouth every 6 hours as needed for moderate pain        ketorolac tromethamine 0.4 % Soln ophthalmic solution    ACULAR-LS    5 mL    Apply 1 drop to eye 3 times daily Instill into operative eye(s) per physician instructions.    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    Cataract of left eye, unspecified cataract type       prednisoLONE acetate 1 % ophthalmic susp    PRED FORTE    5 mL    Apply 1 drop to eye 3 times daily Instill into operative eye(s) per physician instructions.    Cataract of left eye, unspecified cataract type       ranibizumab 0.5 MG/0.05ML Soln    LUCENTIS    0.05 mL    0.05 mLs (0.5 mg) by Intravitreal route every 28 days    CNVM (choroidal neovascular membrane), left       ranitidine 300 MG tablet    ZANTAC          RIBOFLAVIN PO      Take 100 mg by mouth        TAMSULOSIN HCL PO      Take 0.4 mg by mouth At Bedtime        traZODone 100 MG tablet    DESYREL     take 1 tab of 50 mg daily        vitamin B complex with vitamin C Tabs tablet      Take 1 tablet by mouth daily        VOLTAREN 1 % Gel topical gel   Generic drug:  diclofenac      Place onto the skin 4 times daily        zolpidem 10 MG tablet    AMBIEN     Take 10 mg by mouth

## 2018-06-16 NOTE — PROGRESS NOTES
SUBJECTIVE:  Travis Peres is a 77 year old male who presents to the clinic today with a chief complaint of cough  for 5 day(s).  His cough is described as occasional and productive white.    The patient's symptoms are mild and not changing over the course of time.  Associated symptoms include fever. The patient's symptoms are exacerbated by no particular triggers  Patient has been using doxycycline  to improve symptoms.    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     Post-surgical hypothyroidism 2007     Uncomplicated asthma      Vocal cord paralysis     right     Xerostomia     due to radiation exposures       Current Outpatient Prescriptions   Medication Sig Dispense Refill     amoxicillin-clavulanate (AUGMENTIN) 875-125 MG per tablet Take 1 tablet by mouth 2 times daily for 10 days 20 tablet 0     aflibercept (EYLEA) 2 MG/0.05ML SOLN injection 0.05 mLs (2 mg) by Intravitreal route every 28 days 0.05 mL 11     alendronate (FOSAMAX) 70 MG tablet Take 1 tablet (70 mg) by mouth every 7 days 16 tablet 3     amLODIPine (NORVASC) 2.5 MG tablet TK 1 T PO QD  4     Carboxymethylcellulose Sod PF (REFRESH PLUS) 0.5 % SOLN ophthalmic solution Place 1 drop into both eyes 3 times daily as needed for dry eyes       COMBIVENT RESPIMAT  MCG/ACT inhaler INL 1 PUFF PO QID  1     diclofenac (VOLTAREN) 1 % GEL topical gel Place onto the skin 4 times daily       doxycycline (VIBRA-TABS) 100 MG tablet Take 1 tablet (100 mg) by mouth 2 times daily for 10 days 20 tablet 0     fish oil-omega-3  fatty acids 1000 MG capsule Take 2 g by mouth daily       fluticasone (FLONASE) 50 MCG/ACT spray Spray 2 sprays into both nostrils daily       GuaiFENesin (MUCUS RELIEF ADULT PO) Take 400 mg by mouth 2 times daily as needed        hydrocortisone (CORTEF) 10 MG tablet Take one tablet by mouth daily in the morning, 1/2 tablet at 2  tablet 3     ibuprofen (ADVIL/MOTRIN) 400 MG tablet Take 400-600 mg by mouth every 6 hours as needed for moderate pain        ketorolac tromethamine (ACULAR-LS) 0.4 % SOLN ophthalmic solution Apply 1 drop to eye 3 times daily Instill into operative eye(s) per physician instructions. 5 mL 0     levothyroxine (SYNTHROID/LEVOTHROID) 137 MCG tablet Take 1 tablet (137 mcg) by mouth daily 90 tablet 3     MIRTAZAPINE PO Take 15 mg by mouth At Bedtime       ofloxacin (OCUFLOX) 0.3 % ophthalmic solution Apply 1 drop to eye 4 times daily Instill into operative eye(s) per physician instructions. 5 mL 0     prednisoLONE acetate (PRED FORTE) 1 % ophthalmic susp Apply 1 drop to eye 3 times daily Instill into operative eye(s) per physician instructions. 5 mL 0     ranibizumab (LUCENTIS) 0.5 MG/0.05ML SOLN 0.05 mLs (0.5 mg) by Intravitreal route every 28 days 0.05 mL 11     ranitidine (ZANTAC) 300 MG tablet        RIBOFLAVIN PO Take 100 mg by mouth       TAMSULOSIN HCL PO Take 0.4 mg by mouth At Bedtime        traZODone (DESYREL) 100 MG tablet take 1 tab of 50 mg daily  0     vitamin B complex with vitamin C (VITAMIN  B COMPLEX) TABS tablet Take 1 tablet by mouth daily       zolpidem (AMBIEN) 10 MG tablet Take 10 mg by mouth         Social History   Substance Use Topics     Smoking status: Never Smoker     Smokeless tobacco: Never Used     Alcohol use No       ROS  INTEGUMENTARY/SKIN: NEGATIVE for worrisome rashes, moles or lesions  EYES: NEGATIVE for vision changes or irritation  CV: NEGATIVE for chest pain, palpitations or peripheral edema  GI: POSITIVE for dysphagia    OBJECTIVE:  /80 (Cuff  Size: Adult Regular)  Pulse 72  Temp 98.1  F (36.7  C) (Oral)  Resp 22  Wt 133 lb (60.3 kg)  SpO2 92%  BMI 22.13 kg/m2  GENERAL APPEARANCE: healthy, alert and no distress  EYES: EOMI,  PERRL, conjunctiva clear  HENT: ear canals and TM's normal.  Nose and mouth without ulcers, erythema or lesions  NECK: supple, nontender, no lymphadenopathy  RESP: normal WOB, crackles at lower lung fields b/l  CV: regular rates and rhythm, normal S1 S2, no murmur noted  ABDOMEN:  soft, nontender, no HSM or masses and bowel sounds normal  NEURO: Normal strength and tone, sensory exam grossly normal,  normal speech and mentation  SKIN: no suspicious lesions or rashes    ASSESSMENT:  Pneumonia, RML, possible aspiration    PLAN:  See orders in Epic  Symptomatic measures encouraged, humidified air, plenty of fluids.

## 2018-06-20 ENCOUNTER — RADIANT APPOINTMENT (OUTPATIENT)
Dept: ULTRASOUND IMAGING | Facility: CLINIC | Age: 78
End: 2018-06-20
Payer: COMMERCIAL

## 2018-06-20 ENCOUNTER — OFFICE VISIT (OUTPATIENT)
Dept: ENDOCRINOLOGY | Facility: CLINIC | Age: 78
End: 2018-06-20
Payer: COMMERCIAL

## 2018-06-20 VITALS
WEIGHT: 132.5 LBS | SYSTOLIC BLOOD PRESSURE: 130 MMHG | HEART RATE: 60 BPM | DIASTOLIC BLOOD PRESSURE: 80 MMHG | BODY MASS INDEX: 21.29 KG/M2 | HEIGHT: 66 IN

## 2018-06-20 DIAGNOSIS — C77.0 METASTASIS TO CERVICAL LYMPH NODE (H): ICD-10-CM

## 2018-06-20 DIAGNOSIS — R77.8 HIGH SERUM THYROGLOBULIN: ICD-10-CM

## 2018-06-20 DIAGNOSIS — Z60.3 LANGUAGE BARRIER AFFECTING HEALTH CARE: ICD-10-CM

## 2018-06-20 DIAGNOSIS — E89.0 POSTSURGICAL HYPOTHYROIDISM: ICD-10-CM

## 2018-06-20 DIAGNOSIS — E23.0 PARTIAL HYPOPITUITARISM (H): ICD-10-CM

## 2018-06-20 DIAGNOSIS — Z75.8 LANGUAGE BARRIER AFFECTING HEALTH CARE: ICD-10-CM

## 2018-06-20 DIAGNOSIS — C73 PAPILLARY THYROID CARCINOMA (H): ICD-10-CM

## 2018-06-20 DIAGNOSIS — M85.9 LOW BONE DENSITY: ICD-10-CM

## 2018-06-20 DIAGNOSIS — C73 PAPILLARY THYROID CARCINOMA (H): Primary | ICD-10-CM

## 2018-06-20 LAB
T4 FREE SERPL-MCNC: 1.6 NG/DL (ref 0.76–1.46)
TSH SERPL DL<=0.005 MIU/L-ACNC: <0.01 MU/L (ref 0.4–4)

## 2018-06-20 SDOH — SOCIAL STABILITY - SOCIAL INSECURITY: ACCULTURATION DIFFICULTY: Z60.3

## 2018-06-20 ASSESSMENT — PAIN SCALES - GENERAL: PAINLEVEL: NO PAIN (0)

## 2018-06-20 NOTE — MR AVS SNAPSHOT
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  *Please allow 3 business days for routine medication refills.  *Please allow 5 business days for controlled substance medication refills.  --------------------  To schedule an appointment (including lab work) you can reach our clinic schedulers at [phone], or you can schedule any follow up appointment directly through [employer] by clicking on the \"Visits\" tab and selecting \"Schedule an Appointment.\"    To ask a question to your Endocrine care team, please send them a [employer] message, or reach them by phone at [phone] and press option #3.    For after-hours urgent Endocrine issues, that do not require 911, please dial [phone], and ask to speak with the Endocrinologist On-Call.    For questions related to your bill, please contact:  [place]: [phone]  [facility]: [phone]    If you do not have enough, or have no insurance for your care, or have questions about possible costs and coverage, please reach out to our [facility] Financial Counselors to discuss with them any options you may have. To reach them, please call [phone].    --------------------  Please Note: If you are active " on [employer], all future test results will be sent by [employer] message only and will no longer be sent by mail. You may also receive communication directly from your physician.            Follow-ups after your visit        Follow-up notes from your care team     Return in about 2 months (around 8/20/2018).      Your next 10 appointments already scheduled     Jun 20, 2018  4:00 PM CDT   US HEAD NECK SOFT TISSUE with UCUS3   [facility] ([facility])    [address] [place] [id number] [phone]           Please bring a list of your medicines (including vitamins, minerals and over-the-counter drugs). Also, tell your doctor about any allergies you may have. Wear comfortable clothes and leave your valuables at home.  You do not need to do anything special to prepare for your exam.  Please call the Imaging Department at your exam site with any questions.            Jun 20, 2018  5:00 PM CDT   LAB with  LAB   [facility] ([facility])    [address] [place] [id number] [phone]           Please do not eat 10-12 hours before your appointment if you are coming in fasting for labs on lipids, cholesterol, or glucose (sugar). This does not apply to pregnant women. Water, hot tea and black coffee (with nothing added) are okay. Do not drink other fluids, diet soda or chew gum.            Jun 25, 2018 10:00 AM CDT   DX HIP/PELVIS/SPINE with UCDX1   [facility]a ([facility])    [address] [place] [id number] [phone]           Please do not take any of the following 24 hours prior to the day of your exam: vitamins, calcium tablets, antacids.  If possible, please wear clothes without metal (snaps, zippers). A sweatsuit works well.            Jun 27, 2018  2:45 PM CDT   RETURN RETINA with Nadiya Allen MD   [facility] ([facility])    [address]  [address] Clin [address]  [facility] [address]   [phone]            Aug 29, 2018  1:30 PM CDT   (Arrive by 1:15 PM)   RETURN ENDOCRINE with Corina Potts MD   [facility] ([facility])    [address]  3rd Floor  [facility] [id number] [phone]              Future tests that were ordered for you today     Open Future Orders        Priority Expected Expires Ordered    Dexa hip/pelvis/spine Routine  2019    T4 free Routine  2019    Thyroglobulin (Total, antibody & recovery %) Routine  2019    TSH Routine  2019    US head neck soft tissue Routine  2019            Who to contact     Please call your clinic at [phone] to:    Ask questions about your health    Make or cancel appointments    Discuss your medicines    Learn about your test results    Speak to your doctor            Additional Information About Your Visit        [facility] Information     [facility] is an electronic gateway that provides easy, online access to your medical records. With [facility], you can request a clinic appointment, read your test results, renew a prescription or communicate with your care team.     To sign up for [facility] visit the website at [email or web address]   You will be asked to enter the access code listed below, as well as some personal information. Please follow the directions to create your username and password.     Your access code is: [email or web address]  Expires: 2018  6:30 AM     Your access code will  in 90 days. If you need help or a new code, please contact your [facility] or call [phone] for assistance.        Care EveryWhere ID     This is your Care EveryWhere ID. This could be used by other organizations to access your [facility] medical records  [id number]        Your Vitals Were  "    Pulse Height BMI (Body Mass Index)             60 1.665 m (5' 5.55\") 21.68 kg/m2          Blood Pressure from Last 3 Encounters:   06/20/18 130/80   06/16/18 146/80   06/14/18 (!) 158/97    Weight from Last 3 Encounters:   06/20/18 60.1 kg (132 lb 8 oz)   06/16/18 60.3 kg (133 lb)   06/14/18 58.7 kg (129 lb 8 oz)               Primary Care Provider Office Phone # Fax #    Karol Alvarez [phone]       [address] [address]        Equal Access to Services     LAYLA LUCAS : Sesar Rodriguez, susana singer, jose kaalmaarslan cooper, roxanne mitchell. So [facility] [phone].    ATENCIÓN: Si ayshala español, tiene a reese disposición servicios gratuitos de asistencia lingüística. Llame al [phone].    We comply with applicable federal civil rights laws and [place] laws. We do not discriminate on the basis of race, color, national origin, age, disability, sex, sexual orientation, or gender identity.            Thank you!     Thank you for choosing [facility]  for your care. Our goal is always to provide you with excellent care. Hearing back from our patients is one way we can continue to improve our services. Please take a few minutes to complete the written survey that you may receive in the mail after your visit with us. Thank you!             Your Updated Medication List - Protect others around you: Learn how to safely use, store and throw away your medicines at www.disposemymeds.org.          This list is accurate as of 6/20/18  3:39 PM.  Always use your most recent med list.                   Brand Name Dispense Instructions for use Diagnosis    aflibercept 2 MG/0.05ML Soln injection    EYLEA    0.05 mL    0.05 mLs (2 mg) by Intravitreal route every 28 days    CNVM (choroidal neovascular membrane), left       alendronate 70 MG tablet    FOSAMAX    16 tablet    Take 1 tablet (70 mg) by mouth every 7 " days    Postsurgical hypothyroidism, Papillary thyroid carcinoma (H), Pituitary adenoma (H), Hypopituitarism (H)       amLODIPine 2.5 MG tablet    NORVASC     TK 1 T PO QD        amoxicillin-clavulanate 875-125 MG per tablet    AUGMENTIN    20 tablet    Take 1 tablet by mouth 2 times daily for 10 days    Pneumonia due to infectious organism, unspecified laterality, unspecified part of lung       Carboxymethylcellulose Sod PF 0.5 % Soln ophthalmic solution    REFRESH PLUS     Place 1 drop into both eyes 3 times daily as needed for dry eyes        COMBIVENT RESPIMAT  MCG/ACT inhaler   Generic drug:  Ipratropium-Albuterol      INL 1 PUFF PO QID        doxycycline 100 MG tablet    VIBRA-TABS    20 tablet    Take 1 tablet (100 mg) by mouth 2 times daily for 10 days    Pneumonia of left lower lobe due to infectious organism (H)       fish oil-omega-3 fatty acids 1000 MG capsule      Take 2 g by mouth daily        fluticasone 50 MCG/ACT spray    FLONASE     Spray 2 sprays into both nostrils daily        hydrocortisone 10 MG tablet    CORTEF    180 tablet    Take one tablet by mouth daily in the morning, 1/2 tablet at 2 PM    Postsurgical hypothyroidism, Papillary thyroid carcinoma (H), Abnormal finding on imaging       ibuprofen 400 MG tablet    ADVIL/MOTRIN     Take 400-600 mg by mouth every 6 hours as needed for moderate pain        ketorolac tromethamine 0.4 % Soln ophthalmic solution    ACULAR-LS    5 mL    Apply 1 drop to eye 3 times daily Instill into operative eye(s) per physician instructions.    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    Cataract of left eye, unspecified cataract type       prednisoLONE acetate 1 % ophthalmic susp    PRED FORTE    5 mL    Apply 1 drop to eye 3 times daily Instill into operative eye(s) per physician instructions.    Cataract of left eye, unspecified cataract type       ranibizumab 0.5 MG/0.05ML Soln    LUCENTIS    0.05 mL    0.05 mLs (0.5 mg) by Intravitreal route every 28 days    CNVM (choroidal neovascular membrane), left       ranitidine 300 MG tablet    ZANTAC          RIBOFLAVIN PO      Take 100 mg by mouth        TAMSULOSIN HCL PO      Take 0.4 mg by mouth At Bedtime        traZODone 100 MG tablet    DESYREL     take 1 tab of 50 mg daily        vitamin B complex with vitamin C Tabs tablet      Take 1 tablet by mouth daily        VOLTAREN 1 % Gel topical gel   Generic drug:  diclofenac      Place onto the skin 4 times daily        zolpidem 10 MG tablet    AMBIEN     Take 10 mg by mouth

## 2018-06-20 NOTE — LETTER
6/20/2018       RE: Travis Peres  [address]  [facility] [id number]     Dear Colleague,    Thank you for referring your patient, Travis Peres, to the [facility]. Please see a copy of my visit note below.    Endocrinology Attending Physician Progress note    Very complicated endocrine patient/ problem set.   1. Papillary thyroid cancer 4.8 cm right, bilateral node positive. He has been treated with total thyroidectomy, 131I x 2 (cummulative dose 346.4 mCi) His last 131I TBS (2008) post therapy scan raised question of ? lung mets and also ? met above left kidney. Cervical adenopathy has been treated in the past with ETOH .    I have long suspected he had lung mets, but we haven't proven this.      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   Cervical adenopathy has been treated in the past with ETOH . The 2 LNs likely correlate to the high FDG regions on the PET. Over time these nodes are progressing/ enlarging. Specifically, volume of right # 7 has gone from 0.79 to 1.65 to 2.71 cm3 from 9/17 to 4/18 to 6/20/18, doubling time < 1 year  It is possible these are the same LNs that were treated with ETOH in the past (vs others in the same vicinity - it is hard to say)  Options discussed again.   Watch it  Surgery  ETOh again.   Today Mr Peres has little opinion - we decided to repeat US and see if changed.  As noted above, it may be larger, though the technique on the current US is different than last US. I am concerned the level 7 mass can't be reached with ETOH treatment, that the only way to remove it is with surgery.  He has already had surgery on the right several times. I will send him back to Dr Kinney again for another opinion on this    Lung nodules - biopsy has not been performed.  BAL cytology (8/15) did not show malignancy. Spiculated Lingular nodule to 1.7 x 1.5 cm on 9/17 CT measured only 7 mm on 4/12/18 PET, lacking  FDG activity.       Persistent thyroglobulinemia has been rising/worse, suggesting progression of thyroid cancer in some location.   As per # 1.  Repeat Tg again      Hypopituitary with hypogonadotropic hypogonadism, probable secondary hypothyroidism, and secondary adrenal insufficiency, probable GH deficiency.   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 The tumor has been significantly de bulked and He has completed XRT for  persistent tumor-. Tumor mass is stable on  MRI through 12/16.       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   The TSH is not fully reliable.  We can treat up to high normal free T4.       Low BMD. He continues on alendronate .  Last DXA 7/15 is stable.  He is on alendronate.    He is due for follow up DXA.  Not addressed.    Hypogonadism has been untreated (Due to patient noncompliance with treatment recommendations) - not addressed      ? Lytic bone lesions -stable on serial imaging -- not addressed     Language barrier ;hearing barrier. - both of these make his care very difficult and are interfering with his best health care.     Corina Potts MD      Cc/ HPI: Mr Peres returns today, along with his wife and also .  I last saw him 5/9/18.  See my 4/9/18 note for his detailed history.  I see that on 6/16/18 he was diagnosed with RML pneumonia, possible aspiration.  He is on Augmentin -   On 6/14/18 he was seen in urology for the abnormal PET of prostate.  I have reviewed their note.       He  has a history of multiple complicated endocrine issues, including thyroid cancer, surgical hypothyroidism, osteoporosis, pituitary macroadenoma with partial hypopituitarism and history of DI.      Papillary thyroid cancer 4.8 cm left, bilateral node positive (MACIS 6.88 minimum, pT3, pN1a (8), pMx, Stage III or IV). His course has included several operations and several 131I  treatments  11/27/07 thyroidectomy  153.4 mCi 131I in 6/08. The radioiodine  was complicated by acute sialadenitis.   12/08  193 mCi 131I (with Thyrogen stimulation). The post therapy scan showed activity in the thyroid bed, lung base on the right and also superior left kidney region   11/3/09  right selective neck dissection levels 2, 3 and 4, removing many positive LNs.   4/16/14 FNAB of right level 6 and 7 cervical lymph nodes were  positive for papillary thyroid cancer. Needle wash Tg was also high on both samples (see below). He had  hoarseness within one hour after the FNAB procedure. He was treated with cymetra on 5/12/14 and he restarted thickened liquids.    6/3/14  ETOH ablation procedure of the  2 LNs    1/31/17  trasnscervical resection of retrosternal mediastinal lesions.  A cystic lesion was removed and drained.  Surgical path  benign thymic tissue.     4/12/18  PET/CT .  Right low neck /superior mediastinal high FDG lateral and more midline. The paratracheal mass is somewhat posterior and below the level of the clavicle (read as 1.4 cm, larger than before, SUB 68), but above the sternum  Tiny focus of fdg left lung      Labs   4/9/18: Tg 18.2, FRANK < 0.4; TSH  ,0.01, free T4 1.52, Na 139, K 4.4;     Neck US,6/20/18 (following appt), compare with 4/16/18:   Right level 6 thyroid bed 1.2 x 01 x 1.1  (was 1.3 x 0.95 x 1.2 cm - suspicious; was 1.2 x 0.93 x 0.9 ;   Right level 7 # 1 1.8 x 1.6 x 1.8 cm (was 1.4 x 1.5 x 1.5 cm; was 1.1 x 1.2 x 1 -  Left level 4  0.3 x 0.6 x 1.1 (was  0.3 x 0.8 x 1 cm ; 0.6 x 0.3 x 0.8 )  Left level 4 # 2 0.5 x 0.7 x 0.7 (was  0.8 x 0.5 x 0.6 cm;  0.5 x 0.5 x 1 )  Left level 5 1 0.6 x 0.9 x 1.3 (was 1.0 x 0.5 x 1.5 cm; 1.1 x 0.6 x 1.3   Left level 5 2 0.9 x 0.7 x 0.9 (was 1 x 0.5 x 1.3      ROS:  Feeling good /better  Neck no lumps- later he reported left level 5 mass   Cardiac: negative  Respiratory: much better on day 2 and 3.  Coughing less, a lot of mucous  GI:  negative      PMH   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     Post-surgical hypothyroidism 2007     Uncomplicated asthma      Vocal cord paralysis     right     Xerostomia     due to radiation exposures     Past Surgical History:   Procedure Laterality Date     cymetra injection       ESOPHAGOSCOPY, GASTROSCOPY, DUODENOSCOPY (EGD), COMBINED  6/20/2013    Procedure: COMBINED ESOPHAGOSCOPY, GASTROSCOPY, DUODENOSCOPY (EGD), BIOPSY SINGLE OR MULTIPLE;  gastroscopy;  Surgeon: Leslie Guadarrama MD;  Location:  GI     HERNIA REPAIR Right      lipoma resection chest wall Right 11/09     PHACOEMULSIFICATION CLEAR CORNEA WITH STANDARD INTRAOCULAR LENS IMPLANT Left 5/7/2018    Procedure: PHACOEMULSIFICATION CLEAR CORNEA WITH STANDARD INTRAOCULAR LENS IMPLANT;  LEFT PHACOEMULSIFICATION CLEAR CORNEA WITH STANDARD INTRAOCULAR LENS IMPLANT ;  Surgeon: Nadiya Allen MD;  Location:  [facility]     right selective neck dissection level 2, 3, 4  11/3/09     right  shunt placement  6/28/07     THYMECTOMY N/A 1/3/2017    Procedure: THYMECTOMY;  Surgeon: Ernesto Armstrong MD;  Location: [facility]     THYROIDECTOMY  11/27/07     TRANSCERVICAL EXTENDED MEDIASTINAL LYMPHADENECTOMY N/A 1/3/2017    Procedure: TRANSCERVICAL EXTENDED MEDIASTINAL LYMPHADENECTOMY;  Surgeon: Ernesto Armstrong MD;  Location: [facility]     transnasal endoscopic resection of pituitary adenoma  8/13/2007     Family History   Problem Relation Age of Onset     Cancer No family hx of      no skin cancer      "Glaucoma No family hx of      Macular Degeneration No family hx of      Social History     Social History     Marital status:      Spouse name: N/A     Number of children: N/A     Years of education: N/A     Occupational History     Not on file.     Social History Main Topics     Smoking status: Never Smoker     Smokeless tobacco: Never Used     Alcohol use No     Drug use: No     Sexual activity: Not on file     Other Topics Concern     Not on file     Social History Narrative    Mom  at age 93 from a fall    Dad  at age 98 from old age     40 plus years    Two adult children in good health.    Occupation: retired from running a [employer]    Originally from [place]         Physical Exam   GENERAL: elderly man in NAD; His wife is present . He is very [place]-He is speaking less than usual today, but seems attentive  /80  Pulse 60  Ht 1.665 m (5' 5.55\")  Wt 60.1 kg (132 lb 8 oz)  BMI 21.68 kg/m2  SKIN: normal color , temperature.  HEENT: no scleral icterus  Neck: no palpable masses (including no palpable mass left level 5 where he says he feels something)  LUNGS: clear bilaterally  CARDIAC: RRR s1 S2  NEURO: Alert, responds to questions, unclear if he really understands all of the issues.     Results for BUCK CUEVAS (MRN [id number]) as of 2018 09:37   Ref. Range 2018 15:55   T4 Free Latest Ref Range: 0.76 - 1.46 ng/dL 1.60 (H)   TSH Latest Ref Range: 0.40 - 4.00 mU/L <0.01 (L)       DATA REVIEW:    EXAMINATION: US HEAD NECK SOFT TISSUE, 2018 4:43 PM      COMPARISON: Cervical ultrasound dated 2018.     HISTORY: Past medical history of papillary thyroid carcinoma with  concern for cervical metastasis.     FINDINGS:     Lymph nodes are measured bilaterally with measurements given in  craniocaudal, transverse and AP dimensions as follows:     Right:  Level 1: No lymphadenopathy.  Level 2: No lymphadenopathy.  Level 3: No lymphadenopathy.  Level 4: No lymphadenopathy.  Level " 5: No lymphadenopathy.  Level 6: A 1.2 x 1.0 x 1.1 cm node with rounded shape, loss of  echogenic kenneth, microlobulated margin, and central vascularity noted  on Doppler imaging. Node measured 1.3 x 1.0 x 1.2 cm on prior exam.   Level 7: A 1.8 x 1.6 x 1.8 cm rounded lymph node, microlobulated  margin, loss of echogenic kenneth, mixed echogenicity, no central  hypervascularity. Compared to 1.2 x 0.9 x 0.9 on prior exam.      Left:  Level 1: No lymphadenopathy.  Level 2: 1.2 x 0.6 x 2.3 cm lymph node with a preserved fatty hilum,  being shaped, isoechoic.  Doppler images demonstrate no  hypervascularity. Node measured 1.2 x 0.7 x 2.1 cm on prior exam.  Level 3: No lymphadenopathy.  Level 4: A 0.3 x 0.6 x 1.1 cm node, been shaped, compared to 0.8 x 0.3  x 1.0 cm on prior exam. Doppler images demonstrate no significant  hypervascularity. An additional oblong lymph node measuring 0.5 x 0.7  x 0.7 cm, compared to 0.8 x 0.5 x 0.6 cm on prior exam. Doppler images  demonstrate no significant hypervascularity.  Level 5: 0.6 x 0.9 x 1.3 cm node been shaped, preserved fatty kenneth,  without significant hypervascularity. Node measured 0.5 x 1.0 x 1.5 cm  on prior exam. Additional lymph node with a preserved fatty hilum  measuring 0.9 x 0.6 x 0.9 cm, compared to 0.8 x 0.5 x 0.6 on prior  exam. Doppler images demonstrate no significant hypervascularity.  Level 6: No lymphadenopathy.  Level 7: No lymphadenopathy.         IMPRESSION:  1.  Redemonstration of suspicious appearing lymph nodes on the right  in levels 6 and 7. The level 7 lymph node has increased in size.   2.  Interval improvement in previously noted benign-appearing lymph  nodes in level 2 bilaterally.  3.   Additional bilateral benign-appearing lymph nodes are unchanged  compared to prior exam.     I have personally reviewed the examination and initial interpretation  and I agree with the findings.     SHARAN JEROME MD    Again, thank you for allowing me to  participate in the care of your patient.      Sincerely,    Corina Potts MD

## 2018-06-20 NOTE — PATIENT INSTRUCTIONS
"Get blood test  Repeat neck ultrasound    You are due to follow up bone density test    To expedite your medication refill(s), please contact your pharmacy and have them fax a refill request to: [phone].  *Please allow 3 business days for routine medication refills.  *Please allow 5 business days for controlled substance medication refills.  --------------------  To schedule an appointment (including lab work) you can reach our clinic schedulers at [phone], or you can schedule any follow up appointment directly through [facility] by clicking on the \"Visits\" tab and selecting \"Schedule an Appointment.\"    To ask a question to your Endocrine care team, please send them a [facility] message, or reach them by phone at [phone] and press option #3.    For after-hours urgent Endocrine issues, that do not require 911, please dial [phone], and ask to speak with the Endocrinologist On-Call.    For questions related to your bill, please contact:  [place]: [phone]  [facility]: [phone]    If you do not have enough, or have no insurance for your care, or have questions about possible costs and coverage, please reach out to our [facility] Financial Counselors to discuss with them any options you may have. To reach them, please call [phone].    --------------------  Please Note: If you are active on [facility], all future test results will be sent by Dr[facility] message only and will no longer be sent by mail. You may also receive communication directly from your physician.    "

## 2018-06-20 NOTE — PROGRESS NOTES
Endocrinology Attending Physician Progress note    Very complicated endocrine patient/ problem set.   1. Papillary thyroid cancer 4.8 cm right, bilateral node positive. He has been treated with total thyroidectomy, 131I x 2 (cummulative dose 346.4 mCi) His last 131I TBS (2008) post therapy scan raised question of ? lung mets and also ? met above left kidney. Cervical adenopathy has been treated in the past with ETOH .    I have long suspected he had lung mets, but we haven't proven this.      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   Cervical adenopathy has been treated in the past with ETOH . The 2 LNs likely correlate to the high FDG regions on the PET. Over time these nodes are progressing/ enlarging. Specifically, volume of right # 7 has gone from 0.79 to 1.65 to 2.71 cm3 from 9/17 to 4/18 to 6/20/18, doubling time < 1 year  It is possible these are the same LNs that were treated with ETOH in the past (vs others in the same vicinity - it is hard to say)  Options discussed again.   Watch it  Surgery  ETOh again.   Today Mr Peres has little opinion - we decided to repeat US and see if changed.  As noted above, it may be larger, though the technique on the current US is different than last US. I am concerned the level 7 mass can't be reached with ETOH treatment, that the only way to remove it is with surgery.  He has already had surgery on the right several times. I will send him back to Dr Kinney again for another opinion on this    Lung nodules - biopsy has not been performed.  BAL cytology (8/15) did not show malignancy. Spiculated Lingular nodule to 1.7 x 1.5 cm on 9/17 CT measured only 7 mm on 4/12/18 PET, lacking FDG activity.       Persistent thyroglobulinemia has been rising/worse, suggesting progression of thyroid cancer in some location.   As per # 1.  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 The tumor has been significantly de bulked and He has completed XRT for  persistent tumor-. Tumor mass is stable on  MRI through 12/16.       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   The TSH is not fully reliable.  We can treat up to high normal free T4.       Low BMD. He continues on alendronate .  Last DXA 7/15 is stable.  He is on alendronate.    He is due for follow up DXA.  Not addressed.    Hypogonadism has been untreated (Due to patient noncompliance with treatment recommendations) - not addressed      ? Lytic bone lesions -stable on serial imaging -- not addressed     Language barrier ;hearing barrier. - both of these make his care very difficult and are interfering with his best health care.     Corina Potts MD      Cc/ HPI: Mr Peres returns today, along with his wife and also .  I last saw him 5/9/18.  See my 4/9/18 note for his detailed history.  I see that on 6/16/18 he was diagnosed with RML pneumonia, possible aspiration.  He is on Augmentin -   On 6/14/18 he was seen in urology for the abnormal PET of prostate.  I have reviewed their note.       He has a history of multiple complicated endocrine issues, including thyroid cancer, surgical hypothyroidism, osteoporosis, pituitary macroadenoma with partial hypopituitarism and history of DI.      Papillary thyroid cancer 4.8 cm left, bilateral node positive (MACIS 6.88 minimum, pT3, pN1a (8), pMx, Stage III or IV). His course has included several operations and several 131I treatments  11/27/07 thyroidectomy  153.4 mCi 131I in 6/08. The radioiodine  was complicated by acute sialadenitis.   12/08  193 mCi 131I (with Thyrogen stimulation). The post therapy scan showed activity in the thyroid bed, lung base on the right and also superior left kidney  region   11/3/09  right selective neck dissection levels 2, 3 and 4, removing many positive LNs.   4/16/14 FNAB of right level 6 and 7 cervical lymph nodes were  positive for papillary thyroid cancer. Needle wash Tg was also high on both samples (see below). He had  hoarseness within one hour after the FNAB procedure. He was treated with cymetra on 5/12/14 and he restarted thickened liquids.    6/3/14  ETOH ablation procedure of the  2 LNs    1/31/17  trasnscervical resection of retrosternal mediastinal lesions.  A cystic lesion was removed and drained.  Surgical path  benign thymic tissue.     4/12/18  PET/CT .  Right low neck /superior mediastinal high FDG lateral and more midline. The paratracheal mass is somewhat posterior and below the level of the clavicle (read as 1.4 cm, larger than before, SUB 68), but above the sternum  Tiny focus of fdg left lung      Labs   4/9/18: Tg 18.2, FRANK < 0.4; TSH  ,0.01, free T4 1.52, Na 139, K 4.4;   6/20/18: Tg 12.4, FRANK < 0.4, TSH < 0.01, free T4 1.6 - results followed appt, not discussed at appt .     Neck US,6/20/18 (following appt), compare with 4/16/18:   Right level 6 thyroid bed 1.2 x 01 x 1.1  (was 1.3 x 0.95 x 1.2 cm - suspicious; was 1.2 x 0.93 x 0.9 ;   Right level 7 # 1 1.8 x 1.6 x 1.8 cm (was 1.4 x 1.5 x 1.5 cm; was 1.1 x 1.2 x 1 -  Left level 4  0.3 x 0.6 x 1.1 (was  0.3 x 0.8 x 1 cm ; 0.6 x 0.3 x 0.8 )  Left level 4 # 2 0.5 x 0.7 x 0.7 (was  0.8 x 0.5 x 0.6 cm;  0.5 x 0.5 x 1 )  Left level 5 1 0.6 x 0.9 x 1.3 (was 1.0 x 0.5 x 1.5 cm; 1.1 x 0.6 x 1.3   Left level 5 2 0.9 x 0.7 x 0.9 (was 1 x 0.5 x 1.3      ROS:  Feeling good /better  Neck no lumps- later he reported left level 5 mass   Cardiac: negative  Respiratory: much better on day 2 and 3.  Coughing less, a lot of mucous  GI: negative      PMH   Past Medical History:   Diagnosis Date     Arthritis      BPH (benign prostatic hyperplasia)      Depression      Hyperlipidemia      Hypertension     no current  meds     Hypopituitarism after adenoma resection (H)      Hypovitaminosis D      Multiple pulmonary nodules      Osteopenia      Panhypopituitarism (H)      Papillary thyroid carcinoma (H) 2007    4.8 cm, right, node positive;      Pituitary macroadenoma with extrasellar extension (H) 2007    causing obstructive hydrocephalus     Post-surgical hypothyroidism 2007     Uncomplicated asthma      Vocal cord paralysis     right     Xerostomia     due to radiation exposures     Past Surgical History:   Procedure Laterality Date     cymetra injection       ESOPHAGOSCOPY, GASTROSCOPY, DUODENOSCOPY (EGD), COMBINED  6/20/2013    Procedure: COMBINED ESOPHAGOSCOPY, GASTROSCOPY, DUODENOSCOPY (EGD), BIOPSY SINGLE OR MULTIPLE;  gastroscopy;  Surgeon: Leslie Guadarrama MD;  Location:  GI     HERNIA REPAIR Right      lipoma resection chest wall Right 11/09     PHACOEMULSIFICATION CLEAR CORNEA WITH STANDARD INTRAOCULAR LENS IMPLANT Left 5/7/2018    Procedure: PHACOEMULSIFICATION CLEAR CORNEA WITH STANDARD INTRAOCULAR LENS IMPLANT;  LEFT PHACOEMULSIFICATION CLEAR CORNEA WITH STANDARD INTRAOCULAR LENS IMPLANT ;  Surgeon: Nadiya Allen MD;  Location:  [facility]     right selective neck dissection level 2, 3, 4  11/3/09     right  shunt placement  6/28/07     THYMECTOMY N/A 1/3/2017    Procedure: THYMECTOMY;  Surgeon: Ernesto Armstrong MD;  Location: [facility] OR     THYROIDECTOMY  11/27/07     TRANSCERVICAL EXTENDED MEDIASTINAL LYMPHADENECTOMY N/A 1/3/2017    Procedure: TRANSCERVICAL EXTENDED MEDIASTINAL LYMPHADENECTOMY;  Surgeon: Ernesto Armstrong MD;  Location: [facility] OR     transnasal endoscopic resection of pituitary adenoma  8/13/2007     Family History   Problem Relation Age of Onset     Cancer No family hx of      no skin cancer     Glaucoma No family hx of      Macular Degeneration No family hx of      Social History     Social History     Marital status:      Spouse name: N/A     Number of children: N/A  "    Years of education: N/A     Occupational History     Not on file.     Social History Main Topics     Smoking status: Never Smoker     Smokeless tobacco: Never Used     Alcohol use No     Drug use: No     Sexual activity: Not on file     Other Topics Concern     Not on file     Social History Narrative    Mom  at age 93 from a fall    Dad  at age 98 from old age     40 plus years    Two adult children in good health.    Occupation: retired from running a restaurant    Originally from [place]         Physical Exam   GENERAL: elderly man in NAD; His wife is present . He is very [place]-He is speaking less than usual today, but seems attentive  /80  Pulse 60  Ht 1.665 m (5' 5.55\")  Wt 60.1 kg (132 lb 8 oz)  BMI 21.68 kg/m2  SKIN: normal color , temperature.  HEENT: no scleral icterus  Neck: no palpable masses (including no palpable mass left level 5 where he says he feels something)  LUNGS: clear bilaterally  CARDIAC: RRR s1 S2  NEURO: Alert, responds to questions, unclear if he really understands all of the issues.     Results for BUCK CUEVAS (MRN [id number]) as of 2018 09:37   Ref. Range 2018 15:55   T4 Free Latest Ref Range: 0.76 - 1.46 ng/dL 1.60 (H)   TSH Latest Ref Range: 0.40 - 4.00 mU/L <0.01 (L)       DATA REVIEW:    EXAMINATION: US HEAD NECK SOFT TISSUE, 2018 4:43 PM      COMPARISON: Cervical ultrasound dated 2018.     HISTORY: Past medical history of papillary thyroid carcinoma with  concern for cervical metastasis.     FINDINGS:     Lymph nodes are measured bilaterally with measurements given in  craniocaudal, transverse and AP dimensions as follows:     Right:  Level 1: No lymphadenopathy.  Level 2: No lymphadenopathy.  Level 3: No lymphadenopathy.  Level 4: No lymphadenopathy.  Level 5: No lymphadenopathy.  Level 6: A 1.2 x 1.0 x 1.1 cm node with rounded shape, loss of  echogenic kenneth, microlobulated margin, and central vascularity noted  on Doppler imaging. Node " measured 1.3 x 1.0 x 1.2 cm on prior exam.   Level 7: A 1.8 x 1.6 x 1.8 cm rounded lymph node, microlobulated  margin, loss of echogenic kenneth, mixed echogenicity, no central  hypervascularity. Compared to 1.2 x 0.9 x 0.9 on prior exam.      Left:  Level 1: No lymphadenopathy.  Level 2: 1.2 x 0.6 x 2.3 cm lymph node with a preserved fatty hilum,  being shaped, isoechoic.  Doppler images demonstrate no  hypervascularity. Node measured 1.2 x 0.7 x 2.1 cm on prior exam.  Level 3: No lymphadenopathy.  Level 4: A 0.3 x 0.6 x 1.1 cm node, been shaped, compared to 0.8 x 0.3  x 1.0 cm on prior exam. Doppler images demonstrate no significant  hypervascularity. An additional oblong lymph node measuring 0.5 x 0.7  x 0.7 cm, compared to 0.8 x 0.5 x 0.6 cm on prior exam. Doppler images  demonstrate no significant hypervascularity.  Level 5: 0.6 x 0.9 x 1.3 cm node been shaped, preserved fatty kenneth,  without significant hypervascularity. Node measured 0.5 x 1.0 x 1.5 cm  on prior exam. Additional lymph node with a preserved fatty hilum  measuring 0.9 x 0.6 x 0.9 cm, compared to 0.8 x 0.5 x 0.6 on prior  exam. Doppler images demonstrate no significant hypervascularity.  Level 6: No lymphadenopathy.  Level 7: No lymphadenopathy.         IMPRESSION:  1.  Redemonstration of suspicious appearing lymph nodes on the right  in levels 6 and 7. The level 7 lymph node has increased in size.   2.  Interval improvement in previously noted benign-appearing lymph  nodes in level 2 bilaterally.  3.   Additional bilateral benign-appearing lymph nodes are unchanged  compared to prior exam.     I have personally reviewed the examination and initial interpretation  and I agree with the findings.     SHARAN JEROME MD

## 2018-06-23 ENCOUNTER — TELEPHONE (OUTPATIENT)
Dept: ENDOCRINOLOGY | Facility: CLINIC | Age: 78
End: 2018-06-23

## 2018-06-23 NOTE — TELEPHONE ENCOUNTER
----- Message from Corina Potts MD sent at 6/23/2018  6:42 AM CDT -----  Regarding: RE: opinion requested  The language barrier is a huge problem with this patient.  We will see if we can get an  to call him and explain we want him to see you.  I am cc'ing our triage nurses to get them in the mix on this to help us.     Corina Potts  ----- Message -----     From: Kavitha Sam     Sent: 6/22/2018   1:20 PM       To: Corina Potts MD, Lyndsay Kinney MD  Subject: RE: opinion requested                            I tried to jhonny this patient and reached his wife and through an  wife stated that they thought things hadn't changed and they were under the impression that they would be seeing Dr Potts again in a few months.     Who should be calling this patient/family to let them know what is suggested to be done?    Thank you,    Kavitha Sam   [facility]-Op Coordinator  [phone]      ----- Message -----     From: Lyndsay Kinney MD     Sent: 6/22/2018  10:38 AM       To: Corina Potts MD, Kavitha Sam  Subject: RE: opinion requested                            Sure. I can see him Monday July 2. I remember him. We had to shave his tumor off of his nerve. I even called in [facility] to determine if we should sacrifice the nerve or not. In retrospect, we should have.     Evonne  ----- Message -----     From: Corina Potts MD     Sent: 6/22/2018   7:07 AM       To: Lyndsay Kinney MD  Subject: RE: opinion requested                            That is probably acceptable timeline relative to getting him back to explain it again to him, and scheduling.   Do you want to see him to talk about it before you go out on leave?   He is extremely [place] and I am really not sure how well he understands any of this.        ----- Message -----     From: Lyndsay Kinney MD     Sent: 6/22/2018  12:45 AM       To: Corina Potts MD  Subject: RE: opinion  requested                            Both right nodes are accessible with surgery. He is 76 yo and very functional If I recall correctly. I wont be able to operate on him until lat August. Is that too long to wait?    Evonne  ----- Message -----     From: Corina Potts MD     Sent: 6/21/2018   5:03 PM       To: Lyndsay Kinney MD  Subject: opinion requested                                What do you think of the right level 6/ and7 LNs - they are biopsy proven + progressive /recurrent disease.  We have high FDG pet in the vicinity on the right.    He has had several operations and ETOH treatment in the past.  The last operation didn't get cancer.  I am afraid we couldn't reach the level 7 mass with ETOH, that the only way to get it is surgery.  What do you think?

## 2018-06-23 NOTE — TELEPHONE ENCOUNTER
----- Message from Corina Potts MD sent at 6/23/2018  6:48 AM CDT -----  Regarding:  call  Please have  call and read him the letter I wrote today.  We are trying to set him up with Dr Kinney for July 2.  I also cc'd you on another note with his scheduling nurse, to help facilitate this.    Corina Potts

## 2018-06-23 NOTE — TELEPHONE ENCOUNTER
Spoke w/ Pts wife Via Cantonese  - Requested to know what kind of Doctor Dr Kinnye was , explained she was an Endo surgeon  The problem that Mr Peres is not able to  the phone, [place] of hearing, and wife goes to work during the day. Please send letter with next available appt  Please scheudle appt in the morning as Pts wife goes to work at 4pm. Please call to confirm after the letter has been sent.

## 2018-06-25 ENCOUNTER — RADIANT APPOINTMENT (OUTPATIENT)
Dept: BONE DENSITY | Facility: CLINIC | Age: 78
End: 2018-06-25
Payer: COMMERCIAL

## 2018-06-25 DIAGNOSIS — C77.0 METASTASIS TO CERVICAL LYMPH NODE (H): ICD-10-CM

## 2018-06-25 DIAGNOSIS — Z60.3 LANGUAGE BARRIER AFFECTING HEALTH CARE: ICD-10-CM

## 2018-06-25 DIAGNOSIS — E23.0 PARTIAL HYPOPITUITARISM (H): ICD-10-CM

## 2018-06-25 DIAGNOSIS — C73 PAPILLARY THYROID CARCINOMA (H): ICD-10-CM

## 2018-06-25 DIAGNOSIS — E89.0 POSTSURGICAL HYPOTHYROIDISM: ICD-10-CM

## 2018-06-25 DIAGNOSIS — R77.8 HIGH SERUM THYROGLOBULIN: ICD-10-CM

## 2018-06-25 DIAGNOSIS — M85.9 LOW BONE DENSITY: ICD-10-CM

## 2018-06-25 DIAGNOSIS — H35.3220 EXUDATIVE AGE-RELATED MACULAR DEGENERATION, LEFT EYE, STAGE UNSPECIFIED (H): Primary | ICD-10-CM

## 2018-06-25 DIAGNOSIS — Z75.8 LANGUAGE BARRIER AFFECTING HEALTH CARE: ICD-10-CM

## 2018-06-25 SDOH — SOCIAL STABILITY - SOCIAL INSECURITY: ACCULTURATION DIFFICULTY: Z60.3

## 2018-06-26 ENCOUNTER — TELEPHONE (OUTPATIENT)
Dept: ENDOCRINOLOGY | Facility: CLINIC | Age: 78
End: 2018-06-26

## 2018-06-26 NOTE — TELEPHONE ENCOUNTER
----- Message from Corina Potts MD sent at 6/26/2018 11:23 AM CDT -----  Regarding: RE: ANNETTE   Just make sure that the patient knows about the appt    ----- Message -----     From: Daisy Abad, RN     Sent: 6/25/2018   8:15 AM       To: Corina Potts MD  Subject: VANCEI                                              Messaged Kavitha Potts out of clinic until 07/02.   ----- Message -----     From: Kavitha Sam     Sent: 6/25/2018   8:09 AM       To: Corina Potts MD, Lyndsay Kinney MD, #  Subject: RE: opinion requested                            I am giving this patient an appointment 7/2/18 at 2:00 pm    If Dr Potts or her nurse need to contact me, please call me at [phone]    Kavitha Sam   [facility]-Op Coordinator for Dr Kinney  [phone]    ----- Message -----     From: Corina Potts MD     Sent: 6/23/2018   6:42 AM       To: Lyndsay Kinney MD, Kavitha Sam, #  Subject: RE: opinion requested                            The language barrier is a huge problem with this patient.  We will see if we can get an  to call him and explain we want him to see you.  I am cc'ing our triage nurses to get them in the mix on this to help us.     Corina Potts  ----- Message -----     From: Kavitha Sam     Sent: 6/22/2018   1:20 PM       To: Corina Potts MD, Lyndsay Kinney MD  Subject: RE: opinion requested                            I tried to jhonny this patient and reached his wife and through an  wife stated that they thought things hadn't changed and they were under the impression that they would be seeing Dr Potts again in a few months.     Who should be calling this patient/family to let them know what is suggested to be done?    Thank you,    Kavitha Sam   [facility]-Op Coordinator  [phone]      ----- Message -----     From: Lyndsay Kinney MD     Sent: 6/22/2018  10:38 AM       To: Corina Potts MD,  Kavitha Sam  Subject: RE: opinion requested                            Sure. I can see him Monday July 2. I remember him. We had to shave his tumor off of his nerve. I even called in [facility] to determine if we should sacrifice the nerve or not. In retrospect, we should have.     Evonne  ----- Message -----     From: Corina Potts MD     Sent: 6/22/2018   7:07 AM       To: Lyndsay Kinney MD  Subject: RE: opinion requested                            That is probably acceptable timeline relative to getting him back to explain it again to him, and scheduling.   Do you want to see him to talk about it before you go out on leave?   He is extremely [place] and I am really not sure how well he understands any of this.        ----- Message -----     From: Lyndsay Kinney MD     Sent: 6/22/2018  12:45 AM       To: Corina Potts MD  Subject: RE: opinion requested                            Both right nodes are accessible with surgery. He is 78 yo and very functional If I recall correctly. I wont be able to operate on him until lat August. Is that too long to wait?    Evonne  ----- Message -----     From: Corina Potts MD     Sent: 6/21/2018   5:03 PM       To: Lyndsay Kinney MD  Subject: opinion requested                                What do you think of the right level 6/ and7 LNs - they are biopsy proven + progressive /recurrent disease.  We have high FDG pet in the vicinity on the right.    He has had several operations and ETOH treatment in the past.  The last operation didn't get cancer.  I am afraid we couldn't reach the level 7 mass with ETOH, that the only way to get it is surgery.  What do you think?

## 2018-06-26 NOTE — TELEPHONE ENCOUNTER
Spoke w/ Pt (through his wife)  via Cantonese speaking interpretor to confirm appt w/ Dr Kinney on Monday 07/02/2018 at 2:00pm -   Pt states she needs to change the time and date to do it before 12noon because she goes to work in the afternoon and needs to get her  back home before 4:00pm - prefers 10AM or 11Am any day this week or next week - sent to Kavitha Sam.     RE:    Mr.Hau MARY Cuevas  [address] [facility] [address]           June 23, 2018     Dear ,     We are writing to inform you of your test results.     The ultrasound showed more enlargement of the right neck thyroid cancer mass. It got bigger between April and June .  I recommend you see  Dr Kinney, the surgeon, for her opinion on this.  She can see you on July 2    BUCK CUEVAS MRN: [id number]    Date: 7/2/2018 Status: [facility]   Time: 2:00 PM        ENT GENERAL - 4th Floor   4H   Department Address: [address] [place] [address] 4th [facility] [address]   Department Phone: [phone]

## 2018-06-26 NOTE — TELEPHONE ENCOUNTER
----- Message from Kavitha Sam sent at 6/25/2018  8:18 AM CDT -----  Regarding: RE: opinion requested  Please see string of notes, per Dr Potts one of her triage nurses should be calling this patient and explaining about the appointment with Dr Kinney.     I tried on Friday and wife and patient did not understand so I in basketed the Dr's and Dr Potts said her office would call.    Thank you,    Kavitha    ----- Message -----     From: Daisy Abad RN     Sent: 6/25/2018   8:17 AM       To: Kavitha Sam  Subject: RE: opinion requested                            Dr Delroy Heredia is out of clinic until 07/02/2018. Please let us know if we can be of any assistance. Thank you. Daisy  ----- Message -----     From: Kavitha Sam     Sent: 6/25/2018   8:09 AM       To: Corina Potts MD, Lyndsay Kinney MD, #  Subject: RE: opinion requested                            I am giving this patient an appointment 7/2/18 at 2:00 pm    If Dr Potts or her nurse need to contact me, please call me at [phone]    Kavitha Sam   [facility]-Op Coordinator for Dr Kinney  [phone]    ----- Message -----     From: Corina Potts MD     Sent: 6/23/2018   6:42 AM       To: Lyndsay Kinney MD, Kavitha Sam, #  Subject: RE: opinion requested                            The language barrier is a huge problem with this patient.  We will see if we can get an  to call him and explain we want him to see you.  I am cc'ing our triage nurses to get them in the mix on this to help us.     Corina Potts  ----- Message -----     From: Kavitha Sam     Sent: 6/22/2018   1:20 PM       To: Corina Potts MD, Lyndsay Kinney MD  Subject: RE: opinion requested                            I tried to jhonny this patient and reached his wife and through an  wife stated that they thought things hadn't changed and they were under the impression that they would  be seeing Dr Potts again in a few months.     Who should be calling this patient/family to let them know what is suggested to be done?    Thank you,    Kavitha Sam   [facility]-Op Coordinator  [phone]      ----- Message -----     From: Lyndsay Kinney MD     Sent: 6/22/2018  10:38 AM       To: Corina Potts MD, Kavitha Sam  Subject: RE: opinion requested                            Sure. I can see him Monday July 2. I remember him. We had to shave his tumor off of his nerve. I even called in [facility] to determine if we should sacrifice the nerve or not. In retrospect, we should have.     Evonne  ----- Message -----     From: Corina Potts MD     Sent: 6/22/2018   7:07 AM       To: Lyndsay Kinney MD  Subject: RE: opinion requested                            That is probably acceptable timeline relative to getting him back to explain it again to him, and scheduling.   Do you want to see him to talk about it before you go out on leave?   He is extremely [place] and I am really not sure how well he understands any of this.        ----- Message -----     From: Lyndsay Kinney MD     Sent: 6/22/2018  12:45 AM       To: Corina Potts MD  Subject: RE: opinion requested                            Both right nodes are accessible with surgery. He is 76 yo and very functional If I recall correctly. I wont be able to operate on him until lat August. Is that too long to wait?    Evonne  ----- Message -----     From: Corina Potts MD     Sent: 6/21/2018   5:03 PM       To: Lyndsay Kinney MD  Subject: opinion requested                                What do you think of the right level 6/ and7 LNs - they are biopsy proven + progressive /recurrent disease.  We have high FDG pet in the vicinity on the right.    He has had several operations and ETOH treatment in the past.  The last operation didn't get cancer.  I am afraid we couldn't reach the level 7 mass with ETOH, that the  only way to get it is surgery.  What do you think?

## 2018-06-27 ENCOUNTER — DOCUMENTATION ONLY (OUTPATIENT)
Dept: OTOLARYNGOLOGY | Facility: CLINIC | Age: 78
End: 2018-06-27

## 2018-06-27 ENCOUNTER — OFFICE VISIT (OUTPATIENT)
Dept: OPHTHALMOLOGY | Facility: CLINIC | Age: 78
End: 2018-06-27
Attending: OPHTHALMOLOGY
Payer: MEDICARE

## 2018-06-27 DIAGNOSIS — H25.11 NUCLEAR SENILE CATARACT OF RIGHT EYE: Primary | ICD-10-CM

## 2018-06-27 DIAGNOSIS — H35.3220 EXUDATIVE AGE-RELATED MACULAR DEGENERATION, LEFT EYE, STAGE UNSPECIFIED (H): ICD-10-CM

## 2018-06-27 PROCEDURE — G0463 HOSPITAL OUTPT CLINIC VISIT: HCPCS | Mod: ZF

## 2018-06-27 PROCEDURE — 92134 CPTRZ OPH DX IMG PST SGM RTA: CPT | Mod: ZF | Performed by: OPHTHALMOLOGY

## 2018-06-27 PROCEDURE — 67028 INJECTION EYE DRUG: CPT | Mod: ZF | Performed by: OPHTHALMOLOGY

## 2018-06-27 PROCEDURE — 25000128 H RX IP 250 OP 636: Mod: ZF | Performed by: OPHTHALMOLOGY

## 2018-06-27 RX ADMIN — RANIBIZUMAB 0.5 MG: 10 INJECTION, SOLUTION INTRAVITREAL at 16:16

## 2018-06-27 ASSESSMENT — EXTERNAL EXAM - LEFT EYE: OS_EXAM: NORMAL

## 2018-06-27 ASSESSMENT — REFRACTION_WEARINGRX
OS_SPHERE: +1.00
OD_SPHERE: +3.75
OD_AXIS: 115
OS_ADD: +3.00
OS_CYLINDER: +0.50
OD_ADD: +3.00
OD_CYLINDER: +0.75
OS_AXIS: 005
SPECS_TYPE: BIFOCAL

## 2018-06-27 ASSESSMENT — CUP TO DISC RATIO
OS_RATIO: 0.65
OD_RATIO: 0.5

## 2018-06-27 ASSESSMENT — EXTERNAL EXAM - RIGHT EYE: OD_EXAM: NORMAL

## 2018-06-27 ASSESSMENT — TONOMETRY
OS_IOP_MMHG: 15
OD_IOP_MMHG: 18
IOP_METHOD: TONOPEN

## 2018-06-27 ASSESSMENT — VISUAL ACUITY
METHOD: SNELLEN - LINEAR
OD_CC: 20/30
CORRECTION_TYPE: GLASSES
OD_SC: 20/100
OS_PH_SC: 20/500

## 2018-06-27 ASSESSMENT — SLIT LAMP EXAM - LIDS
COMMENTS: NORMAL
COMMENTS: NORMAL

## 2018-06-27 NOTE — PROGRESS NOTES
Cc: follow up status post CE/PCIOL left eye 5/7/18   HPI: VA stable no flashes and floaters     Lens centered   IOP WNL  Doing well    OCT Mac 06/27/18   Right eye: few small drusen   Left eye: subfoveal CNVM without subretinal fluid / (+) slightly increased intraretinal fluid   Plan for lucentis inj left eye today    Assessment and plan      1. 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    2.  Dry Age related macular degeneration right eye  AREDS supplementation is recommended.  Weekly Amsler Grid use was discussed and training performed.  A diet of leafy green vegetables was encouraged.  Return precautions were given.    3. PVD OU   - RT/RD precautions    4. Cataract Right eye  Request surgery right eye  Will schedule surgery     5. Pseudophakia left eye   Medications: no eyedrops  Glasses Prescription        Sphere Cylinder Axis Add   Right +2.75 +1.00 110 +3.00   Left -2.25 +1.00 005 +3.00          Follow up in 2 months with OCT Mac ,  Possible lucentis next visit left eye  ~~~~~~~~~~~~~~~~~~~~~~~~~~~~~~~~~~   Complete documentation of historical and exam elements from today's encounter can be found in the full encounter summary report (not reduplicated in this progress note).  I personally obtained the chief complaint(s) and history of present illness.  I confirmed and edited as necessary the review of systems, past medical/surgical history, family history, social history, and examination findings as documented by others; and I examined the patient myself.  I personally reviewed the relevant tests, images, and reports as documented above.  I personally reviewed the ophthalmic test(s) associated with this  encounter, agree with the interpretation(s) as documented by the resident/fellow, and have edited the corresponding report(s) as necessary.   I formulated and edited as necessary the assessment and plan and discussed the findings and management plan with the patient and family and I was present for the entire procedure performed by the resident/fellow.    Nadiya Allen MD  .  Retina Service   Department of Ophthalmology and Visual Neurosciences   [facility]  Phone: [phone]   Fax: [phone]

## 2018-06-27 NOTE — NURSING NOTE
Chief Complaints and History of Present Illnesses   Patient presents with     Follow Up For     6 week follow up s/p  CE/PCIOL left eye 5/7/18      HPI    Affected eye(s):  Both   Symptoms:     No floaters   No flashes   No redness   No tearing   No Dryness   No itching         Do you have eye pain now?:  No      Comments:  Pt states vision is about the same as last visit. No eye pain today.    Yenifer PATEL June 27, 2018 2:58 PM

## 2018-06-27 NOTE — MR AVS SNAPSHOT
After Visit Summary   6/27/2018    Travis Peres    MRN: [id number]           Patient Information     Date Of Birth          1940        Visit Information        Provider Department      6/27/2018 2:30 PM Nadiya Allen MD; LANGUAGE [facility]        Today's Diagnoses     Nuclear senile cataract of right eye    -  1    Exudative age-related macular degeneration, left eye, stage unspecified (H)           Follow-ups after your visit        Follow-up notes from your care team     Return in about 6 weeks (around 8/8/2018).      Your next 10 appointments already scheduled     Jul 05, 2018 11:15 AM CDT   (Arrive by 11:00 AM)   New Patient Visit with Lyndsay Kinney MD   [facility] Ear Nose and Throat ([facility])    [address] [place] [address] [phone]            Aug 22, 2018 10:00 AM CDT   Post-Op with Nadiya Allen MD   Eye Clinic ([facility])    [address] [address] [phone]            Aug 29, 2018 10:00 AM CDT   Post-Op with Nadiya Allen MD   Eye Clinic ([facility])    [address] [address] [phone]            Aug 29, 2018  1:30 PM CDT   (Arrive by 1:15 PM)   RETURN ENDOCRINE with Corina Potts MD   [facility] Endocrinology ([facility])    [address] [place] [address] [phone]              Future tests that were ordered for you today     Open Future Orders        Priority Expected Expires Ordered    OCT Retina Spectralis OU (both eyes) Routine  6/27/2019 6/27/2018            Who to contact     Please call your clinic at [phone] to:    Ask questions about your health    Make or cancel appointments    Discuss your medicines    Learn about your test results    Speak to your  doctor            Additional Information About Your Visit        Tudouhart Information     Agenus is an electronic gateway that provides easy, online access to your medical records. With Agenus, you can request a clinic appointment, read your test results, renew a prescription or communicate with your care team.     To sign up for Agenus visit the website at [email or web address]   You will be asked to enter the access code listed below, as well as some personal information. Please follow the directions to create your username and password.     Your access code is: [email or web address]  Expires: 2018  6:30 AM     Your access code will  in 90 days. If you need help or a new code, please contact your [facility] or call [phone] for assistance.        Care EveryWhere ID     This is your Care EveryWhere ID. This could be used by other organizations to access your [place] medical records  [id number]         Blood Pressure from Last 3 Encounters:   18 130/80   18 146/80   18 (!) 158/97    Weight from Last 3 Encounters:   18 60.1 kg (132 lb 8 oz)   18 60.3 kg (133 lb)   18 58.7 kg (129 lb 8 oz)              We Performed the Following     Lucentis (Ranibizumab) 0.5MG Intravitreal Injection OS (left eye)     OCT Retina Spectralis OU (both eyes)     Emmanuelle-Operative Worksheet (Retina)        Primary Care Provider Office Phone # Fax #    Karol Alvarez [phone]       [address] [address]        Equal Access to Services     LAYLA LUCAS AH: Hadii jake Rodriguez, waramon singer, qaybbert antonioalroxanne garcia. So [facility] [phone].    ATENCIÓN: Si habla español, tiene a reese disposición servicios gratuitos de asistencia lingüística. Tiffanie al [phone].    We comply with applicable federal civil rights laws and [place] laws. We do not  discriminate on the basis of race, color, national origin, age, disability, sex, sexual orientation, or gender identity.            Thank you!     Thank you for choosing EYE CLINIC  for your care. Our goal is always to provide you with excellent care. Hearing back from our patients is one way we can continue to improve our services. Please take a few minutes to complete the written survey that you may receive in the mail after your visit with us. Thank you!             Your Updated Medication List - Protect others around you: Learn how to safely use, store and throw away your medicines at www.disposemymeds.org.          This list is accurate as of 6/27/18  4:51 PM.  Always use your most recent med list.                   Brand Name Dispense Instructions for use Diagnosis    alendronate 70 MG tablet    FOSAMAX    16 tablet    Take 1 tablet (70 mg) by mouth every 7 days    Postsurgical hypothyroidism, Papillary thyroid carcinoma (H), Pituitary adenoma (H), Hypopituitarism (H)       amLODIPine 2.5 MG tablet    NORVASC     TK 1 T PO QD        amoxicillin-clavulanate 875-125 MG per tablet    AUGMENTIN    20 tablet    Take 1 tablet by mouth 2 times daily for 10 days    Pneumonia due to infectious organism, unspecified laterality, unspecified part of lung       Carboxymethylcellulose Sod PF 0.5 % Soln ophthalmic solution    REFRESH PLUS     Place 1 drop into both eyes 3 times daily as needed for dry eyes        COMBIVENT RESPIMAT  MCG/ACT inhaler   Generic drug:  Ipratropium-Albuterol      INL 1 PUFF PO QID        fish oil-omega-3 fatty acids 1000 MG capsule      Take 2 g by mouth daily        fluticasone 50 MCG/ACT spray    FLONASE     Spray 2 sprays into both nostrils daily        hydrocortisone 10 MG tablet    CORTEF    180 tablet    Take one tablet by mouth daily in the morning, 1/2 tablet at 2 PM    Postsurgical hypothyroidism, Papillary thyroid carcinoma (H), Abnormal finding on imaging       ibuprofen 400 MG  tablet    ADVIL/MOTRIN     Take 400-600 mg by mouth every 6 hours as needed for moderate pain        levothyroxine 137 MCG tablet    SYNTHROID/LEVOTHROID    90 tablet    Take 1 tablet (137 mcg) by mouth daily    Postsurgical hypothyroidism, Papillary thyroid carcinoma (H), Abnormal finding on imaging       MIRTAZAPINE PO      Take 15 mg by mouth At Bedtime        MUCUS RELIEF ADULT PO      Take 400 mg by mouth 2 times daily as needed    Papillary thyroid carcinoma (H), Postsurgical hypothyroidism, Malignant neoplasm of thyroid gland (H), Cancer of thyroid (H), 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

## 2018-06-27 NOTE — PROGRESS NOTES
Spoke with wife of patient through  services.  Gave information for appointment with Dr Kinney 7/5/18 at 11:15 Clinic 4H.    [facility]   [address] [place] [address] 4H   [facility]  [id number]      Kavitha Sam   ENT Emmanuelle-Op Coordinator  [phone]

## 2018-06-28 LAB — LAB SCANNED RESULT: NORMAL

## 2018-07-05 ENCOUNTER — OFFICE VISIT (OUTPATIENT)
Dept: OTOLARYNGOLOGY | Facility: CLINIC | Age: 78
End: 2018-07-05
Payer: COMMERCIAL

## 2018-07-05 VITALS — HEIGHT: 65 IN | BODY MASS INDEX: 21.83 KG/M2 | WEIGHT: 131 LBS

## 2018-07-05 DIAGNOSIS — R10.11 ABDOMINAL WALL PAIN IN RIGHT UPPER QUADRANT: Primary | ICD-10-CM

## 2018-07-05 RX ORDER — PREDNISOLONE ACETATE 10 MG/ML
SUSPENSION/ DROPS OPHTHALMIC
Refills: 0 | COMMUNITY
Start: 2018-05-17 | End: 2018-09-29

## 2018-07-05 RX ORDER — TRIAMCINOLONE ACETONIDE 0.1 %
PASTE (GRAM) DENTAL
Refills: 0 | COMMUNITY
Start: 2018-04-25 | End: 2019-10-15

## 2018-07-05 RX ORDER — LOPERAMIDE HCL 2 MG
CAPSULE ORAL
Refills: 1 | Status: ON HOLD | COMMUNITY
Start: 2018-04-25 | End: 2020-10-29

## 2018-07-05 NOTE — LETTER
7/5/2018       RE: Travis Peres  [address]  [facility] [place] [facility] [place] [address]     Dear Colleague,    Thank you for referring your patient, Travis Peres, to the [facility] EAR NOSE AND THROAT at [facility]. Please see a copy of my visit note below.    U    Service Date: 07/05/2018      Note that this visit was carried out with an  present as well as the patient's wife.  The patient is notably hard of hearing; however, the  did a very good job of speaking loudly into the patient's ear.  He answered appropriately and felt that all of his questions were addressed during this visit.      HISTORY OF PRESENT ILLNESS:  I was asked to reevaluate this patient because of persistent papillary thyroid carcinoma.  He has a right vocal cord paralysis and has had an increasing thyroglobulin and a known mass in the lower right paratracheal region and this is consistent with papillary thyroid carcinoma.  I am seeing the patient for possible surgical options.      PHYSICAL EXAMINATION:  He has well-healed 2 incisional scars, 1 for the right lateral neck dissection and the second one from the thyroidectomy.  I do not feel any nodularity; however, there is a fullness in the right paratracheal region, particularly noted with swallowing.      PLAN:   I discussed with the patient and his wife that I think it is reasonable to proceed with a completion or reoperative central neck dissection.  We will indeed likely sacrifice the right recurrent laryngeal nerve.  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  I would recommend referral to my operative report.  Dr. Melo was contacted and we did leave tumor on the nerve extending down into the level 6 all the way up just prior to where it penetrated the cricothyroid membrane.  Again, we would sacrifice this nerve and I would contact Dr. Nolan or   "Misono for possible thyroplasty intraoperatively.      The patient felt that he would prefer not to proceed with surgery.  He is not having any symptoms with this.  He would not prefer a thyroplasty.  I discussed with him that we can continue monitoring this, but there are risks of it getting larger and invading the trachea, etc.  Again, both he and his wife felt that they understood and would not want to proceed with any further surgeries at this time.  They are going to \"wait and think about it.\"      As I was discussing with the patient, he noted some pain in the right inferior costal region over a rib.  He states that his neck does not bother him, but that this nodule bothers him.  I examined it and it appears to be a lipoma right over the rib inferiorly along the anterior axillary line.  Therefore, I am placing an order for him to have an ultrasound and referral for General Surgery for removal of this lipoma since it is so symptomatic.      I reconfirmed that the patient and his wife are aware of his diagnosis of papillary thyroid carcinoma.  There is still papillary thyroid carcinoma present.  He could benefit from the surgery, but there are complications with that.  He reassured me that he does not want to proceed at this time with any surgery.         D: 2018   T: 2018   MT: dee      Name:     BUCK CUEVAS   MRN:      [id number]        Account:      [id number]   :      1940           Service Date: 2018      Document: [id number]       Again, thank you for allowing me to participate in the care of your patient.      Sincerely,    Lyndsay Kinney MD      "

## 2018-07-05 NOTE — NURSING NOTE
Chief Complaint   Patient presents with     Consult     Referred by PCP, concerning for cancer in lymph nodes     Roshan Thomas, EMT

## 2018-07-05 NOTE — MR AVS SNAPSHOT
After Visit Summary   7/5/2018    Travis Peres    MRN: [id number]           Patient Information     Date Of Birth          1940        Visit Information        Provider Department      7/5/2018 11:00 AM Asad Nguyen Maria Rae, MD [facility] Ear Nose and Throat        Today's Diagnoses     Abdominal wall pain in right upper quadrant    -  1      Care Instructions    1.  Ultrasound was ordered at today's visit.   -Patient to schedule at his convenience.  2.  Patient to call our clinic with additional questions or concerns: [phone], option #3.              Follow-ups after your visit        Additional Services     GENERAL SURG ADULT REFERRAL       Your provider has referred you to: [facility]: General Surgery Clinic [facility] [phone]   [email or web address]    Please be aware that coverage of these services is subject to the terms and limitations of your health insurance plan.  Call member services at your health plan with any benefit or coverage questions.      Please bring the following with you to your appointment:    (1) Any X-Rays, CTs or MRIs which have been performed.  Contact the facility where they were done to arrange for  prior to your scheduled appointment.   (2) List of current medications   (3) This referral request   (4) Any documents/labs given to you for this referral                  Your next 10 appointments already scheduled     Jul 06, 2018 10:45 AM CDT   US ABDOMEN LIMITED with UCUS1   [facility] Imaging Center US ([facility])    [address] [address] [phone]           Please bring a list of your medicines (including vitamins, minerals and over-the-counter drugs). Also, tell your doctor about any allergies you may have. Wear comfortable clothes and leave your valuables at home.  Adults: No eating or drinking for 8 hours before the exam. You may take  medicine with a small sip of water.  Children: - Children 6+ years: No food or drink for 6 hours before exam. - Children 1-5 years: No food or drink for 4 hours before exam. - Infants, breast-fed: may have breast milk up to 2 hours before exam. - Infants, formula: may have bottle until 4 hours before exam.  Please call the Imaging Department at your exam site with any questions.            Jul 12, 2018  9:00 AM CDT   (Arrive by 8:45 AM)   New Patient Visit with Nehemias Goznales MD   [facility] General Surgery ([facility])    [address]  [facility] [id number] [phone]            Aug 21, 2018   Procedure with Nadiya Allen MD   [facility] PeriOP Services (--)    [address]  [facility] [address]   [phone]            Aug 22, 2018 10:00 AM CDT   Post-Op with Nadiya Allen MD   Eye Clinic ([facility])    [address] [address]   [phone]            Aug 29, 2018 10:00 AM CDT   Post-Op with Nadiya Allen MD   Eye Clinic ([facility])    [address] [address]   [phone]            Aug 29, 2018  1:30 PM CDT   (Arrive by 1:15 PM)   RETURN ENDOCRINE with Corina Potts MD   [facility] Endocrinology ([facility])    [address]  [address] [facility] [address] [phone]              Future tests that were ordered for you today     Open Future Orders        Priority Expected Expires Ordered    US Abdomen Limited Routine  7/5/2019 7/5/2018            Who to contact     Please call your clinic at [phone] to:    Ask questions about your health    Make or cancel appointments    Discuss your medicines    Learn about your test results    Speak to your doctor            Additional Information About Your Visit       "  MyChart Information     [employer] is an electronic gateway that provides easy, online access to your medical records. With People[employer], you can request a clinic appointment, read your test results, renew a prescription or communicate with your care team.     To sign up for [employer] visit the website at [email or web address]   You will be asked to enter the access code listed below, as well as some personal information. Please follow the directions to create your username and password.     Your access code is: [email or web address]  Expires: 2018  6:30 AM     Your access code will  in 90 days. If you need help or a new code, please contact your [facility] or call [phone] for assistance.        Care EveryWhere ID     This is your Care EveryWhere ID. This could be used by other organizations to access your [place] medical records  [id number]        Your Vitals Were     Height BMI (Body Mass Index)                1.651 m (5' 5\") 21.8 kg/m2           Blood Pressure from Last 3 Encounters:   18 130/80   18 146/80   18 (!) 158/97    Weight from Last 3 Encounters:   18 59.4 kg (131 lb)   18 60.1 kg (132 lb 8 oz)   18 60.3 kg (133 lb)              We Performed the Following     GENERAL SURG ADULT REFERRAL        Primary Care Provider Office Phone # Fax #    Karol SchillingAmrit [phone]       [address] [address]        Equal Access to Services     [facility] CenterCTAHLEEN : Hadii jake mondragon hadasho Sokristal, waaxda luqadaha, qaybta kaalmada kenneth, roxanne smith . So [facility] [phone].    ATENCIÓN: Si habla español, tiene a reese disposición servicios gratuitos de asistencia lingüística. Karunaame al [phone].    We comply with applicable federal civil rights laws and [place] laws. We do not discriminate on the basis of race, color, national origin, age, disability, sex, sexual " orientation, or gender identity.            Thank you!     Thank you for choosing [facility] EAR NOSE AND THROAT  for your care. Our goal is always to provide you with excellent care. Hearing back from our patients is one way we can continue to improve our services. Please take a few minutes to complete the written survey that you may receive in the mail after your visit with us. Thank you!             Your Updated Medication List - Protect others around you: Learn how to safely use, store and throw away your medicines at www.disposemymeds.org.          This list is accurate as of 7/5/18 12:08 PM.  Always use your most recent med list.                   Brand Name Dispense Instructions for use Diagnosis    alendronate 70 MG tablet    FOSAMAX    16 tablet    Take 1 tablet (70 mg) by mouth every 7 days    Postsurgical hypothyroidism, Papillary thyroid carcinoma (H), Pituitary adenoma (H), Hypopituitarism (H)       amLODIPine 2.5 MG tablet    NORVASC     TK 1 T PO QD        Carboxymethylcellulose Sod PF 0.5 % Soln ophthalmic solution    REFRESH PLUS     Place 1 drop into both eyes 3 times daily as needed for dry eyes        COMBIVENT RESPIMAT  MCG/ACT inhaler   Generic drug:  Ipratropium-Albuterol      INL 1 PUFF PO QID        fish oil-omega-3 fatty acids 1000 MG capsule      Take 2 g by mouth daily        fluticasone 50 MCG/ACT spray    FLONASE     Spray 2 sprays into both nostrils daily        hydrocortisone 10 MG tablet    CORTEF    180 tablet    Take one tablet by mouth daily in the morning, 1/2 tablet at 2 PM    Postsurgical hypothyroidism, Papillary thyroid carcinoma (H), Abnormal finding on imaging       ibuprofen 400 MG tablet    ADVIL/MOTRIN     Take 400-600 mg by mouth every 6 hours as needed for moderate pain        levothyroxine 137 MCG tablet    SYNTHROID/LEVOTHROID    90 tablet    Take 1 tablet (137 mcg) by mouth daily    Postsurgical hypothyroidism, Papillary thyroid carcinoma (H), Abnormal finding  on imaging       loperamide 2 MG capsule    IMODIUM          MIRTAZAPINE PO      Take 15 mg by mouth At Bedtime        MUCUS RELIEF ADULT PO      Take 400 mg by mouth 2 times daily as needed    Papillary thyroid carcinoma (H), Postsurgical hypothyroidism, Malignant neoplasm of thyroid gland (H), Cancer of thyroid (H), 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), Osteopenia, Hypopituitarism (H), Pituitary adenoma (H), Noncompliance with medication regimen       prednisoLONE acetate 1 % ophthalmic susp    PRED FORTE     INT 1 GTT INTO OPERATIVE EYE TID        ranitidine 300 MG tablet    ZANTAC          RIBOFLAVIN PO      Take 100 mg by mouth        TAMSULOSIN HCL PO      Take 0.4 mg by mouth At Bedtime        traZODone 100 MG tablet    DESYREL     take 1 tab of 50 mg daily        triamcinolone 0.1 % paste    KENALOG     JOHNATHAN SML AMT AA IN MOUTH BID        VITAMIN B COMPLEX PO      Take 1 capsule by mouth        vitamin B complex with vitamin C Tabs tablet      Take 1 tablet by mouth daily        VOLTAREN 1 % Gel topical gel   Generic drug:  diclofenac      Place onto the skin 4 times daily        zolpidem 10 MG tablet    AMBIEN     Take 10 mg by mouth

## 2018-07-05 NOTE — PATIENT INSTRUCTIONS
1.  Ultrasound was ordered at today's visit.   -Patient to schedule at his convenience.  2.  Patient to call our clinic with additional questions or concerns: [phone], option #3.

## 2018-07-06 ENCOUNTER — RADIANT APPOINTMENT (OUTPATIENT)
Dept: ULTRASOUND IMAGING | Facility: CLINIC | Age: 78
End: 2018-07-06
Attending: SURGERY
Payer: COMMERCIAL

## 2018-07-06 DIAGNOSIS — R10.11 ABDOMINAL WALL PAIN IN RIGHT UPPER QUADRANT: ICD-10-CM

## 2018-07-08 NOTE — PROGRESS NOTES
Service Date: 07/05/2018      Note that this visit was carried out with an  present as well as the patient's wife.  The patient is notably hard of hearing; however, the  did a very good job of speaking loudly into the patient's ear.  He answered appropriately and felt that all of his questions were addressed during this visit.      HISTORY OF PRESENT ILLNESS:  I was asked to reevaluate this patient because of persistent papillary thyroid carcinoma.  He has a right vocal cord paralysis and has had an increasing thyroglobulin and a known mass in the lower right paratracheal region and this is consistent with papillary thyroid carcinoma.  I am seeing the patient for possible surgical options.      PHYSICAL EXAMINATION:  He has well-healed 2 incisional scars, 1 for the right lateral neck dissection and the second one from the thyroidectomy.  I do not feel any nodularity; however, there is a fullness in the right paratracheal region, particularly noted with swallowing.      PLAN:   I discussed with the patient and his wife that I think it is reasonable to proceed with a completion or reoperative central neck dissection.  We will indeed likely sacrifice the right recurrent laryngeal nerve.  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  I would recommend referral to my operative report.  Dr. Melo was contacted and we did leave tumor on the nerve extending down into the level 6 all the way up just prior to where it penetrated the cricothyroid membrane.  Again, we would sacrifice this nerve and I would contact Dr. Nolan or Dr. Rg for possible thyroplasty intraoperatively.      The patient felt that he would prefer not to proceed with surgery.  He is not having any symptoms with this.  He would not prefer a thyroplasty.  I discussed with him that we can continue monitoring this, but there are risks of  "it getting larger and invading the trachea, etc.  Again, both he and his wife felt that they understood and would not want to proceed with any further surgeries at this time.  They are going to \"wait and think about it.\"      As I was discussing with the patient, he noted some pain in the right inferior costal region over a rib.  He states that his neck does not bother him, but that this nodule bothers him.  I examined it and it appears to be a lipoma right over the rib inferiorly along the anterior axillary line.  Therefore, I am placing an order for him to have an ultrasound and referral for General Surgery for removal of this lipoma since it is so symptomatic.      I reconfirmed that the patient and his wife are aware of his diagnosis of papillary thyroid carcinoma.  There is still papillary thyroid carcinoma present.  He could benefit from the surgery, but there are complications with that.  He reassured me that he does not want to proceed at this time with any surgery.         RD ARROYO MD             D: 2018   T: 2018   MT: dee      Name:     BUCK CUEVAS   MRN:      [id number]        Account:      [id number]   :      1940           Service Date: 2018      Document: [id number]    "

## 2018-07-12 ENCOUNTER — OFFICE VISIT (OUTPATIENT)
Dept: SURGERY | Facility: CLINIC | Age: 78
End: 2018-07-12
Attending: SURGERY
Payer: COMMERCIAL

## 2018-07-12 VITALS
TEMPERATURE: 98.2 F | BODY MASS INDEX: 21.52 KG/M2 | WEIGHT: 129.2 LBS | HEIGHT: 65 IN | SYSTOLIC BLOOD PRESSURE: 152 MMHG | DIASTOLIC BLOOD PRESSURE: 81 MMHG | OXYGEN SATURATION: 98 % | HEART RATE: 59 BPM

## 2018-07-12 DIAGNOSIS — D17.1 LIPOMA OF CHEST WALL: Primary | ICD-10-CM

## 2018-07-12 NOTE — PROGRESS NOTES
General Surgery Consultation Note    I was asked by Karol Alvarez to see this patient for the following problem:    CC: right-sided abdominal pain associated with lipoma    HPI:  Location: right lower chest wall.  Severity: mild  Timing: off and on pain; none currently.  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     Post-surgical hypothyroidism 2007     Uncomplicated asthma      Vocal cord paralysis     right     Xerostomia     due to radiation exposures     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), wet (H)     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     Language barrier affecting health care     Pulmonary nodules     Partial hypopituitarism (H)     Low bone density       Surgical History:  Past Surgical History:   Procedure Laterality Date     cymetra injection        ESOPHAGOSCOPY, GASTROSCOPY, DUODENOSCOPY (EGD), COMBINED  6/20/2013    Procedure: COMBINED ESOPHAGOSCOPY, GASTROSCOPY, DUODENOSCOPY (EGD), BIOPSY SINGLE OR MULTIPLE;  gastroscopy;  Surgeon: Leslie Guadarrama MD;  Location:  GI     HERNIA REPAIR Right      lipoma resection chest wall Right 11/09     PHACOEMULSIFICATION CLEAR CORNEA WITH STANDARD INTRAOCULAR LENS IMPLANT Left 5/7/2018    Procedure: PHACOEMULSIFICATION CLEAR CORNEA WITH STANDARD INTRAOCULAR LENS IMPLANT;  LEFT PHACOEMULSIFICATION CLEAR CORNEA WITH STANDARD INTRAOCULAR LENS IMPLANT ;  Surgeon: Nadiya Allen MD;  Location:  [facility]     right selective neck dissection level 2, 3, 4  11/3/09     right  shunt placement  6/28/07     THORACIC SURGERY  Fidencio 3 2017     THYMECTOMY N/A 1/3/2017    Procedure: THYMECTOMY;  Surgeon: Ernesto Armstrong MD;  Location: [facility] OR     THYROIDECTOMY  11/27/07     TRANSCERVICAL EXTENDED MEDIASTINAL LYMPHADENECTOMY N/A 1/3/2017    Procedure: TRANSCERVICAL EXTENDED MEDIASTINAL LYMPHADENECTOMY;  Surgeon: Ernesto Armstrong MD;  Location: [facility] OR     transnasal endoscopic resection of pituitary adenoma  8/13/2007       Medications:  Prior to Admission medications    Medication Sig Start Date End Date Taking? Authorizing Provider   alendronate (FOSAMAX) 70 MG tablet Take 1 tablet (70 mg) by mouth every 7 days 9/18/17  Yes Corina Potts MD   amLODIPine (NORVASC) 2.5 MG tablet TK 1 T PO QD 10/28/17  Yes Reported, Patient   B Complex Vitamins (VITAMIN B COMPLEX PO) Take 1 capsule by mouth 8/23/17  Yes Reported, Patient   Carboxymethylcellulose Sod PF (REFRESH PLUS) 0.5 % SOLN ophthalmic solution Place 1 drop into both eyes 3 times daily as needed for dry eyes   Yes Reported, Patient   COMBIVENT RESPIMAT  MCG/ACT inhaler INL 1 PUFF PO QID 11/14/16  Yes Reported, Patient   diclofenac (VOLTAREN) 1 % GEL topical gel Place onto the skin 4 times daily   Yes Reported, Patient   fish oil-omega-3 fatty  acids 1000 MG capsule Take 2 g by mouth daily   Yes Reported, Patient   fluticasone (FLONASE) 50 MCG/ACT spray Spray 2 sprays into both nostrils daily   Yes Unknown, Entered By History   GuaiFENesin (MUCUS RELIEF ADULT PO) Take 400 mg by mouth 2 times daily as needed    Yes Reported, Patient   hydrocortisone (CORTEF) 10 MG tablet Take one tablet by mouth daily in the morning, 1/2 tablet at 2 PM 4/16/18  Yes Corina Potts MD   ibuprofen (ADVIL/MOTRIN) 400 MG tablet Take 400-600 mg by mouth every 6 hours as needed for moderate pain    Yes Reported, Patient   levothyroxine (SYNTHROID/LEVOTHROID) 137 MCG tablet Take 1 tablet (137 mcg) by mouth daily 4/16/18  Yes Corina Potts MD   loperamide (IMODIUM) 2 MG capsule  4/25/18  Yes Reported, Patient   MIRTAZAPINE PO Take 15 mg by mouth At Bedtime   Yes Reported, Patient   prednisoLONE acetate (PRED FORTE) 1 % ophthalmic susp INT 1 GTT INTO OPERATIVE EYE TID 5/17/18  Yes Reported, Patient   ranitidine (ZANTAC) 300 MG tablet  12/3/16  Yes Reported, Patient   RIBOFLAVIN PO Take 100 mg by mouth   Yes Reported, Patient   TAMSULOSIN HCL PO Take 0.4 mg by mouth At Bedtime    Yes Reported, Patient   traZODone (DESYREL) 100 MG tablet take 1 tab of 50 mg daily 11/1/17  Yes Reported, Patient   triamcinolone (KENALOG) 0.1 % paste JOHNATHAN SML AMT AA IN MOUTH BID 4/25/18  Yes Reported, Patient   vitamin B complex with vitamin C (VITAMIN  B COMPLEX) TABS tablet Take 1 tablet by mouth daily   Yes Reported, Patient   zolpidem (AMBIEN) 10 MG tablet Take 10 mg by mouth 9/26/17  Yes Reported, Patient     Current Outpatient Prescriptions   Medication Sig Dispense Refill     alendronate (FOSAMAX) 70 MG tablet Take 1 tablet (70 mg) by mouth every 7 days 16 tablet 3     amLODIPine (NORVASC) 2.5 MG tablet TK 1 T PO QD  4     B Complex Vitamins (VITAMIN B COMPLEX PO) Take 1 capsule by mouth       Carboxymethylcellulose Sod PF (REFRESH PLUS) 0.5 % SOLN ophthalmic solution Place 1 drop into  both eyes 3 times daily as needed for dry eyes       COMBIVENT RESPIMAT  MCG/ACT inhaler INL 1 PUFF PO QID  1     diclofenac (VOLTAREN) 1 % GEL topical gel Place onto the skin 4 times daily       fish oil-omega-3 fatty acids 1000 MG capsule Take 2 g by mouth daily       fluticasone (FLONASE) 50 MCG/ACT spray Spray 2 sprays into both nostrils daily       GuaiFENesin (MUCUS RELIEF ADULT PO) Take 400 mg by mouth 2 times daily as needed        hydrocortisone (CORTEF) 10 MG tablet Take one tablet by mouth daily in the morning, 1/2 tablet at 2  tablet 3     ibuprofen (ADVIL/MOTRIN) 400 MG tablet Take 400-600 mg by mouth every 6 hours as needed for moderate pain        levothyroxine (SYNTHROID/LEVOTHROID) 137 MCG tablet Take 1 tablet (137 mcg) by mouth daily 90 tablet 3     loperamide (IMODIUM) 2 MG capsule   1     MIRTAZAPINE PO Take 15 mg by mouth At Bedtime       prednisoLONE acetate (PRED FORTE) 1 % ophthalmic susp INT 1 GTT INTO OPERATIVE EYE TID  0     ranitidine (ZANTAC) 300 MG tablet        RIBOFLAVIN PO Take 100 mg by mouth       TAMSULOSIN HCL PO Take 0.4 mg by mouth At Bedtime        traZODone (DESYREL) 100 MG tablet take 1 tab of 50 mg daily  0     triamcinolone (KENALOG) 0.1 % paste JOHNATHAN SML AMT AA IN MOUTH BID  0     vitamin B complex with vitamin C (VITAMIN  B COMPLEX) TABS tablet Take 1 tablet by mouth daily       zolpidem (AMBIEN) 10 MG tablet Take 10 mg by mouth         Allergies:  Allergies   Allergen Reactions     Metoprolol Shortness Of Breath, Other (See Comments) and Unknown     Not true allergy.  Bradycardia, fatigue, COPD worsening.         Fenofibrate Hives     Fenofibric Acid      Lisinopril Cough     Losartan Cough     Niacin      Simvastatin Cramps and Other (See Comments)       Social History:  Social History     Social History     Marital status:      Spouse name: N/A     Number of children: N/A     Years of education: N/A     Social History Main Topics     Smoking status:  "Former Smoker     Packs/day: 0.50     Years: 5.00     Types: Cigarettes     Start date: 1976     Quit date: 2001     Smokeless tobacco: Never Used     Alcohol use No     Drug use: No     Sexual activity: Not Currently     Partners: Female     Birth control/ protection: Other     Other Topics Concern     None     Social History Narrative    Mom  at age 93 from a fall    Dad  at age 98 from old age     40 plus years    Two adult children in good health.    Occupation: retired from running a restaurant    Originally from [place]           Family History:  Family History   Problem Relation Age of Onset     Cancer No family hx of      no skin cancer     Glaucoma No family hx of      Macular Degeneration No family hx of            Physical Examination:  Vital Signs: /81  Pulse 59  Temp 98.2  F (36.8  C) (Oral)  Ht 1.651 m (5' 5\")  Wt 58.6 kg (129 lb 3.2 oz)  SpO2 98%  BMI 21.5 kg/m2  HEENT: NCAT; MMM; EOMSI; PERRLA  Lungs: Breathing unlabored  Abdomen: soft/nontender/nondistended; incisions none.     Physical Exam Area of Interest:   I examined right lower chest wall and small flat lipoma; soft; rubbery.  No tenderness.  No skin color changes.    Imaging:  CT scan reviewed; U/S shows small lipoma.    Study Result   Exam: US ABDOMEN LIMITED, 2018 11:12 AM     Indication: Pain on RUQ wall -mass noted please evaluate; Abdominal  wall pain in right upper quadrant     Comparison: Correlations made with a chest abdomen pelvis CT dated  2017.     Findings:   There is a circumscribed capsular soft tissue mass overlying the  distal ribs over the lateral right upper quadrant. This appears  entirely made of adipose tissue and is consistent with lipoma. This  correlates to a region of similar appearance and size on CT dated  2017 (series 2 image 62).          Impression: Soft tissue lipoma overlying the right anterior lateral  aspect of the distal ribs.      I have personally reviewed the " examination and initial interpretation  and I agree with the findings.     DEANDRE RAZO MD     Laboratory testing:    Lab Results   Component Value Date    WBC 13.6 01/06/2017     Lab Results   Component Value Date    RBC 3.80 01/06/2017     Lab Results   Component Value Date    HGB 9.9 01/06/2017     Lab Results   Component Value Date    HCT 30.2 01/06/2017     No components found for: MCT  Lab Results   Component Value Date    MCV 80 01/06/2017     Lab Results   Component Value Date    MCH 26.1 01/06/2017     Lab Results   Component Value Date    MCHC 32.8 01/06/2017     Lab Results   Component Value Date    RDW 14.2 01/06/2017     Lab Results   Component Value Date    PLT 88 01/06/2017     Last Basic Metabolic Panel:  Lab Results   Component Value Date     04/09/2018      Lab Results   Component Value Date    POTASSIUM 4.4 04/09/2018     Lab Results   Component Value Date    CHLORIDE 119 01/06/2017     Lab Results   Component Value Date    NIHARIKA 6.5 01/06/2017     Lab Results   Component Value Date    CO2 23 01/06/2017     Lab Results   Component Value Date    BUN 19 01/06/2017     Lab Results   Component Value Date    CR 1.04 09/18/2017     Lab Results   Component Value Date     01/06/2017     INR/Prothrombin Time  Liver Function Studies -   Recent Labs   Lab Test  01/05/17   0018   PROTTOTAL  5.4*   ALBUMIN  2.4*   BILITOTAL  0.6   ALKPHOS  64   AST  227*   ALT  114*     [unfilled]    @LNK,[id number]@    Assessment:  Small lipoma; intermittently symptomatic    Discussion of Risks:   No risk of watching this.    Plan:  No surgery at this time; patient satisfied with this.    No orders of the defined types were placed in this encounter.          Nehemias Gonzales MD  Surgery  [phone] (hospital )  [phone] (clinic nurses)

## 2018-07-12 NOTE — LETTER
7/12/2018       RE: Travis Peres  [address]  [facility] [address]     Dear Colleague,    Thank you for referring your patient, Travis Peres, to the [facility] GENERAL SURGERY at [facility]. Please see a copy of my visit note below.    General Surgery Consultation Note    I was asked by Karol Alvarez to see this patient for the following problem:    CC: right-sided abdominal pain associated with lipoma    HPI:  Location: right lower chest wall.  Severity: mild  Timing: off and on pain; none currently.  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     Post-surgical hypothyroidism 2007     Uncomplicated asthma      Vocal cord paralysis     right     Xerostomia     due to radiation exposures     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), wet (H)     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     Language barrier affecting health care     Pulmonary nodules     Partial hypopituitarism (H)     Low bone density       Surgical History:  Past Surgical History:   Procedure Laterality Date     cymetra injection       ESOPHAGOSCOPY, GASTROSCOPY, DUODENOSCOPY (EGD), COMBINED  6/20/2013    Procedure: COMBINED ESOPHAGOSCOPY, GASTROSCOPY, DUODENOSCOPY (EGD), BIOPSY SINGLE OR MULTIPLE;  gastroscopy;  Surgeon: Leslie Guadarrama MD;  Location:  GI     HERNIA REPAIR Right      lipoma resection chest wall Right 11/09     PHACOEMULSIFICATION CLEAR CORNEA WITH STANDARD INTRAOCULAR LENS IMPLANT Left 5/7/2018    Procedure: PHACOEMULSIFICATION CLEAR CORNEA WITH STANDARD INTRAOCULAR LENS IMPLANT;  LEFT PHACOEMULSIFICATION CLEAR CORNEA WITH STANDARD INTRAOCULAR LENS IMPLANT ;  Surgeon: Nadiya Allen MD;  Location:  EC     right selective neck dissection level 2, 3, 4  11/3/09     right  shunt placement  6/28/07     THORACIC SURGERY  Fidencio 3 2017     THYMECTOMY N/A 1/3/2017    Procedure: THYMECTOMY;  Surgeon: Ernesto Armstrong MD;  Location: [facility] OR     THYROIDECTOMY  11/27/07     TRANSCERVICAL EXTENDED MEDIASTINAL LYMPHADENECTOMY N/A 1/3/2017    Procedure: TRANSCERVICAL EXTENDED MEDIASTINAL LYMPHADENECTOMY;  Surgeon: Ernesto Armstrong MD;  Location: [facility] OR     transnasal endoscopic resection of pituitary adenoma  8/13/2007       Medications:  Prior to Admission medications    Medication Sig Start Date End Date Taking? Authorizing Provider   alendronate (FOSAMAX) 70 MG tablet Take 1 tablet (70 mg) by mouth every 7 days 9/18/17  Yes Corina Potts MD   amLODIPine (NORVASC) 2.5 MG tablet TK 1 T PO QD 10/28/17  Yes Reported, Patient   B Complex Vitamins (VITAMIN B COMPLEX PO) Take 1 capsule by mouth 8/23/17  Yes Reported, Patient   Carboxymethylcellulose Sod PF (REFRESH PLUS) 0.5 % SOLN ophthalmic solution Place 1 drop into both eyes 3 times  daily as needed for dry eyes   Yes Reported, Patient   COMBIVENT RESPIMAT  MCG/ACT inhaler INL 1 PUFF PO QID 11/14/16  Yes Reported, Patient   diclofenac (VOLTAREN) 1 % GEL topical gel Place onto the skin 4 times daily   Yes Reported, Patient   fish oil-omega-3 fatty acids 1000 MG capsule Take 2 g by mouth daily   Yes Reported, Patient   fluticasone (FLONASE) 50 MCG/ACT spray Spray 2 sprays into both nostrils daily   Yes Unknown, Entered By History   GuaiFENesin (MUCUS RELIEF ADULT PO) Take 400 mg by mouth 2 times daily as needed    Yes Reported, Patient   hydrocortisone (CORTEF) 10 MG tablet Take one tablet by mouth daily in the morning, 1/2 tablet at 2 PM 4/16/18  Yes Corina Potts MD   ibuprofen (ADVIL/MOTRIN) 400 MG tablet Take 400-600 mg by mouth every 6 hours as needed for moderate pain    Yes Reported, Patient   levothyroxine (SYNTHROID/LEVOTHROID) 137 MCG tablet Take 1 tablet (137 mcg) by mouth daily 4/16/18  Yes Corina Potts MD   loperamide (IMODIUM) 2 MG capsule  4/25/18  Yes Reported, Patient   MIRTAZAPINE PO Take 15 mg by mouth At Bedtime   Yes Reported, Patient   prednisoLONE acetate (PRED FORTE) 1 % ophthalmic susp INT 1 GTT INTO OPERATIVE EYE TID 5/17/18  Yes Reported, Patient   ranitidine (ZANTAC) 300 MG tablet  12/3/16  Yes Reported, Patient   RIBOFLAVIN PO Take 100 mg by mouth   Yes Reported, Patient   TAMSULOSIN HCL PO Take 0.4 mg by mouth At Bedtime    Yes Reported, Patient   traZODone (DESYREL) 100 MG tablet take 1 tab of 50 mg daily 11/1/17  Yes Reported, Patient   triamcinolone (KENALOG) 0.1 % paste JOHNATHAN SML AMT AA IN MOUTH BID 4/25/18  Yes Reported, Patient   vitamin B complex with vitamin C (VITAMIN  B COMPLEX) TABS tablet Take 1 tablet by mouth daily   Yes Reported, Patient   zolpidem (AMBIEN) 10 MG tablet Take 10 mg by mouth 9/26/17  Yes Reported, Patient     Current Outpatient Prescriptions   Medication Sig Dispense Refill     alendronate (FOSAMAX) 70 MG tablet Take 1  tablet (70 mg) by mouth every 7 days 16 tablet 3     amLODIPine (NORVASC) 2.5 MG tablet TK 1 T PO QD  4     B Complex Vitamins (VITAMIN B COMPLEX PO) Take 1 capsule by mouth       Carboxymethylcellulose Sod PF (REFRESH PLUS) 0.5 % SOLN ophthalmic solution Place 1 drop into both eyes 3 times daily as needed for dry eyes       COMBIVENT RESPIMAT  MCG/ACT inhaler INL 1 PUFF PO QID  1     diclofenac (VOLTAREN) 1 % GEL topical gel Place onto the skin 4 times daily       fish oil-omega-3 fatty acids 1000 MG capsule Take 2 g by mouth daily       fluticasone (FLONASE) 50 MCG/ACT spray Spray 2 sprays into both nostrils daily       GuaiFENesin (MUCUS RELIEF ADULT PO) Take 400 mg by mouth 2 times daily as needed        hydrocortisone (CORTEF) 10 MG tablet Take one tablet by mouth daily in the morning, 1/2 tablet at 2  tablet 3     ibuprofen (ADVIL/MOTRIN) 400 MG tablet Take 400-600 mg by mouth every 6 hours as needed for moderate pain        levothyroxine (SYNTHROID/LEVOTHROID) 137 MCG tablet Take 1 tablet (137 mcg) by mouth daily 90 tablet 3     loperamide (IMODIUM) 2 MG capsule   1     MIRTAZAPINE PO Take 15 mg by mouth At Bedtime       prednisoLONE acetate (PRED FORTE) 1 % ophthalmic susp INT 1 GTT INTO OPERATIVE EYE TID  0     ranitidine (ZANTAC) 300 MG tablet        RIBOFLAVIN PO Take 100 mg by mouth       TAMSULOSIN HCL PO Take 0.4 mg by mouth At Bedtime        traZODone (DESYREL) 100 MG tablet take 1 tab of 50 mg daily  0     triamcinolone (KENALOG) 0.1 % paste JOHNATHAN SML AMT AA IN MOUTH BID  0     vitamin B complex with vitamin C (VITAMIN  B COMPLEX) TABS tablet Take 1 tablet by mouth daily       zolpidem (AMBIEN) 10 MG tablet Take 10 mg by mouth         Allergies:  Allergies   Allergen Reactions     Metoprolol Shortness Of Breath, Other (See Comments) and Unknown     Not true allergy.  Bradycardia, fatigue, COPD worsening.         Fenofibrate Hives     Fenofibric Acid      Lisinopril Cough     Losartan Cough  "    Niacin      Simvastatin Cramps and Other (See Comments)       Social History:  Social History     Social History     Marital status:      Spouse name: N/A     Number of children: N/A     Years of education: N/A     Social History Main Topics     Smoking status: Former Smoker     Packs/day: 0.50     Years: 5.00     Types: Cigarettes     Start date: 1976     Quit date: 2001     Smokeless tobacco: Never Used     Alcohol use No     Drug use: No     Sexual activity: Not Currently     Partners: Female     Birth control/ protection: Other     Other Topics Concern     None     Social History Narrative    Mom  at age 93 from a fall    Dad  at age 98 from old age     40 plus years    Two adult children in good health.    Occupation: retired from running a restaurant    Originally from [place]           Family History:  Family History   Problem Relation Age of Onset     Cancer No family hx of      no skin cancer     Glaucoma No family hx of      Macular Degeneration No family hx of            Physical Examination:  Vital Signs: /81  Pulse 59  Temp 98.2  F (36.8  C) (Oral)  Ht 1.651 m (5' 5\")  Wt 58.6 kg (129 lb 3.2 oz)  SpO2 98%  BMI 21.5 kg/m2  HEENT: NCAT; MMM; EOMSI; PERRLA  Lungs: Breathing unlabored  Abdomen: soft/nontender/nondistended; incisions none.     Physical Exam Area of Interest:   I examined right lower chest wall and small flat lipoma; soft; rubbery.  No tenderness.  No skin color changes.    Imaging:  CT scan reviewed; U/S shows small lipoma.    Study Result   Exam: US ABDOMEN LIMITED, 2018 11:12 AM     Indication: Pain on RUQ wall -mass noted please evaluate; Abdominal  wall pain in right upper quadrant     Comparison: Correlations made with a chest abdomen pelvis CT dated  2017.     Findings:   There is a circumscribed capsular soft tissue mass overlying the  distal ribs over the lateral right upper quadrant. This appears  entirely made of adipose tissue " and is consistent with lipoma. This  correlates to a region of similar appearance and size on CT dated  1/4/2017 (series 2 image 62).          Impression: Soft tissue lipoma overlying the right anterior lateral  aspect of the distal ribs.      I have personally reviewed the examination and initial interpretation  and I agree with the findings.     DEANDRE RAZO MD     Laboratory testing:    Lab Results   Component Value Date    WBC 13.6 01/06/2017     Lab Results   Component Value Date    RBC 3.80 01/06/2017     Lab Results   Component Value Date    HGB 9.9 01/06/2017     Lab Results   Component Value Date    HCT 30.2 01/06/2017     No components found for: MCT  Lab Results   Component Value Date    MCV 80 01/06/2017     Lab Results   Component Value Date    MCH 26.1 01/06/2017     Lab Results   Component Value Date    MCHC 32.8 01/06/2017     Lab Results   Component Value Date    RDW 14.2 01/06/2017     Lab Results   Component Value Date    PLT 88 01/06/2017     Last Basic Metabolic Panel:  Lab Results   Component Value Date     04/09/2018      Lab Results   Component Value Date    POTASSIUM 4.4 04/09/2018     Lab Results   Component Value Date    CHLORIDE 119 01/06/2017     Lab Results   Component Value Date    NIHARIKA 6.5 01/06/2017     Lab Results   Component Value Date    CO2 23 01/06/2017     Lab Results   Component Value Date    BUN 19 01/06/2017     Lab Results   Component Value Date    CR 1.04 09/18/2017     Lab Results   Component Value Date     01/06/2017     INR/Prothrombin Time  Liver Function Studies -   Recent Labs   Lab Test  01/05/17   0018   PROTTOTAL  5.4*   ALBUMIN  2.4*   BILITOTAL  0.6   ALKPHOS  64   AST  227*   ALT  114*     [unfilled]    @[id number]@    Assessment:  Small lipoma; intermittently symptomatic    Discussion of Risks:   No risk of watching this.    Plan:  No surgery at this time; patient satisfied with this.    No orders of the defined types were placed in this  encounter.          Nehemias Gonzales MD  Surgery  [phone] (hospital )  [phone] (clinic nurses)

## 2018-07-12 NOTE — MR AVS SNAPSHOT
"              After Visit Summary   7/12/2018    Travis Peres    MRN: [id number]           Patient Information     Date Of Birth          1940        Visit Information        Provider Department      7/12/2018 9:00 AM Devan Stephen; Nehemias Gonzales MD [facility] General Surgery        Today's Diagnoses     Lipoma of chest wall    -  1       Follow-ups after your visit        Your next 10 appointments already scheduled     Aug 21, 2018   Procedure with Nadiya Allen MD   [facility] (--)    [address]  [facility] [address]   [phone]            Aug 22, 2018 10:00 AM CDT   Post-Op with Nadiya Allen MD   [facility] ([facility])    [address] [address]   [phone]            Aug 29, 2018 10:00 AM CDT   Post-Op with Nadiya Allen MD   Eye Clinic ([facility])    [address] [address]   [phone]            Aug 29, 2018  1:30 PM CDT   (Arrive by 1:15 PM)   RETURN ENDOCRINE with Corina Potts MD   [facility] Endocrinology ([facility])    [address] [address] [phone]              Who to contact     Please call your clinic at [phone] to:    Ask questions about your health    Make or cancel appointments    Discuss your medicines    Learn about your test results    Speak to your doctor            Additional Information About Your Visit        Care EveryWhere ID     This is your Care EveryWhere ID. This could be used by other organizations to access your [place] medical records  [id number]        Your Vitals Were     Pulse Temperature Height Pulse Oximetry BMI (Body Mass Index)       59 98.2  F (36.8  C) (Oral) 5' 5\" 98% 21.5 kg/m2        Blood Pressure from Last 3 Encounters:   07/12/18 152/81   06/20/18 130/80 "   06/16/18 146/80    Weight from Last 3 Encounters:   07/12/18 129 lb 3.2 oz   07/05/18 131 lb   06/20/18 132 lb 8 oz              Today, you had the following     No orders found for display       Primary Care Provider Office Phone # Fax Corey Alvarez [phone]       [address] [address]        Equal Access to Services     LAYLA LUCAS : Hadii aad ku hadasho Soomaali, waaxda luqadaha, qaybta kaalmada adeegyada, waxay idiin hayaan adeeg deakennrosana smith . So [facility] [phone].    ATENCIÓN: Si barbara abel, tiene a reese disposición servicios gratuitos de asistencia lingüística. [facility] [phone].    We comply with applicable federal civil rights laws and [place] laws. We do not discriminate on the basis of race, color, national origin, age, disability, sex, sexual orientation, or gender identity.            Thank you!     Thank you for choosing [facility] SURGERY  for your care. Our goal is always to provide you with excellent care. Hearing back from our patients is one way we can continue to improve our services. Please take a few minutes to complete the written survey that you may receive in the mail after your visit with us. Thank you!             Your Updated Medication List - Protect others around you: Learn how to safely use, store and throw away your medicines at www.disposemymeds.org.          This list is accurate as of 7/12/18 10:35 AM.  Always use your most recent med list.                   Brand Name Dispense Instructions for use Diagnosis    alendronate 70 MG tablet    FOSAMAX    16 tablet    Take 1 tablet (70 mg) by mouth every 7 days    Postsurgical hypothyroidism, Papillary thyroid carcinoma (H), Pituitary adenoma (H), Hypopituitarism (H)       amLODIPine 2.5 MG tablet    NORVASC     TK 1 T PO QD        Carboxymethylcellulose Sod PF 0.5 % Soln ophthalmic solution    REFRESH PLUS     Place 1 drop into both eyes 3 times daily  as needed for dry eyes        COMBIVENT RESPIMAT  MCG/ACT inhaler   Generic drug:  Ipratropium-Albuterol      INL 1 PUFF PO QID        fish oil-omega-3 fatty acids 1000 MG capsule      Take 2 g by mouth daily        fluticasone 50 MCG/ACT spray    FLONASE     Spray 2 sprays into both nostrils daily        hydrocortisone 10 MG tablet    CORTEF    180 tablet    Take one tablet by mouth daily in the morning, 1/2 tablet at 2 PM    Postsurgical hypothyroidism, Papillary thyroid carcinoma (H), Abnormal finding on imaging       ibuprofen 400 MG tablet    ADVIL/MOTRIN     Take 400-600 mg by mouth every 6 hours as needed for moderate pain        levothyroxine 137 MCG tablet    SYNTHROID/LEVOTHROID    90 tablet    Take 1 tablet (137 mcg) by mouth daily    Postsurgical hypothyroidism, Papillary thyroid carcinoma (H), Abnormal finding on imaging       loperamide 2 MG capsule    IMODIUM          MIRTAZAPINE PO      Take 15 mg by mouth At Bedtime        MUCUS RELIEF ADULT PO      Take 400 mg by mouth 2 times daily as needed    Papillary thyroid carcinoma (H), Postsurgical hypothyroidism, Malignant neoplasm of thyroid gland (H), Cancer of thyroid (H), 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), Osteopenia, Hypopituitarism (H), Pituitary adenoma (H), Noncompliance with medication regimen       prednisoLONE acetate 1 % ophthalmic susp    PRED FORTE     INT 1 GTT INTO OPERATIVE EYE TID        ranitidine 300 MG tablet    ZANTAC          RIBOFLAVIN PO      Take 100 mg by mouth        TAMSULOSIN HCL PO      Take 0.4 mg by mouth At Bedtime        traZODone 100 MG tablet    DESYREL     take 1 tab of 50 mg daily        triamcinolone 0.1 % paste    KENALOG     JOHNATHAN SML AMT AA IN MOUTH BID        VITAMIN B COMPLEX PO      Take 1 capsule by mouth        vitamin B complex with vitamin C Tabs tablet      Take 1 tablet by mouth daily        VOLTAREN 1 % Gel topical gel   Generic drug:  diclofenac      Place onto the skin 4 times daily         zolpidem 10 MG tablet    AMBIEN     Take 10 mg by mouth

## 2018-07-12 NOTE — NURSING NOTE
"  Chief Complaint   Patient presents with     Consult     Abdominal wall pain in right upper quadrant     Vitals:    07/12/18 0914   BP: 152/81   Pulse: 59   Temp: 98.2  F (36.8  C)   TempSrc: Oral   SpO2: 98%   Weight: 129 lb 3.2 oz   Height: 5' 5\"     Body mass index is 21.5 kg/(m^2).  Ascencion Newton CMA    "

## 2018-08-21 ENCOUNTER — ANESTHESIA (OUTPATIENT)
Dept: SURGERY | Facility: CLINIC | Age: 78
End: 2018-08-21
Payer: MEDICARE

## 2018-08-21 ENCOUNTER — HOSPITAL ENCOUNTER (OUTPATIENT)
Facility: CLINIC | Age: 78
Discharge: HOME | End: 2018-08-21
Attending: OPHTHALMOLOGY | Admitting: OPHTHALMOLOGY
Payer: MEDICARE

## 2018-08-21 ENCOUNTER — ANESTHESIA EVENT (OUTPATIENT)
Dept: SURGERY | Facility: CLINIC | Age: 78
End: 2018-08-21
Payer: MEDICARE

## 2018-08-21 ENCOUNTER — SURGERY (OUTPATIENT)
Age: 78
End: 2018-08-21

## 2018-08-21 ENCOUNTER — OFFICE VISIT (OUTPATIENT)
Dept: INTERPRETER SERVICES | Facility: CLINIC | Age: 78
End: 2018-08-21
Payer: COMMERCIAL

## 2018-08-21 VITALS
BODY MASS INDEX: 23.19 KG/M2 | RESPIRATION RATE: 16 BRPM | HEIGHT: 62 IN | TEMPERATURE: 97.5 F | WEIGHT: 126 LBS | SYSTOLIC BLOOD PRESSURE: 121 MMHG | DIASTOLIC BLOOD PRESSURE: 78 MMHG | OXYGEN SATURATION: 95 %

## 2018-08-21 DIAGNOSIS — Z98.890 S/P EYE SURGERY: ICD-10-CM

## 2018-08-21 DIAGNOSIS — H25.11 AGE-RELATED NUCLEAR CATARACT OF RIGHT EYE: ICD-10-CM

## 2018-08-21 PROCEDURE — 71000028 ZZH EYE RECOVERY PHASE 2 EACH 15 MINS: Performed by: OPHTHALMOLOGY

## 2018-08-21 PROCEDURE — 25000125 ZZHC RX 250: Performed by: OPHTHALMOLOGY

## 2018-08-21 PROCEDURE — 36000101 ZZH EYE SURGERY LEVEL 3 1ST 30 MIN: Performed by: OPHTHALMOLOGY

## 2018-08-21 PROCEDURE — A9270 NON-COVERED ITEM OR SERVICE: HCPCS | Mod: GY | Performed by: OPHTHALMOLOGY

## 2018-08-21 PROCEDURE — 25000128 H RX IP 250 OP 636: Performed by: OPHTHALMOLOGY

## 2018-08-21 PROCEDURE — 36000102 ZZH EYE SURGERY LEVEL 3 EA 15 ADDTL MIN: Performed by: OPHTHALMOLOGY

## 2018-08-21 PROCEDURE — 40000170 ZZH STATISTIC PRE-PROCEDURE ASSESSMENT II: Performed by: OPHTHALMOLOGY

## 2018-08-21 PROCEDURE — 27210794 ZZH OR GENERAL SUPPLY STERILE: Performed by: OPHTHALMOLOGY

## 2018-08-21 PROCEDURE — 25000128 H RX IP 250 OP 636: Performed by: NURSE ANESTHETIST, CERTIFIED REGISTERED

## 2018-08-21 PROCEDURE — V2632 POST CHMBR INTRAOCULAR LENS: HCPCS | Performed by: OPHTHALMOLOGY

## 2018-08-21 PROCEDURE — 25000125 ZZHC RX 250: Mod: GY | Performed by: OPHTHALMOLOGY

## 2018-08-21 PROCEDURE — 37000008 ZZH ANESTHESIA TECHNICAL FEE, 1ST 30 MIN: Performed by: OPHTHALMOLOGY

## 2018-08-21 PROCEDURE — T1013 SIGN LANG/ORAL INTERPRETER: HCPCS | Mod: U3

## 2018-08-21 PROCEDURE — 27210995 ZZH RX 272: Performed by: OPHTHALMOLOGY

## 2018-08-21 PROCEDURE — 25000128 H RX IP 250 OP 636: Performed by: ANESTHESIOLOGY

## 2018-08-21 PROCEDURE — 37000009 ZZH ANESTHESIA TECHNICAL FEE, EACH ADDTL 15 MIN: Performed by: OPHTHALMOLOGY

## 2018-08-21 DEVICE — EYE IMP IOL ALCON PCL SN60WF ACRYSOF IQ 22.0: Type: IMPLANTABLE DEVICE | Site: EYE | Status: FUNCTIONAL

## 2018-08-21 RX ORDER — PHENYLEPHRINE HYDROCHLORIDE 25 MG/ML
1 SOLUTION/ DROPS OPHTHALMIC
Status: COMPLETED | OUTPATIENT
Start: 2018-08-21 | End: 2018-08-21

## 2018-08-21 RX ORDER — KETOROLAC TROMETHAMINE 4 MG/ML
1 SOLUTION/ DROPS OPHTHALMIC 4 TIMES DAILY
Qty: 5 ML | Refills: 0 | Status: SHIPPED | OUTPATIENT
Start: 2018-08-21 | End: 2019-08-31

## 2018-08-21 RX ORDER — PREDNISOLONE ACETATE 10 MG/ML
1 SUSPENSION/ DROPS OPHTHALMIC 4 TIMES DAILY
Qty: 5 ML | Refills: 0 | Status: SHIPPED | OUTPATIENT
Start: 2018-08-21 | End: 2018-09-29

## 2018-08-21 RX ORDER — TROPICAMIDE 10 MG/ML
1 SOLUTION/ DROPS OPHTHALMIC
Status: COMPLETED | OUTPATIENT
Start: 2018-08-21 | End: 2018-08-21

## 2018-08-21 RX ORDER — BALANCED SALT SOLUTION 6.4; .75; .48; .3; 3.9; 1.7 MG/ML; MG/ML; MG/ML; MG/ML; MG/ML; MG/ML
SOLUTION OPHTHALMIC PRN
Status: DISCONTINUED | OUTPATIENT
Start: 2018-08-21 | End: 2018-08-21 | Stop reason: HOSPADM

## 2018-08-21 RX ORDER — OFLOXACIN 3 MG/ML
1 SOLUTION/ DROPS OPHTHALMIC 4 TIMES DAILY
Qty: 5 ML | Refills: 0 | Status: SHIPPED | OUTPATIENT
Start: 2018-08-21 | End: 2019-08-31

## 2018-08-21 RX ORDER — SODIUM CHLORIDE, SODIUM LACTATE, POTASSIUM CHLORIDE, CALCIUM CHLORIDE 600; 310; 30; 20 MG/100ML; MG/100ML; MG/100ML; MG/100ML
500 INJECTION, SOLUTION INTRAVENOUS CONTINUOUS
Status: DISCONTINUED | OUTPATIENT
Start: 2018-08-21 | End: 2018-08-21 | Stop reason: HOSPADM

## 2018-08-21 RX ORDER — ONDANSETRON 2 MG/ML
INJECTION INTRAMUSCULAR; INTRAVENOUS PRN
Status: DISCONTINUED | OUTPATIENT
Start: 2018-08-21 | End: 2018-08-21

## 2018-08-21 RX ORDER — DEXAMETHASONE SODIUM PHOSPHATE 4 MG/ML
INJECTION, SOLUTION INTRA-ARTICULAR; INTRALESIONAL; INTRAMUSCULAR; INTRAVENOUS; SOFT TISSUE PRN
Status: DISCONTINUED | OUTPATIENT
Start: 2018-08-21 | End: 2018-08-21 | Stop reason: HOSPADM

## 2018-08-21 RX ORDER — PROPOFOL 10 MG/ML
INJECTION, EMULSION INTRAVENOUS PRN
Status: DISCONTINUED | OUTPATIENT
Start: 2018-08-21 | End: 2018-08-21

## 2018-08-21 RX ORDER — CYCLOPENTOLATE HYDROCHLORIDE 10 MG/ML
1 SOLUTION/ DROPS OPHTHALMIC
Status: COMPLETED | OUTPATIENT
Start: 2018-08-21 | End: 2018-08-21

## 2018-08-21 RX ADMIN — PHENYLEPHRINE HYDROCHLORIDE 1 DROP: 2.5 SOLUTION/ DROPS OPHTHALMIC at 06:30

## 2018-08-21 RX ADMIN — DEXAMETHASONE SODIUM PHOSPHATE 2 MG: 4 INJECTION, SOLUTION INTRA-ARTICULAR; INTRALESIONAL; INTRAMUSCULAR; INTRAVENOUS; SOFT TISSUE at 08:17

## 2018-08-21 RX ADMIN — CYCLOPENTOLATE HYDROCHLORIDE 1 DROP: 10 SOLUTION/ DROPS OPHTHALMIC at 06:20

## 2018-08-21 RX ADMIN — SODIUM CHLORIDE, POTASSIUM CHLORIDE, SODIUM LACTATE AND CALCIUM CHLORIDE 500 ML: 600; 310; 30; 20 INJECTION, SOLUTION INTRAVENOUS at 06:36

## 2018-08-21 RX ADMIN — Medication 1 APPLICATOR: at 08:00

## 2018-08-21 RX ADMIN — TROPICAMIDE 1 DROP: 10 SOLUTION/ DROPS OPHTHALMIC at 06:25

## 2018-08-21 RX ADMIN — PROPOFOL 30 MG: 10 INJECTION, EMULSION INTRAVENOUS at 07:41

## 2018-08-21 RX ADMIN — TROPICAMIDE 1 DROP: 10 SOLUTION/ DROPS OPHTHALMIC at 06:20

## 2018-08-21 RX ADMIN — CYCLOPENTOLATE HYDROCHLORIDE 1 DROP: 10 SOLUTION/ DROPS OPHTHALMIC at 06:25

## 2018-08-21 RX ADMIN — PHENYLEPHRINE HYDROCHLORIDE 1 DROP: 2.5 SOLUTION/ DROPS OPHTHALMIC at 06:25

## 2018-08-21 RX ADMIN — LIDOCAINE HYDROCHLORIDE,EPINEPHRINE BITARTRATE 6.5 ML GIVEN: 20; .005 INJECTION, SOLUTION EPIDURAL; INFILTRATION; INTRACAUDAL; PERINEURAL at 07:41

## 2018-08-21 RX ADMIN — DEXAMETHASONE SODIUM PHOSPHATE 2 MG: 4 INJECTION, SOLUTION INTRA-ARTICULAR; INTRALESIONAL; INTRAMUSCULAR; INTRAVENOUS; SOFT TISSUE at 08:15

## 2018-08-21 RX ADMIN — TROPICAMIDE 1 DROP: 10 SOLUTION/ DROPS OPHTHALMIC at 06:30

## 2018-08-21 RX ADMIN — NEOMYCIN SULFATE, POLYMYXIN B SULFATE, AND DEXAMETHASONE 1 G: 3.5; 10000; 1 OINTMENT OPHTHALMIC at 08:15

## 2018-08-21 RX ADMIN — WATER 0.5 ML: 1 INJECTION INTRAMUSCULAR; INTRAVENOUS; SUBCUTANEOUS at 08:15

## 2018-08-21 RX ADMIN — MIDAZOLAM 1 MG: 1 INJECTION INTRAMUSCULAR; INTRAVENOUS at 07:32

## 2018-08-21 RX ADMIN — ONDANSETRON 4 MG: 2 INJECTION INTRAMUSCULAR; INTRAVENOUS at 07:39

## 2018-08-21 RX ADMIN — BALANCED SALT SOLUTION 15 ML: 6.4; .75; .48; .3; 3.9; 1.7 SOLUTION OPHTHALMIC at 07:59

## 2018-08-21 RX ADMIN — EPINEPHRINE 500 ML: 1 INJECTION, SOLUTION, CONCENTRATE INTRAVENOUS at 08:00

## 2018-08-21 RX ADMIN — PHENYLEPHRINE HYDROCHLORIDE 1 DROP: 2.5 SOLUTION/ DROPS OPHTHALMIC at 06:20

## 2018-08-21 RX ADMIN — CYCLOPENTOLATE HYDROCHLORIDE 1 DROP: 10 SOLUTION/ DROPS OPHTHALMIC at 06:30

## 2018-08-21 RX ADMIN — TRYPAN BLUE 0.5 ML: 0.3 INJECTION, SOLUTION INTRAOCULAR; OPHTHALMIC at 08:00

## 2018-08-21 ASSESSMENT — ENCOUNTER SYMPTOMS
DYSRHYTHMIAS: 0
SEIZURES: 0
ORTHOPNEA: 0

## 2018-08-21 ASSESSMENT — COPD QUESTIONNAIRES
COPD: 1
CAT_SEVERITY: MILD

## 2018-08-21 ASSESSMENT — LIFESTYLE VARIABLES: TOBACCO_USE: 1

## 2018-08-21 NOTE — ANESTHESIA POSTPROCEDURE EVALUATION
Patient: Travis Peres    Procedure(s):  RIGHT EYE PHACOEMULSIFICATION CLEAR CORNEA WITH STANDARD INTRAOCULAR LENS IMPLANT - Wound Class: I-Clean    Diagnosis:RIGHT EYE CATARACT   Diagnosis Additional Information: No value filed.    Anesthesia Type:  MAC    Note:  Anesthesia Post Evaluation    Patient location during evaluation: PACU  Patient participation: Able to fully participate in evaluation  Level of consciousness: awake and alert  Pain management: adequate  Airway patency: patent  Cardiovascular status: acceptable  Respiratory status: acceptable and unassisted  Hydration status: acceptable  PONV: none             Last vitals:  Vitals:    08/21/18 0627 08/21/18 0820   BP: 144/89 109/89   Resp: 16 16   Temp: 36.4  C (97.5  F)    SpO2: 94% 95%         Electronically Signed By: Angeli Campa MD  August 21, 2018  8:33 AM

## 2018-08-21 NOTE — OR NURSING
In Phase II: noted to have serosanguinous drainage on lower edge of (Right) guaze eye dressing. Dr. Allen notified and came to see pt. Stated it was OK. She requested guaze eye patch to be changed before discharge which was done with sterile guaze eye patch.

## 2018-08-21 NOTE — IP AVS SNAPSHOT
[facility]    [address]    [place] [address]    Phone:  [phone]    Fax:  [phone]                                       After Visit Summary   8/21/2018    Travis Peres    MRN: [id number]           After Visit Summary Signature Page     I have received my discharge instructions, and my questions have been answered. I have discussed any challenges I see with this plan with the nurse or doctor.    ..........................................................................................................................................  Patient/Patient Representative Signature      ..........................................................................................................................................  Patient Representative Print Name and Relationship to Patient    ..................................................               ................................................  Date                                            Time    ..........................................................................................................................................  Reviewed by Signature/Title    ...................................................              ..............................................  Date                                                            Time

## 2018-08-21 NOTE — OP NOTE
SURGEON: Nadiya Allen MD   ASSISTANT SURGEON: Matthew Anderson MD  PREOPERATIVE DIAGNOSIS: Visually significant cataract RIGHT eye  POSTOPERATIVE DIAGNOSIS: same  PROCEDURE: Phacoemulsification with intraocular lens implantation, SN60WF 22.0 D SN [id number]  ANESTHESIA: Monitored anesthesia care and peribulbar block   COMPLICATIONS: none    Indication: Travis Peres is a 77 year old with diagnosis of visually significant cataract, here for cataract surgery    DESCRIPTION OF THE PROCEDURE:  The patient was taken to the operative room where intravenous sedation was administered and a peribulbar block consisting of a 1:1 mixture of 2%lidocaine and 0.75% marcaine with epinephrine and wydase, was administered to the operative eye with adequate anesthesia and akinesia.    The operative eye was prepped and draped in the usual sterile surgical fashion for ophthalmic surgery, including the installation of one drop of 5% Povidone Iodine.  A sterile drape was placed over the face and body and a lid speculum was inserted.      With the use of a Supersharp blade and 0.12 forceps, a paracentesis was created at the limbus.   Trypan Blue was placed in the anterior chamber and was irrigated using BSS. Viscoelastic was injected into the anterior chamber using a canula.  A 2.4 mm keratome was then used to construct a clear corneal incision at the 10 o'clock position.  Using Utrata forceps and cystotome needle, a continuous curvilinear capsulorrhexis was created and hydrodissection was undertaken with the use of BSS.  The nucleus was found to be freely mobile and then removed by phacoemulsification using a divide and conquer technique.  The remaining elements of cortex were then removed with irrigation/aspiration.  An IOL,was injected into the capsular bag and was rotated into a good position with a Sinskey hook. The remaining elements of viscoelastic were then removed with irrigation/aspiration. The wounds were hydrodissect and were  watertight.   A 10- nylon suture was placed  at the wound incision.    The lid speculum was removed.  Subconjunctival injection of Dexamethasone and Ancef were administered. The eye was cleaned with wet and dry gauze. Maxitrol ointment was placed on the eye.  A patch and Whitney shield were placed over the eye.  The patient was discharge in stable condition having tolerated the procedure well    The surgery was assisted by Dr. Matthew Anderson, because no qualified resident was available on the day of the surgery. Due to the delicate and complex nature of this surgery, Dr. Matthew Anderson was required. Dr. Matthew Anderson assisted with the cataract surgery. I was present for the entire surgery.

## 2018-08-21 NOTE — ANESTHESIA CARE TRANSFER NOTE
Patient: Travis Peres    Procedure(s):  RIGHT EYE PHACOEMULSIFICATION CLEAR CORNEA WITH STANDARD INTRAOCULAR LENS IMPLANT - Wound Class: I-Clean    Diagnosis: RIGHT EYE CATARACT   Diagnosis Additional Information: No value filed.    Anesthesia Type:   MAC     Note:  Airway :Room Air  Patient transferred to:Phase II  Comments: [facility]red to [facility] recovery room in recliner with armrests up, spontaneous respirations, O2 saturation maintained greater than 95% with oxygen via room air. All monitors and alarms on and functioning, clinically stable vital signs. Report given to recovery RN and questions answered. Patient alert and following verbal directions.Handoff Report: Identifed the Patient, Identified the Reponsible Provider, Reviewed the pertinent medical history, Discussed the surgical course, Reviewed Intra-OP anesthesia mangement and issues during anesthesia, Set expectations for post-procedure period and Allowed opportunity for questions and acknowledgement of understanding      Vitals: (Last set prior to Anesthesia Care Transfer)    CRNA VITALS  8/21/2018 0751 - 8/21/2018 0821      8/21/2018             Resp Rate (set): 10                Electronically Signed By: GEORGIA Bettencourt CRNA  August 21, 2018  8:21 AM

## 2018-08-21 NOTE — IP AVS SNAPSHOT
MRN:[id number]                      After Visit Summary   8/21/2018    Travis Peres    MRN: [id number]           Thank you!     Thank you for choosing [facility] for your care. Our goal is always to provide you with excellent care. Hearing back from our patients is one way we can continue to improve our services. Please take a few minutes to complete the written survey that you may receive in the mail after you visit with us. Thank you!        Patient Information     Date Of Birth          1940        About your hospital stay     You were admitted on:  August 21, 2018 You last received care in the:  [facility]    You were discharged on:  August 21, 2018       Who to Call     For medical emergencies, please call 911.  For non-urgent questions about your medical care, please call your primary care provider or clinic, [phone]  For questions related to your surgery, please call your surgery clinic        Attending Provider     Provider Specialty    Nadiya Allen MD Ophthalmology       Primary Care Provider Office Phone # Fax #    Karol Alvarez [phone]      After Care Instructions     Activity       Avoid strenuous activities the next several days.                  Your next 10 appointments already scheduled     Aug 22, 2018 10:00 AM CDT   Post-Op with Nadiya Allen MD   Eye Clinic ([facility])    [address] [address]   [phone]            Aug 29, 2018 10:00 AM CDT   Post-Op with Nadiya Allen MD   [facility] Clinic ([facility])    [address] [address]   [phone]            Aug 29, 2018  1:30 PM CDT   (Arrive by 1:15 PM)   RETURN ENDOCRINE with Corina Potts MD   [facility] ([facility])    [address] [place] [address]  [facility] [address] [phone]              Further instructions from your care team       [facility] Discharge  Instructions  Anesthesia ([facility] Care Center)   Adult Discharge Instructions    For 24 hours after surgery    1. Get plenty of rest.  Make arrangements to have a responsible adult stay with you for at least 24 hours after you leave the hospital.  2. Do not drive or use heavy equipment for 24 hours.    3. Do not drink alcohol for 24 hours.  4. Do not sign legal documents or make important decisions for 24 hours.  5. Avoid strenuous or risky activities. You may feel lightheaded.  If so, sit for a few minutes before standing.  Have someone help you get up.   6. Conscious sedation patients may resume a regular diet..  7. Any questions of medical nature, call your physician.    Dr. Nadiya Allen  [facility]  [phone]  Post Operative Cataract Instructions      If you have a gauze eye patch on, please do not remove it until it is removed by your physician at your first post-operative visit.  You will start your eye drops the next day.      Wear the clear eye shield when sleeping for protection for 5 days.      Do not rub the operated eye.      Light sensitivity may be noticed. Sunglasses may be worn for comfort.      Some discomfort and irritation may be noticed. Acetaminophen (Tylenol) or Ibuprofen (Advil) may be taken for discomfort. If pain persists please call Dr. Allen's office.      Keep the operated eye dry. You may wash your hair, bathe or shower, but keep the operated eye closed while doing so.       If you take glaucoma medications, bring them with you to the clinic on your first post operative visit.      Bring your prescribed eye drops with you to your scheduled post-operative appointment.      Use medication exactly as prescribed by your doctor. You may restart your regular home medications.       Call Dr. Allen's office at  "[phone] if any of the following should occur:    - Any sudden vision changes, including decreased vision  - Nausea or severe headache  - Increase in pain not controlled  - Signs of infection (pus, increasing redness or tenderness)  - Severe sensitivity to light    Pending Results     No orders found from 8/19/2018 to 8/22/2018.            Admission Information     Date & Time Provider Department Dept. Phone    8/21/2018 Nadiya Allen MD [facility] [place] [phone]      Your Vitals Were     Blood Pressure Temperature Respirations Height Weight Pulse Oximetry    109/89 97.5  F (36.4  C) (Temporal) 16 1.575 m (5' 2\") 57.2 kg (126 lb) 95%    BMI (Body Mass Index)                   23.05 kg/m2           Care EveryWhere ID     This is your Care EveryWhere ID. This could be used by other organizations to access your [facility] medical records  [id number]        Equal Access to Services     LAYLA LUCAS AH: Hadii jake Rodriguez, waramon singer, qaybta kaalmada kenneth, roxanne mitchell. So [facility] [phone].    ATENCIÓN: Si ayshala page, tiene a reese disposición servicios gratuitos de asistencia lingüística. Llame al [phone].    We comply with applicable federal civil rights laws and [place] laws. We do not discriminate on the basis of race, color, national origin, age, disability, sex, sexual orientation, or gender identity.               Review of your medicines      START taking        Dose / Directions    ketorolac tromethamine 0.4 % Soln ophthalmic solution   Commonly known as:  ACULAR-LS   Used for:  S/P eye surgery        Dose:  1 drop   Apply 1 drop to eye 4 times daily Instill into operative eye(s) per physician instructions.   Quantity:  5 mL   Refills:  0       ofloxacin 0.3 % ophthalmic solution   Commonly known as:  OCUFLOX   Used for:  S/P eye surgery        Dose:  1 drop   Place 1 drop into the right eye 4 times daily Instill into " operative eye(s) per physician instructions.   Quantity:  5 mL   Refills:  0         CONTINUE these medicines which may have CHANGED, or have new prescriptions. If we are uncertain of the size of tablets/capsules you have at home, strength may be listed as something that might have changed.        Dose / Directions    * prednisoLONE acetate 1 % ophthalmic susp   Commonly known as:  PRED FORTE   This may have changed:  Another medication with the same name was added. Make sure you understand how and when to take each.        INT 1 GTT INTO OPERATIVE EYE TID   Refills:  0       * prednisoLONE acetate 1 % ophthalmic susp   Commonly known as:  PRED FORTE   This may have changed:  You were already taking a medication with the same name, and this prescription was added. Make sure you understand how and when to take each.   Used for:  S/P eye surgery        Dose:  1 drop   Apply 1 drop to eye 4 times daily Instill into operative eye(s) per physician instructions.   Quantity:  5 mL   Refills:  0       * Notice:  This list has 2 medication(s) that are the same as other medications prescribed for you. Read the directions carefully, and ask your doctor or other care provider to review them with you.      CONTINUE these medicines which have NOT CHANGED        Dose / Directions    alendronate 70 MG tablet   Commonly known as:  FOSAMAX   Used for:  Postsurgical hypothyroidism, Papillary thyroid carcinoma (H), Pituitary adenoma (H), Hypopituitarism (H)        Dose:  70 mg   Take 1 tablet (70 mg) by mouth every 7 days   Quantity:  16 tablet   Refills:  3       amLODIPine 2.5 MG tablet   Commonly known as:  NORVASC        TK 1 T PO QD   Refills:  4       Carboxymethylcellulose Sod PF 0.5 % Soln ophthalmic solution   Commonly known as:  REFRESH PLUS        Dose:  1 drop   Place 1 drop into both eyes 3 times daily as needed for dry eyes   Refills:  0       COMBIVENT RESPIMAT  MCG/ACT inhaler   Generic drug:   Ipratropium-Albuterol        INL 1 PUFF PO QID   Refills:  1       fish oil-omega-3 fatty acids 1000 MG capsule        Dose:  2 g   Take 2 g by mouth daily   Refills:  0       fluticasone 50 MCG/ACT spray   Commonly known as:  FLONASE        Dose:  2 spray   Spray 2 sprays into both nostrils daily   Refills:  0       ibuprofen 400 MG tablet   Commonly known as:  ADVIL/MOTRIN        Dose:  400-600 mg   Take 400-600 mg by mouth every 6 hours as needed for moderate pain   Refills:  0       levothyroxine 137 MCG tablet   Commonly known as:  SYNTHROID/LEVOTHROID   Used for:  Postsurgical hypothyroidism, Papillary thyroid carcinoma (H), Abnormal finding on imaging        Dose:  137 mcg   Take 1 tablet (137 mcg) by mouth daily   Quantity:  90 tablet   Refills:  3       loperamide 2 MG capsule   Commonly known as:  IMODIUM        Refills:  1       MIRTAZAPINE PO        Dose:  15 mg   Take 15 mg by mouth At Bedtime   Refills:  0       MUCUS RELIEF ADULT PO   Used for:  Papillary thyroid carcinoma (H), Postsurgical hypothyroidism, Malignant neoplasm of thyroid gland (H), Cancer of thyroid (H), 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), Osteopenia, Hypopituitarism (H), Pituitary adenoma (H), Noncompliance with medication regimen        Dose:  400 mg   Take 400 mg by mouth 2 times daily as needed   Refills:  0       ranitidine 300 MG tablet   Commonly known as:  ZANTAC        Dose:  300 mg   300 mg daily   Refills:  0       RIBOFLAVIN PO        Dose:  100 mg   Take 100 mg by mouth   Refills:  0       TAMSULOSIN HCL PO        Dose:  0.4 mg   Take 0.4 mg by mouth At Bedtime   Refills:  0       traZODone 100 MG tablet   Commonly known as:  DESYREL        take 1 tab of 50 mg daily   Refills:  0       triamcinolone 0.1 % paste   Commonly known as:  KENALOG        JOHNATHAN SML AMT AA IN MOUTH BID   Refills:  0       VITAMIN B COMPLEX PO        Dose:  1 capsule   Take 1 capsule by mouth   Refills:  0       vitamin B complex with vitamin C Tabs  tablet        Dose:  1 tablet   Take 1 tablet by mouth daily   Refills:  0       VOLTAREN 1 % Gel topical gel   Generic drug:  diclofenac        Place onto the skin 4 times daily   Refills:  0       zolpidem 10 MG tablet   Commonly known as:  AMBIEN        Dose:  10 mg   Take 10 mg by mouth   Refills:  0            Where to get your medicines      These medications were sent to [facility] [place], [place] - [address]  [address], [place] [address]     Phone:  [phone]     ketorolac tromethamine 0.4 % Soln ophthalmic solution    ofloxacin 0.3 % ophthalmic solution    prednisoLONE acetate 1 % ophthalmic susp                Protect others around you: Learn how to safely use, store and throw away your medicines at www.disposemymeds.org.             Medication List: This is a list of all your medications and when to take them. Check marks below indicate your daily home schedule. Keep this list as a reference.      Medications           Morning Afternoon Evening Bedtime As Needed    alendronate 70 MG tablet   Commonly known as:  FOSAMAX   Take 1 tablet (70 mg) by mouth every 7 days                                amLODIPine 2.5 MG tablet   Commonly known as:  NORVASC   TK 1 T PO QD                                Carboxymethylcellulose Sod PF 0.5 % Soln ophthalmic solution   Commonly known as:  REFRESH PLUS   Place 1 drop into both eyes 3 times daily as needed for dry eyes                                COMBIVENT RESPIMAT  MCG/ACT inhaler   INL 1 PUFF PO QID   Generic drug:  Ipratropium-Albuterol                                fish oil-omega-3 fatty acids 1000 MG capsule   Take 2 g by mouth daily                                fluticasone 50 MCG/ACT spray   Commonly known as:  FLONASE   Spray 2 sprays into both nostrils daily                                ibuprofen 400 MG tablet   Commonly known as:  ADVIL/MOTRIN   Take 400-600 mg by mouth every 6 hours as needed for moderate pain                                 ketorolac tromethamine 0.4 % Soln ophthalmic solution   Commonly known as:  ACULAR-LS   Apply 1 drop to eye 4 times daily Instill into operative eye(s) per physician instructions.                                levothyroxine 137 MCG tablet   Commonly known as:  SYNTHROID/LEVOTHROID   Take 1 tablet (137 mcg) by mouth daily                                loperamide 2 MG capsule   Commonly known as:  IMODIUM                                MIRTAZAPINE PO   Take 15 mg by mouth At Bedtime                                MUCUS RELIEF ADULT PO   Take 400 mg by mouth 2 times daily as needed                                ofloxacin 0.3 % ophthalmic solution   Commonly known as:  OCUFLOX   Place 1 drop into the right eye 4 times daily Instill into operative eye(s) per physician instructions.                                * prednisoLONE acetate 1 % ophthalmic susp   Commonly known as:  PRED FORTE   INT 1 GTT INTO OPERATIVE EYE TID                                * prednisoLONE acetate 1 % ophthalmic susp   Commonly known as:  PRED FORTE   Apply 1 drop to eye 4 times daily Instill into operative eye(s) per physician instructions.                                ranitidine 300 MG tablet   Commonly known as:  ZANTAC   300 mg daily                                RIBOFLAVIN PO   Take 100 mg by mouth                                TAMSULOSIN HCL PO   Take 0.4 mg by mouth At Bedtime                                traZODone 100 MG tablet   Commonly known as:  DESYREL   take 1 tab of 50 mg daily                                triamcinolone 0.1 % paste   Commonly known as:  KENALOG   JOHNATHAN SML AMT AA IN MOUTH BID                                VITAMIN B COMPLEX PO   Take 1 capsule by mouth                                vitamin B complex with vitamin C Tabs tablet   Take 1 tablet by mouth daily                                VOLTAREN 1 % Gel topical gel   Place onto the skin 4 times daily   Generic drug:   diclofenac                                zolpidem 10 MG tablet   Commonly known as:  AMBIEN   Take 10 mg by mouth                                * Notice:  This list has 2 medication(s) that are the same as other medications prescribed for you. Read the directions carefully, and ask your doctor or other care provider to review them with you.

## 2018-08-21 NOTE — OR NURSING
Waited for discharge meds (3 eye drops) to come from pharmacy. Drops are here. Instructed to bring to post op appointment tomorrow, 8/22/18.

## 2018-08-21 NOTE — ANESTHESIA PREPROCEDURE EVALUATION
Anesthesia Evaluation     . Pt has had prior anesthetic. Type: General    No history of anesthetic complications          ROS/MED HX    ENT/Pulmonary:     (+)tobacco use, Past use mild COPD, , . .   (-) sleep apnea and recent URI   Neurologic:     (+)other neuro Pituitary Dysfunction   (-) seizures and CVA   Cardiovascular:     (+) hypertension----. : . . . :. .      (-) angina, CAD, orthopnea/PND, syncope, arrhythmias, irregular heartbeat/palpitations and angina   METS/Exercise Tolerance:     Hematologic:         Musculoskeletal:         GI/Hepatic:     (+) GERD Asymptomatic on medication,      (-) liver disease   Renal/Genitourinary:         Endo:     (+) thyroid problem hypothyroidism Thyroid disease - Other, Chronic steroid usage for .      Psychiatric:         Infectious Disease:         Malignancy:   (+) Malignancy History of Other  Other CA Remission status post Surgery and Radiation         Other:                     Physical Exam  Normal systems: cardiovascular, pulmonary and dental    Airway   Mallampati: I  TM distance: >3 FB  Neck ROM: full    Dental     Cardiovascular       Pulmonary                     Anesthesia Plan      History & Physical Review  History and physical reviewed and following examination; no interval change.    ASA Status:  2 .    NPO Status:  > 8 hours    Plan for MAC Reason for MAC:  Procedure to face, neck, head or breast  PONV prophylaxis:  Ondansetron (or other 5HT-3)       Postoperative Care  Postoperative pain management:  Oral pain medications.      Consents  Anesthetic plan, risks, benefits and alternatives discussed with:  Patient and Spouse..                          .

## 2018-08-21 NOTE — DISCHARGE INSTRUCTIONS
[facility] Anesthesia [facility] Discharge  Instructions  Anesthesia ([facility] Care Center)   Adult Discharge Instructions    For 24 hours after surgery    1. Get plenty of rest.  Make arrangements to have a responsible adult stay with you for at least 24 hours after you leave the hospital.  2. Do not drive or use heavy equipment for 24 hours.    3. Do not drink alcohol for 24 hours.  4. Do not sign legal documents or make important decisions for 24 hours.  5. Avoid strenuous or risky activities. You may feel lightheaded.  If so, sit for a few minutes before standing.  Have someone help you get up.   6. Conscious sedation patients may resume a regular diet..  7. Any questions of medical nature, call your physician.    Dr. Nadiya Allen  [facility]  [phone]  Post Operative Cataract Instructions      If you have a gauze eye patch on, please do not remove it until it is removed by your physician at your first post-operative visit.  You will start your eye drops the next day.      Wear the clear eye shield when sleeping for protection for 5 days.      Do not rub the operated eye.      Light sensitivity may be noticed. Sunglasses may be worn for comfort.      Some discomfort and irritation may be noticed. Acetaminophen (Tylenol) or Ibuprofen (Advil) may be taken for discomfort. If pain persists please call Dr. Allen's office.      Keep the operated eye dry. You may wash your hair, bathe or shower, but keep the operated eye closed while doing so.       If you take glaucoma medications, bring them with you to the clinic on your first post operative visit.      Bring your prescribed eye drops with you to your scheduled post-operative appointment.      Use medication exactly as prescribed by your doctor. You may restart your regular home medications.       Call Dr. Allen's office at [phone] if any of the following should occur:    - Any sudden vision changes, including decreased  vision  - Nausea or severe headache  - Increase in pain not controlled  - Signs of infection (pus, increasing redness or tenderness)  - Severe sensitivity to light

## 2018-08-22 ENCOUNTER — OFFICE VISIT (OUTPATIENT)
Dept: OPHTHALMOLOGY | Facility: CLINIC | Age: 78
End: 2018-08-22
Attending: OPHTHALMOLOGY
Payer: MEDICARE

## 2018-08-22 DIAGNOSIS — H35.3220 EXUDATIVE AGE-RELATED MACULAR DEGENERATION, LEFT EYE, STAGE UNSPECIFIED (H): ICD-10-CM

## 2018-08-22 PROCEDURE — 25000128 H RX IP 250 OP 636: Mod: ZF | Performed by: OPHTHALMOLOGY

## 2018-08-22 PROCEDURE — G0463 HOSPITAL OUTPT CLINIC VISIT: HCPCS | Mod: ZF

## 2018-08-22 PROCEDURE — 92134 CPTRZ OPH DX IMG PST SGM RTA: CPT | Mod: ZF | Performed by: OPHTHALMOLOGY

## 2018-08-22 PROCEDURE — 67028 INJECTION EYE DRUG: CPT | Mod: LT,ZF | Performed by: OPHTHALMOLOGY

## 2018-08-22 PROCEDURE — 25000125 ZZHC RX 250: Mod: ZF | Performed by: OPHTHALMOLOGY

## 2018-08-22 PROCEDURE — T1013 SIGN LANG/ORAL INTERPRETER: HCPCS | Mod: U3,ZF

## 2018-08-22 RX ORDER — LIDOCAINE HYDROCHLORIDE 20 MG/ML
0.5 INJECTION, SOLUTION EPIDURAL; INFILTRATION; INTRACAUDAL; PERINEURAL ONCE
Status: COMPLETED | OUTPATIENT
Start: 2018-08-22 | End: 2018-08-22

## 2018-08-22 RX ADMIN — RANIBIZUMAB 0.5 MG: 10 INJECTION, SOLUTION INTRAVITREAL at 11:40

## 2018-08-22 RX ADMIN — LIDOCAINE HYDROCHLORIDE 0.5 ML: 20 INJECTION, SOLUTION EPIDURAL; INFILTRATION; INTRACAUDAL at 11:40

## 2018-08-22 ASSESSMENT — CONF VISUAL FIELD
METHOD: COUNTING FINGERS
OS_NORMAL: 1
OD_NORMAL: 1

## 2018-08-22 ASSESSMENT — VISUAL ACUITY
OD_SC+: -2
OD_SC: 20/40
METHOD: SNELLEN - LINEAR

## 2018-08-22 ASSESSMENT — TONOMETRY
OD_IOP_MMHG: 28
IOP_METHOD: TONOPEN
OS_IOP_MMHG: 11

## 2018-08-22 ASSESSMENT — CUP TO DISC RATIO
OS_RATIO: 0.65
OD_RATIO: 0.5

## 2018-08-22 ASSESSMENT — EXTERNAL EXAM - RIGHT EYE: OD_EXAM: NORMAL

## 2018-08-22 ASSESSMENT — EXTERNAL EXAM - LEFT EYE: OS_EXAM: NORMAL

## 2018-08-22 ASSESSMENT — SLIT LAMP EXAM - LIDS
COMMENTS: NORMAL
COMMENTS: NORMAL

## 2018-08-22 NOTE — NURSING NOTE
Chief Complaints and History of Present Illnesses   Patient presents with     Post Op (Ophthalmology) Right Eye      1 day after Vitrectomy / possible injection LE today     HPI    Last Eye Exam:  6/27/18   Affected eye(s):  Both   Symptoms:        Duration:  1 day   Frequency:  Constant          Comments:  Travis is here  1 day after Vitrectomy RE / possible injection LE today  He says he slept well last night and his RE feels good today.    Rayshawn Fulton COT 10:19 AM August 22, 2018

## 2018-08-22 NOTE — PROGRESS NOTES
Cc: follow up status post CE/PCIOL right eye 08/21/18     Ok with resident injections  Likes subconj lido    HPI: Slept well, minimal pain following cataract surgery.  Family has questions about drops schedule.  Otherwise doing well.      OCT Mac 08/22/18   Right eye: few small drusen   Left eye: subfoveal CNVM without subretinal fluid / stable intraretinal fluid   Plan for lucentis inj left eye today    Assessment and plan      1. 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   - Lucentis today    2.  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   - Weekly Amsler Grid use was discussed and training performed.   - A diet of leafy green vegetables was encouraged.   - Return precautions were given.    3. PVD OU   - RT/RD precautions    4. S/p CEIOL OD   - Doing well   - Postop as scheduled   - Lens centered    - Retina attached   - IOP 28   - Doing well   - start eyedrops ketorolac, vigamox and Predforte  Four times a day      5. Pseudophakia left eye    - monitor    return to clinic: 1 week postop CEIOL RIGHT EYE - no OCT at this visit    Mohan Hathaway M.D.  PGY-3, Ophthalmology     ~~~~~~~~~~~~~~~~~~~~~~~~~~~~~~~~~~   Complete documentation of historical and exam elements from today's encounter can be found in the full encounter summary report (not reduplicated in this progress note).  I personally obtained the chief complaint(s) and history of present illness.  I confirmed and edited as necessary the review of systems, past medical/surgical history, family history, social history, and examination findings as documented by others; and I examined the patient myself.  I personally  reviewed the relevant tests, images, and reports as documented above.  I formulated and edited as necessary the assessment and plan and discussed the findings and management plan with the patient and family and I was present for the entire procedure performed by the resident/fellow.    Nadiya Allen MD   of Ophthalmology.  Retina Service   Department of Ophthalmology and Visual Neurosciences   [facility]  Phone: [phone]   Fax: [phone]

## 2018-08-22 NOTE — MR AVS SNAPSHOT
After Visit Summary   8/22/2018    Travis Peres    MRN: [id number]           Patient Information     Date Of Birth          1940        Visit Information        Provider Department      8/22/2018 10:00 AM Devan Stephen; [facility]        Today's Diagnoses     Exudative age-related macular degeneration, left eye, stage unspecified (H)           Follow-ups after your visit        Your next 10 appointments already scheduled     Aug 29, 2018 10:00 AM CDT   Post-Op with Nadiya Allen MD   Eye Clinic ([facility])    [address]  [facility] [id number] [phone]            Aug 29, 2018  1:30 PM CDT   (Arrive by 1:15 PM)   RETURN ENDOCRINE with Corina Potts MD   [facility] ([facility])    [address]  [address] [facility] [address] [phone]              Who to contact     Please call your clinic at [phone] to:    Ask questions about your health    Make or cancel appointments    Discuss your medicines    Learn about your test results    Speak to your doctor            Additional Information About Your Visit        Care EveryWhere ID     This is your Care EveryWhere ID. This could be used by other organizations to access your [facility] medical records  [id number]         Blood Pressure from Last 3 Encounters:   08/21/18 121/78   07/12/18 152/81   06/20/18 130/80    Weight from Last 3 Encounters:   08/21/18 57.2 kg (126 lb)   07/12/18 58.6 kg (129 lb 3.2 oz)   07/05/18 59.4 kg (131 lb)              We Performed the Following     Lucentis (Ranibizumab) 0.5MG Intravitreal Injection OS (left eye)     OCT Retina Spectralis OU (both eyes)        Primary Care Provider Office Phone # Fax #    Karol Kim [phone]       [address] [address]        Equal Access to Services     LAYLA  GAAR : Hadii aad ku camron Rodriguez, waaxda luqadaha, qaybta kaalmada kenneth, roxanne martitain hayaarama horowitzrobyn duncan luis . So [facility] [phone].    ATENCIÓN: Si habla page, tiene a reese disposición servicios gratuitos de asistencia lingüística. Llame al [phone].    We comply with applicable federal civil rights laws and [place] laws. We do not discriminate on the basis of race, color, national origin, age, disability, sex, sexual orientation, or gender identity.            Thank you!     Thank you for choosing EYE CLINIC  for your care. Our goal is always to provide you with excellent care. Hearing back from our patients is one way we can continue to improve our services. Please take a few minutes to complete the written survey that you may receive in the mail after your visit with us. Thank you!             Your Updated Medication List - Protect others around you: Learn how to safely use, store and throw away your medicines at www.disposemymeds.org.          This list is accurate as of 8/22/18 11:41 AM.  Always use your most recent med list.                   Brand Name Dispense Instructions for use Diagnosis    alendronate 70 MG tablet    FOSAMAX    16 tablet    Take 1 tablet (70 mg) by mouth every 7 days    Postsurgical hypothyroidism, Papillary thyroid carcinoma (H), Pituitary adenoma (H), Hypopituitarism (H)       amLODIPine 2.5 MG tablet    NORVASC     TK 1 T PO QD        Carboxymethylcellulose Sod PF 0.5 % Soln ophthalmic solution    REFRESH PLUS     Place 1 drop into both eyes 3 times daily as needed for dry eyes        COMBIVENT RESPIMAT  MCG/ACT inhaler   Generic drug:  Ipratropium-Albuterol      INL 1 PUFF PO QID        fish oil-omega-3 fatty acids 1000 MG capsule      Take 2 g by mouth daily        fluticasone 50 MCG/ACT spray    FLONASE     Spray 2 sprays into both nostrils daily        ibuprofen 400 MG tablet    ADVIL/MOTRIN     Take 400-600 mg by mouth every 6 hours as needed for  moderate pain        ketorolac tromethamine 0.4 % Soln ophthalmic solution    ACULAR-LS    5 mL    Apply 1 drop to eye 4 times daily Instill into operative eye(s) per physician instructions.    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    S/P eye surgery       * prednisoLONE acetate 1 % ophthalmic susp    PRED FORTE     INT 1 GTT INTO OPERATIVE EYE TID        * prednisoLONE acetate 1 % ophthalmic susp    PRED FORTE    5 mL    Apply 1 drop to eye 4 times daily Instill into operative eye(s) per physician instructions.    S/P eye surgery       ranitidine 300 MG tablet    ZANTAC     300 mg daily        RIBOFLAVIN PO      Take 100 mg by mouth        TAMSULOSIN HCL PO      Take 0.4 mg by mouth At Bedtime        traZODone 100 MG tablet    DESYREL     take 1 tab of 50 mg daily        triamcinolone 0.1 % paste    KENALOG     JOHNATHAN SML AMT AA IN MOUTH BID        VITAMIN B COMPLEX PO      Take 1 capsule by mouth        vitamin B complex with vitamin C Tabs tablet      Take 1 tablet by mouth daily        VOLTAREN 1 % Gel topical gel   Generic drug:  diclofenac      Place onto the skin 4 times daily        zolpidem 10 MG tablet    AMBIEN     Take 10 mg by  mouth        * Notice:  This list has 2 medication(s) that are the same as other medications prescribed for you. Read the directions carefully, and ask your doctor or other care provider to review them with you.

## 2018-08-29 ENCOUNTER — OFFICE VISIT (OUTPATIENT)
Dept: ENDOCRINOLOGY | Facility: CLINIC | Age: 78
End: 2018-08-29
Payer: COMMERCIAL

## 2018-08-29 ENCOUNTER — RADIANT APPOINTMENT (OUTPATIENT)
Dept: CT IMAGING | Facility: CLINIC | Age: 78
End: 2018-08-29
Payer: COMMERCIAL

## 2018-08-29 ENCOUNTER — OFFICE VISIT (OUTPATIENT)
Dept: OPHTHALMOLOGY | Facility: CLINIC | Age: 78
End: 2018-08-29
Attending: OPHTHALMOLOGY
Payer: MEDICARE

## 2018-08-29 VITALS
WEIGHT: 132.2 LBS | BODY MASS INDEX: 22.02 KG/M2 | HEIGHT: 65 IN | DIASTOLIC BLOOD PRESSURE: 78 MMHG | SYSTOLIC BLOOD PRESSURE: 132 MMHG | HEART RATE: 85 BPM

## 2018-08-29 DIAGNOSIS — M85.9 LOW BONE DENSITY: ICD-10-CM

## 2018-08-29 DIAGNOSIS — C73 PAPILLARY THYROID CARCINOMA (H): Primary | ICD-10-CM

## 2018-08-29 DIAGNOSIS — R91.8 PULMONARY NODULES: ICD-10-CM

## 2018-08-29 DIAGNOSIS — D35.2 PITUITARY MACROADENOMA (H): ICD-10-CM

## 2018-08-29 DIAGNOSIS — E89.0 POSTSURGICAL HYPOTHYROIDISM: ICD-10-CM

## 2018-08-29 DIAGNOSIS — C73 PAPILLARY THYROID CARCINOMA (H): ICD-10-CM

## 2018-08-29 DIAGNOSIS — E23.0 HYPOPITUITARISM (H): ICD-10-CM

## 2018-08-29 DIAGNOSIS — Z75.8 LANGUAGE BARRIER AFFECTING HEALTH CARE: ICD-10-CM

## 2018-08-29 DIAGNOSIS — H91.90 DEAFNESS, UNSPECIFIED LATERALITY: ICD-10-CM

## 2018-08-29 DIAGNOSIS — Z48.810 AFTERCARE FOLLOWING SURGERY OF A SENSE ORGAN: Primary | ICD-10-CM

## 2018-08-29 DIAGNOSIS — Z60.3 LANGUAGE BARRIER AFFECTING HEALTH CARE: ICD-10-CM

## 2018-08-29 LAB
CALCIUM SERPL-MCNC: 8.3 MG/DL (ref 8.5–10.1)
CREAT SERPL-MCNC: 0.83 MG/DL (ref 0.66–1.25)
GFR SERPL CREATININE-BSD FRML MDRD: 89 ML/MIN/1.7M2
PHOSPHATE SERPL-MCNC: 3.2 MG/DL (ref 2.5–4.5)
PROLACTIN SERPL-MCNC: 1 UG/L (ref 2–18)
PTH-INTACT SERPL-MCNC: 41 PG/ML (ref 18–80)

## 2018-08-29 PROCEDURE — G0463 HOSPITAL OUTPT CLINIC VISIT: HCPCS | Mod: ZF

## 2018-08-29 RX ORDER — HYDROCORTISONE 10 MG/1
TABLET ORAL
Refills: 3 | COMMUNITY
Start: 2018-04-16 | End: 2018-08-29

## 2018-08-29 RX ORDER — HYDROCORTISONE 10 MG/1
TABLET ORAL
Qty: 135 TABLET | Refills: 3 | Status: SHIPPED | OUTPATIENT
Start: 2018-08-29 | End: 2019-07-23

## 2018-08-29 RX ORDER — PREDNISOLONE ACETATE 10 MG/ML
1-2 SUSPENSION/ DROPS OPHTHALMIC SEE ADMIN INSTRUCTIONS
Qty: 1 BOTTLE | Refills: 0 | Status: SHIPPED | OUTPATIENT
Start: 2018-08-29 | End: 2019-08-31

## 2018-08-29 RX ORDER — KETOROLAC TROMETHAMINE 5 MG/ML
1 SOLUTION OPHTHALMIC SEE ADMIN INSTRUCTIONS
Status: DISCONTINUED | OUTPATIENT
Start: 2018-08-29 | End: 2018-08-29 | Stop reason: CLARIF

## 2018-08-29 SDOH — SOCIAL STABILITY - SOCIAL INSECURITY: ACCULTURATION DIFFICULTY: Z60.3

## 2018-08-29 ASSESSMENT — VISUAL ACUITY
OS_SC: 20/400
OD_SC: 20/25
OD_SC+: -2
METHOD: SNELLEN - LINEAR

## 2018-08-29 ASSESSMENT — CONF VISUAL FIELD
METHOD: COUNTING FINGERS
OS_INFERIOR_NASAL_RESTRICTION: 1
OD_INFERIOR_TEMPORAL_RESTRICTION: 1

## 2018-08-29 ASSESSMENT — SLIT LAMP EXAM - LIDS
COMMENTS: NORMAL
COMMENTS: NORMAL

## 2018-08-29 ASSESSMENT — TONOMETRY
OD_IOP_MMHG: 19
OS_IOP_MMHG: 23
IOP_METHOD: TONOPEN

## 2018-08-29 ASSESSMENT — CUP TO DISC RATIO
OS_RATIO: 0.65
OD_RATIO: 0.5

## 2018-08-29 ASSESSMENT — EXTERNAL EXAM - RIGHT EYE: OD_EXAM: NORMAL

## 2018-08-29 ASSESSMENT — EXTERNAL EXAM - LEFT EYE: OS_EXAM: NORMAL

## 2018-08-29 NOTE — PROGRESS NOTES
Endocrinology Attending Physician Progress note    Very complicated endocrine patient/ problem set.   1. Language barrier , hearing barrier. - both of these make his care very difficult and are interfering with his best health care. He presented to clinic today without hearing aide. They don't know his meds. I wonder more and more  if he is also dementing. I am really uncertain on this.  Today the  is admittedly confused by Mr Peres's statements - the meeting is overall chaotic as most, though today was probably the worst ever.   I am concerned about his basic med management at a basic daily / weekly level and I am also concerned about their abilty to understand what we have been presenting in past appointments relative to the cancer condition, treatment options and health care decisions. We make no progress.  The communication by him  is internally contradictory and very confusing. The  is confused today which is, perhaps progress, in terms of this 's acknowledgment (a different  than we have had in the past) , but similar to what I have long suspected.  I am not sure if either of them (Mr Peres or his wife)  are capable of representing their own best interests.  I am increasingly concerned about this.    Suggestions:   I have indicated we need the help of his PCP and possibly  related to his med management.  After I suggested this his wife insisted they did not need this type of help.    He needs to use hearing aide for every medical appt.    I have encouraged them to think about the conversations he has had with Dr Kinney and me today, in the context of what is important for his life - for further discussion in the future.  If his PCP could get him life transition decision services it might be helpful (vs total waste of time if he can't hear or is too demented to be a part of the discussion).      2.  Papillary thyroid cancer 4.8 cm right, bilateral node  positive. He has been treated with total thyroidectomy, 131I x 2 (cummulative dose 346.4 mCi) His last 131I TBS (2008) post therapy scan raised question of ? lung mets and also ? met above left kidney. Cervical adenopathy has been treated in the past with ETOH .    I have long suspected he had lung mets, but we haven't proven this.      3.  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   Cervical adenopathy has been treated in the past with ETOH . The 2 LNs likely correlate to the high FDG regions on the PET. Over time these nodes are progressing/ enlarging. Specifically, volume of right # 7 has gone from 0.79 to 1.65 to 2.71 cm3 from 9/17 to 4/18 to 6/20/18, doubling time < 1 year  It is possible these are the same LNs that were treated with ETOH in the past (vs others in the same vicinity - it is hard to say)   I am concerned the level 7 mass can't be reached with ETOH treatment, that the only way to remove it is with surgery.  He declined surgery offered in July.  We spent most of the appt today trying to determine if he understands this is what he did, if he understands what the issues are.  Perhaps the morbidity of yet another operation is high enough that the choice to decline surgery is not inappropriate.  Nevertheless, I am concerned as indicated in # 1.      Lung nodules - biopsy has not been performed.  BAL cytology (8/15) did not show malignancy. Spiculated Lingular nodule to 1.7 x 1.5 cm on 9/17 CT measured only 7 mm on 4/12/18 PET, lacking FDG activity.   Chest CT      Persistent thyroglobulinemia has been rising/worse, suggesting progression of thyroid cancer in some location.   As per # 1.  Repeat Tg again      Hypopituitary with hypogonadotropic hypogonadism, probable secondary hypothyroidism, and secondary adrenal insufficiency, probable GH deficiency.   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 The tumor has been significantly de bulked and He has completed XRT for  persistent tumor-. Tumor mass is stable on  MRI through 12/16.    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   The TSH is not fully reliable.  We can treat up to high normal free T4.       Low BMD.  Last DXA bone loss 6/18  He is on alendronate.     Hypogonadism has been untreated (Due to patient noncompliance with treatment recommendations) - not addressed      ? Lytic bone lesions -stable on serial imaging -- not addressed    Greater than 50% of  >  40 minute appt On care coordination/ counseling.  Chaotic meeting as usual.    End 223 PM    Corina Potts MD      Cc/ HPI: Mr Peres returns today, along with his wife and also .  He is not wearing his hearing aide and it is apparent he can't hear.  He is intermittently covering his face with his hands as his wife is screaming questions to him.   I last saw him 6/20/18.  He saw Dr Kinney 7/5/18. I have reviewed her note - he was offered another operation for the neck disease, and he declined it.  Today when asked about this appt initially he said he didn't remember the meeting, his wife said she did remember the meeting -- they say that they were told that the doctor wanted my opinion.  Later They acknowledged they were told of surgical risk to the nerve. Later he said that the past biopsy was benign (which was incorrect).  Repeatedly the  noted how he isn't answering my  questions -he is talking extensively but about other things, including his past hisitory. The  is having a hard time with interpreting because of this.  I  had to ask the  several times to tell me what he is saying due to the confusion of the communication.     He has a history of multiple complicated endocrine issues, including thyroid cancer, surgical hypothyroidism, osteoporosis, pituitary macroadenoma with partial hypopituitarism  and history of DI.   See my 4/9/18 note for his detailed history.    Papillary thyroid cancer 4.8 cm left, bilateral node positive (MACIS 6.88 minimum, pT3, pN1a (8), pMx, Stage III or IV). His course has included several operations and several 131I treatments  11/27/07 thyroidectomy  153.4 mCi 131I in 6/08. The radioiodine  was complicated by acute sialadenitis.   12/08  193 mCi 131I (with Thyrogen stimulation). The post therapy scan showed activity in the thyroid bed, lung base on the right and also superior left kidney region   11/3/09  right selective neck dissection levels 2, 3 and 4, removing many positive LNs.   4/16/14 FNAB of right level 6 and 7 cervical lymph nodes were  positive for papillary thyroid cancer. Needle wash Tg was also high on both samples (see below). He had  hoarseness within one hour after the FNAB procedure. He was treated with cymetra on 5/12/14 and he restarted thickened liquids.    6/3/14  ETOH ablation procedure of the  2 LNs    1/31/17  trasnscervical resection of retrosternal mediastinal lesions.  A cystic lesion was removed and drained.  Surgical path  benign thymic tissue.     4/12/18  PET/CT .  Right low neck /superior mediastinal high FDG lateral and more midline. The paratracheal mass is somewhat posterior and below the level of the clavicle (read as 1.4 cm, larger than before, SUB 68), but above the sternum  Tiny focus of fdg left lung     2.  Osteoporosis.  The last BMD was 6/25/18. It shows lowest T-score -2.3 at the right femoral neck. He has had significant loss of BMD since 2015.  Alendronate has been prescribed since 2012.  I suspect he isn't compliant. Today they seem to acknowledge perhaps he ran out of alendronate a month ago. (though the Rx is still active).       Labs   4/9/18: Tg 18.2, FRANK < 0.4; TSH  ,0.01, free T4 1.52, Na 139, K 4.4;   6/20/18: Tg 12.4, FRANK < 0.4, TSH < 0.01, free T4 1.6 - results followed appt, not discussed at appt .     Neck US,6/20/18 ,  "compare with 4/16/18:   Right level 6 thyroid bed 1.2 x 01 x 1.1  (was 1.3 x 0.95 x 1.2 cm - suspicious; was 1.2 x 0.93 x 0.9 ;   Right level 7 # 1 1.8 x 1.6 x 1.8 cm (was 1.4 x 1.5 x 1.5 cm; was 1.1 x 1.2 x 1 -  Left level 4  0.3 x 0.6 x 1.1 (was  0.3 x 0.8 x 1 cm ; 0.6 x 0.3 x 0.8 )  Left level 4 # 2 0.5 x 0.7 x 0.7 (was  0.8 x 0.5 x 0.6 cm;  0.5 x 0.5 x 1 )  Left level 5 1 0.6 x 0.9 x 1.3 (was 1.0 x 0.5 x 1.5 cm; 1.1 x 0.6 x 1.3   Left level 5 2 0.9 x 0.7 x 0.9 (was 1 x 0.5 x 1.3      ROS:  Voice changing- \"difficult to speak\"; this is a change?  Since 2-3 months.   A lot of phlegm  Not choking on water  Eating difficult??   \"Neck is OK\"  \"2 months ago I had the biopsy.  It did not show cancer\" (this is not correct).   He keeps pointing to his face/ eyes -  says he is telling me he had cataract surery last month.  He can now see better.   Cardiac: he doesn't know   Respiratory: negative; a little HO with stairs  GI:  negative  Wife has a fever and so she couldn't drive him to swim      PMH   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     Post-surgical hypothyroidism 2007     Uncomplicated asthma      Vocal cord paralysis     right     Xerostomia     due to radiation exposures     Past Surgical History:   Procedure Laterality Date     cymetra injection       ESOPHAGOSCOPY, GASTROSCOPY, DUODENOSCOPY (EGD), COMBINED  6/20/2013    Procedure: COMBINED ESOPHAGOSCOPY, GASTROSCOPY, " DUODENOSCOPY (EGD), BIOPSY SINGLE OR MULTIPLE;  gastroscopy;  Surgeon: Leslie Guadarrama MD;  Location:  GI     HERNIA REPAIR Right      lipoma resection chest wall Right 11/09     PHACOEMULSIFICATION CLEAR CORNEA WITH STANDARD INTRAOCULAR LENS IMPLANT Left 5/7/2018    Procedure: PHACOEMULSIFICATION CLEAR CORNEA WITH STANDARD INTRAOCULAR LENS IMPLANT;  LEFT PHACOEMULSIFICATION CLEAR CORNEA WITH STANDARD INTRAOCULAR LENS IMPLANT ;  Surgeon: Nadiya Allen MD;  Location:  [facility]     PHACOEMULSIFICATION CLEAR CORNEA WITH STANDARD INTRAOCULAR LENS IMPLANT Right 8/21/2018    Procedure: PHACOEMULSIFICATION CLEAR CORNEA WITH STANDARD INTRAOCULAR LENS IMPLANT;  RIGHT EYE PHACOEMULSIFICATION CLEAR CORNEA WITH STANDARD INTRAOCULAR LENS IMPLANT;  Surgeon: Nadiya Allen MD;  Location:  [facility]     right selective neck dissection level 2, 3, 4  11/3/09     right  shunt placement  6/28/07     THORACIC SURGERY  Fidencio 3 2017     THYMECTOMY N/A 1/3/2017    Procedure: THYMECTOMY;  Surgeon: Ernesto Armstrong MD;  Location: [facility] OR     THYROIDECTOMY  11/27/07     TRANSCERVICAL EXTENDED MEDIASTINAL LYMPHADENECTOMY N/A 1/3/2017    Procedure: TRANSCERVICAL EXTENDED MEDIASTINAL LYMPHADENECTOMY;  Surgeon: Ernesto Armstrong MD;  Location: [facility] OR     transnasal endoscopic resection of pituitary adenoma  8/13/2007     Current Outpatient Prescriptions   Medication Sig Dispense Refill     hydrocortisone (CORTEF) 10 MG tablet   3     levothyroxine (SYNTHROID/LEVOTHROID) 137 MCG tablet Take 1 tablet (137 mcg) by mouth daily 90 tablet 3     alendronate (FOSAMAX) 70 MG tablet Take 1 tablet (70 mg) by mouth every 7 days 16 tablet 3     amLODIPine (NORVASC) 2.5 MG tablet TK 1 T PO QD  4     B Complex Vitamins (VITAMIN B COMPLEX PO) Take 1 capsule by mouth       Carboxymethylcellulose Sod PF (REFRESH PLUS) 0.5 % SOLN ophthalmic solution Place 1 drop into both eyes 3 times daily as needed for dry eyes       COMBIVENT  RESPIMAT  MCG/ACT inhaler INL 1 PUFF PO QID  1     diclofenac (VOLTAREN) 1 % GEL topical gel Place onto the skin 4 times daily       fish oil-omega-3 fatty acids 1000 MG capsule Take 2 g by mouth daily       fluticasone (FLONASE) 50 MCG/ACT spray Spray 2 sprays into both nostrils daily       GuaiFENesin (MUCUS RELIEF ADULT PO) Take 400 mg by mouth 2 times daily as needed        ibuprofen (ADVIL/MOTRIN) 400 MG tablet Take 400-600 mg by mouth every 6 hours as needed for moderate pain        ketorolac tromethamine (ACULAR-LS) 0.4 % SOLN ophthalmic solution Apply 1 drop to eye 4 times daily Instill into operative eye(s) per physician instructions. 5 mL 0     loperamide (IMODIUM) 2 MG capsule   1     MIRTAZAPINE PO Take 15 mg by mouth At Bedtime       ofloxacin (OCUFLOX) 0.3 % ophthalmic solution Place 1 drop into the right eye 4 times daily Instill into operative eye(s) per physician instructions. 5 mL 0     prednisoLONE acetate (PRED FORTE) 1 % ophthalmic susp Place 1-2 drops into the right eye See Admin Instructions 1 Bottle 0     prednisoLONE acetate (PRED FORTE) 1 % ophthalmic susp Apply 1 drop to eye 4 times daily Instill into operative eye(s) per physician instructions. 5 mL 0     prednisoLONE acetate (PRED FORTE) 1 % ophthalmic susp INT 1 GTT INTO OPERATIVE EYE TID  0     ranitidine (ZANTAC) 300 MG tablet 300 mg daily        RIBOFLAVIN PO Take 100 mg by mouth       TAMSULOSIN HCL PO Take 0.4 mg by mouth At Bedtime        traZODone (DESYREL) 100 MG tablet take 1 tab of 50 mg daily  0     triamcinolone (KENALOG) 0.1 % paste JOHNATHAN SML AMT AA IN MOUTH BID  0     vitamin B complex with vitamin C (VITAMIN  B COMPLEX) TABS tablet Take 1 tablet by mouth daily       zolpidem (AMBIEN) 10 MG tablet Take 10 mg by mouth       They present today without an accurate med list - they don't know his meds.   Medicine at home is managed by him- by himself   Much later in the appt he pulled pill bottles out of a  "bag  Levothyroxine 137 mg/day  HC bottle is empty-- he says he has the pills but transferred to another bottle  Alendronate ran out one month ago??    Family History   Problem Relation Age of Onset     Cancer No family hx of      no skin cancer     Glaucoma No family hx of      Macular Degeneration No family hx of      Social History     Social History     Marital status:      Spouse name: N/A     Number of children: N/A     Years of education: N/A     Occupational History     Not on file.     Social History Main Topics     Smoking status: Former Smoker     Packs/day: 0.50     Years: 5.00     Types: Cigarettes     Start date: 1976     Quit date: 2001     Smokeless tobacco: Never Used     Alcohol use No     Drug use: No     Sexual activity: Not Currently     Partners: Female     Birth control/ protection: Other     Other Topics Concern     Not on file     Social History Narrative    Mom  at age 93 from a fall    Dad  at age 98 from old age     40 plus years    Two adult children in good health.    Occupation: retired from running a [employer]    Originally from [place]         ; \"not that much swimming\" because I'm sick.  Wife has a fever and so she couldn't drive him to swim     Physical Exam   GENERAL: elderly man in NAD.  He is intermittently pointing at his face or covering his face with his hands; His wife is present and yelling in his ear as at other appts. He is very [place]-  His voice is hoarse.  He is carrying water and intermittently drinking it.    BP (!) 193/93 (BP Location: Right arm, Patient Position: Sitting, Cuff Size: Adult Regular)  Pulse 85  Ht 1.651 m (5' 5\")  Wt 60 kg (132 lb 3.2 oz)  BMI 22 kg/m2  SKIN: normal color , temperature.  HEENT: no scleral icterus  Neck.  No definitely definable neck mass by palpation.  I thought I felt more than past tissue on the left neck, somewhat lobular/nodular group  LUNGS: clear bilaterally  CARDIAC: RRR s1 S2  NEURO: Awake, " eyes intermittently closed.  Intermittently responds to questions as if he is on track and then other times responds with long answers having nothing less to do with question.      Results for BUCK CUEVAS (MRN [id number]) as of 8/30/2018 09:33   Ref. Range 8/29/2018 15:00   Creatinine Latest Ref Range: 0.66 - 1.25 mg/dL 0.83   GFR Estimate Latest Ref Range: >60 mL/min/1.7m2 89   GFR Estimate If Black Latest Ref Range: >60 mL/min/1.7m2 >90   Calcium Latest Ref Range: 8.5 - 10.1 mg/dL 8.3 (L)   Phosphorus Latest Ref Range: 2.5 - 4.5 mg/dL 3.2   Prolactin Latest Ref Range: 2 - 18 ug/L 1 (L)   Albumin Fraction Latest Ref Range: 3.7 - 5.1 g/dL PENDING   Alpha 1 Fraction Latest Ref Range: 0.2 - 0.4 g/dL PENDING   Alpha 2 Fraction Latest Ref Range: 0.5 - 0.9 g/dL PENDING   Beta Fraction Latest Ref Range: 0.6 - 1.0 g/dL PENDING   ELP Interpretation: Unknown PENDING   Gamma Fraction Latest Ref Range: 0.7 - 1.6 g/dL PENDING   Monoclonal Peak Latest Ref Range: 0.0 g/dL PENDING   Parathyroid Hormone Intact Latest Ref Range: 18 - 80 pg/mL 41

## 2018-08-29 NOTE — PATIENT INSTRUCTIONS
Prednisolone  Ketorolac  Ofloxicin     3 x day x 1 week, 2 x day x 1 week, 1 x day x 1 week stop    Stop the ointment

## 2018-08-29 NOTE — LETTER
8/29/2018       RE: Travis Peres  [address]  [facility] [id number]     Dear Colleague,    Thank you for referring your patient, Travis Peres, to the [facility] ENDOCRINOLOGY at [facility]. Please see a copy of my visit note below.    Endocrinology Attending Physician Progress note    Very complicated endocrine patient/ problem set.   1. Language barrier , hearing barrier. - both of these make his care very difficult and are interfering with his best health care. He presented to clinic today without hearing aide. They don't know his meds. I wonder more and more  if he is also dementing. I am really uncertain on this.  Today the  is admittedly confused by Mr Peres's statements - the meeting is overall chaotic as most, though today was probably the worst ever.   I am concerned about his basic med management at a basic daily / weekly level and I am also concerned about their abilty to understand what we have been presenting in past appointments relative to the cancer condition, treatment options and health care decisions. We make no progress.  The communication by him  is internally contradictory and very confusing. The  is confused today which is, perhaps progress, in terms of this 's acknowledgment (a different  than we have had in the past) , but similar to what I have long suspected.  I am not sure if either of them (Mr Peres or his wife)  are capable of representing their own best interests.  I am increasingly concerned about this.    Suggestions:   I have indicated we need the help of his PCP and possibly  related to his med management.  After I suggested this his wife insisted they did not need this type of help.    He needs to use hearing aide for every medical appt.    I have encouraged them to think about the conversations he has had with Dr Kinney and me today, in the context of what is important for his life -  for further discussion in the future.  If his PCP could get him life transition decision services it might be helpful (vs total waste of time if he can't hear or is too demented to be a part of the discussion).      2.  Papillary thyroid cancer 4.8 cm right, bilateral node positive. He has been treated with total thyroidectomy, 131I x 2 (cummulative dose 346.4 mCi) His last 131I TBS (2008) post therapy scan raised question of ? lung mets and also ? met above left kidney. Cervical adenopathy has been treated in the past with ETOH .    I have long suspected he had lung mets, but we haven't proven this.      3.  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   Cervical adenopathy has been treated in the past with ETOH . The 2 LNs likely correlate to the high FDG regions on the PET. Over time these nodes are progressing/ enlarging. Specifically, volume of right # 7 has gone from 0.79 to 1.65 to 2.71 cm3 from 9/17 to 4/18 to 6/20/18, doubling time < 1 year  It is possible these are the same LNs that were treated with ETOH in the past (vs others in the same vicinity - it is hard to say)   I am concerned the level 7 mass can't be reached with ETOH treatment, that the only way to remove it is with surgery.  He declined surgery offered in July.  We spent most of the appt today trying to determine if he understands this is what he did, if he understands what the issues are.  Perhaps the morbidity of yet another operation is high enough that the choice to decline surgery is not inappropriate.  Nevertheless, I am concerned as indicated in # 1.      Lung nodules - biopsy has not been performed.  BAL cytology (8/15) did not show malignancy. Spiculated Lingular nodule to 1.7 x 1.5 cm on 9/17 CT measured only 7 mm on 4/12/18 PET, lacking FDG activity.   Chest CT      Persistent thyroglobulinemia has been rising/worse, suggesting progression of thyroid cancer in some location.   As per # 1.  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 The tumor has been significantly de bulked and He has completed XRT for  persistent tumor-. Tumor mass is stable on  MRI through 12/16.    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   The TSH is not fully reliable.  We can treat up to high normal free T4.       Low BMD.  Last DXA bone loss 6/18  He is on alendronate.     Hypogonadism has been untreated (Due to patient noncompliance with treatment recommendations) - not addressed      ? Lytic bone lesions -stable on serial imaging -- not addressed    Greater than 50% of  >  40 minute appt On care coordination/ counseling.  Chaotic meeting as usual.    End 223 PM    Corina Potts MD      Cc/ HPI: Mr Peres returns today, along with his wife and also .  He is not wearing his hearing aide and it is apparent he can't hear.  He is intermittently covering his face with his hands as his wife is screaming questions to him.   I last saw him 6/20/18.  He saw Dr Kinney 7/5/18. I have reviewed her note - he was offered another operation for the neck disease, and he declined it.  Today when asked about this appt initially he said he didn't remember the meeting, his wife said she did remember the meeting -- they say that they were told that the doctor wanted my opinion.  Later They acknowledged they were told of surgical risk to the nerve. Later he said that the past biopsy was benign (which was incorrect).  Repeatedly the  noted how he isn't answering my  questions -he is talking extensively but about other things, including his past hisitory. The  is having a hard time with interpreting because of this.  I  had to ask the   several times to tell me what he is saying due to the confusion of the communication.     He has a history of multiple complicated endocrine issues, including thyroid cancer, surgical hypothyroidism, osteoporosis, pituitary macroadenoma with partial hypopituitarism and history of DI.   See my 4/9/18 note for his detailed history.    Papillary thyroid cancer 4.8 cm left, bilateral node positive (MACIS 6.88 minimum, pT3, pN1a (8), pMx, Stage III or IV). His course has included several operations and several 131I treatments  11/27/07 thyroidectomy  153.4 mCi 131I in 6/08. The radioiodine  was complicated by acute sialadenitis.   12/08  193 mCi 131I (with Thyrogen stimulation). The post therapy scan showed activity in the thyroid bed, lung base on the right and also superior left kidney region   11/3/09  right selective neck dissection levels 2, 3 and 4, removing many positive LNs.   4/16/14 FNAB of right level 6 and 7 cervical lymph nodes were  positive for papillary thyroid cancer. Needle wash Tg was also high on both samples (see below). He had  hoarseness within one hour after the FNAB procedure. He was treated with cymetra on 5/12/14 and he restarted thickened liquids.    6/3/14  ETOH ablation procedure of the  2 LNs    1/31/17  trasnscervical resection of retrosternal mediastinal lesions.  A cystic lesion was removed and drained.  Surgical path  benign thymic tissue.     4/12/18  PET/CT .  Right low neck /superior mediastinal high FDG lateral and more midline. The paratracheal mass is somewhat posterior and below the level of the clavicle (read as 1.4 cm, larger than before, SUB 68), but above the sternum  Tiny focus of fdg left lung     2.  Osteoporosis.  The last BMD was 6/25/18. It shows lowest T-score -2.3 at the right femoral neck. He has had significant loss of BMD since 2015.  Alendronate has been prescribed since 2012.  I suspect he isn't compliant. Today they seem to acknowledge perhaps he ran  "out of alendronate a month ago. (though the Rx is still active).       Labs   4/9/18: Tg 18.2, FRANK < 0.4; TSH  ,0.01, free T4 1.52, Na 139, K 4.4;   6/20/18: Tg 12.4, FRANK < 0.4, TSH < 0.01, free T4 1.6 - results followed appt, not discussed at appt .     Neck US,6/20/18 , compare with 4/16/18:   Right level 6 thyroid bed 1.2 x 01 x 1.1  (was 1.3 x 0.95 x 1.2 cm - suspicious; was 1.2 x 0.93 x 0.9 ;   Right level 7 # 1 1.8 x 1.6 x 1.8 cm (was 1.4 x 1.5 x 1.5 cm; was 1.1 x 1.2 x 1 -  Left level 4  0.3 x 0.6 x 1.1 (was  0.3 x 0.8 x 1 cm ; 0.6 x 0.3 x 0.8 )  Left level 4 # 2 0.5 x 0.7 x 0.7 (was  0.8 x 0.5 x 0.6 cm;  0.5 x 0.5 x 1 )  Left level 5 1 0.6 x 0.9 x 1.3 (was 1.0 x 0.5 x 1.5 cm; 1.1 x 0.6 x 1.3   Left level 5 2 0.9 x 0.7 x 0.9 (was 1 x 0.5 x 1.3      ROS:  Voice changing- \"difficult to speak\"; this is a change?  Since 2-3 months.   A lot of phlegm  Not choking on water  Eating difficult??   \"Neck is OK\"  \"2 months ago I had the biopsy.  It did not show cancer\" (this is not correct).   He keeps pointing to his face/ eyes -  says he is telling me he had cataract surery last month.  He can now see better.   Cardiac: he doesn't know   Respiratory: negative; a little HO with stairs  GI:  negative  Wife has a fever and so she couldn't drive him to swim      [facility]   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     Post-surgical hypothyroidism 2007     Uncomplicated asthma      Vocal cord " paralysis     right     Xerostomia     due to radiation exposures     Past Surgical History:   Procedure Laterality Date     cymetra injection       ESOPHAGOSCOPY, GASTROSCOPY, DUODENOSCOPY (EGD), COMBINED  6/20/2013    Procedure: COMBINED ESOPHAGOSCOPY, GASTROSCOPY, DUODENOSCOPY (EGD), BIOPSY SINGLE OR MULTIPLE;  gastroscopy;  Surgeon: Leslie Guadarrama MD;  Location:  GI     HERNIA REPAIR Right      lipoma resection chest wall Right 11/09     PHACOEMULSIFICATION CLEAR CORNEA WITH STANDARD INTRAOCULAR LENS IMPLANT Left 5/7/2018    Procedure: PHACOEMULSIFICATION CLEAR CORNEA WITH STANDARD INTRAOCULAR LENS IMPLANT;  LEFT PHACOEMULSIFICATION CLEAR CORNEA WITH STANDARD INTRAOCULAR LENS IMPLANT ;  Surgeon: Nadiya Allen MD;  Location:  EC     PHACOEMULSIFICATION CLEAR CORNEA WITH STANDARD INTRAOCULAR LENS IMPLANT Right 8/21/2018    Procedure: PHACOEMULSIFICATION CLEAR CORNEA WITH STANDARD INTRAOCULAR LENS IMPLANT;  RIGHT EYE PHACOEMULSIFICATION CLEAR CORNEA WITH STANDARD INTRAOCULAR LENS IMPLANT;  Surgeon: Nadiya Allen MD;  Location:  EC     right selective neck dissection level 2, 3, 4  11/3/09     right  shunt placement  6/28/07     THORACIC SURGERY  Fidencio 3 2017     THYMECTOMY N/A 1/3/2017    Procedure: THYMECTOMY;  Surgeon: Ernesto Armstrong MD;  Location: [facility] OR     THYROIDECTOMY  11/27/07     TRANSCERVICAL EXTENDED MEDIASTINAL LYMPHADENECTOMY N/A 1/3/2017    Procedure: TRANSCERVICAL EXTENDED MEDIASTINAL LYMPHADENECTOMY;  Surgeon: Ernesto Armstrong MD;  Location: [facility] OR     transnasal endoscopic resection of pituitary adenoma  8/13/2007     Current Outpatient Prescriptions   Medication Sig Dispense Refill     hydrocortisone (CORTEF) 10 MG tablet   3     levothyroxine (SYNTHROID/LEVOTHROID) 137 MCG tablet Take 1 tablet (137 mcg) by mouth daily 90 tablet 3     alendronate (FOSAMAX) 70 MG tablet Take 1 tablet (70 mg) by mouth every 7 days 16 tablet 3     amLODIPine  (NORVASC) 2.5 MG tablet TK 1 T PO QD  4     B Complex Vitamins (VITAMIN B COMPLEX PO) Take 1 capsule by mouth       Carboxymethylcellulose Sod PF (REFRESH PLUS) 0.5 % SOLN ophthalmic solution Place 1 drop into both eyes 3 times daily as needed for dry eyes       COMBIVENT RESPIMAT  MCG/ACT inhaler INL 1 PUFF PO QID  1     diclofenac (VOLTAREN) 1 % GEL topical gel Place onto the skin 4 times daily       fish oil-omega-3 fatty acids 1000 MG capsule Take 2 g by mouth daily       fluticasone (FLONASE) 50 MCG/ACT spray Spray 2 sprays into both nostrils daily       GuaiFENesin (MUCUS RELIEF ADULT PO) Take 400 mg by mouth 2 times daily as needed        ibuprofen (ADVIL/MOTRIN) 400 MG tablet Take 400-600 mg by mouth every 6 hours as needed for moderate pain        ketorolac tromethamine (ACULAR-LS) 0.4 % SOLN ophthalmic solution Apply 1 drop to eye 4 times daily Instill into operative eye(s) per physician instructions. 5 mL 0     loperamide (IMODIUM) 2 MG capsule   1     MIRTAZAPINE PO Take 15 mg by mouth At Bedtime       ofloxacin (OCUFLOX) 0.3 % ophthalmic solution Place 1 drop into the right eye 4 times daily Instill into operative eye(s) per physician instructions. 5 mL 0     prednisoLONE acetate (PRED FORTE) 1 % ophthalmic susp Place 1-2 drops into the right eye See Admin Instructions 1 Bottle 0     prednisoLONE acetate (PRED FORTE) 1 % ophthalmic susp Apply 1 drop to eye 4 times daily Instill into operative eye(s) per physician instructions. 5 mL 0     prednisoLONE acetate (PRED FORTE) 1 % ophthalmic susp INT 1 GTT INTO OPERATIVE EYE TID  0     ranitidine (ZANTAC) 300 MG tablet 300 mg daily        RIBOFLAVIN PO Take 100 mg by mouth       TAMSULOSIN HCL PO Take 0.4 mg by mouth At Bedtime        traZODone (DESYREL) 100 MG tablet take 1 tab of 50 mg daily  0     triamcinolone (KENALOG) 0.1 % paste JOHNATHAN SML AMT AA IN MOUTH BID  0     vitamin B complex with vitamin C (VITAMIN  B COMPLEX) TABS tablet Take 1 tablet by  "mouth daily       zolpidem (AMBIEN) 10 MG tablet Take 10 mg by mouth       They present today without an accurate med list - they don't know his meds.   Medicine at home is managed by him- by himself   Much later in the appt he pulled pill bottles out of a bag  Levothyroxine 137 mg/day  HC bottle is empty-- he says he has the pills but transferred to another bottle  Alendronate ran out one month ago??    Family History   Problem Relation Age of Onset     Cancer No family hx of      no skin cancer     Glaucoma No family hx of      Macular Degeneration No family hx of      Social History     Social History     Marital status:      Spouse name: N/A     Number of children: N/A     Years of education: N/A     Occupational History     Not on file.     Social History Main Topics     Smoking status: Former Smoker     Packs/day: 0.50     Years: 5.00     Types: Cigarettes     Start date: 1976     Quit date: 2001     Smokeless tobacco: Never Used     Alcohol use No     Drug use: No     Sexual activity: Not Currently     Partners: Female     Birth control/ protection: Other     Other Topics Concern     Not on file     Social History Narrative    Mom  at age 93 from a fall    Dad  at age 98 from old age     40 plus years    Two adult children in good health.    Occupation: retired from running a restaurant    Originally from [place]         ; \"not that much swimming\" because I'm sick.  Wife has a fever and so she couldn't drive him to swim     Physical Exam   GENERAL: elderly man in NAD.  He is intermittently pointing at his face or covering his face with his hands; His wife is present and yelling in his ear as at other appts. He is very [place]-  His voice is hoarse.  He is carrying water and intermittently drinking it.    BP (!) 193/93 (BP Location: Right arm, Patient Position: Sitting, Cuff Size: Adult Regular)  Pulse 85  Ht 1.651 m (5' 5\")  Wt 60 kg (132 lb 3.2 oz)  BMI 22 kg/m2  SKIN: " normal color , temperature.  HEENT: no scleral icterus  Neck.  No definitely definable neck mass by palpation.  I thought I felt more than past tissue on the left neck, somewhat lobular/nodular group  LUNGS: clear bilaterally  CARDIAC: RRR s1 S2  NEURO: Awake, eyes intermittently closed.  Intermittently responds to questions as if he is on track and then other times responds with long answers having nothing less to do with question.      Results for BUCK CUEVAS (MRN [id number]) as of 8/30/2018 09:33   Ref. Range 8/29/2018 15:00   Creatinine Latest Ref Range: 0.66 - 1.25 mg/dL 0.83   GFR Estimate Latest Ref Range: >60 mL/min/1.7m2 89   GFR Estimate If Black Latest Ref Range: >60 mL/min/1.7m2 >90   Calcium Latest Ref Range: 8.5 - 10.1 mg/dL 8.3 (L)   Phosphorus Latest Ref Range: 2.5 - 4.5 mg/dL 3.2   Prolactin Latest Ref Range: 2 - 18 ug/L 1 (L)   Albumin Fraction Latest Ref Range: 3.7 - 5.1 g/dL PENDING   Alpha 1 Fraction Latest Ref Range: 0.2 - 0.4 g/dL PENDING   Alpha 2 Fraction Latest Ref Range: 0.5 - 0.9 g/dL PENDING   Beta Fraction Latest Ref Range: 0.6 - 1.0 g/dL PENDING   ELP Interpretation: Unknown PENDING   Gamma Fraction Latest Ref Range: 0.7 - 1.6 g/dL PENDING   Monoclonal Peak Latest Ref Range: 0.0 g/dL PENDING   Parathyroid Hormone Intact Latest Ref Range: 18 - 80 pg/mL 41       Again, thank you for allowing me to participate in the care of your patient.      Sincerely,    Corina Potts MD

## 2018-08-29 NOTE — MR AVS SNAPSHOT
After Visit Summary   8/29/2018    Travis Peres    MRN: [id number]           Patient Information     Date Of Birth          1940        Visit Information        Provider Department      8/29/2018 1:15 PM Corina Potts MD[facility]        Today's Diagnoses     Papillary thyroid carcinoma (HCC)    -  1    Postsurgical hypothyroidism        Pulmonary nodules        Pituitary macroadenoma (H)        Low bone density          Care Instructions    Chest CT  Brain MRI      Labs today      See me in 2-3 months.  Get neck ultrasound just before next appointment.          Follow-ups after your visit        Your next 10 appointments already scheduled     Aug 29, 2018  2:45 PM CDT   LAB with  LAB   [facility] Lab ([facility])    [address] [place] [address] [phone]           Please do not eat 10-12 hours before your appointment if you are coming in fasting for labs on lipids, cholesterol, or glucose (sugar). This does not apply to pregnant women. Water, hot tea and black coffee (with nothing added) are okay. Do not drink other fluids, diet soda or chew gum.            Aug 29, 2018  8:00 PM CDT   CT CHEST W/O CONTRAST with UCCT2   [facility] Center CT ([facility])    [address] [place] [address] [phone]           Please bring any scans or X-rays taken at other hospitals, if similar tests were done. Also bring a list of your medicines, including vitamins, minerals and over-the-counter drugs. It is safest to leave personal items at home.  Be sure to tell your doctor:   If you have any allergies.   If there s any chance you are pregnant.   If you are breastfeeding.  You do not need to do anything special to prepare for this exam.  Please wear loose clothing, such as a sweat suit or jogging clothes. Avoid snaps, zippers and other metal. We may ask you to  undress and put on a hospital gown.            Aug 31, 2018  5:30 PM CDT   MR BRAIN W/O & W CONTRAST with [id number]   [facility] MRI ([facility])    [address] [address] [phone]           Take your medicines as usual, unless your doctor tells you not to. Bring a list of your current medicines to your exam (including vitamins, minerals and over-the-counter drugs).  You may or may not receive intravenous (IV) contrast for this exam pending the discretion of the Radiologist.  You do not need to do anything special to prepare.  The MRI machine uses a strong magnet. Please wear clothes without metal (snaps, zippers). A sweatsuit works well, or we may give you a hospital gown.  Please remove any body piercings and hair extensions before you arrive. You will also remove watches, jewelry, hairpins, wallets, dentures, partial dental plates and hearing aids. You may wear contact lenses, and you may be able to wear your rings. We have a safe place to keep your personal items, but it is safer to leave them at home.  **IMPORTANT** THE INSTRUCTIONS BELOW ARE ONLY FOR THOSE PATIENTS WHO HAVE BEEN PRESCRIBED SEDATION OR GENERAL ANESTHESIA DURING THEIR MRI PROCEDURE:  IF YOUR DOCTOR PRESCRIBED ORAL SEDATION (take medicine to help you relax during your exam):   You must get the medicine from your doctor (oral medication) before you arrive. Bring the medicine to the exam. Do not take it at home. You ll be told when to take it upon arriving for your exam.   Arrive one hour early. Bring someone who can take you home after the test. Your medicine will make you sleepy. After the exam, you may not drive, take a bus or take a taxi by yourself.  IF YOUR DOCTOR PRESCRIBED IV SEDATION:   Arrive one hour early. Bring someone who can take you home after the test. Your medicine will make you sleepy. After the exam, you may not drive, take a bus or take a taxi by  yourself.   No eating 6 hours before your exam. You may have clear liquids up until 4 hours before your exam. (Clear liquids include water, clear tea, black coffee and fruit juice without pulp.)  IF YOUR DOCTOR PRESCRIBED ANESTHESIA (be asleep for your exam):   Arrive 1 1/2 hours early. Bring someone who can take you home after the test. You may not drive, take a bus or take a taxi by yourself.   No eating 8 hours before your exam. You may have clear liquids up until 4 hours before your exam. (Clear liquids include water, clear tea, black coffee and fruit juice without pulp.)   You will spend four to five hours in the recovery room.  Please call the Imaging Department at your exam site with any questions.            Sep 19, 2018 10:00 AM CDT   RETURN RETINA with Nadiya Allen MD   Eye Clinic ([facility])    [address] Clin [address]  [facility] [address]   [phone]            Nov 27, 2018  2:15 PM CST   US HEAD NECK SOFT TISSUE with UCUS2   [facility] US ([facility])    [address] [id number] [phone]           Please bring a list of your medicines (including vitamins, minerals and over-the-counter drugs). Also, tell your doctor about any allergies you may have. Wear comfortable clothes and leave your valuables at home.  You do not need to do anything special to prepare for your exam.  Please call the Imaging Department at your exam site with any questions.            Nov 27, 2018  3:20 PM CST   (Arrive by 3:05 PM)   RETURN ENDOCRINE with Corina Potts MD   [facility] Endocrinology ([facility])    [address] [facility]  3rd [facility] [id number] [phone]              Future tests that were ordered for you today     Open Future Orders        Priority Expected Expires Ordered    Protein electrophoresis Routine  8/29/2019 8/29/2018     "Prolactin Routine  8/29/2019 8/29/2018    Calcium Routine  8/29/2019 8/29/2018    Phosphorus Routine  8/29/2019 8/29/2018    Parathyroid Hormone Intact Routine  8/29/2019 8/29/2018    Creatinine Routine  8/29/2019 8/29/2018    US head neck soft tissue Routine 10/29/2018 3/27/2019 8/29/2018    MRI Brain w & w/o contrast Routine  3/27/2019 8/29/2018    CT Chest w/o contrast Routine  3/27/2019 8/29/2018            Who to contact     Please call your clinic at [phone] to:    Ask questions about your health    Make or cancel appointments    Discuss your medicines    Learn about your test results    Speak to your doctor            Additional Information About Your Visit        Care EveryWhere ID     This is your Care EveryWhere ID. This could be used by other organizations to access your [place] medical records  [id number]        Your Vitals Were     Pulse Height BMI (Body Mass Index)             85 1.651 m (5' 5\") 22 kg/m2          Blood Pressure from Last 3 Encounters:   08/29/18 (!) 193/93   08/21/18 121/78   07/12/18 152/81    Weight from Last 3 Encounters:   08/29/18 60 kg (132 lb 3.2 oz)   08/21/18 57.2 kg (126 lb)   07/12/18 58.6 kg (129 lb 3.2 oz)                 Today's Medication Changes          These changes are accurate as of 8/29/18  2:34 PM.  If you have any questions, ask your nurse or doctor.               These medicines have changed or have updated prescriptions.        Dose/Directions    hydrocortisone 10 MG tablet   Commonly known as:  CORTEF   This may have changed:  additional instructions   Used for:  Papillary thyroid carcinoma (H), Postsurgical hypothyroidism, Pulmonary nodules, Pituitary macroadenoma (H), Low bone density   Changed by:  Corina Potts MD        10 mg AM and 5 mg afternoon   Quantity:  135 tablet   Refills:  3       * prednisoLONE acetate 1 % ophthalmic susp   Commonly known as:  PRED FORTE   This may have changed:  Another medication with the same name was added. " Make sure you understand how and when to take each.   Changed by:  Nadiya Allen MD        INT 1 GTT INTO OPERATIVE EYE TID   Refills:  0       * prednisoLONE acetate 1 % ophthalmic susp   Commonly known as:  PRED FORTE   This may have changed:  Another medication with the same name was added. Make sure you understand how and when to take each.   Used for:  S/P eye surgery   Changed by:  Nadiya Allen MD        Dose:  1 drop   Apply 1 drop to eye 4 times daily Instill into operative eye(s) per physician instructions.   Quantity:  5 mL   Refills:  0       * prednisoLONE acetate 1 % ophthalmic susp   Commonly known as:  PRED FORTE   This may have changed:  You were already taking a medication with the same name, and this prescription was added. Make sure you understand how and when to take each.   Used for:  Aftercare following surgery of a sense organ   Changed by:  Nadiya Allen MD        Dose:  1-2 drop   Place 1-2 drops into the right eye See Admin Instructions   Quantity:  1 Bottle   Refills:  0       * Notice:  This list has 3 medication(s) that are the same as other medications prescribed for you. Read the directions carefully, and ask your doctor or other care provider to review them with you.         Where to get your medicines      These medications were sent to [facility] [address] [place] [address] & [address], [place] [address]     Phone:  [phone]     hydrocortisone 10 MG tablet    prednisoLONE acetate 1 % ophthalmic susp                Primary Care Provider Office Phone # Fax #    Karol SchillingAmrit [phone]       [address] [address]        Equal Access to Services     GERRY LUCAS : Sesar Rodriguez, susana singer, roxanne pool. So [facility] [phone].    ATENCIÓN: Eliza abel,  tiene a reese disposición servicios gratuitos de asistencia lingüística. Tiffanie fong [phone].    We comply with applicable federal civil rights laws and [place] laws. We do not discriminate on the basis of race, color, national origin, age, disability, sex, sexual orientation, or gender identity.            Thank you!     Thank you for choosing [facility]  for your care. Our goal is always to provide you with excellent care. Hearing back from our patients is one way we can continue to improve our services. Please take a few minutes to complete the written survey that you may receive in the mail after your visit with us. Thank you!             Your Updated Medication List - Protect others around you: Learn how to safely use, store and throw away your medicines at www.disposemymeds.org.          This list is accurate as of 8/29/18  2:34 PM.  Always use your most recent med list.                   Brand Name Dispense Instructions for use Diagnosis    alendronate 70 MG tablet    FOSAMAX    16 tablet    Take 1 tablet (70 mg) by mouth every 7 days    Postsurgical hypothyroidism, Papillary thyroid carcinoma (H), Pituitary adenoma (H), Hypopituitarism (H)       amLODIPine 2.5 MG tablet    NORVASC     TK 1 T PO QD        Carboxymethylcellulose Sod PF 0.5 % Soln ophthalmic solution    REFRESH PLUS     Place 1 drop into both eyes 3 times daily as needed for dry eyes        COMBIVENT RESPIMAT  MCG/ACT inhaler   Generic drug:  Ipratropium-Albuterol      INL 1 PUFF PO QID        fish oil-omega-3 fatty acids 1000 MG capsule      Take 2 g by mouth daily        fluticasone 50 MCG/ACT spray    FLONASE     Spray 2 sprays into both nostrils daily        hydrocortisone 10 MG tablet    CORTEF    135 tablet    10 mg AM and 5 mg afternoon    Papillary thyroid carcinoma (H), Postsurgical hypothyroidism, Pulmonary nodules, Pituitary macroadenoma (H), Low bone density       ibuprofen 400 MG tablet    ADVIL/MOTRIN     Take  400-600 mg by mouth every 6 hours as needed for moderate pain        ketorolac tromethamine 0.4 % Soln ophthalmic solution    ACULAR-LS    5 mL    Apply 1 drop to eye 4 times daily Instill into operative eye(s) per physician instructions.    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    S/P eye surgery       * prednisoLONE acetate 1 % ophthalmic susp    PRED FORTE     INT 1 GTT INTO OPERATIVE EYE TID        * prednisoLONE acetate 1 % ophthalmic susp    PRED FORTE    5 mL    Apply 1 drop to eye 4 times daily Instill into operative eye(s) per physician instructions.    S/P eye surgery       * prednisoLONE acetate 1 % ophthalmic susp    PRED FORTE    1 Bottle    Place 1-2 drops into the right eye See Admin Instructions    Aftercare following surgery of a sense organ       ranitidine 300 MG tablet    ZANTAC     300 mg daily        RIBOFLAVIN PO      Take 100 mg by mouth        TAMSULOSIN HCL PO      Take 0.4 mg by mouth At Bedtime        traZODone 100 MG tablet    DESYREL     take 1 tab of 50 mg daily        triamcinolone 0.1 % paste    KENALOG     JOHNATHAN SML AMT AA IN MOUTH BID        VITAMIN B COMPLEX PO      Take 1 capsule by mouth         vitamin B complex with vitamin C Tabs tablet      Take 1 tablet by mouth daily        VOLTAREN 1 % Gel topical gel   Generic drug:  diclofenac      Place onto the skin 4 times daily        zolpidem 10 MG tablet    AMBIEN     Take 10 mg by mouth        * Notice:  This list has 3 medication(s) that are the same as other medications prescribed for you. Read the directions carefully, and ask your doctor or other care provider to review them with you.

## 2018-08-29 NOTE — NURSING NOTE
Chief Complaints and History of Present Illnesses   Patient presents with     Follow Up For     1 week s/p CE/IOL RE     HPI    Affected eye(s):  Right   Symptoms:           Do you have eye pain now?:  No      Comments:  Pt states vision still blurry in RE.     Shweta Hunter@ [facility] 10:26 AM August 29, 2018

## 2018-08-29 NOTE — PROGRESS NOTES
Cc: follow up status post CE/PCIOL right eye 08/21/18     Ok with resident injections  Likes subconj lido    HPI: doing well and vision improving    OCT Mac 08/22/18   Right eye: few small drusen   Left eye: subfoveal CNVM without subretinal fluid / stable intraretinal fluid   Plan for lucentis inj left eye today    Assessment and plan      1. 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    2.  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   - Weekly Amsler Grid use was discussed and training performed.   - A diet of leafy green vegetables was encouraged.   - Return precautions were given.    3. PVD OU   - RT/RD precautions    4. S/p CEIOL OD   - Doing well   - Postop as scheduled   - Lens centered    - Retina attached   - Doing well   - taper eyedrops ketorolac, vigamox and Predforte  Three times a day and slow tapering     5. Pseudophakia left eye    - monitor    return to clinic: 3 weeks postop CEIOL RIGHT EYE - OCT at this visit both eyes   Possible lucentis inj left eye   Possible K suture removal        ~~~~~~~~~~~~~~~~~~~~~~~~~~~~~~~~~~   Complete documentation of historical and exam elements from today's encounter can be found in the full encounter summary report (not reduplicated in this progress note).  I personally obtained the chief complaint(s) and history of present illness.  I confirmed and edited as necessary the review of systems, past medical/surgical history, family history, social history, and examination findings as documented by others; and I examined the patient myself.  I personally reviewed the relevant tests, images, and reports as documented above.  I  formulated and edited as necessary the assessment and plan and discussed the findings and management plan with the patient and family and I was present for the entire procedure performed by the resident/fellow.    Nadiya Allen MD   of Ophthalmology.  Retina Service   Department of Ophthalmology and Visual Neurosciences   [facility]  Phone: [phone]   Fax: [phone]

## 2018-08-29 NOTE — NURSING NOTE
Chief Complaint   Patient presents with     RECHECK     Papillary thyroid carcinoma     Shivani High, CMA

## 2018-08-29 NOTE — MR AVS SNAPSHOT
After Visit Summary   8/29/2018    Travis Peres    MRN: [id number]           Patient Information     Date Of Birth          1940        Visit Information        Provider Department      8/29/2018 9:45 AM Nadiya Allen MD; LANGUAGE [facility]        Care Instructions    Prednisolone  Ketorolac  Ofloxicin     3 x day x 1 week, 2 x day x 1 week, 1 x day x 1 week stop    Stop the ointment           Follow-ups after your visit        Your next 10 appointments already scheduled     Aug 29, 2018  1:15 PM CDT   RETURN ENDOCRINE with Corina Potts MD   [facility] Endocrinology ([facility])    [address]  3rd Floor  [facility] [address] [phone]            Sep 19, 2018 10:00 AM CDT   RETURN RETINA with Nadiya Allen MD   Eye Clinic ([facility])    [address]  [address] Clin [address]  [facility] [address]   [phone]              Future tests that were ordered for you today     Open Future Orders        Priority Expected Expires Ordered    Protein electrophoresis Routine  8/29/2019 8/29/2018    Prolactin Routine  8/29/2019 8/29/2018    Calcium Routine  8/29/2019 8/29/2018    Phosphorus Routine  8/29/2019 8/29/2018    Parathyroid Hormone Intact Routine  8/29/2019 8/29/2018    Creatinine Routine  8/29/2019 8/29/2018    US head neck soft tissue Routine 10/29/2018 3/27/2019 8/29/2018    MRI Brain w & w/o contrast Routine  3/27/2019 8/29/2018    CT Chest w/o contrast Routine  3/27/2019 8/29/2018            Who to contact     Please call your clinic at [phone] to:    Ask questions about your health    Make or cancel appointments    Discuss your medicines    Learn about your test results    Speak to your doctor            Additional Information About Your Visit        Care EveryWhere ID     This is your Care EveryWhere ID. This could be used by other organizations to access your [facility]  records  [id number]         Blood Pressure from Last 3 Encounters:   08/21/18 121/78   07/12/18 152/81   06/20/18 130/80    Weight from Last 3 Encounters:   08/21/18 57.2 kg (126 lb)   07/12/18 58.6 kg (129 lb 3.2 oz)   07/05/18 59.4 kg (131 lb)              Today, you had the following     No orders found for display       Primary Care Provider Office Phone # Fax #    Karol Alvarez [phone]       [address] [address]        Equal Access to Services     GERRY LUCAS : Hadii jake Rodriguez, wadianelysda lucrecia, qajpta kaalmada kenneth, roxanne mitchell. So [facility] [phone].    ATENCIÓN: Si habla español, tiene a reese disposición servicios gratuitos de asistencia lingüística. [facility] [phone].    We comply with applicable federal civil rights laws and [place] laws. We do not discriminate on the basis of race, color, national origin, age, disability, sex, sexual orientation, or gender identity.            Thank you!     Thank you for choosing EYE CLINIC  for your care. Our goal is always to provide you with excellent care. Hearing back from our patients is one way we can continue to improve our services. Please take a few minutes to complete the written survey that you may receive in the mail after your visit with us. Thank you!             Your Updated Medication List - Protect others around you: Learn how to safely use, store and throw away your medicines at www.disposemymeds.org.          This list is accurate as of 8/29/18 12:02 PM.  Always use your most recent med list.                   Brand Name Dispense Instructions for use Diagnosis    alendronate 70 MG tablet    FOSAMAX    16 tablet    Take 1 tablet (70 mg) by mouth every 7 days    Postsurgical hypothyroidism, Papillary thyroid carcinoma (H), Pituitary adenoma (H), Hypopituitarism (H)       amLODIPine 2.5 MG tablet    NORVASC     TK 1 T PO QD         Carboxymethylcellulose Sod PF 0.5 % Soln ophthalmic solution    REFRESH PLUS     Place 1 drop into both eyes 3 times daily as needed for dry eyes        COMBIVENT RESPIMAT  MCG/ACT inhaler   Generic drug:  Ipratropium-Albuterol      INL 1 PUFF PO QID        fish oil-omega-3 fatty acids 1000 MG capsule      Take 2 g by mouth daily        fluticasone 50 MCG/ACT spray    FLONASE     Spray 2 sprays into both nostrils daily        ibuprofen 400 MG tablet    ADVIL/MOTRIN     Take 400-600 mg by mouth every 6 hours as needed for moderate pain        ketorolac tromethamine 0.4 % Soln ophthalmic solution    ACULAR-LS    5 mL    Apply 1 drop to eye 4 times daily Instill into operative eye(s) per physician instructions.    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    S/P eye surgery       * prednisoLONE acetate 1 % ophthalmic susp    PRED FORTE     INT 1 GTT INTO OPERATIVE EYE TID        * prednisoLONE acetate 1 % ophthalmic susp    PRED FORTE    5 mL    Apply 1 drop to eye 4 times daily Instill into operative eye(s) per physician instructions.    S/P eye surgery       ranitidine 300 MG tablet    ZANTAC     300 mg daily        RIBOFLAVIN PO       Take 100 mg by mouth        TAMSULOSIN HCL PO      Take 0.4 mg by mouth At Bedtime        traZODone 100 MG tablet    DESYREL     take 1 tab of 50 mg daily        triamcinolone 0.1 % paste    KENALOG     JOHNATHAN SML AMT AA IN MOUTH BID        VITAMIN B COMPLEX PO      Take 1 capsule by mouth        vitamin B complex with vitamin C Tabs tablet      Take 1 tablet by mouth daily        VOLTAREN 1 % Gel topical gel   Generic drug:  diclofenac      Place onto the skin 4 times daily        zolpidem 10 MG tablet    AMBIEN     Take 10 mg by mouth        * Notice:  This list has 2 medication(s) that are the same as other medications prescribed for you. Read the directions carefully, and ask your doctor or other care provider to review them with you.

## 2018-08-29 NOTE — PATIENT INSTRUCTIONS
Chest CT  Brain MRI      Labs today      See me in 2-3 months.  Get neck ultrasound just before next appointment.

## 2018-08-30 LAB
ALBUMIN SERPL ELPH-MCNC: 4.2 G/DL (ref 3.7–5.1)
ALPHA1 GLOB SERPL ELPH-MCNC: 0.3 G/DL (ref 0.2–0.4)
ALPHA2 GLOB SERPL ELPH-MCNC: 0.6 G/DL (ref 0.5–0.9)
B-GLOBULIN SERPL ELPH-MCNC: 1 G/DL (ref 0.6–1)
GAMMA GLOB SERPL ELPH-MCNC: 2 G/DL (ref 0.7–1.6)
M PROTEIN SERPL ELPH-MCNC: 0 G/DL
PROT PATTERN SERPL ELPH-IMP: ABNORMAL

## 2018-08-30 RX ORDER — ALENDRONATE SODIUM 70 MG/1
70 TABLET ORAL
Qty: 16 TABLET | Refills: 3 | Status: SHIPPED | OUTPATIENT
Start: 2018-08-30 | End: 2019-07-23

## 2018-09-19 ENCOUNTER — OFFICE VISIT (OUTPATIENT)
Dept: OPHTHALMOLOGY | Facility: CLINIC | Age: 78
End: 2018-09-19
Attending: OPHTHALMOLOGY
Payer: MEDICARE

## 2018-09-19 DIAGNOSIS — Z48.810 AFTERCARE FOLLOWING SURGERY OF A SENSORY ORGAN: ICD-10-CM

## 2018-09-19 DIAGNOSIS — H35.3220 EXUDATIVE AGE-RELATED MACULAR DEGENERATION, LEFT EYE, STAGE UNSPECIFIED (H): Primary | ICD-10-CM

## 2018-09-19 PROCEDURE — 92134 CPTRZ OPH DX IMG PST SGM RTA: CPT | Mod: ZF | Performed by: OPHTHALMOLOGY

## 2018-09-19 PROCEDURE — 67028 INJECTION EYE DRUG: CPT | Mod: ZF | Performed by: OPHTHALMOLOGY

## 2018-09-19 PROCEDURE — 92015 DETERMINE REFRACTIVE STATE: CPT | Mod: ZF

## 2018-09-19 PROCEDURE — 25000125 ZZHC RX 250: Mod: ZF | Performed by: OPHTHALMOLOGY

## 2018-09-19 PROCEDURE — 25000128 H RX IP 250 OP 636: Mod: ZF | Performed by: OPHTHALMOLOGY

## 2018-09-19 PROCEDURE — G0463 HOSPITAL OUTPT CLINIC VISIT: HCPCS | Mod: 25,ZF

## 2018-09-19 RX ORDER — LIDOCAINE HYDROCHLORIDE 20 MG/ML
5 INJECTION, SOLUTION EPIDURAL; INFILTRATION; INTRACAUDAL; PERINEURAL ONCE
Status: COMPLETED | OUTPATIENT
Start: 2018-09-19 | End: 2018-09-19

## 2018-09-19 RX ADMIN — RANIBIZUMAB 0.5 MG: 10 INJECTION, SOLUTION INTRAVITREAL at 12:38

## 2018-09-19 RX ADMIN — LIDOCAINE HYDROCHLORIDE 5 ML: 20 INJECTION, SOLUTION EPIDURAL; INFILTRATION; INTRACAUDAL; PERINEURAL at 12:38

## 2018-09-19 ASSESSMENT — CUP TO DISC RATIO
OD_RATIO: 0.5
OS_RATIO: 0.65

## 2018-09-19 ASSESSMENT — REFRACTION_MANIFEST
OD_SPHERE: -0.25
OD_CYLINDER: +0.25
OD_AXIS: 090
OD_ADD: +3.00
OS_SPHERE: PLANO

## 2018-09-19 ASSESSMENT — EXTERNAL EXAM - LEFT EYE: OS_EXAM: NORMAL

## 2018-09-19 ASSESSMENT — SLIT LAMP EXAM - LIDS
COMMENTS: NORMAL
COMMENTS: NORMAL

## 2018-09-19 ASSESSMENT — EXTERNAL EXAM - RIGHT EYE: OD_EXAM: NORMAL

## 2018-09-19 ASSESSMENT — VISUAL ACUITY
METHOD: SNELLEN - LINEAR
OS_SC: CF @ 1'
OD_SC+: -1
OD_SC: 20/25

## 2018-09-19 ASSESSMENT — TONOMETRY
OD_IOP_MMHG: 18
IOP_METHOD: TONOPEN
OS_IOP_MMHG: 20

## 2018-09-19 NOTE — PROGRESS NOTES
Cc: follow up  status post CE/PCIOL right eye 08/21/18     Ok with resident injections  Likes subconj lido    HPI: doing well and vision improving    OCULAR IMAGING  OCT Mac 9-19-18  Right eye: few small drusen   Left eye: subfoveal CNVM without subretinal fluid / stable intraretinal fluid   Plan for lucentis inj left eye today    Assessment and plan      1. 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    2.  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   - Weekly Amsler Grid use was discussed and training performed.   - A diet of leafy green vegetables was encouraged.   - Return precautions were given.    3. PVD OU   - RT/RD precautions    4. S/p CEIOL right eye 08/21/18    - Doing well   - Lens centered    - Retina attached   Cornea suture removal:  1gtt proparacaine given  1gtt of betadine 5% administered  Cornea suture removed without complications  1gtt of betadine 5% administered    Plan:  Vigamox drops four times a day  For 3 days    5. Pseudophakia left eye    - monitor    return to clinic: 4-6 weeks Optical Coherence Tomography and possibly lucentis inj left eye  Use subconj lido for injections    ~~~~~~~~~~~~~~~~~~~~~~~~~~~~~~~~~~   Complete documentation of historical and exam elements from today's encounter can be found in the full encounter summary report (not reduplicated in this progress note).  I personally obtained the chief complaint(s) and history of present illness.  I confirmed and edited as necessary the review of systems, past medical/surgical history, family history, social history, and examination findings as documented by others; and I examined the patient  myself.  I personally reviewed the relevant tests, images, and reports as documented above.  I formulated and edited as necessary the assessment and plan and discussed the findings and management plan with the patient and family and I was present for the entire procedure performed by the resident/fellow.    Nadiya Allen MD   of Ophthalmology.  Retina Service   Department of Ophthalmology and Visual Neurosciences   [facility]  Phone: [phone]   Fax: [phone]

## 2018-09-19 NOTE — MR AVS SNAPSHOT
After Visit Summary   9/19/2018    Travis Peres    MRN: [id number]           Patient Information     Date Of Birth          1940        Visit Information        Provider Department      9/19/2018 9:45 AM Nadiya Allen MD; [facility]        Today's Diagnoses     Exudative age-related macular degeneration, left eye, stage unspecified (H)    -  1       Follow-ups after your visit        Your next 10 appointments already scheduled     Oct 17, 2018 10:30 AM CDT   INJECTION with Nadiya Allen MD   Eye Clinic ([facility])    [address]  [address]  [facility] [address]   [phone]            Nov 27, 2018  2:15 PM CST   US HEAD NECK SOFT TISSUE with UCUS2   [facility] ([facility])    [address] [facility]  [address] [facility] [address]   [phone]           How do I prepare for my exam? (Food and drink instructions) No Food and Drink Restrictions.  How do I prepare for my exam? (Other instructions) You do not need to do anything special to prepare for your exam.  What should I wear: Wear comfortable clothes.  How long does the exam take: Most ultrasounds take 30 to 60 minutes.  What should I bring: Bring a list of your medicines, including vitamins, minerals and over-the-counter drugs. It is safest to leave personal items at home.  Do I need a :  No  is needed.  What do I need to tell my doctor: Tell your doctor about any allergies you may have.  What should I do after the exam: No restrictions, You may resume normal activities.  What is this test: An ultrasound uses sound waves to make pictures of the body. Sound waves do not cause pain. The only discomfort may be the pressure of the wand against your skin or full bladder.  Who should I call with questions: If you have any questions, please call the Imaging Department where you will have your exam.  Directions, parking instructions, and other information is available on our website, [email or web address].            Nov 27, 2018  3:20 PM CST   (Arrive by 3:05 PM)   RETURN ENDOCRINE with Corina Potts MD   [facility] ([facility])    [address] [place] [address] [phone]              Who to contact     Please call your clinic at [phone] to:    Ask questions about your health    Make or cancel appointments    Discuss your medicines    Learn about your test results    Speak to your doctor            Additional Information About Your Visit        Care EveryWhere ID     This is your Care EveryWhere ID. This could be used by other organizations to access your [facility] medical records  [id number]         Blood Pressure from Last 3 Encounters:   08/29/18 132/78   08/21/18 121/78   07/12/18 152/81    Weight from Last 3 Encounters:   08/29/18 60 kg (132 lb 3.2 oz)   08/21/18 57.2 kg (126 lb)   07/12/18 58.6 kg (129 lb 3.2 oz)              We Performed the Following     Lucentis (Ranibizumab) 0.5MG Intravitreal Injection OS (left eye)     OCT Retina Spectralis OU (both eyes)        Primary Care Provider Office Phone # Fax #    Karol Alvarez [phone]       [address] [address]        Equal Access to Services     LAYLA LUCAS : Hadii jake leeo Sokristla, waaxda luqadaha, qaybta kaalmada kenneth, roxanne smith . So [facility] [phone].    ATENCIÓN: Si habla español, tiene a reese disposición servicios gratuitos de asistencia lingüística. Tiffanie al [phone].    We comply with applicable federal civil rights laws and [place] laws. We do not discriminate on the basis of race, color, national origin, age, disability, sex, sexual orientation, or gender identity.            Thank you!     Thank you for choosing EYE CLINIC  for your care. Our goal is always  to provide you with excellent care. Hearing back from our patients is one way we can continue to improve our services. Please take a few minutes to complete the written survey that you may receive in the mail after your visit with us. Thank you!             Your Updated Medication List - Protect others around you: Learn how to safely use, store and throw away your medicines at www.disposemymeds.org.          This list is accurate as of 9/19/18 12:41 PM.  Always use your most recent med list.                   Brand Name Dispense Instructions for use Diagnosis    alendronate 70 MG tablet    FOSAMAX    16 tablet    Take 1 tablet (70 mg) by mouth every 7 days    Postsurgical hypothyroidism, Papillary thyroid carcinoma (H), Hypopituitarism (H)       amLODIPine 2.5 MG tablet    NORVASC     TK 1 T PO QD        Carboxymethylcellulose Sod PF 0.5 % Soln ophthalmic solution    REFRESH PLUS     Place 1 drop into both eyes 3 times daily as needed for dry eyes        COMBIVENT RESPIMAT  MCG/ACT inhaler   Generic drug:  Ipratropium-Albuterol      INL 1 PUFF PO QID        fish oil-omega-3 fatty acids 1000 MG capsule      Take 2 g by mouth daily        fluticasone 50 MCG/ACT spray    FLONASE     Spray 2 sprays into both nostrils daily        hydrocortisone 10 MG tablet    CORTEF    135 tablet    10 mg AM and 5 mg afternoon    Papillary thyroid carcinoma (H), Postsurgical hypothyroidism, Pulmonary nodules, Pituitary macroadenoma (H), Low bone density       ibuprofen 400 MG tablet    ADVIL/MOTRIN     Take 400-600 mg by mouth every 6 hours as needed for moderate pain        ketorolac tromethamine 0.4 % Soln ophthalmic solution    ACULAR-LS    5 mL    Apply 1 drop to eye 4 times daily Instill into operative eye(s) per physician instructions.    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    S/P eye surgery       * prednisoLONE acetate 1 % ophthalmic susp    PRED FORTE     INT 1 GTT INTO OPERATIVE EYE TID        * prednisoLONE acetate 1 % ophthalmic susp    PRED FORTE    5 mL    Apply 1 drop to eye 4 times daily Instill into operative eye(s) per physician instructions.    S/P eye surgery       * prednisoLONE acetate 1 % ophthalmic susp    PRED FORTE    1 Bottle    Place 1-2 drops into the right eye See Admin Instructions    Aftercare following surgery of a sense organ       ranitidine 300 MG tablet    ZANTAC     300 mg daily        RIBOFLAVIN PO      Take 100 mg by mouth        TAMSULOSIN HCL PO      Take 0.4 mg by mouth At Bedtime        traZODone 100 MG tablet    DESYREL     take 1 tab of 50 mg daily        triamcinolone 0.1 % paste    KENALOG     JOHNATHAN SML AMT AA IN MOUTH BID        VITAMIN B COMPLEX PO      Take 1 capsule by mouth        vitamin B complex with vitamin C Tabs tablet      Take 1 tablet by mouth daily        VOLTAREN 1 % Gel topical gel   Generic drug:  diclofenac      Place onto the skin 4 times daily        zolpidem 10 MG tablet    AMBIEN     Take 10 mg by mouth        * Notice:  This list has 3 medication(s) that are the same as other medications prescribed for you. Read the directions carefully, and ask your doctor or other care provider to  review them with you.

## 2018-09-19 NOTE — NURSING NOTE
Chief Complaints and History of Present Illnesses   Patient presents with     Post Op (Ophthalmology) Right Eye     follow up status post CE/PCIOL right eye 08/21/18      HPI    Affected eye(s):  Right   Symptoms:     No floaters   No flashes         Do you have eye pain now?:  No      Comments:  follow up status post CE/PCIOL right eye 08/21/18   Prednisolone bid RE  Ketorolac bid RE  Noelle URIARTE 10:27 AM September 19, 2018

## 2018-09-25 ENCOUNTER — PATIENT OUTREACH (OUTPATIENT)
Dept: ENDOCRINOLOGY | Facility: CLINIC | Age: 78
End: 2018-09-25

## 2018-09-25 ENCOUNTER — TELEPHONE (OUTPATIENT)
Dept: ENDOCRINOLOGY | Facility: CLINIC | Age: 78
End: 2018-09-25

## 2018-09-25 NOTE — TELEPHONE ENCOUNTER
----- Message from Corina Potts MD sent at 9/21/2018  7:15 AM CDT -----  Regarding: care coordination case  This patient needs care coordination and should be added to the [facility] patient list.  We have multiple barriers to his care including deafness, language barrier and my increasing concern he is dementing.   I ordered MRI at the last appt which was not done due to the fact it was scheduled after neurosurg was not available to manage the shunt calibration.    He needs the MRI to be scheduled followed by neurosurg shunt calibration.    Please have Low threshold to involve social work - see my last note on issue related to this    Corina Potts

## 2018-09-25 NOTE — TELEPHONE ENCOUNTER
Travis was added to Care coordination. MRI is scheduled for October  and  that is the only time  Neuro surgery is available to be there  for  the shunt calibration.

## 2018-09-25 NOTE — PATIENT INSTRUCTIONS
Mailed upcoming appointments  for MRI and U/S and will call him  10/19 and 10/22 to remind of  MRI appointment .

## 2018-09-29 ENCOUNTER — OFFICE VISIT (OUTPATIENT)
Dept: URGENT CARE | Facility: URGENT CARE | Age: 78
End: 2018-09-29
Payer: COMMERCIAL

## 2018-09-29 VITALS
OXYGEN SATURATION: 95 % | BODY MASS INDEX: 22.16 KG/M2 | SYSTOLIC BLOOD PRESSURE: 140 MMHG | DIASTOLIC BLOOD PRESSURE: 71 MMHG | RESPIRATION RATE: 16 BRPM | WEIGHT: 133.19 LBS | HEART RATE: 64 BPM | TEMPERATURE: 97.8 F

## 2018-09-29 DIAGNOSIS — J44.1 CHRONIC OBSTRUCTIVE PULMONARY DISEASE WITH ACUTE EXACERBATION (H): Primary | ICD-10-CM

## 2018-09-29 PROBLEM — R79.89 ELEVATED TROPONIN: Status: RESOLVED | Noted: 2017-01-04 | Resolved: 2018-09-29

## 2018-09-29 PROCEDURE — 99214 OFFICE O/P EST MOD 30 MIN: CPT | Performed by: FAMILY MEDICINE

## 2018-09-29 RX ORDER — DOXYCYCLINE 100 MG/1
100 CAPSULE ORAL 2 TIMES DAILY
Qty: 20 CAPSULE | Refills: 0 | Status: SHIPPED | OUTPATIENT
Start: 2018-09-29 | End: 2018-10-09

## 2018-09-29 RX ORDER — PREDNISONE 20 MG/1
40 TABLET ORAL DAILY
Qty: 10 TABLET | Refills: 0 | Status: SHIPPED | OUTPATIENT
Start: 2018-09-29 | End: 2018-10-04

## 2018-09-29 NOTE — MR AVS SNAPSHOT
After Visit Summary   9/29/2018    Travis Peres    MRN: [id number]           Patient Information     Date Of Birth          1940        Visit Information        Provider Department      9/29/2018 11:10 AM Karan Mujica MD [facility]        Today's Diagnoses     Chronic obstructive pulmonary disease with acute exacerbation (H)    -  1       Follow-ups after your visit        Follow-up notes from your care team     Return if symptoms worsen or fail to improve.      Your next 10 appointments already scheduled     Oct 17, 2018 10:30 AM CDT   INJECTION with Nadiya Allen MD   Eye Clinic ([facility])    [facility]  [address] [place] [address]  9th Fl Clin 9a  [facility] [address] [phone]            Oct 24, 2018  9:15 AM CDT   MR BRAIN W/O & W CONTRAST with [address] [facility] ([facility])    [address] [facility]  [address]st [facility] [id number] [phone]           How do I prepare for my exam? (Food and drink instructions) **If you will be receiving sedation or general anesthesia, please see special notes below.**  How do I prepare for my exam? (Other instructions) Take your medicines as usual, unless your doctor tells you not to. You may or may not receive intravenous (IV) contrast for this exam pending the discretion of the Radiologist.  You do not need to do anything special to prepare.  **If you will be receiving sedation or general anesthesia, please see special notes below.**  What should I wear: The MRI machine uses a strong magnet. Please wear clothes without metal (snaps, zippers). A sweatsuit works well, or we may give you a hospital gown. Please remove any body piercings and hair extensions before you arrive. You will also remove watches, jewelry, hairpins, wallets, dentures, partial dental plates and hearing aids. You may wear contact lenses, and you may be able to wear  your rings. We have a safe place to keep your personal items, but it is safer to leave them at home.  How long does the exam take: Most tests take 30 to 60 minutes.  HOWEVER, IF YOUR DOCTOR PRESCRIBES ANESTHESIA please plan on spending four to five hours in the recovery room.  What should I bring:  Bring a list of your current medicines to your exam (including vitamins, minerals and over-the-counter drugs).  Do I need a :  **If you will be receiving sedation or general anesthesia, please see special notes below.**  What should I do after the exam: No Restrictions, You may resume normal activities.  What is this test: MRI (magnetic resonance imaging) uses a strong magnet and radio waves to look inside the body. An MRA (magnetic resonance angiogram) does the same thing, but it lets us look at your blood vessels. A computer turns the radio waves into pictures showing cross sections of the body, much like slices of bread. This helps us see any problems more clearly. You may receive fluid (called  contrast ) before or during your scan. The fluid helps us see the pictures better. We give the fluid through an IV (small needle in your arm).  Who should I call with questions:  Please call the Imaging Department at your exam site with any questions. Directions, parking instructions, and other information is available on our website, [email or web address].  How do I prepare if I m having sedation or anesthesia? **IMPORTANT** THE INSTRUCTIONS BELOW ARE ONLY FOR THOSE PATIENTS WHO HAVE BEEN TOLD THEY WILL RECEIVE SEDATION OR GENERAL ANESTHESIA DURING THEIR MRI PROCEDURE:  IF YOU WILL RECEIVE SEDATION (take medicine to help you relax during your exam): You must get the medicine from your doctor before you arrive. Bring the medicine to the exam. Do not take it at home. Arrive one hour early. Bring someone who can take you home after the test. Your medicine will make you sleepy. After the exam, you may not drive, take a bus or  take a taxi by yourself. No eating 8 hours before your exam. You may have clear liquids up until 4 hours before your exam. (Clear liquids include water, clear tea, black coffee and fruit juice without pulp.)  IF YOU WILL RECEIVE ANESTHESIA (be asleep for your exam): Arrive 1 1/2 hours early. Bring someone who can take you home after the test. You may not drive, take a bus or take a taxi by yourself. No eating 8 hours before your exam. You may have clear liquids up until 4 hours before your exam. (Clear liquids include water, clear tea, black coffee and fruit juice without pulp.)            Oct 24, 2018 10:15 AM CDT   (Arrive by 10:00 AM)   Return Visit with Segundo Stahl MD   [facility] Neurosurgery ([facility])    [address] [place] [id number] [phone]            Nov 27, 2018  2:15 PM CST   US HEAD NECK SOFT TISSUE with UCUS2   [facility] US ([facility])    [address] [place] [id number] [phone]           How do I prepare for my exam? (Food and drink instructions) No Food and Drink Restrictions.  How do I prepare for my exam? (Other instructions) You do not need to do anything special to prepare for your exam.  What should I wear: Wear comfortable clothes.  How long does the exam take: Most ultrasounds take 30 to 60 minutes.  What should I bring: Bring a list of your medicines, including vitamins, minerals and over-the-counter drugs. It is safest to leave personal items at home.  Do I need a :  No  is needed.  What do I need to tell my doctor: Tell your doctor about any allergies you may have.  What should I do after the exam: No restrictions, You may resume normal activities.  What is this test: An ultrasound uses sound waves to make pictures of the body. Sound waves do not cause pain. The only discomfort may be the pressure of the wand against your skin or full  "bladder.  Who should I call with questions: If you have any questions, please call the Imaging Department where you will have your exam. Directions, parking instructions, and other information is available on our website, [email or web address].            2018  3:20 PM CST   (Arrive by 3:05 PM)   RETURN ENDOCRINE with Corina Potts MD   [facility] Endocrinology ([facility])    [address] [place] [address]  3rd [facility] [address] [phone]              Who to contact     If you have questions or need follow up information about today's clinic visit or your schedule please contact [facility] directly at [phone].  Normal or non-critical lab and imaging results will be communicated to you by MyChart, letter or phone within 4 business days after the clinic has received the results. If you do not hear from us within 7 days, please contact the clinic through [employer] or phone. If you have a critical or abnormal lab result, we will notify you by phone as soon as possible.  Submit refill requests through [employer] or call your pharmacy and they will forward the refill request to us. Please allow 3 business days for your refill to be completed.          Additional Information About Your Visit        [employer] Information     [employer] lets you send messages to your doctor, view your test results, renew your prescriptions, schedule appointments and more. To sign up, go to [email or web address] . Click on \"Log in\" on the left side of the screen, which will take you to the Welcome page. Then click on \"Sign up Now\" on the right side of the page.     You will be asked to enter the access code listed below, as well as some personal information. Please follow the directions to create your username and password.     Your access code is: [email or web address]  Expires: 2019  9:36 AM     Your access code will  in 90 days. If you need help or a new code, please " call your [facility] clinic or [phone].        Care EveryWhere ID     This is your Care EveryWhere ID. This could be used by other organizations to access your [facility] medical records  [id number]        Your Vitals Were     Pulse Temperature Respirations Pulse Oximetry BMI (Body Mass Index)       64 97.8  F (36.6  C) (Oral) 16 95% 22.16 kg/m2        Blood Pressure from Last 3 Encounters:   09/29/18 140/71   08/29/18 132/78   08/21/18 121/78    Weight from Last 3 Encounters:   09/29/18 133 lb 3 oz (60.4 kg)   08/29/18 132 lb 3.2 oz (60 kg)   08/21/18 126 lb (57.2 kg)              Today, you had the following     No orders found for display         Today's Medication Changes          These changes are accurate as of 9/29/18 11:59 PM.  If you have any questions, ask your nurse or doctor.               Start taking these medicines.        Dose/Directions    doxycycline monohydrate 100 MG capsule   Used for:  Chronic obstructive pulmonary disease with acute exacerbation (H)   Started by:  Karan Mujica MD        Dose:  100 mg   Take 1 capsule (100 mg) by mouth 2 times daily for 10 days   Quantity:  20 capsule   Refills:  0       predniSONE 20 MG tablet   Commonly known as:  DELTASONE   Used for:  Chronic obstructive pulmonary disease with acute exacerbation (H)   Started by:  Karan Mujica MD        Dose:  40 mg   Take 2 tablets (40 mg) by mouth daily for 5 days   Quantity:  10 tablet   Refills:  0         These medicines have changed or have updated prescriptions.        Dose/Directions    prednisoLONE acetate 1 % ophthalmic susp   Commonly known as:  PRED FORTE   This may have changed:  Another medication with the same name was removed. Continue taking this medication, and follow the directions you see here.   Used for:  Aftercare following surgery of a sense organ   Changed by:  Karan Mujica MD        Dose:  1-2 drop   Place 1-2 drops into the right eye See Admin Instructions   Quantity:  1  Bottle   Refills:  0            Where to get your medicines      These medications were sent to [facility] [id number] [address] & [address], [place] [address]     Phone:  [phone]     doxycycline monohydrate 100 MG capsule    predniSONE 20 MG tablet                Primary Care Provider Office Phone # Fax #    Karol Alvarez [phone]       [address] [address]        Equal Access to Services     [facility]: Hadii aad ku hadasho Soomaali, waaxda luqadaha, qaybta kaalmada adeegyada, waxay martitain hayfroyn aderobyn smith . So [facility] [phone].    ATENCIÓN: Si habla español, tiene a reese disposición servicios gratuitos de asistencia lingüística. [facility] [phone].    We comply with applicable federal civil rights laws and [place] laws. We do not discriminate on the basis of race, color, national origin, age, disability, sex, sexual orientation, or gender identity.            Thank you!     Thank you for choosing [facility]  for your care. Our goal is always to provide you with excellent care. Hearing back from our patients is one way we can continue to improve our services. Please take a few minutes to complete the written survey that you may receive in the mail after your visit with us. Thank you!             Your Updated Medication List - Protect others around you: Learn how to safely use, store and throw away your medicines at www.disposemymeds.org.          This list is accurate as of 9/29/18 11:59 PM.  Always use your most recent med list.                   Brand Name Dispense Instructions for use Diagnosis    alendronate 70 MG tablet    FOSAMAX    16 tablet    Take 1 tablet (70 mg) by mouth every 7 days    Postsurgical hypothyroidism, Papillary thyroid carcinoma (H), Hypopituitarism (H)       amLODIPine 2.5 MG tablet    NORVASC     TK 1 T PO QD         Carboxymethylcellulose Sod PF 0.5 % Soln ophthalmic solution    REFRESH PLUS     Place 1 drop into both eyes 3 times daily as needed for dry eyes        COMBIVENT RESPIMAT  MCG/ACT inhaler   Generic drug:  Ipratropium-Albuterol      INL 1 PUFF PO QID        doxycycline monohydrate 100 MG capsule     20 capsule    Take 1 capsule (100 mg) by mouth 2 times daily for 10 days    Chronic obstructive pulmonary disease with acute exacerbation (H)       fish oil-omega-3 fatty acids 1000 MG capsule      Take 2 g by mouth daily        fluticasone 50 MCG/ACT spray    FLONASE     Spray 2 sprays into both nostrils daily        hydrocortisone 10 MG tablet    CORTEF    135 tablet    10 mg AM and 5 mg afternoon    Papillary thyroid carcinoma (H), Postsurgical hypothyroidism, Pulmonary nodules, Pituitary macroadenoma (H), Low bone density       ibuprofen 400 MG tablet    ADVIL/MOTRIN     Take 400-600 mg by mouth every 6 hours as needed for moderate pain        ketorolac tromethamine 0.4 % Soln ophthalmic solution    ACULAR-LS    5 mL    Apply 1 drop to eye 4 times daily Instill into operative eye(s) per physician instructions.    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    S/P eye surgery       prednisoLONE acetate 1 % ophthalmic susp    PRED FORTE    1 Bottle    Place 1-2 drops into the right eye See Admin Instructions    Aftercare following surgery of a sense organ       predniSONE 20 MG tablet    DELTASONE    10 tablet    Take 2 tablets (40 mg) by mouth daily for 5 days    Chronic obstructive pulmonary disease with acute exacerbation (H)       ranitidine 300 MG tablet    ZANTAC     300 mg daily        RIBOFLAVIN PO      Take 100 mg by mouth        TAMSULOSIN HCL PO      Take 0.4 mg by mouth At Bedtime        traZODone 100 MG tablet    DESYREL     take 1 tab of 50 mg daily        triamcinolone 0.1 % paste    KENALOG     JOHNATHAN SML AMT AA IN MOUTH BID        VITAMIN B COMPLEX PO      Take 1 capsule by mouth        vitamin B complex with vitamin C Tabs tablet      Take 1 tablet by mouth daily        VOLTAREN 1 % Gel topical gel   Generic drug:  diclofenac      Place onto the skin 4 times daily        zolpidem 10 MG tablet    AMBIEN     Take 10 mg by mouth

## 2018-09-29 NOTE — PROGRESS NOTES
CHIEF COMPLAINT:   Chief Complaint   Patient presents with     Cough     cough and chest congestion for one month.     SUBJECTIVE:  Travis Peres is a 77 year old male who presents to the clinic today with a chief complaint of cough  for 1 month(s).  His cough is described as persistent and productive of yellow sputum.    The patient's symptoms are moderate and worsening.  Associated symptoms include nasal congestion and shortness of breath. The patient's symptoms are exacerbated by no particular triggers  Patient has been using albuterol nebs  to improve symptoms.    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     Post-surgical hypothyroidism 2007     Uncomplicated asthma      Vocal cord paralysis     right     Xerostomia     due to radiation exposures       Current Outpatient Prescriptions   Medication Sig Dispense Refill     alendronate (FOSAMAX) 70 MG tablet Take 1 tablet (70 mg) by mouth every 7 days 16 tablet 3     amLODIPine (NORVASC) 2.5 MG tablet TK 1 T PO QD  4     B Complex Vitamins (VITAMIN B COMPLEX PO) Take 1 capsule by mouth       Carboxymethylcellulose Sod PF (REFRESH PLUS) 0.5 % SOLN ophthalmic solution Place 1 drop into both eyes 3 times daily as needed for dry eyes       COMBIVENT RESPIMAT  MCG/ACT inhaler INL 1 PUFF PO QID  1     diclofenac (VOLTAREN) 1 % GEL topical gel Place onto the skin 4 times daily       fish oil-omega-3 fatty acids 1000 MG capsule Take 2 g by mouth daily        fluticasone (FLONASE) 50 MCG/ACT spray Spray 2 sprays into both nostrils daily       GuaiFENesin (MUCUS RELIEF ADULT PO) Take 400 mg by mouth 2 times daily as needed        hydrocortisone (CORTEF) 10 MG tablet 10 mg AM and 5 mg afternoon 135 tablet 3     ibuprofen (ADVIL/MOTRIN) 400 MG tablet Take 400-600 mg by mouth every 6 hours as needed for moderate pain        ketorolac tromethamine (ACULAR-LS) 0.4 % SOLN ophthalmic solution Apply 1 drop to eye 4 times daily Instill into operative eye(s) per physician instructions. 5 mL 0     levothyroxine (SYNTHROID/LEVOTHROID) 137 MCG tablet Take 1 tablet (137 mcg) by mouth daily 90 tablet 3     loperamide (IMODIUM) 2 MG capsule   1     MIRTAZAPINE PO Take 15 mg by mouth At Bedtime       ofloxacin (OCUFLOX) 0.3 % ophthalmic solution Place 1 drop into the right eye 4 times daily Instill into operative eye(s) per physician instructions. 5 mL 0     prednisoLONE acetate (PRED FORTE) 1 % ophthalmic susp Place 1-2 drops into the right eye See Admin Instructions 1 Bottle 0     prednisoLONE acetate (PRED FORTE) 1 % ophthalmic susp Apply 1 drop to eye 4 times daily Instill into operative eye(s) per physician instructions. 5 mL 0     prednisoLONE acetate (PRED FORTE) 1 % ophthalmic susp INT 1 GTT INTO OPERATIVE EYE TID  0     ranitidine (ZANTAC) 300 MG tablet 300 mg daily        RIBOFLAVIN PO Take 100 mg by mouth       TAMSULOSIN HCL PO Take 0.4 mg by mouth At Bedtime        traZODone (DESYREL) 100 MG tablet take 1 tab of 50 mg daily  0     triamcinolone (KENALOG) 0.1 % paste JOHNATHAN SML AMT AA IN MOUTH BID  0     vitamin B complex with vitamin C (VITAMIN  B COMPLEX) TABS tablet Take 1 tablet by mouth daily       zolpidem (AMBIEN) 10 MG tablet Take 10 mg by mouth       Social History   Substance Use Topics     Smoking status: Former Smoker     Packs/day: 0.50     Years: 5.00     Types: Cigarettes     Start date: 1/2/1976     Quit date: 2/2/2001     Smokeless tobacco: Never Used      Alcohol use No     ROS  CONSTITUTIONAL:NEGATIVE for fever, chills, change in weight  INTEGUMENTARY/SKIN: NEGATIVE for worrisome rashes, moles or lesions  EYES: NEGATIVE for vision changes or irritation    OBJECTIVE:  /71  Pulse 64  Temp 97.8  F (36.6  C) (Oral)  Resp 16  Wt 133 lb 3 oz (60.4 kg)  SpO2 95%  BMI 22.16 kg/m2  GENERAL APPEARANCE: healthy, alert and mild distress  EYES: EOMI,  PERRL, conjunctiva clear  HENT: ear canals and TM's normal.  Nose and mouth without ulcers, erythema or lesions  NECK: supple, nontender, no lymphadenopathy  RESP: decreased breath sounds   CV: regular rates and rhythm, normal S1 S2, no murmur noted  NEURO: Normal strength and tone, sensory exam grossly normal,  normal speech and mentation  SKIN: no suspicious lesions or rashes    ASSESSMENT:    COPD exacerbation  Increase nebs   Pt instructed to come back to the clinic for worsening sx       - doxycycline monohydrate 100 MG capsule; Take 1 capsule (100 mg) by mouth 2 times daily for 10 days  Dispense: 20 capsule; Refill: 0  - predniSONE (DELTASONE) 20 MG tablet; Take 2 tablets (40 mg) by mouth daily for 5 days  Dispense: 10 tablet; Refill: 0

## 2018-10-09 ENCOUNTER — TELEPHONE (OUTPATIENT)
Dept: OTOLARYNGOLOGY | Facility: CLINIC | Age: 78
End: 2018-10-09

## 2018-10-09 NOTE — TELEPHONE ENCOUNTER
Contacted pt via  regarding pts pursuing surgery. Relayed per Dr. Kinney she would like to see them in clinic to discuss this prior to scheduling. Provided pt with number for scheduling and requested call back with any questions or concerns. Caridad SANFORD RNCC

## 2018-10-16 DIAGNOSIS — H35.3221 EXUDATIVE AGE-RELATED MACULAR DEGENERATION OF LEFT EYE WITH ACTIVE CHOROIDAL NEOVASCULARIZATION (H): Primary | ICD-10-CM

## 2018-10-17 ENCOUNTER — ALLIED HEALTH/NURSE VISIT (OUTPATIENT)
Dept: OPHTHALMOLOGY | Facility: CLINIC | Age: 78
End: 2018-10-17
Attending: OPHTHALMOLOGY
Payer: MEDICARE

## 2018-10-17 DIAGNOSIS — H35.3221 EXUDATIVE AGE-RELATED MACULAR DEGENERATION OF LEFT EYE WITH ACTIVE CHOROIDAL NEOVASCULARIZATION (H): Primary | ICD-10-CM

## 2018-10-17 PROCEDURE — 25000128 H RX IP 250 OP 636: Mod: ZF | Performed by: OPHTHALMOLOGY

## 2018-10-17 PROCEDURE — G0463 HOSPITAL OUTPT CLINIC VISIT: HCPCS | Mod: 25,ZF

## 2018-10-17 PROCEDURE — 67028 INJECTION EYE DRUG: CPT | Mod: ZF | Performed by: OPHTHALMOLOGY

## 2018-10-17 PROCEDURE — 25000125 ZZHC RX 250: Mod: ZF | Performed by: OPHTHALMOLOGY

## 2018-10-17 RX ORDER — LIDOCAINE HYDROCHLORIDE 20 MG/ML
0.05 INJECTION, SOLUTION EPIDURAL; INFILTRATION; INTRACAUDAL; PERINEURAL
Status: DISCONTINUED | OUTPATIENT
Start: 2018-10-17 | End: 2019-09-18

## 2018-10-17 RX ADMIN — RANIBIZUMAB 0.5 MG: 10 INJECTION, SOLUTION INTRAVITREAL at 12:34

## 2018-10-17 RX ADMIN — LIDOCAINE HYDROCHLORIDE 0.05 ML: 20 INJECTION, SOLUTION EPIDURAL; INFILTRATION; INTRACAUDAL; PERINEURAL at 12:34

## 2018-10-17 ASSESSMENT — CONF VISUAL FIELD
OS_SUPERIOR_TEMPORAL_RESTRICTION: 3
METHOD: COUNTING FINGERS
OD_INFERIOR_NASAL_RESTRICTION: 3
OD_SUPERIOR_NASAL_RESTRICTION: 3
OS_INFERIOR_TEMPORAL_RESTRICTION: 3

## 2018-10-17 ASSESSMENT — TONOMETRY
OD_IOP_MMHG: 12
OS_IOP_MMHG: 13
IOP_METHOD: TONOPEN

## 2018-10-17 ASSESSMENT — SLIT LAMP EXAM - LIDS
COMMENTS: NORMAL
COMMENTS: NORMAL

## 2018-10-17 ASSESSMENT — VISUAL ACUITY
OD_PH_SC: 20/25-2
OD_SC+: -2
OD_SC: 20/30
METHOD: SNELLEN - LINEAR

## 2018-10-17 ASSESSMENT — CUP TO DISC RATIO
OD_RATIO: 0.5
OS_RATIO: 0.65

## 2018-10-17 ASSESSMENT — EXTERNAL EXAM - LEFT EYE: OS_EXAM: NORMAL

## 2018-10-17 ASSESSMENT — EXTERNAL EXAM - RIGHT EYE: OD_EXAM: NORMAL

## 2018-10-17 NOTE — MR AVS SNAPSHOT
After Visit Summary   10/17/2018    Travis Peres    MRN: [id number]           Patient Information     Date Of Birth          1940        Visit Information        Provider Department      10/17/2018 10:15 AM Nadiya Allen MD; LANGUAGE [facility]        Today's Diagnoses     Exudative age-related macular degeneration of left eye with active choroidal neovascularization (H)    -  1       Follow-ups after your visit        Follow-up notes from your care team     Return in about 6 weeks (around 11/28/2018) for OCT.      Your next 10 appointments already scheduled     Oct 24, 2018  9:15 AM CDT   MR BRAIN W/O & W CONTRAST with [address] [facility] ([facility])    [address]  1st Floor  [facility] [address] [phone]           How do I prepare for my exam? (Food and drink instructions) **If you will be receiving sedation or general anesthesia, please see special notes below.**  How do I prepare for my exam? (Other instructions) Take your medicines as usual, unless your doctor tells you not to. You may or may not receive intravenous (IV) contrast for this exam pending the discretion of the Radiologist.  You do not need to do anything special to prepare.  **If you will be receiving sedation or general anesthesia, please see special notes below.**  What should I wear: The MRI machine uses a strong magnet. Please wear clothes without metal (snaps, zippers). A sweatsuit works well, or we may give you a hospital gown. Please remove any body piercings and hair extensions before you arrive. You will also remove watches, jewelry, hairpins, wallets, dentures, partial dental plates and hearing aids. You may wear contact lenses, and you may be able to wear your rings. We have a safe place to keep your personal items, but it is safer to leave them at home.  How long does the exam take: Most tests take 30 to 60 minutes.  HOWEVER, IF YOUR DOCTOR  PRESCRIBES ANESTHESIA please plan on spending four to five hours in the recovery room.  What should I bring:  Bring a list of your current medicines to your exam (including vitamins, minerals and over-the-counter drugs).  Do I need a :  **If you will be receiving sedation or general anesthesia, please see special notes below.**  What should I do after the exam: No Restrictions, You may resume normal activities.  What is this test: MRI (magnetic resonance imaging) uses a strong magnet and radio waves to look inside the body. An MRA (magnetic resonance angiogram) does the same thing, but it lets us look at your blood vessels. A computer turns the radio waves into pictures showing cross sections of the body, much like slices of bread. This helps us see any problems more clearly. You may receive fluid (called  contrast ) before or during your scan. The fluid helps us see the pictures better. We give the fluid through an IV (small needle in your arm).  Who should I call with questions:  Please call the Imaging Department at your exam site with any questions. Directions, parking instructions, and other information is available on our website, [email or web address].  How do I prepare if I m having sedation or anesthesia? **IMPORTANT** THE INSTRUCTIONS BELOW ARE ONLY FOR THOSE PATIENTS WHO HAVE BEEN TOLD THEY WILL RECEIVE SEDATION OR GENERAL ANESTHESIA DURING THEIR MRI PROCEDURE:  IF YOU WILL RECEIVE SEDATION (take medicine to help you relax during your exam): You must get the medicine from your doctor before you arrive. Bring the medicine to the exam. Do not take it at home. Arrive one hour early. Bring someone who can take you home after the test. Your medicine will make you sleepy. After the exam, you may not drive, take a bus or take a taxi by yourself. No eating 8 hours before your exam. You may have clear liquids up until 4 hours before your exam. (Clear liquids include water, clear tea, black coffee and fruit  juice without pulp.)  IF YOU WILL RECEIVE ANESTHESIA (be asleep for your exam): Arrive 1 1/2 hours early. Bring someone who can take you home after the test. You may not drive, take a bus or take a taxi by yourself. No eating 8 hours before your exam. You may have clear liquids up until 4 hours before your exam. (Clear liquids include water, clear tea, black coffee and fruit juice without pulp.)            Oct 24, 2018 10:15 AM CDT   (Arrive by 10:00 AM)   Return Visit with Segundo Stahl MD   [facility] Neurosurgery ([facility])    [address]  3rd Floor  [facility] [address]   [phone]            Nov 27, 2018  2:15 PM CST   US HEAD NECK SOFT TISSUE with UCUS2   [facility] US ([facility])    [address] [place] [address]  [address] [employer] [address] [phone]           How do I prepare for my exam? (Food and drink instructions) No Food and Drink Restrictions.  How do I prepare for my exam? (Other instructions) You do not need to do anything special to prepare for your exam.  What should I wear: Wear comfortable clothes.  How long does the exam take: Most ultrasounds take 30 to 60 minutes.  What should I bring: Bring a list of your medicines, including vitamins, minerals and over-the-counter drugs. It is safest to leave personal items at home.  Do I need a :  No  is needed.  What do I need to tell my doctor: Tell your doctor about any allergies you may have.  What should I do after the exam: No restrictions, You may resume normal activities.  What is this test: An ultrasound uses sound waves to make pictures of the body. Sound waves do not cause pain. The only discomfort may be the pressure of the wand against your skin or full bladder.  Who should I call with questions: If you have any questions, please call the Imaging Department where you will have your exam. Directions, parking instructions, and other  information is available on our website, [email or web address].            2018  3:20 PM CST   (Arrive by 3:05 PM)   RETURN ENDOCRINE with Corina Potts MD   [facility] Endocrinology ([facility])    [address]  3rd [facility] [id number] [phone]            2018  9:45 AM CST   RETURN RETINA with Nadiya Allen MD   Eye Clinic ([facility])    [address]  [facility] [id number] [phone]              Future tests that were ordered for you today     Open Future Orders        Priority Expected Expires Ordered    OCT Retina Spectralis OU (both eyes) Routine  10/16/2019 10/16/2018            Who to contact     Please call your clinic at [phone] to:    Ask questions about your health    Make or cancel appointments    Discuss your medicines    Learn about your test results    Speak to your doctor            Additional Information About Your Visit        [employer] Information     [employer] is an electronic gateway that provides easy, online access to your medical records. With [employer], you can request a clinic appointment, read your test results, renew a prescription or communicate with your care team.     To sign up for [employer] visit the website at [email or web address]   You will be asked to enter the access code listed below, as well as some personal information. Please follow the directions to create your username and password.     Your access code is: [email or web address]  Expires: 2019  9:36 AM     Your access code will  in 90 days. If you need help or a new code, please contact your [facility] or call [phone] for assistance.        Care EveryWhere ID     This is your Care EveryWhere ID. This could be used by other organizations to access your [facility] medical records  [id number]         Blood Pressure from Last 3 Encounters:    09/29/18 140/71   08/29/18 132/78   08/21/18 121/78    Weight from Last 3 Encounters:   09/29/18 60.4 kg (133 lb 3 oz)   08/29/18 60 kg (132 lb 3.2 oz)   08/21/18 57.2 kg (126 lb)              We Performed the Following     Lucentis (Ranibizumab) 0.5MG Intravitreal Injection OS (left eye)        Primary Care Provider Office Phone # Fax #    Karol Alvarez [phone]       [address] [address]        Equal Access to Services     [facility] : Hadii jake Rodriguez, waaxda lucrecia, qaybta kaalmada kenneth, roxanne smith . So [facility] [phone].    ATENCIÓN: Si habla español, tiene a reese disposición servicios gratuitos de asistencia lingüística. Llame al [phone].    We comply with applicable federal civil rights laws and [place] laws. We do not discriminate on the basis of race, color, national origin, age, disability, sex, sexual orientation, or gender identity.            Thank you!     Thank you for choosing EYE CLINIC  for your care. Our goal is always to provide you with excellent care. Hearing back from our patients is one way we can continue to improve our services. Please take a few minutes to complete the written survey that you may receive in the mail after your visit with us. Thank you!             Your Updated Medication List - Protect others around you: Learn how to safely use, store and throw away your medicines at www.disposemymeds.org.          This list is accurate as of 10/17/18 12:57 PM.  Always use your most recent med list.                   Brand Name Dispense Instructions for use Diagnosis    alendronate 70 MG tablet    FOSAMAX    16 tablet    Take 1 tablet (70 mg) by mouth every 7 days    Postsurgical hypothyroidism, Papillary thyroid carcinoma (H), Hypopituitarism (H)       amLODIPine 2.5 MG tablet    NORVASC     TK 1 T PO QD        Carboxymethylcellulose Sod PF 0.5 % Soln ophthalmic  solution    REFRESH PLUS     Place 1 drop into both eyes 3 times daily as needed for dry eyes        COMBIVENT RESPIMAT  MCG/ACT inhaler   Generic drug:  Ipratropium-Albuterol      INL 1 PUFF PO QID        fish oil-omega-3 fatty acids 1000 MG capsule      Take 2 g by mouth daily        fluticasone 50 MCG/ACT spray    FLONASE     Spray 2 sprays into both nostrils daily        hydrocortisone 10 MG tablet    CORTEF    135 tablet    10 mg AM and 5 mg afternoon    Papillary thyroid carcinoma (H), Postsurgical hypothyroidism, Pulmonary nodules, Pituitary macroadenoma (H), Low bone density       ibuprofen 400 MG tablet    ADVIL/MOTRIN     Take 400-600 mg by mouth every 6 hours as needed for moderate pain        ketorolac tromethamine 0.4 % Soln ophthalmic solution    ACULAR-LS    5 mL    Apply 1 drop to eye 4 times daily Instill into operative eye(s) per physician instructions.    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    S/P eye surgery       prednisoLONE acetate 1 % ophthalmic susp    PRED FORTE    1 Bottle    Place 1-2 drops into the right eye See Admin Instructions    Aftercare following surgery of a sense organ       ranitidine 300  MG tablet    ZANTAC     300 mg daily        RIBOFLAVIN PO      Take 100 mg by mouth        TAMSULOSIN HCL PO      Take 0.4 mg by mouth At Bedtime        traZODone 100 MG tablet    DESYREL     take 1 tab of 50 mg daily        triamcinolone 0.1 % paste    KENALOG     JOHNATHAN SML AMT AA IN MOUTH BID        VITAMIN B COMPLEX PO      Take 1 capsule by mouth        vitamin B complex with vitamin C Tabs tablet      Take 1 tablet by mouth daily        VOLTAREN 1 % Gel topical gel   Generic drug:  diclofenac      Place onto the skin 4 times daily        zolpidem 10 MG tablet    AMBIEN     Take 10 mg by mouth

## 2018-10-17 NOTE — NURSING NOTE
Chief Complaints and History of Present Illnesses   Patient presents with     Follow Up For     Exudative age-related macular degeneration of left eye with active choroidal neovascularization (H)      HPI    Last Eye Exam:  9/19/18   Affected eye(s):  Left   Symptoms:        Unknown duration    Frequency:  Constant       Do you have eye pain now?:  No      Comments:  Travis is here for a follow up of Exudative age-related macular degeneration of left eye with active choroidal neovascularization (H)   He may need an intravitreal injection today as well. He feels he sees better in the distance but not as well at near. When his eyes are the best they can be, he is interested in new glasses.    Rayshawn Fulton COT 11:05 AM October 17, 2018

## 2018-10-17 NOTE — PROGRESS NOTES
Cc: follow up  status post CE/PCIOL right eye 08/21/18     Ok with resident injections  Likes subconj lido    HPI: doing well and vision improving    OCULAR IMAGING  OCT Mac 10-17-18  Right eye: few small drusen   Left eye: subfoveal CNVM without subretinal fluid / stable intraretinal fluid   Plan for lucentis inj left eye today    Assessment and plan      1. 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    2.  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   - Weekly [employer] use was discussed and training performed.   - A diet of leafy green vegetables was encouraged.   - Return precautions were given.    3. PVD OU   - RT/RD precautions    4. S/p CEIOL right eye 08/21/18    - Doing well   - Lens centered    - Retina attached    5. Pseudophakia left eye    - monitor    return to clinic: 6 weeks Optical Coherence Tomography and possibly lucentis inj left eye  Use subconj lido for injections    Matthew Anderson MD, PhD  Vitreoretinal Surgery Fellow      ~~~~~~~~~~~~~~~~~~~~~~~~~~~~~~~~~~   Complete documentation of historical and exam elements from today's encounter can be found in the full encounter summary report (not reduplicated in this progress note).  I personally obtained the chief complaint(s) and history of present illness.  I confirmed and edited as necessary the review of systems, past medical/surgical history, family history, social history, and examination findings as documented by others; and I examined the patient myself.  I personally reviewed the relevant tests, images, and reports as documented above.  I formulated and edited as necessary the assessment and plan and  discussed the findings and management plan with the patient and family and No resident or fellow assisted with the procedures performed.  I performed the procedures myself.    Nadiya Allen MD   of Ophthalmology.  Retina Service   Department of Ophthalmology and Visual Neurosciences   [facility]  Phone: [phone]   Fax: [phone]

## 2018-10-24 ENCOUNTER — OFFICE VISIT (OUTPATIENT)
Dept: NEUROSURGERY | Facility: CLINIC | Age: 78
End: 2018-10-24
Payer: COMMERCIAL

## 2018-10-24 ENCOUNTER — RADIANT APPOINTMENT (OUTPATIENT)
Dept: MRI IMAGING | Facility: CLINIC | Age: 78
End: 2018-10-24
Payer: COMMERCIAL

## 2018-10-24 VITALS — OXYGEN SATURATION: 95 % | SYSTOLIC BLOOD PRESSURE: 138 MMHG | HEART RATE: 69 BPM | DIASTOLIC BLOOD PRESSURE: 65 MMHG

## 2018-10-24 DIAGNOSIS — D35.2 PITUITARY ADENOMA (H): ICD-10-CM

## 2018-10-24 DIAGNOSIS — Z98.2 VP (VENTRICULOPERITONEAL) SHUNT STATUS: Primary | ICD-10-CM

## 2018-10-24 RX ORDER — GADOBUTROL 604.72 MG/ML
7.5 INJECTION INTRAVENOUS ONCE
Status: COMPLETED | OUTPATIENT
Start: 2018-10-24 | End: 2018-10-24

## 2018-10-24 RX ADMIN — GADOBUTROL 7.5 ML: 604.72 INJECTION INTRAVENOUS at 09:31

## 2018-10-24 ASSESSMENT — PAIN SCALES - GENERAL: PAINLEVEL: NO PAIN (0)

## 2018-10-24 NOTE — MR AVS SNAPSHOT
After Visit Summary   10/24/2018    Travis Peres    MRN: [id number]           Patient Information     Date Of Birth          1940        Visit Information        Provider Department      10/24/2018 10:15 AM Segundo Stahl MD; MULTILINGUAL WORD [facility] Neurosurgery         Follow-ups after your visit        Your next 10 appointments already scheduled     Nov 27, 2018  2:15 PM CST   US HEAD NECK SOFT TISSUE with UCUS2   [facility] US ([facility])    [address] [place] [address] [phone]           How do I prepare for my exam? (Food and drink instructions) No Food and Drink Restrictions.  How do I prepare for my exam? (Other instructions) You do not need to do anything special to prepare for your exam.  What should I wear: Wear comfortable clothes.  How long does the exam take: Most ultrasounds take 30 to 60 minutes.  What should I bring: Bring a list of your medicines, including vitamins, minerals and over-the-counter drugs. It is safest to leave personal items at home.  Do I need a :  No  is needed.  What do I need to tell my doctor: Tell your doctor about any allergies you may have.  What should I do after the exam: No restrictions, You may resume normal activities.  What is this test: An ultrasound uses sound waves to make pictures of the body. Sound waves do not cause pain. The only discomfort may be the pressure of the wand against your skin or full bladder.  Who should I call with questions: If you have any questions, please call the Imaging Department where you will have your exam. Directions, parking instructions, and other information is available on our website, [email or web address].            Nov 27, 2018  3:20 PM CST   (Arrive by 3:05 PM)   RETURN ENDOCRINE with Corina Potts MD   [facility] Endocrinology ([facility])    [address] [place] [address]  [address]   [phone]            2018  9:45 AM CST   RETURN RETINA with Nadiya Aleln MD   Eye Clinic ([facility])    [address] [address]  [address] [address]  [facility] [address]   [phone]              Who to contact     Please call your clinic at [phone] to:    Ask questions about your health    Make or cancel appointments    Discuss your medicines    Learn about your test results    Speak to your doctor            Additional Information About Your Visit        [employer] Information     [email or web address] is an electronic gateway that provides easy, online access to your medical records. With [email or web address], you can request a clinic appointment, read your test results, renew a prescription or communicate with your care team.     To sign up for [email or web address] visit the website at [email or web address]   You will be asked to enter the access code listed below, as well as some personal information. Please follow the directions to create your username and password.     Your access code is: [email or web address]  Expires: 2019  9:36 AM     Your access code will  in 90 days. If you need help or a new code, please contact your [facility] or call [phone] for assistance.        Care EveryWhere ID     This is your Care EveryWhere ID. This could be used by other organizations to access your [facility] medical records  [id number]        Your Vitals Were     Pulse Pulse Oximetry                69 95%           Blood Pressure from Last 3 Encounters:   10/24/18 138/65   18 140/71   18 132/78    Weight from Last 3 Encounters:   18 60.4 kg (133 lb 3 oz)   18 60 kg (132 lb 3.2 oz)   18 57.2 kg (126 lb)              Today, you had the following     No orders found for display       Primary Care Provider Office Phone # Fax #    Karol Kim [phone]       [address]  [facility] [address]        Equal Access to Services     [facility] LANCE : Hadii jake mondragon camron Rodriguez, wadianelysda luqbecky, qajpta kastephanarslan cortezraadarslan, roxanne malinkennrosana mitchell. So [facility] [phone].    ATENCIÓN: Si habla español, tiene a reese disposición servicios gratuitos de asistencia lingüística. Tiffanie al [phone].    We comply with applicable federal civil rights laws and [place] laws. We do not discriminate on the basis of race, color, national origin, age, disability, sex, sexual orientation, or gender identity.            Thank you!     Thank you for choosing [facility]  for your care. Our goal is always to provide you with excellent care. Hearing back from our patients is one way we can continue to improve our services. Please take a few minutes to complete the written survey that you may receive in the mail after your visit with us. Thank you!             Your Updated Medication List - Protect others around you: Learn how to safely use, store and throw away your medicines at www.disposemymeds.org.          This list is accurate as of 10/24/18 10:48 AM.  Always use your most recent med list.                   Brand Name Dispense Instructions for use Diagnosis    alendronate 70 MG tablet    FOSAMAX    16 tablet    Take 1 tablet (70 mg) by mouth every 7 days    Postsurgical hypothyroidism, Papillary thyroid carcinoma (H), Hypopituitarism (H)       amLODIPine 2.5 MG tablet    NORVASC     TK 1 T PO QD        Carboxymethylcellulose Sod PF 0.5 % Soln ophthalmic solution    REFRESH PLUS     Place 1 drop into both eyes 3 times daily as needed for dry eyes        COMBIVENT RESPIMAT  MCG/ACT inhaler   Generic drug:  Ipratropium-Albuterol      INL 1 PUFF PO QID        fish oil-omega-3 fatty acids 1000 MG capsule      Take 2 g by mouth daily        fluticasone 50 MCG/ACT spray    FLONASE     Spray 2 sprays into both nostrils daily        hydrocortisone 10 MG tablet    CORTEF    135  tablet    10 mg AM and 5 mg afternoon    Papillary thyroid carcinoma (H), Postsurgical hypothyroidism, Pulmonary nodules, Pituitary macroadenoma (H), Low bone density       ibuprofen 400 MG tablet    ADVIL/MOTRIN     Take 400-600 mg by mouth every 6 hours as needed for moderate pain        ketorolac tromethamine 0.4 % Soln ophthalmic solution    ACULAR-LS    5 mL    Apply 1 drop to eye 4 times daily Instill into operative eye(s) per physician instructions.    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    S/P eye surgery       prednisoLONE acetate 1 % ophthalmic susp    PRED FORTE    1 Bottle    Place 1-2 drops into the right eye See Admin Instructions    Aftercare following surgery of a sense organ       ranitidine 300 MG tablet    ZANTAC     300 mg daily        RIBOFLAVIN PO      Take 100 mg by mouth        TAMSULOSIN HCL PO      Take 0.4 mg by mouth At Bedtime        traZODone 100 MG tablet    DESYREL     take 1 tab of 50 mg daily        triamcinolone 0.1 % paste    KENALOG     JOHNATHAN SML AMT AA IN MOUTH BID        VITAMIN B COMPLEX PO      Take 1 capsule by mouth        vitamin B complex with vitamin C Tabs tablet      Take 1 tablet by mouth  daily        VOLTAREN 1 % Gel topical gel   Generic drug:  diclofenac      Place onto the skin 4 times daily        zolpidem 10 MG tablet    AMBIEN     Take 10 mg by mouth

## 2018-10-24 NOTE — PROGRESS NOTES
Service Date: 10/24/2018      HISTORY OF PRESENT ILLNESS:  Mr. 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  His last imaging and neurosurgical evaluation was in 2017.      Reviewing the chart and according to the information from him and his wife through the , he does not appear to be having any complaints related to his tumor or shunt.  He has a number of other health issues that apparently are difficult to manage, but the neurosurgical issues appear to be under control.      We assessed the Strata valve and his  shunt following his MRI scan and found the performance level to have been changed to 2.0.      Using the Strata programming tool, we reprogrammed it back to performance level 2.5.      IMAGING:  I reviewed the MRI scan and there is a small residual, but it appears unchanged since 2017 scan.      ASSESSMENT:     1.  Stable resected giant pituitary adenoma.     2.  Stable hydrocephalus controlled with  shunt.      PLAN:  We have recommended a followup scan and visit in 2 years.  I explained that I will be retiring before then and followup should be with Dr. Barragan and the pituitary clinic.        VITALIY JASON MD             D: 10/24/2018   T: 10/24/2018   MT: DINESH      Name:     BUCK CUEVAS   MRN:      [id number]        Account:      [id number]   :      1940           Service Date: 10/24/2018      Document: [id number]

## 2018-10-24 NOTE — DISCHARGE INSTRUCTIONS
MRI Contrast Discharge Instructions    The IV contrast you received today will pass out of your body in your  urine. This will happen in the next 24 hours. You will not feel this process.  Your urine will not change color.    Drink at least 4 extra glasses of water or juice today (unless your doctor  has restricted your fluids). This reduces the stress on your kidneys.  You may take your regular medicines.    If you are on dialysis: It is best to have dialysis today.    If you have a reaction: Most reactions happen right away. If you have  any new symptoms after leaving the hospital (such as hives or swelling),  call your hospital at the correct number below. Or call your family doctor.  If you have breathing distress or wheezing, call 911.    Special instructions: ***    I have read and understand the above information.    Signature:______________________________________ Date:___________    Staff:__________________________________________ Date:___________     Time:__________    [place] [facility] Departments:    ___[place]: [phone]  ___[facility]: [phone]  ___[place]: [phone] ___[facility]: [phone]  ___[facility]: [phone]  ___[facility]: [phone]  ___[phone]  ___[facility]: [phone]  ___[phone]

## 2018-10-24 NOTE — PATIENT INSTRUCTIONS
1.  Follow up in 2 years with Dr. Barragan .    Thank you for using [employer] for your healthcare needs.

## 2018-10-24 NOTE — LETTER
10/24/2018       RE: Buck Cuevas  [address]  [place] [address]     Dear Colleague,    Thank you for referring your patient, Buck Cuevas, to the [facility] NEUROSURGERY at [facility]. Please see a copy of my visit note below.    Service Date: 10/24/2018      HISTORY OF PRESENT ILLNESS:  Mr. 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  His last imaging and neurosurgical evaluation was in 2017.      Reviewing the chart and according to the information from him and his wife through the , he does not appear to be having any complaints related to his tumor or shunt.  He has a number of other health issues that apparently are difficult to manage, but the neurosurgical issues appear to be under control.      We assessed the Strata valve and his  shunt following his MRI scan and found the performance level to have been changed to 2.0.      Using the Strata programming tool, we reprogrammed it back to performance level 2.5.      IMAGING:  I reviewed the MRI scan and there is a small residual, but it appears unchanged since 2017 scan.      ASSESSMENT:     1.  Stable resected giant pituitary adenoma.     2.  Stable hydrocephalus controlled with  shunt.      PLAN:  We have recommended a followup scan and visit in 2 years.  I explained that I will be retiring before then and followup should be with Dr. Barragan and the pituitary clinic.       D: 10/24/2018   T: 10/24/2018   MT: DINSEH      Name:     BUCK CUEVAS   MRN:      [id number]        Account:      [id number]   :      1940           Service Date: 10/24/2018      Document: [id number]        Again, thank you for allowing me to participate in the care of your patient.      Sincerely,    Segundo Stahl MD

## 2018-11-07 ENCOUNTER — TELEPHONE (OUTPATIENT)
Dept: ENDOCRINOLOGY | Facility: CLINIC | Age: 78
End: 2018-11-07

## 2018-11-07 NOTE — TELEPHONE ENCOUNTER
I contacted Travis and his wife  to alert them to  his upcoming Endocrine appointment 11/27. She is aware that he has an Ul;tra sound in Imaging and then a f/u with Dr Potts after . She has a printed schedule. He did complete the MRI in August and has seen  Dr Stahl  for f/u . I have asked Nazanin to make sure he has his  Hearing aides in for the appointment with Dr Potts.

## 2018-11-08 ENCOUNTER — TRANSFERRED RECORDS (OUTPATIENT)
Dept: HEALTH INFORMATION MANAGEMENT | Facility: CLINIC | Age: 78
End: 2018-11-08

## 2018-11-27 ENCOUNTER — RADIANT APPOINTMENT (OUTPATIENT)
Dept: ULTRASOUND IMAGING | Facility: CLINIC | Age: 78
End: 2018-11-27
Payer: COMMERCIAL

## 2018-11-27 ENCOUNTER — OFFICE VISIT (OUTPATIENT)
Dept: ENDOCRINOLOGY | Facility: CLINIC | Age: 78
End: 2018-11-27
Payer: COMMERCIAL

## 2018-11-27 VITALS
WEIGHT: 136 LBS | DIASTOLIC BLOOD PRESSURE: 78 MMHG | SYSTOLIC BLOOD PRESSURE: 139 MMHG | HEART RATE: 69 BPM | HEIGHT: 65 IN | BODY MASS INDEX: 22.66 KG/M2

## 2018-11-27 DIAGNOSIS — C73 PAPILLARY THYROID CARCINOMA (H): ICD-10-CM

## 2018-11-27 DIAGNOSIS — D35.2 PITUITARY ADENOMA (H): ICD-10-CM

## 2018-11-27 DIAGNOSIS — M85.9 LOW BONE DENSITY: ICD-10-CM

## 2018-11-27 DIAGNOSIS — E89.0 POSTSURGICAL HYPOTHYROIDISM: ICD-10-CM

## 2018-11-27 DIAGNOSIS — C77.0 METASTASIS TO CERVICAL LYMPH NODE (H): ICD-10-CM

## 2018-11-27 DIAGNOSIS — R91.8 PULMONARY NODULES: ICD-10-CM

## 2018-11-27 DIAGNOSIS — E23.0 HYPOPITUITARISM (H): ICD-10-CM

## 2018-11-27 DIAGNOSIS — D35.2 PITUITARY MACROADENOMA (H): ICD-10-CM

## 2018-11-27 DIAGNOSIS — C73 PAPILLARY THYROID CARCINOMA (H): Primary | ICD-10-CM

## 2018-11-27 LAB
T4 FREE SERPL-MCNC: 1.46 NG/DL (ref 0.76–1.46)
TSH SERPL DL<=0.005 MIU/L-ACNC: <0.01 MU/L (ref 0.4–4)

## 2018-11-27 ASSESSMENT — PAIN SCALES - GENERAL: PAINLEVEL: NO PAIN (0)

## 2018-11-27 NOTE — MR AVS SNAPSHOT
"              After Visit Summary   11/27/2018    Travis Peres    MRN: [id number]           Patient Information     Date Of Birth          1940        Visit Information        Provider Department      11/27/2018 3:05 PM Devan Stephen; Corina Potts MD [facility]        Today's Diagnoses     Papillary thyroid carcinoma (HCC)    -  1    Postsurgical hypothyroidism        Hypopituitarism (H)          Care Instructions    See me every 4-6 months              Follow-ups after your visit        Follow-up notes from your care team     Return in about 5 months (around 4/27/2019).      Your next 10 appointments already scheduled     Nov 29, 2018  9:45 AM CST   RETURN RETINA with Nadiya Allen MD   Eye Clinic ([facility])    [address]  [address] [address] [phone]            Apr 22, 2019  2:00 PM CDT   (Arrive by 1:45 PM)   RETURN ENDOCRINE with Corina Potts MD   [facility]ology ([facility])    [address]  [address] [facility] [address] [phone]              Future tests that were ordered for you today     Open Future Orders        Priority Expected Expires Ordered    Thyroglobulin (Total, antibody & recovery %) Routine  11/27/2019 11/27/2018    TSH Routine  11/27/2019 11/27/2018    T4 free Routine  11/27/2019 11/27/2018            Who to contact     Please call your clinic at [phone] to:    Ask questions about your health    Make or cancel appointments    Discuss your medicines    Learn about your test results    Speak to your doctor            Additional Information About Your Visit        Care EveryWhere ID     This is your Care EveryWhere ID. This could be used by other organizations to access your [place] medical records  [id number]        Your Vitals Were     Pulse Height BMI (Body Mass Index)             69 1.651 m (5' 5\") 22.63 kg/m2          Blood " Pressure from Last 3 Encounters:   11/27/18 139/78   10/24/18 138/65   09/29/18 140/71    Weight from Last 3 Encounters:   11/27/18 61.7 kg (136 lb)   09/29/18 60.4 kg (133 lb 3 oz)   08/29/18 60 kg (132 lb 3.2 oz)               Primary Care Provider Office Phone # Fax #    Karol Alvarez [phone]       [address] [address]        Equal Access to Services     [facility]: Hadii jake mondragon hadasho Soomaali, waaxda luqadaha, qaybta kaalmada adeegyaarslan, roxanne smith . So [facility] [phone].    ATENCIÓN: Si habla español, tiene a reese disposición servicios gratuitos de asistencia lingüística. [facility] [phone].    We comply with applicable federal civil rights laws and [place] laws. We do not discriminate on the basis of race, color, national origin, age, disability, sex, sexual orientation, or gender identity.            Thank you!     Thank you for choosing [facility] ENDOCRINOLOGY  for your care. Our goal is always to provide you with excellent care. Hearing back from our patients is one way we can continue to improve our services. Please take a few minutes to complete the written survey that you may receive in the mail after your visit with us. Thank you!             Your Updated Medication List - Protect others around you: Learn how to safely use, store and throw away your medicines at www.disposemymeds.org.          This list is accurate as of 11/27/18  4:21 PM.  Always use your most recent med list.                   Brand Name Dispense Instructions for use Diagnosis    alendronate 70 MG tablet    FOSAMAX    16 tablet    Take 1 tablet (70 mg) by mouth every 7 days    Postsurgical hypothyroidism, Papillary thyroid carcinoma (H), Hypopituitarism (H)       amLODIPine 2.5 MG tablet    NORVASC     TK 1 T PO QD        Carboxymethylcellulose Sod PF 0.5 % Soln ophthalmic solution    REFRESH PLUS     Place 1 drop into both eyes 3 times  daily as needed for dry eyes        COMBIVENT RESPIMAT  MCG/ACT inhaler   Generic drug:  Ipratropium-Albuterol      INL 1 PUFF PO QID        fish oil-omega-3 fatty acids 1000 MG capsule      Take 2 g by mouth daily        fluticasone 50 MCG/ACT nasal spray    FLONASE     Spray 2 sprays into both nostrils daily        hydrocortisone 10 MG tablet    CORTEF    135 tablet    10 mg AM and 5 mg afternoon    Papillary thyroid carcinoma (H), Postsurgical hypothyroidism, Pulmonary nodules, Pituitary macroadenoma (H), Low bone density       ibuprofen 400 MG tablet    ADVIL/MOTRIN     Take 400-600 mg by mouth every 6 hours as needed for moderate pain        ketorolac tromethamine 0.4 % Soln ophthalmic solution    ACULAR-LS    5 mL    Apply 1 drop to eye 4 times daily Instill into operative eye(s) per physician instructions.    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    S/P eye surgery       prednisoLONE acetate 1 % ophthalmic suspension    PRED FORTE    1 Bottle    Place 1-2 drops into the right eye See Admin Instructions    Aftercare following surgery of a sense organ       ranitidine 300 MG tablet    ZANTAC     300 mg daily        RIBOFLAVIN  PO      Take 100 mg by mouth        TAMSULOSIN HCL PO      Take 0.4 mg by mouth At Bedtime        traZODone 100 MG tablet    DESYREL     take 1 tab of 50 mg daily        triamcinolone 0.1 % paste    KENALOG     JOHNATHAN SML AMT AA IN MOUTH BID        VITAMIN B COMPLEX PO      Take 1 capsule by mouth        vitamin B complex with vitamin C tablet      Take 1 tablet by mouth daily        VOLTAREN 1 % topical gel   Generic drug:  diclofenac      Place onto the skin 4 times daily        zolpidem 10 MG tablet    AMBIEN     Take 10 mg by mouth

## 2018-11-27 NOTE — PROGRESS NOTES
Endocrinology Attending Physician Progress note    Very complicated endocrine patient/ problem set.      Papillary thyroid cancer 4.8 cm right, bilateral node positive. He has been treated with total thyroidectomy, 131I x 2 (cummulative dose 346.4 mCi) His last 131I TBS (2008) post therapy scan raised question of ? lung mets and also ? met above left kidney. Cervical adenopathy has been treated in the past with ETOH .    I have long suspected he had lung mets, but we haven't proven this.      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   Cervical adenopathy has been treated in the past with ETOH . The 2 LNs likely correlate to the high FDG regions on the PET. Over time these nodes are progressing/ enlarging. Specifically, volume of right # 7 has gone from 0.79 to 1.65 to 2.71 cm3 from 9/17 to 4/18 to 6/20/18, doubling time < 1 year, but with no change from 6/18 to 11/17 US .  It is possible these are the same LNs that were treated with ETOH in the past (vs others in the same vicinity - it is hard to say)   I am concerned the level 7 mass can't be reached with ETOH treatment, that the only way to remove it is with surgery.  He declined surgery offered in July.      Lung nodules - biopsy has not been performed.  BAL cytology (8/15) did not show malignancy. Spiculated Lingular nodule to 1.7 x 1.5 cm on 9/17 CT measured only 7 mm on 4/12/18 PET, lacking FDG activity. Chest CT 8/18  confirmed PET findings and was stable/ improved ompared with  9/17.      Persistent thyroglobulinemia has been rising/worse, suggesting progression of thyroid cancer in some location.   As per # 1.  Repeat Tg again      Hypopituitary with hypogonadotropic hypogonadism, probable secondary hypothyroidism, and secondary adrenal insufficiency, probable GH deficiency.   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 The tumor has been  significantly de bulked and He has completed XRT for  persistent tumor-. 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   The TSH is  reliable.  We can treat up to high normal free T4.       Low BMD.  Last DXA bone loss 6/18  He is on alendronate.     Hypogonadism has been untreated (Due to patient noncompliance with treatment recommendations) - not addressed      ? Lytic bone lesions -stable on serial imaging -- not addressed    . Language barrier , hearing barrier. - both of these make his care very difficult and are interfering with his best health care.     Corina Potts MD      Cc/ HPI: Mr Peres returns today, along with his wife and also . He has a history of multiple complicated endocrine issues, including thyroid cancer, surgical hypothyroidism, osteoporosis, pituitary macroadenoma with partial hypopituitarism and history of DI.   See my 4/9/18 note for his detailed history.   I last saw him 8/18.  Since then, he had brain MRI, chest CT and he saw Dr Stahl in [facility] [place] where the  shunt was reprogrammed on 10/24/18.     He saw Dr Kinney 7/5/18 where he was offered another operation for the progressive thyroid cancer neck disease, and he declined it. On follow up it has not been entirely clear if this decision was made with a competent mind, though given his comorbidities it was not necessarily an inappropriate decision.  He had a neck US in anticipation of this appt today. The study demonstrates stable abnormalities.     Papillary thyroid cancer 4.8 cm left, bilateral node positive (MACIS 6.88 minimum, pT3, pN1a (8), pMx, Stage III or IV). His course has included several operations and several 131I treatments  11/27/07 thyroidectomy  153.4 mCi 131I in 6/08. The radioiodine  was complicated by acute sialadenitis.   12/08  193 mCi 131I (with Thyrogen stimulation). The post therapy scan showed activity in the thyroid bed, lung base on  the right and also superior left kidney region   11/3/09  right selective neck dissection levels 2, 3 and 4, removing many positive LNs.   4/16/14 FNAB of right level 6 and 7 cervical lymph nodes were  positive for papillary thyroid cancer. Needle wash Tg was also high on both samples (see below). He had  hoarseness within one hour after the FNAB procedure. He was treated with cymetra on 5/12/14 and he restarted thickened liquids.    6/3/14  ETOH ablation procedure of the  2 LNs    1/31/17  trasnscervical resection of retrosternal mediastinal lesions.  A cystic lesion was removed and drained.  Surgical path  benign thymic tissue.  4/12/18  PET/CT .  Right low neck /superior mediastinal high FDG lateral and more midline. The paratracheal mass is somewhat posterior and below the level of the clavicle (read as 1.4 cm, larger than before, SUB 68), but above the sternum  Tiny focus of fdg left lung     2.  Osteoporosis.  The last BMD was 6/25/18. It shows lowest T-score -2.3 at the right femoral neck. He has had significant loss of BMD since 2015.  Alendronate has been prescribed since 2012.  I suspect he isn't compliant. Today they seem to acknowledge perhaps he ran out of alendronate a month ago. (though the Rx is still active).       Labs   4/9/18: Tg 18.2, FRANK < 0.4; TSH  ,0.01, free T4 1.52, Na 139, K 4.4;   6/20/18: Tg 12.4, FRANK < 0.4, TSH < 0.01, free T4 1.6 -   11/27/18: Tg 16.5, FRANK  0.4, TSH < 0.01, free T4 1.46    8/29/18 CT chest: previously spiculted 1.7 x 1.5 cm nodule now 7 x 5 mm , ? Atelectasis.  Scattered upper lobe reticulonodular opacities as seen on prior CT, several more conspicuous, up to 4 mm, ? Infection .  To my eye I also note right level 7 neck mass 1.6 x 2 with some airway mass effect (this was 1.4 x 1.5 on 9/17 CT). This is also likely  the right level 7 mass we have bene following on US.    10/24/18 MRI brain residual mass 1.4 x 0.6 x 0.9 cm, extending into left cavernous sinus.  Stalk  deviated to the left, thickened to 5 mm; optic chiasm atrophic  Neck US from today compared with 6/20/18 and  4/16/18:   Right level 6 thyroid bed 1.2 x 0.9 x 1.2 (was 1.2 x 01 x 1.1  (was 1.3 x 0.95 x 1.2 cm - suspicious; was 1.2 x 0.93 x 0.9 ;  FNAB 4/23/18: + PTC, needle wash Tg 2082 ng/ml  Right level 7 # 1 1.8 x 1.5 x 1.8 (was 1.8 x 1.6 x 1.8 cm (was 1.4 x 1.5 x 1.5 cm; was 1.1 x 1.2 x 1 -FNAB 4/23/18 + PTC, needle wash Tg 430 ng/ml  Left level 4  0.4 x 0.6 x 0.9  0.3 x 0.6 x 1.1 (was  0.3 x 0.8 x 1 cm ; 0.6 x 0.3 x 0.8 )  Left level 4 # 2 0.4 x0.6 x 0.6 0.5 x 0.7 x 0.7 (was  0.8 x 0.5 x 0.6 cm;  0.5 x 0.5 x 1 )     ROS:  Feeling fine  Appetite good  Swallow is no problem- unless eating too fast  Weight is up a few lbs  No pain     PMH   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     Post-surgical hypothyroidism 2007     Uncomplicated asthma      Vocal cord paralysis     right     Xerostomia     due to radiation exposures     Past Surgical History:   Procedure Laterality Date     cymetra injection       ESOPHAGOSCOPY, GASTROSCOPY, DUODENOSCOPY (EGD), COMBINED  6/20/2013    Procedure: COMBINED ESOPHAGOSCOPY, GASTROSCOPY, DUODENOSCOPY (EGD), BIOPSY SINGLE OR MULTIPLE;  gastroscopy;  Surgeon: Leslie Guadarrama MD;  Location: [facility]     HERNIA REPAIR Right      lipoma resection chest wall Right 11/09     PHACOEMULSIFICATION CLEAR CORNEA WITH STANDARD INTRAOCULAR LENS IMPLANT Left 5/7/2018     Procedure: PHACOEMULSIFICATION CLEAR CORNEA WITH STANDARD INTRAOCULAR LENS IMPLANT;  LEFT PHACOEMULSIFICATION CLEAR CORNEA WITH STANDARD INTRAOCULAR LENS IMPLANT ;  Surgeon: Nadiya Allen MD;  Location:  [facility]     PHACOEMULSIFICATION CLEAR CORNEA WITH STANDARD INTRAOCULAR LENS IMPLANT Right 8/21/2018    Procedure: PHACOEMULSIFICATION CLEAR CORNEA WITH STANDARD INTRAOCULAR LENS IMPLANT;  RIGHT EYE PHACOEMULSIFICATION CLEAR CORNEA WITH STANDARD INTRAOCULAR LENS IMPLANT;  Surgeon: Nadiya Allen MD;  Location: [facility]     right selective neck dissection level 2, 3, 4  11/3/09     right  shunt placement  6/28/07     THORACIC SURGERY  Fidencio 3 2017     THYMECTOMY N/A 1/3/2017    Procedure: THYMECTOMY;  Surgeon: Ernesto Armstrong MD;  Location: [facility]     THYROIDECTOMY  11/27/07     TRANSCERVICAL EXTENDED MEDIASTINAL LYMPHADENECTOMY N/A 1/3/2017    Procedure: TRANSCERVICAL EXTENDED MEDIASTINAL LYMPHADENECTOMY;  Surgeon: Ernesto Armstrong MD;  Location: [facility]     transnasal endoscopic resection of pituitary adenoma  8/13/2007     Current Outpatient Prescriptions   Medication Sig Dispense Refill     alendronate (FOSAMAX) 70 MG tablet Take 1 tablet (70 mg) by mouth every 7 days 16 tablet 3     amLODIPine (NORVASC) 2.5 MG tablet TK 1 T PO QD  4     B Complex Vitamins (VITAMIN B COMPLEX PO) Take 1 capsule by mouth       Carboxymethylcellulose Sod PF (REFRESH PLUS) 0.5 % SOLN ophthalmic solution Place 1 drop into both eyes 3 times daily as needed for dry eyes       COMBIVENT RESPIMAT  MCG/ACT inhaler INL 1 PUFF PO QID  1     diclofenac (VOLTAREN) 1 % GEL topical gel Place onto the skin 4 times daily       fish oil-omega-3 fatty acids 1000 MG capsule Take 2 g by mouth daily       fluticasone (FLONASE) 50 MCG/ACT spray Spray 2 sprays into both nostrils daily       GuaiFENesin (MUCUS RELIEF ADULT PO) Take 400 mg by mouth 2 times daily as needed        hydrocortisone (CORTEF) 10 MG tablet 10 mg  AM and 5 mg afternoon 135 tablet 3     ibuprofen (ADVIL/MOTRIN) 400 MG tablet Take 400-600 mg by mouth every 6 hours as needed for moderate pain        ketorolac tromethamine (ACULAR-LS) 0.4 % SOLN ophthalmic solution Apply 1 drop to eye 4 times daily Instill into operative eye(s) per physician instructions. 5 mL 0     levothyroxine (SYNTHROID/LEVOTHROID) 137 MCG tablet Take 1 tablet (137 mcg) by mouth daily 90 tablet 3     loperamide (IMODIUM) 2 MG capsule   1     MIRTAZAPINE PO Take 15 mg by mouth At Bedtime       ofloxacin (OCUFLOX) 0.3 % ophthalmic solution Place 1 drop into the right eye 4 times daily Instill into operative eye(s) per physician instructions. 5 mL 0     prednisoLONE acetate (PRED FORTE) 1 % ophthalmic susp Place 1-2 drops into the right eye See Admin Instructions 1 Bottle 0     ranitidine (ZANTAC) 300 MG tablet 300 mg daily        RIBOFLAVIN PO Take 100 mg by mouth       TAMSULOSIN HCL PO Take 0.4 mg by mouth At Bedtime        traZODone (DESYREL) 100 MG tablet take 1 tab of 50 mg daily  0     triamcinolone (KENALOG) 0.1 % paste JOHNATHAN SML AMT AA IN MOUTH BID  0     vitamin B complex with vitamin C (VITAMIN  B COMPLEX) TABS tablet Take 1 tablet by mouth daily       zolpidem (AMBIEN) 10 MG tablet Take 10 mg by mouth       He carried in his bag of meds today  HC is 1.5 /day- some days only one/day      Family History   Problem Relation Age of Onset     Cancer No family hx of      no skin cancer     Glaucoma No family hx of      Macular Degeneration No family hx of      Social History     Social History     Marital status:      Spouse name: N/A     Number of children: N/A     Years of education: N/A     Occupational History     Not on file.     Social History Main Topics     Smoking status: Former Smoker     Packs/day: 0.50     Years: 5.00     Types: Cigarettes     Start date: 1/2/1976     Quit date: 2/2/2001     Smokeless tobacco: Never Used     Alcohol use No     Drug use: No     Sexual  "activity: Not Currently     Partners: Female     Birth control/ protection: Other     Other Topics Concern     Not on file     Social History Narrative    Mom  at age 93 from a fall    Dad  at age 98 from old age     40 plus years    Two adult children in good health.    Occupation: retired from running a restaurant    Originally from [place] 2 times/week, back to swimming    Physical Exam   GENERAL: elderly man in NAD.  He is wearing his hearing aide today and he seems more appropriate again  His voice is hoarse.  He is carrying water   /78  Pulse 69  Ht 1.651 m (5' 5\")  Wt 61.7 kg (136 lb)  BMI 22.63 kg/m2  SKIN: normal color , temperature.  HEENT: no scleral icterus  Neck.  No definitely definable neck mass by palpation.    LUNGS: clear bilaterally  CARDIAC: RRR s1 S2  NEURO: Awake, alert, responds to questions and seems much more appropriate today.      History: ; Pituitary macroadenoma (H); Papillary thyroid carcinoma  (H); Postsurgical hypothyroidism; Pulmonary nodules; Low bone density.  ICD-10: Pituitary macroadenoma (H); Papillary thyroid carcinoma (H);  Postsurgical hypothyroidism; Pulmonary nodules; Low bone density     Comparison:  MR brain 7/10/2015.      Technique: Axial diffusion and FLAIR images of the whole brain  obtained without intravenous contrast. Sagittal T1 and T2-weighted,  coronal T2-weighted, coronal T1-weighted images with focus on the  sella were obtained without intravenous contrast. Post intravenous  contrast using gadolinium coronal and sagittal T1-weighted images were  obtained focused on the sella. Dynamic postcontrast coronal  T1-weighted images were also obtained.     Contrast: 7.5mL Gadavist     Findings:    Note: Susceptibility artifact associated with a shunt reservoir within  the right parietal region degrades multiple images and limits  evaluation of the right temporal parietal and occipital regions.     There are postsurgical changes of " transnasal and transsphenoidal  approach pituitary adenoma resection. There is residual mass within  the left aspect of the sella, which involves the pituitary  infundibulum measures approximately 1.3 x 0.9 x 0.6 cm,   and extends  into the left cavernous sinus.. The thickened pituitary infundibulum  is deviated to the left and measures up to 5 mm in thickness. The  optic chiasm is atrophic. Major cavernous carotid vascular flow-voids  are patent.  .       Regarding the remainder of the brain. There is no mass effect, midline  shift or extra-axial fluid collection. Diffusion-weighted images  reveal no abnormal reduced diffusion. There is mild generalized  cerebral atrophy. A few T2 hyperintense lesions within the  periventricular and subcortical white matter are nonspecific but  likely represent chronic small vessel ischemic disease.Right parietal  approach ventriculostomy traverses the right lateral ventricle and  terminates within the frontal horn of the left lateral ventricle.  Ventricles are stable in size and configuration. Susceptibility  weighted imaging reveals no intracranial hemorrhage. Flow voids within  the major intracranial vessels are present.     Stable T1 hyperintense fluid/debris within the sphenoid sinus. Mild  mucosal thickening within the ethmoid air cells and maxillary sinuses.  Orbits are grossly unremarkable.         Impression:   1. Postsurgical changes of transsphenoidal sellar adenoma resection  with unchanged mass within the left aspect of the sella and  infundibulum.  2. Stable inspissated debris within in the aerated pterygoid. Stable  chronic sphenoid sinusitis.     I have personally reviewed the examination and initial interpretation  and I agree with the findings.     HUNTER TRAN MD    EXAM:  CT CHEST W/O CONTRAST . 8/29/2018 3:12 PM      TECHNIQUE:  Helical CT images from the thoracic inlet through the  upper abdomen were obtained without intravenous contrast.       COMPARISON: PET/CT 4/12/2018, CT chest 9/25/2017     HISTORY:   ; Papillary thyroid carcinoma (H); Postsurgical  hypothyroidism; Pulmonary nodules; Pituitary macroadenoma (H); Low  bone density      FINDINGS:  The visualized thyroid gland unremarkable in appearance. The heart is  normal in size. No pericardial effusion. The ascending aorta measures  3.9 cm in diameter. The main pulmonary artery is normal in caliber.  Atherosclerotic calcifications of the aorta and coronary arteries.  Mildly prominent mediastinal lymph nodes, similar to the prior CT on  9/25/2017, none which are enlarged by size criteria. Unchanged  calcified mediastinal and right hilar lymph nodes. Soft tissue  attenuation in the anterior mediastinum is similar to prior, likely  from postsurgical changes related to anterior mediastinal mass  resection. Partially visualized line to the right chest wall and neck,  presumably a  shunt.     Central tracheobronchial tree is patent. Evaluation of lungs is  slightly limited limited due to motion artifact. No pleural effusion  or pneumothorax. Unchanged 1.5 x 1.3 partially calcified nodule in the  lingula (series 4, image 142). Previously described spiculated nodule  in the lingula on the prior CT on 9/25/2017, now measures 7 x 5 mm as  it did on the comparison PET/CT on 4/12/2018. Straightening no avid  FDG uptake. This may represent atelectasis.      Innumerable upper lobe predominant reticulonodular opacities, right  greater than left, in a peribronchovascular distribution. Of these  appear more conspicuous compared to the prior CT on 9/25/2017,  including adjacent 3 mm and 4 mm solid nodules in the right upper lobe  (series 4, image [id number]) Unchanged consolidation along the right minor  fissure extending to the hilum, with air bronchograms.     Limited evaluation of the upper abdomen. Cholelithiasis. 5.4 cm left  renal cyst. Tiny hiatal hernia.     Bones and soft tissues: Degenerative changes of  the spine. No acute  osseous abnormalities or suspicious osseous lesions.         IMPRESSION:   1. The previously described spiculated nodule in the lingula on CT on  9/25/2017 which measured 1.7 x 1.5 cm, now measures 7 x 5 mm as it did  on the recent PET/CT on 4/12/2018. This demonstrated no avid FDG  uptake on PET/CT, and likely represents atelectasis.   2. Scattered upper lobe predominant reticulonodular opacities as seen  on the prior CT, several of which appear more conspicuous on this exam  in a tree-in-bud distribution and measure up to 4 mm. These findings  may represent infection.   3. Stable dilatation of the ascending aorta which measures 3.9 cm in  diameter.     I have personally reviewed the examination and initial interpretation  and I agree with the findings.     SOM NGUYEN MD    EXAMINATION: US HEAD NECK SOFT TISSUE, 11/27/2018 3:00 PM      COMPARISON: 6/20/2018, 4/16/2018, 9/25/2017     HISTORY: Papillary thyroid cancer     FINDINGS:     Lymph nodes are measured bilaterally with measurements given in  craniocaudal, transverse and AP dimensions as follows:     Right:  Level 1: No suspicious nodes  Level 2: No suspicious nodes  Level 3: No suspicious nodes  Level 4: No suspicious nodes  Level 5: No suspicious nodes  Level 6: 1.2 x 0.9 x 1.2 cm solid hypoechoic node with lobulated  margins, unchanged since at least 9/25/2017  Level 7: 1.8 x 1.5 x 1.8 cm hypoechoic solid node, similar in size to  6/20/2018 though has increased in size from priors.     Left:  Level 1: No suspicious nodes  Level 2: 1.4 x 0.5 x 2.3 cm elongated node with fatty hilum,  previously seen on prior. Additional 1.2 x 0.7 x 1.4 cm lymph node  with fatty hilum not substantially changed from prior exam.  Level 3: No suspicious nodes  Level 4: 2 nodes without substantial change from prior exam: A 0.4 x  0.6 x 0.8 cm node and a 0.4 x 0.6 x 0.6 cm node  Level 5: 2 nodes without substantial change from prior examination: A  1.1 x  0.4 x 1.5 cm node, and a 0.9 x 3.6 x 1.0 cm  Level 6: No suspicious nodes  Level 7: No suspicious nodes         IMPRESSION:   1. Redemonstration of suspicious right level 6 and 7 nodules which  were hypermetabolic on PET/CT 4/12/2018. Findings are concerning for  recurrent/metastatic disease. Consider tissue sampling.  2. Additional lymph node measurements as described above.     I have personally reviewed the examination and initial interpretation  and I agree with the findings.     LUISA PATTERSON MD

## 2018-11-27 NOTE — LETTER
11/27/2018       RE: Travis Peres  [address]  [facility] [address]     Dear Colleague,    Thank you for referring your patient, Trvais Peres, to the [facility] ENDOCRINOLOGY at [facility]. Please see a copy of my visit note below.        Endocrinology Attending Physician Progress note    Very complicated endocrine patient/ problem set.      Papillary thyroid cancer 4.8 cm right, bilateral node positive. He has been treated with total thyroidectomy, 131I x 2 (cummulative dose 346.4 mCi) His last 131I TBS (2008) post therapy scan raised question of ? lung mets and also ? met above left kidney. Cervical adenopathy has been treated in the past with ETOH .    I have long suspected he had lung mets, but we haven't proven this.      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   Cervical adenopathy has been treated in the past with ETOH . The 2 LNs likely correlate to the high FDG regions on the PET. Over time these nodes are progressing/ enlarging. Specifically, volume of right # 7 has gone from 0.79 to 1.65 to 2.71 cm3 from 9/17 to 4/18 to 6/20/18, doubling time < 1 year, but with no change from 6/18 to 11/17 US .  It is possible these are the same LNs that were treated with ETOH in the past (vs others in the same vicinity - it is hard to say)   I am concerned the level 7 mass can't be reached with ETOH treatment, that the only way to remove it is with surgery.  He declined surgery offered in July.      Lung nodules - biopsy has not been performed.  BAL cytology (8/15) did not show malignancy. Spiculated Lingular nodule to 1.7 x 1.5 cm on 9/17 CT measured only 7 mm on 4/12/18 PET, lacking FDG activity. Chest CT 8/18  confirmed PET findings and was stable/ improved ompared with  9/17.      Persistent thyroglobulinemia has been rising/worse, suggesting progression of thyroid cancer in some location.   As per # 1.  Repeat Tg again      Hypopituitary with  hypogonadotropic hypogonadism, probable secondary hypothyroidism, and secondary adrenal insufficiency, probable GH deficiency.   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 The tumor has been significantly de bulked and He has completed XRT for  persistent tumor-. 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   The TSH is  reliable.  We can treat up to high normal free T4.       Low BMD.  Last DXA bone loss 6/18  He is on alendronate.     Hypogonadism has been untreated (Due to patient noncompliance with treatment recommendations) - not addressed      ? Lytic bone lesions -stable on serial imaging -- not addressed    . Language barrier , hearing barrier. - both of these make his care very difficult and are interfering with his best health care.     Corina Potts MD      Cc/ HPI: Mr Peres returns today, along with his wife and also . He has a history of multiple complicated endocrine issues, including thyroid cancer, surgical hypothyroidism, osteoporosis, pituitary macroadenoma with partial hypopituitarism and history of DI.   See my 4/9/18 note for his detailed history.   I last saw him 8/18.  Since then, he had brain MRI, chest CT and [place] [facility] [place] was reprogrammed on 10/24/18.     He saw Dr Kinney 7/5/18 where he was offered another operation for the progressive thyroid cancer neck disease, and he declined it. On follow up it has not been entirely clear if this decision was made with a competent mind, though given his comorbidities it was not necessarily an inappropriate decision.  He had a neck US in anticipation of this appt today. The study demonstrates stable abnormalities.     Papillary thyroid cancer 4.8 cm left, bilateral node positive (MACIS 6.88 minimum, pT3, pN1a (8), pMx, Stage III or IV). His course has  included several operations and several 131I treatments  11/27/07 thyroidectomy  153.4 mCi 131I in 6/08. The radioiodine  was complicated by acute sialadenitis.   12/08  193 mCi 131I (with Thyrogen stimulation). The post therapy scan showed activity in the thyroid bed, lung base on the right and also superior left kidney region   11/3/09  right selective neck dissection levels 2, 3 and 4, removing many positive LNs.   4/16/14 FNAB of right level 6 and 7 cervical lymph nodes were  positive for papillary thyroid cancer. Needle wash Tg was also high on both samples (see below). He had  hoarseness within one hour after the FNAB procedure. He was treated with cymetra on 5/12/14 and he restarted thickened liquids.    6/3/14  ETOH ablation procedure of the  2 LNs    1/31/17  trasnscervical resection of retrosternal mediastinal lesions.  A cystic lesion was removed and drained.  Surgical path  benign thymic tissue.  4/12/18  PET/CT .  Right low neck /superior mediastinal high FDG lateral and more midline. The paratracheal mass is somewhat posterior and below the level of the clavicle (read as 1.4 cm, larger than before, SUB 68), but above the sternum  Tiny focus of fdg left lung     2.  Osteoporosis.  The last BMD was 6/25/18. It shows lowest T-score -2.3 at the right femoral neck. He has had significant loss of BMD since 2015.  Alendronate has been prescribed since 2012.  I suspect he isn't compliant. Today they seem to acknowledge perhaps he ran out of alendronate a month ago. (though the Rx is still active).       Labs   4/9/18: Tg 18.2, FRANK < 0.4; TSH  ,0.01, free T4 1.52, Na 139, K 4.4;   6/20/18: Tg 12.4, FRANK < 0.4, TSH < 0.01, free T4 1.6 -   11/27/18: Tg 16.5, FRANK  0.4, TSH < 0.01, free T4 1.46    8/29/18 CT chest: previously spiculted 1.7 x 1.5 cm nodule now 7 x 5 mm , ? Atelectasis.  Scattered upper lobe reticulonodular opacities as seen on prior CT, several more conspicuous, up to 4 mm, ? Infection .  To my eye I  also note right level 7 neck mass 1.6 x 2 with some airway mass effect (this was 1.4 x 1.5 on 9/17 CT). This is also likely  the right level 7 mass we have bene following on US.    10/24/18 MRI brain residual mass 1.4 x 0.6 x 0.9 cm, extending into left cavernous sinus.  Stalk deviated to the left, thickened to 5 mm; optic chiasm atrophic  Neck US from today compared with 6/20/18 and  4/16/18:   Right level 6 thyroid bed 1.2 x 0.9 x 1.2 (was 1.2 x 01 x 1.1  (was 1.3 x 0.95 x 1.2 cm - suspicious; was 1.2 x 0.93 x 0.9 ;  FNAB 4/23/18: + PTC, needle wash Tg 2082 ng/ml  Right level 7 # 1 1.8 x 1.5 x 1.8 (was 1.8 x 1.6 x 1.8 cm (was 1.4 x 1.5 x 1.5 cm; was 1.1 x 1.2 x 1 -FNAB 4/23/18 + PTC, needle wash Tg 430 ng/ml  Left level 4  0.4 x 0.6 x 0.9  0.3 x 0.6 x 1.1 (was  0.3 x 0.8 x 1 cm ; 0.6 x 0.3 x 0.8 )  Left level 4 # 2 0.4 x0.6 x 0.6 0.5 x 0.7 x 0.7 (was  0.8 x 0.5 x 0.6 cm;  0.5 x 0.5 x 1 )     ROS:  Feeling fine  Appetite good  Swallow is no problem- unless eating too fast  Weight is up a few lbs  No pain     PMH   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     Post-surgical hypothyroidism 2007     Uncomplicated asthma      Vocal cord paralysis     right     Xerostomia     due to radiation exposures     Past Surgical History:   Procedure Laterality Date     cymetra injection       ESOPHAGOSCOPY, GASTROSCOPY, DUODENOSCOPY (EGD), COMBINED  6/20/2013    Procedure: COMBINED ESOPHAGOSCOPY, GASTROSCOPY,  DUODENOSCOPY (EGD), BIOPSY SINGLE OR MULTIPLE;  gastroscopy;  Surgeon: Leslie Guadarrama MD;  Location:  GI     HERNIA REPAIR Right      lipoma resection chest wall Right 11/09     PHACOEMULSIFICATION CLEAR CORNEA WITH STANDARD INTRAOCULAR LENS IMPLANT Left 5/7/2018    Procedure: PHACOEMULSIFICATION CLEAR CORNEA WITH STANDARD INTRAOCULAR LENS IMPLANT;  LEFT PHACOEMULSIFICATION CLEAR CORNEA WITH STANDARD INTRAOCULAR LENS IMPLANT ;  Surgeon: Nadiya Allen MD;  Location:  [facility]     PHACOEMULSIFICATION CLEAR CORNEA WITH STANDARD INTRAOCULAR LENS IMPLANT Right 8/21/2018    Procedure: PHACOEMULSIFICATION CLEAR CORNEA WITH STANDARD INTRAOCULAR LENS IMPLANT;  RIGHT EYE PHACOEMULSIFICATION CLEAR CORNEA WITH STANDARD INTRAOCULAR LENS IMPLANT;  Surgeon: Nadiya Allen MD;  Location:  EC     right selective neck dissection level 2, 3, 4  11/3/09     right  shunt placement  6/28/07     THORACIC SURGERY  Fidencio 3 2017     THYMECTOMY N/A 1/3/2017    Procedure: THYMECTOMY;  Surgeon: Ernesto Armstrong MD;  Location: [facility] OR     THYROIDECTOMY  11/27/07     TRANSCERVICAL EXTENDED MEDIASTINAL LYMPHADENECTOMY N/A 1/3/2017    Procedure: TRANSCERVICAL EXTENDED MEDIASTINAL LYMPHADENECTOMY;  Surgeon: Ernesto Armstrong MD;  Location: [facility] OR     transnasal endoscopic resection of pituitary adenoma  8/13/2007     Current Outpatient Prescriptions   Medication Sig Dispense Refill     alendronate (FOSAMAX) 70 MG tablet Take 1 tablet (70 mg) by mouth every 7 days 16 tablet 3     amLODIPine (NORVASC) 2.5 MG tablet TK 1 T PO QD  4     B Complex Vitamins (VITAMIN B COMPLEX PO) Take 1 capsule by mouth       Carboxymethylcellulose Sod PF (REFRESH PLUS) 0.5 % SOLN ophthalmic solution Place 1 drop into both eyes 3 times daily as needed for dry eyes       COMBIVENT RESPIMAT  MCG/ACT inhaler INL 1 PUFF PO QID  1     diclofenac (VOLTAREN) 1 % GEL topical gel Place onto the skin 4 times daily       fish oil-omega-3  fatty acids 1000 MG capsule Take 2 g by mouth daily       fluticasone (FLONASE) 50 MCG/ACT spray Spray 2 sprays into both nostrils daily       GuaiFENesin (MUCUS RELIEF ADULT PO) Take 400 mg by mouth 2 times daily as needed        hydrocortisone (CORTEF) 10 MG tablet 10 mg AM and 5 mg afternoon 135 tablet 3     ibuprofen (ADVIL/MOTRIN) 400 MG tablet Take 400-600 mg by mouth every 6 hours as needed for moderate pain        ketorolac tromethamine (ACULAR-LS) 0.4 % SOLN ophthalmic solution Apply 1 drop to eye 4 times daily Instill into operative eye(s) per physician instructions. 5 mL 0     levothyroxine (SYNTHROID/LEVOTHROID) 137 MCG tablet Take 1 tablet (137 mcg) by mouth daily 90 tablet 3     loperamide (IMODIUM) 2 MG capsule   1     MIRTAZAPINE PO Take 15 mg by mouth At Bedtime       ofloxacin (OCUFLOX) 0.3 % ophthalmic solution Place 1 drop into the right eye 4 times daily Instill into operative eye(s) per physician instructions. 5 mL 0     prednisoLONE acetate (PRED FORTE) 1 % ophthalmic susp Place 1-2 drops into the right eye See Admin Instructions 1 Bottle 0     ranitidine (ZANTAC) 300 MG tablet 300 mg daily        RIBOFLAVIN PO Take 100 mg by mouth       TAMSULOSIN HCL PO Take 0.4 mg by mouth At Bedtime        traZODone (DESYREL) 100 MG tablet take 1 tab of 50 mg daily  0     triamcinolone (KENALOG) 0.1 % paste JOHNATHAN SML AMT AA IN MOUTH BID  0     vitamin B complex with vitamin C (VITAMIN  B COMPLEX) TABS tablet Take 1 tablet by mouth daily       zolpidem (AMBIEN) 10 MG tablet Take 10 mg by mouth       He carried in his bag of meds today  HC is 1.5 /day- some days only one/day      Family History   Problem Relation Age of Onset     Cancer No family hx of      no skin cancer     Glaucoma No family hx of      Macular Degeneration No family hx of      Social History     Social History     Marital status:      Spouse name: N/A     Number of children: N/A     Years of education: N/A     Occupational History  "    Not on file.     Social History Main Topics     Smoking status: Former Smoker     Packs/day: 0.50     Years: 5.00     Types: Cigarettes     Start date: 1976     Quit date: 2001     Smokeless tobacco: Never Used     Alcohol use No     Drug use: No     Sexual activity: Not Currently     Partners: Female     Birth control/ protection: Other     Other Topics Concern     Not on file     Social History Narrative    Mom  at age 93 from a fall    Dad  at age 98 from old age     40 plus years    Two adult children in good health.    Occupation: retired from running a [employer]    Originally from [place] 2 times/week, back to [employer]    Physical Exam   GENERAL: elderly man in NAD.  He is wearing his hearing aide today and he seems more appropriate again  His voice is hoarse.  He is carrying water   /78  Pulse 69  Ht 1.651 m (5' 5\")  Wt 61.7 kg (136 lb)  BMI 22.63 kg/m2  SKIN: normal color , temperature.  HEENT: no scleral icterus  Neck.  No definitely definable neck mass by palpation.    LUNGS: clear bilaterally  CARDIAC: RRR s1 S2  NEURO: Awake, alert, responds to questions and seems much more appropriate today.      History: ; Pituitary macroadenoma (H); Papillary thyroid carcinoma  (H); Postsurgical hypothyroidism; Pulmonary nodules; Low bone density.  ICD-10: Pituitary macroadenoma (H); Papillary thyroid carcinoma (H);  Postsurgical hypothyroidism; Pulmonary nodules; Low bone density     Comparison:  MR brain 7/10/2015.      Technique: Axial diffusion and FLAIR images of the whole brain  obtained without intravenous contrast. Sagittal T1 and T2-weighted,  coronal T2-weighted, coronal T1-weighted images with focus on the  sella were obtained without intravenous contrast. Post intravenous  contrast using gadolinium coronal and sagittal T1-weighted images were  obtained focused on the sella. Dynamic postcontrast coronal  T1-weighted images were also obtained.     Contrast: " 7.5mL Gadavist     Findings:    Note: Susceptibility artifact associated with a shunt reservoir within  the right parietal region degrades multiple images and limits  evaluation of the right temporal parietal and occipital regions.     There are postsurgical changes of transnasal and transsphenoidal  approach pituitary adenoma resection. There is residual mass within  the left aspect of the sella, which involves the pituitary  infundibulum measures approximately 1.3 x 0.9 x 0.6 cm,   and extends  into the left cavernous sinus.. The thickened pituitary infundibulum  is deviated to the left and measures up to 5 mm in thickness. The  optic chiasm is atrophic. Major cavernous carotid vascular flow-voids  are patent.  .       Regarding the remainder of the brain. There is no mass effect, midline  shift or extra-axial fluid collection. Diffusion-weighted images  reveal no abnormal reduced diffusion. There is mild generalized  cerebral atrophy. A few T2 hyperintense lesions within the  periventricular and subcortical white matter are nonspecific but  likely represent chronic small vessel ischemic disease.Right parietal  approach ventriculostomy traverses the right lateral ventricle and  terminates within the frontal horn of the left lateral ventricle.  Ventricles are stable in size and configuration. Susceptibility  weighted imaging reveals no intracranial hemorrhage. Flow voids within  the major intracranial vessels are present.     Stable T1 hyperintense fluid/debris within the sphenoid sinus. Mild  mucosal thickening within the ethmoid air cells and maxillary sinuses.  Orbits are grossly unremarkable.         Impression:   1. Postsurgical changes of transsphenoidal sellar adenoma resection  with unchanged mass within the left aspect of the sella and  infundibulum.  2. Stable inspissated debris within in the aerated pterygoid. Stable  chronic sphenoid sinusitis.     I have personally reviewed the examination and initial  interpretation  and I agree with the findings.     HUNTER TRAN MD    EXAM:  CT CHEST W/O CONTRAST . 8/29/2018 3:12 PM      TECHNIQUE:  Helical CT images from the thoracic inlet through the  upper abdomen were obtained without intravenous contrast.      COMPARISON: PET/CT 4/12/2018, CT chest 9/25/2017     HISTORY:   ; Papillary thyroid carcinoma (H); Postsurgical  hypothyroidism; Pulmonary nodules; Pituitary macroadenoma (H); Low  bone density      FINDINGS:  The visualized thyroid gland unremarkable in appearance. The heart is  normal in size. No pericardial effusion. The ascending aorta measures  3.9 cm in diameter. The main pulmonary artery is normal in caliber.  Atherosclerotic calcifications of the aorta and coronary arteries.  Mildly prominent mediastinal lymph nodes, similar to the prior CT on  9/25/2017, none which are enlarged by size criteria. Unchanged  calcified mediastinal and right hilar lymph nodes. Soft tissue  attenuation in the anterior mediastinum is similar to prior, likely  from postsurgical changes related to anterior mediastinal mass  resection. Partially visualized line to the right chest wall and neck,  presumably a  shunt.     Central tracheobronchial tree is patent. Evaluation of lungs is  slightly limited limited due to motion artifact. No pleural effusion  or pneumothorax. Unchanged 1.5 x 1.3 partially calcified nodule in the  lingula (series 4, image 142). Previously described spiculated nodule  in the lingula on the prior CT on 9/25/2017, now measures 7 x 5 mm as  it did on the comparison PET/CT on 4/12/2018. Straightening no avid  FDG uptake. This may represent atelectasis.      Innumerable upper lobe predominant reticulonodular opacities, right  greater than left, in a peribronchovascular distribution. Of these  appear more conspicuous compared to the prior CT on 9/25/2017,  including adjacent 3 mm and 4 mm solid nodules in the right upper lobe  (series 4, image [id number])  Unchanged consolidation along the right minor  fissure extending to the hilum, with air bronchograms.     Limited evaluation of the upper abdomen. Cholelithiasis. 5.4 cm left  renal cyst. Tiny hiatal hernia.     Bones and soft tissues: Degenerative changes of the spine. No acute  osseous abnormalities or suspicious osseous lesions.         IMPRESSION:   1. The previously described spiculated nodule in the lingula on CT on  9/25/2017 which measured 1.7 x 1.5 cm, now measures 7 x 5 mm as it did  on the recent PET/CT on 4/12/2018. This demonstrated no avid FDG  uptake on PET/CT, and likely represents atelectasis.   2. Scattered upper lobe predominant reticulonodular opacities as seen  on the prior CT, several of which appear more conspicuous on this exam  in a tree-in-bud distribution and measure up to 4 mm. These findings  may represent infection.   3. Stable dilatation of the ascending aorta which measures 3.9 cm in  diameter.     I have personally reviewed the examination and initial interpretation  and I agree with the findings.     SOM NGUYEN MD    EXAMINATION: US HEAD NECK SOFT TISSUE, 11/27/2018 3:00 PM      COMPARISON: 6/20/2018, 4/16/2018, 9/25/2017     HISTORY: Papillary thyroid cancer     FINDINGS:     Lymph nodes are measured bilaterally with measurements given in  craniocaudal, transverse and AP dimensions as follows:     Right:  Level 1: No suspicious nodes  Level 2: No suspicious nodes  Level 3: No suspicious nodes  Level 4: No suspicious nodes  Level 5: No suspicious nodes  Level 6: 1.2 x 0.9 x 1.2 cm solid hypoechoic node with lobulated  margins, unchanged since at least 9/25/2017  Level 7: 1.8 x 1.5 x 1.8 cm hypoechoic solid node, similar in size to  6/20/2018 though has increased in size from priors.     Left:  Level 1: No suspicious nodes  Level 2: 1.4 x 0.5 x 2.3 cm elongated node with fatty hilum,  previously seen on prior. Additional 1.2 x 0.7 x 1.4 cm lymph node  with fatty hilum not  substantially changed from prior exam.  Level 3: No suspicious nodes  Level 4: 2 nodes without substantial change from prior exam: A 0.4 x  0.6 x 0.8 cm node and a 0.4 x 0.6 x 0.6 cm node  Level 5: 2 nodes without substantial change from prior examination: A  1.1 x 0.4 x 1.5 cm node, and a 0.9 x 3.6 x 1.0 cm  Level 6: No suspicious nodes  Level 7: No suspicious nodes         IMPRESSION:   1. Redemonstration of suspicious right level 6 and 7 nodules which  were hypermetabolic on PET/CT 4/12/2018. Findings are concerning for  recurrent/metastatic disease. Consider tissue sampling.  2. Additional lymph node measurements as described above.     I have personally reviewed the examination and initial interpretation  and I agree with the findings.     FREDDY MEZAgain, thank you for allowing me to participate in the care of your patient.      Sincerely,    Corina Potts MD

## 2018-11-29 ENCOUNTER — OFFICE VISIT (OUTPATIENT)
Dept: OPHTHALMOLOGY | Facility: CLINIC | Age: 78
End: 2018-11-29
Attending: OPHTHALMOLOGY
Payer: MEDICARE

## 2018-11-29 DIAGNOSIS — H35.3221 EXUDATIVE AGE-RELATED MACULAR DEGENERATION OF LEFT EYE WITH ACTIVE CHOROIDAL NEOVASCULARIZATION (H): ICD-10-CM

## 2018-11-29 PROCEDURE — 25000128 H RX IP 250 OP 636: Mod: ZF | Performed by: OPHTHALMOLOGY

## 2018-11-29 PROCEDURE — 25000125 ZZHC RX 250: Mod: ZF | Performed by: OPHTHALMOLOGY

## 2018-11-29 PROCEDURE — G0463 HOSPITAL OUTPT CLINIC VISIT: HCPCS | Mod: ZF

## 2018-11-29 PROCEDURE — T1013 SIGN LANG/ORAL INTERPRETER: HCPCS | Mod: U3,ZF

## 2018-11-29 PROCEDURE — 67028 INJECTION EYE DRUG: CPT | Mod: ZF | Performed by: OPHTHALMOLOGY

## 2018-11-29 PROCEDURE — 92134 CPTRZ OPH DX IMG PST SGM RTA: CPT | Mod: ZF | Performed by: OPHTHALMOLOGY

## 2018-11-29 RX ADMIN — LIDOCAINE HYDROCHLORIDE 0.05 ML: 20 INJECTION, SOLUTION EPIDURAL; INFILTRATION; INTRACAUDAL; PERINEURAL at 12:07

## 2018-11-29 RX ADMIN — RANIBIZUMAB 0.5 MG: 10 INJECTION, SOLUTION INTRAVITREAL at 12:07

## 2018-11-29 ASSESSMENT — EXTERNAL EXAM - LEFT EYE: OS_EXAM: NORMAL

## 2018-11-29 ASSESSMENT — VISUAL ACUITY
METHOD: SNELLEN - LINEAR
OS_SC: 20/400
OD_SC: 20/30
OD_SC+: -2

## 2018-11-29 ASSESSMENT — TONOMETRY
OD_IOP_MMHG: 14
OS_IOP_MMHG: 14
IOP_METHOD: TONOPEN

## 2018-11-29 ASSESSMENT — CUP TO DISC RATIO
OD_RATIO: 0.5
OS_RATIO: 0.65

## 2018-11-29 ASSESSMENT — SLIT LAMP EXAM - LIDS
COMMENTS: NORMAL
COMMENTS: NORMAL

## 2018-11-29 ASSESSMENT — EXTERNAL EXAM - RIGHT EYE: OD_EXAM: NORMAL

## 2018-11-29 NOTE — NURSING NOTE
Chief Complaints and History of Present Illnesses   Patient presents with     Follow Up For     Exudative age-related macular degeneration of left eye with active choroidal neovascularization (H)      HPI    Last Eye Exam:  10/17/18   Affected eye(s):  Left   Symptoms:        Unknown duration    Frequency:  Constant       Do you have eye pain now?:  No      Comments:  Travis is here for a follow up of Exudative age-related macular degeneration of left eye with active choroidal neovascularization (H)   He may need an intraocular injection today.     Rayshawn Fulton COT 10:16 AM November 29, 2018

## 2018-11-29 NOTE — PROGRESS NOTES
Cc: follow up  status post CE/PCIOL right eye 08/21/18     Ok with resident injections  Likes subconj lido    HPI: doing well and vision improving    OCULAR IMAGING  OCT Mac 11-29-18  Right eye: few small drusen   Left eye: subfoveal CNVM without subretinal fluid / stable intraretinal fluid   Plan for lucentis inj left eye today    Assessment and plan      1. 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    2.  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   - Weekly Amsler Grid use was discussed and training performed.   - A diet of leafy green vegetables was encouraged.   - Return precautions were given.    3. PVD OU   - RT/RD precautions    4. S/p CEIOL right eye 08/21/18    - Doing well   - Lens centered    - Retina attached    5. Pseudophakia left eye    - monitor    return to clinic: 8 weeks Optical Coherence Tomography and possibly lucentis inj left eye  Use subconj lido for injections    ~~~~~~~~~~~~~~~~~~~~~~~~~~~~~~~~~~   Complete documentation of historical and exam elements from today's encounter can be found in the full encounter summary report (not reduplicated in this progress note).  I personally obtained the chief complaint(s) and history of present illness.  I confirmed and edited as necessary the review of systems, past medical/surgical history, family history, social history, and examination findings as documented by others; and I examined the patient myself.  I personally reviewed the relevant tests, images, and reports as documented above.  I formulated and edited as necessary the assessment and plan and discussed the findings and management plan with the  patient and family and No resident or fellow assisted with the procedures performed.  I performed the procedures myself.    Nadiya Allen MD   of Ophthalmology.  Retina Service   Department of Ophthalmology and Visual Neurosciences   [facility]  Phone: [phone]   Fax: [phone]

## 2018-11-29 NOTE — MR AVS SNAPSHOT
After Visit Summary   11/29/2018    Travis Peres    MRN: [id number]           Patient Information     Date Of Birth          1940        Visit Information        Provider Department      11/29/2018 9:45 AM Devan Stephen; [facility]        Today's Diagnoses     Exudative age-related macular degeneration of left eye with active choroidal neovascularization (H)           Follow-ups after your visit        Your next 10 appointments already scheduled     Apr 22, 2019  2:00 PM CDT   (Arrive by 1:45 PM)   RETURN ENDOCRINE with Corina Potts MD   [facility] ([facility])    [address] [facility]  [address] [facility] [id number] [phone]            Apr 24, 2019  9:45 AM CDT   RETURN RETINA with Nadiya Allen MD   [facility] ([facility] [address])    [address]  [facility] [id number] [phone]              Future tests that were ordered for you today     Open Future Orders        Priority Expected Expires Ordered    OCT Retina Spectralis OU (both eyes) Routine  5/29/2020 11/29/2018            Who to contact     Please call your clinic at [phone] to:    Ask questions about your health    Make or cancel appointments    Discuss your medicines    Learn about your test results    Speak to your doctor            Additional Information About Your Visit        Care EveryWhere ID     This is your Care EveryWhere ID. This could be used by other organizations to access your [facility] medical records  [id number]         Blood Pressure from Last 3 Encounters:   11/27/18 139/78   10/24/18 138/65   09/29/18 140/71    Weight from Last 3 Encounters:   11/27/18 61.7 kg (136 lb)   09/29/18 60.4 kg (133 lb 3 oz)   08/29/18 60 kg (132 lb 3.2 oz)              We Performed the Following     Lucentis (Ranibizumab) 0.5MG Intravitreal Injection OS (left eye)     OCT Retina Spectralis  OU (both eyes)        Primary Care Provider Office Phone # Fax #    Karol Alvarez [phone]       [address] [address]        Equal Access to Services     LAYLA LUCAS : Hadii jake ku rosao Soomaali, waaxda luqadaha, qaybta kaalmada adeegyada, roxanne duncan laSouravrubio mitchell. So [facility] [phone].    ATENCIÓN: Si habla español, tiene a reese disposición servicios gratuitos de asistencia lingüística. Llame al [phone].    We comply with applicable federal civil rights laws and [place] laws. We do not discriminate on the basis of race, color, national origin, age, disability, sex, sexual orientation, or gender identity.            Thank you!     Thank you for choosing [facility]  for your care. Our goal is always to provide you with excellent care. Hearing back from our patients is one way we can continue to improve our services. Please take a few minutes to complete the written survey that you may receive in the mail after your visit with us. Thank you!             Your Updated Medication List - Protect others around you: Learn how to safely use, store and throw away your medicines at www.disposemymeds.org.          This list is accurate as of 11/29/18 12:12 PM.  Always use your most recent med list.                   Brand Name Dispense Instructions for use Diagnosis    alendronate 70 MG tablet    FOSAMAX    16 tablet    Take 1 tablet (70 mg) by mouth every 7 days    Postsurgical hypothyroidism, Papillary thyroid carcinoma (H), Hypopituitarism (H)       amLODIPine 2.5 MG tablet    NORVASC     TK 1 T PO QD        Carboxymethylcellulose Sod PF 0.5 % Soln ophthalmic solution    REFRESH PLUS     Place 1 drop into both eyes 3 times daily as needed for dry eyes        COMBIVENT RESPIMAT  MCG/ACT inhaler   Generic drug:  Ipratropium-Albuterol      INL 1 PUFF PO QID        fish oil-omega-3 fatty acids 1000 MG capsule      Take 2 g by mouth  daily        fluticasone 50 MCG/ACT nasal spray    FLONASE     Spray 2 sprays into both nostrils daily        hydrocortisone 10 MG tablet    CORTEF    135 tablet    10 mg AM and 5 mg afternoon    Papillary thyroid carcinoma (H), Postsurgical hypothyroidism, Pulmonary nodules, Pituitary macroadenoma (H), Low bone density       ibuprofen 400 MG tablet    ADVIL/MOTRIN     Take 400-600 mg by mouth every 6 hours as needed for moderate pain        ketorolac tromethamine 0.4 % Soln ophthalmic solution    ACULAR-LS    5 mL    Apply 1 drop to eye 4 times daily Instill into operative eye(s) per physician instructions.    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    S/P eye surgery       prednisoLONE acetate 1 % ophthalmic suspension    PRED FORTE    1 Bottle    Place 1-2 drops into the right eye See Admin Instructions    Aftercare following surgery of a sense organ       ranitidine 300 MG tablet    ZANTAC     300 mg daily        RIBOFLAVIN PO      Take 100 mg by mouth        TAMSULOSIN HCL PO      Take 0.4 mg by mouth At Bedtime        traZODone 100 MG tablet    DESYREL     take 1 tab of 50 mg daily        triamcinolone 0.1 % paste    KENALOG     JOHNATHAN  SML AMT AA IN MOUTH BID        VITAMIN B COMPLEX PO      Take 1 capsule by mouth        vitamin B complex with vitamin C tablet      Take 1 tablet by mouth daily        VOLTAREN 1 % topical gel   Generic drug:  diclofenac      Place onto the skin 4 times daily        zolpidem 10 MG tablet    AMBIEN     Take 10 mg by mouth

## 2018-12-07 LAB — LAB SCANNED RESULT: NORMAL

## 2019-04-02 NOTE — NURSING NOTE
Chief Complaint   Patient presents with     RECHECK     thyroid nodule     Brandee Loaiza CMA   PAST SURGICAL HISTORY:  No significant past surgical history

## 2019-04-22 ENCOUNTER — OFFICE VISIT (OUTPATIENT)
Dept: ENDOCRINOLOGY | Facility: CLINIC | Age: 79
End: 2019-04-22
Payer: COMMERCIAL

## 2019-04-22 VITALS
HEIGHT: 65 IN | DIASTOLIC BLOOD PRESSURE: 80 MMHG | HEART RATE: 77 BPM | WEIGHT: 139.6 LBS | BODY MASS INDEX: 23.26 KG/M2 | SYSTOLIC BLOOD PRESSURE: 146 MMHG

## 2019-04-22 DIAGNOSIS — C73 PAPILLARY THYROID CARCINOMA (H): Primary | ICD-10-CM

## 2019-04-22 DIAGNOSIS — E89.0 POSTSURGICAL HYPOTHYROIDISM: ICD-10-CM

## 2019-04-22 DIAGNOSIS — M85.9 LOW BONE DENSITY: ICD-10-CM

## 2019-04-22 DIAGNOSIS — E23.0 PARTIAL HYPOPITUITARISM (H): ICD-10-CM

## 2019-04-22 DIAGNOSIS — D35.2 PITUITARY ADENOMA (H): ICD-10-CM

## 2019-04-22 DIAGNOSIS — Z91.148 NONCOMPLIANCE WITH MEDICATION REGIMEN: ICD-10-CM

## 2019-04-22 DIAGNOSIS — Z75.8 LANGUAGE BARRIER AFFECTING HEALTH CARE: ICD-10-CM

## 2019-04-22 DIAGNOSIS — C77.0 METASTASIS TO CERVICAL LYMPH NODE (H): ICD-10-CM

## 2019-04-22 DIAGNOSIS — R77.8 HIGH SERUM THYROGLOBULIN: ICD-10-CM

## 2019-04-22 DIAGNOSIS — J38.01 VOCAL FOLD PARALYSIS, UNILATERAL: ICD-10-CM

## 2019-04-22 DIAGNOSIS — R91.8 PULMONARY NODULES/LESIONS, MULTIPLE: ICD-10-CM

## 2019-04-22 DIAGNOSIS — E83.51 HYPOCALCEMIA: ICD-10-CM

## 2019-04-22 DIAGNOSIS — E23.0 HYPOPITUITARISM (H): ICD-10-CM

## 2019-04-22 DIAGNOSIS — C73 PAPILLARY THYROID CARCINOMA (H): ICD-10-CM

## 2019-04-22 DIAGNOSIS — Z60.3 LANGUAGE BARRIER AFFECTING HEALTH CARE: ICD-10-CM

## 2019-04-22 PROBLEM — H91.90 DEAFNESS, UNSPECIFIED LATERALITY: Status: ACTIVE | Noted: 2018-08-30

## 2019-04-22 PROBLEM — M81.0 AGE-RELATED OSTEOPOROSIS WITHOUT CURRENT PATHOLOGICAL FRACTURE: Status: ACTIVE | Noted: 2019-04-22

## 2019-04-22 PROBLEM — M81.0 AGE-RELATED OSTEOPOROSIS WITHOUT CURRENT PATHOLOGICAL FRACTURE: Status: RESOLVED | Noted: 2019-04-22 | Resolved: 2019-04-22

## 2019-04-22 LAB
ALBUMIN SERPL-MCNC: 3.3 G/DL (ref 3.4–5)
CA-I SERPL ISE-MCNC: 4.6 MG/DL (ref 4.4–5.2)
CALCIUM SERPL-MCNC: 8.4 MG/DL (ref 8.5–10.1)
CREAT SERPL-MCNC: 1.03 MG/DL (ref 0.66–1.25)
GFR SERPL CREATININE-BSD FRML MDRD: 69 ML/MIN/{1.73_M2}
GLUCOSE SERPL-MCNC: 122 MG/DL (ref 70–99)
PHOSPHATE SERPL-MCNC: 3.1 MG/DL (ref 2.5–4.5)
T4 FREE SERPL-MCNC: 1.51 NG/DL (ref 0.76–1.46)
TSH SERPL DL<=0.005 MIU/L-ACNC: <0.01 MU/L (ref 0.4–4)

## 2019-04-22 PROCEDURE — 82306 VITAMIN D 25 HYDROXY: CPT

## 2019-04-22 SDOH — SOCIAL STABILITY - SOCIAL INSECURITY: ACCULTURATION DIFFICULTY: Z60.3

## 2019-04-22 ASSESSMENT — MIFFLIN-ST. JEOR: SCORE: 1275.1

## 2019-04-22 ASSESSMENT — PAIN SCALES - GENERAL: PAINLEVEL: NO PAIN (0)

## 2019-04-22 NOTE — ASSESSMENT & PLAN NOTE
4.8 cm right, bilateral node positive. He has been treated with total thyroidectomy, 131I x 2 (cummulative dose 346.4 mCi) His last 131I TBS (2008) post therapy scan raised question of ? lung mets and also ? met above left kidney. Cervical adenopathy has been treated in the past with ETOH .    I have long suspected he had lung mets, but we haven't proven this.

## 2019-04-22 NOTE — LETTER
4/22/2019       RE: Travis Peres  [address] [facility] [id number]     Dear Colleague,    Thank you for referring your patient, Travis Peres, to the [facility] ENDOCRINOLOGY at [facility]. Please see a copy of my visit note below.    Endocrinology Attending Physician Progress note    Very complicated endocrine patient/ problem set.     Papillary thyroid carcinoma (HCC)  4.8 cm right, bilateral node positive. He has been treated with total thyroidectomy, 131I x 2 (cummulative dose 346.4 mCi) His last 131I TBS (2008) post therapy scan raised question of ? lung mets and also ? met above left kidney. Cervical adenopathy has been treated in the past with ETOH .    I have long suspected he had lung mets, but we haven't proven this.     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   Cervical adenopathy has been treated in the past with ETOH . The 2 LNs likely correlate to the high FDG regions on the PET. Over time these nodes are progressing/ enlarging. Specifically, volume of right # 7 has gone from 0.79 to 1.65 to 2.71 cm3 from 9/17 to 4/18 to 6/20/18, doubling time < 1 year, but with no change from 6/18 to 11/17 US .  It is possible these are the same LNs that were treated with ETOH in the past (vs others in the same vicinity - it is hard to say)   I am concerned the level 7 mass can't be reached with ETOH treatment, that the only way to remove it is with surgery.  He declined surgery offered in July.      High serum thyroglobulin  Persistent thyroglobulinemia has been rising/worse, suggesting progression of thyroid cancer in some location.   As per # 1.  Repeat Tg again    Low bone density   Last DXA bone loss 6/18  He is on alendronate.  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   The TSH is  reliable.  We can treat up to high normal free T4.      Pulmonary  nodules/lesions, multiple  biopsy has not been performed.  BAL cytology (8/15) did not show malignancy. Spiculated Lingular nodule to 1.7 x 1.5 cm on 9/17 CT measured only 7 mm on 4/12/18 PET, lacking FDG activity. Chest CT 8/18  confirmed PET findings and was stable/ improved ompared with  9/17.      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   He is clinically stable --On LT4 and HC only  He has been noncompliant with testosterone in the past and is no longer on it.      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 The tumor has been significantly de bulked and He has completed XRT for  persistent tumor-. Tumor mass is stable on  MRI through 10/24/18.  Continue periodic imaging of the sella, treat the hypopituitarism medically.         Language barrier affecting health care  This and his hearing problem make his care very difficult and are interfering with his best health care.    Noncompliance with medication regimen  This hs been intermittently noted.  It may reflect his increasing need for supervision    ? Lytic bone lesions -stable on serial imaging -- not addressed    40 minutes on coordination of care and counseling.     Addendum: I see that the DXA and CT chest I had ordered for just before next appt in 4 months was done following this appt, contrary to my orders.  He will now require another follow up now to discuss these abnormal  results.      Corina Potts MD      Cc/ HPI: Mr Peres returns today, along with his wife and .  He arrived 25+ minutes late for his appt today. He has a history of multiple complicated endocrine issues, including thyroid cancer, surgical hypothyroidism, osteoporosis, pituitary macroadenoma with partial hypopituitarism and history of DI.   See my 4/9/18 note for his detailed  history.   I last saw him 11/18.         Papillary thyroid cancer 4.8 cm left, bilateral node positive (MACIS 6.88 minimum, pT3, pN1a (8), pMx, Stage III or IV). His course has included several operations and several 131I treatments  11/27/07 thyroidectomy  153.4 mCi 131I in 6/08. The radioiodine  was complicated by acute sialadenitis.   12/08  193 mCi 131I (with Thyrogen stimulation). The post therapy scan showed activity in the thyroid bed, lung base on the right and also superior left kidney region   11/3/09  right selective neck dissection levels 2, 3 and 4, removing many positive LNs.   4/16/14 FNAB of right level 6 and 7 cervical lymph nodes were  positive for papillary thyroid cancer. Needle wash Tg was also high on both samples (see below). He had  hoarseness within one hour after the FNAB procedure. He was treated with cymetra on 5/12/14 and he restarted thickened liquids.    6/3/14  ETOH ablation procedure of the  2 LNs    1/31/17  trasnscervical resection of retrosternal mediastinal lesions.  A cystic lesion was removed and drained.  Surgical path  benign thymic tissue.  4/12/18  PET/CT .  Right low neck /superior mediastinal high FDG lateral and more midline. The paratracheal mass is somewhat posterior and below the level of the clavicle (read as 1.4 cm, larger than before, SUB 68), but above the sternum  Tiny focus of fdg left lung     2.  Osteoporosis.  The last BMD was 6/25/18. It shows lowest T-score -2.3 at the right femoral neck. He has had significant loss of BMD since 2015.  Alendronate has been prescribed since 2012.  I suspect he isn't compliant.      Labs   4/9/18: Tg 18.2, FRANK < 0.4; TSH  ,0.01, free T4 1.52, Na 139, K 4.4;   6/20/18: Tg 12.4, FRANK < 0.4, TSH < 0.01, free T4 1.6 -   11/27/18: Tg 16.5, FRANK  0.4, TSH < 0.01, free T4 1.46  4/22/19: Tg 22, FRANK < 0.4, TSH < 0.01, free T4 1.51- results followed appt, not discussed at appt.     8/29/18 CT chest: previously spiculted 1.7 x 1.5 cm  nodule now 7 x 5 mm , ? Atelectasis.  Scattered upper lobe reticulonodular opacities as seen on prior CT, several more conspicuous, up to 4 mm, ? Infection .  To my eye I also note right level 7 neck mass 1.6 x 2 with some airway mass effect (this was 1.4 x 1.5 on 9/17 CT). This is also likely  the right level 7 mass we have bene following on US.    10/24/18 MRI brain residual mass 1.4 x 0.6 x 0.9 cm, extending into left cavernous sinus.  Stalk deviated to the left, thickened to 5 mm; optic chiasm atrophic  4/25/19 neck US (following appt ) - compared with 11/27/18,  6/20/18 and  4/16/18:   Right level 6 thyroid bed  1.5 x 1.1 x 1.2  (was 1.2 x 0.9 x 1.2; 1.2 x 01 x 1.1 ;  1.3 x 0.95 x 1.2 cm - suspicious; 1.2 x 0.93 x 0.9 ;  FNAB 4/23/18: + PTC, needle wash Tg 2082 ng/ml  Right level 7 # 1  2 x 1.9 x 2.1 (was  1.8 x 1.5 x 1.8 ; 1.8 x 1.6 x 1.8 cm ; 1.4 x 1.5 x 1.5 cm; 1.1 x 1.2 x 1 -FNAB 4/23/18 + PTC, needle wash Tg 430 ng/ml  Left level 4  0.5 x 0.4 x 1 ( was 0.4 x 0.6 x 0.9  0.3 x 0.6 x 1.1 ;  0.3 x 0.8 x 1 cm ; 0.6 x 0.3 x 0.8 )  Left level 4 # 2 0.4 x 0.4 x 0.6 (was 0.4 x0.6 x 0.6 ; 0.5 x 0.7 x 0.7 ; 0.8 x 0.5 x 0.6 cm;  0.5 x 0.5 x 1 )  4/25/19 DXA: lowest T-score -2.3 right femoral neck; no change in BMD compared with 2018.      ROS:  No problem  Weight is up 3#  Don't sleep well  Itching whole body- longstanding, not new  Lost the hearing aide in [place]  No hearing aide on today  Respiratory: A lot of phlegm which makes me cough     PMH   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)      Hypovitaminosis D      Multiple pulmonary nodules      Osteopenia      Panhypopituitarism (H)      Papillary thyroid carcinoma (H) 2007    4.8 cm, right, node positive;       Pituitary macroadenoma with extrasellar extension (H) 2007    causing obstructive hydrocephalus     Post-surgical hypothyroidism 2007     Uncomplicated asthma      Vocal cord paralysis     right     Xerostomia     due to radiation exposures     Past Surgical History:   Procedure Laterality Date     cymetra injection       ESOPHAGOSCOPY, GASTROSCOPY, DUODENOSCOPY (EGD), COMBINED  6/20/2013    Procedure: COMBINED ESOPHAGOSCOPY, GASTROSCOPY, DUODENOSCOPY (EGD), BIOPSY SINGLE OR MULTIPLE;  gastroscopy;  Surgeon: Leslie Guadarrama MD;  Location:  GI     HERNIA REPAIR Right      lipoma resection chest wall Right 11/09     PHACOEMULSIFICATION CLEAR CORNEA WITH STANDARD INTRAOCULAR LENS IMPLANT Left 5/7/2018    Procedure: PHACOEMULSIFICATION CLEAR CORNEA WITH STANDARD INTRAOCULAR LENS IMPLANT;  LEFT PHACOEMULSIFICATION CLEAR CORNEA WITH STANDARD INTRAOCULAR LENS IMPLANT ;  Surgeon: Nadiya Allen MD;  Location:  EC     PHACOEMULSIFICATION CLEAR CORNEA WITH STANDARD INTRAOCULAR LENS IMPLANT Right 8/21/2018    Procedure: PHACOEMULSIFICATION CLEAR CORNEA WITH STANDARD INTRAOCULAR LENS IMPLANT;  RIGHT EYE PHACOEMULSIFICATION CLEAR CORNEA WITH STANDARD INTRAOCULAR LENS IMPLANT;  Surgeon: Nadiya Allen MD;  Location:  EC     right selective neck dissection level 2, 3, 4  11/3/09     right  shunt placement  6/28/07     THORACIC SURGERY  Fidencio 3 2017     THYMECTOMY N/A 1/3/2017    Procedure: THYMECTOMY;  Surgeon: Ernesto Armstrong MD;  Location: [facility] OR     THYROIDECTOMY  11/27/07     TRANSCERVICAL EXTENDED MEDIASTINAL LYMPHADENECTOMY N/A 1/3/2017    Procedure: TRANSCERVICAL EXTENDED MEDIASTINAL LYMPHADENECTOMY;  Surgeon: Ernesto Armstrong MD;  Location: [facility] OR     transnasal endoscopic resection of pituitary adenoma  8/13/2007     Current Outpatient Medications   Medication Sig Dispense Refill     alendronate (FOSAMAX) 70 MG tablet Take 1 tablet (70 mg) by mouth every 7 days 16 tablet 3      amLODIPine (NORVASC) 2.5 MG tablet TK 1 T PO QD  4     B Complex Vitamins (VITAMIN B COMPLEX PO) Take 1 capsule by mouth       Carboxymethylcellulose Sod PF (REFRESH PLUS) 0.5 % SOLN ophthalmic solution Place 1 drop into both eyes 3 times daily as needed for dry eyes       COMBIVENT RESPIMAT  MCG/ACT inhaler INL 1 PUFF PO QID  1     diclofenac (VOLTAREN) 1 % GEL topical gel Place onto the skin 4 times daily       fish oil-omega-3 fatty acids 1000 MG capsule Take 2 g by mouth daily       fluticasone (FLONASE) 50 MCG/ACT spray Spray 2 sprays into both nostrils daily       GuaiFENesin (MUCUS RELIEF ADULT PO) Take 400 mg by mouth 2 times daily as needed        hydrocortisone (CORTEF) 10 MG tablet 10 mg AM and 5 mg afternoon 135 tablet 3     ibuprofen (ADVIL/MOTRIN) 400 MG tablet Take 400-600 mg by mouth every 6 hours as needed for moderate pain        ketorolac tromethamine (ACULAR-LS) 0.4 % SOLN ophthalmic solution Apply 1 drop to eye 4 times daily Instill into operative eye(s) per physician instructions. 5 mL 0     levothyroxine (SYNTHROID/LEVOTHROID) 137 MCG tablet Take 1 tablet (137 mcg) by mouth daily 90 tablet 3     loperamide (IMODIUM) 2 MG capsule   1     MIRTAZAPINE PO Take 15 mg by mouth At Bedtime       ofloxacin (OCUFLOX) 0.3 % ophthalmic solution Place 1 drop into the right eye 4 times daily Instill into operative eye(s) per physician instructions. 5 mL 0     prednisoLONE acetate (PRED FORTE) 1 % ophthalmic susp Place 1-2 drops into the right eye See Admin Instructions 1 Bottle 0     ranitidine (ZANTAC) 300 MG tablet 300 mg daily        RIBOFLAVIN PO Take 100 mg by mouth       TAMSULOSIN HCL PO Take 0.4 mg by mouth At Bedtime        traZODone (DESYREL) 100 MG tablet take 1 tab of 50 mg daily  0     triamcinolone (KENALOG) 0.1 % paste JOHNATHAN SML AMT AA IN MOUTH BID  0     vitamin B complex with vitamin C (VITAMIN  B COMPLEX) TABS tablet Take 1 tablet by mouth daily       zolpidem (AMBIEN) 10 MG tablet  Take 10 mg by mouth       He carried in his bag of meds today        Family History   Problem Relation Age of Onset     Cancer No family hx of         no skin cancer     Glaucoma No family hx of      Macular Degeneration No family hx of      Social History     Socioeconomic History     Marital status:      Spouse name: Not on file     Number of children: Not on file     Years of education: Not on file     Highest education level: Not on file   Occupational History     Not on file   Social Needs     Financial resource strain: Not on file     Food insecurity:     Worry: Not on file     Inability: Not on file     Transportation needs:     Medical: Not on file     Non-medical: Not on file   Tobacco Use     Smoking status: Former Smoker     Packs/day: 0.50     Years: 5.00     Pack years: 2.50     Types: Cigarettes     Start date: 1976     Last attempt to quit: 2001     Years since quittin.2     Smokeless tobacco: Never Used   Substance and Sexual Activity     Alcohol use: No     Drug use: No     Sexual activity: Not Currently     Partners: Female     Birth control/protection: Other   Lifestyle     Physical activity:     Days per week: Not on file     Minutes per session: Not on file     Stress: Not on file   Relationships     Social connections:     Talks on phone: Not on file     Gets together: Not on file     Attends [employer] service: Not on file     Active member of club or organization: Not on file     Attends meetings of clubs or organizations: Not on file     Relationship status: Not on file     Intimate partner violence:     Fear of current or ex partner: Not on file     Emotionally abused: Not on file     Physically abused: Not on file     Forced sexual activity: Not on file   Other Topics Concern     Parent/sibling w/ CABG, MI or angioplasty before 65F 55M? Not Asked   Social History Narrative    Mom  at age 93 from a fall    Dad  at age 98 from old age     40 plus years     "Two adult children in good health.    Occupation: retired from running a restaurant    Originally from [place]     Had infusion 7-8 times while in [place] - antibiotic due to cough; he says they told him he has asthma, chronic.  Saw a doctor couple of days ago who gave him 3 pills for lung infection.      Physical Exam   GENERAL: elderly man in NAD.  No hearing aide  His voice is hoarse.  He is carrying water and intemrittently drinking and then coughing repeatedly  /80   Pulse 77   Ht 1.651 m (5' 5\")   Wt 63.3 kg (139 lb 9.6 oz)   BMI 23.23 kg/m     SKIN: normal color , temperature.  HEENT: no scleral icterus; very [place]  Neck.  No definitely definable neck mass by palpation.    LUNGS: clear bilaterally  CARDIAC: RRR s1 S2  NEURO: Awake, alert, r      CT CHEST W/O CONTRAST 4/25/2019 1:05 PM     History: Postsurgical hypothyroidism; Low bone density; Pulmonary  nodules/lesions, multiple; Hypopituitarism (H)     Comparison: CT chest 8/29/2018, 9/25/2017     Technique: CT of the chest was obtained without intravenous contrast.  Axial, coronal, and sagittal reconstructions were obtained and  reviewed.     Contrast: None     Findings:      Lungs: Increase in tree-in-bud type opacities in the posterior aspect  of the right upper lobe becoming larger nodules in the inferior  extent. Largest of these nodules measures 5 x 5 mm on series 4, image  105. Similar-appearing, although stable nodules in the peripheral  aspect of the right lower lobe. Stable collapse of the right middle  lobe. 16 mm calcified granuloma in the lingula.  Airways: Central tracheobronchial tree is clear. Mild bronchial wall  thickening.  Vessels: Main pulmonary artery and aorta are normal in caliber. Normal  three-vessel arch  Heart: Heart size is normal without pericardial effusion. Mild  coronary calcification. Mild constipation of the mitral valve.  Lymph nodes: No suspicious mediastinal or hilar lymphadenopathy.  Calcified mediastinal and right " hilar lymph nodes.  Thyroid: Stable 18 mm nodule of the posterior aspect of the right lobe  of thyroid resection bed.  Esophagus: Within normal limits     Upper abdomen: Simple renal cyst in the superior pole of the left  kidney. Cholelithiasis without adjacent fat stranding.     Bones and soft tissues: No suspicious axillary lymphadenopathy or soft  tissue mass. No suspicious osseous lesion.                                                                      Impression:   1. Increase in centrilobular nodules in the posterior right upper  lobe. Given the chronicity, these are most consistent with atypical  mycobacterium or fungal infection, correlate with QuantiFeron gold.  2. Cholelithiasis without acute cholecystitis.  3. Right thyroid bed lymph node stable. See prior CT and PETCT.     I have personally reviewed the examination and initial interpretation  and I agree with the findings.     MANA TUCKER MD    EXAMINATION: US HEAD NECK SOFT TISSUE, 4/25/2019 12:34 PM      COMPARISON: 11/27/2018.     HISTORY: Papillary thyroid cancer     FINDINGS:     Lymph nodes are measured bilaterally with measurements given in  craniocaudal, transverse and AP dimensions as follows:     Right:  Level 1: No suspicious node.  Level 2: 0.7 x 0.4 x 1.6 cm, with benign fatty hilum.  Level 3: No suspicious nodes  Level 4: No suspicious nodes  Level 5: 1.2 x 0.3 x 0.9 cm, new newly measured compared to prior  though was previously present on cine images. This has a fatty hilum  without highly suspicious features  Level 6: 1.4 x 1.1 x 1.2 cm solid hypoechoic node with lobulated  margin previously measured 1.2 x 0.9 x 1.2 cm.  Level 7: 2 x 1.9 x 2.1 cm hypoechoic suspicious solid node, previously  was 1.8 x 1.5 x 1.8 cm.     Left:  Level 1: No suspicious nodes  Level 2: There are 2 nodes at this level:  #1 1.7 x 0.5 x 2.3 cm elongated node with fatty hilum, previously was  1.4 x 0.5 x 2.3 cm.  #2 1.2 x 0.7 x 1.9 cm lymph node with fatty  hilum previously was 1.2 x  0.7 x 1.4 cm.  Level 3: No suspicious nodes  Level 4: There are 2 lymph nodes at this level without change since  prior measuring 0.4 x 0.4 x 1 cm and 0.3 x 0.4 x 0.6 cm.  Level 5: 1 x 0.4 x 1.3 cm lymph node with a fatty hilum.  Level 6: No suspicious nodes  Level 7: No suspicious nodes                                                                      IMPRESSION:   1. Increased size of suspicious right level 6 and 7 lymph nodes.   2. Additional lymph nodes as above.        I have personally reviewed the examination and initial interpretation  and I agree with the findings.     KEENA GUTIERREZ MD      Again, thank you for allowing me to participate in the care of your patient.      Sincerely,    Corina Potts MD

## 2019-04-22 NOTE — ASSESSMENT & PLAN NOTE
Persistent thyroglobulinemia has been rising/worse, suggesting progression of thyroid cancer in some location.   As per # 1.  Repeat Tg again

## 2019-04-22 NOTE — ASSESSMENT & PLAN NOTE
This and his hearing problem make his care very difficult and are interfering with his best health care.

## 2019-04-22 NOTE — ASSESSMENT & PLAN NOTE
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   Cervical adenopathy has been treated in the past with ETOH . The 2 LNs likely correlate to the high FDG regions on the PET. Over time these nodes are progressing/ enlarging. Specifically, volume of right # 7 has gone from 0.79 to 1.65 to 2.71 cm3 from 9/17 to 4/18 to 6/20/18, doubling time < 1 year, but with no change from 6/18 to 11/17 US .  It is possible these are the same LNs that were treated with ETOH in the past (vs others in the same vicinity - it is hard to say)   I am concerned the level 7 mass can't be reached with ETOH treatment, that the only way to remove it is with surgery.  He declined surgery offered in July.

## 2019-04-22 NOTE — ASSESSMENT & PLAN NOTE
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 The tumor has been significantly de bulked and He has completed XRT for  persistent tumor-. Tumor mass is stable on  MRI through 10/24/18.  Continue periodic imaging of the sella, treat the hypopituitarism medically.

## 2019-04-22 NOTE — ASSESSMENT & PLAN NOTE
Hypopituitary with hypogonadotropic hypogonadism, probable secondary hypothyroidism, and secondary adrenal insufficiency, probable GH deficiency.   He is clinically stable --On LT4 and HC only  He has been noncompliant with testosterone in the past and is no longer on it.

## 2019-04-22 NOTE — ASSESSMENT & PLAN NOTE
due to thyroidectomy plus hypopituitarism.   The TSH is  reliable.  We can treat up to high normal free T4.

## 2019-04-22 NOTE — PROGRESS NOTES
Endocrinology Attending Physician Progress note    Very complicated endocrine patient/ problem set.     Papillary thyroid carcinoma (HCC)  4.8 cm right, bilateral node positive. He has been treated with total thyroidectomy, 131I x 2 (cummulative dose 346.4 mCi) His last 131I TBS (2008) post therapy scan raised question of ? lung mets and also ? met above left kidney. Cervical adenopathy has been treated in the past with ETOH .    I have long suspected he had lung mets, but we haven't proven this.     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   Cervical adenopathy has been treated in the past with ETOH . The 2 LNs likely correlate to the high FDG regions on the PET. Over time these nodes are progressing/ enlarging. Specifically, volume of right # 7 has gone from 0.79 to 1.65 to 2.71 cm3 from 9/17 to 4/18 to 6/20/18, doubling time < 1 year, but with no change from 6/18 to 11/17 US .  It is possible these are the same LNs that were treated with ETOH in the past (vs others in the same vicinity - it is hard to say)   I am concerned the level 7 mass can't be reached with ETOH treatment, that the only way to remove it is with surgery.  He declined surgery offered in July.      High serum thyroglobulin  Persistent thyroglobulinemia has been rising/worse, suggesting progression of thyroid cancer in some location.   As per # 1.  Repeat Tg again    Low bone density   Last DXA bone loss 6/18  He is on alendronate.  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   The TSH is  reliable.  We can treat up to high normal free T4.      Pulmonary nodules/lesions, multiple  biopsy has not been performed.  BAL cytology (8/15) did not show malignancy. Spiculated Lingular nodule to 1.7 x 1.5 cm on 9/17 CT measured only 7 mm on 4/12/18 PET, lacking FDG activity. Chest CT 8/18  confirmed PET findings and was stable/  improved ompared with  9/17.      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   He is clinically stable --On LT4 and HC only  He has been noncompliant with testosterone in the past and is no longer on it.      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 The tumor has been significantly de bulked and He has completed XRT for  persistent tumor-. Tumor mass is stable on  MRI through 10/24/18.  Continue periodic imaging of the sella, treat the hypopituitarism medically.         Language barrier affecting health care  This and his hearing problem make his care very difficult and are interfering with his best health care.    Noncompliance with medication regimen  This hs been intermittently noted.  It may reflect his increasing need for supervision    ? Lytic bone lesions -stable on serial imaging -- not addressed    40 minutes on coordination of care and counseling.     Addendum: I see that the DXA and CT chest I had ordered for just before next appt in 4 months was done following this appt, contrary to my orders.  He will now require another follow up now to discuss these abnormal  results.      Corina Potts MD      Cc/ HPI: Mr Peres returns today, along with his wife and .  He arrived 25+ minutes late for his appt today. He has a history of multiple complicated endocrine issues, including thyroid cancer, surgical hypothyroidism, osteoporosis, pituitary macroadenoma with partial hypopituitarism and history of DI.   See my 4/9/18 note for his detailed history.   I last saw him 11/18.         Papillary thyroid cancer 4.8 cm left, bilateral node positive (MACIS 6.88 minimum, pT3, pN1a (8), pMx, Stage III or IV). His course has included several operations and several 131I treatments  11/27/07 thyroidectomy  153.4 mCi 131I  in 6/08. The radioiodine  was complicated by acute sialadenitis.   12/08  193 mCi 131I (with Thyrogen stimulation). The post therapy scan showed activity in the thyroid bed, lung base on the right and also superior left kidney region   11/3/09  right selective neck dissection levels 2, 3 and 4, removing many positive LNs.   4/16/14 FNAB of right level 6 and 7 cervical lymph nodes were  positive for papillary thyroid cancer. Needle wash Tg was also high on both samples (see below). He had  hoarseness within one hour after the FNAB procedure. He was treated with cymetra on 5/12/14 and he restarted thickened liquids.    6/3/14  ETOH ablation procedure of the  2 LNs    1/31/17  trasnscervical resection of retrosternal mediastinal lesions.  A cystic lesion was removed and drained.  Surgical path  benign thymic tissue.  4/12/18  PET/CT .  Right low neck /superior mediastinal high FDG lateral and more midline. The paratracheal mass is somewhat posterior and below the level of the clavicle (read as 1.4 cm, larger than before, SUB 68), but above the sternum  Tiny focus of fdg left lung     2.  Osteoporosis.  The last BMD was 6/25/18. It shows lowest T-score -2.3 at the right femoral neck. He has had significant loss of BMD since 2015.  Alendronate has been prescribed since 2012.  I suspect he isn't compliant.      Labs   4/9/18: Tg 18.2, FRANK < 0.4; TSH  ,0.01, free T4 1.52, Na 139, K 4.4;   6/20/18: Tg 12.4, FRANK < 0.4, TSH < 0.01, free T4 1.6 -   11/27/18: Tg 16.5, FRANK  0.4, TSH < 0.01, free T4 1.46  4/22/19: Tg 22, FRANK < 0.4, TSH < 0.01, free T4 1.51- results followed appt, not discussed at appt.     8/29/18 CT chest: previously spiculted 1.7 x 1.5 cm nodule now 7 x 5 mm , ? Atelectasis.  Scattered upper lobe reticulonodular opacities as seen on prior CT, several more conspicuous, up to 4 mm, ? Infection .  To my eye I also note right level 7 neck mass 1.6 x 2 with some airway mass effect (this was 1.4 x 1.5 on 9/17 CT).  This is also likely  the right level 7 mass we have bene following on US.    10/24/18 MRI brain residual mass 1.4 x 0.6 x 0.9 cm, extending into left cavernous sinus.  Stalk deviated to the left, thickened to 5 mm; optic chiasm atrophic  4/25/19 neck US (following appt ) - compared with 11/27/18,  6/20/18 and  4/16/18:   Right level 6 thyroid bed  1.5 x 1.1 x 1.2  (was 1.2 x 0.9 x 1.2; 1.2 x 01 x 1.1 ;  1.3 x 0.95 x 1.2 cm - suspicious; 1.2 x 0.93 x 0.9 ;  FNAB 4/23/18: + PTC, needle wash Tg 2082 ng/ml  Right level 7 # 1  2 x 1.9 x 2.1 (was  1.8 x 1.5 x 1.8 ; 1.8 x 1.6 x 1.8 cm ; 1.4 x 1.5 x 1.5 cm; 1.1 x 1.2 x 1 -FNAB 4/23/18 + PTC, needle wash Tg 430 ng/ml  Left level 4  0.5 x 0.4 x 1 ( was 0.4 x 0.6 x 0.9  0.3 x 0.6 x 1.1 ;  0.3 x 0.8 x 1 cm ; 0.6 x 0.3 x 0.8 )  Left level 4 # 2 0.4 x 0.4 x 0.6 (was 0.4 x0.6 x 0.6 ; 0.5 x 0.7 x 0.7 ; 0.8 x 0.5 x 0.6 cm;  0.5 x 0.5 x 1 )  4/25/19 DXA: lowest T-score -2.3 right femoral neck; no change in BMD compared with 2018.      ROS:  No problem  Weight is up 3#  Don't sleep well  Itching whole body- longstanding, not new  Lost the hearing aide in [place]  No hearing aide on today  Respiratory: A lot of phlegm which makes me cough     PMH   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     Post-surgical hypothyroidism 2007     Uncomplicated asthma      Vocal cord paralysis     right     Xerostomia     due to radiation exposures     Past Surgical History:   Procedure  Laterality Date     cymetra injection       ESOPHAGOSCOPY, GASTROSCOPY, DUODENOSCOPY (EGD), COMBINED  6/20/2013    Procedure: COMBINED ESOPHAGOSCOPY, GASTROSCOPY, DUODENOSCOPY (EGD), BIOPSY SINGLE OR MULTIPLE;  gastroscopy;  Surgeon: Leslie Guadarrama MD;  Location:  GI     HERNIA REPAIR Right      lipoma resection chest wall Right 11/09     PHACOEMULSIFICATION CLEAR CORNEA WITH STANDARD INTRAOCULAR LENS IMPLANT Left 5/7/2018    Procedure: PHACOEMULSIFICATION CLEAR CORNEA WITH STANDARD INTRAOCULAR LENS IMPLANT;  LEFT PHACOEMULSIFICATION CLEAR CORNEA WITH STANDARD INTRAOCULAR LENS IMPLANT ;  Surgeon: Nadiya Allen MD;  Location:  [facility]     PHACOEMULSIFICATION CLEAR CORNEA WITH STANDARD INTRAOCULAR LENS IMPLANT Right 8/21/2018    Procedure: PHACOEMULSIFICATION CLEAR CORNEA WITH STANDARD INTRAOCULAR LENS IMPLANT;  RIGHT EYE PHACOEMULSIFICATION CLEAR CORNEA WITH STANDARD INTRAOCULAR LENS IMPLANT;  Surgeon: Nadiya Allen MD;  Location:  EC     right selective neck dissection level 2, 3, 4  11/3/09     right  shunt placement  6/28/07     THORACIC SURGERY  Fidencio 3 2017     THYMECTOMY N/A 1/3/2017    Procedure: THYMECTOMY;  Surgeon: Ernesto Armstrong MD;  Location: [facility] OR     THYROIDECTOMY  11/27/07     TRANSCERVICAL EXTENDED MEDIASTINAL LYMPHADENECTOMY N/A 1/3/2017    Procedure: TRANSCERVICAL EXTENDED MEDIASTINAL LYMPHADENECTOMY;  Surgeon: Ernesto Armstrong MD;  Location: [facility] OR     transnasal endoscopic resection of pituitary adenoma  8/13/2007     Current Outpatient Medications   Medication Sig Dispense Refill     alendronate (FOSAMAX) 70 MG tablet Take 1 tablet (70 mg) by mouth every 7 days 16 tablet 3     amLODIPine (NORVASC) 2.5 MG tablet TK 1 T PO QD  4     B Complex Vitamins (VITAMIN B COMPLEX PO) Take 1 capsule by mouth       Carboxymethylcellulose Sod PF (REFRESH PLUS) 0.5 % SOLN ophthalmic solution Place 1 drop into both eyes 3 times daily as needed for dry eyes        COMBIVENT RESPIMAT  MCG/ACT inhaler INL 1 PUFF PO QID  1     diclofenac (VOLTAREN) 1 % GEL topical gel Place onto the skin 4 times daily       fish oil-omega-3 fatty acids 1000 MG capsule Take 2 g by mouth daily       fluticasone (FLONASE) 50 MCG/ACT spray Spray 2 sprays into both nostrils daily       GuaiFENesin (MUCUS RELIEF ADULT PO) Take 400 mg by mouth 2 times daily as needed        hydrocortisone (CORTEF) 10 MG tablet 10 mg AM and 5 mg afternoon 135 tablet 3     ibuprofen (ADVIL/MOTRIN) 400 MG tablet Take 400-600 mg by mouth every 6 hours as needed for moderate pain        ketorolac tromethamine (ACULAR-LS) 0.4 % SOLN ophthalmic solution Apply 1 drop to eye 4 times daily Instill into operative eye(s) per physician instructions. 5 mL 0     levothyroxine (SYNTHROID/LEVOTHROID) 137 MCG tablet Take 1 tablet (137 mcg) by mouth daily 90 tablet 3     loperamide (IMODIUM) 2 MG capsule   1     MIRTAZAPINE PO Take 15 mg by mouth At Bedtime       ofloxacin (OCUFLOX) 0.3 % ophthalmic solution Place 1 drop into the right eye 4 times daily Instill into operative eye(s) per physician instructions. 5 mL 0     prednisoLONE acetate (PRED FORTE) 1 % ophthalmic susp Place 1-2 drops into the right eye See Admin Instructions 1 Bottle 0     ranitidine (ZANTAC) 300 MG tablet 300 mg daily        RIBOFLAVIN PO Take 100 mg by mouth       TAMSULOSIN HCL PO Take 0.4 mg by mouth At Bedtime        traZODone (DESYREL) 100 MG tablet take 1 tab of 50 mg daily  0     triamcinolone (KENALOG) 0.1 % paste JOHNATHAN SML AMT AA IN MOUTH BID  0     vitamin B complex with vitamin C (VITAMIN  B COMPLEX) TABS tablet Take 1 tablet by mouth daily       zolpidem (AMBIEN) 10 MG tablet Take 10 mg by mouth       He carried in his bag of meds today        Family History   Problem Relation Age of Onset     Cancer No family hx of         no skin cancer     Glaucoma No family hx of      Macular Degeneration No family hx of      Social History     Socioeconomic  History     Marital status:      Spouse name: Not on file     Number of children: Not on file     Years of education: Not on file     Highest education level: Not on file   Occupational History     Not on file   Social Needs     Financial resource strain: Not on file     Food insecurity:     Worry: Not on file     Inability: Not on file     Transportation needs:     Medical: Not on file     Non-medical: Not on file   Tobacco Use     Smoking status: Former Smoker     Packs/day: 0.50     Years: 5.00     Pack years: 2.50     Types: Cigarettes     Start date: 1976     Last attempt to quit: 2001     Years since quittin.2     Smokeless tobacco: Never Used   Substance and Sexual Activity     Alcohol use: No     Drug use: No     Sexual activity: Not Currently     Partners: Female     Birth control/protection: Other   Lifestyle     Physical activity:     Days per week: Not on file     Minutes per session: Not on file     Stress: Not on file   Relationships     Social connections:     Talks on phone: Not on file     Gets together: Not on file     Attends Jehovah's witness service: Not on file     Active member of club or organization: Not on file     Attends meetings of clubs or organizations: Not on file     Relationship status: Not on file     Intimate partner violence:     Fear of current or ex partner: Not on file     Emotionally abused: Not on file     Physically abused: Not on file     Forced sexual activity: Not on file   Other Topics Concern     Parent/sibling w/ CABG, MI or angioplasty before 65F 55M? Not Asked   Social History Narrative    Mom  at age 93 from a fall    Dad  at age 98 from old age     40 plus years    Two adult children in good health.    Occupation: retired from running a restaurant    Originally from [place]     Had infusion 7-8 times while in [place] - antibiotic due to cough; he says they told him he has asthma, chronic.  Saw a doctor couple of days ago who gave him 3 pills  "for lung infection.      Physical Exam   GENERAL: elderly man in NAD.  No hearing aide  His voice is hoarse.  He is carrying water and intemrittently drinking and then coughing repeatedly  /80   Pulse 77   Ht 1.651 m (5' 5\")   Wt 63.3 kg (139 lb 9.6 oz)   BMI 23.23 kg/m    SKIN: normal color , temperature.  HEENT: no scleral icterus; very [place]  Neck.  No definitely definable neck mass by palpation.    LUNGS: clear bilaterally  CARDIAC: RRR s1 S2  NEURO: Awake, alert, r      CT CHEST W/O CONTRAST 4/25/2019 1:05 PM     History: Postsurgical hypothyroidism; Low bone density; Pulmonary  nodules/lesions, multiple; Hypopituitarism (H)     Comparison: CT chest 8/29/2018, 9/25/2017     Technique: CT of the chest was obtained without intravenous contrast.  Axial, coronal, and sagittal reconstructions were obtained and  reviewed.     Contrast: None     Findings:      Lungs: Increase in tree-in-bud type opacities in the posterior aspect  of the right upper lobe becoming larger nodules in the inferior  extent. Largest of these nodules measures 5 x 5 mm on series 4, image  105. Similar-appearing, although stable nodules in the peripheral  aspect of the right lower lobe. Stable collapse of the right middle  lobe. 16 mm calcified granuloma in the lingula.  Airways: Central tracheobronchial tree is clear. Mild bronchial wall  thickening.  Vessels: Main pulmonary artery and aorta are normal in caliber. Normal  three-vessel arch  Heart: Heart size is normal without pericardial effusion. Mild  coronary calcification. Mild constipation of the mitral valve.  Lymph nodes: No suspicious mediastinal or hilar lymphadenopathy.  Calcified mediastinal and right hilar lymph nodes.  Thyroid: Stable 18 mm nodule of the posterior aspect of the right lobe  of thyroid resection bed.  Esophagus: Within normal limits     Upper abdomen: Simple renal cyst in the superior pole of the left  kidney. Cholelithiasis without adjacent fat " stranding.     Bones and soft tissues: No suspicious axillary lymphadenopathy or soft  tissue mass. No suspicious osseous lesion.                                                                      Impression:   1. Increase in centrilobular nodules in the posterior right upper  lobe. Given the chronicity, these are most consistent with atypical  mycobacterium or fungal infection, correlate with QuantiFeron gold.  2. Cholelithiasis without acute cholecystitis.  3. Right thyroid bed lymph node stable. See prior CT and PETCT.     I have personally reviewed the examination and initial interpretation  and I agree with the findings.     MANA TUCKER MD    EXAMINATION: US HEAD NECK SOFT TISSUE, 4/25/2019 12:34 PM      COMPARISON: 11/27/2018.     HISTORY: Papillary thyroid cancer     FINDINGS:     Lymph nodes are measured bilaterally with measurements given in  craniocaudal, transverse and AP dimensions as follows:     Right:  Level 1: No suspicious node.  Level 2: 0.7 x 0.4 x 1.6 cm, with benign fatty hilum.  Level 3: No suspicious nodes  Level 4: No suspicious nodes  Level 5: 1.2 x 0.3 x 0.9 cm, new newly measured compared to prior  though was previously present on cine images. This has a fatty hilum  without highly suspicious features  Level 6: 1.4 x 1.1 x 1.2 cm solid hypoechoic node with lobulated  margin previously measured 1.2 x 0.9 x 1.2 cm.  Level 7: 2 x 1.9 x 2.1 cm hypoechoic suspicious solid node, previously  was 1.8 x 1.5 x 1.8 cm.     Left:  Level 1: No suspicious nodes  Level 2: There are 2 nodes at this level:  #1 1.7 x 0.5 x 2.3 cm elongated node with fatty hilum, previously was  1.4 x 0.5 x 2.3 cm.  #2 1.2 x 0.7 x 1.9 cm lymph node with fatty hilum previously was 1.2 x  0.7 x 1.4 cm.  Level 3: No suspicious nodes  Level 4: There are 2 lymph nodes at this level without change since  prior measuring 0.4 x 0.4 x 1 cm and 0.3 x 0.4 x 0.6 cm.  Level 5: 1 x 0.4 x 1.3 cm lymph node with a fatty hilum.  Level  6: No suspicious nodes  Level 7: No suspicious nodes                                                                      IMPRESSION:   1. Increased size of suspicious right level 6 and 7 lymph nodes.   2. Additional lymph nodes as above.        I have personally reviewed the examination and initial interpretation  and I agree with the findings.     KEENA GUTIERREZ MD

## 2019-04-22 NOTE — PATIENT INSTRUCTIONS
See me every 4-6 months    Labs today    Neck ultrasound soon    Bone density and chest CT in July or early August and see me soon after

## 2019-04-22 NOTE — ASSESSMENT & PLAN NOTE
biopsy has not been performed.  BAL cytology (8/15) did not show malignancy. Spiculated Lingular nodule to 1.7 x 1.5 cm on 9/17 CT measured only 7 mm on 4/12/18 PET, lacking FDG activity. Chest CT 8/18  confirmed PET findings and was stable/ improved ompared with  9/17.

## 2019-04-23 LAB — DEPRECATED CALCIDIOL+CALCIFEROL SERPL-MC: 22 UG/L (ref 20–75)

## 2019-04-24 ENCOUNTER — OFFICE VISIT (OUTPATIENT)
Dept: OPHTHALMOLOGY | Facility: CLINIC | Age: 79
End: 2019-04-24
Attending: OPHTHALMOLOGY
Payer: COMMERCIAL

## 2019-04-24 DIAGNOSIS — H35.3221 EXUDATIVE AGE-RELATED MACULAR DEGENERATION OF LEFT EYE WITH ACTIVE CHOROIDAL NEOVASCULARIZATION (H): ICD-10-CM

## 2019-04-24 PROCEDURE — G0463 HOSPITAL OUTPT CLINIC VISIT: HCPCS | Mod: ZF

## 2019-04-24 PROCEDURE — 92134 CPTRZ OPH DX IMG PST SGM RTA: CPT | Mod: ZF | Performed by: OPHTHALMOLOGY

## 2019-04-24 ASSESSMENT — TONOMETRY
OD_IOP_MMHG: 15
IOP_METHOD: TONOPEN
OS_IOP_MMHG: 14

## 2019-04-24 ASSESSMENT — SLIT LAMP EXAM - LIDS
COMMENTS: NORMAL
COMMENTS: NORMAL

## 2019-04-24 ASSESSMENT — VISUAL ACUITY
OS_SC: 4' 200 E
OD_SC+: -2
OD_PH_SC+: -3
OD_PH_SC: 20/25
METHOD: SNELLEN - LINEAR
OD_SC: 20/40

## 2019-04-24 ASSESSMENT — EXTERNAL EXAM - RIGHT EYE: OD_EXAM: NORMAL

## 2019-04-24 ASSESSMENT — EXTERNAL EXAM - LEFT EYE: OS_EXAM: NORMAL

## 2019-04-24 ASSESSMENT — CUP TO DISC RATIO
OD_RATIO: 0.5
OS_RATIO: 0.65

## 2019-04-24 NOTE — PROGRESS NOTES
Cc: follow up  status post CE/PCIOL right eye 08/21/18     Ok with resident injections  Likes subconj lido    HPI: reports far vision stable, difficulty with near vision, does not have reading glasses     OCULAR IMAGING  OCT Mac 4-24-19  Right eye: few small drusen - stable   Left eye: subfoveal non active CNVM without subretinal fluid / slightly increased intraretinal fluid - stable     Assessment and plan      1. 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 Stable VA and trace intraretinal fluid. non active CNVM       2.  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   - Weekly Amsler Grid use was discussed and training performed.   - A diet of leafy green vegetables was encouraged.   - Return precautions were given.    3. PVD OU   - RT/RD precautions    4. S/p CEIOL right eye 08/21/18    - Doing well   - Lens centered    - Retina attached    5. Pseudophakia left eye    - monitor    return to clinic: 4 months Optical Coherence Tomography and possibly lucentis inj left eye  And possible prescription       Devin Klein MD  Ophthalmology Resident     ~~~~~~~~~~~~~~~~~~~~~~~~~~~~~~~~~~   Complete documentation of historical and exam elements from today's encounter can be found in the full encounter summary report (not reduplicated in this progress note).  I personally obtained the chief complaint(s) and history of present illness.  I confirmed and edited as necessary the review of systems, past medical/surgical history, family history, social history, and examination findings as documented by others; and I examined the patient myself.  I personally reviewed the relevant tests, images, and reports as documented above.  I formulated and edited as necessary the assessment and plan and  discussed the findings and management plan with the patient and family    Nadiya Allen MD   of Ophthalmology.  Retina Service   Department of Ophthalmology and Visual Neurosciences   [facility]  Phone: [phone]   Fax: [phone]

## 2019-04-25 ENCOUNTER — ANCILLARY PROCEDURE (OUTPATIENT)
Dept: BONE DENSITY | Facility: CLINIC | Age: 79
End: 2019-04-25
Payer: COMMERCIAL

## 2019-04-25 ENCOUNTER — ANCILLARY PROCEDURE (OUTPATIENT)
Dept: ULTRASOUND IMAGING | Facility: CLINIC | Age: 79
End: 2019-04-25
Payer: COMMERCIAL

## 2019-04-25 ENCOUNTER — ANCILLARY PROCEDURE (OUTPATIENT)
Dept: CT IMAGING | Facility: CLINIC | Age: 79
End: 2019-04-25
Payer: COMMERCIAL

## 2019-04-25 DIAGNOSIS — E89.0 POSTSURGICAL HYPOTHYROIDISM: ICD-10-CM

## 2019-04-25 DIAGNOSIS — M85.9 LOW BONE DENSITY: ICD-10-CM

## 2019-04-25 DIAGNOSIS — R91.8 PULMONARY NODULES/LESIONS, MULTIPLE: ICD-10-CM

## 2019-04-25 DIAGNOSIS — C77.0 METASTASIS TO CERVICAL LYMPH NODE (H): ICD-10-CM

## 2019-04-25 DIAGNOSIS — E23.0 HYPOPITUITARISM (H): ICD-10-CM

## 2019-04-25 DIAGNOSIS — C73 PAPILLARY THYROID CARCINOMA (H): ICD-10-CM

## 2019-04-30 LAB — LAB SCANNED RESULT: NORMAL

## 2019-05-07 ENCOUNTER — TELEPHONE (OUTPATIENT)
Dept: ENDOCRINOLOGY | Facility: CLINIC | Age: 79
End: 2019-05-07

## 2019-05-07 NOTE — TELEPHONE ENCOUNTER
[facility] Call Center    Phone Message    May a detailed message be left on voicemail: yes    Reason for Call: Other: Dr. Galloway is calling to let Dr. Potts know that she has referred patient to Pulmonary for his recent abnormal CT scan. If any questions you can call Dr. Galloway on her cell or the clinic number [phone]     Action Taken: Message routed to:  [facility] ([facility]): Endo

## 2019-05-07 NOTE — TELEPHONE ENCOUNTER
Phone conversation with his PCP Dr Paz Magana - she has referred him back to pulmonary due to what she believes is his most significant /bothersome symptom, which is the cough.   We have discussed the fact we know he has thyroid cancer in the neck, past counseling on this, concerns related to risks of procedures vs removing small foci of malignancy.         To Endocrine RN triage team:  Please see what we can do to help facilitate the visit back to pulmonary. He has seen them before.  Thanks  Corina Potts

## 2019-05-09 ENCOUNTER — TELEPHONE (OUTPATIENT)
Dept: ENDOCRINOLOGY | Facility: CLINIC | Age: 79
End: 2019-05-09

## 2019-05-09 NOTE — TELEPHONE ENCOUNTER
----- Message from Mariela ROGERS Link sent at 5/9/2019  3:51 PM CDT -----  Regarding: RE: schedule   This was already sent by telephone encounter.I forwarded it to pulmonary clinic schedulers.    ----- Message -----  From: Love Vela RN  Sent: 5/9/2019   3:22 PM  To: [id number]  Subject: schedule                                         Please help  patient  get scheduled in Pulmonary again for new finding on  Chest CT   Referral was placed.  needed

## 2019-06-13 NOTE — TELEPHONE ENCOUNTER
RECORDS STATUS - ALL OTHER DIAGNOSIS      RECORDS RECEIVED FROM: Vitaly   DATE RECEIVED:    NOTES STATUS DETAILS   OFFICE NOTE from referring provider George Potts   OFFICE NOTE from medical oncologist NA    DISCHARGE SUMMARY from hospital NA    DISCHARGE REPORT from the ER [facility] UC Visit - 9/20/18   OPERATIVE REPORT     MEDICATION LIST [facility]    CLINICAL TRIAL TREATMENTS TO DATE     LABS     PATHOLOGY REPORTS NA    ANYTHING RELATED TO DIAGNOSIS Epic/    GENONOMIC TESTING     TYPE:     IMAGING (NEED IMAGES & REPORT)     CT SCANS [facility] - Requested 9/20/19 - Report in [facility]   MRI     MAMMO     ULTRASOUND     PET     XR [facility] - Requested 10/26/18, 7/3/18 - Report in [facility]

## 2019-06-13 NOTE — TELEPHONE ENCOUNTER
ONCOLOGY INTAKE: Records Information      APPT INFORMATION: 7/2/19 - [facility] - [facility]   Referring provider:      Referring provider s clinic:  Endocrine    Reason for visit/diagnosis:  Lung Nodules   Has patient been notified of appointment date and time?: Yes    RECORDS INFORMATION:  Were the records received with the referral (via [employer])? No (internal referral)    Has patient been seen for any external appt for this diagnosis? No    If yes, where? NA    Has patient had any imaging or procedures outside of [place]  [facility] for this condition? No      If Yes, where? NA    ADDITIONAL INFORMATION:  Per pt all records are in Epic

## 2019-06-14 NOTE — TELEPHONE ENCOUNTER
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 Transfused 2 units FFP and 2 units PRBC [place] [facility] [place]    [place]28: Arrived to SICU 6084. Sinus tach 140s with ST depression on EKG, 2D echo performed at bedside, 6L LR given, 4 units PRBC, 2 platelets, and 3 units FFP administered. Urine and blood cultures sent.     Nursing:  + CBC q4h    Outcome: Ongoing (interventions implemented as appropriate)  Plan of care reviewed with pt. Pt AAOx's4, with stable vitals. Pt tolerating regular diet well. Pt ambulated in [place] several times today and remains free from falls. No complaints of pain. Left arm swollen and tender. Swift intact. Wife at bedside. No acute events at this time. Bed in low and locked position, with call light in reach. TM

## 2019-06-26 ENCOUNTER — TELEPHONE (OUTPATIENT)
Dept: PULMONOLOGY | Facility: CLINIC | Age: 79
End: 2019-06-26

## 2019-06-26 DIAGNOSIS — R91.8 PULMONARY NODULES/LESIONS, MULTIPLE: Primary | ICD-10-CM

## 2019-06-26 NOTE — TELEPHONE ENCOUNTER
I called pt to schedule a CT chest for 7/2/19 at 9am - prior to appt with Dr CATHLEEN Haro at 10:10am. Pt did not answer so a voicemail was left by a Cantonese  (#[id number]) with the date/time/location details of the CT appt, and a request for a return call to verify. Garrett sent to Jigna with update.

## 2019-07-02 ENCOUNTER — PRE VISIT (OUTPATIENT)
Dept: PULMONOLOGY | Facility: CLINIC | Age: 79
End: 2019-07-02

## 2019-07-02 ENCOUNTER — OFFICE VISIT (OUTPATIENT)
Dept: PULMONOLOGY | Facility: CLINIC | Age: 79
End: 2019-07-02
Attending: INTERNAL MEDICINE
Payer: COMMERCIAL

## 2019-07-02 VITALS
TEMPERATURE: 98.5 F | OXYGEN SATURATION: 95 % | DIASTOLIC BLOOD PRESSURE: 92 MMHG | SYSTOLIC BLOOD PRESSURE: 156 MMHG | HEART RATE: 71 BPM

## 2019-07-02 DIAGNOSIS — J44.9 CHRONIC OBSTRUCTIVE PULMONARY DISEASE, UNSPECIFIED COPD TYPE (H): Primary | ICD-10-CM

## 2019-07-02 PROCEDURE — T1013 SIGN LANG/ORAL INTERPRETER: HCPCS | Mod: U3,ZF

## 2019-07-02 PROCEDURE — G0463 HOSPITAL OUTPT CLINIC VISIT: HCPCS | Mod: ZF

## 2019-07-02 RX ORDER — TIOTROPIUM BROMIDE 18 UG/1
18 CAPSULE ORAL; RESPIRATORY (INHALATION) DAILY
Qty: 3 CAPSULE | Refills: 3 | Status: SHIPPED | OUTPATIENT
Start: 2019-07-02 | End: 2019-08-31

## 2019-07-02 RX ORDER — ALBUTEROL SULFATE 90 UG/1
2 AEROSOL, METERED RESPIRATORY (INHALATION) EVERY 4 HOURS PRN
Qty: 3 INHALER | Refills: 11 | Status: SHIPPED | OUTPATIENT
Start: 2019-07-02

## 2019-07-02 ASSESSMENT — PAIN SCALES - GENERAL: PAINLEVEL: NO PAIN (0)

## 2019-07-02 NOTE — PROGRESS NOTES
LUNG NODULE & INTERVENTIONAL PULMONARY CLINIC  [facility]     Travis Peres MRN# [id number]   Age: 78 year old YOB: 1940     Reason for Consultation: lung nodule(s) and COPD    Requesting Physician: Corina Potts MD  [address] [place] [address] [address]  [place] [address]       Assessment and Plan:    1. Established multiple pulmonary lung nodule(s). Given the characteristics on current/previous imaging and risk factors; I would classify this to be Intermediate (6-65%) risk for cancer. RUL posterior area with cluster of nodules likely atypical infection. He is asx. Has neg quant gold in 5/2019 at [facility]. Cont to follow with CT in 6mo.     2. COPD. Severe COPD based on PFT in 2009. He is only on combivent inhaler and does report exertional dyspnea. I will start spiriva and albuterol prn while stopping combivent. He is agreeable.     Billing: The patient was seen and examined by me and the findings, assessment, and plan as documented was explained to the patient/family who expressed understand.     Spenser Haro MD   of Medicine  Interventional Pulmonology  Department of Pulmonary, Allergy, Critical Care and Sleep Medicine   [facility]  Pager: [phone]          History:     Travis Peres is a 78 year old male with sig h/o for hypothyroidism, COPD, HTN, hyperlipidemia, MDD,  who is here for evaluation/followup of lung nodule(s).    - No new resp sx or complaints. Denies dyspnea or cough.   - CT chest and found incidental nodules. Here for evaluation   - Personal hx of cancer: thyroid cacner  - Family hx of cancer: No  - Exposure hx: Denies asbestos or radon exposure   - Tobacco hx: Past Smoker: 0.5ppd for 5years. Quit 2001.   - My interpretation of the images relevant for this visit includes: right posterior nodules   - My interpretation of the PFT's relevant for this visit includes: Obstructive  pattern     Culprit Nodule(s):   1: RUL posterior area has cluster of 5mm nodules.     Other active medical problems include:   - had thyroid cancer (papillary thyroid cancer) s/p thyroidectomy 2007. On synthroid.    - Has COPD. On combivent inhaler daily. Up-to-date on flu and pna vaccine. No recent AECOPD. Previous PFT obstructed with reduced DLCO.   - has HTN, hyperlipidemia. Stable.           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     Post-surgical hypothyroidism 2007     Uncomplicated asthma      Vocal cord paralysis     right     Xerostomia     due to radiation exposures           Past Surgical History:      Past Surgical History:   Procedure Laterality Date     cymetra injection       ESOPHAGOSCOPY, GASTROSCOPY, DUODENOSCOPY (EGD), COMBINED  6/20/2013    Procedure: COMBINED ESOPHAGOSCOPY, GASTROSCOPY, DUODENOSCOPY (EGD), BIOPSY SINGLE OR MULTIPLE;  gastroscopy;  Surgeon: Leslie Guadarrama MD;  Location:  GI     HERNIA REPAIR Right      lipoma resection chest wall Right 11/09     PHACOEMULSIFICATION CLEAR CORNEA WITH STANDARD INTRAOCULAR LENS IMPLANT Left 5/7/2018    Procedure: PHACOEMULSIFICATION CLEAR CORNEA WITH STANDARD INTRAOCULAR LENS IMPLANT;  LEFT PHACOEMULSIFICATION CLEAR CORNEA WITH STANDARD INTRAOCULAR LENS IMPLANT ;  Surgeon: Nadiya Allen MD;  Location:  [facility]     PHACOEMULSIFICATION CLEAR CORNEA WITH STANDARD INTRAOCULAR LENS IMPLANT  Right 2018    Procedure: PHACOEMULSIFICATION CLEAR CORNEA WITH STANDARD INTRAOCULAR LENS IMPLANT;  RIGHT EYE PHACOEMULSIFICATION CLEAR CORNEA WITH STANDARD INTRAOCULAR LENS IMPLANT;  Surgeon: Nadiya Allen MD;  [facility]     right selective neck dissection level 2, 3, 4  11/3/09     right  shunt placement  07     THORACIC SURGERY  Fidencio 3 2017     THYMECTOMY N/A 1/3/2017    Procedure: THYMECTOMY;  Surgeon: Ernesto Armstrong MD;  Location: [facility]     THYROIDECTOMY  07     TRANSCERVICAL EXTENDED MEDIASTINAL LYMPHADENECTOMY N/A 1/3/2017    Procedure: TRANSCERVICAL EXTENDED MEDIASTINAL LYMPHADENECTOMY;  Surgeon: Ernesto Armstrong MD;  Location: [facility] OR     transnasal endoscopic resection of pituitary adenoma  2007          Social History:     Social History     Tobacco Use     Smoking status: Former Smoker     Packs/day: 0.50     Years: 5.00     Pack years: 2.50     Types: Cigarettes     Start date: 1976     Last attempt to quit: 2001     Years since quittin.4     Smokeless tobacco: Never Used   Substance Use Topics     Alcohol use: No          Family History:     Family History   Problem Relation Age of Onset     Cancer No family hx of         no skin cancer     Glaucoma No family hx of      Macular Degeneration No family hx of            Allergies:      Allergies   Allergen Reactions     Metoprolol Shortness Of Breath, Other (See Comments) and Unknown     Not true allergy.  Bradycardia, fatigue, COPD worsening.         Fenofibrate Hives     Fenofibric Acid      Lisinopril Cough     Losartan Cough     Niacin      Simvastatin Cramps and Other (See Comments)          Medications:     Current Outpatient Medications   Medication Sig     alendronate (FOSAMAX) 70 MG tablet Take 1 tablet (70 mg) by mouth every 7 days     amLODIPine (NORVASC) 2.5 MG tablet TK 1 T PO QD     B Complex Vitamins (VITAMIN B COMPLEX PO) Take 1 capsule by mouth     Carboxymethylcellulose  Sod PF (REFRESH PLUS) 0.5 % SOLN ophthalmic solution Place 1 drop into both eyes 3 times daily as needed for dry eyes     COMBIVENT RESPIMAT  MCG/ACT inhaler INL 1 PUFF PO QID     diclofenac (VOLTAREN) 1 % GEL topical gel Place onto the skin 4 times daily     fish oil-omega-3 fatty acids 1000 MG capsule Take 2 g by mouth daily     fluticasone (FLONASE) 50 MCG/ACT spray Spray 2 sprays into both nostrils daily     GuaiFENesin (MUCUS RELIEF ADULT PO) Take 400 mg by mouth 2 times daily as needed      hydrocortisone (CORTEF) 10 MG tablet 10 mg AM and 5 mg afternoon     ibuprofen (ADVIL/MOTRIN) 400 MG tablet Take 400-600 mg by mouth every 6 hours as needed for moderate pain      ketorolac tromethamine (ACULAR-LS) 0.4 % SOLN ophthalmic solution Apply 1 drop to eye 4 times daily Instill into operative eye(s) per physician instructions.     levothyroxine (SYNTHROID/LEVOTHROID) 137 MCG tablet Take 1 tablet (137 mcg) by mouth daily     loperamide (IMODIUM) 2 MG capsule      MIRTAZAPINE PO Take 15 mg by mouth At Bedtime     ofloxacin (OCUFLOX) 0.3 % ophthalmic solution Place 1 drop into the right eye 4 times daily Instill into operative eye(s) per physician instructions.     prednisoLONE acetate (PRED FORTE) 1 % ophthalmic susp Place 1-2 drops into the right eye See Admin Instructions     ranitidine (ZANTAC) 300 MG tablet 300 mg daily      RIBOFLAVIN PO Take 100 mg by mouth     TAMSULOSIN HCL PO Take 0.4 mg by mouth At Bedtime      traZODone (DESYREL) 100 MG tablet take 1 tab of 50 mg daily     triamcinolone (KENALOG) 0.1 % paste JOHNATHAN SML AMT AA IN MOUTH BID     vitamin B complex with vitamin C (VITAMIN  B COMPLEX) TABS tablet Take 1 tablet by mouth daily     zolpidem (AMBIEN) 10 MG tablet Take 10 mg by mouth     Current Facility-Administered Medications   Medication     lidocaine (PF) (XYLOCAINE) 2 % injection 0.05 mL     ranibizumab (LUCENTIS) injection syringe 0.5 mg     Facility-Administered Medications Ordered in  Other Visits   Medication     gadobutrol (GADAVIST) injection 7.5 mL          Review of Systems:     CONSTITUTIONAL: negative for fever, chills, change in weight  INTEGUMENTARY/SKIN: no rash or obvious new lesions  ENT/MOUTH: no sore throat, new sinus pain or nasal drainage  RESP: see interval history  CV: negative for chest pain, palpitations or peripheral edema  GI: no nausea, vomiting, change in stools  : no dysuria  MUSCULOSKELETAL: no myalgias, arthralgias  ENDOCRINE: blood sugars with adequate control  PSYCHIATRIC: mood stable  LYMPHATIC: no new lymphadenopathy  HEME: no bleeding or easy bruisability  NEURO: no numbness, weakness, headaches         Physical Exam:     Temp:  [98.5  F (36.9  C)] 98.5  F (36.9  C)  Pulse:  [71] 71  BP: (156)/(92) 156/92  SpO2:  [95 %] 95 %  Wt Readings from Last 4 Encounters:   04/22/19 63.3 kg (139 lb 9.6 oz)   11/27/18 61.7 kg (136 lb)   09/29/18 60.4 kg (133 lb 3 oz)   08/29/18 60 kg (132 lb 3.2 oz)     Constitutional:   Awake, alert and in no apparent distress     Eyes:   Nonicteric, MOHINDER     ENT:    Trachea is midline. No gross neck abnormalities      Neck:   Supple without supraclavicular or cervical lymphadenopathy     Lungs:   Good air flow.  No crackles. No rhonchi.  No wheezes.     Cardiovascular:   Normal S1 and S2.  RRR.  No murmur, gallop or rub.  Radial, DP and PT pulses normal and symmetric     Abdomen:   NABS, soft, nontender, nondistended.  No HSM.     Musculoskeletal:   No edema.      Neurologic:   Alert and conversant. Cranial nerves  intact.       Skin:   Warm, dry.  No rash on limited exam.           Current Laboratory Data:   All laboratory and imaging data reviewed.           Previous Cardiology Imaging   No results found for this or any previous visit (from the past 8760 hour(s)).

## 2019-07-02 NOTE — LETTER
7/2/2019       RE: Travis Peres  [address]  [facility] [id number]     Dear Colleague,    Thank you for referring your patient, Travis Peres, to the [facility] at [facility]. Please see a copy of my visit note below.    LUNG NODULE & INTERVENTIONAL PULMONARY CLINIC  CLINICS & SURGERY [place], [facility], [facility]     Travis Peres MRN# [id number]   Age: 78 year old YOB: 1940     Reason for Consultation: lung nodule(s) and COPD    Requesting Physician: Corina Potts MD  [address]  [place] [address]       Assessment and Plan:    1. Established multiple pulmonary lung nodule(s). Given the characteristics on current/previous imaging and risk factors; I would classify this to be Intermediate (6-65%) risk for cancer. RUL posterior area with cluster of nodules likely atypical infection. He is asx. Has neg quant gold in 5/2019 at [facility]. Cont to follow with CT in 6mo.     2. COPD. Severe COPD based on PFT in 2009. He is only on combivent inhaler and does report exertional dyspnea. I will start spiriva and albuterol prn while stopping combivent. He is agreeable.     Billing: The patient was seen and examined by me and the findings, assessment, and plan as documented was explained to the patient/family who expressed understand.     Spenser Haro MD   of Medicine  Interventional Pulmonology  Department of Pulmonary, Allergy, Critical Care and Sleep Medicine   [facility]  Pager: [phone]          History:     Travis Peres is a 78 year old male with sig h/o for hypothyroidism, COPD, HTN, hyperlipidemia, MDD,  who is here for evaluation/followup of lung nodule(s).    - No new resp sx or complaints. Denies dyspnea or cough.   - CT chest and found incidental nodules. Here for evaluation   - Personal hx of cancer: thyroid cacner  - Family hx of cancer: No  -  Exposure hx: Denies asbestos or radon exposure   - Tobacco hx: Past Smoker: 0.5ppd for 5years. Quit 2001.   - My interpretation of the images relevant for this visit includes: right posterior nodules   - My interpretation of the PFT's relevant for this visit includes: Obstructive pattern     Culprit Nodule(s):   1: RUL posterior area has cluster of 5mm nodules.     Other active medical problems include:   - had thyroid cancer (papillary thyroid cancer) s/p thyroidectomy 2007. On synthroid.    - Has COPD. On combivent inhaler daily. Up-to-date on flu and pna vaccine. No recent AECOPD. Previous PFT obstructed with reduced DLCO.   - has HTN, hyperlipidemia. Stable.           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     Post-surgical hypothyroidism 2007     Uncomplicated asthma      Vocal cord paralysis     right     Xerostomia     due to radiation exposures           Past Surgical History:      Past Surgical History:   Procedure Laterality Date     cymetra injection       ESOPHAGOSCOPY, GASTROSCOPY, DUODENOSCOPY (EGD), COMBINED  6/20/2013    Procedure: COMBINED ESOPHAGOSCOPY, GASTROSCOPY, DUODENOSCOPY (EGD), BIOPSY SINGLE OR MULTIPLE;  gastroscopy;  Surgeon: Leslie Guadarrama MD;  Location:  GI     HERNIA REPAIR Right      lipoma resection chest wall Right 11/09     PHACOEMULSIFICATION CLEAR CORNEA WITH STANDARD INTRAOCULAR LENS IMPLANT Left 5/7/2018    Procedure:  PHACOEMULSIFICATION CLEAR CORNEA WITH STANDARD INTRAOCULAR LENS IMPLANT;  LEFT PHACOEMULSIFICATION CLEAR CORNEA WITH STANDARD INTRAOCULAR LENS IMPLANT ;  Surgeon: Nadiya Allen MD;  Location:  [facility]     PHACOEMULSIFICATION CLEAR CORNEA WITH STANDARD INTRAOCULAR LENS IMPLANT Right 2018    Procedure: PHACOEMULSIFICATION CLEAR CORNEA WITH STANDARD INTRAOCULAR LENS IMPLANT;  RIGHT EYE PHACOEMULSIFICATION CLEAR CORNEA WITH STANDARD INTRAOCULAR LENS IMPLANT;  Surgeon: Nadiya Allen MD;  [facility]     right selective neck dissection level 2, 3, 4  11/3/09     right  shunt placement  07     THORACIC SURGERY  Fidencio 3 2017     THYMECTOMY N/A 1/3/2017    Procedure: THYMECTOMY;  Surgeon: Ernesto Armstrong MD;  Location: [facility]     THYROIDECTOMY  07     TRANSCERVICAL EXTENDED MEDIASTINAL LYMPHADENECTOMY N/A 1/3/2017    Procedure: TRANSCERVICAL EXTENDED MEDIASTINAL LYMPHADENECTOMY;  Surgeon: Ernesto Armstrong MD;  Location: [facility]     transnasal endoscopic resection of pituitary adenoma  2007          Social History:     Social History     Tobacco Use     Smoking status: Former Smoker     Packs/day: 0.50     Years: 5.00     Pack years: 2.50     Types: Cigarettes     Start date: 1976     Last attempt to quit: 2001     Years since quittin.4     Smokeless tobacco: Never Used   Substance Use Topics     Alcohol use: No          Family History:     Family History   Problem Relation Age of Onset     Cancer No family hx of         no skin cancer     Glaucoma No family hx of      Macular Degeneration No family hx of            Allergies:      Allergies   Allergen Reactions     Metoprolol Shortness Of Breath, Other (See Comments) and Unknown     Not true allergy.  Bradycardia, fatigue, COPD worsening.         Fenofibrate Hives     Fenofibric Acid      Lisinopril Cough     Losartan Cough     Niacin      Simvastatin Cramps and Other (See Comments)           Medications:     Current Outpatient Medications   Medication Sig     alendronate (FOSAMAX) 70 MG tablet Take 1 tablet (70 mg) by mouth every 7 days     amLODIPine (NORVASC) 2.5 MG tablet TK 1 T PO QD     B Complex Vitamins (VITAMIN B COMPLEX PO) Take 1 capsule by mouth     Carboxymethylcellulose Sod PF (REFRESH PLUS) 0.5 % SOLN ophthalmic solution Place 1 drop into both eyes 3 times daily as needed for dry eyes     COMBIVENT RESPIMAT  MCG/ACT inhaler INL 1 PUFF PO QID     diclofenac (VOLTAREN) 1 % GEL topical gel Place onto the skin 4 times daily     fish oil-omega-3 fatty acids 1000 MG capsule Take 2 g by mouth daily     fluticasone (FLONASE) 50 MCG/ACT spray Spray 2 sprays into both nostrils daily     GuaiFENesin (MUCUS RELIEF ADULT PO) Take 400 mg by mouth 2 times daily as needed      hydrocortisone (CORTEF) 10 MG tablet 10 mg AM and 5 mg afternoon     ibuprofen (ADVIL/MOTRIN) 400 MG tablet Take 400-600 mg by mouth every 6 hours as needed for moderate pain      ketorolac tromethamine (ACULAR-LS) 0.4 % SOLN ophthalmic solution Apply 1 drop to eye 4 times daily Instill into operative eye(s) per physician instructions.     levothyroxine (SYNTHROID/LEVOTHROID) 137 MCG tablet Take 1 tablet (137 mcg) by mouth daily     loperamide (IMODIUM) 2 MG capsule      MIRTAZAPINE PO Take 15 mg by mouth At Bedtime     ofloxacin (OCUFLOX) 0.3 % ophthalmic solution Place 1 drop into the right eye 4 times daily Instill into operative eye(s) per physician instructions.     prednisoLONE acetate (PRED FORTE) 1 % ophthalmic susp Place 1-2 drops into the right eye See Admin Instructions     ranitidine (ZANTAC) 300 MG tablet 300 mg daily      RIBOFLAVIN PO Take 100 mg by mouth     TAMSULOSIN HCL PO Take 0.4 mg by mouth At Bedtime      traZODone (DESYREL) 100 MG tablet take 1 tab of 50 mg daily     triamcinolone (KENALOG) 0.1 % paste JOHNATHAN SML AMT AA IN MOUTH BID     vitamin B complex with vitamin C (VITAMIN  B COMPLEX) TABS tablet Take  1 tablet by mouth daily     zolpidem (AMBIEN) 10 MG tablet Take 10 mg by mouth     Current Facility-Administered Medications   Medication     lidocaine (PF) (XYLOCAINE) 2 % injection 0.05 mL     ranibizumab (LUCENTIS) injection syringe 0.5 mg     Facility-Administered Medications Ordered in Other Visits   Medication     gadobutrol (GADAVIST) injection 7.5 mL          Review of Systems:     CONSTITUTIONAL: negative for fever, chills, change in weight  INTEGUMENTARY/SKIN: no rash or obvious new lesions  ENT/MOUTH: no sore throat, new sinus pain or nasal drainage  RESP: see interval history  CV: negative for chest pain, palpitations or peripheral edema  GI: no nausea, vomiting, change in stools  : no dysuria  MUSCULOSKELETAL: no myalgias, arthralgias  ENDOCRINE: blood sugars with adequate control  PSYCHIATRIC: mood stable  LYMPHATIC: no new lymphadenopathy  HEME: no bleeding or easy bruisability  NEURO: no numbness, weakness, headaches         Physical Exam:     Temp:  [98.5  F (36.9  C)] 98.5  F (36.9  C)  Pulse:  [71] 71  BP: (156)/(92) 156/92  SpO2:  [95 %] 95 %  Wt Readings from Last 4 Encounters:   04/22/19 63.3 kg (139 lb 9.6 oz)   11/27/18 61.7 kg (136 lb)   09/29/18 60.4 kg (133 lb 3 oz)   08/29/18 60 kg (132 lb 3.2 oz)     Constitutional:   Awake, alert and in no apparent distress     Eyes:   Nonicteric, MOHINDER     ENT:    Trachea is midline. No gross neck abnormalities      Neck:   Supple without supraclavicular or cervical lymphadenopathy     Lungs:   Good air flow.  No crackles. No rhonchi.  No wheezes.     Cardiovascular:   Normal S1 and S2.  RRR.  No murmur, gallop or rub.  Radial, DP and PT pulses normal and symmetric     Abdomen:   NABS, soft, nontender, nondistended.  No HSM.     Musculoskeletal:   No edema.      Neurologic:   Alert and conversant. Cranial nerves  intact.       Skin:   Warm, dry.  No rash on limited exam.           Current Laboratory Data:   All laboratory and imaging data reviewed.            Previous Cardiology Imaging   No results found for this or any previous visit (from the past 8760 hour(s)).               Again, thank you for allowing me to participate in the care of your patient.      Sincerely,    Spenser Haro MD

## 2019-07-02 NOTE — NURSING NOTE
"Oncology Rooming Note    July 2, 2019 10:27 AM   Travis Peres is a 78 year old male who presents for:    Chief Complaint   Patient presents with     Consult     New- LUNG NODULES     Initial Vitals: BP (!) 156/92   Pulse 71   Temp 98.5  F (36.9  C) (Oral)   SpO2 95%  Estimated body mass index is 23.23 kg/m  as calculated from the following:    Height as of 4/22/19: 1.651 m (5' 5\").    Weight as of 4/22/19: 63.3 kg (139 lb 9.6 oz). There is no height or weight on file to calculate BSA.  No Pain (0) Comment: Data Unavailable   No LMP for male patient.  Allergies reviewed: Yes  Medications reviewed: Yes    Medications: Medication refills not needed today.  Pharmacy name entered into [employer]: [facility] [address] [place] [address] - [address]    Clinical concerns: Patient did not take his BP medication today.      BOB PATIÑO CMA              "

## 2019-07-13 ENCOUNTER — ANCILLARY PROCEDURE (OUTPATIENT)
Dept: GENERAL RADIOLOGY | Facility: CLINIC | Age: 79
End: 2019-07-13
Attending: FAMILY MEDICINE
Payer: COMMERCIAL

## 2019-07-13 ENCOUNTER — OFFICE VISIT (OUTPATIENT)
Dept: URGENT CARE | Facility: URGENT CARE | Age: 79
End: 2019-07-13
Payer: COMMERCIAL

## 2019-07-13 VITALS
SYSTOLIC BLOOD PRESSURE: 122 MMHG | DIASTOLIC BLOOD PRESSURE: 80 MMHG | TEMPERATURE: 98.1 F | OXYGEN SATURATION: 97 % | HEART RATE: 56 BPM | WEIGHT: 132 LBS | RESPIRATION RATE: 20 BRPM | BODY MASS INDEX: 21.97 KG/M2

## 2019-07-13 DIAGNOSIS — R07.89 RIGHT-SIDED CHEST WALL PAIN: Primary | ICD-10-CM

## 2019-07-13 DIAGNOSIS — R05.8 COUGH PRODUCTIVE OF CLEAR SPUTUM: ICD-10-CM

## 2019-07-13 PROCEDURE — 71046 X-RAY EXAM CHEST 2 VIEWS: CPT

## 2019-07-13 PROCEDURE — 99214 OFFICE O/P EST MOD 30 MIN: CPT | Performed by: FAMILY MEDICINE

## 2019-07-13 RX ORDER — BENZONATATE 100 MG/1
100 CAPSULE ORAL 3 TIMES DAILY PRN
Qty: 30 CAPSULE | Refills: 0 | Status: ON HOLD | OUTPATIENT
Start: 2019-07-13 | End: 2020-10-29

## 2019-07-13 RX ORDER — AZITHROMYCIN 250 MG/1
TABLET, FILM COATED ORAL
Qty: 6 TABLET | Refills: 0 | Status: SHIPPED | OUTPATIENT
Start: 2019-07-13 | End: 2019-08-31

## 2019-07-13 NOTE — PROGRESS NOTES
SUBJECTIVE:   Travis Peres is a 78 year old male with h/o COPD htn, hyperlipidemia presenting with a chief complaint of right sided chest pain  And coughing  worse with movement started 3 days back when he was exercising at the [employer]  3 days back cough - non-productive. He is an established patient of [facility].  Onset of symptoms was 3 day(s) ago.  Entire visit was done with the help of an  patient is hard of hearing so a lot of the interpretation and the medication was done through his wife.  He denies any chest heaviness acute shortness of breath or chest pain on the left side.  Or any radiation of pain in the left jaw or left arm.  He describes the pain as 5 out of 10 and the pain tends to be worse with movement there is no rash associated with the pain.  He describes the pain mostly with any kind of movement.  Also has history of coughing which is not new for him for almost a year and describes a sputum being either yellow or white sometimes green in color.  Has no fever or chills or any other systemic symptoms  Course of illness is worsening.    Severity moderate  Current and Associated symptoms: right sided chest pain   Treatment measures tried include None tried.  Predisposing factors include None.    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     Post-surgical hypothyroidism 2007     Uncomplicated asthma      Vocal cord paralysis     right     Xerostomia     due to radiation exposures      Current Outpatient Medications   Medication Sig Dispense Refill     albuterol (PROAIR HFA) 108 (90 Base) MCG/ACT inhaler Inhale 2 puffs into the lungs every 4 hours as needed for shortness of breath / dyspnea or wheezing 3 Inhaler 11     alendronate (FOSAMAX) 70 MG tablet Take 1 tablet (70 mg) by mouth every 7 days 16 tablet 3     amLODIPine (NORVASC) 2.5 MG tablet TK 1 T PO QD  4     azithromycin (ZITHROMAX) 250 MG tablet Two tablets first day, then one tablet daily for four days. 6 tablet 0     B Complex Vitamins (VITAMIN B COMPLEX PO) Take 1 capsule by mouth       benzonatate (TESSALON) 100 MG capsule Take 1 capsule (100 mg) by mouth 3 times daily as needed for cough 30 capsule 0     Carboxymethylcellulose Sod PF (REFRESH PLUS) 0.5 % SOLN ophthalmic solution Place 1 drop into both eyes 3 times daily as needed for dry eyes       diclofenac (VOLTAREN) 1 % GEL topical gel Place onto the skin 4 times daily       fish oil-omega-3 fatty acids 1000 MG capsule Take 2 g by mouth daily       fluticasone (FLONASE) 50 MCG/ACT spray Spray 2 sprays into both nostrils daily       GuaiFENesin (MUCUS RELIEF ADULT PO) Take 400 mg by mouth 2 times daily as needed        hydrocortisone (CORTEF) 10 MG tablet 10 mg AM and 5 mg afternoon 135 tablet 3     ibuprofen (ADVIL/MOTRIN) 400 MG tablet Take 400-600 mg by mouth every 6 hours as needed for moderate pain        ketorolac tromethamine (ACULAR-LS) 0.4 % SOLN ophthalmic solution Apply 1 drop to eye 4 times daily Instill into operative eye(s) per physician instructions. 5 mL 0     levothyroxine (SYNTHROID/LEVOTHROID) 137 MCG tablet Take 1 tablet (137 mcg) by mouth daily 90 tablet 3     loperamide (IMODIUM) 2 MG capsule   1     MIRTAZAPINE PO Take 15 mg by mouth At Bedtime       ofloxacin (OCUFLOX) 0.3 % ophthalmic solution Place 1 drop into the right eye 4 times daily Instill into operative eye(s) per physician instructions. 5 mL 0     prednisoLONE acetate (PRED FORTE) 1 %  ophthalmic susp Place 1-2 drops into the right eye See Admin Instructions 1 Bottle 0     ranitidine (ZANTAC) 300 MG tablet 300 mg daily        RIBOFLAVIN PO Take 100 mg by mouth       TAMSULOSIN HCL PO Take 0.4 mg by mouth At Bedtime        tiotropium (SPIRIVA HANDIHALER) 18 MCG inhaled capsule Inhale 1 capsule (18 mcg) into the lungs daily 3 capsule 3     traZODone (DESYREL) 100 MG tablet take 1 tab of 50 mg daily  0     triamcinolone (KENALOG) 0.1 % paste JOHNATHAN SML AMT AA IN MOUTH BID  0     vitamin B complex with vitamin C (VITAMIN  B COMPLEX) TABS tablet Take 1 tablet by mouth daily       zolpidem (AMBIEN) 10 MG tablet Take 10 mg by mouth       Social History     Tobacco Use     Smoking status: Former Smoker     Packs/day: 0.50     Years: 5.00     Pack years: 2.50     Types: Cigarettes     Start date: 1976     Last attempt to quit: 2001     Years since quittin.4     Smokeless tobacco: Never Used   Substance Use Topics     Alcohol use: No     Family History   Problem Relation Age of Onset     Cancer No family hx of         no skin cancer     Glaucoma No family hx of      Macular Degeneration No family hx of          ROS:    10 point ROS of systems including Constitutional, Eyes,  Cardiovascular, Gastroenterology, Genitourinary, Integumentary,Psychiatric were all negative except for pertinent positives noted in my HPI       OBJECTIVE:  /80 (Cuff Size: Adult Regular)   Pulse 56   Temp 98.1  F (36.7  C)   Resp 20   Wt 59.9 kg (132 lb)   SpO2 97%   BMI 21.97 kg/m    GENERAL APPEARANCE: healthy, alert and no distress  EYES: EOMI,  PERRL, conjunctiva clear  HENT: ear canals and TM's normal.  Nose and mouth without ulcers, erythema or lesions  NECK: supple, nontender, no lymphadenopathy  RESP: lungs clear to auscultation - no rales, rhonchi or wheezes  CV: regular rates and rhythm, normal S1 S2, no murmur noted  ABDOMEN:  soft, nontender, no HSM or masses and bowel sounds normal  SKIN: no  suspicious lesions or rashes  Physical Exam      X-Ray was done, my findings are: right sided lower lung opacity almost same as before     Medical Decision Making:    Differential Diagnosis:  URI Adult/Peds:  Bronchitis-viral, Pneumonia, Viral syndrome and Viral upper respiratory illness      ASSESSMENT:  Travis was seen today for cough.    Diagnoses and all orders for this visit:    Right-sided chest wall pain  -     XR Chest 2 Views    Cough productive of yellow sputum  -     azithromycin (ZITHROMAX) 250 MG tablet; Two tablets first day, then one tablet daily for four days.  -     benzonatate (TESSALON) 100 MG capsule; Take 1 capsule (100 mg) by mouth 3 times daily as needed for cough          PLAN:  Tylenol, Fluids and Rest  Advised to start the antibiotic   If symptoms worsen should follow up   For pain suggested to do tylenol  I did review with pt that tessalon should help with cough   Follow up if  symptoms fail to improve or worsens   Pt understood and agreed with plan     did spent>45 minutes with patient and > 50% of the time was for answering questions, discussing findings, counseling and coordination of care     See orders in Epic

## 2019-07-16 DIAGNOSIS — E89.0 POSTSURGICAL HYPOTHYROIDISM: ICD-10-CM

## 2019-07-16 DIAGNOSIS — R93.89 ABNORMAL FINDING ON IMAGING: ICD-10-CM

## 2019-07-16 DIAGNOSIS — C73 PAPILLARY THYROID CARCINOMA (H): ICD-10-CM

## 2019-07-16 RX ORDER — LEVOTHYROXINE SODIUM 137 UG/1
137 TABLET ORAL DAILY
Qty: 90 TABLET | Refills: 3 | Status: SHIPPED | OUTPATIENT
Start: 2019-07-16 | End: 2019-11-06

## 2019-07-16 NOTE — TELEPHONE ENCOUNTER
levothyroxine (SYNTHROID/LEVOTHROID) 137 MCG tablet      Last Written Prescription Date:  4-16-18  Last Fill Quantity: 90,   # refills: 3  Last Office Visit : 4-22-19  Future Office visit:  7-23-19    Kathleen M Doege RN

## 2019-07-17 ENCOUNTER — DOCUMENTATION ONLY (OUTPATIENT)
Dept: CARE COORDINATION | Facility: CLINIC | Age: 79
End: 2019-07-17

## 2019-07-23 ENCOUNTER — OFFICE VISIT (OUTPATIENT)
Dept: ENDOCRINOLOGY | Facility: CLINIC | Age: 79
End: 2019-07-23
Payer: COMMERCIAL

## 2019-07-23 VITALS
DIASTOLIC BLOOD PRESSURE: 80 MMHG | SYSTOLIC BLOOD PRESSURE: 177 MMHG | HEIGHT: 65 IN | BODY MASS INDEX: 22.23 KG/M2 | HEART RATE: 96 BPM | WEIGHT: 133.4 LBS

## 2019-07-23 DIAGNOSIS — C77.0 METASTASIS TO CERVICAL LYMPH NODE (H): ICD-10-CM

## 2019-07-23 DIAGNOSIS — C73 PAPILLARY THYROID CARCINOMA (H): Primary | ICD-10-CM

## 2019-07-23 DIAGNOSIS — R77.8 HIGH SERUM THYROGLOBULIN: ICD-10-CM

## 2019-07-23 DIAGNOSIS — E89.0 POSTSURGICAL HYPOTHYROIDISM: ICD-10-CM

## 2019-07-23 DIAGNOSIS — C73 PAPILLARY THYROID CARCINOMA (H): ICD-10-CM

## 2019-07-23 DIAGNOSIS — D35.2 PITUITARY MACROADENOMA (H): ICD-10-CM

## 2019-07-23 DIAGNOSIS — E23.0 HYPOPITUITARISM (H): ICD-10-CM

## 2019-07-23 DIAGNOSIS — M85.9 LOW BONE DENSITY: ICD-10-CM

## 2019-07-23 DIAGNOSIS — D35.2 PITUITARY ADENOMA (H): ICD-10-CM

## 2019-07-23 DIAGNOSIS — R91.8 PULMONARY NODULES: ICD-10-CM

## 2019-07-23 LAB
T4 FREE SERPL-MCNC: 1.32 NG/DL (ref 0.76–1.46)
TSH SERPL DL<=0.005 MIU/L-ACNC: <0.01 MU/L (ref 0.4–4)

## 2019-07-23 RX ORDER — HYDROCORTISONE 10 MG/1
TABLET ORAL
Qty: 135 TABLET | Refills: 3 | Status: SHIPPED | OUTPATIENT
Start: 2019-07-23 | End: 2019-11-06

## 2019-07-23 RX ORDER — ALENDRONATE SODIUM 70 MG/1
70 TABLET ORAL
Qty: 16 TABLET | Refills: 3 | Status: SHIPPED | OUTPATIENT
Start: 2019-07-23 | End: 2019-11-06

## 2019-07-23 ASSESSMENT — MIFFLIN-ST. JEOR: SCORE: 1246.98

## 2019-07-23 ASSESSMENT — PAIN SCALES - GENERAL: PAINLEVEL: NO PAIN (0)

## 2019-07-23 NOTE — ASSESSMENT & PLAN NOTE
Hypopituitary with hypogonadotropic hypogonadism, probable secondary hypothyroidism, and secondary adrenal insufficiency, probable GH deficiency.   He is clinically stable --On LT4 and HC only.  Treat to high normal free T4 target  He has been noncompliant with testosterone in the past and is no longer on it.

## 2019-07-23 NOTE — ASSESSMENT & PLAN NOTE
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   Cervical adenopathy has been treated in the past with ETOH . The 2 LNs likely correlate to the high FDG regions on the PET. Over time these nodes are progressing/ enlarging. Specifically, volume of right # 7 has gone from 0.79 to 1.65 to 2.71 cm3 from 9/17 to 4/18 to 6/20/18, doubling time < 1 year, but with no change from 6/18 to 11/17 US .  It is possible these are the same LNs that were treated with ETOH in the past (vs others in the same vicinity - it is hard to say)   I am concerned the level 7 mass can't be reached with ETOH treatment, that the only way to remove it is with surgery.  He declined surgery offered in July, 2018   Repeat US 6 months after the last one, which will be late October, 2018 - see me afterwards

## 2019-07-23 NOTE — PROGRESS NOTES
Endocrinology Attending Physician Progress note    Very complicated endocrine patient/ problem set.     Papillary thyroid carcinoma (HCC)  4.8 cm right, bilateral node positive. He has been treated with total thyroidectomy, 131I x 2 (cummulative dose 346.4 mCi) His last 131I TBS (2008) post therapy scan raised question of ? lung mets and also ? met above left kidney. Cervical adenopathy has been treated in the past with ETOH .    I have long suspected he had lung mets, but we haven't proven this.   Thyroglobulin indicates persistent /progressive disease.  We have biopsy proven positive LN involvement in the neck which we have not treated so far.     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   TSH should be kept low to avoid stimulating the thyroid cancer .     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   Cervical adenopathy has been treated in the past with ETOH . The 2 LNs likely correlate to the high FDG regions on the PET. Over time these nodes are progressing/ enlarging. Specifically, volume of right # 7 has gone from 0.79 to 1.65 to 2.71 cm3 from 9/17 to 4/18 to 6/20/18, doubling time < 1 year, but with no change from 6/18 to 11/17 US .  It is possible these are the same LNs that were treated with ETOH in the past (vs others in the same vicinity - it is hard to say)   I am concerned the level 7 mass can't be reached with ETOH treatment, that the only way to remove it is with surgery.  He declined surgery offered in July, 2018   Repeat US 6 months after the last one, which will be late October, 2018 - see me afterwards    Hypopituitarism (H)  Hypopituitary with hypogonadotropic hypogonadism, probable secondary hypothyroidism, and secondary adrenal insufficiency, probable GH deficiency.   He is clinically stable --On LT4 and HC only.  Treat to high normal free T4 target  He has been noncompliant with testosterone in the  past and is no longer on it.      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 The tumor has been significantly de bulked and He has completed XRT for  persistent tumor-. Tumor mass is stable on  MRI through 10/24/18.  Continue periodic imaging of the sella, treat the hypopituitarism medically.   Next pituitary MRI 10/2021 or sooner prn        High serum thyroglobulin  Persistent thyroglobulinemia has been rising/worse, suggesting progression of thyroid cancer in some location.   As per # 1.  Repeat Tg again    Low bone density  He is on alendronate.  Next DXA 4/2021 or thereafter    ? Lytic bone lesions -stable on serial imaging -- not addressed    Corina Potts MD      Cc/ HPI: Mr Peres returns today, along with his wife and . He has a history of multiple complicated endocrine issues, including thyroid cancer, surgical hypothyroidism, osteoporosis, pituitary macroadenoma with partial hypopituitarism and history of DI.   See my 4/9/18 note for his detailed history.   I last saw him 4/22/19.  I have been trying to see him more frequently due to the difficulties of communication and scheduling of tests.  Since our last appt he had several tests performed on a timeline significantly different than I had intended.  Specifically, both the chest CT and DXA were performed much earlier than intended.  He has been back to pulmonary due to the chest CT, saw them on 7/2/19.  They recommended follow up chest CT in 6 months. They gave him new inhalers for COPD.     Today we again discussed the FNAB 4/23/18: + PTC, needle wash Tg 2082 ng/ml biopsy from 2018, that we know he has thyroid cancer in the neck.  I have again presented the risk vs benefit discussion, in the context of the fact he has repeatedly  had complications after procedures.  Last summer he met with surgeon and they decided against surgery at that time.  I have noted today that we  have seen slow progression in the size of the involved  Neck mets but that they are not at a size that they are forcing us to do something.  Mr Larisa states today that he would like to avoid another operation unless he has to.      Papillary thyroid cancer 4.8 cm left, bilateral node positive (MACIS 6.88 minimum, pT3, pN1a (8), pMx, Stage III or IV). His course has included several operations and several 131I treatments  11/27/07 thyroidectomy  153.4 mCi 131I in 6/08. The radioiodine  was complicated by acute sialadenitis.   12/08  193 mCi 131I (with Thyrogen stimulation). The post therapy scan showed activity in the thyroid bed, lung base on the right and also superior left kidney region   11/3/09  right selective neck dissection levels 2, 3 and 4, removing many positive LNs.   4/16/14 FNAB of right level 6 and 7 cervical lymph nodes were  positive for papillary thyroid cancer. Needle wash Tg was also high on both samples (see below). He had  hoarseness within one hour after the FNAB procedure. He was treated with cymetra on 5/12/14 and he restarted thickened liquids.    6/3/14  ETOH ablation procedure of the  2 LNs    1/31/17  trasnscervical resection of retrosternal mediastinal lesions.  A cystic lesion was removed and drained.  Surgical path  benign thymic tissue.  4/12/18  PET/CT .  Right low neck /superior mediastinal high FDG lateral and more midline. The paratracheal mass is somewhat posterior and below the level of the clavicle (read as 1.4 cm, larger than before, SUB 68), but above the sternum  Tiny focus of fdg left lung     2.  Osteoporosis.  The last BMD was 4/25/19 , performed earlier than expected shows lowest T-score -2.3 at the right femoral neck. BMD is stable compared with 6/25/19.  Alendronate has been prescribed since 2012.  I suspect he isn't compliant.      Labs   4/9/18: Tg 18.2, FRANK < 0.4; TSH  ,0.01, free T4 1.52, Na 139, K 4.4;   6/20/18: Tg 12.4, FRANK < 0.4, TSH < 0.01, free T4 1.6 -    11/27/18: Tg 16.5, FRANK  0.4, TSH < 0.01, free T4 1.46  4/22/19: Tg 22, FRANK < 0.4, TSH < 0.01, free T4 1.51, Ca 8.4, albumin 3.3, phos 3.1, creatinine 1.03, glucose 122, vitamin D 22    8/29/18 CT chest: previously spiculted 1.7 x 1.5 cm nodule now 7 x 5 mm , ? Atelectasis.  Scattered upper lobe reticulonodular opacities as seen on prior CT, several more conspicuous, up to 4 mm, ? Infection .  To my eye I also note right level 7 neck mass 1.6 x 2 with some airway mass effect (this was 1.4 x 1.5 on 9/17 CT). This is also likely  the right level 7 mass we have bene following on US.    10/24/18 MRI brain residual mass 1.4 x 0.6 x 0.9 cm, extending into left cavernous sinus.  Stalk deviated to the left, thickened to 5 mm; optic chiasm atrophic  4/25/19 neck US  - compared with 11/27/18,  6/20/18 and  4/16/18:   Right level 6 thyroid bed  1.5 x 1.1 x 1.2  (was 1.2 x 0.9 x 1.2; 1.2 x 01 x 1.1 ;  1.3 x 0.95 x 1.2 cm - suspicious; 1.2 x 0.93 x 0.9 ;  FNAB 4/23/18: + PTC, needle wash Tg 2082 ng/ml  Right level 7 # 1  2 x 1.9 x 2.1 (was  1.8 x 1.5 x 1.8 ; 1.8 x 1.6 x 1.8 cm ; 1.4 x 1.5 x 1.5 cm; 1.1 x 1.2 x 1 -FNAB 4/23/18 + PTC, needle wash Tg 430 ng/ml  Left level 4  0.5 x 0.4 x 1 ( was 0.4 x 0.6 x 0.9  0.3 x 0.6 x 1.1 ;  0.3 x 0.8 x 1 cm ; 0.6 x 0.3 x 0.8 )  Left level 4 # 2 0.4 x 0.4 x 0.6 (was 0.4 x0.6 x 0.6 ; 0.5 x 0.7 x 0.7 ; 0.8 x 0.5 x 0.6 cm;  0.5 x 0.5 x 1 )  4/25/19 DXA: lowest T-score -2.3 right femoral neck; no change in BMD compared with 2018.   4/25/19 chest CT: increase in centrilobular nodules posterior RUL-- I had discussed this with Dr Paz Magana after our last appt; today I have reviewed the images -right level 7 mass indents trachea slightly.       ROS:  No neck symptoms; doesn't feel any lumps int he neck  dryness in the throat   No choking on water per patient ; only once in a while per his wife.   Hearing aide   a little cough; a little phlegm in the throat; will get another CT in October     PMH    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     Post-surgical hypothyroidism 2007     Uncomplicated asthma      Vocal cord paralysis     right     Xerostomia     due to radiation exposures     Past Surgical History:   Procedure Laterality Date     cymetra injection       ESOPHAGOSCOPY, GASTROSCOPY, DUODENOSCOPY (EGD), COMBINED  6/20/2013    Procedure: COMBINED ESOPHAGOSCOPY, GASTROSCOPY, DUODENOSCOPY (EGD), BIOPSY SINGLE OR MULTIPLE;  gastroscopy;  Surgeon: Leslie Guadarrama MD;  Location:  GI     HERNIA REPAIR Right      lipoma resection chest wall Right 11/09     PHACOEMULSIFICATION CLEAR CORNEA WITH STANDARD INTRAOCULAR LENS IMPLANT Left 5/7/2018    Procedure: PHACOEMULSIFICATION CLEAR CORNEA WITH STANDARD INTRAOCULAR LENS IMPLANT;  LEFT PHACOEMULSIFICATION CLEAR CORNEA WITH STANDARD INTRAOCULAR LENS IMPLANT ;  Surgeon: Nadiya Allen MD;  Location:  [facility]     PHACOEMULSIFICATION CLEAR CORNEA WITH STANDARD INTRAOCULAR LENS IMPLANT Right 8/21/2018    Procedure: PHACOEMULSIFICATION CLEAR CORNEA WITH STANDARD INTRAOCULAR LENS IMPLANT;  RIGHT EYE PHACOEMULSIFICATION CLEAR CORNEA WITH STANDARD INTRAOCULAR LENS IMPLANT;  Surgeon: Nadiya Allen MD;  Location:  [facility]     right selective neck dissection level 2, 3, 4  11/3/09     right  shunt placement  6/28/07     THORACIC SURGERY  Fidencio 3 2017     THYMECTOMY N/A 1/3/2017    Procedure: THYMECTOMY;  Surgeon: Ernesto Armstrong  MD Gurinder;  Location: [facility] OR     THYROIDECTOMY  11/27/07     TRANSCERVICAL EXTENDED MEDIASTINAL LYMPHADENECTOMY N/A 1/3/2017    Procedure: TRANSCERVICAL EXTENDED MEDIASTINAL LYMPHADENECTOMY;  Surgeon: Ernesto Armstrong MD;  Location: [facility] OR     transnasal endoscopic resection of pituitary adenoma  8/13/2007     Current Outpatient Medications   Medication Sig Dispense Refill     alendronate (FOSAMAX) 70 MG tablet Take 1 tablet (70 mg) by mouth every 7 days 16 tablet 3     hydrocortisone (CORTEF) 10 MG tablet 10 mg AM and 5 mg afternoon 135 tablet 3     albuterol (PROAIR HFA) 108 (90 Base) MCG/ACT inhaler Inhale 2 puffs into the lungs every 4 hours as needed for shortness of breath / dyspnea or wheezing 3 Inhaler 11     amLODIPine (NORVASC) 2.5 MG tablet TK 1 T PO QD  4     azithromycin (ZITHROMAX) 250 MG tablet Two tablets first day, then one tablet daily for four days. 6 tablet 0     B Complex Vitamins (VITAMIN B COMPLEX PO) Take 1 capsule by mouth       benzonatate (TESSALON) 100 MG capsule Take 1 capsule (100 mg) by mouth 3 times daily as needed for cough 30 capsule 0     Carboxymethylcellulose Sod PF (REFRESH PLUS) 0.5 % SOLN ophthalmic solution Place 1 drop into both eyes 3 times daily as needed for dry eyes       diclofenac (VOLTAREN) 1 % GEL topical gel Place onto the skin 4 times daily       fish oil-omega-3 fatty acids 1000 MG capsule Take 2 g by mouth daily       fluticasone (FLONASE) 50 MCG/ACT spray Spray 2 sprays into both nostrils daily       GuaiFENesin (MUCUS RELIEF ADULT PO) Take 400 mg by mouth 2 times daily as needed        ibuprofen (ADVIL/MOTRIN) 400 MG tablet Take 400-600 mg by mouth every 6 hours as needed for moderate pain        ketorolac tromethamine (ACULAR-LS) 0.4 % SOLN ophthalmic solution Apply 1 drop to eye 4 times daily Instill into operative eye(s) per physician instructions. 5 mL 0     levothyroxine (SYNTHROID/LEVOTHROID) 137 MCG tablet Take 1 tablet (137 mcg) by mouth daily  "90 tablet 3     loperamide (IMODIUM) 2 MG capsule   1     MIRTAZAPINE PO Take 15 mg by mouth At Bedtime       ofloxacin (OCUFLOX) 0.3 % ophthalmic solution Place 1 drop into the right eye 4 times daily Instill into operative eye(s) per physician instructions. 5 mL 0     prednisoLONE acetate (PRED FORTE) 1 % ophthalmic susp Place 1-2 drops into the right eye See Admin Instructions 1 Bottle 0     ranitidine (ZANTAC) 300 MG tablet 300 mg daily        RIBOFLAVIN PO Take 100 mg by mouth       TAMSULOSIN HCL PO Take 0.4 mg by mouth At Bedtime        tiotropium (SPIRIVA HANDIHALER) 18 MCG inhaled capsule Inhale 1 capsule (18 mcg) into the lungs daily 3 capsule 3     traZODone (DESYREL) 100 MG tablet take 1 tab of 50 mg daily  0     triamcinolone (KENALOG) 0.1 % paste JOHNATHAN SML AMT AA IN MOUTH BID  0     vitamin B complex with vitamin C (VITAMIN  B COMPLEX) TABS tablet Take 1 tablet by mouth daily       zolpidem (AMBIEN) 10 MG tablet Take 10 mg by mouth           Family History   Problem Relation Age of Onset     Cancer No family hx of         no skin cancer     Glaucoma No family hx of      Macular Degeneration No family hx of      Social History     Tobacco Use     Smoking status: Former Smoker     Packs/day: 0.50     Years: 5.00     Pack years: 2.50     Types: Cigarettes     Start date: 1976     Last attempt to quit: 2001     Years since quittin.4     Smokeless tobacco: Never Used   Substance Use Topics     Alcohol use: No     Drug use: No        Physical Exam   GENERAL: elderly man in NAD.  Hearing aide in place.  He is much more appropriate today  /80   Pulse 96   Ht 1.651 m (5' 5\")   Wt 60.5 kg (133 lb 6.4 oz)   BMI 22.20 kg/m    SKIN: normal color , temperature.  HEENT: no scleral icterus; very [place]  Neck.  No definitely definable neck mass by palpation.    LUNGS: clear bilaterally  CARDIAC: RRR s1 S2  NEURO: Awake, alert, responds appropriately to question      CT CHEST W/O CONTRAST 2019 " 1:05 PM     History: Postsurgical hypothyroidism; Low bone density; Pulmonary  nodules/lesions, multiple; Hypopituitarism (H)     Comparison: CT chest 8/29/2018, 9/25/2017     Technique: CT of the chest was obtained without intravenous contrast.  Axial, coronal, and sagittal reconstructions were obtained and  reviewed.     Contrast: None     Findings:      Lungs: Increase in tree-in-bud type opacities in the posterior aspect  of the right upper lobe becoming larger nodules in the inferior  extent. Largest of these nodules measures 5 x 5 mm on series 4, image  105. Similar-appearing, although stable nodules in the peripheral  aspect of the right lower lobe. Stable collapse of the right middle  lobe. 16 mm calcified granuloma in the lingula.  Airways: Central tracheobronchial tree is clear. Mild bronchial wall  thickening.  Vessels: Main pulmonary artery and aorta are normal in caliber. Normal  three-vessel arch  Heart: Heart size is normal without pericardial effusion. Mild  coronary calcification. Mild constipation of the mitral valve.  Lymph nodes: No suspicious mediastinal or hilar lymphadenopathy.  Calcified mediastinal and right hilar lymph nodes.  Thyroid: Stable 18 mm nodule of the posterior aspect of the right lobe  of thyroid resection bed.  Esophagus: Within normal limits     Upper abdomen: Simple renal cyst in the superior pole of the left  kidney. Cholelithiasis without adjacent fat stranding.     Bones and soft tissues: No suspicious axillary lymphadenopathy or soft  tissue mass. No suspicious osseous lesion.                                                                      Impression:   1. Increase in centrilobular nodules in the posterior right upper  lobe. Given the chronicity, these are most consistent with atypical  mycobacterium or fungal infection, correlate with QuantiFeron gold.  2. Cholelithiasis without acute cholecystitis.  3. Right thyroid bed lymph node stable. See prior CT and  PETCT.     I have personally reviewed the examination and initial interpretation  and I agree with the findings.     MANA TUCKER MD    EXAMINATION: US HEAD NECK SOFT TISSUE, 4/25/2019 12:34 PM      COMPARISON: 11/27/2018.     HISTORY: Papillary thyroid cancer     FINDINGS:     Lymph nodes are measured bilaterally with measurements given in  craniocaudal, transverse and AP dimensions as follows:     Right:  Level 1: No suspicious node.  Level 2: 0.7 x 0.4 x 1.6 cm, with benign fatty hilum.  Level 3: No suspicious nodes  Level 4: No suspicious nodes  Level 5: 1.2 x 0.3 x 0.9 cm, new newly measured compared to prior  though was previously present on cine images. This has a fatty hilum  without highly suspicious features  Level 6: 1.4 x 1.1 x 1.2 cm solid hypoechoic node with lobulated  margin previously measured 1.2 x 0.9 x 1.2 cm.  Level 7: 2 x 1.9 x 2.1 cm hypoechoic suspicious solid node, previously  was 1.8 x 1.5 x 1.8 cm.     Left:  Level 1: No suspicious nodes  Level 2: There are 2 nodes at this level:  #1 1.7 x 0.5 x 2.3 cm elongated node with fatty hilum, previously was  1.4 x 0.5 x 2.3 cm.  #2 1.2 x 0.7 x 1.9 cm lymph node with fatty hilum previously was 1.2 x  0.7 x 1.4 cm.  Level 3: No suspicious nodes  Level 4: There are 2 lymph nodes at this level without change since  prior measuring 0.4 x 0.4 x 1 cm and 0.3 x 0.4 x 0.6 cm.  Level 5: 1 x 0.4 x 1.3 cm lymph node with a fatty hilum.  Level 6: No suspicious nodes  Level 7: No suspicious nodes                                                                      IMPRESSION:   1. Increased size of suspicious right level 6 and 7 lymph nodes.   2. Additional lymph nodes as above.        I have personally reviewed the examination and initial interpretation  and I agree with the findings.     KEENA GUTIERREZ MD

## 2019-07-23 NOTE — ASSESSMENT & PLAN NOTE
due to thyroidectomy plus hypopituitarism.   TSH should be kept low to avoid stimulating the thyroid cancer .

## 2019-07-23 NOTE — ASSESSMENT & PLAN NOTE
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 The tumor has been significantly de bulked and He has completed XRT for  persistent tumor-. Tumor mass is stable on  MRI through 10/24/18.  Continue periodic imaging of the sella, treat the hypopituitarism medically.   Next pituitary MRI 10/2021 or sooner prn

## 2019-07-23 NOTE — LETTER
7/23/2019       RE: Travis Peres  [address]  [facility] [id number]     Dear Colleague,    Thank you for referring your patient, Travis Peres, to the [facility] ENDOCRINOLOGY at [facility]. Please see a copy of my visit note below.    Endocrinology Attending Physician Progress note    Very complicated endocrine patient/ problem set.     Papillary thyroid carcinoma (HCC)  4.8 cm right, bilateral node positive. He has been treated with total thyroidectomy, 131I x 2 (cummulative dose 346.4 mCi) His last 131I TBS (2008) post therapy scan raised question of ? lung mets and also ? met above left kidney. Cervical adenopathy has been treated in the past with ETOH .    I have long suspected he had lung mets, but we haven't proven this.   Thyroglobulin indicates persistent /progressive disease.  We have biopsy proven positive LN involvement in the neck which we have not treated so far.     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    TSH should be kept low to avoid stimulating the thyroid cancer .     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   Cervical adenopathy has been treated in the past with ETOH . The 2 LNs likely correlate to the high FDG regions on the PET. Over time these nodes are progressing/ enlarging. Specifically, volume of right # 7 has gone from 0.79 to 1.65 to 2.71 cm3 from 9/17 to 4/18 to 6/20/18, doubling time < 1 year, but with no change from 6/18 to 11/17 US .  It is possible these are the same LNs that were treated with ETOH in the past (vs others in the same vicinity - it is hard to say)   I am concerned the level 7 mass can't be reached with ETOH treatment, that the only way to remove it is with surgery.  He declined surgery offered in July, 2018   Repeat US 6 months after the last one, which will be late October, 2018 - see me afterwards    Hypopituitarism  (H)  Hypopituitary with hypogonadotropic hypogonadism, probable secondary hypothyroidism, and secondary adrenal insufficiency, probable GH deficiency.   He is clinically stable --On LT4 and HC only.  Treat to high normal free T4 target  He has been noncompliant with testosterone in the past and is no longer on it.      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 The tumor has been significantly de bulked and He has completed XRT for  persistent tumor-. Tumor mass is stable on  MRI through 10/24/18.  Continue periodic imaging of the sella, treat the hypopituitarism medically.   Next pituitary MRI 10/2021 or sooner prn        High serum thyroglobulin  Persistent thyroglobulinemia has been rising/worse, suggesting progression of thyroid cancer in some location.   As per # 1.  Repeat Tg again    Low bone density  He is on alendronate.  Next DXA 4/2021 or thereafter    ? Lytic bone lesions -stable on serial imaging -- not addressed    Corina Potts MD      Cc/ HPI: Mr Peres returns today, along with his wife and . He has a history of multiple complicated endocrine issues, including thyroid cancer, surgical hypothyroidism, osteoporosis, pituitary macroadenoma with partial hypopituitarism and history of DI.   See my 4/9/18 note for his detailed history.   I last saw him 4/22/19.  I have been trying to see him more frequently due to the difficulties of communication and scheduling of tests.  Since our last appt he had several tests performed on a timeline significantly different than I had intended.  Specifically, both the chest CT and DXA were performed much earlier than intended.  He has been back to pulmonary due to the chest CT, saw them on 7/2/19.  They recommended follow up chest CT in 6 months. They gave him new inhalers for COPD.     Today we again discussed the FNAB 4/23/18: + PTC, needle wash Tg 2082 ng/ml biopsy from 2018, that we  know he has thyroid cancer in the neck.  I have again presented the risk vs benefit discussion, in the context of the fact he has repeatedly  had complications after procedures.  Last summer he met with surgeon and they decided against surgery at that time.  I have noted today that we have seen slow progression in the size of the involved  Neck mets but that they are not at a size that they are forcing us to do something.  Mr Larisa states today that he would like to avoid another operation unless he has to.      Papillary thyroid cancer 4.8 cm left, bilateral node positive (MACIS 6.88 minimum, pT3, pN1a (8), pMx, Stage III or IV). His course has included several operations and several 131I treatments  11/27/07 thyroidectomy  153.4 mCi 131I in 6/08. The radioiodine  was complicated by acute sialadenitis.   12/08  193 mCi 131I (with Thyrogen stimulation). The post therapy scan showed activity in the thyroid bed, lung base on the right and also superior left kidney region   11/3/09  right selective neck dissection levels 2, 3 and 4, removing many positive LNs.   4/16/14 FNAB of right level 6 and 7 cervical lymph nodes were  positive for papillary thyroid cancer. Needle wash Tg was also high on both samples (see below). He had  hoarseness within one hour after the FNAB procedure. He was treated with cymetra on 5/12/14 and he restarted thickened liquids.    6/3/14  ETOH ablation procedure of the  2 LNs    1/31/17  trasnscervical resection of retrosternal mediastinal lesions.  A cystic lesion was removed and drained.  Surgical path  benign thymic tissue.  4/12/18  PET/CT .  Right low neck /superior mediastinal high FDG lateral and more midline. The paratracheal mass is somewhat posterior and below the level of the clavicle (read as 1.4 cm, larger than before, SUB 68), but above the sternum  Tiny focus of fdg left lung     2.  Osteoporosis.  The last BMD was 4/25/19 , performed earlier than expected shows lowest T-score  -2.3 at the right femoral neck. BMD is stable compared with 6/25/19.  Alendronate has been prescribed since 2012.  I suspect he isn't compliant.      Labs   4/9/18: Tg 18.2, FRANK < 0.4; TSH  ,0.01, free T4 1.52, Na 139, K 4.4;   6/20/18: Tg 12.4, FRANK < 0.4, TSH < 0.01, free T4 1.6 -   11/27/18: Tg 16.5, FRANK  0.4, TSH < 0.01, free T4 1.46  4/22/19: Tg 22, FRANK < 0.4, TSH < 0.01, free T4 1.51, Ca 8.4, albumin 3.3, phos 3.1, creatinine 1.03, glucose 122, vitamin D 22    8/29/18 CT chest: previously spiculted 1.7 x 1.5 cm nodule now 7 x 5 mm , ? Atelectasis.  Scattered upper lobe reticulonodular opacities as seen on prior CT, several more conspicuous, up to 4 mm, ? Infection .  To my eye I also note right level 7 neck mass 1.6 x 2 with some airway mass effect (this was 1.4 x 1.5 on 9/17 CT). This is also likely  the right level 7 mass we have bene following on US.    10/24/18 MRI brain residual mass 1.4 x 0.6 x 0.9 cm, extending into left cavernous sinus.  Stalk deviated to the left, thickened to 5 mm; optic chiasm atrophic  4/25/19 neck US  - compared with 11/27/18,  6/20/18 and  4/16/18:   Right level 6 thyroid bed  1.5 x 1.1 x 1.2  (was 1.2 x 0.9 x 1.2; 1.2 x 01 x 1.1 ;  1.3 x 0.95 x 1.2 cm - suspicious; 1.2 x 0.93 x 0.9 ;  FNAB 4/23/18: + PTC, needle wash Tg 2082 ng/ml  Right level 7 # 1  2 x 1.9 x 2.1 (was  1.8 x 1.5 x 1.8 ; 1.8 x 1.6 x 1.8 cm ; 1.4 x 1.5 x 1.5 cm; 1.1 x 1.2 x 1 -FNAB 4/23/18 + PTC, needle wash Tg 430 ng/ml  Left level 4  0.5 x 0.4 x 1 ( was 0.4 x 0.6 x 0.9  0.3 x 0.6 x 1.1 ;  0.3 x 0.8 x 1 cm ; 0.6 x 0.3 x 0.8 )  Left level 4 # 2 0.4 x 0.4 x 0.6 (was 0.4 x0.6 x 0.6 ; 0.5 x 0.7 x 0.7 ; 0.8 x 0.5 x 0.6 cm;  0.5 x 0.5 x 1 )  4/25/19 DXA: lowest T-score -2.3 right femoral neck; no change in BMD compared with 2018.   4/25/19 chest CT: increase in centrilobular nodules posterior RUL-- I had discussed this with Dr Paz Magana after our last appt; today I have reviewed the images -right level 7 mass  indents trachea slightly.       ROS:  No neck symptoms; doesn't feel any lumps int he neck  dryness in the throat   No choking on water per patient ; only once in a while per his wife.   Hearing aide   a little cough; a little phlegm in the throat; will get another CT in October PMH   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     Post-surgical hypothyroidism 2007     Uncomplicated asthma      Vocal cord paralysis     right     Xerostomia     due to radiation exposures     Past Surgical History:   Procedure Laterality Date     cymetra injection       ESOPHAGOSCOPY, GASTROSCOPY, DUODENOSCOPY (EGD), COMBINED  6/20/2013    Procedure: COMBINED ESOPHAGOSCOPY, GASTROSCOPY, DUODENOSCOPY (EGD), BIOPSY SINGLE OR MULTIPLE;  gastroscopy;  Surgeon: Leslie Guadarrama MD;  Location:  GI     HERNIA REPAIR Right      lipoma resection chest wall Right 11/09     PHACOEMULSIFICATION CLEAR CORNEA WITH STANDARD INTRAOCULAR LENS IMPLANT Left 5/7/2018    Procedure: PHACOEMULSIFICATION CLEAR CORNEA WITH STANDARD INTRAOCULAR LENS IMPLANT;  LEFT PHACOEMULSIFICATION CLEAR CORNEA WITH STANDARD INTRAOCULAR LENS IMPLANT ;  Surgeon: Nadiya Allen MD;  Location:  EC     PHACOEMULSIFICATION CLEAR CORNEA WITH STANDARD INTRAOCULAR LENS IMPLANT Right 8/21/2018    Procedure: PHACOEMULSIFICATION CLEAR CORNEA WITH STANDARD INTRAOCULAR LENS IMPLANT;  RIGHT EYE PHACOEMULSIFICATION CLEAR CORNEA WITH STANDARD INTRAOCULAR  LENS IMPLANT;  Surgeon: Nadiya Allen MD;  Location: [facility]     right selective neck dissection level 2, 3, 4  11/3/09     right  shunt placement  6/28/07     THORACIC SURGERY  Fidencio 3 2017     THYMECTOMY N/A 1/3/2017    Procedure: THYMECTOMY;  Surgeon: Ernesto Armstrong MD;  Location: [facility] OR     THYROIDECTOMY  11/27/07     TRANSCERVICAL EXTENDED MEDIASTINAL LYMPHADENECTOMY N/A 1/3/2017    Procedure: TRANSCERVICAL EXTENDED MEDIASTINAL LYMPHADENECTOMY;  Surgeon: Ernesto Armstrong MD;  Location: [facility] OR     transnasal endoscopic resection of pituitary adenoma  8/13/2007     Current Outpatient Medications   Medication Sig Dispense Refill     alendronate (FOSAMAX) 70 MG tablet Take 1 tablet (70 mg) by mouth every 7 days 16 tablet 3     hydrocortisone (CORTEF) 10 MG tablet 10 mg AM and 5 mg afternoon 135 tablet 3     albuterol (PROAIR HFA) 108 (90 Base) MCG/ACT inhaler Inhale 2 puffs into the lungs every 4 hours as needed for shortness of breath / dyspnea or wheezing 3 Inhaler 11     amLODIPine (NORVASC) 2.5 MG tablet TK 1 T PO QD  4     azithromycin (ZITHROMAX) 250 MG tablet Two tablets first day, then one tablet daily for four days. 6 tablet 0     B Complex Vitamins (VITAMIN B COMPLEX PO) Take 1 capsule by mouth       benzonatate (TESSALON) 100 MG capsule Take 1 capsule (100 mg) by mouth 3 times daily as needed for cough 30 capsule 0     Carboxymethylcellulose Sod PF (REFRESH PLUS) 0.5 % SOLN ophthalmic solution Place 1 drop into both eyes 3 times daily as needed for dry eyes       diclofenac (VOLTAREN) 1 % GEL topical gel Place onto the skin 4 times daily       fish oil-omega-3 fatty acids 1000 MG capsule Take 2 g by mouth daily       fluticasone (FLONASE) 50 MCG/ACT spray Spray 2 sprays into both nostrils daily       GuaiFENesin (MUCUS RELIEF ADULT PO) Take 400 mg by mouth 2 times daily as needed        ibuprofen (ADVIL/MOTRIN) 400 MG tablet Take 400-600 mg by mouth every 6 hours as needed for  "moderate pain        ketorolac tromethamine (ACULAR-LS) 0.4 % SOLN ophthalmic solution Apply 1 drop to eye 4 times daily Instill into operative eye(s) per physician instructions. 5 mL 0     levothyroxine (SYNTHROID/LEVOTHROID) 137 MCG tablet Take 1 tablet (137 mcg) by mouth daily 90 tablet 3     loperamide (IMODIUM) 2 MG capsule   1     MIRTAZAPINE PO Take 15 mg by mouth At Bedtime       ofloxacin (OCUFLOX) 0.3 % ophthalmic solution Place 1 drop into the right eye 4 times daily Instill into operative eye(s) per physician instructions. 5 mL 0     prednisoLONE acetate (PRED FORTE) 1 % ophthalmic susp Place 1-2 drops into the right eye See Admin Instructions 1 Bottle 0     ranitidine (ZANTAC) 300 MG tablet 300 mg daily        RIBOFLAVIN PO Take 100 mg by mouth       TAMSULOSIN HCL PO Take 0.4 mg by mouth At Bedtime        tiotropium (SPIRIVA HANDIHALER) 18 MCG inhaled capsule Inhale 1 capsule (18 mcg) into the lungs daily 3 capsule 3     traZODone (DESYREL) 100 MG tablet take 1 tab of 50 mg daily  0     triamcinolone (KENALOG) 0.1 % paste JOHNATHAN SML AMT AA IN MOUTH BID  0     vitamin B complex with vitamin C (VITAMIN  B COMPLEX) TABS tablet Take 1 tablet by mouth daily       zolpidem (AMBIEN) 10 MG tablet Take 10 mg by mouth           Family History   Problem Relation Age of Onset     Cancer No family hx of         no skin cancer     Glaucoma No family hx of      Macular Degeneration No family hx of      Social History     Tobacco Use     Smoking status: Former Smoker     Packs/day: 0.50     Years: 5.00     Pack years: 2.50     Types: Cigarettes     Start date: 1976     Last attempt to quit: 2001     Years since quittin.4     Smokeless tobacco: Never Used   Substance Use Topics     Alcohol use: No     Drug use: No        Physical Exam   GENERAL: elderly man in NAD.  Hearing aide in place.  He is much more appropriate today  /80   Pulse 96   Ht 1.651 m (5' 5\")   Wt 60.5 kg (133 lb 6.4 oz)   BMI 22.20 " kg/m     SKIN: normal color , temperature.  HEENT: no scleral icterus; very [place]  Neck.  No definitely definable neck mass by palpation.    LUNGS: clear bilaterally  CARDIAC: RRR s1 S2  NEURO: Awake, alert, responds appropriately to question      CT CHEST W/O CONTRAST 4/25/2019 1:05 PM     History: Postsurgical hypothyroidism; Low bone density; Pulmonary  nodules/lesions, multiple; Hypopituitarism (H)     Comparison: CT chest 8/29/2018, 9/25/2017     Technique: CT of the chest was obtained without intravenous contrast.  Axial, coronal, and sagittal reconstructions were obtained and  reviewed.     Contrast: None     Findings:      Lungs: Increase in tree-in-bud type opacities in the posterior aspect  of the right upper lobe becoming larger nodules in the inferior  extent. Largest of these nodules measures 5 x 5 mm on series 4, image  105. Similar-appearing, although stable nodules in the peripheral  aspect of the right lower lobe. Stable collapse of the right middle  lobe. 16 mm calcified granuloma in the lingula.  Airways: Central tracheobronchial tree is clear. Mild bronchial wall  thickening.  Vessels: Main pulmonary artery and aorta are normal in caliber. Normal  three-vessel arch  Heart: Heart size is normal without pericardial effusion. Mild  coronary calcification. Mild constipation of the mitral valve.  Lymph nodes: No suspicious mediastinal or hilar lymphadenopathy.  Calcified mediastinal and right hilar lymph nodes.  Thyroid: Stable 18 mm nodule of the posterior aspect of the right lobe  of thyroid resection bed.  Esophagus: Within normal limits     Upper abdomen: Simple renal cyst in the superior pole of the left  kidney. Cholelithiasis without adjacent fat stranding.     Bones and soft tissues: No suspicious axillary lymphadenopathy or soft  tissue mass. No suspicious osseous lesion.                                                                      Impression:   1. Increase in centrilobular nodules in  the posterior right upper  lobe. Given the chronicity, these are most consistent with atypical  mycobacterium or fungal infection, correlate with QuantiFeron gold.  2. Cholelithiasis without acute cholecystitis.  3. Right thyroid bed lymph node stable. See prior CT and PETCT.     I have personally reviewed the examination and initial interpretation  and I agree with the findings.     MANA TUCKER MD    EXAMINATION: US HEAD NECK SOFT TISSUE, 4/25/2019 12:34 PM      COMPARISON: 11/27/2018.     HISTORY: Papillary thyroid cancer     FINDINGS:     Lymph nodes are measured bilaterally with measurements given in  craniocaudal, transverse and AP dimensions as follows:     Right:  Level 1: No suspicious node.  Level 2: 0.7 x 0.4 x 1.6 cm, with benign fatty hilum.  Level 3: No suspicious nodes  Level 4: No suspicious nodes  Level 5: 1.2 x 0.3 x 0.9 cm, new newly measured compared to prior  though was previously present on cine images. This has a fatty hilum  without highly suspicious features  Level 6: 1.4 x 1.1 x 1.2 cm solid hypoechoic node with lobulated  margin previously measured 1.2 x 0.9 x 1.2 cm.  Level 7: 2 x 1.9 x 2.1 cm hypoechoic suspicious solid node, previously  was 1.8 x 1.5 x 1.8 cm.     Left:  Level 1: No suspicious nodes  Level 2: There are 2 nodes at this level:  #1 1.7 x 0.5 x 2.3 cm elongated node with fatty hilum, previously was  1.4 x 0.5 x 2.3 cm.  #2 1.2 x 0.7 x 1.9 cm lymph node with fatty hilum previously was 1.2 x  0.7 x 1.4 cm.  Level 3: No suspicious nodes  Level 4: There are 2 lymph nodes at this level without change since  prior measuring 0.4 x 0.4 x 1 cm and 0.3 x 0.4 x 0.6 cm.  Level 5: 1 x 0.4 x 1.3 cm lymph node with a fatty hilum.  Level 6: No suspicious nodes  Level 7: No suspicious nodes                                                                      IMPRESSION:   1. Increased size of suspicious right level 6 and 7 lymph nodes.   2. Additional lymph nodes as above.        I have  personally reviewed the examination and initial interpretation  and I agree with the findings.     KEENA GUTIERREZ MD    Again, thank you for allowing me to participate in the care of your patient.      Sincerely,    Corina Potts MD

## 2019-07-23 NOTE — ASSESSMENT & PLAN NOTE
4.8 cm right, bilateral node positive. He has been treated with total thyroidectomy, 131I x 2 (cummulative dose 346.4 mCi) His last 131I TBS (2008) post therapy scan raised question of ? lung mets and also ? met above left kidney. Cervical adenopathy has been treated in the past with ETOH .    I have long suspected he had lung mets, but we haven't proven this.   Thyroglobulin indicates persistent /progressive disease.  We have biopsy proven positive LN involvement in the neck which we have not treated so far.

## 2019-07-31 LAB — LAB SCANNED RESULT: NORMAL

## 2019-08-07 ENCOUNTER — OFFICE VISIT (OUTPATIENT)
Dept: URGENT CARE | Facility: URGENT CARE | Age: 79
End: 2019-08-07
Payer: COMMERCIAL

## 2019-08-07 VITALS
TEMPERATURE: 98.1 F | BODY MASS INDEX: 22.13 KG/M2 | OXYGEN SATURATION: 97 % | RESPIRATION RATE: 18 BRPM | WEIGHT: 133 LBS | HEART RATE: 75 BPM | SYSTOLIC BLOOD PRESSURE: 117 MMHG | DIASTOLIC BLOOD PRESSURE: 70 MMHG

## 2019-08-07 DIAGNOSIS — J44.1 COPD EXACERBATION (H): Primary | ICD-10-CM

## 2019-08-07 DIAGNOSIS — R06.2 WHEEZING: ICD-10-CM

## 2019-08-07 PROCEDURE — 94640 AIRWAY INHALATION TREATMENT: CPT | Performed by: PHYSICIAN ASSISTANT

## 2019-08-07 PROCEDURE — 99214 OFFICE O/P EST MOD 30 MIN: CPT | Mod: 25 | Performed by: PHYSICIAN ASSISTANT

## 2019-08-07 RX ORDER — IPRATROPIUM BROMIDE AND ALBUTEROL SULFATE 2.5; .5 MG/3ML; MG/3ML
3 SOLUTION RESPIRATORY (INHALATION) ONCE
Status: DISCONTINUED | OUTPATIENT
Start: 2019-08-07 | End: 2019-09-17

## 2019-08-07 RX ORDER — IPRATROPIUM BROMIDE AND ALBUTEROL SULFATE 2.5; .5 MG/3ML; MG/3ML
3 SOLUTION RESPIRATORY (INHALATION) ONCE
Status: COMPLETED | OUTPATIENT
Start: 2019-08-07 | End: 2019-08-07

## 2019-08-07 RX ORDER — PREDNISONE 20 MG/1
20 TABLET ORAL DAILY
Qty: 5 TABLET | Refills: 0 | Status: SHIPPED | OUTPATIENT
Start: 2019-08-07 | End: 2019-08-31

## 2019-08-07 RX ADMIN — IPRATROPIUM BROMIDE AND ALBUTEROL SULFATE 3 ML: 2.5; .5 SOLUTION RESPIRATORY (INHALATION) at 11:44

## 2019-08-07 NOTE — PROGRESS NOTES
Patient presents with:  Cough: Pt has cough and gray mucus since january    SUBJECTIVE:   Travis Peres is a 78 year old male presenting with a chief complaint of   1) persistent cough for the past 8 months.    He was seen in  here on 7/13/19 and had a CXR which was negative for any acute infection or issues and was treated with tessalon perles and Zithromax by Dr. Obrien.     He was also seen by Pulmonology on 7/2/19.  His next pulmnology appointment is in October.  His next appointment with his primary doctor is in September.      Cough is worse at night and he has not been sleeping well for the past week.     He is using his albuterol and spiriva inhalers.    A [employer]  is used to obtain his history.     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     Post-surgical hypothyroidism 2007     Uncomplicated asthma      Vocal cord paralysis     right     Xerostomia     due to radiation exposures     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)      (ventriculoperitoneal) shunt status     Hydrocephalus     Noncompliance with medication regimen     High serum thyroglobulin     anterior  mediastinal mass 2.6 cm     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), wet (H)     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     Language barrier affecting health care     Low bone density     Deafness, unspecified laterality     Hypocalcemia     Social History     Tobacco Use     Smoking status: Former Smoker     Packs/day: 0.50     Years: 5.00     Pack years: 2.50     Types: Cigarettes     Start date: 1976     Last attempt to quit: 2001     Years since quittin.5     Smokeless tobacco: Never Used   Substance Use Topics     Alcohol use: No       ROS:      OBJECTIVE  :/70 (BP Location: Left arm, Patient Position: Sitting, Cuff Size: Adult Regular)   Pulse 75   Temp 98.1  F (36.7  C) (Oral)   Resp 18   Wt 60.3 kg (133 lb)   SpO2 95%   BMI 22.13 kg/m       Pulse ox after neb treatment 97%    GENERAL APPEARANCE: healthy, alert and no distress  HENT: ear canals and TM's normal.  Nose and mouth without ulcers, erythema or lesions  NECK: supple, nontender, no lymphadenopathy  RESP: scattered wheezing which improves significantly with nebulizer treatment in clinic  CV: regular rates and rhythm, normal S1 S2, no murmur noted  SKIN: no suspicious lesions or rashes  EYES: EOMI,  PERRL  ABDOMEN:  soft, nontender, no HSM or masses and bowel sounds normal  EXT: No edema noted    Previous CXR:   CHEST TWO VIEWS 2019 11:13 AM      HISTORY: Right-sided chest wall pain.     COMPARISON: 2019.     FINDINGS: Calcified granuloma noted in the anterior left lung.  Calcified mediastinal and right hilar lymph node also noted. No  airspace consolidation, pneumothorax, or pleural effusion. Heart size  is normal. A  shunt is present.                                                                      IMPRESSION: No radiographic evidence of acute chest abnormality.      HEMAL RIVERA MD    (J44.1) COPD exacerbation (H)  (primary encounter diagnosis)  Comment:   Plan: order for DME           Use nebulizer treatment at home every 6 hours as needed for wheezing or cough.        (R06.2) Wheezing  Comment:   Plan: ipratropium - albuterol 0.5 mg/2.5 mg/3 mL         (DUONEB) neb solution 3 mL,         INHALATION/NEBULIZER TREATMENT, INITIAL,         ipratropium - albuterol 0.5 mg/2.5 mg/3 mL         (DUONEB) neb solution 3 mL, predniSONE         (DELTASONE) 20 MG tablet            Use saline nasal spray as needed for nasal congestion    Maintain good water intake during the day.    Follow up with primary clinic within ONE WEEK, sooner should symptoms persist, to EMERGENCY DEPARTMENT should symptoms worsen in ANY way.    Greater than 50% of the 45 minutes spent face to face with patient was discussing and reviewing the results of diagnostic tests, with the assistance of the telephone , discussing risks and benefits of management (treatment) options, instruction for management and follow up and importance of compliance with treatment.      Patient and his wife express understanding and agreement with the assessment and plan as above.

## 2019-08-07 NOTE — PATIENT INSTRUCTIONS
(J44.1) COPD exacerbation (H)  (primary encounter diagnosis)  Comment:   Plan: order for DME          Use nebulizer treatment at home every 6 hours as needed for wheezing or cough.        (R06.2) Wheezing  Comment:   Plan: ipratropium - albuterol 0.5 mg/2.5 mg/3 mL         (DUONEB) neb solution 3 mL,         INHALATION/NEBULIZER TREATMENT, INITIAL,         ipratropium - albuterol 0.5 mg/2.5 mg/3 mL         (DUONEB) neb solution 3 mL, predniSONE         (DELTASONE) 20 MG tablet            Use saline nasal spray as needed for nasal congestion    Maintain good water intake during the day.    Follow up with primary clinic within ONE WEEK, sooner should symptoms persist, to EMERGENCY DEPARTMENT should symptoms worsen in ANY way.

## 2019-08-26 DIAGNOSIS — H35.3221 EXUDATIVE AGE-RELATED MACULAR DEGENERATION OF LEFT EYE WITH ACTIVE CHOROIDAL NEOVASCULARIZATION (H): Primary | ICD-10-CM

## 2019-08-28 ENCOUNTER — OFFICE VISIT (OUTPATIENT)
Dept: OPHTHALMOLOGY | Facility: CLINIC | Age: 79
End: 2019-08-28
Attending: OPHTHALMOLOGY
Payer: COMMERCIAL

## 2019-08-28 DIAGNOSIS — H35.3112 INTERMEDIATE STAGE NONEXUDATIVE AGE-RELATED MACULAR DEGENERATION OF RIGHT EYE: ICD-10-CM

## 2019-08-28 DIAGNOSIS — H35.3221 EXUDATIVE AGE-RELATED MACULAR DEGENERATION OF LEFT EYE WITH ACTIVE CHOROIDAL NEOVASCULARIZATION (H): Primary | ICD-10-CM

## 2019-08-28 PROCEDURE — G0463 HOSPITAL OUTPT CLINIC VISIT: HCPCS | Mod: ZF

## 2019-08-28 PROCEDURE — 92134 CPTRZ OPH DX IMG PST SGM RTA: CPT | Mod: ZF | Performed by: OPHTHALMOLOGY

## 2019-08-28 ASSESSMENT — CUP TO DISC RATIO
OS_RATIO: 0.65
OD_RATIO: 0.5

## 2019-08-28 ASSESSMENT — EXTERNAL EXAM - LEFT EYE: OS_EXAM: NORMAL

## 2019-08-28 ASSESSMENT — EXTERNAL EXAM - RIGHT EYE: OD_EXAM: NORMAL

## 2019-08-28 ASSESSMENT — CONF VISUAL FIELD
OD_SUPERIOR_TEMPORAL_RESTRICTION: 3
OS_SUPERIOR_TEMPORAL_RESTRICTION: 3
OS_INFERIOR_TEMPORAL_RESTRICTION: 3
OS_SUPERIOR_NASAL_RESTRICTION: 3
OD_SUPERIOR_NASAL_RESTRICTION: 3
OS_INFERIOR_NASAL_RESTRICTION: 3
OD_INFERIOR_TEMPORAL_RESTRICTION: 3
OD_INFERIOR_NASAL_RESTRICTION: 3

## 2019-08-28 ASSESSMENT — TONOMETRY
IOP_METHOD: ICARE
OD_IOP_MMHG: 11
OS_IOP_MMHG: 11

## 2019-08-28 ASSESSMENT — SLIT LAMP EXAM - LIDS
COMMENTS: NORMAL
COMMENTS: NORMAL

## 2019-08-28 ASSESSMENT — VISUAL ACUITY
OD_PH_SC+: -3
OS_SC: CF @ 3FT
OD_PH_SC: 20/25
OD_SC: 20/30
METHOD: SNELLEN - LINEAR

## 2019-08-28 NOTE — PROGRESS NOTES
Cc: follow up  status post CE/PCIOL right eye 08/21/18     Interval history:  patient reports vision stable, still cannot see anything from left eye. Ok with resident injections  Likes subconj lido    HPI: reports far vision stable, difficulty with near vision, does not have reading glasses     OCULAR IMAGING  OCT Mac 8-28-19  Right eye: few small drusen - stable   Left eye: subfoveal non active CNVM without subretinal fluid / +intraretinal fluid - stable     Assessment and plan      1. 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 Stable VA and trace intraretinal fluid. non active CNVM       2.  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   - Weekly Amsler Grid use was discussed and training performed.   - A diet of leafy green vegetables was encouraged.   - Return precautions were given.    3. PVD OU   - RT/RD precautions    4. S/p CEIOL right eye 08/21/18    - Doing well   - Lens centered    - Retina attached    5. Pseudophakia left eye    - monitor    return to clinic: 4 months Optical Coherence Tomography and possibly lucentis inj left eye  And possible prescription     Fer Ramsey MD PGY3 Ophthalmology Resident    ~~~~~~~~~~~~~~~~~~~~~~~~~~~~~~~~~~   Complete documentation of historical and exam elements from today's encounter can be found in the full encounter summary report (not reduplicated in this progress note).  I personally obtained the chief complaint(s) and history of present illness.  I confirmed and edited as necessary the review of systems, past medical/surgical history, family history, social history, and examination findings as documented by others; and I examined the patient myself.  I personally reviewed the relevant tests,  images, and reports as documented above.  I formulated and edited as necessary the assessment and plan and discussed the findings and management plan with the patient and family    Nadiya Allen MD   of Ophthalmology.  Retina Service   Department of Ophthalmology and Visual Neurosciences   [facility]  Phone: [phone]   Fax: [phone]

## 2019-08-28 NOTE — NURSING NOTE
Chief Complaint(s) and History of Present Illness(es)     Follow Up     Associated symptoms include Negative for dryness, redness, tearing and eye pain.              Comments     4 month f/u for Wet Age related macular degeneration left eye with CNVM left eye, Dry AMD RE. Pt notes no changes in vision BE x the last 4 months.     JORGE Mcmahon 10:12 AM August 28, 2019

## 2019-08-31 ENCOUNTER — APPOINTMENT (OUTPATIENT)
Dept: CT IMAGING | Facility: CLINIC | Age: 79
DRG: 202 | End: 2019-08-31
Attending: EMERGENCY MEDICINE
Payer: COMMERCIAL

## 2019-08-31 ENCOUNTER — APPOINTMENT (OUTPATIENT)
Dept: GENERAL RADIOLOGY | Facility: CLINIC | Age: 79
DRG: 202 | End: 2019-08-31
Attending: EMERGENCY MEDICINE
Payer: COMMERCIAL

## 2019-08-31 ENCOUNTER — HOSPITAL ENCOUNTER (INPATIENT)
Facility: CLINIC | Age: 79
LOS: 2 days | Discharge: HOME | DRG: 202 | End: 2019-09-02
Attending: EMERGENCY MEDICINE | Admitting: INTERNAL MEDICINE
Payer: COMMERCIAL

## 2019-08-31 DIAGNOSIS — J96.21 ACUTE AND CHRONIC RESPIRATORY FAILURE WITH HYPOXIA (H): Primary | ICD-10-CM

## 2019-08-31 DIAGNOSIS — E23.0 PITUITARY INSUFFICIENCY (H): ICD-10-CM

## 2019-08-31 DIAGNOSIS — J18.9 PNEUMONIA DUE TO INFECTIOUS ORGANISM, UNSPECIFIED LATERALITY, UNSPECIFIED PART OF LUNG: ICD-10-CM

## 2019-08-31 LAB
ALBUMIN SERPL-MCNC: 2.9 G/DL (ref 3.4–5)
ALBUMIN UR-MCNC: NEGATIVE MG/DL
ALP SERPL-CCNC: 59 U/L (ref 40–150)
ALT SERPL W P-5'-P-CCNC: 25 U/L (ref 0–70)
ANION GAP SERPL CALCULATED.3IONS-SCNC: 3 MMOL/L (ref 3–14)
APPEARANCE UR: CLEAR
AST SERPL W P-5'-P-CCNC: 21 U/L (ref 0–45)
BASOPHILS # BLD AUTO: 0 10E9/L (ref 0–0.2)
BASOPHILS NFR BLD AUTO: 0.2 %
BILIRUB SERPL-MCNC: 0.3 MG/DL (ref 0.2–1.3)
BILIRUB UR QL STRIP: NEGATIVE
BUN SERPL-MCNC: 12 MG/DL (ref 7–30)
CALCIUM SERPL-MCNC: 8.1 MG/DL (ref 8.5–10.1)
CHLORIDE SERPL-SCNC: 106 MMOL/L (ref 94–109)
CO2 BLDCOV-SCNC: 32 MMOL/L (ref 21–28)
CO2 SERPL-SCNC: 32 MMOL/L (ref 20–32)
COLOR UR AUTO: ABNORMAL
CREAT SERPL-MCNC: 0.96 MG/DL (ref 0.66–1.25)
DIFFERENTIAL METHOD BLD: ABNORMAL
EOSINOPHIL # BLD AUTO: 0.2 10E9/L (ref 0–0.7)
EOSINOPHIL NFR BLD AUTO: 3 %
ERYTHROCYTE [DISTWIDTH] IN BLOOD BY AUTOMATED COUNT: 12.8 % (ref 10–15)
GFR SERPL CREATININE-BSD FRML MDRD: 75 ML/MIN/{1.73_M2}
GLUCOSE SERPL-MCNC: 138 MG/DL (ref 70–99)
GLUCOSE UR STRIP-MCNC: NEGATIVE MG/DL
HCT VFR BLD AUTO: 38 % (ref 40–53)
HGB BLD-MCNC: 12.8 G/DL (ref 13.3–17.7)
HGB UR QL STRIP: NEGATIVE
IMM GRANULOCYTES # BLD: 0 10E9/L (ref 0–0.4)
IMM GRANULOCYTES NFR BLD: 0.2 %
KETONES UR STRIP-MCNC: NEGATIVE MG/DL
LACTATE BLD-SCNC: 1.5 MMOL/L (ref 0.7–2.1)
LEUKOCYTE ESTERASE UR QL STRIP: NEGATIVE
LYMPHOCYTES # BLD AUTO: 1.6 10E9/L (ref 0.8–5.3)
LYMPHOCYTES NFR BLD AUTO: 24.5 %
MCH RBC QN AUTO: 26.5 PG (ref 26.5–33)
MCHC RBC AUTO-ENTMCNC: 33.7 G/DL (ref 31.5–36.5)
MCV RBC AUTO: 79 FL (ref 78–100)
MONOCYTES # BLD AUTO: 0.6 10E9/L (ref 0–1.3)
MONOCYTES NFR BLD AUTO: 9.3 %
MUCOUS THREADS #/AREA URNS LPF: PRESENT /LPF
NEUTROPHILS # BLD AUTO: 4 10E9/L (ref 1.6–8.3)
NEUTROPHILS NFR BLD AUTO: 62.8 %
NITRATE UR QL: NEGATIVE
NRBC # BLD AUTO: 0 10*3/UL
NRBC BLD AUTO-RTO: 0 /100
PCO2 BLDV: 57 MM HG (ref 40–50)
PH BLDV: 7.35 PH (ref 7.32–7.43)
PH UR STRIP: 6.5 PH (ref 5–7)
PLATELET # BLD AUTO: 128 10E9/L (ref 150–450)
PO2 BLDV: 38 MM HG (ref 25–47)
POTASSIUM SERPL-SCNC: 3.5 MMOL/L (ref 3.4–5.3)
PROCALCITONIN SERPL-MCNC: <0.05 NG/ML
PROT SERPL-MCNC: 6.7 G/DL (ref 6.8–8.8)
RBC # BLD AUTO: 4.83 10E12/L (ref 4.4–5.9)
RBC #/AREA URNS AUTO: <1 /HPF (ref 0–2)
SAO2 % BLDV FROM PO2: 68 %
SODIUM SERPL-SCNC: 141 MMOL/L (ref 133–144)
SOURCE: ABNORMAL
SP GR UR STRIP: 1.01 (ref 1–1.03)
TROPONIN I SERPL-MCNC: <0.015 UG/L (ref 0–0.04)
UROBILINOGEN UR STRIP-MCNC: NORMAL MG/DL (ref 0–2)
WBC # BLD AUTO: 6.4 10E9/L (ref 4–11)
WBC #/AREA URNS AUTO: 1 /HPF (ref 0–5)

## 2019-08-31 PROCEDURE — 71046 X-RAY EXAM CHEST 2 VIEWS: CPT

## 2019-08-31 PROCEDURE — 40000275 ZZH STATISTIC RCP TIME EA 10 MIN

## 2019-08-31 PROCEDURE — 94640 AIRWAY INHALATION TREATMENT: CPT

## 2019-08-31 PROCEDURE — 96374 THER/PROPH/DIAG INJ IV PUSH: CPT | Mod: 59

## 2019-08-31 PROCEDURE — 25000128 H RX IP 250 OP 636: Performed by: EMERGENCY MEDICINE

## 2019-08-31 PROCEDURE — 99223 1ST HOSP IP/OBS HIGH 75: CPT | Mod: AI | Performed by: INTERNAL MEDICINE

## 2019-08-31 PROCEDURE — 12000000 ZZH R&B MED SURG/OB

## 2019-08-31 PROCEDURE — 84145 PROCALCITONIN (PCT): CPT | Performed by: EMERGENCY MEDICINE

## 2019-08-31 PROCEDURE — 25000132 ZZH RX MED GY IP 250 OP 250 PS 637: Performed by: INTERNAL MEDICINE

## 2019-08-31 PROCEDURE — 81001 URINALYSIS AUTO W/SCOPE: CPT | Performed by: EMERGENCY MEDICINE

## 2019-08-31 PROCEDURE — 82803 BLOOD GASES ANY COMBINATION: CPT

## 2019-08-31 PROCEDURE — 71275 CT ANGIOGRAPHY CHEST: CPT

## 2019-08-31 PROCEDURE — 83605 ASSAY OF LACTIC ACID: CPT

## 2019-08-31 PROCEDURE — 25000125 ZZHC RX 250: Performed by: EMERGENCY MEDICINE

## 2019-08-31 PROCEDURE — 84484 ASSAY OF TROPONIN QUANT: CPT | Performed by: EMERGENCY MEDICINE

## 2019-08-31 PROCEDURE — 25000125 ZZHC RX 250: Performed by: INTERNAL MEDICINE

## 2019-08-31 PROCEDURE — 25800030 ZZH RX IP 258 OP 636: Performed by: INTERNAL MEDICINE

## 2019-08-31 PROCEDURE — 87086 URINE CULTURE/COLONY COUNT: CPT | Performed by: EMERGENCY MEDICINE

## 2019-08-31 PROCEDURE — 80053 COMPREHEN METABOLIC PANEL: CPT | Performed by: EMERGENCY MEDICINE

## 2019-08-31 PROCEDURE — 99285 EMERGENCY DEPT VISIT HI MDM: CPT | Mod: 25

## 2019-08-31 PROCEDURE — 96361 HYDRATE IV INFUSION ADD-ON: CPT

## 2019-08-31 PROCEDURE — 25800030 ZZH RX IP 258 OP 636: Performed by: EMERGENCY MEDICINE

## 2019-08-31 PROCEDURE — 25000132 ZZH RX MED GY IP 250 OP 250 PS 637: Performed by: EMERGENCY MEDICINE

## 2019-08-31 PROCEDURE — 85025 COMPLETE CBC W/AUTO DIFF WBC: CPT | Performed by: EMERGENCY MEDICINE

## 2019-08-31 PROCEDURE — 87040 BLOOD CULTURE FOR BACTERIA: CPT | Performed by: EMERGENCY MEDICINE

## 2019-08-31 RX ORDER — AMLODIPINE BESYLATE 2.5 MG/1
2.5 TABLET ORAL DAILY
Status: DISCONTINUED | OUTPATIENT
Start: 2019-09-01 | End: 2019-09-02 | Stop reason: HOSPADM

## 2019-08-31 RX ORDER — LIDOCAINE 40 MG/G
CREAM TOPICAL
Status: DISCONTINUED | OUTPATIENT
Start: 2019-08-31 | End: 2019-09-02 | Stop reason: HOSPADM

## 2019-08-31 RX ORDER — BENZONATATE 100 MG/1
100 CAPSULE ORAL 3 TIMES DAILY PRN
Status: DISCONTINUED | OUTPATIENT
Start: 2019-08-31 | End: 2019-09-02 | Stop reason: HOSPADM

## 2019-08-31 RX ORDER — AMOXICILLIN 250 MG
1 CAPSULE ORAL 2 TIMES DAILY PRN
Status: DISCONTINUED | OUTPATIENT
Start: 2019-08-31 | End: 2019-09-02 | Stop reason: HOSPADM

## 2019-08-31 RX ORDER — SODIUM CHLORIDE, SODIUM LACTATE, POTASSIUM CHLORIDE, CALCIUM CHLORIDE 600; 310; 30; 20 MG/100ML; MG/100ML; MG/100ML; MG/100ML
1000 INJECTION, SOLUTION INTRAVENOUS CONTINUOUS
Status: DISCONTINUED | OUTPATIENT
Start: 2019-08-31 | End: 2019-08-31

## 2019-08-31 RX ORDER — ZOLPIDEM TARTRATE 5 MG/1
5 TABLET ORAL
Status: DISCONTINUED | OUTPATIENT
Start: 2019-08-31 | End: 2019-09-02 | Stop reason: HOSPADM

## 2019-08-31 RX ORDER — ONDANSETRON 2 MG/ML
4 INJECTION INTRAMUSCULAR; INTRAVENOUS EVERY 6 HOURS PRN
Status: DISCONTINUED | OUTPATIENT
Start: 2019-08-31 | End: 2019-09-02 | Stop reason: HOSPADM

## 2019-08-31 RX ORDER — IPRATROPIUM BROMIDE AND ALBUTEROL 20; 100 UG/1; UG/1
1 SPRAY, METERED RESPIRATORY (INHALATION) 4 TIMES DAILY
Status: ON HOLD | COMMUNITY
End: 2020-10-28

## 2019-08-31 RX ORDER — ALBUTEROL SULFATE 0.83 MG/ML
2.5 SOLUTION RESPIRATORY (INHALATION)
Status: DISCONTINUED | OUTPATIENT
Start: 2019-08-31 | End: 2019-09-02 | Stop reason: HOSPADM

## 2019-08-31 RX ORDER — MIRTAZAPINE 15 MG/1
15 TABLET, FILM COATED ORAL AT BEDTIME
Status: DISCONTINUED | OUTPATIENT
Start: 2019-08-31 | End: 2019-09-02 | Stop reason: HOSPADM

## 2019-08-31 RX ORDER — AMOXICILLIN 250 MG
2 CAPSULE ORAL 2 TIMES DAILY PRN
Status: DISCONTINUED | OUTPATIENT
Start: 2019-08-31 | End: 2019-09-02 | Stop reason: HOSPADM

## 2019-08-31 RX ORDER — ONDANSETRON 4 MG/1
4 TABLET, ORALLY DISINTEGRATING ORAL EVERY 6 HOURS PRN
Status: DISCONTINUED | OUTPATIENT
Start: 2019-08-31 | End: 2019-09-02 | Stop reason: HOSPADM

## 2019-08-31 RX ORDER — TAMSULOSIN HYDROCHLORIDE 0.4 MG/1
0.4 CAPSULE ORAL DAILY
COMMUNITY

## 2019-08-31 RX ORDER — NALOXONE HYDROCHLORIDE 0.4 MG/ML
.1-.4 INJECTION, SOLUTION INTRAMUSCULAR; INTRAVENOUS; SUBCUTANEOUS
Status: DISCONTINUED | OUTPATIENT
Start: 2019-08-31 | End: 2019-09-02 | Stop reason: HOSPADM

## 2019-08-31 RX ORDER — IOPAMIDOL 755 MG/ML
61 INJECTION, SOLUTION INTRAVASCULAR ONCE
Status: COMPLETED | OUTPATIENT
Start: 2019-08-31 | End: 2019-08-31

## 2019-08-31 RX ORDER — HYDROCORTISONE 10 MG/1
10 TABLET ORAL DAILY
Status: DISCONTINUED | OUTPATIENT
Start: 2019-09-01 | End: 2019-09-02 | Stop reason: HOSPADM

## 2019-08-31 RX ORDER — ACETAMINOPHEN 650 MG/1
650 SUPPOSITORY RECTAL EVERY 4 HOURS PRN
Status: DISCONTINUED | OUTPATIENT
Start: 2019-08-31 | End: 2019-09-02 | Stop reason: HOSPADM

## 2019-08-31 RX ORDER — ACETAMINOPHEN 500 MG
1000 TABLET ORAL ONCE
Status: COMPLETED | OUTPATIENT
Start: 2019-08-31 | End: 2019-08-31

## 2019-08-31 RX ORDER — AMLODIPINE BESYLATE 2.5 MG/1
2.5 TABLET ORAL DAILY
COMMUNITY
End: 2020-10-26

## 2019-08-31 RX ORDER — TAMSULOSIN HYDROCHLORIDE 0.4 MG/1
0.4 CAPSULE ORAL DAILY
Status: DISCONTINUED | OUTPATIENT
Start: 2019-09-01 | End: 2019-09-02 | Stop reason: HOSPADM

## 2019-08-31 RX ORDER — IPRATROPIUM BROMIDE AND ALBUTEROL SULFATE 2.5; .5 MG/3ML; MG/3ML
3 SOLUTION RESPIRATORY (INHALATION)
Status: DISCONTINUED | OUTPATIENT
Start: 2019-09-01 | End: 2019-09-02 | Stop reason: HOSPADM

## 2019-08-31 RX ORDER — HYDROCORTISONE 5 MG/1
5 TABLET ORAL
Status: DISCONTINUED | OUTPATIENT
Start: 2019-09-01 | End: 2019-09-02 | Stop reason: HOSPADM

## 2019-08-31 RX ORDER — SODIUM CHLORIDE 9 MG/ML
INJECTION, SOLUTION INTRAVENOUS CONTINUOUS
Status: DISCONTINUED | OUTPATIENT
Start: 2019-08-31 | End: 2019-09-02 | Stop reason: HOSPADM

## 2019-08-31 RX ORDER — BUDESONIDE 0.5 MG/2ML
0.5 INHALANT ORAL 2 TIMES DAILY
Status: DISCONTINUED | OUTPATIENT
Start: 2019-08-31 | End: 2019-09-02 | Stop reason: HOSPADM

## 2019-08-31 RX ORDER — ACETAMINOPHEN 325 MG/1
650 TABLET ORAL EVERY 4 HOURS PRN
Status: DISCONTINUED | OUTPATIENT
Start: 2019-08-31 | End: 2019-09-02 | Stop reason: HOSPADM

## 2019-08-31 RX ORDER — FLUTICASONE PROPIONATE 50 MCG
2 SPRAY, SUSPENSION (ML) NASAL DAILY
Status: DISCONTINUED | OUTPATIENT
Start: 2019-09-01 | End: 2019-09-02 | Stop reason: HOSPADM

## 2019-08-31 RX ORDER — TRAZODONE HYDROCHLORIDE 50 MG/1
50 TABLET, FILM COATED ORAL AT BEDTIME
Status: DISCONTINUED | OUTPATIENT
Start: 2019-08-31 | End: 2019-09-02 | Stop reason: HOSPADM

## 2019-08-31 RX ADMIN — HYDROCORTISONE SODIUM SUCCINATE 100 MG: 100 INJECTION, POWDER, FOR SOLUTION INTRAMUSCULAR; INTRAVENOUS at 20:34

## 2019-08-31 RX ADMIN — SODIUM CHLORIDE, POTASSIUM CHLORIDE, SODIUM LACTATE AND CALCIUM CHLORIDE 1000 ML: 600; 310; 30; 20 INJECTION, SOLUTION INTRAVENOUS at 20:15

## 2019-08-31 RX ADMIN — RANITIDINE 300 MG: 150 TABLET ORAL at 23:28

## 2019-08-31 RX ADMIN — IOPAMIDOL 61 ML: 755 INJECTION, SOLUTION INTRAVENOUS at 21:41

## 2019-08-31 RX ADMIN — SODIUM CHLORIDE 87 ML: 9 INJECTION, SOLUTION INTRAVENOUS at 21:41

## 2019-08-31 RX ADMIN — BUDESONIDE 0.5 MG: 0.5 INHALANT RESPIRATORY (INHALATION) at 23:36

## 2019-08-31 RX ADMIN — TRAZODONE HYDROCHLORIDE 50 MG: 50 TABLET ORAL at 23:28

## 2019-08-31 RX ADMIN — ACETAMINOPHEN 1000 MG: 500 TABLET, FILM COATED ORAL at 20:37

## 2019-08-31 RX ADMIN — MIRTAZAPINE 15 MG: 15 TABLET, FILM COATED ORAL at 23:28

## 2019-08-31 RX ADMIN — SODIUM CHLORIDE: 9 INJECTION, SOLUTION INTRAVENOUS at 23:19

## 2019-08-31 ASSESSMENT — ENCOUNTER SYMPTOMS
SLEEP DISTURBANCE: 1
COUGH: 1
CHEST TIGHTNESS: 1
APPETITE CHANGE: 1
VOMITING: 0
CONFUSION: 0
FATIGUE: 1
WEAKNESS: 1
FEVER: 1
SHORTNESS OF BREATH: 1
DIARRHEA: 0

## 2019-09-01 ENCOUNTER — APPOINTMENT (OUTPATIENT)
Dept: PHYSICAL THERAPY | Facility: CLINIC | Age: 79
DRG: 202 | End: 2019-09-01
Attending: INTERNAL MEDICINE
Payer: COMMERCIAL

## 2019-09-01 LAB
BACTERIA SPEC CULT: NO GROWTH
ERYTHROCYTE [DISTWIDTH] IN BLOOD BY AUTOMATED COUNT: 12.8 % (ref 10–15)
HCT VFR BLD AUTO: 36.8 % (ref 40–53)
HGB BLD-MCNC: 12.3 G/DL (ref 13.3–17.7)
Lab: NORMAL
MCH RBC QN AUTO: 26.2 PG (ref 26.5–33)
MCHC RBC AUTO-ENTMCNC: 33.4 G/DL (ref 31.5–36.5)
MCV RBC AUTO: 79 FL (ref 78–100)
PLATELET # BLD AUTO: 116 10E9/L (ref 150–450)
RBC # BLD AUTO: 4.69 10E12/L (ref 4.4–5.9)
SPECIMEN SOURCE: NORMAL
TROPONIN I SERPL-MCNC: <0.015 UG/L (ref 0–0.04)
WBC # BLD AUTO: 6.8 10E9/L (ref 4–11)

## 2019-09-01 PROCEDURE — 12000000 ZZH R&B MED SURG/OB

## 2019-09-01 PROCEDURE — 25800030 ZZH RX IP 258 OP 636: Performed by: INTERNAL MEDICINE

## 2019-09-01 PROCEDURE — 85027 COMPLETE CBC AUTOMATED: CPT | Performed by: INTERNAL MEDICINE

## 2019-09-01 PROCEDURE — 99232 SBSQ HOSP IP/OBS MODERATE 35: CPT | Performed by: STUDENT IN AN ORGANIZED HEALTH CARE EDUCATION/TRAINING PROGRAM

## 2019-09-01 PROCEDURE — 25000125 ZZHC RX 250: Performed by: INTERNAL MEDICINE

## 2019-09-01 PROCEDURE — 97161 PT EVAL LOW COMPLEX 20 MIN: CPT | Mod: GP

## 2019-09-01 PROCEDURE — 93005 ELECTROCARDIOGRAM TRACING: CPT

## 2019-09-01 PROCEDURE — 84484 ASSAY OF TROPONIN QUANT: CPT | Performed by: INTERNAL MEDICINE

## 2019-09-01 PROCEDURE — 94640 AIRWAY INHALATION TREATMENT: CPT

## 2019-09-01 PROCEDURE — 93010 ELECTROCARDIOGRAM REPORT: CPT | Performed by: INTERNAL MEDICINE

## 2019-09-01 PROCEDURE — 25000132 ZZH RX MED GY IP 250 OP 250 PS 637: Performed by: INTERNAL MEDICINE

## 2019-09-01 PROCEDURE — 94640 AIRWAY INHALATION TREATMENT: CPT | Mod: 76

## 2019-09-01 PROCEDURE — 36415 COLL VENOUS BLD VENIPUNCTURE: CPT | Performed by: INTERNAL MEDICINE

## 2019-09-01 PROCEDURE — 40000275 ZZH STATISTIC RCP TIME EA 10 MIN

## 2019-09-01 RX ORDER — LANOLIN ALCOHOL/MO/W.PET/CERES
3 CREAM (GRAM) TOPICAL
Status: DISCONTINUED | OUTPATIENT
Start: 2019-09-01 | End: 2019-09-02 | Stop reason: HOSPADM

## 2019-09-01 RX ADMIN — IPRATROPIUM BROMIDE AND ALBUTEROL SULFATE 3 ML: .5; 3 SOLUTION RESPIRATORY (INHALATION) at 19:19

## 2019-09-01 RX ADMIN — ACETAMINOPHEN 650 MG: 325 TABLET ORAL at 11:04

## 2019-09-01 RX ADMIN — AMLODIPINE BESYLATE 2.5 MG: 2.5 TABLET ORAL at 10:48

## 2019-09-01 RX ADMIN — MIRTAZAPINE 15 MG: 15 TABLET, FILM COATED ORAL at 21:04

## 2019-09-01 RX ADMIN — IPRATROPIUM BROMIDE AND ALBUTEROL SULFATE 3 ML: .5; 3 SOLUTION RESPIRATORY (INHALATION) at 11:52

## 2019-09-01 RX ADMIN — IPRATROPIUM BROMIDE AND ALBUTEROL SULFATE 3 ML: .5; 3 SOLUTION RESPIRATORY (INHALATION) at 07:39

## 2019-09-01 RX ADMIN — TRAZODONE HYDROCHLORIDE 50 MG: 50 TABLET ORAL at 21:04

## 2019-09-01 RX ADMIN — BUDESONIDE 0.5 MG: 0.5 INHALANT RESPIRATORY (INHALATION) at 07:39

## 2019-09-01 RX ADMIN — BUDESONIDE 0.5 MG: 0.5 INHALANT RESPIRATORY (INHALATION) at 19:16

## 2019-09-01 RX ADMIN — IPRATROPIUM BROMIDE AND ALBUTEROL SULFATE 3 ML: .5; 3 SOLUTION RESPIRATORY (INHALATION) at 15:16

## 2019-09-01 RX ADMIN — TAMSULOSIN HYDROCHLORIDE 0.4 MG: 0.4 CAPSULE ORAL at 10:48

## 2019-09-01 RX ADMIN — LEVOTHYROXINE SODIUM 137 MCG: 112 TABLET ORAL at 10:49

## 2019-09-01 RX ADMIN — HYDROCORTISONE 10 MG: 10 TABLET ORAL at 10:49

## 2019-09-01 RX ADMIN — FLUTICASONE PROPIONATE 2 SPRAY: 50 SPRAY, METERED NASAL at 10:50

## 2019-09-01 RX ADMIN — HYDROCORTISONE 5 MG: 5 TABLET ORAL at 14:16

## 2019-09-01 RX ADMIN — SODIUM CHLORIDE: 9 INJECTION, SOLUTION INTRAVENOUS at 08:35

## 2019-09-01 RX ADMIN — RANITIDINE 300 MG: 150 TABLET ORAL at 21:04

## 2019-09-01 ASSESSMENT — ACTIVITIES OF DAILY LIVING (ADL)
ADLS_ACUITY_SCORE: 16
SWALLOWING: 0-->SWALLOWS FOODS/LIQUIDS WITHOUT DIFFICULTY
ADLS_ACUITY_SCORE: 16
ADLS_ACUITY_SCORE: 16
DRESS: 0-->INDEPENDENT
RETIRED_EATING: 0-->INDEPENDENT
ADLS_ACUITY_SCORE: 16
RETIRED_COMMUNICATION: 0-->UNDERSTANDS/COMMUNICATES WITHOUT DIFFICULTY
ADLS_ACUITY_SCORE: 16
COGNITION: 0 - NO COGNITION ISSUES REPORTED
ADLS_ACUITY_SCORE: 16

## 2019-09-01 NOTE — PROVIDER NOTIFICATION
MD Notification    Notified Person: MD    Notified Person Name:  Lois    Notification Date/Time: 9/1/19 @ 1208    Notification Interaction: Text page    Purpose of Notification: Wife requesting to see MD, pt requesting to leave.    Orders Received:    Comments:

## 2019-09-01 NOTE — PLAN OF CARE
Cognitive Concerns/ Orientation : Alert and oriented x 4.  Cantonese speaking, some English   BEHAVIOR & AGGRESSION TOOL COLOR: Green   CIWA SCORE: n/a   ABNL VS/O2: VSS 95% 3lpm NC   MOBILITY: Assist of one with cane and gait belt   PAIN MANAGMENT: Denies   DIET: Regular   BOWEL/BLADDER: Voiding per urinal   ABNL LAB/BG: n/a   DRAIN/DEVICES: Peripheral IV right arm infusing NS at 100cc per hour   TELEMETRY RHYTHM: NSR   SKIN: Bruising, scabs   TESTS/PROCEDURES: n/a   D/C DAY/GOALS/PLACE: pending   OTHER IMPORTANT INFO:

## 2019-09-01 NOTE — H&P
[facility]    History and Physical  Hospitalist       Date of Admission:  8/31/2019  Date of Service (when I saw the patient): 08/31/19    Note: History difficult as pt very hard of hearing, non-Engish speaking and deferring to his wife for answers. Wife assisting as able but also limited, she declines iPad  at this time.    Assessment & Plan   Travis Peres is a 78 year old male who presents with cough    Acute hypoxic respiratory failure  Pulmonary nodules  COPD  Cough chronic. Follows with pulmonary for nodules. Per 7/2 note thought intermediate risk for cancer. Cluster of nodules thought 2/2 atypical infection in RUL posteriorly. 5/2019 quant gold negative (at [facility]). Severe COPD per pulmonary 7/2.     Presented to ED with wife 2/2 ongoing cough. Per notes this is not new. C/o R sided chest pain and tightness for 2 days. Also increased weakness, fatigue, decreased appetite, sob. EMS found his O2 sats to be 88%. Temp on arrival in ED at 100.0. Bibasilar rales on exam. CXR clear. WBC at 6.4.  Lactate 1.5. Initial troponin negative.VBG 7.35/57/38/32 (compensated respiratory acidosis with metabolic compensation).  UA bland. CT chest with no PE but notes moderate to severe bronchial wall thickening. Suspect acute bronchitis.  - procal pending  - check EKG, telemetry  - add pulmicort nebs BID  - duonebs QID scheduled  - prn albuterol nebs  - will check troponin in the am (unlikely ACS but very little by way of history)  - prn robitussin, tessalon  - blood and urine cultures pending  - hold abx for now  - pending progress consider pulmonary consult  - PT consult    HTN  HLD  Outpatient on amlodipine 2.5 mg daily. 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 Potts, 7/29/19 notes  that she's long suspected lung mets but not proven,  thyroglobulin indicates persistent/ progressive disease. thyroglobulinemia rising suggesting progression of thyroid cancer. TSH should be kept low to avoid stimulating the thyroid cancer. 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 Pituitary macroadenoma causing hydrocephalus s/p debulking with mass stable on MRI 10/2018  - continue hydrocort (outpatient on 10 mg am/ 5 mg 2pm)  - levothyroxine 137 mcg daily  - no indication for stress dose steroids right now  - follow up with Dr. Potts    GERD  Continue Ranitidine 300 mg daily    BPH  Continue flomax 0.4 mg daily    Pruritis  Continue doxepin 6 mg HS    Depression  Insomnia  pta on  doxepin 6 mg HS, mirtazapine 7.5 mg HS, trazodone 100 mg HS, zolpidem 10 mg HS prn  - will continue pta meds    TCP  Appears chronic, stable  - monitor    DVT Prophylaxis: Pneumatic Compression Devices  Code Status: unable to ascertain given language and other issues above. Full code by default    Disposition: Expected discharge in ~2 days pending evaluation and progress    Shola Herndon MD  [phone] (P)  Text Page     Primary Care Physician   Dr. Karol Alvarez    Chief Complaint   Cough, fever    History is obtained from the patient and medical records    Note: History difficult as pt very hard of hearing, non-Engish speaking and deferring to his wife for answers. Wife assisting as able but also limited, she declines iPad  at this time.    History of Present Illness   Travis Peres is a 78 year old male who presents with cough and fever.  He has a complex medical history including COPD, known pulmonary nodules, hypertension, hyperlipidemia, papillary thyroid carcinoma, panhypopituitarism on chronic steroids,  shunt secondary to pituitary macroadenoma with hydrocephalus, GERD, BPH, depression, insomnia and chronic thrombocytopenia.  As I can ascertain patient has a history of chronic cough.  This is documented in many clinic notes.   He appears to have had some temperatures at home as well.  These were not measured.  He also has chest discomfort which I am unable to better delineate.  It does not appear that he has shortness of breath.  Is unclear as to whether his cough is worse at baseline.  There is no nausea or vomiting, no abdominal pain.  No edema.  Per EMS his O2 sats at home were 88% prior to oxygen.    In the emergency department he had a temperature 100.0.  Heart rate was normal.  Blood pressures run 140s to 150s/80s.  Labs show normal BMP, normal liver function test with exception of albumin, normal lactate, normal troponin, white count was normal.  VBG showed a pH of 7.35, PCO2 57, PO2 38 and bicarb 32.  UA was bland.    Past Medical History    I have reviewed this patient's medical history and updated it with pertinent information if needed.   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  Past Surgical History:   Procedure Laterality Date     cymetra injection       ESOPHAGOSCOPY, GASTROSCOPY, DUODENOSCOPY (EGD), COMBINED  6/20/2013    Procedure: COMBINED ESOPHAGOSCOPY, GASTROSCOPY, DUODENOSCOPY (EGD), BIOPSY SINGLE OR MULTIPLE;  gastroscopy;   Surgeon: Leslie Guadarrama MD;  Location:  GI     HERNIA REPAIR Right      lipoma resection chest wall Right 11/09     PHACOEMULSIFICATION CLEAR CORNEA WITH STANDARD INTRAOCULAR LENS IMPLANT Left 5/7/2018    Procedure: PHACOEMULSIFICATION CLEAR CORNEA WITH STANDARD INTRAOCULAR LENS IMPLANT;  LEFT PHACOEMULSIFICATION CLEAR CORNEA WITH STANDARD INTRAOCULAR LENS IMPLANT ;  Surgeon: Nadiya Allen MD;  Location:  EC     PHACOEMULSIFICATION CLEAR CORNEA WITH STANDARD INTRAOCULAR LENS IMPLANT Right 8/21/2018    Procedure: PHACOEMULSIFICATION CLEAR CORNEA WITH STANDARD INTRAOCULAR LENS IMPLANT;  RIGHT EYE PHACOEMULSIFICATION CLEAR CORNEA WITH STANDARD INTRAOCULAR LENS IMPLANT;  Surgeon: Nadiya Allen MD;  Location:  EC     right selective neck dissection level 2, 3, 4  11/3/09     right  shunt placement  6/28/07     THORACIC SURGERY  Fidencio 3 2017     THYMECTOMY N/A 1/3/2017    Procedure: THYMECTOMY;  Surgeon: Ernesto Armstrong MD;  Location: [facility] OR     THYROIDECTOMY  11/27/07     TRANSCERVICAL EXTENDED MEDIASTINAL LYMPHADENECTOMY N/A 1/3/2017    Procedure: TRANSCERVICAL EXTENDED MEDIASTINAL LYMPHADENECTOMY;  Surgeon: Ernesto Armstrong MD;  Location: [facility] OR     transnasal endoscopic resection of pituitary adenoma  8/13/2007       Prior to Admission Medications   Prior to Admission Medications   Prescriptions Last Dose Informant Patient Reported? Taking?   B Complex Vitamins (VITAMIN B COMPLEX PO)   Yes No   Sig: Take 1 capsule by mouth   Carboxymethylcellulose Sod PF (REFRESH PLUS) 0.5 % SOLN ophthalmic solution   Yes No   Sig: Place 1 drop into both eyes 3 times daily as needed for dry eyes   GuaiFENesin (MUCUS RELIEF ADULT PO)   Yes No   Sig: Take 400 mg by mouth 2 times daily as needed    MIRTAZAPINE PO   Yes No   Sig: Take 15 mg by mouth At Bedtime   RIBOFLAVIN PO   Yes No   Sig: Take 100 mg by mouth   TAMSULOSIN HCL PO   Yes No   Sig: Take 0.4 mg by mouth At Bedtime     albuterol (PROAIR HFA) 108 (90 Base) MCG/ACT inhaler   No No   Sig: Inhale 2 puffs into the lungs every 4 hours as needed for shortness of breath / dyspnea or wheezing   alendronate (FOSAMAX) 70 MG tablet   No No   Sig: Take 1 tablet (70 mg) by mouth every 7 days   amLODIPine (NORVASC) 2.5 MG tablet   Yes No   Sig: TK 1 T PO QD   azithromycin (ZITHROMAX) 250 MG tablet   No No   Sig: Two tablets first day, then one tablet daily for four days.   benzonatate (TESSALON) 100 MG capsule   No No   Sig: Take 1 capsule (100 mg) by mouth 3 times daily as needed for cough   diclofenac (VOLTAREN) 1 % GEL topical gel   Yes No   Sig: Place onto the skin 4 times daily   fish oil-omega-3 fatty acids 1000 MG capsule   Yes No   Sig: Take 2 g by mouth daily   fluticasone (FLONASE) 50 MCG/ACT spray   Yes No   Sig: Spray 2 sprays into both nostrils daily   hydrocortisone (CORTEF) 10 MG tablet   No No   Sig: 10 mg AM and 5 mg afternoon   ibuprofen (ADVIL/MOTRIN) 400 MG tablet   Yes No   Sig: Take 400-600 mg by mouth every 6 hours as needed for moderate pain    ketorolac tromethamine (ACULAR-LS) 0.4 % SOLN ophthalmic solution   No No   Sig: Apply 1 drop to eye 4 times daily Instill into operative eye(s) per physician instructions.   levothyroxine (SYNTHROID/LEVOTHROID) 137 MCG tablet   No No   Sig: Take 1 tablet (137 mcg) by mouth daily   loperamide (IMODIUM) 2 MG capsule   Yes No   ofloxacin (OCUFLOX) 0.3 % ophthalmic solution   No No   Sig: Place 1 drop into the right eye 4 times daily Instill into operative eye(s) per physician instructions.   order for DME   No No   Sig: Equipment being ordered: Nebulizer with tubing   predniSONE (DELTASONE) 20 MG tablet   No No   Sig: Take 1 tablet (20 mg) by mouth daily for 5 days   prednisoLONE acetate (PRED FORTE) 1 % ophthalmic susp   No No   Sig: Place 1-2 drops into the right eye See Admin Instructions   ranitidine (ZANTAC) 300 MG tablet   Yes No   Si mg daily    tiotropium (SPIRIVA  HANDIHALER) 18 MCG inhaled capsule   No No   Sig: Inhale 1 capsule (18 mcg) into the lungs daily   traZODone (DESYREL) 100 MG tablet   Yes No   Sig: take 1 tab of 50 mg daily   triamcinolone (KENALOG) 0.1 % paste   Yes No   Sig: JOHNATHAN SML AMT AA IN MOUTH BID   vitamin B complex with vitamin C (VITAMIN  B COMPLEX) TABS tablet   Yes No   Sig: Take 1 tablet by mouth daily   zolpidem (AMBIEN) 10 MG tablet   Yes No   Sig: Take 10 mg by mouth      Facility-Administered Medications Last Administration Doses Remaining   ipratropium - albuterol 0.5 mg/2.5 mg/3 mL (DUONEB) neb solution 3 mL None recorded 1   lidocaine (PF) (XYLOCAINE) 2 % injection 0.05 mL 11/29/2018 12:07 PM 10   ranibizumab (LUCENTIS) injection syringe 0.5 mg None recorded 12        Allergies   Allergies   Allergen Reactions     Metoprolol Shortness Of Breath, Other (See Comments) and Unknown     Not true allergy.  Bradycardia, fatigue, COPD worsening.         Fenofibrate Hives     Fenofibric Acid      Lisinopril Cough     Losartan Cough     Niacin      Simvastatin Cramps and Other (See Comments)       Social History   I have reviewed this patient's social history and updated it with pertinent information if needed. Travis Peres  reports that he quit smoking about 18 years ago. His smoking use included cigarettes. He started smoking about 43 years ago. He has a 2.50 pack-year smoking history. He has never used smokeless tobacco. He reports that he does not drink alcohol or use drugs.    Family History   I have reviewed this patient's family history and updated it with pertinent information if needed.   Family History   Problem Relation Age of Onset     Cancer No family hx of         no skin cancer     Glaucoma No family hx of      Macular Degeneration No family hx of        Review of Systems   The 10 point Review of Systems is unable to be performed 2/2 pt factors    Physical Exam   Temp: 100  F (37.8  C) Temp src: Oral BP: (!) 149/84 Pulse: 65 Heart Rate: 65  Resp: 20 SpO2: 91 % O2 Device: None (Room air)    Vital Signs with Ranges  155 lbs 0 oz    Constitutional: sleepy but arousable, not in any acute distress  Eyes: EOMI, PERRL  HEENT: OP clear  Respiratory: CTA B without w/c  Cardiovascular: RRR without m/r/g  GI: soft, nontender, nondistended, no HSM  Lymph/Hematologic: no cervical LAD  Genitourinary: deferred  Skin: no rashes or lesions grossly  Musculoskeletal: no deformities or arthritis  Neurologic: CN intact, unable to fully assess 2/2 pt factors  Psychiatric: unable to assess    Data   Data reviewed today:  I personally reviewed the chest CT image(s) showing no pulmonary embolism, bronchial wall thickening.  Recent Labs   Lab 08/31/19 2007   WBC 6.4   HGB 12.8*   MCV 79   *      POTASSIUM 3.5   CHLORIDE 106   CO2 32   BUN 12   CR 0.96   ANIONGAP 3   NIHARIKA 8.1*   *   ALBUMIN 2.9*   PROTTOTAL 6.7*   BILITOTAL 0.3   ALKPHOS 59   ALT 25   AST 21   TROPI <0.015       Recent Results (from the past 24 hour(s))   XR Chest 2 Views    Narrative    CHEST TWO VIEWS 8/31/2019 8:32 PM     HISTORY: Fever, cough.    COMPARISON: July 13, 2019.     FINDINGS: There are no acute infiltrates. The cardiac silhouette is  not enlarged. Pulmonary vasculature is unremarkable. Stable  granulomatous change.      Impression    IMPRESSION: No acute disease.    RICHARD MCKEON MD

## 2019-09-01 NOTE — ED TRIAGE NOTES
Pt is Cantonese speaking and presents by EMS for complaints of cough, weakness and febrile. Pt also having R sided chest pain for the past 2-3 days. Pt got 324 ASA by EMS. Wife is present to help use the jabber for . Room air o2 saturation for EMS was 88%, pt placed on 3 liters NC

## 2019-09-01 NOTE — ED NOTES
"[facility]  ED Nurse Handoff Report    ED Chief complaint: Cough      ED Diagnosis:   Final diagnoses:   None       Code Status: Hospital MD to address     Allergies:   Allergies   Allergen Reactions     Metoprolol Shortness Of Breath, Other (See Comments) and Unknown     Not true allergy.  Bradycardia, fatigue, COPD worsening.         Fenofibrate Hives     Fenofibric Acid      Lisinopril Cough     Losartan Cough     Niacin      Simvastatin Cramps and Other (See Comments)       Activity level - Baseline/Home:  Independent  Activity Level - Current:   Stand with Assist    Patient's Preferred language: Cantonese    Needed?: Yes- using jabber with minimal success- wife is helping.  was paged w/o success     Isolation: No  Infection: Not Applicable  Bariatric?: No    Vital Signs:   Vitals:    08/31/19 1959 08/31/19 2030 08/31/19 2100   BP: (!) 149/84 (!) 144/89 (!) 151/81   Pulse: 65  66   Resp: 20  10   Temp: 100  F (37.8  C)     TempSrc: Oral     SpO2: 91%  100%   Weight: 70.3 kg (155 lb)         Cardiac Rhythm: ,        Pain level:      Is this patient confused?: No   Does this patient have a guardian?  No         If yes, is there guardianship documents in the Epic \"Code/ACP\" activity?  N/A         Guardian Notified?  N/A  [place] - Suicide Severity Rating Scale Completed?  Yes  If yes, what color did the patient score?  White    Patient Report: Initial Complaint: Fever, SOB, cough  Focused Assessment: Pt presents from home where he lives with wife and they are Cantonese speaking. Pt is very [place] so it makes it difficult to use the  ipad. Pt's wife is helping to interpret some as well. An  was paged..still waiting. Pt uses a walker to ambulate. Wife states for the past few days pt has had worsening SOB, cough, fever and weakness. Pt still eating but small amounts. Pt was 88% on RA so pt was placed on 3 liters NC. CXR negative so CT of chest being performed. " Wife at bedside  Tests Performed: Blood work, CXR, chest CT, urine  Abnormal Results:   Labs Ordered and Resulted from Time of ED Arrival Up to the Time of Departure from the ED   CBC WITH PLATELETS DIFFERENTIAL - Abnormal; Notable for the following components:       Result Value    Hemoglobin 12.8 (*)     Hematocrit 38.0 (*)     Platelet Count 128 (*)     All other components within normal limits   COMPREHENSIVE METABOLIC PANEL - Abnormal; Notable for the following components:    Glucose 138 (*)     Calcium 8.1 (*)     Albumin 2.9 (*)     Protein Total 6.7 (*)     All other components within normal limits   ROUTINE UA WITH MICROSCOPIC - Abnormal; Notable for the following components:    Mucous Urine Present (*)     All other components within normal limits   ISTAT  GASES LACTATE MILES POCT - Abnormal; Notable for the following components:    PCO2 Venous 57 (*)     Bicarbonate Venous 32 (*)     All other components within normal limits   TROPONIN I   PROCALCITONIN   PULSE OXIMETRY NURSING   CARDIAC CONTINUOUS MONITORING   PATIENT CARE ORDER   ISTAT CG4 GASES LACTATE MILES NURSING POCT   URINE CULTURE AEROBIC BACTERIAL   BLOOD CULTURE   BLOOD CULTURE       Treatments provided: IVF, iv steroids    Family Comments: wife at bedside     OBS brochure/video discussed/provided to patient/family: N/A              Name of person given brochure if not patient:  n/a              Relationship to patient: n/a    ED Medications:   Medications   lactated ringers BOLUS 1,000 mL (0 mLs Intravenous Stopped 8/31/19 2130)     Followed by   lactated ringers infusion (has no administration in time range)   iopamidol (ISOVUE-370) solution 61 mL (has no administration in time range)   sodium chloride 0.9 % bag 500mL for CT scan flush use (has no administration in time range)   hydrocortisone sodium succinate PF (solu-CORTEF) injection 100 mg (100 mg Intravenous Given 8/31/19 2034)   acetaminophen (TYLENOL) tablet 1,000 mg (1,000 mg Oral Given  8/31/19 2037)       Drips infusing?:  No    For the majority of the shift this patient was  green  Interventions performed were fang jimenez, herminio    Severe Sepsis OR Septic Shock Diagnosis Present: no    To be done/followed up on inpatient unit:  inpt orders    ED NURSE PHONE NUMBER: *[id number]

## 2019-09-01 NOTE — ED PROVIDER NOTES
History     Chief Complaint:  Cough    The history is provided by the spouse. The history is limited by a language barrier. A  was used (Cantonese).    Wife is also a limited historian. Pt is apparently hard of hearing. He was not able to use the jabber and was minimally verbally interactive.   Travis Peres is a 78 year old male with a history of COPD,, papillary thyroid carcinoma s/p thyroidectomy, panhypopituitarism, pituitary macroadenoma with extrasellar extension, hydrocephalus s/p  shunt placement, and with a history of language barrier affecting his healthcare, who presents to the ED via EMS with his wife, Nazanin, for evaluation of a cough. The patient's wife states that the patient has been complaining of right-sided chest pain and tightness for the past two days, has been febrile, and not sleeping. EMS gave the patient 324 mg of Aspirin en route to the ED. She also reports the patient has had increased fatigue, weakness, decreased appetite, shortness of breath, and cough. She states that the patient still walks at home, but slowly. The patient had a chest CT done on 4/25/2019 (results below), and the patient's wife states that the doctor told them that the spot they found was not cancer, but that they needed to watch it. The patient's wife denies any vomiting, diarrhea, confusion, history of tuberculosis, or current tobacco use. The patient is a former smoker.    Chest CT w/o contrast (4/25/2019):  1. Increase in centrilobular nodules in the posterior right upper  lobe. Given the chronicity, these are most consistent with atypical  mycobacterium or fungal infection, correlate with QuantiFeron gold.  2. Cholelithiasis without acute cholecystitis.  3. Right thyroid bed lymph node stable. See prior CT and PETCT as per radiology.    Allergies:  Metoprolol  Fenofibrate  Fenofibric Acid  Lisinopril  Losartan  Niacin  Simvastatin      Medications:    Fosamax   Albuterol   Amlodipine    Tessalon   Hydrocortisone   Levothyroxine   Imodium   Mirtazepine   Ranitidine   Riboflavin   Tamsulosin   Spiriva handihaler   Zolpidem   Trazodone     Past Medical History:    Arthritis   BPH   COPD  Depression  Hyperlipidemia   Hypertension  Hypopituitarism   Hypovitaminosis D   Multiple pulmonary nodules   Osteopenia  Panhypopituitarism   Papillary thyroid carcinoma   Pituitary macroadenoma with extrasellar extension   Post surgical hypothyroidism   Vocal cord paralysis   Xerostomia   Hypocalcemia   Deafness  Low bone density   Language barrier affecting healthcare   Noncompliance with medication regimen   Hydrocephalus    shunt status   Sphenoid sinusitis     Past Surgical History:    Cymetra injection  EGD Combined  Hernia repair  Lipoma resection chest wall  Phacoemulsification clear cornea with standard intraocular lens implant x2   Right selective neck dissection level 2, 3, 4   Right  shunt placement  Thoracic surgery  Thymectomy  Thyroidectomy   Transcervical extended mediastinal lymphadenectomy     Family History:    History reviewed. No pertinent family history.      Social History:  The patient was accompanied to the ED by wife.  Smoking Status: Former smoker 0.5 PPD for 5 years  Smokeless Tobacco: Never  Alcohol Use: No   Marital Status:     Lives in house with wife, children live in the area.    Review of Systems   Constitutional: Positive for appetite change, fatigue and fever.   Respiratory: Positive for cough, chest tightness and shortness of breath.    Cardiovascular: Positive for chest pain.   Gastrointestinal: Negative for diarrhea and vomiting.   Musculoskeletal: Negative for gait problem.   Neurological: Positive for weakness.   Psychiatric/Behavioral: Positive for sleep disturbance. Negative for confusion.   All other systems reviewed and are negative.        Physical Exam     Patient Vitals for the past 24 hrs:   BP Temp Temp src Pulse Heart Rate Resp SpO2 Weight   08/31/19  2145 -- -- -- -- -- -- 100 % --   08/31/19 2100 (!) 151/81 -- -- 66 70 10 100 % --   08/31/19 2030 (!) 144/89 -- -- -- -- -- -- --   08/31/19 1959 (!) 149/84 100  F (37.8  C) Oral 65 65 20 91 % 70.3 kg (155 lb)     Physical Exam  Constitutional:       Appears well-developed and well-nourished. Lies with eyes closed, hard of hearing.  HENT:   Head:    Normocephalic and atraumatic. VPshunt in place.   Right Ear:   Tympanic membrane and external ear normal.   Left Ear:   Tympanic membrane and external ear normal.   Mouth/Throat:   Oropharynx is clear and moist.      Mucous membranes are normal.   Eyes:    Conjunctivae normal and EOM are normal.      Pupils are equal, round, and reactive to light.   Neck:    Normal range of motion. Neck supple.   Cardiovascular:  Normal rate, regular rhythm, S1 normal and S2 normal.      No gallop and no friction rub. No murmur heard.  Pulmonary/Chest:  Decreased breath sounds bilaterally, rhonchi both bases.    Abdominal:   Soft. No hepatosplenomegaly. No tenderness.      No rebound and no CVA tenderness.   Musculoskeletal:  Normal range of motion.   Neurological:   . Normal strength.      Skin:    Skin is warm and dry.     Emergency Department Course   Imaging:  Radiology findings were communicated with the family who voiced understanding of the findings.  XR Chest 2 views:   No acute disease as per radiology.    CT Chest Pulmonary embolism w/ contrast   IMPRESSION:   1. No pulmonary embolism demonstrated.  2. No thoracic aortic dissection or aneurysm.   3. Moderate to severe bronchial wall thickening and areas of mucous  plugging.  4. Continued lobar atelectasis in the right middle lobe.   As per radiology.     Laboratory:  CBC: WBC: 6.4, HGB: 12.8 (L), PLT: 128 (L)  CMP: Glucose 138 (H), Calcium: 8.1 (L), Albumin: 2.9 (L), Protein total: 6.7 (L), o/w WNL (Creatinine: 0.96)  2007 Troponin: <0.015  ISTAT gases lactate ashwin POCT: pCO2: 57 (H), Bicarbonate: 32 (H)  Blood Culture:  pending  Blood Culture: pending  Procalcitonin: pending    UA with Microscopic: Mucous: present, o/w WNL  Urine Culture Aerobic Bacterial: pending    Interventions:  2015 Lactated ringers bolus 1L IV  2034 Hydrocortisone sodium succinate injection 100 mg IV  2037 Tylenol tablet 1000 mg PO    Emergency Department Course:  Nursing notes and vitals reviewed. (2015) I performed an exam of the patient as documented above.     IV inserted. Medicine administered as documented above. Blood drawn. This was sent to the lab for further testing, results above.     The patient was sent for a chest x-ray and chest CT Pulmonary Embolism while in the emergency department, findings above.     Findings and plan explained to the spouse who consents to admission.     2114: Discussed the patient with Dr. Herndon, who will admit the patient to an inpatient med bed for further monitoring, evaluation, and treatment.     2120 I rechecked the patient and discussed the results of his workup thus far.     Impression & Plan   Medical Decision Making:  The patient is a 78-year-old Cantonese  male with complex medical problems who comes in [facility] via EMS for cough, fever, and hypoxia.  History is limited as patient is apparently hard of hearing and does not interact with me verbally.  They tried to use the Jabber with him but he was not able to communicate with them.  History is through wife through Jabber  but again limited due to wife's lack of knowledge about some questions.  She reports that he has had not been sleeping well and had a cough and fever for the last couple days as well as some right-sided chest pain.  He was noted to be hypoxic at 88% per EMS.  Here he was also hypoxic but good at 3 L.  He decreased breath sounds with rhonchi both bases.  Having said that his laboratory work here is unremarkable and his chest x-ray is negative.  I have talked to Dr. Herndon about whether to treat him or pursue further work-up.   Dr. Herndon is reviewing his situation but would like a CT scan of the chest as we have considered PE.  He does have a history of cancer although I am not sure how active that is currently.  He did have an abnormal CT in April and its unclear what the follow-up from pulmonary was on that.  There was question about Mycobacterium at that time.  CT today should further update any changes there.  In addition we have added a Procalcitonin.  Dr. Herndon will be seeing the patient and following up on the studies.  He will be admitted inpatient.    Discharge Diagnosis:    ICD-10-CM    1. Pneumonia due to infectious organism, unspecified laterality, unspecified part of lung J18.9    2. Pituitary insufficiency (H) E23.0        Disposition:  The patient was admitted.    Scribe Disclosure:  I, Alma Rosa Temple, am serving as a scribe on 8/31/2019 at 8:15 PM to personally document services performed by Kenya Zambrano MD based on my observations and the provider's statements to me.      Alma Rosa Temple  8/31/2019    EMERGENCY DEPARTMENT       Kenya Zambrano MD  08/31/19 2203       Kenya Zambrano MD  08/31/19 2211

## 2019-09-01 NOTE — PROVIDER NOTIFICATION
MD Notification    Notified Person: MD    Notified Person Name: Dr. Abreu    Notification Date/Time: 9/1/19 @ 1044    Notification Interaction: Text page    Purpose of Notification:  here-notified    Orders Received:    Comments:

## 2019-09-01 NOTE — PROGRESS NOTES
RECEIVING UNIT ED HANDOFF REVIEW    ED Nurse Handoff Report was reviewed by: Ruchi Chanel RN on August 31, 2019 at 9:47 PM

## 2019-09-01 NOTE — PROGRESS NOTES
[facility]    Medicine Progress Note - Hospitalist Service       Date of Admission:  8/31/2019  Date of Service: 09/01/2019    Assessment & Plan      Travis Peres is a 78 year old male who presents with cough    Acute hypoxic respiratory failure  Pulmonary nodules  COPD  Cough chronic.   Follows with pulmonary for nodules. Per 7/2 note thought intermediate risk for cancer. Cluster of nodules thought 2/2 atypical infection in RUL posteriorly. 5/2019 quant gold negative (at [facility]). Severe COPD per pulmonary 7/2.     Presented to ED with wife 2/2 ongoing cough. Per notes this is not new. C/o R sided chest pain and tightness for 2 days. Also increased weakness, fatigue, decreased appetite, sob. EMS found his O2 sats to be 88%. Temp on arrival in ED at 100.0. Bibasilar rales on exam. CXR clear. WBC at 6.4.  Lactate 1.5. Initial troponin negative.VBG 7.35/57/38/32 (compensated respiratory acidosis with metabolic compensation).  UA bland. CT chest with no PE but notes moderate to severe bronchial wall thickening. Suspect acute bronchitis.  Plan:  - wean off O2 as patient tolerates  - Continue pulmicort nebs BID  - duonebs QID scheduled  - prn albuterol nebs  - prn robitussin, tessalon  - blood and urine cultures pending  - hold abx for now    HTN  HLD  Outpatient on amlodipine 2.5 mg daily. 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 Potts, 7/29/19 notes  that she's long suspected lung mets but not proven, thyroglobulin indicates persistent/ progressive disease. thyroglobulinemia rising suggesting progression of thyroid cancer. TSH should be kept low to avoid stimulating the thyroid cancer. 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 Pituitary macroadenoma causing hydrocephalus s/p debulking with mass stable on MRI 10/2018  -  continue hydrocort (outpatient on 10 mg am/ 5 mg 2pm)  - levothyroxine 137 mcg daily  - no indication for stress dose steroids right now  - follow up with Dr. Potts    GERD  Continue Ranitidine 300 mg daily    BPH  Continue flomax 0.4 mg daily    Pruritis  Continue doxepin 6 mg HS    Depression  Insomnia  pta on  doxepin 6 mg HS, mirtazapine 7.5 mg HS, trazodone 100 mg HS, zolpidem 10 mg HS prn  - will continue pta meds    TCP  Appears chronic, stable  - monitor         Diet: Combination Diet Regular Diet Adult    DVT Prophylaxis: PCDs  Guzman Catheter: not present  Code Status: Full Code      Disposition Plan   Expected discharge: Tomorrow, recommended to prior living arrangement once O2 use less than 1 liters/minute.  Entered: Anthony Abreu MD 09/01/2019, 2:32 PM       The patient's care was discussed with the Bedside Nurse and Patient.    Anthony Abreu MD  Hospitalist Service  [facility]    ______________________________________________________________________    Interval History     Still with dyspnea and intermittent chest discomfort  No fevers or chills, no cough, no nausea/vomiting or abdominal pain  No urinary complaints.       Data reviewed today: I reviewed all medications, new labs and imaging results over the last 24 hours. I personally reviewed no images or EKG's today.    Physical Exam   Vital Signs: Temp: 97.8  F (36.6  C) Temp src: Oral BP: 137/71 Pulse: 66 Heart Rate: 85 Resp: 16 SpO2: 91 % O2 Device: Nasal cannula Oxygen Delivery: 1 LPM  Weight: 135 lbs 9.33 oz    Constitutional: no apparent distress  Respiratory: CTA B without w/c  Cardiovascular: RRR without m/r/g  GI: soft, nontender, nondistended, no HSM\  Musculoskeletal: no deformities or arthritis  Neurologic: CN intact, moving all extremities  Psychiatric: unable to assess          Data   Recent Labs   Lab 09/01/19  0845 08/31/19 2007   WBC 6.8 6.4   HGB 12.3* 12.8*   MCV 79 79   * 128*   NA  --  141   POTASSIUM  --   3.5   CHLORIDE  --  106   CO2  --  32   BUN  --  12   CR  --  0.96   ANIONGAP  --  3   NIHARIKA  --  8.1*   GLC  --  138*   ALBUMIN  --  2.9*   PROTTOTAL  --  6.7*   BILITOTAL  --  0.3   ALKPHOS  --  59   ALT  --  25   AST  --  21   TROPI <0.015 <0.015     Recent Results (from the past 24 hour(s))   XR Chest 2 Views    Narrative    CHEST TWO VIEWS 8/31/2019 8:32 PM     HISTORY: Fever, cough.    COMPARISON: July 13, 2019.     FINDINGS: There are no acute infiltrates. The cardiac silhouette is  not enlarged. Pulmonary vasculature is unremarkable. Stable  granulomatous change.      Impression    IMPRESSION: No acute disease.    RICHARD MCKEON MD   CT Chest Pulmonary Embolism w Contrast    Narrative    CT CHEST PULMONARY EMBOLISM WITH CONTRAST  8/31/2019 9:46 PM     HISTORY: Cough, fever, abnormal CT previously, has normal WBC and  chest x-ray.     COMPARISON: April 25, 2019.    TECHNIQUE: Volumetric helical acquisition of CT images of the chest  from the lung apices to the kidneys were acquired after the  administration of 61mL Isovue-370  IV contrast. Radiation dose for  this scan was reduced using automated exposure control, adjustment of  the mA and/or kV according to patient size, or iterative  reconstruction technique.    FINDINGS: There is no pulmonary embolism. Decreased nodularity in the  posterolateral right upper lobe, probably resolving infectious or  inflammatory process. Stable granulomatous change in the left upper  lobe. Fairly extensive bronchial wall thickening and moderate areas of  mucous plugging. Lobar atelectasis of the right middle lobe. The heart  is not enlarged. Thoracic aorta is atherosclerotic without evidence of  dissection or aneurysm. There is no pleural or pericardial effusion.   There is no pneumothorax. Adrenal glands are normal. Cholelithiasis  without cholecystitis. Remainder of the visualized upper abdomen is  unremarkable.      Impression    IMPRESSION:   1. No pulmonary embolism  demonstrated.  2. No thoracic aortic dissection or aneurysm.   3. Moderate to severe bronchial wall thickening and areas of mucous  plugging.  4. Continued lobar atelectasis in the right middle lobe.     RICHARD MCKEON MD     Medications     sodium chloride 100 mL/hr at 09/01/19 0835       amLODIPine  2.5 mg Oral Daily     budesonide  0.5 mg Nebulization BID     fluticasone  2 spray Both Nostrils Daily     hydrocortisone  10 mg Oral Daily     hydrocortisone  5 mg Oral Daily at 2 pm     ipratropium - albuterol 0.5 mg/2.5 mg/3 mL  3 mL Nebulization 4x daily     levothyroxine  137 mcg Oral Daily     mirtazapine  15 mg Oral At Bedtime     ranitidine  300 mg Oral At Bedtime     sodium chloride (PF)  3 mL Intracatheter Q8H     tamsulosin  0.4 mg Oral Daily     traZODone  50 mg Oral At Bedtime

## 2019-09-01 NOTE — ED NOTES
Bed: ED13  Expected date: 8/31/19  Expected time: 7:45 PM  Means of arrival: Ambulance  Comments:  HEMS [phone] 28M poss. Pneumonia

## 2019-09-01 NOTE — PLAN OF CARE
Discharge Planner PT   Patient plan for discharge: Home today  Current status: Orders received, chart reviewed, eval completed. Pt very eager to return home today, possible per nursing, has been waiting for MD. Weaned to RA prior to PT. SBA for bed mobility/transfers. SBA with and without IV pole support. Only willing to perform gait to restroom and back due to food arriving. SPO2 >95% throughout. Pt impulsive throughout, reported as baseline. No IP PT needs identified.  Barriers to return to prior living situation: None  Recommendations for discharge: Home  Rationale for recommendations: Spouse reporting at/near baseline balance/activity. Able to wean back to RA with possible discharge today per nursing. Mobilizing SBA in facility. Will complete order.       Entered by: Uvaldo Sandhu 09/01/2019 11:33 AM

## 2019-09-01 NOTE — PLAN OF CARE
Primarily cantonese-speaking.  present this AM for assessment. Alert and oriented X 4, up with A1. Impulsive at times. Chest pain improved to 2/10 with PRN APAP; MD aware of pain. NSR on tele. Trop negative. Infrequent cough. LS diminished with RM/RU crackles. Weaned to RA. Continent B/B. Blood/urine cx's pending. Tolerating diet. Plan to discharge home with wife tomorrow if improved.

## 2019-09-01 NOTE — PROGRESS NOTES
09/01/19 1100   Quick Adds   Type of Visit Initial PT Evaluation       Present yes   Living Environment   Lives With spouse  (Simultaneous filing. User may not have seen previous data.)   Living Arrangements house   Home Accessibility   (5 KYM with B rail. All needs on first level)   Transportation Anticipated family or friend will provide   Self-Care   Usual Activity Tolerance moderate   Current Activity Tolerance moderate   Equipment Currently Used at Home shower chair;cane, straight   Activity/Exercise/Self-Care Comment Mod-I for ADL/mobilty   Functional Level Prior   Ambulation 0-->independent   Transferring 0-->independent   Toileting 0-->independent   Bathing 1-->assistive equipment   Communication 0-->understands/communicates without difficulty   Swallowing 0-->swallows foods/liquids without difficulty   Cognition 0 - no cognition issues reported   Fall history within last six months no   Which of the above functional risks had a recent onset or change? none   Prior Functional Level Comment Pt sometimes uses SEC, but typically reaches around home   General Information   Onset of Illness/Injury or Date of Surgery - Date 08/31/19   Referring Physician Shola Herndon MD   Patient/Family Goals Statement Home today   Pertinent History of Current Problem (include personal factors and/or comorbidities that impact the POC) 78 year old male who presents with cough, suspected bronchitis. Hx of COPD   General Info Comments No O2 at baseline, being weened for RA just prior to PT   Cognitive Status Examination   Orientation orientation to person, place and time   Level of Consciousness alert   Pain Assessment   Patient Currently in Pain No   Integumentary/Edema   Integumentary/Edema no deficits were identifed   Posture    Posture Forward head position;Protracted shoulders   Range of Motion (ROM)   ROM Comment WFL   Strength   Strength Comments BLE >3/5 functionally   Bed Mobility   Bed  "Mobility Comments SBA    Transfer Skills   Transfer Comments SBA sit<>stand   Gait   Gait Comments SBA with and without IV pole support. Only willing to perform gait to restroom and back due to food arriving. SPO2 >95% throughout.   Balance   Balance Comments Fair dynamic balance, reaches out for support which is baseline   Clinical Impression   Criteria for Skilled Therapeutic Intervention evaluation only   Clinical Presentation Stable/Uncomplicated   Clinical Presentation Rationale Medically stable   Clinical Decision Making (Complexity) Low complexity   Anticipated Discharge Disposition Home   Risk & Benefits of therapy have been explained Yes   Patient, Family & other staff in agreement with plan of care Yes   Clinical Impression Comments Spouse reporting at/near baseline balance/activity. Able to wean back to RA with possible discharge today per nursing   [facility] AM-PAC TM \"6 Clicks\"   2016, Trustees of [facility], under license to [employer].  All rights reserved.   6 Clicks Short Forms Basic Mobility Inpatient Short Form   [facility] AM-PAC  \"6 Clicks\" V.2 Basic Mobility Inpatient Short Form   1. Turning from your back to your side while in a flat bed without using bedrails? 4 - None   2. Moving from lying on your back to sitting on the side of a flat bed without using bedrails? 4 - None   3. Moving to and from a bed to a chair (including a wheelchair)? 4 - None   4. Standing up from a chair using your arms (e.g., wheelchair, or bedside chair)? 4 - None   5. To walk in hospital room? 3 - A Little   6. Climbing 3-5 steps with a railing? 3 - A Little   Basic Mobility Raw Score (Score out of 24.Lower scores equate to lower levels of function) 22   Total Evaluation Time   Total Evaluation Time (Minutes) 15     "

## 2019-09-01 NOTE — PHARMACY-ADMISSION MEDICATION HISTORY
Admission medication history interview status for the 8/31/2019  admission is complete. See EPIC admission navigator for prior to admission medications     Medication history source reliability:Moderate    Actions taken by pharmacist (provider contacted, etc):Talked with patient's wife, Reviewed SureScripts, Reviewed CareEverywhere, Contacted Anh     Additional medication history information not noted on PTA med list :  - Patient's wife had a medication list from 4/22/19. She reported that patient is not using any eye drops. She could not tell the last dose of all his medications. She only mentioned that pt takes all his important medications regularly.     Medication reconciliation/reorder completed by provider prior to medication history? No    Time spent in this activity: 25 minutes    Prior to Admission medications    Medication Sig Last Dose Taking? Auth Provider   albuterol (PROAIR HFA) 108 (90 Base) MCG/ACT inhaler Inhale 2 puffs into the lungs every 4 hours as needed for shortness of breath / dyspnea or wheezing  Yes Spenser Haro MD   alendronate (FOSAMAX) 70 MG tablet Take 1 tablet (70 mg) by mouth every 7 days  Yes Corina Potts MD   amLODIPine (NORVASC) 2.5 MG tablet Take 2.5 mg by mouth daily  Yes Unknown, Entered By History   B Complex Vitamins (VITAMIN B COMPLEX PO) Take 1 capsule by mouth daily   Yes Reported, Patient   benzonatate (TESSALON) 100 MG capsule Take 1 capsule (100 mg) by mouth 3 times daily as needed for cough  Yes Ruth Obrien MD   diclofenac (VOLTAREN) 1 % GEL topical gel Place onto the skin 4 times daily  Yes Reported, Patient   fish oil-omega-3 fatty acids 1000 MG capsule Take 2 g by mouth daily  Yes Reported, Patient   fluticasone (FLONASE) 50 MCG/ACT spray Spray 2 sprays into both nostrils daily  Yes Unknown, Entered By History   GuaiFENesin (MUCUS RELIEF ADULT PO) Take 400 mg by mouth 2 times daily as needed   Yes Reported, Patient   hydrocortisone (CORTEF) 10 MG  tablet 10 mg AM and 5 mg afternoon  Yes Corina Potts MD   ibuprofen (ADVIL/MOTRIN) 400 MG tablet Take 400-600 mg by mouth every 6 hours as needed for moderate pain   Yes Reported, Patient   Ipratropium-Albuterol (COMBIVENT RESPIMAT)  MCG/ACT inhaler Inhale 1 puff into the lungs 4 times daily  Yes Unknown, Entered By History   levothyroxine (SYNTHROID/LEVOTHROID) 137 MCG tablet Take 1 tablet (137 mcg) by mouth daily  Yes Corina Potts MD   loperamide (IMODIUM) 2 MG capsule   Yes Reported, Patient   mirtazapine (REMERON) 15 MG tablet Take 15 mg by mouth At Bedtime   Yes Reported, Patient   ranitidine (ZANTAC) 300 MG tablet Take 300 mg by mouth At Bedtime   Yes Reported, Patient   RIBOFLAVIN PO Take 100 mg by mouth  Yes Reported, Patient   tamsulosin (FLOMAX) 0.4 MG capsule Take 0.4 mg by mouth daily  Yes Unknown, Entered By History   traZODone (DESYREL) 100 MG tablet take 1 tab of 50 mg daily  Yes Reported, Patient   triamcinolone (KENALOG) 0.1 % paste Apply small amount to affected area in mouth twice daily.  Yes Reported, Patient   umeclidinium-vilanterol (ANORO ELLIPTA) 62.5-25 MCG/INH oral inhaler Inhale 1 puff into the lungs daily  Yes Unknown, Entered By History   vitamin B complex with vitamin C (VITAMIN  B COMPLEX) TABS tablet Take 1 tablet by mouth daily  Yes Reported, Patient   zolpidem (AMBIEN) 10 MG tablet Take 10 mg by mouth daily   Yes Reported, Patient   order for DME Equipment being ordered: Nebulizer with tubing   Estella Echols PA-C

## 2019-09-02 ENCOUNTER — APPOINTMENT (OUTPATIENT)
Dept: GENERAL RADIOLOGY | Facility: CLINIC | Age: 79
DRG: 202 | End: 2019-09-02
Attending: STUDENT IN AN ORGANIZED HEALTH CARE EDUCATION/TRAINING PROGRAM
Payer: COMMERCIAL

## 2019-09-02 VITALS
TEMPERATURE: 98.5 F | HEART RATE: 106 BPM | RESPIRATION RATE: 15 BRPM | DIASTOLIC BLOOD PRESSURE: 78 MMHG | SYSTOLIC BLOOD PRESSURE: 143 MMHG | OXYGEN SATURATION: 91 % | BODY MASS INDEX: 22.56 KG/M2 | WEIGHT: 135.58 LBS

## 2019-09-02 PROCEDURE — 25000125 ZZHC RX 250: Performed by: INTERNAL MEDICINE

## 2019-09-02 PROCEDURE — 94640 AIRWAY INHALATION TREATMENT: CPT | Mod: 76

## 2019-09-02 PROCEDURE — 94640 AIRWAY INHALATION TREATMENT: CPT

## 2019-09-02 PROCEDURE — 25000132 ZZH RX MED GY IP 250 OP 250 PS 637: Performed by: STUDENT IN AN ORGANIZED HEALTH CARE EDUCATION/TRAINING PROGRAM

## 2019-09-02 PROCEDURE — 99239 HOSP IP/OBS DSCHRG MGMT >30: CPT | Performed by: STUDENT IN AN ORGANIZED HEALTH CARE EDUCATION/TRAINING PROGRAM

## 2019-09-02 PROCEDURE — 25800030 ZZH RX IP 258 OP 636: Performed by: INTERNAL MEDICINE

## 2019-09-02 PROCEDURE — 25000132 ZZH RX MED GY IP 250 OP 250 PS 637: Performed by: INTERNAL MEDICINE

## 2019-09-02 PROCEDURE — 71046 X-RAY EXAM CHEST 2 VIEWS: CPT

## 2019-09-02 PROCEDURE — 40000275 ZZH STATISTIC RCP TIME EA 10 MIN

## 2019-09-02 RX ORDER — GUAIFENESIN/DEXTROMETHORPHAN 100-10MG/5
10 SYRUP ORAL 3 TIMES DAILY PRN
Qty: 236 ML | Refills: 0 | Status: ON HOLD | OUTPATIENT
Start: 2019-09-02 | End: 2020-10-29

## 2019-09-02 RX ADMIN — BUDESONIDE 0.5 MG: 0.5 INHALANT RESPIRATORY (INHALATION) at 07:10

## 2019-09-02 RX ADMIN — AMLODIPINE BESYLATE 2.5 MG: 2.5 TABLET ORAL at 08:52

## 2019-09-02 RX ADMIN — Medication 1 LOZENGE: at 09:41

## 2019-09-02 RX ADMIN — SODIUM CHLORIDE: 9 INJECTION, SOLUTION INTRAVENOUS at 04:17

## 2019-09-02 RX ADMIN — BENZONATATE 100 MG: 100 CAPSULE ORAL at 08:52

## 2019-09-02 RX ADMIN — TAMSULOSIN HYDROCHLORIDE 0.4 MG: 0.4 CAPSULE ORAL at 08:52

## 2019-09-02 RX ADMIN — HYDROCORTISONE 10 MG: 10 TABLET ORAL at 08:52

## 2019-09-02 RX ADMIN — LEVOTHYROXINE SODIUM 137 MCG: 112 TABLET ORAL at 08:52

## 2019-09-02 RX ADMIN — IPRATROPIUM BROMIDE AND ALBUTEROL SULFATE 3 ML: .5; 3 SOLUTION RESPIRATORY (INHALATION) at 11:53

## 2019-09-02 RX ADMIN — IPRATROPIUM BROMIDE AND ALBUTEROL SULFATE 3 ML: .5; 3 SOLUTION RESPIRATORY (INHALATION) at 07:10

## 2019-09-02 RX ADMIN — FLUTICASONE PROPIONATE 2 SPRAY: 50 SPRAY, METERED NASAL at 08:53

## 2019-09-02 ASSESSMENT — ACTIVITIES OF DAILY LIVING (ADL)
ADLS_ACUITY_SCORE: 16

## 2019-09-02 NOTE — DISCHARGE SUMMARY
[facility]  Hospitalist Discharge Summary       Date of Admission:  8/31/2019  Date of Discharge:  9/2/2019  Discharging Provider: Anthony Abreu MD      Discharge Diagnoses     Acute hypoxic respiratory failure  Pulmonary nodules  COPD  Cough chronic    Follow-ups Needed After Discharge   Follow-up Appointments     Follow-up and recommended labs and tests       Follow up with primary care provider, Karol Alvarez, within 7 days   for hospital follow- up.  The following labs/tests are recommended: None.             Unresulted Labs Ordered in the Past 30 Days of this Admission     Date and Time Order Name Status Description    8/31/2019 2012 Blood culture Preliminary     8/31/2019 2012 Blood culture Preliminary         Discharge Disposition   Discharged to home  Condition at discharge: Stable    Hospital Course         Travis Peres is a 78 year old male who presented. with cough    Acute hypoxic respiratory failure  Pulmonary nodules  COPD  Cough chronic.   Follows with pulmonary for nodules. Per 7/2 note thought intermediate risk for cancer. Cluster of nodules thought 2/2 atypical infection in RUL posteriorly. 5/2019 quant gold negative (at [facility]). Severe COPD per pulmonary 7/2.     Presented to ED with wife 2/2 ongoing cough. Per notes this is not new. C/o R sided chest pain and tightness for 2 days. Also increased weakness, fatigue, decreased appetite, sob. EMS found his O2 sats to be 88%. Temp on arrival in ED at 100.0. Bibasilar rales on exam. CXR clear. WBC at 6.4.  Lactate 1.5. Initial troponin negative.VBG 7.35/57/38/32 (compensated respiratory acidosis with metabolic compensation).  UA bland. CT chest with no PE but notes moderate to severe bronchial wall thickening. Suspect acute bronchitis, patient improved without antibiotics. Procalcitonin was wnl. Repeat CXR showed no new consolidation.   Plan:  - duonebs QID resumed at home  - prn albuterol nebs continued  - prn robitussin,  tessalon    HTN  HLD  Outpatient on amlodipine 2.5 mg daily.   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 Potts, 7/29/19 notes  that she's long suspected lung mets but not proven, thyroglobulin indicates persistent/ progressive disease. thyroglobulinemia rising suggesting progression of thyroid cancer. TSH should be kept low to avoid stimulating the thyroid cancer. 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 Pituitary macroadenoma causing hydrocephalus s/p debulking with mass stable on MRI 10/2018. No indication for stress dose steroids right now.  Plan:  - continue PTA steroids hydrocortisone (outpatient on 10 mg am/ 5 mg 2pm)  - Continue PTA levothyroxine 137 mcg daily  - follow up with Dr. Potts    GERD  Continue Ranitidine 300 mg daily    BPH  Continue flomax 0.4 mg daily    Pruritis  Continue doxepin 6 mg HS    Depression  Insomnia  pta on  doxepin 6 mg HS, mirtazapine 7.5 mg HS, trazodone 100 mg HS, zolpidem 10 mg HS prn  - will continue pta meds    TCP  Appears chronic, stable  - monitor      Consultations This Hospital Stay   PHYSICAL THERAPY ADULT IP CONSULT    Code Status   Full Code    Time Spent on this Encounter   I, Anthony Abreu MD, personally saw the patient today and spent greater than 30 minutes discharging this patient.       Anthony Abreu MD  [facility]  ______________________________________________________________________    Physical Exam   Vital Signs: Temp: 98.5  F (36.9  C) Temp src: Oral BP: (!) 143/78 Pulse: 106 Heart Rate: 93 Resp: 15 SpO2: 91 % O2 Device: None (Room air)    Weight: 135 lbs 9.33 oz       Primary Care Physician   Karol Alvarez    Discharge Orders      Reason for your hospital stay    You had shortness of breath and needed oxygen and medical treatment.     Follow-up  and recommended labs and tests     Follow up with primary care provider, Karol Alvarez, within 7 days for hospital follow- up.  The following labs/tests are recommended: None.     Activity    Your activity upon discharge: activity as tolerated     Diet    Follow this diet upon discharge: Orders Placed This Encounter      Combination Diet Regular Diet Adult       Significant Results and Procedures   Most Recent 3 CBC's:  Recent Labs   Lab Test 09/01/19  0845 08/31/19 2007 01/06/17 0453   WBC 6.8 6.4 13.6*   HGB 12.3* 12.8* 9.9*   MCV 79 79 80   * 128* 88*     Most Recent 3 BMP's:  Recent Labs   Lab Test 08/31/19 2007 04/22/19  1550 08/29/18  1500 04/09/18  1536 09/18/17  1509 01/06/17  0453 01/05/17  0347     --   --  139 136 150* 146*   POTASSIUM 3.5  --   --  4.4 3.9 3.8 4.4   CHLORIDE 106  --   --   --   --  119* 115*   CO2 32  --   --   --   --  23 21   BUN 12  --   --   --   --  19 21   CR 0.96 1.03 0.83  --  1.04 1.41* 2.21*   ANIONGAP 3  --   --   --   --  8 10   NIHARIKA 8.1* 8.4* 8.3*  --   --  6.5* 6.4*   * 122*  --   --   --  171* 184*     Most Recent 2 LFT's:  Recent Labs   Lab Test 08/31/19 2007 01/05/17 0018   AST 21 227*   ALT 25 114*   ALKPHOS 59 64   BILITOTAL 0.3 0.6     Most Recent 3 INR's:  Recent Labs   Lab Test 01/05/17  0018 06/03/14  0729   INR 1.51* <0.9   ,   Results for orders placed or performed during the hospital encounter of 08/31/19   XR Chest 2 Views    Narrative    CHEST TWO VIEWS 8/31/2019 8:32 PM     HISTORY: Fever, cough.    COMPARISON: July 13, 2019.     FINDINGS: There are no acute infiltrates. The cardiac silhouette is  not enlarged. Pulmonary vasculature is unremarkable. Stable  granulomatous change.      Impression    IMPRESSION: No acute disease.    RICHARD MCKEON MD   CT Chest Pulmonary Embolism w Contrast    Narrative    CT CHEST PULMONARY EMBOLISM WITH CONTRAST  8/31/2019 9:46 PM     HISTORY: Cough, fever, abnormal CT previously, has normal WBC  and  chest x-ray.     COMPARISON: April 25, 2019.    TECHNIQUE: Volumetric helical acquisition of CT images of the chest  from the lung apices to the kidneys were acquired after the  administration of 61mL Isovue-370  IV contrast. Radiation dose for  this scan was reduced using automated exposure control, adjustment of  the mA and/or kV according to patient size, or iterative  reconstruction technique.    FINDINGS: There is no pulmonary embolism. Decreased nodularity in the  posterolateral right upper lobe, probably resolving infectious or  inflammatory process. Stable granulomatous change in the left upper  lobe. Fairly extensive bronchial wall thickening and moderate areas of  mucous plugging. Lobar atelectasis of the right middle lobe. The heart  is not enlarged. Thoracic aorta is atherosclerotic without evidence of  dissection or aneurysm. There is no pleural or pericardial effusion.   There is no pneumothorax. Adrenal glands are normal. Cholelithiasis  without cholecystitis. Remainder of the visualized upper abdomen is  unremarkable.      Impression    IMPRESSION:   1. No pulmonary embolism demonstrated.  2. No thoracic aortic dissection or aneurysm.   3. Moderate to severe bronchial wall thickening and areas of mucous  plugging.  4. Continued lobar atelectasis in the right middle lobe.     RICHARD MCKEON MD   XR Chest 2 Views    Narrative    CHEST TWO VIEWS  9/2/2019 10:43 AM     HISTORY: 78-year-old patient with shortness of breath and cough.       Impression    IMPRESSION: Since August 31, 2019, heart size is normal. Small  bilateral pleural effusions. Opacity on lateral radiograph is linear,  likely localized to the right middle lobe and relatively unchanged. No  pneumothorax.    CASEY ROSE MD       Discharge Medications   Current Discharge Medication List      START taking these medications    Details   guaiFENesin-dextromethorphan (ROBITUSSIN DM) 100-10 MG/5ML syrup Take 10 mLs by mouth 3 times daily  as needed for cough  Qty: 236 mL, Refills: 0    Associated Diagnoses: Acute and chronic respiratory failure with hypoxia (H)         CONTINUE these medications which have NOT CHANGED    Details   albuterol (PROAIR HFA) 108 (90 Base) MCG/ACT inhaler Inhale 2 puffs into the lungs every 4 hours as needed for shortness of breath / dyspnea or wheezing  Qty: 3 Inhaler, Refills: 11    Associated Diagnoses: Chronic obstructive pulmonary disease, unspecified COPD type (H)      alendronate (FOSAMAX) 70 MG tablet Take 1 tablet (70 mg) by mouth every 7 days  Qty: 16 tablet, Refills: 3    Associated Diagnoses: Postsurgical hypothyroidism; Papillary thyroid carcinoma (H); Hypopituitarism (H)      amLODIPine (NORVASC) 2.5 MG tablet Take 2.5 mg by mouth daily      B Complex Vitamins (VITAMIN B COMPLEX PO) Take 1 capsule by mouth daily       benzonatate (TESSALON) 100 MG capsule Take 1 capsule (100 mg) by mouth 3 times daily as needed for cough  Qty: 30 capsule, Refills: 0    Associated Diagnoses: Cough productive of clear sputum      diclofenac (VOLTAREN) 1 % GEL topical gel Place onto the skin 4 times daily      fish oil-omega-3 fatty acids 1000 MG capsule Take 2 g by mouth daily      fluticasone (FLONASE) 50 MCG/ACT spray Spray 2 sprays into both nostrils daily      GuaiFENesin (MUCUS RELIEF ADULT PO) Take 400 mg by mouth 2 times daily as needed     Associated Diagnoses: Papillary thyroid carcinoma (H); Postsurgical hypothyroidism; Malignant neoplasm of thyroid gland (H); Cancer of thyroid (H); 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); Osteopenia; Hypopituitarism (H); Pituitary adenoma (H); Noncompliance with medication regimen      hydrocortisone (CORTEF) 10 MG tablet 10 mg AM and 5 mg afternoon  Qty: 135 tablet, Refills: 3    Associated Diagnoses: Papillary thyroid carcinoma (H); Postsurgical hypothyroidism; Pulmonary nodules; Pituitary macroadenoma (H); Low bone density      ibuprofen (ADVIL/MOTRIN) 400 MG tablet Take 400-600 mg  by mouth every 6 hours as needed for moderate pain       Ipratropium-Albuterol (COMBIVENT RESPIMAT)  MCG/ACT inhaler Inhale 1 puff into the lungs 4 times daily      levothyroxine (SYNTHROID/LEVOTHROID) 137 MCG tablet Take 1 tablet (137 mcg) by mouth daily  Qty: 90 tablet, Refills: 3    Associated Diagnoses: Postsurgical hypothyroidism; Papillary thyroid carcinoma (H); Abnormal finding on imaging      loperamide (IMODIUM) 2 MG capsule Refills: 1      mirtazapine (REMERON) 15 MG tablet Take 15 mg by mouth At Bedtime       ranitidine (ZANTAC) 300 MG tablet Take 300 mg by mouth At Bedtime       RIBOFLAVIN PO Take 100 mg by mouth      tamsulosin (FLOMAX) 0.4 MG capsule Take 0.4 mg by mouth daily      traZODone (DESYREL) 100 MG tablet take 1 tab of 50 mg daily  Refills: 0      triamcinolone (KENALOG) 0.1 % paste Apply small amount to affected area in mouth twice daily.  Refills: 0      umeclidinium-vilanterol (ANORO ELLIPTA) 62.5-25 MCG/INH oral inhaler Inhale 1 puff into the lungs daily      vitamin B complex with vitamin C (VITAMIN  B COMPLEX) TABS tablet Take 1 tablet by mouth daily      zolpidem (AMBIEN) 10 MG tablet Take 10 mg by mouth daily       order for DME Equipment being ordered: Nebulizer with tubing  Qty: 1 Device, Refills: 0    Associated Diagnoses: COPD exacerbation (H)           Allergies   Allergies   Allergen Reactions     Metoprolol Shortness Of Breath, Other (See Comments) and Unknown     Not true allergy.  Bradycardia, fatigue, COPD worsening.         Fenofibrate Hives     Fenofibric Acid      Lisinopril Cough     Losartan Cough     Niacin      Simvastatin Cramps and Other (See Comments)

## 2019-09-02 NOTE — PLAN OF CARE
Discharge    Patient discharged to Home via w/c with spouse  Care plan note  Date/Time: 9/2/19 [id number]  Cognitive Concerns/ Orientation : Alert and oriented x 4 Cantonese speaking does speak some English.  at bedside from 0830 until discharged.   BEHAVIOR & AGGRESSION TOOL COLOR: Green   CIWA SCORE: n/a     ABNL VS/O2: BP elevated 143/78 other VSS, O2 92% RA  MOBILITY: Assist of one and gait belt/cane   PAIN MANAGMENT: Denies   DIET: Regular   BOWEL/BLADDER: Voiding per urinal   ABNL LAB/BG: troponin negative   DRAIN/DEVICES: PIV access removed   TELEMETRY RHYTHM: NSR   SKIN: Pale, bruising, scabs   TESTS/PROCEDURES: n/a   D/C DAY/GOALS/PLACE: Discharged home  OTHER IMPORTANT INFO: Lung sounds diminished. Frequent productive coughs, received prn Tessalon x1 with some relief. Also c/o sore throat, given Lozenge x1 with relief. Belongings packed and sent with the pt. Went through AVS and discharge medication through an , pt verbalized understanding.        Listed belongings gathered and returned to patient. Yes  Care Plan and Patient education resolved: Yes  Prescriptions if needed, hard copies sent with patient  NA  Home and hospital acquired medications returned to patient: Yes  Medication Bin checked and emptied on discharge Yes  Follow up appointment made for patient: Yes

## 2019-09-02 NOTE — PLAN OF CARE
Cognitive Concerns/ Orientation : Alert and oriented x 4 Cantonese speaking does speak some English   BEHAVIOR & AGGRESSION TOOL COLOR: Green   CIWA SCORE: n/a     ABNL VS/O2: VSS 92% room air   MOBILITY: Assist of one and gait belt/cane   PAIN MANAGMENT: Denies   DIET: Regular   BOWEL/BLADDER: Voiding per urinal   ABNL LAB/BG: troponin negative   DRAIN/DEVICES: Peripheral IV right arm infusing NS 100cc per hour   TELEMETRY RHYTHM: NSR   SKIN: Pale, bruising, scabs   TESTS/PROCEDURES: n/a   D/C DAY/GOALS/PLACE: Possible discharge Monday   OTHER IMPORTANT INFO: Lung sounds diminished.  Non productive cough.

## 2019-09-05 ENCOUNTER — OFFICE VISIT (OUTPATIENT)
Dept: URGENT CARE | Facility: URGENT CARE | Age: 79
End: 2019-09-05
Payer: COMMERCIAL

## 2019-09-05 VITALS
OXYGEN SATURATION: 91 % | WEIGHT: 131 LBS | SYSTOLIC BLOOD PRESSURE: 104 MMHG | DIASTOLIC BLOOD PRESSURE: 62 MMHG | HEART RATE: 98 BPM | BODY MASS INDEX: 21.8 KG/M2 | TEMPERATURE: 99.6 F

## 2019-09-05 DIAGNOSIS — R07.0 THROAT PAIN: ICD-10-CM

## 2019-09-05 DIAGNOSIS — J18.9 PNEUMONIA OF LEFT LOWER LOBE DUE TO INFECTIOUS ORGANISM: Primary | ICD-10-CM

## 2019-09-05 LAB
DEPRECATED S PYO AG THROAT QL EIA: NORMAL
INTERPRETATION ECG - MUSE: NORMAL
SPECIMEN SOURCE: NORMAL

## 2019-09-05 PROCEDURE — 87880 STREP A ASSAY W/OPTIC: CPT | Performed by: PHYSICIAN ASSISTANT

## 2019-09-05 PROCEDURE — 99214 OFFICE O/P EST MOD 30 MIN: CPT | Performed by: FAMILY MEDICINE

## 2019-09-05 PROCEDURE — 87081 CULTURE SCREEN ONLY: CPT | Performed by: FAMILY MEDICINE

## 2019-09-05 RX ORDER — DOXYCYCLINE 100 MG/1
100 TABLET ORAL 2 TIMES DAILY
Qty: 20 TABLET | Refills: 0 | Status: SHIPPED | OUTPATIENT
Start: 2019-09-05 | End: 2019-09-17

## 2019-09-06 LAB
BACTERIA SPEC CULT: NO GROWTH
BACTERIA SPEC CULT: NO GROWTH
BACTERIA SPEC CULT: NORMAL
Lab: NORMAL
Lab: NORMAL
SPECIMEN SOURCE: NORMAL

## 2019-09-11 ENCOUNTER — TRANSFERRED RECORDS (OUTPATIENT)
Dept: HEALTH INFORMATION MANAGEMENT | Facility: CLINIC | Age: 79
End: 2019-09-11

## 2019-09-11 ENCOUNTER — MEDICAL CORRESPONDENCE (OUTPATIENT)
Dept: HEALTH INFORMATION MANAGEMENT | Facility: CLINIC | Age: 79
End: 2019-09-11

## 2019-09-16 NOTE — PROGRESS NOTES
SUBJECTIVE: Travis Peres is a 78 year old male presenting with a chief complaint of cough.  Onset of symptoms was day(s) ago.  Course of illness is worsening.    Severity moderate  Current and Associated symptoms: stuffy nose and cough - non-productive  Treatment measures tried include Tylenol/Ibuprofen.  Predisposing factors include HX of COPD.    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  Bradycardia, fatigue, COPD worsening.         Fenofibrate Hives     Fenofibric Acid      Lisinopril Cough     Losartan Cough     Niacin      Simvastatin Cramps and Other (See Comments)     Social History     Tobacco Use     Smoking status: Former Smoker     Packs/day: 0.50     Years: 5.00     Pack years: 2.50     Types: Cigarettes     Start date: 1976     Last attempt to quit: 2001     Years since quittin.6     Smokeless tobacco: Never Used   Substance Use Topics     Alcohol use: No       ROS:  SKIN: no rash  GI: no vomiting    OBJECTIVE:  /62 (BP Location: Left arm, Patient Position: Sitting, Cuff Size: Adult Regular)   Pulse 98   Temp 99.6  F (37.6  C) (Oral)   Wt 59.4 kg (131 lb)   SpO2 91%   BMI 21.80 kg/m  GENERAL  APPEARANCE: healthy, alert and no distress  EYES: EOMI,  PERRL, conjunctiva clear  HENT: ear canals and TM's normal.  Nose and mouth without ulcers, erythema or lesions  NECK: supple, nontender, no lymphadenopathy  RESP: lungs clear to auscultation - no rales, rhonchi or wheezes  CV: regular rates and rhythm, normal S1 S2, no murmur noted  SKIN: no suspicious lesions or rashes    CXR with infiltrate      ICD-10-CM    1. Pneumonia of left lower lobe due to infectious organism (H) J18.1 doxycycline monohydrate (ADOXA) 100 MG tablet   2. Throat pain R07.0 Rapid strep screen     Beta strep group A culture       Fluids/Rest, f/u if worse/not any better

## 2019-09-17 ENCOUNTER — OFFICE VISIT (OUTPATIENT)
Dept: URGENT CARE | Facility: URGENT CARE | Age: 79
End: 2019-09-17
Payer: COMMERCIAL

## 2019-09-17 ENCOUNTER — HOSPITAL ENCOUNTER (INPATIENT)
Facility: CLINIC | Age: 79
LOS: 3 days | Discharge: HOME | DRG: 444 | End: 2019-09-21
Attending: EMERGENCY MEDICINE | Admitting: INTERNAL MEDICINE
Payer: COMMERCIAL

## 2019-09-17 ENCOUNTER — APPOINTMENT (OUTPATIENT)
Dept: GENERAL RADIOLOGY | Facility: CLINIC | Age: 79
DRG: 444 | End: 2019-09-17
Attending: EMERGENCY MEDICINE
Payer: COMMERCIAL

## 2019-09-17 ENCOUNTER — APPOINTMENT (OUTPATIENT)
Dept: ULTRASOUND IMAGING | Facility: CLINIC | Age: 79
DRG: 444 | End: 2019-09-17
Attending: EMERGENCY MEDICINE
Payer: COMMERCIAL

## 2019-09-17 VITALS
HEART RATE: 82 BPM | BODY MASS INDEX: 21.42 KG/M2 | WEIGHT: 128.7 LBS | TEMPERATURE: 97 F | OXYGEN SATURATION: 94 % | SYSTOLIC BLOOD PRESSURE: 91 MMHG | DIASTOLIC BLOOD PRESSURE: 58 MMHG

## 2019-09-17 DIAGNOSIS — R07.9 CHEST PAIN, UNSPECIFIED TYPE: Primary | ICD-10-CM

## 2019-09-17 DIAGNOSIS — K92.2 UPPER GI BLEED: Primary | ICD-10-CM

## 2019-09-17 DIAGNOSIS — R07.9 CHEST PAIN, UNSPECIFIED TYPE: ICD-10-CM

## 2019-09-17 DIAGNOSIS — R79.89 ELEVATED LFTS: ICD-10-CM

## 2019-09-17 PROBLEM — K80.20 CHOLELITHIASIS: Status: ACTIVE | Noted: 2019-09-17

## 2019-09-17 LAB
ALBUMIN SERPL-MCNC: 3.2 G/DL (ref 3.4–5)
ALP SERPL-CCNC: 161 U/L (ref 40–150)
ALT SERPL W P-5'-P-CCNC: 234 U/L (ref 0–70)
ANION GAP SERPL CALCULATED.3IONS-SCNC: 3 MMOL/L (ref 3–14)
AST SERPL W P-5'-P-CCNC: 460 U/L (ref 0–45)
BASOPHILS # BLD AUTO: 0 10E9/L (ref 0–0.2)
BASOPHILS NFR BLD AUTO: 0.3 %
BILIRUB DIRECT SERPL-MCNC: 1.4 MG/DL (ref 0–0.2)
BILIRUB SERPL-MCNC: 1.8 MG/DL (ref 0.2–1.3)
BUN SERPL-MCNC: 18 MG/DL (ref 7–30)
CALCIUM SERPL-MCNC: 8.2 MG/DL (ref 8.5–10.1)
CHLORIDE SERPL-SCNC: 106 MMOL/L (ref 94–109)
CO2 BLDCOV-SCNC: 27 MMOL/L (ref 21–28)
CO2 SERPL-SCNC: 28 MMOL/L (ref 20–32)
CREAT SERPL-MCNC: 1.23 MG/DL (ref 0.66–1.25)
DIFFERENTIAL METHOD BLD: ABNORMAL
EOSINOPHIL # BLD AUTO: 0 10E9/L (ref 0–0.7)
EOSINOPHIL NFR BLD AUTO: 0.8 %
ERYTHROCYTE [DISTWIDTH] IN BLOOD BY AUTOMATED COUNT: 13.2 % (ref 10–15)
GFR SERPL CREATININE-BSD FRML MDRD: 55 ML/MIN/{1.73_M2}
GLUCOSE SERPL-MCNC: 136 MG/DL (ref 70–99)
HCT VFR BLD AUTO: 38.2 % (ref 40–53)
HGB BLD-MCNC: 12.6 G/DL (ref 13.3–17.7)
IMM GRANULOCYTES # BLD: 0 10E9/L (ref 0–0.4)
IMM GRANULOCYTES NFR BLD: 0.3 %
INTERPRETATION ECG - MUSE: NORMAL
LACTATE BLD-SCNC: 1.5 MMOL/L (ref 0.7–2.1)
LIPASE SERPL-CCNC: 149 U/L (ref 73–393)
LYMPHOCYTES # BLD AUTO: 0.9 10E9/L (ref 0.8–5.3)
LYMPHOCYTES NFR BLD AUTO: 23.5 %
MCH RBC QN AUTO: 25.8 PG (ref 26.5–33)
MCHC RBC AUTO-ENTMCNC: 33 G/DL (ref 31.5–36.5)
MCV RBC AUTO: 78 FL (ref 78–100)
MONOCYTES # BLD AUTO: 0.3 10E9/L (ref 0–1.3)
MONOCYTES NFR BLD AUTO: 6.9 %
NEUTROPHILS # BLD AUTO: 2.7 10E9/L (ref 1.6–8.3)
NEUTROPHILS NFR BLD AUTO: 68.2 %
NRBC # BLD AUTO: 0 10*3/UL
NRBC BLD AUTO-RTO: 0 /100
PCO2 BLDV: 50 MM HG (ref 40–50)
PH BLDV: 7.34 PH (ref 7.32–7.43)
PLATELET # BLD AUTO: 318 10E9/L (ref 150–450)
PO2 BLDV: 26 MM HG (ref 25–47)
POTASSIUM SERPL-SCNC: 4.5 MMOL/L (ref 3.4–5.3)
PROT SERPL-MCNC: 7.5 G/DL (ref 6.8–8.8)
RBC # BLD AUTO: 4.88 10E12/L (ref 4.4–5.9)
SAO2 % BLDV FROM PO2: 43 %
SODIUM SERPL-SCNC: 137 MMOL/L (ref 133–144)
TROPONIN I SERPL-MCNC: <0.015 UG/L (ref 0–0.04)
WBC # BLD AUTO: 3.9 10E9/L (ref 4–11)

## 2019-09-17 PROCEDURE — 25000125 ZZHC RX 250: Performed by: EMERGENCY MEDICINE

## 2019-09-17 PROCEDURE — 99207 ZZC CDG-CODE CATEGORY CHANGED: CPT | Performed by: INTERNAL MEDICINE

## 2019-09-17 PROCEDURE — 25800030 ZZH RX IP 258 OP 636: Performed by: INTERNAL MEDICINE

## 2019-09-17 PROCEDURE — 96360 HYDRATION IV INFUSION INIT: CPT

## 2019-09-17 PROCEDURE — 99220 ZZC INITIAL OBSERVATION CARE,LEVL III: CPT | Mod: AI | Performed by: INTERNAL MEDICINE

## 2019-09-17 PROCEDURE — 84484 ASSAY OF TROPONIN QUANT: CPT | Performed by: EMERGENCY MEDICINE

## 2019-09-17 PROCEDURE — 99285 EMERGENCY DEPT VISIT HI MDM: CPT | Mod: 25

## 2019-09-17 PROCEDURE — 25000132 ZZH RX MED GY IP 250 OP 250 PS 637: Performed by: EMERGENCY MEDICINE

## 2019-09-17 PROCEDURE — 83690 ASSAY OF LIPASE: CPT | Performed by: EMERGENCY MEDICINE

## 2019-09-17 PROCEDURE — G0378 HOSPITAL OBSERVATION PER HR: HCPCS

## 2019-09-17 PROCEDURE — 96361 HYDRATE IV INFUSION ADD-ON: CPT

## 2019-09-17 PROCEDURE — 83605 ASSAY OF LACTIC ACID: CPT

## 2019-09-17 PROCEDURE — 71046 X-RAY EXAM CHEST 2 VIEWS: CPT

## 2019-09-17 PROCEDURE — 85025 COMPLETE CBC W/AUTO DIFF WBC: CPT | Performed by: EMERGENCY MEDICINE

## 2019-09-17 PROCEDURE — 82803 BLOOD GASES ANY COMBINATION: CPT

## 2019-09-17 PROCEDURE — 80076 HEPATIC FUNCTION PANEL: CPT | Performed by: EMERGENCY MEDICINE

## 2019-09-17 PROCEDURE — 93005 ELECTROCARDIOGRAM TRACING: CPT

## 2019-09-17 PROCEDURE — 76705 ECHO EXAM OF ABDOMEN: CPT

## 2019-09-17 PROCEDURE — 25000128 H RX IP 250 OP 636: Performed by: EMERGENCY MEDICINE

## 2019-09-17 PROCEDURE — 80048 BASIC METABOLIC PNL TOTAL CA: CPT | Performed by: EMERGENCY MEDICINE

## 2019-09-17 RX ORDER — ONDANSETRON 2 MG/ML
4 INJECTION INTRAMUSCULAR; INTRAVENOUS EVERY 6 HOURS PRN
Status: DISCONTINUED | OUTPATIENT
Start: 2019-09-17 | End: 2019-09-21 | Stop reason: HOSPADM

## 2019-09-17 RX ORDER — HYDROCODONE BITARTRATE AND ACETAMINOPHEN 5; 325 MG/1; MG/1
1-2 TABLET ORAL EVERY 4 HOURS PRN
Status: DISCONTINUED | OUTPATIENT
Start: 2019-09-17 | End: 2019-09-21 | Stop reason: HOSPADM

## 2019-09-17 RX ORDER — CEFTRIAXONE 1 G/1
1 INJECTION, POWDER, FOR SOLUTION INTRAMUSCULAR; INTRAVENOUS EVERY 24 HOURS
Status: DISCONTINUED | OUTPATIENT
Start: 2019-09-17 | End: 2019-09-19

## 2019-09-17 RX ORDER — ALBUTEROL SULFATE 90 UG/1
2 AEROSOL, METERED RESPIRATORY (INHALATION) EVERY 4 HOURS PRN
Status: DISCONTINUED | OUTPATIENT
Start: 2019-09-17 | End: 2019-09-21 | Stop reason: HOSPADM

## 2019-09-17 RX ORDER — HYDROCORTISONE 10 MG/1
10 TABLET ORAL EVERY MORNING
Status: DISCONTINUED | OUTPATIENT
Start: 2019-09-18 | End: 2019-09-21 | Stop reason: HOSPADM

## 2019-09-17 RX ORDER — ACETAMINOPHEN 325 MG/1
650 TABLET ORAL EVERY 4 HOURS PRN
Status: DISCONTINUED | OUTPATIENT
Start: 2019-09-17 | End: 2019-09-21 | Stop reason: HOSPADM

## 2019-09-17 RX ORDER — NALOXONE HYDROCHLORIDE 0.4 MG/ML
.1-.4 INJECTION, SOLUTION INTRAMUSCULAR; INTRAVENOUS; SUBCUTANEOUS
Status: DISCONTINUED | OUTPATIENT
Start: 2019-09-17 | End: 2019-09-21 | Stop reason: HOSPADM

## 2019-09-17 RX ORDER — ONDANSETRON 4 MG/1
4 TABLET, ORALLY DISINTEGRATING ORAL EVERY 6 HOURS PRN
Status: DISCONTINUED | OUTPATIENT
Start: 2019-09-17 | End: 2019-09-21 | Stop reason: HOSPADM

## 2019-09-17 RX ORDER — ASPIRIN 81 MG/1
162 TABLET, CHEWABLE ORAL ONCE
Status: COMPLETED | OUTPATIENT
Start: 2019-09-17 | End: 2019-09-17

## 2019-09-17 RX ORDER — ACETAMINOPHEN 650 MG/1
650 SUPPOSITORY RECTAL EVERY 4 HOURS PRN
Status: DISCONTINUED | OUTPATIENT
Start: 2019-09-17 | End: 2019-09-20

## 2019-09-17 RX ORDER — AMLODIPINE BESYLATE 2.5 MG/1
2.5 TABLET ORAL DAILY
Status: DISCONTINUED | OUTPATIENT
Start: 2019-09-18 | End: 2019-09-21 | Stop reason: HOSPADM

## 2019-09-17 RX ORDER — HYDROMORPHONE HYDROCHLORIDE 1 MG/ML
0.3 INJECTION, SOLUTION INTRAMUSCULAR; INTRAVENOUS; SUBCUTANEOUS
Status: DISCONTINUED | OUTPATIENT
Start: 2019-09-17 | End: 2019-09-21 | Stop reason: HOSPADM

## 2019-09-17 RX ORDER — BISACODYL 10 MG
10 SUPPOSITORY, RECTAL RECTAL DAILY PRN
Status: DISCONTINUED | OUTPATIENT
Start: 2019-09-17 | End: 2019-09-21 | Stop reason: HOSPADM

## 2019-09-17 RX ORDER — HYDROCORTISONE 5 MG/1
5 TABLET ORAL
Status: DISCONTINUED | OUTPATIENT
Start: 2019-09-18 | End: 2019-09-21 | Stop reason: HOSPADM

## 2019-09-17 RX ORDER — FLUTICASONE PROPIONATE 50 MCG
2 SPRAY, SUSPENSION (ML) NASAL DAILY
Status: DISCONTINUED | OUTPATIENT
Start: 2019-09-18 | End: 2019-09-21 | Stop reason: HOSPADM

## 2019-09-17 RX ORDER — TAMSULOSIN HYDROCHLORIDE 0.4 MG/1
0.4 CAPSULE ORAL DAILY
Status: DISCONTINUED | OUTPATIENT
Start: 2019-09-18 | End: 2019-09-21 | Stop reason: HOSPADM

## 2019-09-17 RX ORDER — IPRATROPIUM BROMIDE AND ALBUTEROL SULFATE 2.5; .5 MG/3ML; MG/3ML
3 SOLUTION RESPIRATORY (INHALATION)
Status: DISCONTINUED | OUTPATIENT
Start: 2019-09-18 | End: 2019-09-21

## 2019-09-17 RX ADMIN — DEXTROSE AND SODIUM CHLORIDE: 5; 900 INJECTION, SOLUTION INTRAVENOUS at 22:36

## 2019-09-17 RX ADMIN — ASPIRIN 81 MG 162 MG: 81 TABLET ORAL at 17:45

## 2019-09-17 RX ADMIN — LIDOCAINE HYDROCHLORIDE 30 ML: 20 SOLUTION ORAL; TOPICAL at 18:56

## 2019-09-17 RX ADMIN — SODIUM CHLORIDE 1000 ML: 9 INJECTION, SOLUTION INTRAVENOUS at 19:00

## 2019-09-17 ASSESSMENT — ENCOUNTER SYMPTOMS
COUGH: 1
LIGHT-HEADEDNESS: 1
HEADACHES: 1

## 2019-09-17 NOTE — ED NOTES
Bed: ED02  Expected date:   Expected time:   Means of arrival:   Comments:  Vitaly 531 Chest pain 78 m

## 2019-09-17 NOTE — ED PROVIDER NOTES
History     Chief Complaint:  Cough; Chest Pain    The history is provided by the patient and the spouse. The history is limited by a language barrier. A  was used (virtual).      Travis Peres is a 78 year old male with a history of COPD, HTN, HLD who presents to the emergency department for evaluation of cough and chest pain. Of note, the patient was admitted to [facility] from 8/31-9/2 for acute on chronic COPD. Per EMS, the patient was being seen in clinic today for 2 weeks of chest pain and cough for several months. EMS indicates the patient had also been complaining of decreased appetite and fatigue. EMS states the patient denied any fever. They recorded blood sugar level of 123 and O2 saturation of 97% en route.    Here at the ED, the patient reports he is experiencing palpitations. The patient's wife reports the patient has experienced increased chest pain for the past several days, as well as decreased sleep, headache, and difficulty standing. The patient indicates currently has chest pain. He states this pain is constant but worsens after awaking from sleep. He rates the pain at 7/10. The wife states the patient has had similar symptoms in the past, though less intense. The patient denies any headache, or lightheadedness currently. The wife denies any new leg swelling. The patient has been taking aspirin at home with no improvement to his symptoms.    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    Social History:  Presents alone via EMS.  Former smoker, 2.5 pack years, quit 2001.  Negative for alcohol use.  Marital Status:   [2]     Review of Systems   Respiratory: Positive for cough.    Cardiovascular: Positive for chest pain. Negative for leg swelling.   Neurological: Positive for light-headedness and headaches.   All other systems reviewed and are negative.      Physical Exam     Patient Vitals for the past 24 hrs:   BP Temp Temp src Pulse Heart Rate Resp SpO2   09/17/19 2156 -- -- -- -- -- -- 96 %   09/17/19 2134 -- -- -- -- -- -- 94 %   09/17/19 2132 124/65 -- -- 71 -- -- --   09/17/19 2017 -- -- -- -- 71 15 95 %   09/17/19 2000 131/72 -- -- 72 72 13 96 %   09/17/19 1930 134/78 -- -- 68 68 14 99 %   09/17/19 1900 110/66 -- -- 53 56 23 98 %   09/17/19 1830 103/60 -- -- 53 52 21 98 %   09/17/19 1800 111/61 -- -- -- 54 -- 97 %   09/17/19 1725 112/58 98.2  F (36.8  C) Oral 66 -- 20 99 %     Physical Exam  General: Alert and cooperative with exam. Patient in mild distress. Normal mentation. Hard of hearing  Head:  Scalp is NC/AT  Eyes:  No scleral icterus, PERRL  ENT:  The external nose and ears are normal. The oropharynx is normal and without erythema; mucus membranes are moist. Uvula midline, no evidence of deep space infection.  Neck:  Normal range of motion without rigidity.  CV:  Regular rate and rhythm    No pathologic murmur   Resp:  Breath sounds are clear  bilaterally    Non-labored, no retractions or accessory muscle use  GI:  Abdomen is soft, no distension, mild upper abdominal tenderness; (-) fuller's sign. No peritoneal signs  MS:  No lower extremity edema   Skin:  Warm and dry, No rash or lesions noted.  Neuro: Oriented x 3. No gross motor deficits.      Emergency Department Course   ECG:  Time: 1741  Vent. Rate 58 bpm. WA interval 170. QRS duration 92. QT/QTc 456/447. P-R-T axis 60 61 39.  Sinus bradycardia.  Minimal voltage criteria for LVH, may be normal variant.   Borderline ECG.  Read time: 1745    Imaging:  Radiographic findings were communicated with the patient and spouse who voiced understanding of the findings.    XR Chest, PA & LAT:  No pneumothorax. Shunt tubing again noted and unchanged.  There are no acute infiltrates. The cardiac silhouette is not  enlarged. Pulmonary vasculature is unremarkable.  As per radiology.    US Abdomen, limited (RUQ only):  Cholelithiasis and mild gallbladder wall thickening,  differential includes acute cholecystitis.  As per radiology.    Laboratory:  CBC: WBC: 3.9 (L), HGB: 12.6 (L), PLT: 318  BMP: Glucose 136 (H), GFR Estimate 55 (L), Calcium 8.2 (L), o/w WNL (Creatinine: 1.23)  Hepatic panel: Bilirubin 1.4 (H), Bilirubin 1.8 (H), Albumin 3.2 (L), Alkaline Phosphatase 161 (H),  (H),  (H), o/w WNL  Lipase: 149    1738 Troponin I: <0.015    Venous Blood Gas  Recent Labs   Lab 09/17/19  1744   PHV 7.34   PCO2V 50   PO2V 26   HCO3V 27   Lactic acid:    1.5    Interventions:  1745 Aspirin 162 mg PO  1856 GI Cocktail 30 mL PO  1900 NS 1L IV Bolus    Emergency Department Course:  Nursing notes and vitals reviewed. 1720 I performed an exam of the patient as documented above.     IV inserted. Medicine administered as documented above. Blood drawn. This was sent to the lab for further testing, results above.    The patient was sent for a XR Chest, US Abdomen while in the emergency department, findings above.      1850 I rechecked the patient and discussed the results of his workup thus far.     2100 I rechecked and updated the patient.    2117 I spoke with Dr. Granda, general surgery, regarding the patient.    2127 I spoke with Dr. Nicholson, hospitalist, who agreed to admit the patient.    Findings and plan explained to the Patient and spouse who consents to admission. Discussed the patient with Dr. Nicholson, who will admit the patient to an obs bed for further monitoring, evaluation, and treatment.    I personally reviewed the laboratory results with the Patient and spouse and answered all related questions prior to admission.    Impression & Plan      Medical Decision Making:  Travis Peres is a 78-year-old male who presents with multiple complaints, including decreased appetite, several month history of cough, chest pain, and upper abdominal discomfort.  Patient's medical history and records were reviewed.  Labs, EKG, and imaging was obtained.  EKG demonstrates a normal sinus rhythm with very mild ST elevation in V2/V3 consistent with J-point elevation; no reciprocal changes or other evidence of ischemia, infarction, or arrhythmia.  Troponin negative.  Patient was provided 162 mg aspirin and later GI cocktail with noted improvement in symptoms.  Labs notable for elevations of LFTs as noted above.  Right upper quadrant ultrasound was obtained showing gallstones and mild gallbladder wall thickening; acute cholecystitis is not excluded.  On repeat evaluation, the patient does report some right upper quadrant pain, though he has no significant tenderness to palpation; he has negative Figueroa sign.  Patient vitally stable with no history of recent fever.  I did discuss the case with Dr. Granda of general surgery, given ultrasound findings and LFTs.  Given that patient's presentation is atypical for acute cholecystitis, there is no emergent indication for acute surgical intervention or antibiotics at this time.  The patient's  chest pain is possibly musculoskeletal in nature given ongoing cough for the last several months.  He recently underwent negative CT PE study and current presentation is not consistent with PE.  Patient will be admitted to observation with the hospitalist service for further evaluation and care.    Diagnosis:    ICD-10-CM    1. Elevated LFTs R94.5    2. Chest pain, unspecified type R07.9        Disposition:  Admitted to obs with Dr. Nicholson.    IAdam, am serving as a scribe on 9/17/2019 at 5:20 PM to personally document services performed by Faraz Graham DO based on my observations and the provider's statements to me.     Adam Romero  9/17/2019    EMERGENCY DEPARTMENT       Faraz Graham DO  09/18/19 0010

## 2019-09-17 NOTE — PROGRESS NOTES
Pt came here due to chest pain. He has been having no appetite for the last 2 weeks. Pt just discharge from the hospital 2 weeks ago. Pt has no fever or chill.   Explain to him that I think he will need to go to ER, he and his wife agree, will call ambulance for transport.   No charge for this visit

## 2019-09-18 ENCOUNTER — APPOINTMENT (OUTPATIENT)
Dept: NUCLEAR MEDICINE | Facility: CLINIC | Age: 79
DRG: 444 | End: 2019-09-18
Attending: INTERNAL MEDICINE
Payer: COMMERCIAL

## 2019-09-18 ENCOUNTER — ANESTHESIA (OUTPATIENT)
Dept: ONCOLOGY | Facility: CLINIC | Age: 79
End: 2019-09-18

## 2019-09-18 ENCOUNTER — APPOINTMENT (OUTPATIENT)
Dept: CARDIOLOGY | Facility: CLINIC | Age: 79
DRG: 444 | End: 2019-09-18
Attending: INTERNAL MEDICINE
Payer: COMMERCIAL

## 2019-09-18 ENCOUNTER — ANESTHESIA EVENT (OUTPATIENT)
Dept: ONCOLOGY | Facility: CLINIC | Age: 79
End: 2019-09-18

## 2019-09-18 LAB
ALBUMIN SERPL-MCNC: 2.7 G/DL (ref 3.4–5)
ALP SERPL-CCNC: 172 U/L (ref 40–150)
ALT SERPL W P-5'-P-CCNC: 280 U/L (ref 0–70)
ANION GAP SERPL CALCULATED.3IONS-SCNC: 5 MMOL/L (ref 3–14)
AST SERPL W P-5'-P-CCNC: 386 U/L (ref 0–45)
BILIRUB SERPL-MCNC: 1 MG/DL (ref 0.2–1.3)
BUN SERPL-MCNC: 13 MG/DL (ref 7–30)
CALCIUM SERPL-MCNC: 7.4 MG/DL (ref 8.5–10.1)
CHLORIDE SERPL-SCNC: 112 MMOL/L (ref 94–109)
CO2 SERPL-SCNC: 26 MMOL/L (ref 20–32)
CREAT SERPL-MCNC: 0.92 MG/DL (ref 0.66–1.25)
ERYTHROCYTE [DISTWIDTH] IN BLOOD BY AUTOMATED COUNT: 13.4 % (ref 10–15)
GFR SERPL CREATININE-BSD FRML MDRD: 78 ML/MIN/{1.73_M2}
GLUCOSE SERPL-MCNC: 115 MG/DL (ref 70–99)
HCT VFR BLD AUTO: 33.6 % (ref 40–53)
HGB BLD-MCNC: 11.1 G/DL (ref 13.3–17.7)
LACTATE BLD-SCNC: 1.4 MMOL/L (ref 0.7–2)
MCH RBC QN AUTO: 25.9 PG (ref 26.5–33)
MCHC RBC AUTO-ENTMCNC: 33 G/DL (ref 31.5–36.5)
MCV RBC AUTO: 78 FL (ref 78–100)
PLATELET # BLD AUTO: 293 10E9/L (ref 150–450)
POTASSIUM SERPL-SCNC: 3.8 MMOL/L (ref 3.4–5.3)
PROT SERPL-MCNC: 6.3 G/DL (ref 6.8–8.8)
RBC # BLD AUTO: 4.29 10E12/L (ref 4.4–5.9)
SODIUM SERPL-SCNC: 143 MMOL/L (ref 133–144)
TROPONIN I SERPL-MCNC: <0.015 UG/L (ref 0–0.04)
WBC # BLD AUTO: 3.6 10E9/L (ref 4–11)

## 2019-09-18 PROCEDURE — 83605 ASSAY OF LACTIC ACID: CPT | Performed by: INTERNAL MEDICINE

## 2019-09-18 PROCEDURE — 36415 COLL VENOUS BLD VENIPUNCTURE: CPT | Performed by: INTERNAL MEDICINE

## 2019-09-18 PROCEDURE — 99204 OFFICE O/P NEW MOD 45 MIN: CPT | Performed by: PHYSICIAN ASSISTANT

## 2019-09-18 PROCEDURE — G0378 HOSPITAL OBSERVATION PER HR: HCPCS

## 2019-09-18 PROCEDURE — 40000264 ECHOCARDIOGRAM COMPLETE

## 2019-09-18 PROCEDURE — 94640 AIRWAY INHALATION TREATMENT: CPT

## 2019-09-18 PROCEDURE — 85027 COMPLETE CBC AUTOMATED: CPT | Performed by: INTERNAL MEDICINE

## 2019-09-18 PROCEDURE — 25500064 ZZH RX 255 OP 636: Performed by: INTERNAL MEDICINE

## 2019-09-18 PROCEDURE — 25000132 ZZH RX MED GY IP 250 OP 250 PS 637: Performed by: INTERNAL MEDICINE

## 2019-09-18 PROCEDURE — 93306 TTE W/DOPPLER COMPLETE: CPT | Mod: 26 | Performed by: INTERNAL MEDICINE

## 2019-09-18 PROCEDURE — 84484 ASSAY OF TROPONIN QUANT: CPT | Performed by: INTERNAL MEDICINE

## 2019-09-18 PROCEDURE — 25000128 H RX IP 250 OP 636: Performed by: INTERNAL MEDICINE

## 2019-09-18 PROCEDURE — 94640 AIRWAY INHALATION TREATMENT: CPT | Mod: 76

## 2019-09-18 PROCEDURE — 40000275 ZZH STATISTIC RCP TIME EA 10 MIN: Mod: 76

## 2019-09-18 PROCEDURE — 80053 COMPREHEN METABOLIC PANEL: CPT | Performed by: INTERNAL MEDICINE

## 2019-09-18 PROCEDURE — 25800030 ZZH RX IP 258 OP 636: Performed by: INTERNAL MEDICINE

## 2019-09-18 PROCEDURE — 99226 ZZC SUBSEQUENT OBSERVATION CARE,LEVEL III: CPT | Performed by: INTERNAL MEDICINE

## 2019-09-18 PROCEDURE — A9537 TC99M MEBROFENIN: HCPCS | Performed by: INTERNAL MEDICINE

## 2019-09-18 PROCEDURE — 34300033 ZZH RX 343: Performed by: INTERNAL MEDICINE

## 2019-09-18 PROCEDURE — 78226 HEPATOBILIARY SYSTEM IMAGING: CPT

## 2019-09-18 PROCEDURE — 25000125 ZZHC RX 250: Performed by: INTERNAL MEDICINE

## 2019-09-18 RX ORDER — KIT FOR THE PREPARATION OF TECHNETIUM TC 99M MEBROFENIN 45 MG/10ML
6 INJECTION, POWDER, LYOPHILIZED, FOR SOLUTION INTRAVENOUS ONCE
Status: COMPLETED | OUTPATIENT
Start: 2019-09-18 | End: 2019-09-18

## 2019-09-18 RX ADMIN — CEFTRIAXONE SODIUM 1 G: 1 INJECTION, POWDER, FOR SOLUTION INTRAMUSCULAR; INTRAVENOUS at 23:15

## 2019-09-18 RX ADMIN — MEBROFENIN 6.9 MILLICURIE: 45 INJECTION, POWDER, LYOPHILIZED, FOR SOLUTION INTRAVENOUS at 10:50

## 2019-09-18 RX ADMIN — IPRATROPIUM BROMIDE AND ALBUTEROL SULFATE 3 ML: .5; 3 SOLUTION RESPIRATORY (INHALATION) at 07:34

## 2019-09-18 RX ADMIN — HUMAN ALBUMIN MICROSPHERES AND PERFLUTREN 5 ML: 10; .22 INJECTION, SOLUTION INTRAVENOUS at 10:30

## 2019-09-18 RX ADMIN — Medication 1 MG: at 19:46

## 2019-09-18 RX ADMIN — HYDROCORTISONE 5 MG: 5 TABLET ORAL at 15:25

## 2019-09-18 RX ADMIN — DEXTROSE AND SODIUM CHLORIDE: 5; 900 INJECTION, SOLUTION INTRAVENOUS at 08:23

## 2019-09-18 RX ADMIN — IPRATROPIUM BROMIDE AND ALBUTEROL SULFATE 3 ML: .5; 3 SOLUTION RESPIRATORY (INHALATION) at 19:28

## 2019-09-18 RX ADMIN — CEFTRIAXONE SODIUM 1 G: 1 INJECTION, POWDER, FOR SOLUTION INTRAMUSCULAR; INTRAVENOUS at 00:01

## 2019-09-18 NOTE — H&P
[facility]    History and Physical  Hospitalist       Date of Admission:  9/17/2019    Assessment & Plan   Travis Peres is a 78 year old male with multiple medical problems admitted with the complaints of abdominal pain, LFTs elevated, cholelithiasis noted in the ultrasound with possibility of acute cholecystitis.  Principal Problem:  Abdominal pain and chest pain    Cholelithiasis with possible cholecystitis  Language barrier affecting health care  --- Patient presented with 5-day history of abdominal pain, reduced appetite and nausea.  He has history of gallstones from prior imaging.  --In the emergency department his white count is less than 4000, he is afebrile, his lactate levels are within normal limits, and the ultrasound abdomen does show gallbladder wall thickening.  Surgery is notified in the emergency department.  --We will keep him n.p.o. 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 Potts, 7/29/19 notes states that  she's long suspected lung mets but not proven, thyroglobulin indicates persistent/ progressive disease. thyroglobulinemia rising suggesting progression of thyroid cancer. TSH should be kept low to avoid stimulating the thyroid cancer. 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 Pituitary macroadenoma causing hydrocephalus s/p debulking with mass stable on MRI 10/2018.   - continue PTA steroids hydrocortisone (outpatient on 10 mg am/ 5 mg 2pm), since there is no  overt signs of active infection will hold off on stress dose steroids, his vitals are stable for now.  - Continue PTA levothyroxine 137 mcg daily          DVT Prophylaxis: Pneumatic Compression Devices  Code Status: Full Code    Disposition: Expected discharge in 1-2 days    Loretta Nicholson MD    Primary Care Physician   Karol Alvarez    Chief Complaint   Abdominal pain and chest pain 4 days     History is obtained from the patient  Wife, medical records with help of interpretor    History of Present Illness   Travis Peres is a 78 year old male with history of COPD, pulmonary nodule, chronic cough, GERD, hypertension, hyperlipidemia, papillary thyroid cancer, hypothyroidism, hypopituitarism,  shunt secondary to pituitary adenoma resection presents to the emergency department brought in by a spouse due to increased weakness, lethargy, nausea and reduced appetite.  This started 4 days ago last Friday, he had reduced appetite the whole day, ate only small bites of food and he had a aching significant and nausea.  He was independent prior to Friday and used to go to [facility] for swimming and exercising.  Patient speaks Chinese so difficult to communicate, communication mostly with the help of .  Wife do understand English,.  Last 4 days he had immediate reduced oral intake, no episodes of vomiting.  Patient wife mentioned that he had complained about abdominal pain mostly epigastric area, he had some central chest pain as well.  Patient was recently admitted at [facility] and discharged on 9/2 following acute bronchitis and respiratory failure secondary to hypoxia and COPD exacerbation.  Patient was doing apparently well since then up until last Friday.  no history of trauma, no hematuria, no melena, no headache, no shortness of breath at this point.  He does not use any oxygen at home.  He has a few inhalers that he use on a regular basis.  Language barrier is a significant issue in his health care  it seems.  Wife want the patient to be full code.  She denied him having any fever, chills or any episodes of syncope.    In the emergency department he was even evaluated by Dr. Graham, his creatinine is mildly high at 1.23 with a GFR of 55, bilirubin 1.8, alkaline phosphatase elevated at 161,  , bilirubin 1.4 and lactic acid levels 1.5.  Lipase levels 149 troponins were negative, EKG did not show any ST elevation.  His white count had gone down to 3.9 with a hemoglobin of 12.6 hematocrit 38.2 and platelet count of 318.  His chest x-ray was unremarkable, ultrasound of the abdomen showed gallstones with gallbladder wall thickening indicating possible acute cholecystitis.  Admission requested for further evaluation, plan to admit him to observation.  The ER physician did contact surgery on-call.  Dr. Granda , as per report from Dr. Graham surgery did not think it could be acute cholecystitis, they wanted to hold off on IV antibiotics and visit  with him tomorrow.  They did not expect him to go into surgery emergently.    Past Medical History    I have reviewed this patient's medical history and updated it with pertinent information if needed.   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  Past Surgical History:   Procedure Laterality Date     cymetra injection       ESOPHAGOSCOPY, GASTROSCOPY, DUODENOSCOPY (EGD), COMBINED  6/20/2013    Procedure: COMBINED ESOPHAGOSCOPY, GASTROSCOPY, DUODENOSCOPY (EGD), BIOPSY SINGLE OR MULTIPLE;  gastroscopy;  Surgeon: Leslie Guadarrama MD;  Location:  GI     HERNIA REPAIR Right      lipoma resection chest wall Right 11/09     PHACOEMULSIFICATION CLEAR CORNEA WITH STANDARD INTRAOCULAR LENS IMPLANT Left 5/7/2018    Procedure: PHACOEMULSIFICATION CLEAR CORNEA WITH STANDARD INTRAOCULAR LENS IMPLANT;  LEFT PHACOEMULSIFICATION CLEAR CORNEA WITH STANDARD INTRAOCULAR LENS IMPLANT ;  Surgeon: Nadiya Allen MD;  Location:  EC     PHACOEMULSIFICATION CLEAR CORNEA WITH STANDARD INTRAOCULAR LENS IMPLANT Right 8/21/2018    Procedure: PHACOEMULSIFICATION CLEAR CORNEA WITH STANDARD INTRAOCULAR LENS IMPLANT;  RIGHT EYE PHACOEMULSIFICATION CLEAR CORNEA WITH STANDARD INTRAOCULAR LENS IMPLANT;  Surgeon: Nadiya Allen MD;  Location:  EC     right selective neck dissection level 2, 3, 4  11/3/09     right  shunt placement  6/28/07     THORACIC SURGERY  Fidencio 3 2017     THYMECTOMY N/A 1/3/2017    Procedure: THYMECTOMY;  Surgeon: Ernesto Armstrong MD;  Location: [facility] OR     THYROIDECTOMY  11/27/07     TRANSCERVICAL EXTENDED MEDIASTINAL LYMPHADENECTOMY N/A 1/3/2017    Procedure: TRANSCERVICAL EXTENDED MEDIASTINAL LYMPHADENECTOMY;  Surgeon: Ernesto Armstrong MD;  Location: [facility] OR     transnasal endoscopic resection of pituitary adenoma  8/13/2007       Prior to Admission Medications   Prior to Admission Medications   Prescriptions Last Dose Informant Patient Reported? Taking?   B Complex Vitamins (VITAMIN B COMPLEX PO)  Self Yes No   Sig: Take 1 capsule by mouth daily    GuaiFENesin (MUCUS RELIEF ADULT PO)  Self Yes No   Sig: Take 400 mg by mouth 2 times daily as needed     Ipratropium-Albuterol (COMBIVENT RESPIMAT)  MCG/ACT inhaler  Pharmacy Yes No   Sig: Inhale 1 puff into the lungs 4 times daily   RIBOFLAVIN PO  Self Yes No   Sig: Take 100 mg by mouth   albuterol (PROAIR HFA) 108 (90 Base) MCG/ACT inhaler  Pharmacy No No   Sig: Inhale 2 puffs into the lungs every 4 hours as needed for shortness of breath / dyspnea or wheezing   alendronate (FOSAMAX) 70 MG tablet  Pharmacy No No   Sig: Take 1 tablet (70 mg) by mouth every 7 days   amLODIPine (NORVASC) 2.5 MG tablet  Pharmacy Yes No   Sig: Take 2.5 mg by mouth daily   benzonatate (TESSALON) 100 MG capsule  Pharmacy No No   Sig: Take 1 capsule (100 mg) by mouth 3 times daily as needed for cough   diclofenac (VOLTAREN) 1 % GEL topical gel  Self Yes No   Sig: Place onto the skin 4 times daily   doxycycline monohydrate (ADOXA) 100 MG tablet   No No   Sig: Take 1 tablet (100 mg) by mouth 2 times daily for 10 days   fish oil-omega-3 fatty acids 1000 MG capsule  Self Yes No   Sig: Take 2 g by mouth daily   fluticasone (FLONASE) 50 MCG/ACT spray  Self Yes No   Sig: Spray 2 sprays into both nostrils daily   guaiFENesin-dextromethorphan (ROBITUSSIN DM) 100-10 MG/5ML syrup   No No   Sig: Take 10 mLs by mouth 3 times daily as needed for cough   hydrocortisone (CORTEF) 10 MG tablet  Pharmacy No No   Sig: 10 mg AM and 5 mg afternoon   ibuprofen (ADVIL/MOTRIN) 400 MG tablet  Self Yes No   Sig: Take 400-600 mg by mouth every 6 hours as needed for moderate pain    levothyroxine (SYNTHROID/LEVOTHROID) 137 MCG tablet  Pharmacy No No   Sig: Take 1 tablet (137 mcg) by mouth daily   loperamide (IMODIUM) 2 MG capsule  Self Yes No   mirtazapine (REMERON) 15 MG tablet  Pharmacy Yes No   Sig: Take 15 mg by mouth At Bedtime    order for DME   No No   Sig: Equipment being ordered: Nebulizer with tubing   ranitidine (ZANTAC) 300 MG tablet  Pharmacy Yes No   Sig: Take 300 mg by mouth At Bedtime    tamsulosin (FLOMAX) 0.4 MG capsule  Pharmacy Yes No   Sig:  Take 0.4 mg by mouth daily   traZODone (DESYREL) 100 MG tablet  Pharmacy Yes No   Sig: take 1 tab of 50 mg daily   triamcinolone (KENALOG) 0.1 % paste  Self Yes No   Sig: Apply small amount to affected area in mouth twice daily.   umeclidinium-vilanterol (ANORO ELLIPTA) 62.5-25 MCG/INH oral inhaler  Pharmacy Yes No   Sig: Inhale 1 puff into the lungs daily   vitamin B complex with vitamin C (VITAMIN  B COMPLEX) TABS tablet  Self Yes No   Sig: Take 1 tablet by mouth daily   zolpidem (AMBIEN) 10 MG tablet  Pharmacy Yes No   Sig: Take 10 mg by mouth daily       Facility-Administered Medications Last Administration Doses Remaining   ipratropium - albuterol 0.5 mg/2.5 mg/3 mL (DUONEB) neb solution 3 mL None recorded 1   lidocaine (PF) (XYLOCAINE) 2 % injection 0.05 mL 11/29/2018 12:07 PM 10   ranibizumab (LUCENTIS) injection syringe 0.5 mg None recorded 12        Allergies   Allergies   Allergen Reactions     Metoprolol Shortness Of Breath, Other (See Comments) and Unknown     Not true allergy.  Bradycardia, fatigue, COPD worsening.         Fenofibrate Hives     Fenofibric Acid      Lisinopril Cough     Losartan Cough     Niacin      Simvastatin Cramps and Other (See Comments)       Social History   I have reviewed this patient's social history and updated it with pertinent information if needed. Travis Peres  reports that he quit smoking about 18 years ago. His smoking use included cigarettes. He started smoking about 43 years ago. He has a 2.50 pack-year smoking history. He has never used smokeless tobacco. He reports that he does not drink alcohol or use drugs.    Family History   I have reviewed this patient's family history and updated it with pertinent information if needed.   Family History   Problem Relation Age of Onset     Cancer No family hx of         no skin cancer     Glaucoma No family hx of      Macular Degeneration No family hx of        Review of Systems   The 10 point Review of Systems is negative other  than noted in the HPI or here.     Physical Exam   Temp: 98.2  F (36.8  C) Temp src: Oral BP: 124/65 Pulse: 71 Heart Rate: 71 Resp: 15 SpO2: 94 % O2 Device: None (Room air)    Vital Signs with Ranges  Temp:  [97  F (36.1  C)-98.2  F (36.8  C)] 98.2  F (36.8  C)  Pulse:  [53-82] 71  Heart Rate:  [52-72] 71  Resp:  [13-23] 15  BP: ()/(58-78) 124/65  SpO2:  [94 %-99 %] 94 %  0 lbs 0 oz    Constitutional: Awake, alert, cooperative, no apparent distress.  Eyes: Conjunctiva and pupils examined and normal.  HEENT: Moist mucous membranes, normal dentition.  Respiratory: Clear to auscultation bilaterally, no crackles or wheezing.  Cardiovascular: Regular rate and rhythm, normal S1 and S2, and no murmur noted.  GI: Soft, non-distended,  normal bowel sounds.mild tenderness noted on right upper quadrant   Lymph/Hematologic: No anterior cervical or supraclavicular adenopathy.  Skin: No rashes, no cyanosis, no edema.  Musculoskeletal: No joint swelling, erythema or tenderness.  Neurologic: Cranial nerves 2-12 intact, normal strength and sensation.  Psychiatric: Alert, oriented to person, place and time, no obvious anxiety or depression.    Data   Data reviewed today:  I personally reviewed ekg showing sinus bradycardia   Recent Labs   Lab 09/17/19  1738   WBC 3.9*   HGB 12.6*   MCV 78         POTASSIUM 4.5   CHLORIDE 106   CO2 28   BUN 18   CR 1.23   ANIONGAP 3   NIHARIKA 8.2*   *   ALBUMIN 3.2*   PROTTOTAL 7.5   BILITOTAL 1.8*   ALKPHOS 161*   *   *   LIPASE 149   TROPI <0.015       Imaging:  Recent Results (from the past 24 hour(s))   Chest XR,  PA & LAT    Narrative    CHEST TWO VIEWS 9/17/2019 5:59 PM     HISTORY: Chest pain.    COMPARISON: September 2, 2019       Impression    IMPRESSION: No pneumothorax. Shunt tubing again noted and unchanged.  There are no acute infiltrates. The cardiac silhouette is not  enlarged. Pulmonary vasculature is unremarkable.    RICHARD MCKEON MD   Abdomen US,  limited (RUQ only)    Narrative    ULTRASOUND ABDOMEN LIMITED  9/17/2019 8:48 PM     HISTORY: Elevated LFTs.    COMPARISON: November 24, 2008    FINDINGS:  Liver is mildly fatty infiltrated as evidenced by a diffuse  increase in echogenicity without focal lesions. Gallbladder  demonstrates cholelithiasis with mild gallbladder wall thickening.  Extrahepatic bile duct is mildly generous at 6 mm although this may be  related to age and may not have clinical significance. Pancreas is  normal where visualized. Right kidney is normal in size. There is no  hydronephrosis.      Impression    IMPRESSION:  Cholelithiasis and mild gallbladder wall thickening,  differential includes acute cholecystitis.    RICHARD MCKEON MD

## 2019-09-18 NOTE — PLAN OF CARE
PT:  Noted in chart and spoke with nurse who stated patient to have ld later this date.  Will defer PT until later AM or early PM tomorrow to allow time for nursing to begin mobilizing patient to determine need for skilled PT intervention. Cancel PT this date.

## 2019-09-18 NOTE — ED NOTES
"[facility]  ED Nurse Handoff Report    ED Chief complaint: Chest Pain      ED Diagnosis:   Final diagnoses:   None       Code Status: Full Code, admitting MD to confirm     Allergies:   Allergies   Allergen Reactions     Metoprolol Shortness Of Breath, Other (See Comments) and Unknown     Not true allergy.  Bradycardia, fatigue, COPD worsening.         Fenofibrate Hives     Fenofibric Acid      Lisinopril Cough     Losartan Cough     Niacin      Simvastatin Cramps and Other (See Comments)       Activity level - Baseline/Home:  Independent  Activity Level - Current:   Stand with Assist    Patient's Preferred language: Cantonese    Needed?: Yes    Isolation: No  Infection: Not Applicable  Bariatric?: No    Vital Signs:   Vitals:    09/17/19 1800 09/17/19 1830 09/17/19 1900 09/17/19 1930   BP: 111/61 103/60 110/66 134/78   Pulse:  53 53 68   Resp:  21 23 14   Temp:       TempSrc:       SpO2: 97% 98% 98% 99%       Cardiac Rhythm: ,        Pain level:      Is this patient confused?: No   Does this patient have a guardian?  No         If yes, is there guardianship documents in the Epic \"Code/ACP\" activity?  N/A         Guardian Notified?  N/A  [place] - Suicide Severity Rating Scale Completed?  Yes  If yes, what color did the patient score?  White    Patient Report: Initial Complaint: Patient presents with chest pain and generalized weakness. History of COPD  Focused Assessment: mid sternal chest pain. Lungs with fine crackles bilaterally. Has not ambulated in the ER, had a courtesy meal.  Tests Performed: blood work, US, chest X-ray  Abnormal Results:   Labs Ordered and Resulted from Time of ED Arrival Up to the Time of Departure from the ED   CBC WITH PLATELETS DIFFERENTIAL - Abnormal; Notable for the following components:       Result Value    WBC 3.9 (*)     Hemoglobin 12.6 (*)     Hematocrit 38.2 (*)     MCH 25.8 (*)     All other components within normal limits   BASIC METABOLIC PANEL - " Abnormal; Notable for the following components:    Glucose 136 (*)     GFR Estimate 55 (*)     Calcium 8.2 (*)     All other components within normal limits   HEPATIC PANEL - Abnormal; Notable for the following components:    Bilirubin Direct 1.4 (*)     Bilirubin Total 1.8 (*)     Albumin 3.2 (*)     Alkaline Phosphatase 161 (*)      (*)      (*)     All other components within normal limits   TROPONIN I   LIPASE   ISTAT GAS OR ELECTROLYTE NURSING POCT   ISTAT  GASES LACTATE MILES POCT     Treatments provided: GI Cocktail, 1 L of NS    Family Comments: Wife at bedside    OBS brochure/video discussed/provided to patient/family: Yes, to patient and wife. Language barrier identified. Hand out given in English, there was no Cantonese version available.               ED Medications:   Medications   0.9% sodium chloride BOLUS (1,000 mLs Intravenous New Bag 9/17/19 1900)   aspirin (ASA) chewable tablet 162 mg (162 mg Oral Given 9/17/19 1745)   lidocaine (XYLOCAINE) 2 % 15 mL, alum & mag hydroxide-simethicone (MYLANTA ES/MAALOX  ES) 15 mL GI Cocktail (30 mLs Oral Given 9/17/19 6336)       Drips infusing?:  No    For the majority of the shift this patient was Green.     Severe Sepsis OR Septic Shock Diagnosis Present: No    To be done/followed up on inpatient unit:  Monitor VS    ED NURSE PHONE NUMBER: [phone]

## 2019-09-18 NOTE — PLAN OF CARE
A&Ox4. Cantonese speaking,  requested at [phone] today. SBA, continent. [place] -hears best in R ear. Complained of mild abdominal pain. No PRN's given. PIV infusing 0.9NS +D5 @100mL/hr, IV rocephin. NPO w/ ice chips due to possible surgery. HIDA scan scheduled for today.

## 2019-09-18 NOTE — PROGRESS NOTES
Diet got advanced after surgery was cancelled. Noted to tolerate 'low fat' diet well. Free from N/V/D. Denied pain for the entire day. Up to bathroom multiple times. Voiding. SBA. No fever. VSS.

## 2019-09-18 NOTE — CONSULTS
General Surgery Consultation    Travis Peres MRN# [id number]   Age: 78 year old YOB: 1940     Date of Admission:  9/17/2019    Reason for consult:           Cholelithiasis        Requesting physician:            Loretta Nicholson MD                  Chief Complaint:   Abdominal pain     History is obtained from the patient's wife and son, Rafa as well as the patient's chart.  No interpreting services available. Patient answers yes and no to questions with reasonable trustworthiness and his wife speaks some english. Son is proficient.         History of Present Illness:   This patient is a 78 year old man who presents to the [facility] ED with cough and chest pain. He also reports abdominal pain that has persisted for nearly a week, associated with decreased appetite and nausea. He has known gall stones and ultrasound from last night shows thickening of the gall bladder wall.  His LFT's are elevated with a normal bilirubin. Cbc shows leukopenia and anemia. Troponins are normal. Echo arriving in room.          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          Past Surgical History:     Past Surgical History:   Procedure Laterality Date     cymetra injection       ESOPHAGOSCOPY, GASTROSCOPY, DUODENOSCOPY (EGD), COMBINED  6/20/2013    Procedure: COMBINED ESOPHAGOSCOPY, GASTROSCOPY, DUODENOSCOPY (EGD), BIOPSY SINGLE OR MULTIPLE;  gastroscopy;  Surgeon: Leslie Guadarrama MD;  Location: [facility]     HERNIA REPAIR Right       lipoma resection chest wall Right      PHACOEMULSIFICATION CLEAR CORNEA WITH STANDARD INTRAOCULAR LENS IMPLANT Left 2018    Procedure: PHACOEMULSIFICATION CLEAR CORNEA WITH STANDARD INTRAOCULAR LENS IMPLANT;  LEFT PHACOEMULSIFICATION CLEAR CORNEA WITH STANDARD INTRAOCULAR LENS IMPLANT ;  Surgeon: Nadiya Allen MD;  Location:  [facility]     PHACOEMULSIFICATION CLEAR CORNEA WITH STANDARD INTRAOCULAR LENS IMPLANT Right 2018    Procedure: PHACOEMULSIFICATION CLEAR CORNEA WITH STANDARD INTRAOCULAR LENS IMPLANT;  RIGHT EYE PHACOEMULSIFICATION CLEAR CORNEA WITH STANDARD INTRAOCULAR LENS IMPLANT;  Surgeon: Nadiya Allen MD;  Location: [facility]     right selective neck dissection level 2, 3, 4  11/3/09     right  shunt placement  07     THORACIC SURGERY  Fidencio 3 2017     THYMECTOMY N/A 1/3/2017    Procedure: THYMECTOMY;  Surgeon: Ernesto Armstrong MD;  Location: [facility]     THYROIDECTOMY  07     TRANSCERVICAL EXTENDED MEDIASTINAL LYMPHADENECTOMY N/A 1/3/2017    Procedure: TRANSCERVICAL EXTENDED MEDIASTINAL LYMPHADENECTOMY;  Surgeon: Ernesto Armstrong MD;  Location: [facility] OR     transnasal endoscopic resection of pituitary adenoma  2007     Additional abdominal surgery:  Shunt          Social History:     Social History     Tobacco Use     Smoking status: Former Smoker     Packs/day: 0.50     Years: 5.00     Pack years: 2.50     Types: Cigarettes     Start date: 1976     Last attempt to quit: 2001     Years since quittin.6     Smokeless tobacco: Never Used   Substance Use Topics     Alcohol use: No             Family History:   Noncontributory         Allergies:     Allergies   Allergen Reactions     Metoprolol Shortness Of Breath, Other (See Comments) and Unknown     Not true allergy.  Bradycardia, fatigue, COPD worsening.         Fenofibrate Hives     Fenofibric Acid      Lisinopril Cough     Losartan Cough     Niacin      Simvastatin Cramps and Other  (See Comments)             Medications:     Current Facility-Administered Medications on File Prior to Encounter:  gadobutrol (GADAVIST) injection 7.5 mL     Current Outpatient Medications on File Prior to Encounter:  albuterol (PROAIR HFA) 108 (90 Base) MCG/ACT inhaler Inhale 2 puffs into the lungs every 4 hours as needed for shortness of breath / dyspnea or wheezing   alendronate (FOSAMAX) 70 MG tablet Take 1 tablet (70 mg) by mouth every 7 days   amLODIPine (NORVASC) 2.5 MG tablet Take 2.5 mg by mouth daily   B Complex Vitamins (VITAMIN B COMPLEX PO) Take 1 capsule by mouth daily    benzonatate (TESSALON) 100 MG capsule Take 1 capsule (100 mg) by mouth 3 times daily as needed for cough   diclofenac (VOLTAREN) 1 % GEL topical gel Place onto the skin 4 times daily as needed    fish oil-omega-3 fatty acids 1000 MG capsule Take 2 g by mouth daily   fluticasone (FLONASE) 50 MCG/ACT spray Spray 2 sprays into both nostrils daily   GuaiFENesin (MUCUS RELIEF ADULT PO) Take 400 mg by mouth 2 times daily as needed    guaiFENesin-dextromethorphan (ROBITUSSIN DM) 100-10 MG/5ML syrup Take 10 mLs by mouth 3 times daily as needed for cough   hydrocortisone (CORTEF) 10 MG tablet 10 mg AM and 5 mg afternoon   ibuprofen (ADVIL/MOTRIN) 400 MG tablet Take 400-600 mg by mouth every 6 hours as needed for moderate pain    levothyroxine (SYNTHROID/LEVOTHROID) 137 MCG tablet Take 1 tablet (137 mcg) by mouth daily   mirtazapine (REMERON) 15 MG tablet Take 15 mg by mouth At Bedtime    ranitidine (ZANTAC) 300 MG tablet Take 300 mg by mouth At Bedtime    RIBOFLAVIN PO Take 100 mg by mouth   tamsulosin (FLOMAX) 0.4 MG capsule Take 0.4 mg by mouth daily   traZODone (DESYREL) 100 MG tablet take 1 tab of 50 mg daily   triamcinolone (KENALOG) 0.1 % paste Apply small amount to affected area in mouth twice daily.   vitamin B complex with vitamin C (VITAMIN  B COMPLEX) TABS tablet Take 1 tablet by mouth daily   Ipratropium-Albuterol (COMBIVENT  RESPIMAT)  MCG/ACT inhaler Inhale 1 puff into the lungs 4 times daily   loperamide (IMODIUM) 2 MG capsule    order for DME Equipment being ordered: Nebulizer with tubing   umeclidinium-vilanterol (ANORO ELLIPTA) 62.5-25 MCG/INH oral inhaler Inhale 1 puff into the lungs daily   zolpidem (AMBIEN) 10 MG tablet Take 10 mg by mouth daily        amLODIPine  2.5 mg Oral Daily     cefTRIAXone  1 g Intravenous Q24H     fluticasone  2 spray Both Nostrils Daily     hydrocortisone  10 mg Oral QAM     hydrocortisone  5 mg Oral Daily at 4 pm     ipratropium - albuterol 0.5 mg/2.5 mg/3 mL  3 mL Nebulization 4x daily     levothyroxine  137 mcg Oral Daily     tamsulosin  0.4 mg Oral Daily            Review of Systems:   The Review of Systems is negative other than noted in the HPI.          Physical Exam:   /75 (BP Location: Right arm)   Pulse 65   Temp 98  F (36.7  C) (Oral)   Resp 16   SpO2 93%   General - Well developed, well nourished male in no apparent distress  HEENT:  [place], Head normocephalic and atraumatic, conjunctivae clear, no scleral icterus  Neck: Supple without thyromegaly or masses  Lungs: Clear to auscultation bilaterally  Heart: regular rate and rhythm, no murmurs  Abdomen:   soft, flat, non-distended with mild tenderness noted diffusely, in the right abdomen. No rebound or guarding. Small surgical scar on right abdomen.  Extremities: Warm without edema  Neurologic: nonfocal  Psychiatric: Mood and affect appropriate  Skin: Without lesions or rashes, or juandice         Data:     Lab Results   Component Value Date    WBC 3.6 09/18/2019     Lab Results   Component Value Date    HGB 11.1 09/18/2019     Lab Results   Component Value Date     09/18/2019     Last Basic Metabolic Panel:  Lab Results   Component Value Date     09/18/2019      Lab Results   Component Value Date    POTASSIUM 3.8 09/18/2019     Lab Results   Component Value Date    CHLORIDE 112 09/18/2019     Lab Results    Component Value Date    NIHARIKA 7.4 09/18/2019     Lab Results   Component Value Date    CO2 26 09/18/2019     Lab Results   Component Value Date    BUN 13 09/18/2019     Lab Results   Component Value Date    CR 0.92 09/18/2019     Lab Results   Component Value Date     09/18/2019       Liver Function Studies -   Recent Labs   Lab Test 09/18/19  0656   PROTTOTAL 6.3*   ALBUMIN 2.7*   BILITOTAL 1.0   ALKPHOS 172*   *   *       Imaging:    Results for orders placed or performed during the hospital encounter of 09/17/19   Chest XR,  PA & LAT    Narrative    CHEST TWO VIEWS 9/17/2019 5:59 PM     HISTORY: Chest pain.    COMPARISON: September 2, 2019       Impression    IMPRESSION: No pneumothorax. Shunt tubing again noted and unchanged.  There are no acute infiltrates. The cardiac silhouette is not  enlarged. Pulmonary vasculature is unremarkable.    RICHARD MCKEON MD   Abdomen US, limited (RUQ only)    Narrative    ULTRASOUND ABDOMEN LIMITED  9/17/2019 8:48 PM     HISTORY: Elevated LFTs.    COMPARISON: November 24, 2008    FINDINGS:  Liver is mildly fatty infiltrated as evidenced by a diffuse  increase in echogenicity without focal lesions. Gallbladder  demonstrates cholelithiasis with mild gallbladder wall thickening.  Extrahepatic bile duct is mildly generous at 6 mm although this may be  related to age and may not have clinical significance. Pancreas is  normal where visualized. Right kidney is normal in size. There is no  hydronephrosis.      Impression    IMPRESSION:  Cholelithiasis and mild gallbladder wall thickening,  differential includes acute cholecystitis.    RICHARD MCKEON MD             Assessment and Plan:   Assessment:   Symptomatic cholelithiasis       Plan:   History, labs and ultrasound support the diagnosis and Hida results wouldn't necessarily guide treatment in this situation.  Propose proceeding with laparoscopic cholecystectomy if patient agrees.  Will wait for medical clearance  confirmation and  for detailed discussion with patient regarding plan.  The reasons for laparoscopic cholecystectomy, along with hopeful outcomes and risks were discussed with patient's son, Rafa. Risks include bleeding, infection, injury to unintended organs and may require laparotomy, a drain or subsequent surgery.          Time spent with the patient, reviewing the EMR, reviewing laboratory and imaging studies, more than 50% of which was counseling and coordinating care:  40 minutes 2  to language barrier.    Ford Sanz PA-C  Office: [phone]  Pager: [phone]

## 2019-09-18 NOTE — PROGRESS NOTES
[facility]    Hospitalist Progress Note    Assessment & Plan   Travis Peres is a 78 year old non-english speaking male with PMHx of hypertension, hyperlipidemia, hx of papillary thyroid carcinoma with metastases, post-surgical hypothyroidism, hypopituitarism and hx of pituitary macroadenoma with who was admitted under observation on 9/17/2019 for evaluation of abdominal pain, elevated LFTs and cholelithiasis on US with concern for acute cholecystitis.    Cholelithiasis, with concerns for cholecystitis  Presented with 5d hx of abdominal pain, poor appetite and nausea. Has hx of gallstones on prior imaging. In ED, was afebrile and hemodynamically stable. WBC nl. Lactate nl. LFTs elevated (AST, ALT, alk phos). RUQ US obtained and showed cholelithiasis and gallbladder wall thickening. General surgery consulted. Agreed that clinical picture consistent with acute cholecystitis and recommended lap ld. HIDA scan obtained and subsequently showed no evidence of acute or chronic cholecystitis.     Discussed recommendations for stress-dosed steroids in setting of possible surgery with patient's primary endocrinologist (Dr. Ptots), who expressed concerns for his ability to tolerate surgery given overall frailty and intolerance to minor procedures as well as aspiration risk. Further discussed with Dr. Rico -- in light of these concerns, and normal HIDA scan, decided against pursing surgery.      -- sx management for now -- low fat diet, supportive cares  -- ?trial of ursodiol    No other etiology of sx identified. Lipase nl. Serial trops neg. Echo showed intact EF, no WMAs and no valve disease.    COPD  Chronic and stable. No s/sx of exacerbation  Conts on home inhalers.     Essential hypertension  Hyperlipidemia  Chronic and stable on amlodipine 2.5mg daily     Papillary thoyroid carcinoma s/p thyroidectomy, with metastases (to cervical lymph node and likely lungs)  Postsurgical  hypothyroidism  Panhypopituitarism  S/p  shunt, dt pituitary macroadenoma with hydrocephalus  Follows with Dr. Potts (endocrine), last visit on 7/23/19. Note reviewed. Has long suspected underlying lung mets. Thyroglobulin levels indicative of persistent/progressive disease but hasn't been biopsy proven. Recommended that TSH be kept low to avoid stimulating thyroid cancer. Advised to treat to nl to high nl FT4. Cervical adenopathy treated in the past but overtime nodes noted to be progressing/enlarging. Declined surgery in 7/2018. Noted to have a pituitary macroadenoma resulting in hydrocephalus, prompting debulking of mass and treatment with XRT. Also had  shunt placement.   -- conts on PTA steroids (hydrocortisone 10mg in AM and 5mg at 1400), no indication for stress-dosed steroids given absence of infection and no plans for surgery  -- cont levothyroxine 137mcg daily    FEN: discontinue IVFs, lytes stable, low fat diet  DVT Prophylaxis: PCDs  Code Status: Full Code    Disposition: Anticipate discharge home tomorrow if sx managed.    Sena Kurtz    Interval History   Seen in preop with spouse and  at bedside -- resting comfortably. No specific complaints at present. Discussed recommendations per specialists -- will defer surgery for now. Patient asking when he will be able to eat.    -Data reviewed today: I reviewed all new labs and imaging results over the last 24 hours. I personally reviewed no images or EKG's today.    Physical Exam   Temp: 98  F (36.7  C) Temp src: Oral BP: 134/75 Pulse: 65 Heart Rate: 70 Resp: 16 SpO2: 93 % O2 Device: None (Room air)    There were no vitals filed for this visit.  Vital Signs with Ranges  Temp:  [97  F (36.1  C)-98.2  F (36.8  C)] 98  F (36.7  C)  Pulse:  [53-82] 65  Heart Rate:  [52-72] 70  Resp:  [13-23] 16  BP: ()/(58-78) 134/75  SpO2:  [93 %-99 %] 93 %  No intake/output data recorded.    Constitutional: Resting comfortably, alert,  NAD  Respiratory: CTAB, no wheeze/rales/rhonchi, no increased work of breathing  Cardiovascular: HRRR, no MGR, no LE edema  GI: S, NT, ND, +S  Skin/Integumen: warm/dry  Other:      Medications     dextrose 5% and 0.9% NaCl 100 mL/hr at 09/18/19 0823       amLODIPine  2.5 mg Oral Daily     cefTRIAXone  1 g Intravenous Q24H     fluticasone  2 spray Both Nostrils Daily     hydrocortisone  10 mg Oral QAM     hydrocortisone  5 mg Oral Daily at 4 pm     ipratropium - albuterol 0.5 mg/2.5 mg/3 mL  3 mL Nebulization 4x daily     levothyroxine  137 mcg Oral Daily     tamsulosin  0.4 mg Oral Daily       Data   Recent Labs   Lab 09/18/19  0945 09/18/19  0656 09/18/19  0205 09/17/19  1738   WBC  --  3.6*  --  3.9*   HGB  --  11.1*  --  12.6*   MCV  --  78  --  78   PLT  --  293  --  318   NA  --  143  --  137   POTASSIUM  --  3.8  --  4.5   CHLORIDE  --  112*  --  106   CO2  --  26  --  28   BUN  --  13  --  18   CR  --  0.92  --  1.23   ANIONGAP  --  5  --  3   NIHARIKA  --  7.4*  --  8.2*   GLC  --  115*  --  136*   ALBUMIN  --  2.7*  --  3.2*   PROTTOTAL  --  6.3*  --  7.5   BILITOTAL  --  1.0  --  1.8*   ALKPHOS  --  172*  --  161*   ALT  --  280*  --  234*   AST  --  386*  --  460*   LIPASE  --   --   --  149   TROPI <0.015 <0.015 <0.015 <0.015       Recent Results (from the past 24 hour(s))   Chest XR,  PA & LAT    Narrative    CHEST TWO VIEWS 9/17/2019 5:59 PM     HISTORY: Chest pain.    COMPARISON: September 2, 2019       Impression    IMPRESSION: No pneumothorax. Shunt tubing again noted and unchanged.  There are no acute infiltrates. The cardiac silhouette is not  enlarged. Pulmonary vasculature is unremarkable.    RICHARD MCKEON MD   Abdomen US, limited (RUQ only)    Narrative    ULTRASOUND ABDOMEN LIMITED  9/17/2019 8:48 PM     HISTORY: Elevated LFTs.    COMPARISON: November 24, 2008    FINDINGS:  Liver is mildly fatty infiltrated as evidenced by a diffuse  increase in echogenicity without focal lesions.  Gallbladder  demonstrates cholelithiasis with mild gallbladder wall thickening.  Extrahepatic bile duct is mildly generous at 6 mm although this may be  related to age and may not have clinical significance. Pancreas is  normal where visualized. Right kidney is normal in size. There is no  hydronephrosis.      Impression    IMPRESSION:  Cholelithiasis and mild gallbladder wall thickening,  differential includes acute cholecystitis.    RICHARD MCKEON MD

## 2019-09-18 NOTE — PLAN OF CARE
PT:  Attempted to see during scheduled PT session.  Patient about to go to imaging; defer PT until plan in place regarding surgery.

## 2019-09-18 NOTE — PHARMACY-ADMISSION MEDICATION HISTORY
Admission Medication History    Admission medication history interview status for the 9/17/2019 admission is complete. See EPIC admission navigator for prior to admission medications     Medication history source reliability:Patient is hard of hearing, Spouse involved in care.  Both are non english speaking  Med list reflects recent discharge but, unclear if patient is taking al medications listed.       Actions taken by pharmacist (provider contacted, etc):Accessed [employer]  via [employer]     Additional medication history information not noted on PTA med list :None    Medication reconciliation/reorder completed by provider prior to medication history? No    Time spent in this activity: 20 minutes    Prior to Admission medications    Medication Sig Last Dose Taking? Auth Provider   albuterol (PROAIR HFA) 108 (90 Base) MCG/ACT inhaler Inhale 2 puffs into the lungs every 4 hours as needed for shortness of breath / dyspnea or wheezing  at PRN Yes Spenser Haro MD   alendronate (FOSAMAX) 70 MG tablet Take 1 tablet (70 mg) by mouth every 7 days 9/11/2019 at Wednesdays Yes Corina Potts MD   amLODIPine (NORVASC) 2.5 MG tablet Take 2.5 mg by mouth daily 9/17/2019 at Unknown time Yes Unknown, Entered By History   B Complex Vitamins (VITAMIN B COMPLEX PO) Take 1 capsule by mouth daily  9/17/2019 at Unknown time Yes Reported, Patient   benzonatate (TESSALON) 100 MG capsule Take 1 capsule (100 mg) by mouth 3 times daily as needed for cough  at PRN Yes Ruth Obrien MD   diclofenac (VOLTAREN) 1 % GEL topical gel Place onto the skin 4 times daily as needed   at PRN Yes Reported, Patient   fish oil-omega-3 fatty acids 1000 MG capsule Take 2 g by mouth daily  Yes Reported, Patient   fluticasone (FLONASE) 50 MCG/ACT spray Spray 2 sprays into both nostrils daily 9/17/2019 at AM Yes Unknown, Entered By History   GuaiFENesin (MUCUS RELIEF ADULT PO) Take 400 mg by mouth 2 times daily as needed   at PRN Yes Reported,  Patient   guaiFENesin-dextromethorphan (ROBITUSSIN DM) 100-10 MG/5ML syrup Take 10 mLs by mouth 3 times daily as needed for cough  at PRN Yes Anthony Abreu MD   hydrocortisone (CORTEF) 10 MG tablet 10 mg AM and 5 mg afternoon 9/17/2019 at AM Yes Corina Potts MD   ibuprofen (ADVIL/MOTRIN) 400 MG tablet Take 400-600 mg by mouth every 6 hours as needed for moderate pain   at PRN Yes Reported, Patient   levothyroxine (SYNTHROID/LEVOTHROID) 137 MCG tablet Take 1 tablet (137 mcg) by mouth daily 9/17/2019 at AM Yes Corina Potts MD   ranitidine (ZANTAC) 300 MG tablet Take 300 mg by mouth At Bedtime  9/16/2019 at PM Yes Reported, Patient   RIBOFLAVIN PO Take 100 mg by mouth  Yes Reported, Patient   tamsulosin (FLOMAX) 0.4 MG capsule Take 0.4 mg by mouth daily 9/17/2019 at AM Yes Unknown, Entered By History   traZODone (DESYREL) 100 MG tablet take 1 tab of 50 mg daily 9/16/2019 at hs Yes Reported, Patient   triamcinolone (KENALOG) 0.1 % paste Apply small amount to affected area in mouth twice daily. 9/17/2019 at Unknown time Yes Reported, Patient   vitamin B complex with vitamin C (VITAMIN  B COMPLEX) TABS tablet Take 1 tablet by mouth daily 9/17/2019 at AM Yes Reported, Patient   Ipratropium-Albuterol (COMBIVENT RESPIMAT)  MCG/ACT inhaler Inhale 1 puff into the lungs 4 times daily  at ???  Unknown, Entered By History   loperamide (IMODIUM) 2 MG capsule    Reported, Patient   mirtazapine (REMERON) 15 MG tablet Take 15 mg by mouth At Bedtime    Reported, Patient   order for DME Equipment being ordered: Nebulizer with tubing   Estella Echols PA-C   umeclidinium-vilanterol (ANORO ELLIPTA) 62.5-25 MCG/INH oral inhaler Inhale 1 puff into the lungs daily  at ???  Unknown, Entered By History   zolpidem (AMBIEN) 10 MG tablet Take 10 mg by mouth daily   at PRN  Reported, Patient       Que Armstrong, PharmD

## 2019-09-18 NOTE — PROGRESS NOTES
RECEIVING UNIT ED HANDOFF REVIEW    ED Nurse Handoff Report was reviewed by: Verena Farris on September 17, 2019 at 9:55 PM

## 2019-09-18 NOTE — CONSULTS
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   Identified issues/concerns regarding health management: SW following for discharge needs  Quality of Family Relationships: supportive, involved     ASSESSMENT  Cognitive Status:  Alert and oriented X4-[place] Speaking  Concerns to be addressed: Patient is a 78 year old male who was admitted under observation for Cholelithiasis with possible cholecystitis. Prior to hospitalization, patient was living at home independently until just recently where he was found to have increased weakness, lethargy, nausea and reduced appetite. Patient was scheduled to have surgery today; however, this was cancelled. Patient is to be assessed by PT/OT to determine safe discharge plan. SSW will follow up after consults/assessments are complete. SW will continue to follow and assist as needed.     PLAN  Financial costs for the patient includes   Patient given options and choices for discharge   Patient/family is agreeable to the plan?   Transportation/person available to transport on day of discharge  is  and have they been notified/set up   Patient Goals and Preferences: TBD  Patient anticipates discharging to:  TBD-likely home with spouse    Maine Wagner, MSW, LGSW  *[phone]

## 2019-09-19 PROBLEM — K92.2 GI BLEED: Status: ACTIVE | Noted: 2019-09-19

## 2019-09-19 LAB
ALBUMIN SERPL-MCNC: 2.5 G/DL (ref 3.4–5)
ALP SERPL-CCNC: 143 U/L (ref 40–150)
ALT SERPL W P-5'-P-CCNC: 160 U/L (ref 0–70)
ANION GAP SERPL CALCULATED.3IONS-SCNC: 4 MMOL/L (ref 3–14)
AST SERPL W P-5'-P-CCNC: 102 U/L (ref 0–45)
BILIRUB SERPL-MCNC: 0.4 MG/DL (ref 0.2–1.3)
BUN SERPL-MCNC: 27 MG/DL (ref 7–30)
CALCIUM SERPL-MCNC: 7.8 MG/DL (ref 8.5–10.1)
CHLORIDE SERPL-SCNC: 113 MMOL/L (ref 94–109)
CO2 SERPL-SCNC: 26 MMOL/L (ref 20–32)
CREAT SERPL-MCNC: 0.82 MG/DL (ref 0.66–1.25)
ERYTHROCYTE [DISTWIDTH] IN BLOOD BY AUTOMATED COUNT: 13.5 % (ref 10–15)
GFR SERPL CREATININE-BSD FRML MDRD: 84 ML/MIN/{1.73_M2}
GLUCOSE SERPL-MCNC: 94 MG/DL (ref 70–99)
HCT VFR BLD AUTO: 29.8 % (ref 40–53)
HGB BLD-MCNC: 8.8 G/DL (ref 13.3–17.7)
HGB BLD-MCNC: 9.1 G/DL (ref 13.3–17.7)
HGB BLD-MCNC: 9.7 G/DL (ref 13.3–17.7)
MCH RBC QN AUTO: 25.6 PG (ref 26.5–33)
MCHC RBC AUTO-ENTMCNC: 32.6 G/DL (ref 31.5–36.5)
MCV RBC AUTO: 79 FL (ref 78–100)
PLATELET # BLD AUTO: 315 10E9/L (ref 150–450)
POTASSIUM SERPL-SCNC: 3.6 MMOL/L (ref 3.4–5.3)
PROT SERPL-MCNC: 6.3 G/DL (ref 6.8–8.8)
RBC # BLD AUTO: 3.79 10E12/L (ref 4.4–5.9)
SODIUM SERPL-SCNC: 143 MMOL/L (ref 133–144)
WBC # BLD AUTO: 5.2 10E9/L (ref 4–11)

## 2019-09-19 PROCEDURE — 12000000 ZZH R&B MED SURG/OB

## 2019-09-19 PROCEDURE — 25800030 ZZH RX IP 258 OP 636: Performed by: INTERNAL MEDICINE

## 2019-09-19 PROCEDURE — 85027 COMPLETE CBC AUTOMATED: CPT | Performed by: INTERNAL MEDICINE

## 2019-09-19 PROCEDURE — 99232 SBSQ HOSP IP/OBS MODERATE 35: CPT | Performed by: INTERNAL MEDICINE

## 2019-09-19 PROCEDURE — 85018 HEMOGLOBIN: CPT | Performed by: INTERNAL MEDICINE

## 2019-09-19 PROCEDURE — 94640 AIRWAY INHALATION TREATMENT: CPT

## 2019-09-19 PROCEDURE — C9113 INJ PANTOPRAZOLE SODIUM, VIA: HCPCS | Performed by: INTERNAL MEDICINE

## 2019-09-19 PROCEDURE — 94640 AIRWAY INHALATION TREATMENT: CPT | Mod: 76

## 2019-09-19 PROCEDURE — 25000132 ZZH RX MED GY IP 250 OP 250 PS 637: Performed by: INTERNAL MEDICINE

## 2019-09-19 PROCEDURE — 25000125 ZZHC RX 250: Performed by: INTERNAL MEDICINE

## 2019-09-19 PROCEDURE — 25000128 H RX IP 250 OP 636: Performed by: INTERNAL MEDICINE

## 2019-09-19 PROCEDURE — G0378 HOSPITAL OBSERVATION PER HR: HCPCS

## 2019-09-19 PROCEDURE — 40000275 ZZH STATISTIC RCP TIME EA 10 MIN

## 2019-09-19 PROCEDURE — 36415 COLL VENOUS BLD VENIPUNCTURE: CPT | Performed by: INTERNAL MEDICINE

## 2019-09-19 PROCEDURE — 80053 COMPREHEN METABOLIC PANEL: CPT | Performed by: INTERNAL MEDICINE

## 2019-09-19 RX ORDER — SODIUM CHLORIDE 9 MG/ML
INJECTION, SOLUTION INTRAVENOUS CONTINUOUS
Status: DISCONTINUED | OUTPATIENT
Start: 2019-09-19 | End: 2019-09-20

## 2019-09-19 RX ADMIN — SODIUM CHLORIDE: 9 INJECTION, SOLUTION INTRAVENOUS at 16:05

## 2019-09-19 RX ADMIN — IPRATROPIUM BROMIDE AND ALBUTEROL SULFATE 3 ML: .5; 3 SOLUTION RESPIRATORY (INHALATION) at 06:55

## 2019-09-19 RX ADMIN — IPRATROPIUM BROMIDE AND ALBUTEROL SULFATE 3 ML: .5; 3 SOLUTION RESPIRATORY (INHALATION) at 15:25

## 2019-09-19 RX ADMIN — LEVOTHYROXINE SODIUM 137 MCG: 112 TABLET ORAL at 09:31

## 2019-09-19 RX ADMIN — HYDROCORTISONE 10 MG: 10 TABLET ORAL at 09:31

## 2019-09-19 RX ADMIN — SODIUM CHLORIDE 8 MG/HR: 9 INJECTION, SOLUTION INTRAVENOUS at 18:37

## 2019-09-19 RX ADMIN — ACETAMINOPHEN 650 MG: 325 TABLET, FILM COATED ORAL at 22:05

## 2019-09-19 RX ADMIN — ACETAMINOPHEN 650 MG: 325 TABLET, FILM COATED ORAL at 12:06

## 2019-09-19 RX ADMIN — IPRATROPIUM BROMIDE AND ALBUTEROL SULFATE 3 ML: .5; 3 SOLUTION RESPIRATORY (INHALATION) at 19:45

## 2019-09-19 RX ADMIN — AMLODIPINE BESYLATE 2.5 MG: 2.5 TABLET ORAL at 09:31

## 2019-09-19 RX ADMIN — SODIUM CHLORIDE 80 MG: 9 INJECTION, SOLUTION INTRAVENOUS at 17:08

## 2019-09-19 RX ADMIN — HYDROCORTISONE 5 MG: 5 TABLET ORAL at 16:05

## 2019-09-19 RX ADMIN — TAMSULOSIN HYDROCHLORIDE 0.4 MG: 0.4 CAPSULE ORAL at 09:31

## 2019-09-19 RX ADMIN — FLUTICASONE PROPIONATE 2 SPRAY: 50 SPRAY, METERED NASAL at 09:32

## 2019-09-19 RX ADMIN — ACETAMINOPHEN 650 MG: 325 TABLET, FILM COATED ORAL at 01:59

## 2019-09-19 ASSESSMENT — ACTIVITIES OF DAILY LIVING (ADL)
ADLS_ACUITY_SCORE: 15
ADLS_ACUITY_SCORE: 16

## 2019-09-19 NOTE — PLAN OF CARE
Patient up with SBA.  A/Ox4; Cantonese-speaking;  utilized.  VSS on RA.  Continue to monitor BPs closely, along with Hgb recheck @2200.  One soft, red/black BM this afternoon.  Denies chest pain or abdominal pain.  NS infusing @75.  Continuous protonix infusing.  Fair PO intake; denies n/v.  Plan for endoscopy @1330 tomorrow; family aware.

## 2019-09-19 NOTE — PLAN OF CARE
Up SBA. A/ox4. Cantonese speaking,  requested at 0730 today. [place]. C/o mild abd, chest and throat pain. CP was non-radiating, and asymptomatic otherwise. Pain managed w/prn tyl. BS active, +gas. Low fat diet. PIV SL. Plan to discharge to home and f/u with GI.

## 2019-09-19 NOTE — PROGRESS NOTES
Pt on room air sat 97%. Lung sounds diminished. Taking nebs some aeration increased noted post neb.  Cj  RT

## 2019-09-19 NOTE — PROVIDER NOTIFICATION
Page sent to hospitalist regarding Hgb 9.1; will closely monitor BPs.  Hgb recheck ordered by MD for 2200.

## 2019-09-19 NOTE — PROVIDER NOTIFICATION
MD Notification    Notified Person: MD    Notified Person Name: Dr Kurtz    Notification Date/Time: 9/19/2019 9:45 PM    Notification Interaction: In person    Purpose of Notification: Pt had large BM with tarry black stool.    Orders Received: MD to check Hgb and monitor pt until at least 4pm.    Comments: Pt had 2 additional bloody BMs. MD changed to inpatient and placed GI consult.

## 2019-09-19 NOTE — CONSULTS
Patient discharging home today with his wife. PCP appointment was set up for patient.    Please Follow-up with your Primary Care Provider:   [facility]   [phone]   September 26, 2019   11:35 AM

## 2019-09-19 NOTE — PROGRESS NOTES
[facility]    Hospitalist Progress Note    Assessment & Plan   Travis Peres is a 78 year old non-english speaking male with PMHx of hypertension, hyperlipidemia, hx of papillary thyroid carcinoma with metastases, post-surgical hypothyroidism, hypopituitarism and hx of pituitary macroadenoma with who was admitted under observation on 9/17/2019 for evaluation of abdominal pain, elevated LFTs and cholelithiasis on US with concern for acute cholecystitis. Subsequent workup this stay was neg for cholecystitis. Continued to endorse vague complaints of abdominal/chest discomfort. Developed dark/bloody stools on 9/19 AM with drop in hgb and increased BUN with clinical picture of GIB.      Anemia / Dark Stools dt suspected GIB:  Noted by RN to pass a dark tarry appearing stool on 9/19 AM. Continued to have dark/blood BMs.   Hgb 11.1 -- 9.7 in past 24 hrs   -- [facility] consulted, started on PPI gtt  -- monitor serial hgbs (next check is at 1600)  -- consented for blood and placed conditional order to transfuse 1U PRBC if hgb < 7 (though if actively bleeding may need more than 1 unit)  -- remains hemodynamically stable at present, but if decompensates will need to place on stress dosed steroids given below    Cholelithiasis  Presented with 5d hx of abdominal pain, poor appetite and nausea. Has hx of gallstones on prior imaging. In ED, was afebrile and hemodynamically stable. WBC nl. Lactate nl. LFTs elevated (AST, ALT, alk phos). RUQ US obtained and showed cholelithiasis and gallbladder wall thickening. Started on ceftriaxone and general surgery consulted. Clinical picture consistent with acute cholecystitis and recommended lap ld. HIDA scan obtained and subsequently showed no evidence of acute or chronic cholecystitis.      Discussed recommendations for stress-dosed steroids in setting of possible surgery with patient's primary endocrinologist (Dr. Potts), who expressed concerns for his ability to tolerate  surgery given overall frailty and intolerance to minor procedures as well as aspiration risk. Further discussed with Dr. Rico -- in light of these concerns and normal HIDA scan, decided against pursing surgery. May eventually need surgical removal of gallbladder vs cholecystostomy tube placement but given his condition remained stable, felt it was best to avoid procedures for now. Discussed with GI (BERYL Perales with [facility]) -- no real benefit to addition of ursodiol at this time.     Other etiologies of abd discomfort explored -- Lipase nl. Serial trops neg. Echo showed intact EF, no WMAs and no valve disease.    -- LFTs trending down, had been otherwise tolerating low fat diet    COPD  Chronic and stable. No s/sx of exacerbation  Conts on home inhalers.     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 Potts (endocrine), last visit on 7/23/19. Note reviewed. Has long suspected underlying lung mets. Thyroglobulin levels indicative of persistent/progressive disease but hasn't been biopsy proven. Recommended that TSH be kept low to avoid stimulating thyroid cancer. Advised to treat to nl to high nl FT4. Cervical adenopathy treated in the past but overtime nodes noted to be progressing/enlarging. Declined surgery in 7/2018. Noted to have a pituitary macroadenoma resulting in hydrocephalus, prompting debulking of mass and treatment with XRT. Also had  shunt placement.   -- conts on PTA steroids (hydrocortisone 10mg in AM and 5mg at 1400) -- if he decompensates dt above, will need stress dosed steroids (100mg IV q8h)  -- cont levothyroxine 137mcg daily    FEN: IVFs with NS @75ml/h, lytes stable, low fat diet  DVT Prophylaxis: PCDs  Code  Status: Full Code    Disposition: Discharge date unclear -- pending evaluation of GI bleed and stable labs.     Spouse at bedside and in agreement with care plan. Signed consent for blood as question whether patient may have some possible underlying cog impairment.    Sena Kurtz    Interval History   Notified by RN this morning that patient passed a dark, tarry appearing stool. Hgb trended down on recheck. Has had two more dark/bloody stools as the day progressed. Seen this afternoon with , conts to endorse vague abd discomfort. No n/v. No cp/sob/cough.     -Data reviewed today: I reviewed all new labs and imaging results over the last 24 hours. I personally reviewed no images or EKG's today.    Physical Exam   Temp: 96.6  F (35.9  C) Temp src: Oral BP: (!) 150/85 Pulse: 63 Heart Rate: 82 Resp: 18 SpO2: 96 % O2 Device: None (Room air)    There were no vitals filed for this visit.  Vital Signs with Ranges  Temp:  [96.6  F (35.9  C)-98.5  F (36.9  C)] 96.6  F (35.9  C)  Pulse:  [63-78] 63  Heart Rate:  [] 82  Resp:  [18] 18  BP: (128-157)/(60-85) 150/85  SpO2:  [94 %-98 %] 96 %  I/O last 3 completed shifts:  In: 1464 [P.O.:375; I.V.:1089]  Out: -     Constitutional: Resting comfortably, alert, NAD  Respiratory: CTAB, no wheeze/rales/rhonchi, no increased work of breathing  Cardiovascular: HRRR, no MGR, no LE edema  GI: S, NT, ND, +S  Skin/Integumen: warm/dry  Other:      Medications     dextrose 5% and 0.9% NaCl 100 mL/hr at 09/18/19 0823       amLODIPine  2.5 mg Oral Daily     cefTRIAXone  1 g Intravenous Q24H     fluticasone  2 spray Both Nostrils Daily     hydrocortisone  10 mg Oral QAM     hydrocortisone  5 mg Oral Daily at 4 pm     ipratropium - albuterol 0.5 mg/2.5 mg/3 mL  3 mL Nebulization 4x daily     levothyroxine  137 mcg Oral Daily     tamsulosin  0.4 mg Oral Daily       Data   Recent Labs   Lab 09/19/19  1029 09/19/19  0736 09/18/19  0945 09/18/19  0656 09/18/19  0205  09/17/19  1738   WBC 5.2  --   --  3.6*  --  3.9*   HGB 9.7*  --   --  11.1*  --  12.6*   MCV 79  --   --  78  --  78     --   --  293  --  318   NA  --  143  --  143  --  137   POTASSIUM  --  3.6  --  3.8  --  4.5   CHLORIDE  --  113*  --  112*  --  106   CO2  --  26  --  26  --  28   BUN  --  27  --  13  --  18   CR  --  0.82  --  0.92  --  1.23   ANIONGAP  --  4  --  5  --  3   NIHARIKA  --  7.8*  --  7.4*  --  8.2*   GLC  --  94  --  115*  --  136*   ALBUMIN  --  2.5*  --  2.7*  --  3.2*   PROTTOTAL  --  6.3*  --  6.3*  --  7.5   BILITOTAL  --  0.4  --  1.0  --  1.8*   ALKPHOS  --  143  --  172*  --  161*   ALT  --  160*  --  280*  --  234*   AST  --  102*  --  386*  --  460*   LIPASE  --   --   --   --   --  149   TROPI  --   --  <0.015 <0.015 <0.015 <0.015       Recent Results (from the past 24 hour(s))   NM HepatOBiliary Scan    [facility]    NUCLEAR MEDICINE HEPATOBILIARY SCAN September 18, 2019 at 1156 hours    INDICATION: Right upper quadrant pain, cholecystitis suspected.     ADDITIONAL INFORMATION: None.    TECHNIQUE: The patient received 6.9 mCi of Tc-99m Choletec  intravenously. Images were obtained out through 44 minutes.      FINDINGS: There is prompt clearance of the radionuclide from the blood  pool into the liver. By 10 minutes there is clear visualization of the  intrahepatic ducts as well as the upper common bile duct. By 12  minutes there is visualization of the gallbladder. At 15 minutes there  is emptying from the common bile duct into the small bowel.      Impression    IMPRESSION: Normal hepatobiliary scan without evidence for acute or  chronic cholecystitis.    ALEJANDRO PARKINSON MD

## 2019-09-19 NOTE — PLAN OF CARE
PT and OT:  Spoke with nursing in PM who stated patient has been up walking with nursing to bathroom and is now scheduled with a new GI consult due to bleeding. Note surgery had been canceled last evening.  Patient still on observation status.  Will defer PT and OT evals until 9/20 to check if skilled PT and/or intervention indicated.

## 2019-09-19 NOTE — PLAN OF CARE
Pt A&Ox3, disoriented to situation at times. C/o mild sternal pain, given tylenol x1 with relief. 3 loose tarry black/bloody stools, MD notified. Low fat diet, NPO at midnight for endoscopy tomorrow. Up with SBA, unsteady at times, bed alarm on.

## 2019-09-19 NOTE — CONSULTS
[facility] Consultation     Travis Peres  [address]  [facility] [address]  78 year old male     Admission Date/Time: 9/17/2019  Primary Care Provider: Karol Alvarez  Referring / Attending Physician:  Jerardo     We were asked to see the patient in consultation by Dr. Kurtz for evaluation of melena.    ASSESSMENT:    UGIB - likely PUD.    Melena  Azotemia  Risks for PUD - steroids, NSAIDs, advanced age, no PPI    Regaring LFTs, appears these are improving and by hx seems likely he passed biliary sludge/stone yesterday when pain intensified, and resolved.      RECOMMENDATIONS:  IV PPI started  NPO  q4hr Hgb overnight, recheck INR - correct prn  At least 2 peripheral IVs  Low threshold to transfer to ICU  CTA vs angio for hemodynamic instability in the next few hours  Unable to offer EGD now due to active diet, drinking currently.  Plan for EGD tomorrow.     Discussed with Dr. Kurtz.     Will updated my overnight colleague.     Gilberto Gibson DO   [facility]  Cell [phone]  ________________________________________________________________________        CC: lethargy     HPI:  Travis Peres is a 78 year old male who was admitted on the 17th with lethargy.  Has COPD, chronic cough, papillary thyroid Ca, hypothyroidism, and  shunt secondary to pituitary adenoma - on steroids, takes ibuprofen prn at home, no PPI.     We were consulted after the patient had several dark stools today, perhaps one yesterday and was found to have Hgb drop about 1 gm, while BUN went from 13 to 27.    Initially on admit was found to have US suggesting possible cholecystitis, CBD 6mm, though LFTs were also slightly elevated, HIDA negative.  Since admit, LFTs have improved, and patient recalls significantly worse pain yesterday in the mid abd, sudden resolution of pain last night.  HIDA negative.     The patient is drinking water at the time of our interview.   present.  He claims to have  eaten lunch around noon, but RN and Dr. Kurtz confirm this was actually around 2-2:30.  Discussed plan with pt, wife via .      ROS: A comprehensive ten point review of systems was negative aside from those in mentioned in the HPI.      PAST MEDICAL HISTORY:  Patient Active Problem List    Diagnosis Date Noted     Cholelithiasis with possible cholecystitis 09/17/2019     Priority: Medium     Acute and chronic respiratory failure with hypoxia (H) 08/31/2019     Priority: Medium     Hypocalcemia 04/22/2019     Priority: Medium     Deafness, unspecified laterality 08/30/2018     Priority: Medium     Low bone density 06/20/2018     Priority: Medium     BMD 6/25/18.shows lowest T-score -2.3 at the right femoral neck. He has had significant loss of BMD since 2015.      Alendronate has been prescribed since 2012.  I suspect he isn't compliant.       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  GOLD 3B as of 9/11/2019.       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  Karol Alvarez MD 4/23/2014         Lytic bone lesions on xray 02/24/2014     Priority: Medium     Pulmonary nodules/lesions, multiple 02/24/2014     Priority: Medium     Chest pain 06/19/2013     Priority: Medium     Sphenoid sinusitis 09/13/2011     Priority: Medium     Papillary thyroid carcinoma (HCC) 12/19/2007     Priority: Medium     Right 4.8 cm, LN+ (8/14), pT3, pN1a, pMx    His course has included several operations and several 131I treatments  11/27/07 thyroidectomy  153.4 mCi 131I in 6/08. The radioiodine  was complicated by acute sialadenitis.   12/08  193 mCi 131I (with Thyrogen stimulation). The post therapy scan showed activity in the thyroid bed, lung base on the right and also superior left kidney region   11/3/09  right selective neck dissection levels 2, 3 and 4, removing many positive LNs.   4/16/14 FNAB of right level 6 and 7 cervical lymph nodes were  positive for papillary thyroid cancer. Needle wash Tg was also high on both samples (see below). He had  hoarseness within one hour after the FNAB procedure. He was treated with cymetra on 5/12/14 and he restarted thickened liquids.    6/3/14  ETOH ablation procedure of the  2 LNs    1/31/17  trasnscervical resection of retrosternal mediastinal lesions.  A cystic lesion was removed and drained.  Surgical path  benign thymic tissue.       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 Karol Day  MD Kim 2017    stopped atenolol, metoprolol (2016 trial) due to bradycardia.  Cough on lisinopril.  Throat itchy on losartan. Leg swelling on amlodipine.  Karol Alvarez MD 6/10/2016          Hyperlipidemia 2006     Priority: Medium     Overview:   Previously on simvastatin.  Previously had triglycerides in the 400s.             Benign localized hyperplasia of prostate with urinary obstruction 2006     Priority: Medium     Overview:   Previously followed by [facility].  Minimal improvement on tamsulosin.  Laser ablation of prostate was recommended but not performed.  Last visit note in chart 10/6/09; patient felt symptoms were improved and declined further treatment..   Karol Alvarez MD 2010          SOCIAL HISTORY:  Social History     Tobacco Use     Smoking status: Former Smoker     Packs/day: 0.50     Years: 5.00     Pack years: 2.50     Types: Cigarettes     Start date: 1976     Last attempt to quit: 2001     Years since quittin.6     Smokeless tobacco: Never Used   Substance Use Topics     Alcohol use: No     Drug use: No     FAMILY HISTORY:  Family History   Problem Relation Age of Onset     Cancer No family hx of         no skin cancer     Glaucoma No family hx of      Macular Degeneration No family hx of      ALLERGIES:   Allergies   Allergen Reactions     Metoprolol Shortness Of Breath, Other (See Comments) and Unknown     Not true allergy.  Bradycardia, fatigue, COPD worsening.         Fenofibrate Hives     Fenofibric Acid      Lisinopril Cough     Losartan Cough     Niacin      Simvastatin Cramps and Other (See Comments)     MEDICATIONS:   Current Facility-Administered Medications   Medication     acetaminophen (TYLENOL) Suppository 650 mg     acetaminophen (TYLENOL) tablet 650 mg     albuterol (PROAIR HFA/PROVENTIL HFA/VENTOLIN HFA) 108 (90 Base) MCG/ACT inhaler 2 puff     amLODIPine (NORVASC) tablet 2.5 mg     bisacodyl (DULCOLAX)  Suppository 10 mg     fluticasone (FLONASE) 50 MCG/ACT spray 2 spray     HYDROcodone-acetaminophen (NORCO) 5-325 MG per tablet 1-2 tablet     hydrocortisone (CORTEF) tablet 10 mg     hydrocortisone (CORTEF) tablet 5 mg     HYDROmorphone (PF) (DILAUDID) injection 0.3 mg     ipratropium - albuterol 0.5 mg/2.5 mg/3 mL (DUONEB) neb solution 3 mL     levothyroxine (SYNTHROID/LEVOTHROID) tablet 137 mcg     melatonin tablet 1 mg     naloxone (NARCAN) injection 0.1-0.4 mg     ondansetron (ZOFRAN-ODT) ODT tab 4 mg    Or     ondansetron (ZOFRAN) injection 4 mg     pantoprazole (PROTONIX) 80 mg in sodium chloride 0.9 % 100 mL infusion     pantoprazole (PROTONIX) 80 mg in sodium chloride 0.9 % 100 mL intermittent infusion     sodium chloride (PF) 0.9% PF flush 3 mL     sodium chloride (PF) 0.9% PF flush 3 mL     tamsulosin (FLOMAX) capsule 0.4 mg     Facility-Administered Medications Ordered in Other Encounters   Medication     gadobutrol (GADAVIST) injection 7.5 mL     PHYSICAL EXAM:   /72 (BP Location: Right arm)   Pulse 63   Temp 97.1  F (36.2  C) (Oral)   Resp 18   SpO2 95%    GEN: Alert, oriented x3, communicative and in NAD.  LAURIE: AT, anicteric, OP without erythema, exudate, or ulcers.    NECK: Supple.    LYMPH: No LAD noted.  HRT: RRR  LUNGS: CTA  ABD: ND, +BS, no guarding or pain to palpation, no rebound, no HSM.  SKIN: No rash, jaundice or spider angiomata  MSKL: LE free of edema, strength 5/5 all 4 extrems  NEURO: CN grossly intact, sensation intact to light touch, toes downgoing.     ADDITIONAL DATA:   I reviewed the patient's new clinical lab test results.   Recent Labs   Lab Test 09/19/19  1029 09/18/19  0656 09/17/19  1738  01/05/17  0018  06/03/14  0729   WBC 5.2 3.6* 3.9*   < > 20.1*   < >  --    HGB 9.7* 11.1* 12.6*   < > 11.4*   < >  --    MCV 79 78 78   < > 82   < >  --     293 318   < > 119*   < >  --    INR  --   --   --   --  1.51*  --  <0.9    < > = values in this interval not displayed.      Recent Labs   Lab Test 09/19/19 0736 09/18/19 0656 09/17/19 1738   POTASSIUM 3.6 3.8 4.5   CHLORIDE 113* 112* 106   CO2 26 26 28   BUN 27 13 18   ANIONGAP 4 5 3     Recent Labs   Lab Test 09/19/19 0736 09/18/19 0656 09/17/19  1738 08/31/19  2007  05/24/18  1640 01/05/17  0348  10/04/16  0522   ALBUMIN 2.5* 2.7* 3.2* 2.9*   < >  --   --    < > 3.4   BILITOTAL 0.4 1.0 1.8* 0.3  --   --   --    < > 0.9   * 280* 234* 25  --   --   --    < > 25   * 386* 460* 21  --   --   --    < > 15   PROTEIN  --   --   --  Negative  --  Negative 10*   < >  --    LIPASE  --   --  149  --   --   --   --   --  211    < > = values in this interval not displayed.        I reviewed the patient's new imaging results.

## 2019-09-20 ENCOUNTER — ANESTHESIA (OUTPATIENT)
Dept: GASTROENTEROLOGY | Facility: CLINIC | Age: 79
DRG: 444 | End: 2019-09-20
Payer: COMMERCIAL

## 2019-09-20 ENCOUNTER — RESULTS ONLY (OUTPATIENT)
Dept: ENDOSCOPY | Facility: CLINIC | Age: 79
End: 2019-09-20

## 2019-09-20 ENCOUNTER — ANESTHESIA EVENT (OUTPATIENT)
Dept: GASTROENTEROLOGY | Facility: CLINIC | Age: 79
DRG: 444 | End: 2019-09-20
Payer: COMMERCIAL

## 2019-09-20 LAB
ALBUMIN SERPL-MCNC: 2.4 G/DL (ref 3.4–5)
ALP SERPL-CCNC: 112 U/L (ref 40–150)
ALT SERPL W P-5'-P-CCNC: 99 U/L (ref 0–70)
ANION GAP SERPL CALCULATED.3IONS-SCNC: 3 MMOL/L (ref 3–14)
AST SERPL W P-5'-P-CCNC: 43 U/L (ref 0–45)
BILIRUB SERPL-MCNC: 0.4 MG/DL (ref 0.2–1.3)
BUN SERPL-MCNC: 20 MG/DL (ref 7–30)
CALCIUM SERPL-MCNC: 7.8 MG/DL (ref 8.5–10.1)
CHLORIDE SERPL-SCNC: 113 MMOL/L (ref 94–109)
CO2 SERPL-SCNC: 26 MMOL/L (ref 20–32)
CREAT SERPL-MCNC: 0.96 MG/DL (ref 0.66–1.25)
ERYTHROCYTE [DISTWIDTH] IN BLOOD BY AUTOMATED COUNT: 13.7 % (ref 10–15)
GFR SERPL CREATININE-BSD FRML MDRD: 74 ML/MIN/{1.73_M2}
GLUCOSE SERPL-MCNC: 95 MG/DL (ref 70–99)
HCT VFR BLD AUTO: 26 % (ref 40–53)
HGB BLD-MCNC: 8.3 G/DL (ref 13.3–17.7)
HGB BLD-MCNC: 8.5 G/DL (ref 13.3–17.7)
MCH RBC QN AUTO: 25.8 PG (ref 26.5–33)
MCHC RBC AUTO-ENTMCNC: 32.7 G/DL (ref 31.5–36.5)
MCV RBC AUTO: 79 FL (ref 78–100)
PLATELET # BLD AUTO: 267 10E9/L (ref 150–450)
POTASSIUM SERPL-SCNC: 3.7 MMOL/L (ref 3.4–5.3)
PROT SERPL-MCNC: 5.8 G/DL (ref 6.8–8.8)
RBC # BLD AUTO: 3.29 10E12/L (ref 4.4–5.9)
SODIUM SERPL-SCNC: 142 MMOL/L (ref 133–144)
UPPER GI ENDOSCOPY: NORMAL
WBC # BLD AUTO: 6.2 10E9/L (ref 4–11)

## 2019-09-20 PROCEDURE — 25800030 ZZH RX IP 258 OP 636: Performed by: NURSE ANESTHETIST, CERTIFIED REGISTERED

## 2019-09-20 PROCEDURE — 99232 SBSQ HOSP IP/OBS MODERATE 35: CPT | Performed by: INTERNAL MEDICINE

## 2019-09-20 PROCEDURE — C9113 INJ PANTOPRAZOLE SODIUM, VIA: HCPCS | Performed by: INTERNAL MEDICINE

## 2019-09-20 PROCEDURE — 25000125 ZZHC RX 250: Performed by: NURSE ANESTHETIST, CERTIFIED REGISTERED

## 2019-09-20 PROCEDURE — 40000275 ZZH STATISTIC RCP TIME EA 10 MIN

## 2019-09-20 PROCEDURE — 25000128 H RX IP 250 OP 636: Performed by: NURSE ANESTHETIST, CERTIFIED REGISTERED

## 2019-09-20 PROCEDURE — 25000125 ZZHC RX 250: Performed by: INTERNAL MEDICINE

## 2019-09-20 PROCEDURE — 25800030 ZZH RX IP 258 OP 636: Performed by: INTERNAL MEDICINE

## 2019-09-20 PROCEDURE — 40000893 ZZH STATISTIC PT IP EVAL DEFER

## 2019-09-20 PROCEDURE — 12000000 ZZH R&B MED SURG/OB

## 2019-09-20 PROCEDURE — 94640 AIRWAY INHALATION TREATMENT: CPT | Mod: 76

## 2019-09-20 PROCEDURE — 37000008 ZZH ANESTHESIA TECHNICAL FEE, 1ST 30 MIN: Performed by: INTERNAL MEDICINE

## 2019-09-20 PROCEDURE — 43235 EGD DIAGNOSTIC BRUSH WASH: CPT | Performed by: INTERNAL MEDICINE

## 2019-09-20 PROCEDURE — 0DJ08ZZ INSPECTION OF UPPER INTESTINAL TRACT, VIA NATURAL OR ARTIFICIAL OPENING ENDOSCOPIC: ICD-10-PCS | Performed by: INTERNAL MEDICINE

## 2019-09-20 PROCEDURE — 36415 COLL VENOUS BLD VENIPUNCTURE: CPT | Performed by: INTERNAL MEDICINE

## 2019-09-20 PROCEDURE — 85027 COMPLETE CBC AUTOMATED: CPT | Performed by: INTERNAL MEDICINE

## 2019-09-20 PROCEDURE — 25000128 H RX IP 250 OP 636: Performed by: INTERNAL MEDICINE

## 2019-09-20 PROCEDURE — 40000010 ZZH STATISTIC ANES STAT CODE-CRNA PER MINUTE: Performed by: INTERNAL MEDICINE

## 2019-09-20 PROCEDURE — 94640 AIRWAY INHALATION TREATMENT: CPT

## 2019-09-20 PROCEDURE — 80053 COMPREHEN METABOLIC PANEL: CPT | Performed by: INTERNAL MEDICINE

## 2019-09-20 PROCEDURE — 37000009 ZZH ANESTHESIA TECHNICAL FEE, EACH ADDTL 15 MIN: Performed by: INTERNAL MEDICINE

## 2019-09-20 PROCEDURE — 85018 HEMOGLOBIN: CPT | Performed by: INTERNAL MEDICINE

## 2019-09-20 PROCEDURE — 25000132 ZZH RX MED GY IP 250 OP 250 PS 637: Performed by: INTERNAL MEDICINE

## 2019-09-20 RX ORDER — LIDOCAINE HYDROCHLORIDE 20 MG/ML
INJECTION, SOLUTION INFILTRATION; PERINEURAL PRN
Status: DISCONTINUED | OUTPATIENT
Start: 2019-09-20 | End: 2019-09-20

## 2019-09-20 RX ORDER — ONDANSETRON 2 MG/ML
INJECTION INTRAMUSCULAR; INTRAVENOUS PRN
Status: DISCONTINUED | OUTPATIENT
Start: 2019-09-20 | End: 2019-09-20

## 2019-09-20 RX ORDER — SODIUM CHLORIDE 9 MG/ML
INJECTION, SOLUTION INTRAVENOUS CONTINUOUS PRN
Status: COMPLETED | OUTPATIENT
Start: 2019-09-20 | End: 2019-09-20

## 2019-09-20 RX ORDER — PROPOFOL 10 MG/ML
INJECTION, EMULSION INTRAVENOUS PRN
Status: DISCONTINUED | OUTPATIENT
Start: 2019-09-20 | End: 2019-09-20

## 2019-09-20 RX ADMIN — PANTOPRAZOLE SODIUM 40 MG: 40 INJECTION, POWDER, LYOPHILIZED, FOR SOLUTION INTRAVENOUS at 21:51

## 2019-09-20 RX ADMIN — HYDROCORTISONE 5 MG: 5 TABLET ORAL at 17:21

## 2019-09-20 RX ADMIN — PROPOFOL 30 MG: 10 INJECTION, EMULSION INTRAVENOUS at 14:39

## 2019-09-20 RX ADMIN — IPRATROPIUM BROMIDE AND ALBUTEROL SULFATE 3 ML: .5; 3 SOLUTION RESPIRATORY (INHALATION) at 11:19

## 2019-09-20 RX ADMIN — PHENYLEPHRINE HYDROCHLORIDE 100 MCG: 10 INJECTION INTRAVENOUS at 15:03

## 2019-09-20 RX ADMIN — Medication 1 MG: at 00:45

## 2019-09-20 RX ADMIN — DEXMEDETOMIDINE HYDROCHLORIDE 12 MCG: 100 INJECTION, SOLUTION INTRAVENOUS at 14:37

## 2019-09-20 RX ADMIN — PROPOFOL 40 MG: 10 INJECTION, EMULSION INTRAVENOUS at 14:49

## 2019-09-20 RX ADMIN — PROPOFOL 30 MG: 10 INJECTION, EMULSION INTRAVENOUS at 14:45

## 2019-09-20 RX ADMIN — PROPOFOL 30 MG: 10 INJECTION, EMULSION INTRAVENOUS at 14:42

## 2019-09-20 RX ADMIN — Medication 1 MG: at 23:23

## 2019-09-20 RX ADMIN — LIDOCAINE HYDROCHLORIDE 60 MG: 20 INJECTION, SOLUTION INFILTRATION; PERINEURAL at 14:48

## 2019-09-20 RX ADMIN — SODIUM CHLORIDE 8 MG/HR: 9 INJECTION, SOLUTION INTRAVENOUS at 05:51

## 2019-09-20 RX ADMIN — IPRATROPIUM BROMIDE AND ALBUTEROL SULFATE 3 ML: .5; 3 SOLUTION RESPIRATORY (INHALATION) at 19:55

## 2019-09-20 RX ADMIN — DEXMEDETOMIDINE HYDROCHLORIDE 8 MCG: 100 INJECTION, SOLUTION INTRAVENOUS at 14:48

## 2019-09-20 RX ADMIN — SODIUM CHLORIDE: 9 INJECTION, SOLUTION INTRAVENOUS at 06:44

## 2019-09-20 RX ADMIN — ACETAMINOPHEN 650 MG: 325 TABLET, FILM COATED ORAL at 23:23

## 2019-09-20 RX ADMIN — FLUTICASONE PROPIONATE 2 SPRAY: 50 SPRAY, METERED NASAL at 09:52

## 2019-09-20 RX ADMIN — ONDANSETRON 4 MG: 2 INJECTION INTRAMUSCULAR; INTRAVENOUS at 14:48

## 2019-09-20 ASSESSMENT — ACTIVITIES OF DAILY LIVING (ADL)
ADLS_ACUITY_SCORE: 15
ADLS_ACUITY_SCORE: 13
ADLS_ACUITY_SCORE: 15
ADLS_ACUITY_SCORE: 13
ADLS_ACUITY_SCORE: 15
ADLS_ACUITY_SCORE: 13

## 2019-09-20 ASSESSMENT — COPD QUESTIONNAIRES
CAT_SEVERITY: MODERATE
COPD: 1

## 2019-09-20 ASSESSMENT — MIFFLIN-ST. JEOR: SCORE: 1225.66

## 2019-09-20 ASSESSMENT — ENCOUNTER SYMPTOMS
DYSRHYTHMIAS: 0
SEIZURES: 0

## 2019-09-20 ASSESSMENT — LIFESTYLE VARIABLES: TOBACCO_USE: 1

## 2019-09-20 NOTE — ANESTHESIA POSTPROCEDURE EVALUATION
Patient: Travis Peres    Procedure(s):  ESOPHAGOGASTRODUODENOSCOPY (EGD)    Diagnosis:bleed  Diagnosis Additional Information: No value filed.    Anesthesia Type:  MAC    Note:  Anesthesia Post Evaluation    Patient location during evaluation: Bedside  Patient participation: Able to fully participate in evaluation  Level of consciousness: awake and alert  Pain management: adequate  Airway patency: patent  Cardiovascular status: acceptable  Respiratory status: acceptable  Hydration status: acceptable  PONV: none             Last vitals:  Vitals:    09/20/19 1525 09/20/19 1526 09/20/19 1551   BP: 104/59  109/55   Pulse:      Resp:  11 12   Temp:   36.1  C (97  F)   SpO2:  100% 97%         Electronically Signed By: Fawad dHz MD  September 20, 2019  5:07 PM

## 2019-09-20 NOTE — PLAN OF CARE
PT:  Discharge Planner PT   Patient plan for discharge: Return home  Current status: Orders received, chart reviewed, discussed with RN. Pt has been up with SBA with nursing and per nursing is safe to ambulate. Pt lives with his spouse and she will be present at home at discharge.  Barriers to return to prior living situation: None  Recommendations for discharge: Return home  Rationale for recommendations: Pt has no skilled IP PT needs at this time.       Entered by: Liudmila Leon 09/20/2019 12:08 PM

## 2019-09-20 NOTE — ANESTHESIA PREPROCEDURE EVALUATION
Anesthesia Pre-Procedure Evaluation    Patient: Travis Peres   MRN: [id number] : 1940          Preoperative Diagnosis: bleed    Procedure(s):  ESOPHAGOGASTRODUODENOSCOPY (EGD)    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)      Hypovitaminosis D      Multiple pulmonary nodules      Osteopenia      Panhypopituitarism (H)      Papillary thyroid carcinoma (H)     4.8 cm, right, node positive;      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     Post-surgical hypothyroidism      Uncomplicated asthma      Vocal cord paralysis     right     Xerostomia     due to radiation exposures     Past Surgical History:   Procedure Laterality Date     cymetra injection       ESOPHAGOSCOPY, GASTROSCOPY, DUODENOSCOPY (EGD), COMBINED  2013    Procedure: COMBINED ESOPHAGOSCOPY, GASTROSCOPY, DUODENOSCOPY (EGD), BIOPSY SINGLE OR MULTIPLE;  gastroscopy;  Surgeon: Leslie Guadarrama MD;  Location:  GI     HERNIA REPAIR Right      lipoma resection chest wall Right      PHACOEMULSIFICATION CLEAR CORNEA WITH STANDARD INTRAOCULAR LENS IMPLANT Left 2018    Procedure: PHACOEMULSIFICATION CLEAR CORNEA WITH STANDARD INTRAOCULAR LENS IMPLANT;  LEFT PHACOEMULSIFICATION CLEAR CORNEA WITH STANDARD INTRAOCULAR LENS IMPLANT ;  Surgeon: Nadiya Allen MD;  Location: [facility]     PHACOEMULSIFICATION CLEAR CORNEA WITH STANDARD INTRAOCULAR LENS IMPLANT Right 2018    Procedure: PHACOEMULSIFICATION CLEAR CORNEA WITH STANDARD INTRAOCULAR LENS IMPLANT;  RIGHT EYE PHACOEMULSIFICATION CLEAR CORNEA WITH STANDARD INTRAOCULAR LENS IMPLANT;  Surgeon: Nadiya Allen MD;  Location:  [facility]     right selective neck  dissection level 2, 3, 4  11/3/09     right  shunt placement  6/28/07     THORACIC SURGERY  Fidencio 3 2017     THYMECTOMY N/A 1/3/2017    Procedure: THYMECTOMY;  Surgeon: Ernesto Armstrong MD;  Location: [facility] OR     THYROIDECTOMY  11/27/07     TRANSCERVICAL EXTENDED MEDIASTINAL LYMPHADENECTOMY N/A 1/3/2017    Procedure: TRANSCERVICAL EXTENDED MEDIASTINAL LYMPHADENECTOMY;  Surgeon: Ernesto Armstrong MD;  Location: [facility] OR     transnasal endoscopic resection of pituitary adenoma  8/13/2007       Anesthesia Evaluation     .             ROS/MED HX    ENT/Pulmonary: Comment: Recent admit for acute bronchitis/COPD exacerbation 9/2/2019    (+)tobacco use, Past use moderate COPD, , . Other pulmonary disease Unilateral vocal cord paralysis.   (-) sleep apnea   Neurologic:      (-) seizures, CVA and TIA   Cardiovascular:     (+) Dyslipidemia, hypertension----. : . . . :. .      (-) CAD, CHF and arrhythmias   METS/Exercise Tolerance:     Hematologic: Comments: Acute anemia 2/2 GIB    (+) Anemia, -      Musculoskeletal:   (+) arthritis,  -       GI/Hepatic: Comment: GI bleed    (+) GERD       Renal/Genitourinary:     (+) BPH,       Endo:     (+) thyroid problem hypothyroidism, Chronic steroid usage for Other Date most recently used: 9/19/2019,Other Endocrine Disorder Panhypopituitarism.      Psychiatric:         Infectious Disease:         Malignancy:         Other:                          Physical Exam      Airway   Mallampati: II  TM distance: >3 FB  Neck ROM: full    Dental   (+) missing and chipped  Comment: Poor dentition    Cardiovascular   Rhythm and rate: regular      Pulmonary    breath sounds clear to auscultation            Lab Results   Component Value Date    WBC 6.2 09/20/2019    HGB 8.3 (L) 09/20/2019    HCT 26.0 (L) 09/20/2019     09/20/2019     09/20/2019    POTASSIUM 3.7 09/20/2019    CHLORIDE 113 (H) 09/20/2019    CO2 26 09/20/2019    BUN 20 09/20/2019    CR 0.96 09/20/2019    GLC 95  "09/20/2019    NIHARIKA 7.8 (L) 09/20/2019    PHOS 3.1 04/22/2019    MAG 2.6 (H) 01/06/2017    ALBUMIN 2.4 (L) 09/20/2019    PROTTOTAL 5.8 (L) 09/20/2019    ALT 99 (H) 09/20/2019    AST 43 09/20/2019    ALKPHOS 112 09/20/2019    BILITOTAL 0.4 09/20/2019    LIPASE 149 09/17/2019    PTT 32 12/19/2007    INR 1.51 (H) 01/05/2017    FIBR 398 01/05/2017    TSH <0.01 (L) 07/23/2019    T4 1.32 07/23/2019    T3 139 01/08/2008       Preop Vitals  BP Readings from Last 3 Encounters:   09/20/19 105/69   09/17/19 91/58   09/05/19 104/62    Pulse Readings from Last 3 Encounters:   09/20/19 81   09/17/19 82   09/05/19 98      Resp Readings from Last 3 Encounters:   09/20/19 16   09/02/19 15   08/07/19 18    SpO2 Readings from Last 3 Encounters:   09/20/19 98%   09/17/19 94%   09/05/19 91%      Temp Readings from Last 1 Encounters:   09/20/19 35.6  C (96.1  F) (Oral)    Ht Readings from Last 1 Encounters:   09/20/19 1.651 m (5' 5\")      Wt Readings from Last 1 Encounters:   09/20/19 58.4 kg (128 lb 11.2 oz)    Estimated body mass index is 21.42 kg/m  as calculated from the following:    Height as of this encounter: 1.651 m (5' 5\").    Weight as of this encounter: 58.4 kg (128 lb 11.2 oz).       Anesthesia Plan      History & Physical Review  History and physical reviewed and following examination; no interval change.    ASA Status:  4 .        Plan for MAC   PONV prophylaxis:  Ondansetron (or other 5HT-3)  Light MAC with dexmedetomidine, supplemental propofol as needed    Methylprednisolone 125 mg prior to procedure      Postoperative Care      Consents  Anesthetic plan, risks, benefits and alternatives discussed with:  Patient..                 Fawad Hdz MD  "

## 2019-09-20 NOTE — PLAN OF CARE
A/o, Cantonese speaking. VSS. C/o upper abdominal pain, declines intervention. NPO since midnight for EGD. Plan to hold AM meds until after EGD d/t NPO status. Up with SBA to bathroom. Hemoglobin recheck at noon. Large yellow/brown stool today. Discharge pending.

## 2019-09-20 NOTE — ANESTHESIA CARE TRANSFER NOTE
Patient: Travis Peres    Procedure(s):  ESOPHAGOGASTRODUODENOSCOPY (EGD)    Diagnosis: bleed  Diagnosis Additional Information: No value filed.    Anesthesia Type:   MAC     Note:  Airway :Nasal Cannula  Patient transferred to:PACU  Comments: To recovery, VSSHandoff Report: Identifed the Patient, Identified the Reponsible Provider, Reviewed the pertinent medical history, Discussed the surgical course, Reviewed Intra-OP anesthesia mangement and issues during anesthesia, Set expectations for post-procedure period and Allowed opportunity for questions and acknowledgement of understanding      Vitals: (Last set prior to Anesthesia Care Transfer)    CRNA VITALS  9/20/2019 1427 - 9/20/2019 1506      9/20/2019             Resp Rate (set):  10                Electronically Signed By: GEORGIA Poole CRNA  September 20, 2019  3:06 PM

## 2019-09-20 NOTE — PLAN OF CARE
A/o, Cantonese speaking. VSS. C/o upper abdominal pain intermittently, declines intervention.EGD showed no signs of bleeding. Tolerating clear liquid diet. Up with SBA to bathroom. Hemoglobin recheck at noon was 8.3. Large yellow/brown stool today. Discharge pending.

## 2019-09-20 NOTE — PROVIDER NOTIFICATION
Dr. Arthur garcia, pt is NPO, wondering if it's ok to give AM meds. Awaiting call back. Hospitalist aware that we're waiting to hear back from GI on AM meds.     Order to hold AM meds until after EGD.

## 2019-09-20 NOTE — PROGRESS NOTES
[facility]    Hospitalist Progress Note    Assessment & Plan   Travis Peres is a 78 year old non-english speaking male with PMHx of hypertension, hyperlipidemia, hx of papillary thyroid carcinoma with metastases, post-surgical hypothyroidism, hypopituitarism and hx of pituitary macroadenoma with who was admitted under observation on 9/17/2019 for evaluation of abdominal pain, elevated LFTs and cholelithiasis on US with concern for acute cholecystitis. Subsequent workup this stay was neg for cholecystitis. Continued to endorse vague complaints of abdominal/chest discomfort. Developed dark/bloody stools on 9/19 AM with drop in hgb and increased BUN with clinical picture of GIB.      Acute blood loss anemia dt suspected GIB:  Noted by RN to pass a dark tarry appearing stool on 9/19 AM with drop in hgb from 11.1 -- 9.7. Continued to have dark/blood BMs. [facility] consulted. Placed on Protonix gtt.   -- hgbs cont trending down: 9.7 yesterday AM -- 8.5 this AM, per RN had 1 dark BM overnight  -- GI following, plan is for EGD this afternoon  -- cont PPI gtt for now  -- repeat hgb at 1200  -- consented for blood and placed conditional order to transfuse 1U PRBC if hgb < 7 (though if actively bleeding may need more than 1 unit)  -- remains hemodynamically stable at present, but if decompensates will need to place on stress dosed steroids given below    Cholelithiasis  Suspected passed stone  Presented with 5d hx of abdominal pain, poor appetite and nausea. Has hx of gallstones on prior imaging. In ED, was afebrile and hemodynamically stable. WBC nl. Lactate nl. LFTs elevated (AST, ALT, alk phos). RUQ US obtained and showed cholelithiasis and gallbladder wall thickening. Started on ceftriaxone and general surgery consulted. Clinical picture consistent with acute cholecystitis and recommended lap ld. HIDA scan obtained and subsequently showed no evidence of acute or chronic cholecystitis.      Discussed  recommendations for stress-dosed steroids in setting of possible surgery with patient's primary endocrinologist (Dr. Potts), who expressed concerns for his ability to tolerate surgery given overall frailty and intolerance to minor procedures as well as aspiration risk. Further discussed with Dr. Rico -- in light of these concerns and normal HIDA scan, decided against pursing surgery. May eventually need surgical removal of gallbladder vs cholecystostomy tube placement but given his condition remained stable, felt it was best to avoid procedures for now. Discussed with GI (BERYL Perales with [facility]) -- no real benefit to addition of ursodiol at this time.     Given trend in LFTs this stay, wonder whether he may have passed a stone (though no specific mention of duct dilation on US).     Other etiologies of abd discomfort explored -- Lipase nl. Serial trops neg. Echo showed intact EF, no WMAs and no valve disease.     -- resume low fat diet after EGD     COPD  Chronic and stable. No s/sx of exacerbation  Conts on home inhalers.     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 Potts (endocrine), last visit on 7/23/19. Note reviewed. Has long suspected underlying lung mets. Thyroglobulin levels indicative of persistent/progressive disease but hasn't been biopsy proven. Recommended that TSH be kept low to avoid stimulating thyroid cancer. Advised to treat to nl to high nl FT4. Cervical adenopathy treated in the past but overtime nodes noted to be progressing/enlarging. Declined surgery in 7/2018. Noted to have a pituitary macroadenoma resulting in hydrocephalus, prompting debulking of mass and treatment with XRT. Also had  shunt  placement.   -- conts on PTA steroids (hydrocortisone 10mg in AM and 5mg at 1400) -- if he decompensates dt above, will need stress dosed steroids (hydrocortisone 100mg IV q8h)  -- cont levothyroxine 137mcg daily    Possible Underlying Cognitive Impairment:  Some concerns as to whether patient may have some underlying cognitive impairment vs difficulties with communication dt language barrier and [place].   -- had wife sign consent for blood  -- follow up with PCP    FEN: IVFs with NS @75ml/h, lytes stable, NPO for EGD later today  DVT Prophylaxis: PCDs  Code Status: Full Code     Disposition: To have EGD this afternoon. Discharge pending findings and stable hgb. Likely home tomorrow.     Will update wife when she visits later this morning    Sena Kurtz    Interval History   Had 1 dark BM overnight, none since. Seen this morning with  (wife not here yet). Patient upset that he didn't sleep last night dt IVs and lab draws resulting in frequent interruption. Perseverates on this during my visit. Endorses intermittent abd discomfort mostly over his epigastrium. No cp/sob/cough, n/v. Wondering when he will be able to eat.    -Data reviewed today: I reviewed all new labs and imaging results over the last 24 hours. I personally reviewed no images or EKG's today.    Physical Exam   Temp: 96.1  F (35.6  C) Temp src: Oral BP: 113/59 Pulse: 70 Heart Rate: 85 Resp: 20 SpO2: 96 % O2 Device: None (Room air)    There were no vitals filed for this visit.  Vital Signs with Ranges  Temp:  [96.1  F (35.6  C)-98.5  F (36.9  C)] 96.1  F (35.6  C)  Pulse:  [70] 70  Heart Rate:  [71-88] 85  Resp:  [16-20] 20  BP: (106-122)/(54-72) 113/59  SpO2:  [94 %-96 %] 96 %  I/O last 3 completed shifts:  In: 1155 [P.O.:980; I.V.:175]  Out: -      Constitutional: Resting comfortably, alert, NAD  Respiratory: CTAB, no wheeze/rales/rhonchi, no increased work of breathing  Cardiovascular: HRRR, no MGR, no LE edema  GI: S, ND, not  particularly TTP, +BS  Skin/Integumen: warm/dry  Other:      Medications     pantoprazole (PROTONIX) infusion ADULT/PEDS GREATER than or EQUAL to 45 kg 8 mg/hr (09/20/19 0551)     sodium chloride 75 mL/hr at 09/20/19 0644       amLODIPine  2.5 mg Oral Daily     fluticasone  2 spray Both Nostrils Daily     hydrocortisone  10 mg Oral QAM     hydrocortisone  5 mg Oral Daily at 4 pm     ipratropium - albuterol 0.5 mg/2.5 mg/3 mL  3 mL Nebulization 4x daily     levothyroxine  137 mcg Oral Daily     sodium chloride (PF)  3 mL Intracatheter Q8H     tamsulosin  0.4 mg Oral Daily       Data   Recent Labs   Lab 09/20/19  0647 09/19/19  2204 09/19/19  1623 09/19/19  1029 09/19/19  0736 09/18/19  0945 09/18/19  0656 09/18/19  0205 09/17/19  1738   WBC 6.2  --   --  5.2  --   --  3.6*  --  3.9*   HGB 8.5* 8.8* 9.1* 9.7*  --   --  11.1*  --  12.6*   MCV 79  --   --  79  --   --  78  --  78     --   --  315  --   --  293  --  318     --   --   --  143  --  143  --  137   POTASSIUM 3.7  --   --   --  3.6  --  3.8  --  4.5   CHLORIDE 113*  --   --   --  113*  --  112*  --  106   CO2 26  --   --   --  26  --  26  --  28   BUN 20  --   --   --  27  --  13  --  18   CR 0.96  --   --   --  0.82  --  0.92  --  1.23   ANIONGAP 3  --   --   --  4  --  5  --  3   NIHARIKA 7.8*  --   --   --  7.8*  --  7.4*  --  8.2*   GLC 95  --   --   --  94  --  115*  --  136*   ALBUMIN 2.4*  --   --   --  2.5*  --  2.7*  --  3.2*   PROTTOTAL 5.8*  --   --   --  6.3*  --  6.3*  --  7.5   BILITOTAL 0.4  --   --   --  0.4  --  1.0  --  1.8*   ALKPHOS 112  --   --   --  143  --  172*  --  161*   ALT 99*  --   --   --  160*  --  280*  --  234*   AST 43  --   --   --  102*  --  386*  --  460*   LIPASE  --   --   --   --   --   --   --   --  149   TROPI  --   --   --   --   --  <0.015 <0.015 <0.015 <0.015       No results found for this or any previous visit (from the past 24 hour(s)).

## 2019-09-20 NOTE — PLAN OF CARE
A&Ox3, disoriented to situation at times. VSS on RA. C/o of mild sternal pain prn tylenol given-effective. NPO since midnight. 1 loose tarry black stool on this shift. SBA. Plan: endoscopy at 1 pm.

## 2019-09-21 VITALS
DIASTOLIC BLOOD PRESSURE: 54 MMHG | BODY MASS INDEX: 21.44 KG/M2 | SYSTOLIC BLOOD PRESSURE: 127 MMHG | HEIGHT: 65 IN | TEMPERATURE: 96.5 F | HEART RATE: 70 BPM | WEIGHT: 128.7 LBS | RESPIRATION RATE: 16 BRPM | OXYGEN SATURATION: 94 %

## 2019-09-21 LAB
ANION GAP SERPL CALCULATED.3IONS-SCNC: 3 MMOL/L (ref 3–14)
BUN SERPL-MCNC: 19 MG/DL (ref 7–30)
CALCIUM SERPL-MCNC: 7.9 MG/DL (ref 8.5–10.1)
CHLORIDE SERPL-SCNC: 113 MMOL/L (ref 94–109)
CO2 SERPL-SCNC: 25 MMOL/L (ref 20–32)
CREAT SERPL-MCNC: 0.92 MG/DL (ref 0.66–1.25)
ERYTHROCYTE [DISTWIDTH] IN BLOOD BY AUTOMATED COUNT: 14 % (ref 10–15)
GFR SERPL CREATININE-BSD FRML MDRD: 78 ML/MIN/{1.73_M2}
GLUCOSE SERPL-MCNC: 135 MG/DL (ref 70–99)
HCT VFR BLD AUTO: 24.5 % (ref 40–53)
HGB BLD-MCNC: 8.2 G/DL (ref 13.3–17.7)
MCH RBC QN AUTO: 26.5 PG (ref 26.5–33)
MCHC RBC AUTO-ENTMCNC: 33.5 G/DL (ref 31.5–36.5)
MCV RBC AUTO: 79 FL (ref 78–100)
PLATELET # BLD AUTO: 267 10E9/L (ref 150–450)
POTASSIUM SERPL-SCNC: 4.2 MMOL/L (ref 3.4–5.3)
RBC # BLD AUTO: 3.09 10E12/L (ref 4.4–5.9)
SODIUM SERPL-SCNC: 141 MMOL/L (ref 133–144)
WBC # BLD AUTO: 6.8 10E9/L (ref 4–11)

## 2019-09-21 PROCEDURE — 25000128 H RX IP 250 OP 636: Performed by: INTERNAL MEDICINE

## 2019-09-21 PROCEDURE — 99239 HOSP IP/OBS DSCHRG MGMT >30: CPT | Performed by: INTERNAL MEDICINE

## 2019-09-21 PROCEDURE — 25000132 ZZH RX MED GY IP 250 OP 250 PS 637: Performed by: INTERNAL MEDICINE

## 2019-09-21 PROCEDURE — 25000125 ZZHC RX 250: Performed by: INTERNAL MEDICINE

## 2019-09-21 PROCEDURE — C9113 INJ PANTOPRAZOLE SODIUM, VIA: HCPCS | Performed by: INTERNAL MEDICINE

## 2019-09-21 PROCEDURE — 80048 BASIC METABOLIC PNL TOTAL CA: CPT | Performed by: INTERNAL MEDICINE

## 2019-09-21 PROCEDURE — 94640 AIRWAY INHALATION TREATMENT: CPT

## 2019-09-21 PROCEDURE — 85027 COMPLETE CBC AUTOMATED: CPT | Performed by: INTERNAL MEDICINE

## 2019-09-21 PROCEDURE — 36415 COLL VENOUS BLD VENIPUNCTURE: CPT | Performed by: INTERNAL MEDICINE

## 2019-09-21 PROCEDURE — 40000275 ZZH STATISTIC RCP TIME EA 10 MIN

## 2019-09-21 RX ORDER — PANTOPRAZOLE SODIUM 40 MG/1
40 TABLET, DELAYED RELEASE ORAL
Qty: 60 TABLET | Refills: 0 | Status: SHIPPED | OUTPATIENT
Start: 2019-09-21 | End: 2019-10-21

## 2019-09-21 RX ORDER — PANTOPRAZOLE SODIUM 40 MG/1
40 TABLET, DELAYED RELEASE ORAL
Status: DISCONTINUED | OUTPATIENT
Start: 2019-09-21 | End: 2019-09-21 | Stop reason: HOSPADM

## 2019-09-21 RX ORDER — TRAZODONE HYDROCHLORIDE 50 MG/1
50 TABLET, FILM COATED ORAL AT BEDTIME
Status: DISCONTINUED | OUTPATIENT
Start: 2019-09-21 | End: 2019-09-21 | Stop reason: HOSPADM

## 2019-09-21 RX ORDER — PANTOPRAZOLE SODIUM 40 MG/1
40 TABLET, DELAYED RELEASE ORAL
Status: DISCONTINUED | OUTPATIENT
Start: 2019-09-21 | End: 2019-09-21

## 2019-09-21 RX ORDER — MIRTAZAPINE 15 MG/1
15 TABLET, FILM COATED ORAL AT BEDTIME
Status: DISCONTINUED | OUTPATIENT
Start: 2019-09-21 | End: 2019-09-21 | Stop reason: HOSPADM

## 2019-09-21 RX ORDER — IPRATROPIUM BROMIDE AND ALBUTEROL SULFATE 2.5; .5 MG/3ML; MG/3ML
3 SOLUTION RESPIRATORY (INHALATION) EVERY 4 HOURS PRN
Status: DISCONTINUED | OUTPATIENT
Start: 2019-09-21 | End: 2019-09-21 | Stop reason: HOSPADM

## 2019-09-21 RX ADMIN — HYDROCORTISONE 10 MG: 10 TABLET ORAL at 11:04

## 2019-09-21 RX ADMIN — PANTOPRAZOLE SODIUM 40 MG: 40 TABLET, DELAYED RELEASE ORAL at 12:20

## 2019-09-21 RX ADMIN — TRAZODONE HYDROCHLORIDE 50 MG: 50 TABLET ORAL at 01:37

## 2019-09-21 RX ADMIN — LEVOTHYROXINE SODIUM 137 MCG: 112 TABLET ORAL at 11:04

## 2019-09-21 RX ADMIN — AMLODIPINE BESYLATE 2.5 MG: 2.5 TABLET ORAL at 11:04

## 2019-09-21 RX ADMIN — FLUTICASONE PROPIONATE 2 SPRAY: 50 SPRAY, METERED NASAL at 11:16

## 2019-09-21 RX ADMIN — PANTOPRAZOLE SODIUM 40 MG: 40 TABLET, DELAYED RELEASE ORAL at 16:31

## 2019-09-21 RX ADMIN — HYDROCORTISONE 5 MG: 5 TABLET ORAL at 16:31

## 2019-09-21 RX ADMIN — IPRATROPIUM BROMIDE AND ALBUTEROL SULFATE 3 ML: .5; 3 SOLUTION RESPIRATORY (INHALATION) at 07:48

## 2019-09-21 RX ADMIN — TAMSULOSIN HYDROCHLORIDE 0.4 MG: 0.4 CAPSULE ORAL at 11:03

## 2019-09-21 RX ADMIN — MIRTAZAPINE 15 MG: 15 TABLET, FILM COATED ORAL at 01:37

## 2019-09-21 ASSESSMENT — ACTIVITIES OF DAILY LIVING (ADL)
ADLS_ACUITY_SCORE: 16
ADLS_ACUITY_SCORE: 15

## 2019-09-21 NOTE — PROGRESS NOTES
Xcoverage: paged by RN because pt is asking for something to help him to sleep; noted that his PTA Remeron and Trazodone were not reordered on admission because of npo status; diet resumed now; will reorder PTA doses of Remeron and Trazodone qhs.    Britt Alicia MD

## 2019-09-21 NOTE — PROVIDER NOTIFICATION
MD Notification    Notified Person: MD    Notified Person Name: Dr Alicia    Notification Date/Time: 9/21 0120    Notification Interaction: Talked on telephone    Purpose of Notification: Pt requesting something stronger than melatonin for sleep.     Orders Received: Reordered pt's PTA Remeron and trazadone    Comments:

## 2019-09-21 NOTE — PROGRESS NOTES
[facility]  Gastroenterology Chart Check    Delvin Ryan MD    Patient Name: Travis Peres MRN# [id number]   YOB: 1940 Age: 78 year old      Date of Admission:  9/17/2019  Today's Date: 09/21/2019        Interval History:        Chart reviewed, endoscopy reviewed.  No signs of ongoing bleeding reported.         Assessment and Plan:        Impression:    - Postop day #1 after EGD.  Findings include nonbleeding esophageal ulcer and possible recent bleeding along the duodenal sweep with no active bleeding or bleeding site that could be located.  Hemodynamics remained stable, no reported ongoing blood loss, hemoglobin stable and BUN/creatinine ratio dropping consistent with cessation of bleeding.  - Multiple medical problems as outlined in consult and H&P under the care of other treating physicians    Recommendations:    - Continue twice daily PPI therapy.  May transition at this point to 40 mg of pantoprazole twice daily for 1 month then 40 mg daily likely long-term  - Continue to follow clinical markers of bleeding including BUN/creatinine ratio, hemoglobin and stool output  - If patient continues to manifest acute GI bleeding with hypotension, angiography would likely be the next appropriate step  - If there is continued slow drift in hemoglobin and melena and perhaps EGD with side-viewing scope/duodena scope early next week might be indicated  - Diet as tolerated  - We will follow while hospitalized  - Incoming inpatient [facility] team will follow-up on Monday.  In the interim please call with any questions.         Medications:          amLODIPine  2.5 mg Oral Daily     fluticasone  2 spray Both Nostrils Daily     hydrocortisone  10 mg Oral QAM     hydrocortisone  5 mg Oral Daily at 4 pm     levothyroxine  137 mcg Oral Daily     mirtazapine  15 mg Oral At Bedtime     pantoprazole (PROTONIX) IV  40 mg Intravenous BID     sodium chloride (PF)  3 mL  Intracatheter Q8H     tamsulosin  0.4 mg Oral Daily     traZODone  50 mg Oral At Bedtime     PRN Meds: acetaminophen, albuterol, bisacodyl, HYDROcodone-acetaminophen, HYDROmorphone, ipratropium - albuterol 0.5 mg/2.5 mg/3 mL, melatonin, naloxone, ondansetron **OR** ondansetron, sodium chloride (PF)         Data:      All new lab and imaging data was reviewed.    EGD ([facility] 9/20/19)  Impression:       - Non-bleeding esophageal ulcer.                             - Recent bleeding along the duodenal sweep, but no                             active bleeding could be located.   Recommendation:               - Continue PPI therapy, okay to transition to po                             BID dosing tomorrow AM.                            - Follow stool output.                            - If there is an acute drop in Hgb and large                             melenic stool, angiography would be appropriate.                            - If there is continued melena and minimal Hgb                             drop, perhaps repeat EGD with duodenoscope would be                             appropriate.                            - Okay to resume diet.   .  Recent Labs   Lab Test 09/21/19  0749 09/20/19  1154 09/20/19  0647  09/19/19  1029  01/05/17  0018  06/03/14  0729   WBC 6.8  --  6.2  --  5.2   < > 20.1*   < >  --    HGB 8.2* 8.3* 8.5*   < > 9.7*   < > 11.4*   < >  --    MCV 79  --  79  --  79   < > 82   < >  --      --  267  --  315   < > 119*   < >  --    INR  --   --   --   --   --   --  1.51*  --  <0.9    < > = values in this interval not displayed.     Recent Labs   Lab Test 09/21/19  0749 09/20/19  0647 09/19/19  0736    142 143   POTASSIUM 4.2 3.7 3.6   CHLORIDE 113* 113* 113*   CO2 25 26 26   BUN 19 20 27   CR 0.92 0.96 0.82   ANIONGAP 3 3 4     Recent Labs   Lab Test 09/20/19  0647 09/19/19  0736 09/18/19  0656 09/17/19  1738 08/31/19  2007  05/24/18  1640 01/05/17  0348  10/04/16  0522   ALBUMIN 2.4*  2.5* 2.7* 3.2* 2.9*   < >  --   --    < > 3.4   BILITOTAL 0.4 0.4 1.0 1.8* 0.3  --   --   --    < > 0.9   ALT 99* 160* 280* 234* 25  --   --   --    < > 25   AST 43 102* 386* 460* 21  --   --   --    < > 15   ALKPHOS 112 143 172* 161* 59  --   --   --    < > 63   PROTEIN  --   --   --   --  Negative  --  Negative 10*   < >  --    LIPASE  --   --   --  149  --   --   --   --   --  211    < > = values in this interval not displayed.       Delvin Ryan MD, FACG  [facility]  [phone]

## 2019-09-21 NOTE — DISCHARGE SUMMARY
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 Subsequent workup this stay was neg for cholecystitis. Continued to endorse vague complaints of abdominal/chest discomfort. Developed dark/bloody stools on 9/19 AM with drop in hgb and increased BUN with clinical picture of GIB.    Hospital Course   Travis Peres was admitted on 9/17/2019.  The following problems were addressed during his hospitalization:    Acute blood loss anemia dt upper GIB:  Noted by RN to pass a dark tarry appearing stool on 9/19 AM with drop in hgb from 11.1 -- 9.7. Continued to have dark/blood BMs. [facility] consulted. Placed on Protonix gtt. Underwent evaluation with EGD on 9/20. Found to have a nonbleeding esophageal ulcer at GE junction (not felt to be source of bleed) and red blood in duodenum indicating recent bleed but no active bleeding identified -- suspect etiology of bleed was a Dieulafoy lesion in the duodenum. Bleeding appeared self limited. No interventions done during EGD. Remained  hemodynamically stable throughout stay. Hgb remained low but stable at mid 8 (8.5 - 8.2 in the 24hrs prior to discharge). Did not require transfusion during stay. Melanotic stools improved. Per GI, if rebleeds, may benefit from angiography and arterial emobolization vs repeat EGD with side scope (see GI note). Placed on Protonix 40mg po BID at discharge (cont BID dosing x4 wks, then decrease to daily dosing indefinitely).  Tolerated oral intake throughout hospital stay.    Cholelithiasis, without cholecytsitis  Suspected passed stone  Presented with 5d hx of abdominal pain, poor appetite and nausea. Has hx of gallstones on prior imaging. In ED, was afebrile and hemodynamically stable. WBC nl. Lactate nl. LFTs elevated (AST, ALT, alk phos). RUQ US obtained and showed cholelithiasis and gallbladder wall thickening. Started on ceftriaxone and general surgery consulted. Clinical picture consistent with acute cholecystitis and recommended lap ld. HIDA scan obtained and subsequently showed no evidence of acute or chronic cholecystitis.      Discussed recommendations for stress-dosed steroids in setting of possible surgery with patient's primary endocrinologist (Dr. Potts), who expressed concerns for his ability to tolerate surgery given overall frailty and intolerance to minor procedures as well as aspiration risk. Further discussed with Dr. Rico (general surgeon) -- in light of these concerns and normal HIDA scan, decided against pursing surgery. May eventually need surgical removal of gallbladder vs cholecystostomy tube placement in the future, but given his condition remained stable felt it was best to avoid procedures for now. Discussed with GI -- no real benefit to addition of ursodiol at this time. Tolerated low fat diet this stay.      Given trend in LFTs this stay, wonder whether he may have passed a stone (though no specific mention of duct dilation on US).      Other etiologies of abd discomfort  explored -- Lipase nl. Serial trops neg. Echo showed intact EF, no WMAs and no valve disease.       COPD  Chronic and stable. No s/sx of exacerbation  Conts on home inhalers.      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 Potts (endocrine), last visit on 7/23/19. Note reviewed. Has long suspected underlying lung mets. Thyroglobulin levels indicative of persistent/progressive disease but hasn't been biopsy proven. Recommended that TSH be kept low to avoid stimulating thyroid cancer. Advised to treat to nl to high nl FT4. Cervical adenopathy treated in the past but overtime nodes noted to be progressing/enlarging. Declined surgery in 7/2018. Noted to have a pituitary macroadenoma resulting in hydrocephalus, prompting debulking of mass and treatment with XRT. Also had  shunt placement.     Remained stable on PTA steroid regimen  (hydrocortisone 10mg in AM and 5mg at 1400) -- did not require stress dosed steroids this stay.   Continued on levothyroxine 137mcg daily.  Follow up with Dr. Potts this fall as scheduled.      Possible Underlying Cognitive Impairment:  Some concerns as to whether patient may have some underlying cognitive impairment vs difficulties with communication dt language barrier and [place]. Wife signed consents this stay. Follow up with PCP.     Sena Kurtz, DO    Significant Results and Procedures   9/20/19 EGD:  Findings:        One superficial esophageal ulcer with no bleeding was found at the GEJ.        The stomach was normal.        Red blood was found at the junction of the duodenal bulb and D2. Blood        was suctioned in the second portion of the duodenum. The ampulla was        identified,  without active bleeding. The next 10mins were spent        irrigating and suctioning the duodenal sweep and D2 in attempts to        locate a source of bleeding, but no additional bleeding or mucosal        defect was seen.                                                                          Impression:                 - Non-bleeding esophageal ulcer.   - Recent bleeding along the duodenal sweep, but no active bleeding could be located.     Recommendation:             - Continue PPI therapy, okay to transition to po BID dosing                       - Follow stool output.   - If there is an acute drop in Hgb and large melenic stool, angiography would be appropriate.   - If there is continued melena and minimal Hgb drop, perhaps repeat EGD with duodenoscope would be appropriate.     Code Status   Full Code       Primary Care Physician   Karol Alvarez    Physical Exam   Temp: 96.5  F (35.8  C) Temp src: Oral BP: 127/54 Pulse: 70 Heart Rate: 88 Resp: 16 SpO2: 94 % O2 Device: None (Room air)    Vitals:    09/20/19 1403   Weight: 58.4 kg (128 lb 11.2 oz)     Vital Signs with Ranges  Temp:  [96.5  F (35.8  C)-97.5  F (36.4  C)] 96.5  F (35.8  C)  Pulse:  [64-81] 70  Heart Rate:  [64-89] 88  Resp:  [11-18] 16  BP: ()/(49-71) 127/54  SpO2:  [94 %-100 %] 94 %  I/O last 3 completed shifts:  In: 520 [P.O.:420; I.V.:100]  Out: -     General: Resting comfortably, alert, conversive, NAD  CVS: HRRR, no MGR, no LE edema  Respiratory: CTAB, no wheeze/rales/rhonchi, no increased work of breathing  GI: S, NT, ND, +BS  Skin: Warm/dry    Discharge Disposition   Discharged to home  Condition at discharge: Stable    Consultations This Hospital Stay   SURGERY GENERAL IP CONSULT  GASTROENTEROLOGY IP CONSULT    Time Spent on this Encounter   Sena PEREZ DO, personally saw the patient today and spent greater than 30 minutes discharging this patient.    Discharge Orders      Activity    Your activity upon  discharge: activity as tolerated     Reason for your hospital stay    Evaluation of your abdominal discomfort, which was initially thought to be due problems with your gallbladder. It was later determine that your pain was due to bleeding in your small intestine. You underwent a procedure called and EGD for further evaluation, and it was suspected that bleeding had occurred from part of your small intestine, though there was no active bleeding at the time of your EGD. You were started on a medication called Protonix, to help protect your stomach.     Follow-up and recommended labs and tests     1. Please Follow-[facility] with your Primary Care Provider[facility]  [facility]  [phone]  September 26, 2019    2. Follow up with Dr. Potts in clinic this fall as advised.   11:35 AM     Discharge Instructions    If you pass stools that have blood or look black and sticky like tar, you should return to the emergency room for evaluation of possible recurrent bleeding from your small intestine.     Diet    Follow this diet upon discharge: Low fat     Discharge Medications   Current Discharge Medication List      START taking these medications    Details   pantoprazole (PROTONIX) 40 MG EC tablet Take 1 tablet (40 mg) by mouth 2 times daily (before meals)  Qty: 60 tablet, Refills: 0    Associated Diagnoses: Upper GI bleed         CONTINUE these medications which have NOT CHANGED    Details   albuterol (PROAIR HFA) 108 (90 Base) MCG/ACT inhaler Inhale 2 puffs into the lungs every 4 hours as needed for shortness of breath / dyspnea or wheezing  Qty: 3 Inhaler, Refills: 11    Associated Diagnoses: Chronic obstructive pulmonary disease, unspecified COPD type (H)      alendronate (FOSAMAX) 70 MG tablet Take 1 tablet (70 mg) by mouth every 7 days  Qty: 16 tablet, Refills: 3    Associated Diagnoses: Postsurgical hypothyroidism; Papillary thyroid carcinoma (H); Hypopituitarism (H)      amLODIPine (NORVASC)  2.5 MG tablet Take 2.5 mg by mouth daily      B Complex Vitamins (VITAMIN B COMPLEX PO) Take 1 capsule by mouth daily       benzonatate (TESSALON) 100 MG capsule Take 1 capsule (100 mg) by mouth 3 times daily as needed for cough  Qty: 30 capsule, Refills: 0    Associated Diagnoses: Cough productive of clear sputum      diclofenac (VOLTAREN) 1 % GEL topical gel Place onto the skin 4 times daily as needed       fish oil-omega-3 fatty acids 1000 MG capsule Take 2 g by mouth daily      fluticasone (FLONASE) 50 MCG/ACT spray Spray 2 sprays into both nostrils daily      GuaiFENesin (MUCUS RELIEF ADULT PO) Take 400 mg by mouth 2 times daily as needed     Associated Diagnoses: Papillary thyroid carcinoma (H); Postsurgical hypothyroidism; Malignant neoplasm of thyroid gland (H); Cancer of thyroid (H); 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); Osteopenia; Hypopituitarism (H); Pituitary adenoma (H); Noncompliance with medication regimen      guaiFENesin-dextromethorphan (ROBITUSSIN DM) 100-10 MG/5ML syrup Take 10 mLs by mouth 3 times daily as needed for cough  Qty: 236 mL, Refills: 0    Associated Diagnoses: Acute and chronic respiratory failure with hypoxia (H)      hydrocortisone (CORTEF) 10 MG tablet 10 mg AM and 5 mg afternoon  Qty: 135 tablet, Refills: 3    Associated Diagnoses: Papillary thyroid carcinoma (H); Postsurgical hypothyroidism; Pulmonary nodules; Pituitary macroadenoma (H); Low bone density      levothyroxine (SYNTHROID/LEVOTHROID) 137 MCG tablet Take 1 tablet (137 mcg) by mouth daily  Qty: 90 tablet, Refills: 3    Associated Diagnoses: Postsurgical hypothyroidism; Papillary thyroid carcinoma (H); Abnormal finding on imaging      mirtazapine (REMERON) 15 MG tablet Take 15 mg by mouth At Bedtime       RIBOFLAVIN PO Take 100 mg by mouth      tamsulosin (FLOMAX) 0.4 MG capsule Take 0.4 mg by mouth daily      traZODone (DESYREL) 100 MG tablet take 1 tab of 50 mg daily  Refills: 0      triamcinolone (KENALOG) 0.1 %  paste Apply small amount to affected area in mouth twice daily.  Refills: 0      vitamin B complex with vitamin C (VITAMIN  B COMPLEX) TABS tablet Take 1 tablet by mouth daily      Ipratropium-Albuterol (COMBIVENT RESPIMAT)  MCG/ACT inhaler Inhale 1 puff into the lungs 4 times daily      loperamide (IMODIUM) 2 MG capsule Refills: 1      order for DME Equipment being ordered: Nebulizer with tubing  Qty: 1 Device, Refills: 0    Associated Diagnoses: COPD exacerbation (H)      umeclidinium-vilanterol (ANORO ELLIPTA) 62.5-25 MCG/INH oral inhaler Inhale 1 puff into the lungs daily      zolpidem (AMBIEN) 10 MG tablet Take 10 mg by mouth daily          STOP taking these medications       ibuprofen (ADVIL/MOTRIN) 400 MG tablet Comments:   Reason for Stopping:         ranitidine (ZANTAC) 300 MG tablet Comments:   Reason for Stopping:             Allergies   Allergies   Allergen Reactions     Metoprolol Shortness Of Breath, Other (See Comments) and Unknown     Not true allergy.  Bradycardia, fatigue, COPD worsening.         Fenofibrate Hives     Fenofibric Acid      Lisinopril Cough     Losartan Cough     Niacin      Simvastatin Cramps and Other (See Comments)     Data   Most Recent 3 CBC's:  Recent Labs   Lab Test 09/21/19  0749 09/20/19  1154 09/20/19  0647  09/19/19  1029   WBC 6.8  --  6.2  --  5.2   HGB 8.2* 8.3* 8.5*   < > 9.7*   MCV 79  --  79  --  79     --  267  --  315    < > = values in this interval not displayed.      Most Recent 3 BMP's:  Recent Labs   Lab Test 09/21/19  0749 09/20/19  0647 09/19/19  0736    142 143   POTASSIUM 4.2 3.7 3.6   CHLORIDE 113* 113* 113*   CO2 25 26 26   BUN 19 20 27   CR 0.92 0.96 0.82   ANIONGAP 3 3 4   NIHARIKA 7.9* 7.8* 7.8*   * 95 94     Most Recent 2 LFT's:  Recent Labs   Lab Test 09/20/19  0647 09/19/19  0736   AST 43 102*   ALT 99* 160*   ALKPHOS 112 143   BILITOTAL 0.4 0.4     Most Recent 3 Troponin's:  Recent Labs   Lab Test 09/18/19  0945 09/18/19  0656  09/18/19  0205   TROPI <0.015 <0.015 <0.015     Results for orders placed or performed during the hospital encounter of 09/17/19   Chest XR,  PA & LAT    Narrative    CHEST TWO VIEWS 9/17/2019 5:59 PM     HISTORY: Chest pain.    COMPARISON: September 2, 2019       Impression    IMPRESSION: No pneumothorax. Shunt tubing again noted and unchanged.  There are no acute infiltrates. The cardiac silhouette is not  enlarged. Pulmonary vasculature is unremarkable.    RICHARD MCKEON MD   Abdomen US, limited (RUQ only)    Narrative    ULTRASOUND ABDOMEN LIMITED  9/17/2019 8:48 PM     HISTORY: Elevated LFTs.    COMPARISON: November 24, 2008    FINDINGS:  Liver is mildly fatty infiltrated as evidenced by a diffuse  increase in echogenicity without focal lesions. Gallbladder  demonstrates cholelithiasis with mild gallbladder wall thickening.  Extrahepatic bile duct is mildly generous at 6 mm although this may be  related to age and may not have clinical significance. Pancreas is  normal where visualized. Right kidney is normal in size. There is no  hydronephrosis.      Impression    IMPRESSION:  Cholelithiasis and mild gallbladder wall thickening,  differential includes acute cholecystitis.    RICHARD MCKEON MD   NM HepatOBiliary Scan    Narrative    NUCLEAR MEDICINE HEPATOBILIARY SCAN September 18, 2019 at 1156 hours    INDICATION: Right upper quadrant pain, cholecystitis suspected.     ADDITIONAL INFORMATION: None.    TECHNIQUE: The patient received 6.9 mCi of Tc-99m Choletec  intravenously. Images were obtained out through 44 minutes.      FINDINGS: There is prompt clearance of the radionuclide from the blood  pool into the liver. By 10 minutes there is clear visualization of the  intrahepatic ducts as well as the upper common bile duct. By 12  minutes there is visualization of the gallbladder. At 15 minutes there  is emptying from the common bile duct into the small bowel.      Impression    IMPRESSION: Normal hepatobiliary  scan without evidence for acute or  chronic cholecystitis.    ALEJANDRO PARKINSON MD

## 2019-09-21 NOTE — PLAN OF CARE
Pt is A&Ox4. Cantonese speaking, needs . VSS on RA. [place]. Upper abdominal pain- declines intervention. Started on Protonix. EGD yesterday showed no signs of bleeding. Tolerating clear liquid diet, advanced to full liquid for AM. No BM this shift. Voiding adequately in BR. Up SBA. Hgb 8.5, recheck in AM. Pt c/o insomnia overnight, melatonin did not help. Called MD- ordered PTA remeron and trazodone for sleep at bedtime. Discharge plans pending. Will continue to monitor.

## 2019-09-21 NOTE — DISCHARGE SUMMARY
Patient discharged at 4:38 to home. IV was discontinued. Pain at time of discharge was 0/10. Belongings returned to patient. Discharge instructions and medications reviewed with patient and wife. Patient and wife verbalized understanding and all questions were answered.  Prescriptions given to patient. At time of discharge, patient condition was stable and left the unit on wheelchair escorted by RN.

## 2019-10-15 ENCOUNTER — ANCILLARY PROCEDURE (OUTPATIENT)
Dept: ULTRASOUND IMAGING | Facility: CLINIC | Age: 79
End: 2019-10-15
Payer: COMMERCIAL

## 2019-10-15 ENCOUNTER — ANCILLARY PROCEDURE (OUTPATIENT)
Dept: CT IMAGING | Facility: CLINIC | Age: 79
End: 2019-10-15
Attending: INTERNAL MEDICINE
Payer: COMMERCIAL

## 2019-10-15 ENCOUNTER — OFFICE VISIT (OUTPATIENT)
Dept: PULMONOLOGY | Facility: CLINIC | Age: 79
End: 2019-10-15
Attending: INTERNAL MEDICINE
Payer: COMMERCIAL

## 2019-10-15 VITALS
WEIGHT: 137 LBS | SYSTOLIC BLOOD PRESSURE: 147 MMHG | HEART RATE: 75 BPM | BODY MASS INDEX: 22.8 KG/M2 | OXYGEN SATURATION: 96 % | TEMPERATURE: 99.1 F | DIASTOLIC BLOOD PRESSURE: 78 MMHG | RESPIRATION RATE: 14 BRPM

## 2019-10-15 DIAGNOSIS — J44.9 CHRONIC OBSTRUCTIVE PULMONARY DISEASE, UNSPECIFIED COPD TYPE (H): ICD-10-CM

## 2019-10-15 DIAGNOSIS — C73 PAPILLARY THYROID CARCINOMA (H): ICD-10-CM

## 2019-10-15 DIAGNOSIS — J44.9 CHRONIC OBSTRUCTIVE PULMONARY DISEASE, UNSPECIFIED COPD TYPE (H): Primary | ICD-10-CM

## 2019-10-15 DIAGNOSIS — C77.0 METASTASIS TO CERVICAL LYMPH NODE (H): ICD-10-CM

## 2019-10-15 PROCEDURE — G0463 HOSPITAL OUTPT CLINIC VISIT: HCPCS | Mod: ZF

## 2019-10-15 RX ORDER — ALBUTEROL SULFATE 90 UG/1
2 AEROSOL, METERED RESPIRATORY (INHALATION) EVERY 4 HOURS PRN
Qty: 3 INHALER | Refills: 11 | Status: ON HOLD | OUTPATIENT
Start: 2019-10-15 | End: 2020-10-29

## 2019-10-15 ASSESSMENT — PAIN SCALES - GENERAL: PAINLEVEL: NO PAIN (0)

## 2019-10-15 NOTE — PROGRESS NOTES
LUNG NODULE & INTERVENTIONAL PULMONARY CLINIC  CLINICS & SURGERY CENTER, [facility]     Travis Peres MRN# [id number]   Age: 79 year old YOB: 1940     Reason for Consultation: lung nodule(s) and COPD    Requesting Physician: Karol Alvarez  [address] [address]       Assessment and Plan:       1. Established multiple pulmonary lung nodule(s). Given the characteristics on current/previous imaging and risk factors; I would classify this to be Intermediate (6-65%) risk for cancer. RUL posterior area with cluster of nodules likely atypical infection. Overall, centrilobular nodules have improved. Will repeat CT in 12mo     2. COPD. Severe COPD based on PFT in 2009. He is only on anora ellipta only. Will prescribe albuterol prn.       Billing: I spent more than 30 minutes face to face and greater than 50% of time was for counseling and coordination of care about the issues above.      Spenser Haro MD   of Medicine  Interventional Pulmonology  Department of Pulmonary, Allergy, Critical Care and Sleep Medicine   [facility]  Pager: [phone]          History:     Travis Peres is a 78 year old male with sig h/o for hypothyroidism, COPD, HTN, hyperlipidemia, MDD,  who is here for evaluation/followup of lung nodule(s).     - No new resp sx or complaints. Denies dyspnea or cough. Recently hospitalized for GIB.  - CT chest and found incidental nodules. Here for evaluation   - Personal hx of cancer: thyroid cancer  - Family hx of cancer: No  - Exposure hx: Denies asbestos or radon exposure   - Tobacco hx: Past Smoker: 0.5ppd for 5years. Quit 2001.   - My interpretation of the images relevant for this visit includes: right posterior nodules   - My interpretation of the PFT's relevant for this visit includes: Obstructive pattern      Culprit Nodule(s):   1: Bilateral sub4mm nodules and  centrilobular/tree-in-bud have improved since 4/2019.      Other active medical problems include:   - had thyroid cancer (papillary thyroid cancer) s/p thyroidectomy 2007. On synthroid.    - Has COPD. On anora ellipta and daily. Up-to-date on flu and pna vaccine. No recent AECOPD. Previous PFT obstructed with reduced DLCO.   - has HTN, hyperlipidemia. Stable.           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     Post-surgical hypothyroidism 2007     Uncomplicated asthma      Vocal cord paralysis     right     Xerostomia     due to radiation exposures           Past Surgical History:      Past Surgical History:   Procedure Laterality Date     cymetra injection       ESOPHAGOSCOPY, GASTROSCOPY, DUODENOSCOPY (EGD), COMBINED  6/20/2013    Procedure: COMBINED ESOPHAGOSCOPY, GASTROSCOPY, DUODENOSCOPY (EGD), BIOPSY SINGLE OR MULTIPLE;  gastroscopy;  Surgeon: Leslie Guadarrama MD;  Location:  GI     ESOPHAGOSCOPY, GASTROSCOPY, DUODENOSCOPY (EGD), COMBINED N/A 9/20/2019    Procedure: ESOPHAGOGASTRODUODENOSCOPY (EGD);  Surgeon: Gilberto Gibson DO;  Location:  GI     HERNIA REPAIR Right      lipoma resection chest wall Right 11/09     PHACOEMULSIFICATION CLEAR CORNEA WITH STANDARD INTRAOCULAR LENS IMPLANT Left 5/7/2018    Procedure: PHACOEMULSIFICATION CLEAR CORNEA WITH STANDARD INTRAOCULAR LENS IMPLANT;  LEFT PHACOEMULSIFICATION CLEAR CORNEA WITH STANDARD INTRAOCULAR  LENS IMPLANT ;  Surgeon: Nadiya Allen MD;  Location: [facility]     PHACOEMULSIFICATION CLEAR CORNEA WITH STANDARD INTRAOCULAR LENS IMPLANT Right 2018    Procedure: PHACOEMULSIFICATION CLEAR CORNEA WITH STANDARD INTRAOCULAR LENS IMPLANT;  RIGHT EYE PHACOEMULSIFICATION CLEAR CORNEA WITH STANDARD INTRAOCULAR LENS IMPLANT;  Surgeon: Nadiya Allen MD;  Location: [facility]     right selective neck dissection level 2, 3, 4  11/3/09     right  shunt placement  07     THORACIC SURGERY  Fidencio 3 2017     THYMECTOMY N/A 1/3/2017    Procedure: THYMECTOMY;  Surgeon: Ernesto Armstrong MD;  Location: [facility]     THYROIDECTOMY  07     TRANSCERVICAL EXTENDED MEDIASTINAL LYMPHADENECTOMY N/A 1/3/2017    Procedure: TRANSCERVICAL EXTENDED MEDIASTINAL LYMPHADENECTOMY;  Surgeon: Ernesto Armstrong MD;  Location: [facility]     transnasal endoscopic resection of pituitary adenoma  2007          Social History:     Social History     Tobacco Use     Smoking status: Former Smoker     Packs/day: 0.50     Years: 5.00     Pack years: 2.50     Types: Cigarettes     Start date: 1976     Last attempt to quit: 2001     Years since quittin.7     Smokeless tobacco: Never Used   Substance Use Topics     Alcohol use: No          Family History:     Family History   Problem Relation Age of Onset     Cancer No family hx of         no skin cancer     Glaucoma No family hx of      Macular Degeneration No family hx of            Allergies:      Allergies   Allergen Reactions     Metoprolol Shortness Of Breath     Bradycardia, fatigue, COPD worsening.         Fenofibrate Hives     Lisinopril Cough     Losartan Cough     Niacin      Simvastatin Cramps          Medications:     Current Outpatient Medications   Medication Sig     albuterol (PROAIR HFA) 108 (90 Base) MCG/ACT inhaler Inhale 2 puffs into the lungs every 4 hours as needed for shortness of breath / dyspnea or wheezing     alendronate (FOSAMAX)  70 MG tablet Take 1 tablet (70 mg) by mouth every 7 days     amLODIPine (NORVASC) 2.5 MG tablet Take 2.5 mg by mouth daily     B Complex Vitamins (VITAMIN B COMPLEX PO) Take 1 capsule by mouth daily      benzonatate (TESSALON) 100 MG capsule Take 1 capsule (100 mg) by mouth 3 times daily as needed for cough     fish oil-omega-3 fatty acids 1000 MG capsule Take 2 g by mouth daily     fluticasone (FLONASE) 50 MCG/ACT spray Spray 2 sprays into both nostrils daily     GuaiFENesin (MUCUS RELIEF ADULT PO) Take 400 mg by mouth 2 times daily as needed      guaiFENesin-dextromethorphan (ROBITUSSIN DM) 100-10 MG/5ML syrup Take 10 mLs by mouth 3 times daily as needed for cough     hydrocortisone (CORTEF) 10 MG tablet 10 mg AM and 5 mg afternoon     Ipratropium-Albuterol (COMBIVENT RESPIMAT)  MCG/ACT inhaler Inhale 1 puff into the lungs 4 times daily     levothyroxine (SYNTHROID/LEVOTHROID) 137 MCG tablet Take 1 tablet (137 mcg) by mouth daily     loperamide (IMODIUM) 2 MG capsule      mirtazapine (REMERON) 15 MG tablet Take 15 mg by mouth At Bedtime      pantoprazole (PROTONIX) 40 MG EC tablet Take 1 tablet (40 mg) by mouth 2 times daily (before meals)     tamsulosin (FLOMAX) 0.4 MG capsule Take 0.4 mg by mouth daily     umeclidinium-vilanterol (ANORO ELLIPTA) 62.5-25 MCG/INH oral inhaler Inhale 1 puff into the lungs daily     Current Facility-Administered Medications   Medication     ranibizumab (LUCENTIS) injection syringe 0.5 mg     Facility-Administered Medications Ordered in Other Visits   Medication     gadobutrol (GADAVIST) injection 7.5 mL          Review of Systems:     CONSTITUTIONAL: negative for fever, chills, change in weight  INTEGUMENTARY/SKIN: no rash or obvious new lesions  ENT/MOUTH: no sore throat, new sinus pain or nasal drainage  RESP: see interval history  CV: negative for chest pain, palpitations or peripheral edema  GI: no nausea, vomiting, change in stools  : no dysuria  MUSCULOSKELETAL: no  myalgias, arthralgias  ENDOCRINE: blood sugars with adequate control  PSYCHIATRIC: mood stable  LYMPHATIC: no new lymphadenopathy  HEME: no bleeding or easy bruisability  NEURO: no numbness, weakness, headaches         Physical Exam:     Temp:  [99.1  F (37.3  C)] 99.1  F (37.3  C)  Pulse:  [75] 75  Resp:  [14] 14  BP: (147)/(78) 147/78  SpO2:  [96 %] 96 %  Wt Readings from Last 4 Encounters:   10/15/19 62.1 kg (137 lb)   09/20/19 58.4 kg (128 lb 11.2 oz)   09/17/19 58.4 kg (128 lb 11.2 oz)   09/05/19 59.4 kg (131 lb)     Constitutional:   Awake, alert and in no apparent distress     Eyes:   Nonicteric, MOHINDER     ENT:    Trachea is midline. No gross neck abnormalities      Neck:   Supple without supraclavicular or cervical lymphadenopathy     Lungs:   Good air flow.  No crackles. No rhonchi.  No wheezes.     Cardiovascular:   Normal S1 and S2.  RRR.  No murmur, gallop or rub.  Radial, DP and PT pulses normal and symmetric     Abdomen:   NABS, soft, nontender, nondistended.  No HSM.     Musculoskeletal:   No edema.      Neurologic:   Alert and conversant. Cranial nerves  intact.       Skin:   Warm, dry.  No rash on limited exam.           Current Laboratory Data:   All laboratory and imaging data reviewed.           Previous Cardiology Imaging   No results found for this or any previous visit (from the past 8760 hour(s)).

## 2019-10-15 NOTE — LETTER
10/15/2019       RE: Travis Peres  [address]  [place] [address]     Dear Colleague,    Thank you for referring your patient, Travis Peres, to the [facility] at [facility]. Please see a copy of my visit note below.    LUNG NODULE & INTERVENTIONAL PULMONARY CLINIC  CLINICS & SURGERY [facility], [facility]     Travis Peres MRN# [id number]   Age: 79 year old YOB: 1940     Reason for Consultation: lung nodule(s) and COPD    Requesting Physician: Karol Alvarez  [address] [address]       Assessment and Plan:       1. Established multiple pulmonary lung nodule(s). Given the characteristics on current/previous imaging and risk factors; I would classify this to be Intermediate (6-65%) risk for cancer. RUL posterior area with cluster of nodules likely atypical infection. Overall, centrilobular nodules have improved. Will repeat CT in 12mo     2. COPD. Severe COPD based on PFT in 2009. He is only on anora ellipta only. Will prescribe albuterol prn.       Billing: I spent more than 30 minutes face to face and greater than 50% of time was for counseling and coordination of care about the issues above.      Spenser Haro MD   of Medicine  Interventional Pulmonology  Department of Pulmonary, Allergy, Critical Care and Sleep Medicine   [facility]  Pager: [phone]          History:     Travis Peres is a 78 year old male with sig h/o for hypothyroidism, COPD, HTN, hyperlipidemia, MDD,  who is here for evaluation/followup of lung nodule(s).     - No new resp sx or complaints. Denies dyspnea or cough. Recently hospitalized for GIB.  - CT chest and found incidental nodules. Here for evaluation   - Personal hx of cancer: thyroid  cancer  - Family hx of cancer: No  - Exposure hx: Denies asbestos or radon exposure   - Tobacco hx:  Past Smoker: 0.5ppd for 5years. Quit 2001.   - My interpretation of the images relevant for this visit includes: right posterior nodules   - My interpretation of the PFT's relevant for this visit includes: Obstructive pattern      Culprit Nodule(s):   1:  Bilateral sub4mm nodules and centrilobular/tree-in-bud have improved since 4/2019.      Other active medical problems include:   - had thyroid cancer (papillary thyroid cancer) s/p thyroidectomy 2007. On synthroid.    - Has COPD. On anora ellipta and daily. Up-to-date on flu and pna vaccine. No recent AECOPD. Previous PFT obstructed with reduced DLCO.   - has HTN, hyperlipidemia. Stable.           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     Post-surgical hypothyroidism 2007     Uncomplicated asthma      Vocal cord paralysis     right     Xerostomia     due to radiation exposures           Past Surgical History:      Past Surgical History:   Procedure Laterality Date     cymetra injection       ESOPHAGOSCOPY, GASTROSCOPY, DUODENOSCOPY (EGD), COMBINED  6/20/2013    Procedure: COMBINED ESOPHAGOSCOPY, GASTROSCOPY, DUODENOSCOPY (EGD), BIOPSY SINGLE OR MULTIPLE;  gastroscopy;  Surgeon: Leslie Guadarrama MD;  Location:  GI     ESOPHAGOSCOPY, GASTROSCOPY, DUODENOSCOPY (EGD), COMBINED N/A 9/20/2019    Procedure: ESOPHAGOGASTRODUODENOSCOPY (EGD);  Surgeon: Gilberto Gibson DO;  Location:  GI     HERNIA REPAIR Right       lipoma resection chest wall Right      PHACOEMULSIFICATION CLEAR CORNEA WITH STANDARD INTRAOCULAR LENS IMPLANT Left 2018    Procedure: PHACOEMULSIFICATION CLEAR CORNEA WITH STANDARD INTRAOCULAR LENS IMPLANT;  LEFT PHACOEMULSIFICATION CLEAR CORNEA WITH STANDARD INTRAOCULAR LENS IMPLANT ;  Surgeon: Nadiya Allen MD;  Location: [facility]     PHACOEMULSIFICATION CLEAR CORNEA WITH STANDARD INTRAOCULAR LENS IMPLANT Right 2018    Procedure: PHACOEMULSIFICATION CLEAR CORNEA WITH STANDARD INTRAOCULAR LENS IMPLANT;  RIGHT EYE PHACOEMULSIFICATION CLEAR CORNEA WITH STANDARD INTRAOCULAR LENS IMPLANT;  Surgeon: Nadiya Allen MD;  Location: [facility]     right selective neck dissection level 2, 3, 4  11/3/09     right  shunt placement  07     THORACIC SURGERY  Fidencio 3 2017     THYMECTOMY N/A 1/3/2017    Procedure: THYMECTOMY;  Surgeon: Ernesto Armstrong MD;  Location: [facility]     THYROIDECTOMY  07     TRANSCERVICAL EXTENDED MEDIASTINAL LYMPHADENECTOMY N/A 1/3/2017    Procedure: TRANSCERVICAL EXTENDED MEDIASTINAL LYMPHADENECTOMY;  Surgeon: Ernesto Armstrong MD;  Location: [facility]     transnasal endoscopic resection of pituitary adenoma  2007          Social History:     Social History     Tobacco Use     Smoking status: Former Smoker     Packs/day: 0.50     Years: 5.00     Pack years: 2.50     Types: Cigarettes     Start date: 1976     Last attempt to quit: 2001     Years since quittin.7     Smokeless tobacco: Never Used   Substance Use Topics     Alcohol use: No          Family History:     Family History   Problem Relation Age of Onset     Cancer No family hx of         no skin cancer     Glaucoma No family hx of      Macular Degeneration No family hx of            Allergies:      Allergies   Allergen Reactions     Metoprolol Shortness Of Breath     Bradycardia, fatigue, COPD worsening.         Fenofibrate Hives     Lisinopril Cough     Losartan Cough     Niacin       Simvastatin Cramps          Medications:     Current Outpatient Medications   Medication Sig     albuterol (PROAIR HFA) 108 (90 Base) MCG/ACT inhaler Inhale 2 puffs into the lungs every 4 hours as needed for shortness of breath / dyspnea or wheezing     alendronate (FOSAMAX) 70 MG tablet Take 1 tablet (70 mg) by mouth every 7 days     amLODIPine (NORVASC) 2.5 MG tablet Take 2.5 mg by mouth daily     B Complex Vitamins (VITAMIN B COMPLEX PO) Take 1 capsule by mouth daily      benzonatate (TESSALON) 100 MG capsule Take 1 capsule (100 mg) by mouth 3 times daily as needed for cough     fish oil-omega-3 fatty acids 1000 MG capsule Take 2 g by mouth daily     fluticasone (FLONASE) 50 MCG/ACT spray Spray 2 sprays into both nostrils daily     GuaiFENesin (MUCUS RELIEF ADULT PO) Take 400 mg by mouth 2 times daily as needed      guaiFENesin-dextromethorphan (ROBITUSSIN DM) 100-10 MG/5ML syrup Take 10 mLs by mouth 3 times daily as needed for cough     hydrocortisone (CORTEF) 10 MG tablet 10 mg AM and 5 mg afternoon     Ipratropium-Albuterol (COMBIVENT RESPIMAT)  MCG/ACT inhaler Inhale 1 puff into the lungs 4 times daily     levothyroxine (SYNTHROID/LEVOTHROID) 137 MCG tablet Take 1 tablet (137 mcg) by mouth daily     loperamide (IMODIUM) 2 MG capsule      mirtazapine (REMERON) 15 MG tablet Take 15 mg by mouth At Bedtime      pantoprazole (PROTONIX) 40 MG EC tablet Take 1 tablet (40 mg) by mouth 2 times daily (before meals)     tamsulosin (FLOMAX) 0.4 MG capsule Take 0.4 mg by mouth daily     umeclidinium-vilanterol (ANORO ELLIPTA) 62.5-25 MCG/INH oral inhaler Inhale 1 puff into the lungs daily     Current Facility-Administered Medications   Medication     ranibizumab (LUCENTIS) injection syringe 0.5 mg     Facility-Administered Medications Ordered in Other Visits   Medication     gadobutrol (GADAVIST) injection 7.5 mL          Review of Systems:     CONSTITUTIONAL: negative for fever, chills, change in  weight  INTEGUMENTARY/SKIN: no rash or obvious new lesions  ENT/MOUTH: no sore throat, new sinus pain or nasal drainage  RESP: see interval history  CV: negative for chest pain, palpitations or peripheral edema  GI: no nausea, vomiting, change in stools  : no dysuria  MUSCULOSKELETAL: no myalgias, arthralgias  ENDOCRINE: blood sugars with adequate control  PSYCHIATRIC: mood stable  LYMPHATIC: no new lymphadenopathy  HEME: no bleeding or easy bruisability  NEURO: no numbness, weakness, headaches         Physical Exam:     Temp:  [99.1  F (37.3  C)] 99.1  F (37.3  C)  Pulse:  [75] 75  Resp:  [14] 14  BP: (147)/(78) 147/78  SpO2:  [96 %] 96 %  Wt Readings from Last 4 Encounters:   10/15/19 62.1 kg (137 lb)   09/20/19 58.4 kg (128 lb 11.2 oz)   09/17/19 58.4 kg (128 lb 11.2 oz)   09/05/19 59.4 kg (131 lb)     Constitutional:   Awake, alert and in no apparent distress     Eyes:   Nonicteric, MOHINDER     ENT:    Trachea is midline. No gross neck abnormalities      Neck:   Supple without supraclavicular or cervical lymphadenopathy     Lungs:   Good air flow.  No crackles. No rhonchi.  No wheezes.     Cardiovascular:   Normal S1 and S2.  RRR.  No murmur, gallop or rub.  Radial, DP and PT pulses normal and symmetric     Abdomen:   NABS, soft, nontender, nondistended.  No HSM.     Musculoskeletal:   No edema.      Neurologic:   Alert and conversant. Cranial nerves  intact.       Skin:   Warm, dry.  No rash on limited exam.           Current Laboratory Data:   All laboratory and imaging data reviewed.           Previous Cardiology Imaging   No results found for this or any previous visit (from the past 8760 hour(s)).               Again, thank you for allowing me to participate in the care of your patient.      Sincerely,    Spneser Haro MD

## 2019-10-15 NOTE — NURSING NOTE
"Oncology Rooming Note    October 15, 2019 9:30 AM   Travis Peres is a 79 year old male who presents for:    Chief Complaint   Patient presents with     Oncology Clinic Visit     Return Pulmonary Nodules     Initial Vitals: BP (!) 147/78   Pulse 75   Temp 99.1  F (37.3  C) (Oral)   Resp 14   Wt 62.1 kg (137 lb)   SpO2 96%   BMI 22.80 kg/m   Estimated body mass index is 22.8 kg/m  as calculated from the following:    Height as of 9/20/19: 1.651 m (5' 5\").    Weight as of this encounter: 62.1 kg (137 lb). Body surface area is 1.69 meters squared.  No Pain (0) Comment: Data Unavailable   No LMP for male patient.  Allergies reviewed: Yes  Medications reviewed: Yes    Medications: Medication refills not needed today.  Pharmacy name entered into HASH: [id number] - [place], [place] - [address]    Clinical concerns: CT scan results       Kay Saleh CMA              "

## 2019-11-06 ENCOUNTER — OFFICE VISIT (OUTPATIENT)
Dept: ENDOCRINOLOGY | Facility: CLINIC | Age: 79
End: 2019-11-06
Payer: COMMERCIAL

## 2019-11-06 ENCOUNTER — OFFICE VISIT (OUTPATIENT)
Dept: OPHTHALMOLOGY | Facility: CLINIC | Age: 79
End: 2019-11-06
Attending: OPHTHALMOLOGY
Payer: COMMERCIAL

## 2019-11-06 VITALS
SYSTOLIC BLOOD PRESSURE: 130 MMHG | WEIGHT: 139.2 LBS | DIASTOLIC BLOOD PRESSURE: 81 MMHG | HEIGHT: 65 IN | HEART RATE: 91 BPM | BODY MASS INDEX: 23.19 KG/M2

## 2019-11-06 DIAGNOSIS — C73 PAPILLARY THYROID CARCINOMA (H): ICD-10-CM

## 2019-11-06 DIAGNOSIS — R93.89 ABNORMAL FINDING ON IMAGING: ICD-10-CM

## 2019-11-06 DIAGNOSIS — E23.0 HYPOPITUITARISM (H): ICD-10-CM

## 2019-11-06 DIAGNOSIS — D35.2 PITUITARY MACROADENOMA (H): ICD-10-CM

## 2019-11-06 DIAGNOSIS — H35.3112 INTERMEDIATE STAGE NONEXUDATIVE AGE-RELATED MACULAR DEGENERATION OF RIGHT EYE: ICD-10-CM

## 2019-11-06 DIAGNOSIS — H35.3221 EXUDATIVE AGE-RELATED MACULAR DEGENERATION OF LEFT EYE WITH ACTIVE CHOROIDAL NEOVASCULARIZATION (H): ICD-10-CM

## 2019-11-06 DIAGNOSIS — E89.0 POSTSURGICAL HYPOTHYROIDISM: ICD-10-CM

## 2019-11-06 DIAGNOSIS — J44.9 CHRONIC OBSTRUCTIVE PULMONARY DISEASE, UNSPECIFIED COPD TYPE (H): ICD-10-CM

## 2019-11-06 DIAGNOSIS — M85.9 LOW BONE DENSITY: ICD-10-CM

## 2019-11-06 DIAGNOSIS — R91.8 PULMONARY NODULES: ICD-10-CM

## 2019-11-06 PROCEDURE — T1013 SIGN LANG/ORAL INTERPRETER: HCPCS | Mod: U3,ZF

## 2019-11-06 PROCEDURE — G0463 HOSPITAL OUTPT CLINIC VISIT: HCPCS | Mod: ZF

## 2019-11-06 PROCEDURE — 92134 CPTRZ OPH DX IMG PST SGM RTA: CPT | Mod: ZF | Performed by: OPHTHALMOLOGY

## 2019-11-06 RX ORDER — LEVOTHYROXINE SODIUM 137 UG/1
137 TABLET ORAL DAILY
Qty: 180 TABLET | Refills: 1 | Status: SHIPPED | OUTPATIENT
Start: 2019-11-06 | End: 2020-11-02

## 2019-11-06 RX ORDER — ALENDRONATE SODIUM 70 MG/1
70 TABLET ORAL
Qty: 32 TABLET | Refills: 1 | Status: SHIPPED | OUTPATIENT
Start: 2019-11-06 | End: 2022-05-23

## 2019-11-06 RX ORDER — HYDROCORTISONE 10 MG/1
TABLET ORAL
Qty: 300 TABLET | Refills: 1 | Status: SHIPPED | OUTPATIENT
Start: 2019-11-06 | End: 2020-11-02

## 2019-11-06 ASSESSMENT — VISUAL ACUITY
OD_SC: 20/25
METHOD: SNELLEN - LINEAR
OS_SC: CF @ 3'
OD_SC+: -2

## 2019-11-06 ASSESSMENT — CUP TO DISC RATIO
OS_RATIO: 0.65
OD_RATIO: 0.5

## 2019-11-06 ASSESSMENT — SLIT LAMP EXAM - LIDS
COMMENTS: NORMAL
COMMENTS: NORMAL

## 2019-11-06 ASSESSMENT — MIFFLIN-ST. JEOR: SCORE: 1268.29

## 2019-11-06 ASSESSMENT — CONF VISUAL FIELD
OD_INFERIOR_NASAL_RESTRICTION: 3
OS_SUPERIOR_TEMPORAL_RESTRICTION: 3
OD_SUPERIOR_NASAL_RESTRICTION: 3
OS_SUPERIOR_NASAL_RESTRICTION: 3
OS_INFERIOR_TEMPORAL_RESTRICTION: 3
OD_SUPERIOR_TEMPORAL_RESTRICTION: 3
OS_INFERIOR_NASAL_RESTRICTION: 3
OD_INFERIOR_TEMPORAL_RESTRICTION: 3

## 2019-11-06 ASSESSMENT — TONOMETRY
OD_IOP_MMHG: 12
IOP_METHOD: TONOPEN
OS_IOP_MMHG: 13

## 2019-11-06 ASSESSMENT — EXTERNAL EXAM - RIGHT EYE: OD_EXAM: NORMAL

## 2019-11-06 ASSESSMENT — PAIN SCALES - GENERAL: PAINLEVEL: NO PAIN (0)

## 2019-11-06 ASSESSMENT — EXTERNAL EXAM - LEFT EYE: OS_EXAM: NORMAL

## 2019-11-06 NOTE — PROGRESS NOTES
Cc: follow up  status post CE/PCIOL right eye 08/21/18     Interval history:  patient reports vision stable, still cannot see anything from left eye. Ok with resident injections  Likes subconj lido    HPI: reports far vision stable, difficulty with near vision, does not have reading glasses     OCULAR IMAGING  OCT Mac 11-6-19  Right eye: few small drusen - stable   Left eye: subfoveal non active CNVM without subretinal fluid / +intraretinal fluid slightly increased     Assessment and plan      1. 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 Stable VA and trace intraretinal fluid. non active CNVM     2.  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   - Weekly [employer] Grid use was discussed and training performed.   - A diet of leafy green vegetables was encouraged.   - Return precautions were given.    3. PVD OU   - RT/RD precautions    4. S/p CEIOL right eye 08/21/18    - with posterior capsular opacity (PCO)   - consider yag laser in the future   - Lens centered    - Retina attached    5. Pseudophakia left eye    - monitor    return to clinic: 4 months Optical Coherence Tomography and possibly lucentis inj left eye  And possible prescription     ~~~~~~~~~~~~~~~~~~~~~~~~~~~~~~~~~~   Complete documentation of historical and exam elements from today's encounter can be found in the full encounter summary report (not reduplicated in this progress note).  I personally obtained the chief complaint(s) and history of present illness.  I confirmed and edited as necessary the review of systems, past medical/surgical history, family history, social history, and examination findings as documented by others; and I examined the patient myself.  I personally reviewed  the relevant tests, images, and reports as documented above.  I formulated and edited as necessary the assessment and plan and discussed the findings and management plan with the patient and family    Nadiya Allen MD   of Ophthalmology.  Retina Service   Department of Ophthalmology and Visual Neurosciences   [facility]  Phone: [phone]   Fax: [phone]

## 2019-11-06 NOTE — NURSING NOTE
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  Pt has no pain or other concerns at this time.    LUIZ Shah November 6, 2019 10:24 AM

## 2019-11-06 NOTE — LETTER
11/6/2019     RE: Travis Peres  [address]  [facility] [address]     Dear Colleague,    Thank you for referring your patient, Travis Peres, to the [facility] at [facility]. Please see a copy of my visit note below.  Endocrinology Attending Physician Progress note  Very complicated endocrine patient/ problem set.   Papillary thyroid carcinoma (HCC)  4.8 cm right, bilateral node positive. He has been treated with total thyroidectomy, 131I x 2 (cummulative dose 346.4 mCi) His last 131I TBS (2008) post therapy scan raised question of ? lung mets and also ? met above left kidney. Cervical adenopathy has been treated in the past with ETOH .    I have long suspected he had lung mets, but we haven't proven this.   Thyroglobulin indicates persistent /progressive disease.  We have biopsy proven positive LN involvement in the neck which we have not treated so far.      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   TSH should be kept low to avoid stimulating the thyroid cancer .      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   Cervical adenopathy has been treated in the past with ETOH . The 2 LNs likely correlate to the high FDG regions on the PET.   It is possible these are the same LNs that were treated with ETOH in the past (vs others in the same vicinity - it is hard to say)   I am concerned the level 7 mass can't be reached with ETOH treatment, that the only way to remove it is with surgery.  He declined surgery offered in July, 2018     Hypopituitarism (H)  Hypopituitary with hypogonadotropic hypogonadism, probable secondary hypothyroidism, and secondary adrenal insufficiency, probable GH deficiency.   He is clinically stable --On LT4 and HC only.  Treat to high normal free T4 target  He has been noncompliant with testosterone in the past and is no longer on it.     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 The tumor has been significantly de bulked and He has completed XRT for  persistent tumor-. Tumor mass is stable on  MRI through 10/24/18.  Continue periodic imaging of the sella, treat the hypopituitarism medically.   Next pituitary MRI 10/2021 or sooner prn    Low bone density  He is on alendronate.  Next DXA 4/2021 or thereafter     ? Lytic bone lesions -stable on serial imaging -- not addressed     Corina Potts MD      Cc/ HPI: Mr Peres returns today, along with his wife and . He has a history of multiple complicated endocrine issues, including thyroid cancer, surgical hypothyroidism, osteoporosis, pituitary macroadenoma with partial hypopituitarism and history of DI.   See my 4/9/18 note for his detailed history.     I last saw him 7/23/19.  I have been trying to see him more frequently due to the difficulties of communication and scheduling of tests.  He was hospitalized x 2 since our last appointment.    8/31/19-9/2/19 acute hypoxic respiratory failure, presenting with cough, fever.    9/17/19-9/21/19 acute blood loss anemia due to upper GI bleed, cholelithiasis without cholecystitis , suspected passed stone.  Surgery was initially considered and then they decided against it after speaking with me about our past experiences with him.      We know he has persistent/recurrent/ progressive thyroid cancer in the neck.  On FNAB 4/23/18: + PTC, needle wash Tg 2082 ng/ml . Summer 2018 he met with surgeon and they decided against surgery at that time. Since then, we have seen slow progression in the size of the involved   He had neck US and chest CT on 10/15/19  In anticipation of this appt.  I have again reviewed the images on PACS today.     Papillary thyroid cancer 4.8 cm left, bilateral node positive (MACIS 6.88 minimum, pT3, pN1a (8), pMx, Stage III or IV). His course has included several  operations and several 131I treatments  11/27/07 thyroidectomy  153.4 mCi 131I in 6/08. The radioiodine  was complicated by acute sialadenitis.   12/08  193 mCi 131I (with Thyrogen stimulation). The post therapy scan showed activity in the thyroid bed, lung base on the right and also superior left kidney region   11/3/09  right selective neck dissection levels 2, 3 and 4, removing many positive LNs.   4/16/14 FNAB of right level 6 and 7 cervical lymph nodes were  positive for papillary thyroid cancer. Needle wash Tg was also high on both samples (see below). He had  hoarseness within one hour after the FNAB procedure. He was treated with cymetra on 5/12/14 and he restarted thickened liquids.    6/3/14  ETOH ablation procedure of the  2 LNs    1/31/17  trasnscervical resection of retrosternal mediastinal lesions.  A cystic lesion was removed and drained.  Surgical path  benign thymic tissue.  4/12/18  PET/CT .  Right low neck /superior mediastinal high FDG lateral and more midline. The paratracheal mass is somewhat posterior and below the level of the clavicle (read as 1.4 cm, larger than before, SUB 68), but above the sternum  Tiny focus of fdg left lung   2.  Osteoporosis.  The last BMD was 4/25/19 , performed earlier than expected shows lowest T-score -2.3 at the right femoral neck. BMD is stable compared with 6/25/19.  Alendronate has been prescribed since 2012.  I suspect he isn't compliant.      Labs   4/9/18: Tg 18.2, FRANK < 0.4; TSH  ,0.01, free T4 1.52, Na 139, K 4.4;   6/20/18: Tg 12.4, FRANK < 0.4, TSH < 0.01, free T4 1.6 -   11/27/18: Tg 16.5, FRANK  0.4, TSH < 0.01, free T4 1.46  4/22/19: Tg 22, FRANK < 0.4, TSH < 0.01, free T4 1.51, Ca 8.4, albumin 3.3, phos 3.1, creatinine 1.03, glucose 122, vitamin D 22  7/23/19: Tg 32.1, FRANK < 0.4, TSH < 0.01  Images:   8/29/18 CT chest: previously spiculted 1.7 x 1.5 cm nodule now 7 x 5 mm , ? Atelectasis.  Scattered upper lobe reticulonodular opacities as seen on prior  CT, several more conspicuous, up to 4 mm, ? Infection .  To my eye I also note right level 7 neck mass 1.6 x 2 with some airway mass effect (this was 1.4 x 1.5 on 9/17 CT). This is also likely  the right level 7 mass we have bene following on US.    10/24/18 MRI brain residual mass 1.4 x 0.6 x 0.9 cm, extending into left cavernous sinus.  Stalk deviated to the left, thickened to 5 mm; optic chiasm atrophic  10/15/19 neck US compared with 4/25/19 , 11/27/18,  6/20/18 and  4/16/18:   Right level 6 thyroid bed 1.6 x 1.1 x 1.1 (was   1.5 x 1.1 x 1.2 ;  1.2 x 0.9 x 1.2; 1.2 x 01 x 1.1 ;  1.3 x 0.95 x 1.2 cm - suspicious; 1.2 x 0.93 x 0.9 ;  FNAB 4/23/18: + PTC, needle wash Tg 2082 ng/ml  Right level 7 # 1 1.7 x 1.6 x  1.7 cm (was  2 x 1.9 x 2.1 ;  1.8 x 1.5 x 1.8 ; 1.8 x 1.6 x 1.8 cm ; 1.4 x 1.5 x 1.5 cm; 1.1 x 1.2 x 1 -FNAB 4/23/18 + PTC, needle wash Tg 430 ng/ml  Left level 4  0.5 x 0.4 x 0.9 cm  (was 0.5 x 0.4 x 1;  0.4 x 0.6 x 0.9  0.3 x 0.6 x 1.1 ;  0.3 x 0.8 x 1 cm ; 0.6 x 0.3 x 0.8 )  Left level 4 # 2 0.3 x  0.5 x 0.4 cm  (was 0.4 x 0.4 x 0.6;  0.4 x0.6 x 0.6 ; 0.5 x 0.7 x 0.7 ; 0.8 x 0.5 x 0.6 cm;  0.5 x 0.5 x 1 )  Left level 4 # 3 0.4 x0.6 x 0.4 - ? new  4/25/19 DXA: lowest T-score -2.3 right femoral neck; no change in BMD compared with 2018.   4/25/19 chest CT: increase in centrilobular nodules posterior RUL-- I had discussed this with Dr Paz Magana after our last appt; today I have reviewed the images -right level 7 mass indents trachea slightly.    10/15/19 chest CT: no significant change      ROS:  Very happy that he dind't have gallbladder surgery  Doesn't want to consider other surgery now  Forgot hearing aide today  Recounting events: fell and then had severe pain on the right - ? Rib - -- next day wife called 911 when pain didn't get better.      PM   Past Medical History:   Diagnosis Date     Arthritis      BPH (benign prostatic hyperplasia)      COPD (chronic obstructive pulmonary disease) (H)       Depression      Depressive disorder      Hyperlipidemia      Hypertension     no current meds     Hypopituitarism after adenoma resection (H)      Hypovitaminosis D      Multiple pulmonary nodules      Osteopenia      Panhypopituitarism (H)      Papillary thyroid carcinoma (H) 2007    4.8 cm, right, node positive;      Pituitary macroadenoma with extrasellar extension (H) 2007    causing obstructive hydrocephalus     Post-surgical hypothyroidism 2007     Uncomplicated asthma      Vocal cord paralysis     right     Xerostomia     due to radiation exposures     Past Surgical History:   Procedure Laterality Date     cymetra injection       ESOPHAGOSCOPY, GASTROSCOPY, DUODENOSCOPY (EGD), COMBINED  6/20/2013    Procedure: COMBINED ESOPHAGOSCOPY, GASTROSCOPY, DUODENOSCOPY (EGD), BIOPSY SINGLE OR MULTIPLE;  gastroscopy;  Surgeon: Leslie Guadarrama MD;  Location:  GI     ESOPHAGOSCOPY, GASTROSCOPY, DUODENOSCOPY (EGD), COMBINED N/A 9/20/2019    Procedure: ESOPHAGOGASTRODUODENOSCOPY (EGD);  Surgeon: Gilberto Gibson DO;  Location:  GI     HERNIA REPAIR Right      lipoma resection chest wall Right 11/09     PHACOEMULSIFICATION CLEAR CORNEA WITH STANDARD INTRAOCULAR LENS IMPLANT Left 5/7/2018    Procedure: PHACOEMULSIFICATION CLEAR CORNEA WITH STANDARD INTRAOCULAR LENS IMPLANT;  LEFT PHACOEMULSIFICATION CLEAR CORNEA WITH STANDARD INTRAOCULAR LENS IMPLANT ;  Surgeon: Nadiya Allen MD;  Location:  EC     PHACOEMULSIFICATION CLEAR CORNEA WITH STANDARD INTRAOCULAR LENS IMPLANT Right 8/21/2018    Procedure: PHACOEMULSIFICATION CLEAR CORNEA WITH STANDARD INTRAOCULAR LENS IMPLANT;  RIGHT EYE PHACOEMULSIFICATION CLEAR CORNEA WITH STANDARD INTRAOCULAR LENS IMPLANT;  Surgeon: Nadiya Allen MD;  Location:  [facility]     right selective neck dissection level 2, 3, 4  11/3/09     right  shunt placement  6/28/07     THORACIC SURGERY  Fidencio 3 2017     THYMECTOMY N/A 1/3/2017    Procedure: THYMECTOMY;  Surgeon:  Ernesto Armstrong MD;  Location: [facility] OR     THYROIDECTOMY  11/27/07     TRANSCERVICAL EXTENDED MEDIASTINAL LYMPHADENECTOMY N/A 1/3/2017    Procedure: TRANSCERVICAL EXTENDED MEDIASTINAL LYMPHADENECTOMY;  Surgeon: Ernesto Armstrong MD;  Location: [facility] OR     transnasal endoscopic resection of pituitary adenoma  8/13/2007     Current Outpatient Medications   Medication Sig Dispense Refill     albuterol (PROAIR HFA) 108 (90 Base) MCG/ACT inhaler Inhale 2 puffs into the lungs every 4 hours as needed for shortness of breath / dyspnea or wheezing 3 Inhaler 11     albuterol (PROAIR HFA/PROVENTIL HFA/VENTOLIN HFA) 108 (90 Base) MCG/ACT inhaler Inhale 2 puffs into the lungs every 4 hours as needed for shortness of breath / dyspnea or wheezing 3 Inhaler 11     alendronate (FOSAMAX) 70 MG tablet Take 1 tablet (70 mg) by mouth every 7 days 16 tablet 3     amLODIPine (NORVASC) 2.5 MG tablet Take 2.5 mg by mouth daily       B Complex Vitamins (VITAMIN B COMPLEX PO) Take 1 capsule by mouth daily        benzonatate (TESSALON) 100 MG capsule Take 1 capsule (100 mg) by mouth 3 times daily as needed for cough 30 capsule 0     fish oil-omega-3 fatty acids 1000 MG capsule Take 2 g by mouth daily       fluticasone (FLONASE) 50 MCG/ACT spray Spray 2 sprays into both nostrils daily       GuaiFENesin (MUCUS RELIEF ADULT PO) Take 400 mg by mouth 2 times daily as needed        guaiFENesin-dextromethorphan (ROBITUSSIN DM) 100-10 MG/5ML syrup Take 10 mLs by mouth 3 times daily as needed for cough 236 mL 0     hydrocortisone (CORTEF) 10 MG tablet 10 mg AM and 5 mg afternoon 135 tablet 3     Ipratropium-Albuterol (COMBIVENT RESPIMAT)  MCG/ACT inhaler Inhale 1 puff into the lungs 4 times daily       levothyroxine (SYNTHROID/LEVOTHROID) 137 MCG tablet Take 1 tablet (137 mcg) by mouth daily 90 tablet 3     loperamide (IMODIUM) 2 MG capsule   1     mirtazapine (REMERON) 15 MG tablet Take 15 mg by mouth At Bedtime        tamsulosin  (FLOMAX) 0.4 MG capsule Take 0.4 mg by mouth daily       umeclidinium-vilanterol (ANORO ELLIPTA) 62.5-25 MCG/INH oral inhaler Inhale 1 puff into the lungs daily           Family History   Problem Relation Age of Onset     Cancer No family hx of         no skin cancer     Glaucoma No family hx of      Macular Degeneration No family hx of      Social History     Tobacco Use     Smoking status: Former Smoker     Packs/day: 0.50     Years: 5.00     Pack years: 2.50     Types: Cigarettes     Start date: 1976     Last attempt to quit: 2001     Years since quittin.7     Smokeless tobacco: Never Used   Substance Use Topics     Alcohol use: No     Drug use: No    upcoming trip to [place] for 6 months.     Physical Exam   GENERAL: elderly man in [facility].  He is much more appropriate today  There were no vitals taken for this visit.  SKIN: normal color , temperature.  HEENT: no scleral icterus; very [place]    NEURO: Awake, alert, responds appropriately to question      EXAMINATION: US HEAD NECK SOFT TISSUE, 10/15/2019 11:43 AM      COMPARISON: 2019, 2018, 2017, 2016..     HISTORY: Papillary thyroid carcinoma.     TECHNIQUE: Sonographic imaging performed of the neck to evaluate for  lymph nodes using grey scale and limited Doppler technique.     FINDINGS:  Lymph nodes are measured bilaterally with measurements given in  transverse, AP, and craniocaudal dimensions as follows:     Right:  Level 2:   #1: 0.7 x 0.5 x 1.6 cm, previously 0.7 x 0.4 x 1.6 cm. Normal fatty  hilum.  Level 5:   #1: 1.2 x 0.5 x 1.0 cm, previously 1.2 x 0.3 x 0.9 cm. Stable  prominent fatty hilum.  Level 6:   #1: 1.6 x 1.1 x 1.1 cm, previously 1.4 x 1.1 x 1.2 cm. Stable  heterogeneous and vascular appearance.  Level 7:  #1: 1.7 x 1.6 x 1.7 cm, previously 2.0 x 1.9 x 2.1 cm. Stable  heterogeneous and lobulated appearance.     Left:  Level 2:  #1: 1.1 x 0.6 x 2.2 cm, previously 1.2 x 0.7 x 1.9 cm. Normal fatty  hilum.  Level 4:   #1:  0.5 x 0.4 x 0.9 cm, previously 0.4 x 0.4 x 1.0 cm. A normal fatty  hilum is not well visualized, likely too small.  #2: 0.3 x 0.5 x 0.4 cm, previously 0.3 x 0.4 x 0.6 cm. Normal fatty  hilum.  #3: 0.4 x 0.6 x 0.4 cm, not measured previously but present on  previous cine images. Normal fatty hilum.  Level 5:   #1: 1.1 x 0.5 x 1.1 cm, previously 1.0 x 0.4 x 1.3 cm. Normal fatty  hilum.  #2: 1.0 x 0.6 x 1.2 cm, not measured on the previous exam but present  since at least 2017 when it measured 1.0 x 0.5 x 1.3 cm. Normal fatty  hilum.                                                                         IMPRESSION:  1.  Stable to slightly increased size of a right level 6 lymph node,  and slightly decreased size of a level 7 lymph node, both of which  demonstrate a heterogeneous appearance and remain suspicious.  2.  Other lymph nodes are grossly stable as described above.     I have personally reviewed the examination and initial interpretation  and I agree with the findings.     JUJU LINK MD    CT CHEST W/O CONTRAST 10/15/2019 9:10 AM     History: Lung nodule, growing non-solid nodule on follow up or annual  LDCT, < 20 mm; Chronic obstructive pulmonary disease, unspecified COPD  type (H)     Comparison: CT pulmonary angiogram 8/31/2019, CT chest portable [id number]     Technique: CT of the chest was obtained without intravenous contrast.  Axial, coronal, and sagittal reconstructions were obtained and  reviewed.     Contrast: None     Findings:      Lungs: Multiple scattered bronchocentric tree-in-bud type opacities  throughout the peripheral aspects of the right lung. No suspicious  index nodule. Stable large calcified granuloma in the lingula. No new  focal airspace opacity, pneumothorax, or pleural effusion. There is  continued atelectasis of the right middle lobe.  Airways: Central tracheobronchial tree is clear.  Vessels: Main pulmonary artery and aorta are normal in caliber. Normal  three-vessel arch. Mild  coronary calcium.  Heart: Heart size is normal without pericardial effusion.  Calcification mitral valve.  Lymph nodes: No suspicious mediastinal or hilar lymphadenopathy.  Calcified mediastinal and right hilar lymph nodes.  Thyroid: Within normal limits.  Esophagus: Within normal limits     Upper abdomen: Gallstones without adjacent inflammatory change. Simple  fluid attenuating cystic structure extending off the superior pole the  left kidney consistent with simple renal cyst. Incompletely visualized  ventricular peroneal shunt terminating in the right upper quadrant.     Bones and soft tissues: No suspicious axillary lymphadenopathy or soft  tissue mass. No suspicious osseous lesion.                                                                    Impression:   1. No significant change in peripheral tree-in-bud type opacities  scattered throughout the right lung as well as right middle lobe  collapse. These findings are most suggestive of previous mycobacterial  or fungal infection.   2. No index pulmonary nodule.  3. Cholelithiasis.     I have personally reviewed the examination and initial interpretation  and I agree with the findings.     MANA TUCKER MD    Again, thank you for allowing me to participate in the care of your patient.      Sincerely,    Corina Potts MD

## 2019-11-06 NOTE — PROGRESS NOTES
Endocrinology Attending Physician Progress note    Very complicated endocrine patient/ problem set.   Papillary thyroid carcinoma (HCC)  4.8 cm right, bilateral node positive. He has been treated with total thyroidectomy, 131I x 2 (cummulative dose 346.4 mCi) His last 131I TBS (2008) post therapy scan raised question of ? lung mets and also ? met above left kidney. Cervical adenopathy has been treated in the past with ETOH .    I have long suspected he had lung mets, but we haven't proven this.   Thyroglobulin indicates persistent /progressive disease.  We have biopsy proven positive LN involvement in the neck which we have not treated so far.      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   TSH should be kept low to avoid stimulating the thyroid cancer .      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   Cervical adenopathy has been treated in the past with ETOH . The 2 LNs likely correlate to the high FDG regions on the PET.   It is possible these are the same LNs that were treated with ETOH in the past (vs others in the same vicinity - it is hard to say)   I am concerned the level 7 mass can't be reached with ETOH treatment, that the only way to remove it is with surgery.  He declined surgery offered in July, 2018     Hypopituitarism (H)  Hypopituitary with hypogonadotropic hypogonadism, probable secondary hypothyroidism, and secondary adrenal insufficiency, probable GH deficiency.   He is clinically stable --On LT4 and HC only.  Treat to high normal free T4 target  He has been noncompliant with testosterone in the past and is no longer on it.     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 The tumor has been significantly de bulked and He has completed XRT for  persistent tumor-. Tumor mass is stable on  MRI through 10/24/18.  Continue periodic imaging of the  sella, treat the hypopituitarism medically.   Next pituitary MRI 10/2021 or sooner prn    Low bone density  He is on alendronate.  Next DXA 4/2021 or thereafter     ? Lytic bone lesions -stable on serial imaging -- not addressed     Corina Potts MD      Cc/ HPI: Mr Peres returns today, along with his wife and . He has a history of multiple complicated endocrine issues, including thyroid cancer, surgical hypothyroidism, osteoporosis, pituitary macroadenoma with partial hypopituitarism and history of DI.   See my 4/9/18 note for his detailed history.     I last saw him 7/23/19.  I have been trying to see him more frequently due to the difficulties of communication and scheduling of tests.  He was hospitalized x 2 since our last appointment.    8/31/19-9/2/19 acute hypoxic respiratory failure, presenting with cough, fever.    9/17/19-9/21/19 acute blood loss anemia due to upper GI bleed, cholelithiasis without cholecystitis , suspected passed stone.  Surgery was initially considered and then they decided against it after speaking with me about our past experiences with him.      We know he has persistent/recurrent/ progressive thyroid cancer in the neck.  On FNAB 4/23/18: + PTC, needle wash Tg 2082 ng/ml . Summer 2018 he met with surgeon and they decided against surgery at that time. Since then, we have seen slow progression in the size of the involved   He had neck US and chest CT on 10/15/19  In anticipation of this appt.  I have again reviewed the images on PACS today.     Papillary thyroid cancer 4.8 cm left, bilateral node positive (MACIS 6.88 minimum, pT3, pN1a (8), pMx, Stage III or IV). His course has included several operations and several 131I treatments  11/27/07 thyroidectomy  153.4 mCi 131I in 6/08. The radioiodine  was complicated by acute sialadenitis.   12/08  193 mCi 131I (with Thyrogen stimulation). The post therapy scan showed activity in the thyroid bed, lung base on the right and  also superior left kidney region   11/3/09  right selective neck dissection levels 2, 3 and 4, removing many positive LNs.   4/16/14 FNAB of right level 6 and 7 cervical lymph nodes were  positive for papillary thyroid cancer. Needle wash Tg was also high on both samples (see below). He had  hoarseness within one hour after the FNAB procedure. He was treated with cymetra on 5/12/14 and he restarted thickened liquids.    6/3/14  ETOH ablation procedure of the  2 LNs    1/31/17  trasnscervical resection of retrosternal mediastinal lesions.  A cystic lesion was removed and drained.  Surgical path  benign thymic tissue.  4/12/18  PET/CT .  Right low neck /superior mediastinal high FDG lateral and more midline. The paratracheal mass is somewhat posterior and below the level of the clavicle (read as 1.4 cm, larger than before, SUB 68), but above the sternum  Tiny focus of fdg left lung     2.  Osteoporosis.  The last BMD was 4/25/19 , performed earlier than expected shows lowest T-score -2.3 at the right femoral neck. BMD is stable compared with 6/25/19.  Alendronate has been prescribed since 2012.  I suspect he isn't compliant.      Labs   4/9/18: Tg 18.2, FRANK < 0.4; TSH  ,0.01, free T4 1.52, Na 139, K 4.4;   6/20/18: Tg 12.4, FRANK < 0.4, TSH < 0.01, free T4 1.6 -   11/27/18: Tg 16.5, FRANK  0.4, TSH < 0.01, free T4 1.46  4/22/19: Tg 22, FRANK < 0.4, TSH < 0.01, free T4 1.51, Ca 8.4, albumin 3.3, phos 3.1, creatinine 1.03, glucose 122, vitamin D 22  7/23/19: Tg 32.1, FRANK < 0.4, TSH < 0.01    Images:   8/29/18 CT chest: previously spiculted 1.7 x 1.5 cm nodule now 7 x 5 mm , ? Atelectasis.  Scattered upper lobe reticulonodular opacities as seen on prior CT, several more conspicuous, up to 4 mm, ? Infection .  To my eye I also note right level 7 neck mass 1.6 x 2 with some airway mass effect (this was 1.4 x 1.5 on 9/17 CT). This is also likely  the right level 7 mass we have bene following on US.    10/24/18 MRI brain residual  mass 1.4 x 0.6 x 0.9 cm, extending into left cavernous sinus.  Stalk deviated to the left, thickened to 5 mm; optic chiasm atrophic  10/15/19 neck US compared with 4/25/19 , 11/27/18,  6/20/18 and  4/16/18:   Right level 6 thyroid bed 1.6 x 1.1 x 1.1 (was   1.5 x 1.1 x 1.2 ;  1.2 x 0.9 x 1.2; 1.2 x 01 x 1.1 ;  1.3 x 0.95 x 1.2 cm - suspicious; 1.2 x 0.93 x 0.9 ;  FNAB 4/23/18: + PTC, needle wash Tg 2082 ng/ml  Right level 7 # 1 1.7 x 1.6 x  1.7 cm (was  2 x 1.9 x 2.1 ;  1.8 x 1.5 x 1.8 ; 1.8 x 1.6 x 1.8 cm ; 1.4 x 1.5 x 1.5 cm; 1.1 x 1.2 x 1 -FNAB 4/23/18 + PTC, needle wash Tg 430 ng/ml  Left level 4  0.5 x 0.4 x 0.9 cm  (was 0.5 x 0.4 x 1;  0.4 x 0.6 x 0.9  0.3 x 0.6 x 1.1 ;  0.3 x 0.8 x 1 cm ; 0.6 x 0.3 x 0.8 )  Left level 4 # 2 0.3 x  0.5 x 0.4 cm  (was 0.4 x 0.4 x 0.6;  0.4 x0.6 x 0.6 ; 0.5 x 0.7 x 0.7 ; 0.8 x 0.5 x 0.6 cm;  0.5 x 0.5 x 1 )  Left level 4 # 3 0.4 x0.6 x 0.4 - ? new  4/25/19 DXA: lowest T-score -2.3 right femoral neck; no change in BMD compared with 2018.   4/25/19 chest CT: increase in centrilobular nodules posterior RUL-- I had discussed this with Dr Paz Magana after our last appt; today I have reviewed the images -right level 7 mass indents trachea slightly.    10/15/19 chest CT: no significant change      ROS:  Very happy that he dind't have gallbladder surgery  Doesn't want to consider other surgery now  Forgot hearing aide today  Recounting events: fell and then had severe pain on the right - ? Rib - -- next day wife called 911 when pain didn't get better.      PMH   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)      Hypovitaminosis D      Multiple pulmonary nodules      Osteopenia      Panhypopituitarism (H)      Papillary thyroid carcinoma (H)  2007    4.8 cm, right, node positive;      Pituitary macroadenoma with extrasellar extension (H) 2007    causing obstructive hydrocephalus     Post-surgical hypothyroidism 2007     Uncomplicated asthma      Vocal cord paralysis     right     Xerostomia     due to radiation exposures     Past Surgical History:   Procedure Laterality Date     cymetra injection       ESOPHAGOSCOPY, GASTROSCOPY, DUODENOSCOPY (EGD), COMBINED  6/20/2013    Procedure: COMBINED ESOPHAGOSCOPY, GASTROSCOPY, DUODENOSCOPY (EGD), BIOPSY SINGLE OR MULTIPLE;  gastroscopy;  Surgeon: Leslie Guadarrama MD;  Location:  GI     ESOPHAGOSCOPY, GASTROSCOPY, DUODENOSCOPY (EGD), COMBINED N/A 9/20/2019    Procedure: ESOPHAGOGASTRODUODENOSCOPY (EGD);  Surgeon: Gilberto Gibson DO;  Location:  GI     HERNIA REPAIR Right      lipoma resection chest wall Right 11/09     PHACOEMULSIFICATION CLEAR CORNEA WITH STANDARD INTRAOCULAR LENS IMPLANT Left 5/7/2018    Procedure: PHACOEMULSIFICATION CLEAR CORNEA WITH STANDARD INTRAOCULAR LENS IMPLANT;  LEFT PHACOEMULSIFICATION CLEAR CORNEA WITH STANDARD INTRAOCULAR LENS IMPLANT ;  Surgeon: Nadiya Allen MD;  Location: [facility]     PHACOEMULSIFICATION CLEAR CORNEA WITH STANDARD INTRAOCULAR LENS IMPLANT Right 8/21/2018    Procedure: PHACOEMULSIFICATION CLEAR CORNEA WITH STANDARD INTRAOCULAR LENS IMPLANT;  RIGHT EYE PHACOEMULSIFICATION CLEAR CORNEA WITH STANDARD INTRAOCULAR LENS IMPLANT;  Surgeon: Nadiya Allen MD;  Location:  [facility]     right selective neck dissection level 2, 3, 4  11/3/09     right  shunt placement  6/28/07     THORACIC SURGERY  Fidencio 3 2017     THYMECTOMY N/A 1/3/2017    Procedure: THYMECTOMY;  Surgeon: Ernesto Armstrong MD;  Location: [facility] OR     THYROIDECTOMY  11/27/07     TRANSCERVICAL EXTENDED MEDIASTINAL LYMPHADENECTOMY N/A 1/3/2017    Procedure: TRANSCERVICAL EXTENDED MEDIASTINAL LYMPHADENECTOMY;  Surgeon: Ernesto Armstrong MD;  Location: [facility] OR     transnasal  endoscopic resection of pituitary adenoma  8/13/2007     Current Outpatient Medications   Medication Sig Dispense Refill     albuterol (PROAIR HFA) 108 (90 Base) MCG/ACT inhaler Inhale 2 puffs into the lungs every 4 hours as needed for shortness of breath / dyspnea or wheezing 3 Inhaler 11     albuterol (PROAIR HFA/PROVENTIL HFA/VENTOLIN HFA) 108 (90 Base) MCG/ACT inhaler Inhale 2 puffs into the lungs every 4 hours as needed for shortness of breath / dyspnea or wheezing 3 Inhaler 11     alendronate (FOSAMAX) 70 MG tablet Take 1 tablet (70 mg) by mouth every 7 days 16 tablet 3     amLODIPine (NORVASC) 2.5 MG tablet Take 2.5 mg by mouth daily       B Complex Vitamins (VITAMIN B COMPLEX PO) Take 1 capsule by mouth daily        benzonatate (TESSALON) 100 MG capsule Take 1 capsule (100 mg) by mouth 3 times daily as needed for cough 30 capsule 0     fish oil-omega-3 fatty acids 1000 MG capsule Take 2 g by mouth daily       fluticasone (FLONASE) 50 MCG/ACT spray Spray 2 sprays into both nostrils daily       GuaiFENesin (MUCUS RELIEF ADULT PO) Take 400 mg by mouth 2 times daily as needed        guaiFENesin-dextromethorphan (ROBITUSSIN DM) 100-10 MG/5ML syrup Take 10 mLs by mouth 3 times daily as needed for cough 236 mL 0     hydrocortisone (CORTEF) 10 MG tablet 10 mg AM and 5 mg afternoon 135 tablet 3     Ipratropium-Albuterol (COMBIVENT RESPIMAT)  MCG/ACT inhaler Inhale 1 puff into the lungs 4 times daily       levothyroxine (SYNTHROID/LEVOTHROID) 137 MCG tablet Take 1 tablet (137 mcg) by mouth daily 90 tablet 3     loperamide (IMODIUM) 2 MG capsule   1     mirtazapine (REMERON) 15 MG tablet Take 15 mg by mouth At Bedtime        tamsulosin (FLOMAX) 0.4 MG capsule Take 0.4 mg by mouth daily       umeclidinium-vilanterol (ANORO ELLIPTA) 62.5-25 MCG/INH oral inhaler Inhale 1 puff into the lungs daily           Family History   Problem Relation Age of Onset     Cancer No family hx of         no skin cancer     Glaucoma  No family hx of      Macular Degeneration No family hx of      Social History     Tobacco Use     Smoking status: Former Smoker     Packs/day: 0.50     Years: 5.00     Pack years: 2.50     Types: Cigarettes     Start date: 1976     Last attempt to quit: 2001     Years since quittin.7     Smokeless tobacco: Never Used   Substance Use Topics     Alcohol use: No     Drug use: No    upcoming trip to [place] for 6 months.     Physical Exam   GENERAL: elderly man in NAD.  He is much more appropriate today  There were no vitals taken for this visit.  SKIN: normal color , temperature.  HEENT: no scleral icterus; very [place]    NEURO: Awake, alert, responds appropriately to question      EXAMINATION: US HEAD NECK SOFT TISSUE, 10/15/2019 11:43 AM      COMPARISON: 2019, 2018, 2017, 2016..     HISTORY: Papillary thyroid carcinoma.     TECHNIQUE: Sonographic imaging performed of the neck to evaluate for  lymph nodes using grey scale and limited Doppler technique.     FINDINGS:  Lymph nodes are measured bilaterally with measurements given in  transverse, AP, and craniocaudal dimensions as follows:     Right:  Level 2:   #1: 0.7 x 0.5 x 1.6 cm, previously 0.7 x 0.4 x 1.6 cm. Normal fatty  hilum.  Level 5:   #1: 1.2 x 0.5 x 1.0 cm, previously 1.2 x 0.3 x 0.9 cm. Stable  prominent fatty hilum.  Level 6:   #1: 1.6 x 1.1 x 1.1 cm, previously 1.4 x 1.1 x 1.2 cm. Stable  heterogeneous and vascular appearance.  Level 7:  #1: 1.7 x 1.6 x 1.7 cm, previously 2.0 x 1.9 x 2.1 cm. Stable  heterogeneous and lobulated appearance.     Left:  Level 2:  #1: 1.1 x 0.6 x 2.2 cm, previously 1.2 x 0.7 x 1.9 cm. Normal fatty  hilum.  Level 4:   #1: 0.5 x 0.4 x 0.9 cm, previously 0.4 x 0.4 x 1.0 cm. A normal fatty  hilum is not well visualized, likely too small.  #2: 0.3 x 0.5 x 0.4 cm, previously 0.3 x 0.4 x 0.6 cm. Normal fatty  hilum.  #3: 0.4 x 0.6 x 0.4 cm, not measured previously but present on  previous cine images.  Normal fatty hilum.  Level 5:   #1: 1.1 x 0.5 x 1.1 cm, previously 1.0 x 0.4 x 1.3 cm. Normal fatty  hilum.  #2: 1.0 x 0.6 x 1.2 cm, not measured on the previous exam but present  since at least 2017 when it measured 1.0 x 0.5 x 1.3 cm. Normal fatty  hilum.                                                                         IMPRESSION:  1.  Stable to slightly increased size of a right level 6 lymph node,  and slightly decreased size of a level 7 lymph node, both of which  demonstrate a heterogeneous appearance and remain suspicious.  2.  Other lymph nodes are grossly stable as described above.     I have personally reviewed the examination and initial interpretation  and I agree with the findings.     JUJU LINK MD    CT CHEST W/O CONTRAST 10/15/2019 9:10 AM     History: Lung nodule, growing non-solid nodule on follow up or annual  LDCT, < 20 mm; Chronic obstructive pulmonary disease, unspecified COPD  type (H)     Comparison: CT pulmonary angiogram 8/31/2019, CT chest portable 519     Technique: CT of the chest was obtained without intravenous contrast.  Axial, coronal, and sagittal reconstructions were obtained and  reviewed.     Contrast: None     Findings:      Lungs: Multiple scattered bronchocentric tree-in-bud type opacities  throughout the peripheral aspects of the right lung. No suspicious  index nodule. Stable large calcified granuloma in the lingula. No new  focal airspace opacity, pneumothorax, or pleural effusion. There is  continued atelectasis of the right middle lobe.  Airways: Central tracheobronchial tree is clear.  Vessels: Main pulmonary artery and aorta are normal in caliber. Normal  three-vessel arch. Mild coronary calcium.  Heart: Heart size is normal without pericardial effusion.  Calcification mitral valve.  Lymph nodes: No suspicious mediastinal or hilar lymphadenopathy.  Calcified mediastinal and right hilar lymph nodes.  Thyroid: Within normal limits.  Esophagus: Within normal  limits     Upper abdomen: Gallstones without adjacent inflammatory change. Simple  fluid attenuating cystic structure extending off the superior pole the  left kidney consistent with simple renal cyst. Incompletely visualized  ventricular peroneal shunt terminating in the right upper quadrant.     Bones and soft tissues: No suspicious axillary lymphadenopathy or soft  tissue mass. No suspicious osseous lesion.                                                                      Impression:   1. No significant change in peripheral tree-in-bud type opacities  scattered throughout the right lung as well as right middle lobe  collapse. These findings are most suggestive of previous mycobacterial  or fungal infection.   2. No index pulmonary nodule.  3. Cholelithiasis.     I have personally reviewed the examination and initial interpretation  and I agree with the findings.     MANA TUCKER MD

## 2020-04-28 ENCOUNTER — TELEPHONE (OUTPATIENT)
Dept: OPHTHALMOLOGY | Facility: CLINIC | Age: 80
End: 2020-04-28

## 2020-05-07 ENCOUNTER — TELEPHONE (OUTPATIENT)
Dept: ENDOCRINOLOGY | Facility: CLINIC | Age: 80
End: 2020-05-07

## 2020-05-07 NOTE — TELEPHONE ENCOUNTER
HILARIO via [employer]  to convert appointment on 5/11 with Dr. Potts to a virtual visit. Left call back number.

## 2020-08-19 ENCOUNTER — TELEPHONE (OUTPATIENT)
Dept: OPHTHALMOLOGY | Facility: CLINIC | Age: 80
End: 2020-08-19

## 2020-08-19 NOTE — TELEPHONE ENCOUNTER
[phone]      Left message with direct number at [phone]  Reviewed tentatively scheduled 8- at 2 PM with Dr. Allen and asked to call direct number to review any new sudden changes or change 8- appt    Phoenix Matamoros RN 9:25 AM 08/20/20        [facility] Call Center    Phone Message    May a detailed message be left on voicemail: yes     Reason for Call: Other: Pts son called to schedule an Appt with Alejandro for an injection that he has been seen for in the past. Protocols state to send a message for this type of Appt. Please call son back at: [phone]. Thank you     Action Taken: Message routed to:  [facility] ([facility] EYE    Travel Screening: Not Applicable

## 2020-08-25 DIAGNOSIS — H35.3221 EXUDATIVE AGE-RELATED MACULAR DEGENERATION OF LEFT EYE WITH ACTIVE CHOROIDAL NEOVASCULARIZATION (H): Primary | ICD-10-CM

## 2020-08-26 ENCOUNTER — OFFICE VISIT (OUTPATIENT)
Dept: OPHTHALMOLOGY | Facility: CLINIC | Age: 80
End: 2020-08-26
Attending: OPHTHALMOLOGY
Payer: COMMERCIAL

## 2020-08-26 DIAGNOSIS — H35.3221 EXUDATIVE AGE-RELATED MACULAR DEGENERATION OF LEFT EYE WITH ACTIVE CHOROIDAL NEOVASCULARIZATION (H): ICD-10-CM

## 2020-08-26 DIAGNOSIS — H26.492 PCO (POSTERIOR CAPSULAR OPACIFICATION), LEFT: Primary | ICD-10-CM

## 2020-08-26 PROCEDURE — 66821 AFTER CATARACT LASER SURGERY: CPT | Mod: LT,ZF | Performed by: OPHTHALMOLOGY

## 2020-08-26 PROCEDURE — 92134 CPTRZ OPH DX IMG PST SGM RTA: CPT | Mod: ZF | Performed by: OPHTHALMOLOGY

## 2020-08-26 PROCEDURE — 25000125 ZZHC RX 250: Mod: ZF | Performed by: OPHTHALMOLOGY

## 2020-08-26 PROCEDURE — G0463 HOSPITAL OUTPT CLINIC VISIT: HCPCS | Mod: ZF

## 2020-08-26 RX ADMIN — APRACLONIDINE HYDROCHLORIDE 1 DROP: 10 SOLUTION/ DROPS OPHTHALMIC at 15:47

## 2020-08-26 ASSESSMENT — CUP TO DISC RATIO
OS_RATIO: 0.65
OD_RATIO: 0.5

## 2020-08-26 ASSESSMENT — TONOMETRY
IOP_METHOD: TONOPEN
OS_IOP_MMHG: 18
OD_IOP_MMHG: 15

## 2020-08-26 ASSESSMENT — EXTERNAL EXAM - LEFT EYE: OS_EXAM: NORMAL

## 2020-08-26 ASSESSMENT — EXTERNAL EXAM - RIGHT EYE: OD_EXAM: NORMAL

## 2020-08-26 ASSESSMENT — VISUAL ACUITY
OD_SC+: -2
OD_SC: 20/25
OS_SC: 3/200 E
METHOD: SNELLEN - LINEAR

## 2020-08-26 ASSESSMENT — SLIT LAMP EXAM - LIDS
COMMENTS: NORMAL
COMMENTS: NORMAL

## 2020-08-26 NOTE — PROGRESS NOTES
Cc: follow up  status post CE/PCIOL right eye 08/21/18     Interval history:  Follow up delayed due to COVID.  Vision is stable compared to prior. They feel like his allergies are worse.     Ok with resident injections  Likes subconj lido    HPI: reports far vision stable, difficulty with near vision, does not have reading glasses     OCULAR IMAGING  OCT Mac 8-26-20  Right eye: few small drusen - stable   Left eye: subfoveal non active CNVM without subretinal fluid / +intraretinal fluid slightly increased     Assessment and plan      1. 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 Stable VA and trace intraretinal fluid. non active CNVM     2.  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   - Weekly Amsler Grid use was discussed and training performed.   - A diet of leafy green vegetables was encouraged.   - Return precautions were given.    3. PVD OU   - RT/RD precautions    4. S/p CEIOL both eyes with posterior capsular opacity   - patient would like to proceed with yag laser; left eye todayy    - Lens centered    - Retina attached    5. Pseudophakia left eye    - monitor    Plan for yag laser left eye today   Follow up in one week VA, intraocular pressure and possible yag right eye   Then return to clinic: 4 months Optical Coherence Tomography    Gianfranco Yun MD  Ophthalmology PGY-3  [facility]       ~~~~~~~~~~~~~~~~~~~~~~~~~~~~~~~~~~   Complete documentation of historical and exam elements from today's encounter can be found in the full encounter summary report (not reduplicated in this progress note).  I personally obtained the chief complaint(s) and history of present illness.  I confirmed and edited as necessary the review of systems,  past medical/surgical history, family history, social history, and examination findings as documented by others; and I examined the patient myself.  I personally reviewed the relevant tests, images, and reports as documented above.  I personally reviewed the ophthalmic test(s) associated with this encounter, agree with the interpretation(s) as documented by the resident/fellow, and have edited the corresponding report(s) as necessary.   I formulated and edited as necessary the assessment and plan and discussed the findings and management plan with the patient and family and I was present for critical portions of the procedure carried out by the resident/fellow and immediately available for the entire procedure    Nadiya Allen MD   of Ophthalmology.  Retina Service   Department of Ophthalmology and Visual Neurosciences   [facility]  Phone: [phone]   Fax: [phone]

## 2020-08-26 NOTE — NURSING NOTE
Chief Complaints and History of Present Illnesses   Patient presents with     Macular Degeneration Follow Up     9 month follow up both eyes.     Chief Complaint(s) and History of Present Illness(es)     Macular Degeneration Follow Up     Laterality: both eyes    Comments: 9 month follow up both eyes.              Comments     Pt here with wife and phone  today.  Pt states vision is about the same as last visit, but does get blurry at times.   DM? Pt doesn't check blood sugars.  Lab Results       Component                Value               Date                       A1C                      5.3                 04/16/2013                 A1C                      5.6                 09/02/2010                 A1C                      5.3                 11/24/2009                 A1C                      5.6                 05/27/2008              JORGE Garvin August 26, 2020 2:23 PM

## 2020-09-03 ENCOUNTER — OFFICE VISIT (OUTPATIENT)
Dept: OPHTHALMOLOGY | Facility: CLINIC | Age: 80
End: 2020-09-03
Attending: OPHTHALMOLOGY
Payer: COMMERCIAL

## 2020-09-03 DIAGNOSIS — Z48.810 AFTERCARE FOLLOWING SURGERY OF A SENSORY ORGAN: Primary | ICD-10-CM

## 2020-09-03 PROCEDURE — G0463 HOSPITAL OUTPT CLINIC VISIT: HCPCS | Mod: ZF

## 2020-09-03 ASSESSMENT — EXTERNAL EXAM - LEFT EYE: OS_EXAM: NORMAL

## 2020-09-03 ASSESSMENT — TONOMETRY
OD_IOP_MMHG: 12
OS_IOP_MMHG: 10
IOP_METHOD: TONOPEN

## 2020-09-03 ASSESSMENT — EXTERNAL EXAM - RIGHT EYE: OD_EXAM: NORMAL

## 2020-09-03 ASSESSMENT — CUP TO DISC RATIO
OD_RATIO: 0.5
OS_RATIO: 0.65

## 2020-09-03 ASSESSMENT — VISUAL ACUITY
METHOD: SNELLEN - LINEAR
OS_SC: 4'/200E
OS_PH_SC: 20/200
OD_SC: 20/25

## 2020-09-03 ASSESSMENT — SLIT LAMP EXAM - LIDS
COMMENTS: NORMAL
COMMENTS: NORMAL

## 2020-09-03 NOTE — NURSING NOTE
Chief Complaints and History of Present Illnesses   Patient presents with     Post Op (Ophthalmology) Left Eye     Chief Complaint(s) and History of Present Illness(es)     Post Op (Ophthalmology) Left Eye     Laterality: left eye    Onset: 1 week ago              Comments     1 week s/p yag LE-Pt. States that there has been no change in VA BE. No pain BE. No flashes or floaters BE.  Jo-Ann Milian COT 9:36 AM September 3, 2020

## 2020-09-03 NOTE — PROGRESS NOTES
Cc: follow up  status post CE/PCIOL right eye 08/21/18 ; status post yag 8.26.2020 left eye     Interval history:  Follow up delayed due to COVID.  Vision is stable compared to prior. They feel like his allergies are worse.     Ok with resident injections  Likes subconj lido    HPI: reports far vision stable, difficulty with near vision, does not have reading glasses     OCULAR IMAGING  OCT Mac 8-26-20  Right eye: few small drusen - stable   Left eye: subfoveal non active CNVM without subretinal fluid / +intraretinal fluid slightly increased     Assessment and plan      1. 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 Stable VA and trace intraretinal fluid. non active CNVM     2.  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   - Weekly Amsler Grid use was discussed and training performed.   - A diet of leafy green vegetables was encouraged.   - Return precautions were given.    3. PVD OU   - RT/RD precautions    4. S/p CEIOL both eyes with posterior capsular opacity right eye   Status post yag left eye   Retina attached  Could consider yag right eye in the future      Then return to clinic: 2 months Optical Coherence Tomography    ~~~~~~~~~~~~~~~~~~~~~~~~~~~~~~~~~~   Complete documentation of historical and exam elements from today's encounter can be found in the full encounter summary report (not reduplicated in this progress note).  I personally obtained the chief complaint(s) and history of present illness.  I confirmed and edited as necessary the review of systems, past medical/surgical history, family history, social history, and examination findings as documented by others; and I examined the patient myself.  I personally reviewed the relevant  tests, images, and reports as documented above.  I formulated and edited as necessary the assessment and plan and discussed the findings and management plan with the patient and family    Nadiya Allen MD   of Ophthalmology.  Retina Service   Department of Ophthalmology and Visual Neurosciences   [facility]  Phone: [phone]   Fax: [phone]

## 2020-09-25 ENCOUNTER — OFFICE VISIT (OUTPATIENT)
Dept: URGENT CARE | Facility: URGENT CARE | Age: 80
End: 2020-09-25
Payer: COMMERCIAL

## 2020-09-25 ENCOUNTER — ANCILLARY PROCEDURE (OUTPATIENT)
Dept: GENERAL RADIOLOGY | Facility: CLINIC | Age: 80
End: 2020-09-25
Attending: FAMILY MEDICINE
Payer: COMMERCIAL

## 2020-09-25 VITALS
HEART RATE: 58 BPM | RESPIRATION RATE: 16 BRPM | SYSTOLIC BLOOD PRESSURE: 150 MMHG | OXYGEN SATURATION: 95 % | BODY MASS INDEX: 23.13 KG/M2 | DIASTOLIC BLOOD PRESSURE: 95 MMHG | WEIGHT: 139 LBS | TEMPERATURE: 97 F

## 2020-09-25 DIAGNOSIS — K59.00 CONSTIPATION, UNSPECIFIED CONSTIPATION TYPE: ICD-10-CM

## 2020-09-25 DIAGNOSIS — K80.20 CALCULUS OF GALLBLADDER WITHOUT CHOLECYSTITIS WITHOUT OBSTRUCTION: ICD-10-CM

## 2020-09-25 DIAGNOSIS — R10.11 RUQ ABDOMINAL PAIN: Primary | ICD-10-CM

## 2020-09-25 DIAGNOSIS — R94.4 DECREASED GFR: ICD-10-CM

## 2020-09-25 DIAGNOSIS — R35.0 URINARY FREQUENCY: ICD-10-CM

## 2020-09-25 LAB
ALBUMIN SERPL-MCNC: 4 G/DL (ref 3.4–5)
ALBUMIN UR-MCNC: NEGATIVE MG/DL
ALP SERPL-CCNC: 55 U/L (ref 40–150)
ALT SERPL W P-5'-P-CCNC: 24 U/L (ref 0–70)
AMYLASE SERPL-CCNC: 32 U/L (ref 30–110)
ANION GAP SERPL CALCULATED.3IONS-SCNC: 6 MMOL/L (ref 3–14)
APPEARANCE UR: CLEAR
AST SERPL W P-5'-P-CCNC: 33 U/L (ref 0–45)
BASOPHILS # BLD AUTO: 0 10E9/L (ref 0–0.2)
BASOPHILS NFR BLD AUTO: 0.9 %
BILIRUB SERPL-MCNC: 0.5 MG/DL (ref 0.2–1.3)
BILIRUB UR QL STRIP: NEGATIVE
BUN SERPL-MCNC: 13 MG/DL (ref 7–30)
CALCIUM SERPL-MCNC: 8.9 MG/DL (ref 8.5–10.1)
CHLORIDE SERPL-SCNC: 94 MMOL/L (ref 94–109)
CO2 SERPL-SCNC: 30 MMOL/L (ref 20–32)
COLOR UR AUTO: YELLOW
CREAT SERPL-MCNC: 1.51 MG/DL (ref 0.66–1.25)
DIFFERENTIAL METHOD BLD: ABNORMAL
EOSINOPHIL # BLD AUTO: 0.2 10E9/L (ref 0–0.7)
EOSINOPHIL NFR BLD AUTO: 3.5 %
ERYTHROCYTE [DISTWIDTH] IN BLOOD BY AUTOMATED COUNT: 14.1 % (ref 10–15)
GFR SERPL CREATININE-BSD FRML MDRD: 43 ML/MIN/{1.73_M2}
GLUCOSE SERPL-MCNC: 109 MG/DL (ref 70–99)
GLUCOSE UR STRIP-MCNC: NEGATIVE MG/DL
HCT VFR BLD AUTO: 39.5 % (ref 40–53)
HGB BLD-MCNC: 13.8 G/DL (ref 13.3–17.7)
HGB UR QL STRIP: ABNORMAL
KETONES UR STRIP-MCNC: NEGATIVE MG/DL
LEUKOCYTE ESTERASE UR QL STRIP: NEGATIVE
LIPASE SERPL-CCNC: 241 U/L (ref 73–393)
LYMPHOCYTES # BLD AUTO: 2.2 10E9/L (ref 0.8–5.3)
LYMPHOCYTES NFR BLD AUTO: 48.7 %
MCH RBC QN AUTO: 27.5 PG (ref 26.5–33)
MCHC RBC AUTO-ENTMCNC: 34.9 G/DL (ref 31.5–36.5)
MCV RBC AUTO: 79 FL (ref 78–100)
MONOCYTES # BLD AUTO: 0.4 10E9/L (ref 0–1.3)
MONOCYTES NFR BLD AUTO: 9.6 %
NEUTROPHILS # BLD AUTO: 1.7 10E9/L (ref 1.6–8.3)
NEUTROPHILS NFR BLD AUTO: 37.3 %
NITRATE UR QL: NEGATIVE
PH UR STRIP: 7 PH (ref 5–7)
PLATELET # BLD AUTO: 230 10E9/L (ref 150–450)
POTASSIUM SERPL-SCNC: 3.1 MMOL/L (ref 3.4–5.3)
PROT SERPL-MCNC: 8.8 G/DL (ref 6.8–8.8)
RBC # BLD AUTO: 5.01 10E12/L (ref 4.4–5.9)
RBC #/AREA URNS AUTO: ABNORMAL /HPF
SODIUM SERPL-SCNC: 130 MMOL/L (ref 133–144)
SOURCE: ABNORMAL
SP GR UR STRIP: 1.01 (ref 1–1.03)
UROBILINOGEN UR STRIP-ACNC: 0.2 EU/DL (ref 0.2–1)
WBC # BLD AUTO: 4.6 10E9/L (ref 4–11)
WBC #/AREA URNS AUTO: ABNORMAL /HPF

## 2020-09-25 PROCEDURE — 82150 ASSAY OF AMYLASE: CPT | Performed by: FAMILY MEDICINE

## 2020-09-25 PROCEDURE — 83690 ASSAY OF LIPASE: CPT | Performed by: FAMILY MEDICINE

## 2020-09-25 PROCEDURE — 99215 OFFICE O/P EST HI 40 MIN: CPT | Performed by: FAMILY MEDICINE

## 2020-09-25 PROCEDURE — 80053 COMPREHEN METABOLIC PANEL: CPT | Performed by: FAMILY MEDICINE

## 2020-09-25 PROCEDURE — 36415 COLL VENOUS BLD VENIPUNCTURE: CPT | Performed by: FAMILY MEDICINE

## 2020-09-25 PROCEDURE — 85025 COMPLETE CBC W/AUTO DIFF WBC: CPT | Performed by: FAMILY MEDICINE

## 2020-09-25 PROCEDURE — 81001 URINALYSIS AUTO W/SCOPE: CPT | Performed by: FAMILY MEDICINE

## 2020-09-25 PROCEDURE — 74019 RADEX ABDOMEN 2 VIEWS: CPT

## 2020-09-25 RX ORDER — DOCUSATE SODIUM 100 MG/1
100 CAPSULE, LIQUID FILLED ORAL 2 TIMES DAILY
Qty: 60 CAPSULE | Refills: 0 | Status: ON HOLD | OUTPATIENT
Start: 2020-09-25 | End: 2020-10-29

## 2020-09-25 NOTE — PROGRESS NOTES
SUBJECTIVE  HPI:  Travis Peres is a 79 year old male with h/o BPH , h/o ventriculoperitoneal shunt ,thyroid carcinoma , post surgical hypothyroidism ,with h/o gall stones who presents with the CC of abdominal/pelvic pain.    Pain is located in the RUQ area, with radiation to none  The pain is characterized as dull and sharp,Pain has been present for 4 month(s) and is stable.and worse for last 2 weeks   EXACERBATING FACTORS: nothing  RELIEVING FACTORS: nothing.  ASSOCIATED SX: headache.  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     Hypovitaminosis D      Multiple pulmonary nodules 2009     Osteopenia 2011     Papillary thyroid carcinoma (H) 2007    4.8 cm, right, node positive;      Pituitary macroadenoma with extrasellar extension (H) 2007    causing obstructive hydrocephalus     Post-surgical hypothyroidism 2007     Uncomplicated asthma      Vocal cord paralysis 2014    right     Xerostomia 2010    due to radiation exposures     Current Outpatient Medications   Medication Sig Dispense Refill     albuterol (PROAIR HFA) 108 (90 Base) MCG/ACT inhaler Inhale 2 puffs into the lungs every 4 hours as needed for shortness of breath / dyspnea or wheezing 3 Inhaler 11     albuterol (PROAIR HFA/PROVENTIL HFA/VENTOLIN HFA) 108 (90 Base) MCG/ACT inhaler Inhale 2 puffs into the lungs every 4 hours as needed for shortness of breath / dyspnea or wheezing 3 Inhaler 11     amLODIPine (NORVASC) 2.5 MG tablet Take 2.5 mg by mouth daily       B  Complex Vitamins (VITAMIN B COMPLEX PO) Take 1 capsule by mouth daily        fish oil-omega-3 fatty acids 1000 MG capsule Take 2 g by mouth daily       fluticasone (FLONASE) 50 MCG/ACT spray Spray 2 sprays into both nostrils daily       GuaiFENesin (MUCUS RELIEF ADULT PO) Take 400 mg by mouth 2 times daily as needed        hydrocortisone (CORTEF) 10 MG tablet 10 mg AM and 5 mg afternoon 300 tablet 1     Ipratropium-Albuterol (COMBIVENT RESPIMAT)  MCG/ACT inhaler Inhale 1 puff into the lungs 4 times daily       levothyroxine (SYNTHROID/LEVOTHROID) 137 MCG tablet Take 1 tablet (137 mcg) by mouth daily 180 tablet 1     loperamide (IMODIUM) 2 MG capsule   1     mirtazapine (REMERON) 15 MG tablet Take 15 mg by mouth At Bedtime        tamsulosin (FLOMAX) 0.4 MG capsule Take 0.4 mg by mouth daily       umeclidinium-vilanterol (ANORO ELLIPTA) 62.5-25 MCG/INH oral inhaler Inhale 1 puff into the lungs daily       alendronate (FOSAMAX) 70 MG tablet Take 1 tablet (70 mg) by mouth every 7 days (Patient not taking: Reported on 2020) 32 tablet 1     benzonatate (TESSALON) 100 MG capsule Take 1 capsule (100 mg) by mouth 3 times daily as needed for cough (Patient not taking: Reported on 2020) 30 capsule 0     guaiFENesin-dextromethorphan (ROBITUSSIN DM) 100-10 MG/5ML syrup Take 10 mLs by mouth 3 times daily as needed for cough (Patient not taking: Reported on 2020) 236 mL 0     Social History     Tobacco Use     Smoking status: Former Smoker     Packs/day: 0.50     Years: 5.00     Pack years: 2.50     Types: Cigarettes     Start date: 1976     Last attempt to quit: 2001     Years since quittin.6     Smokeless tobacco: Never Used   Substance Use Topics     Alcohol use: No       ROS:  10 point ROS of systems including Constitutional, Eyes, Respiratory, Cardiovascular,    Genitourinary, Integumentary, Muscularskeletal, Psychiatric were all negative except for pertinent positives noted in my HPI            OBJECTIVE:  BP (!) 150/95   Pulse 58   Temp 97  F (36.1  C) (Temporal)   Resp 16   Wt 63 kg (139 lb)   SpO2 95%   BMI 23.13 kg/m    GENERAL APPEARANCE: healthy, alert and no distress  EYES: EOMI,  PERRL, conjunctiva clear  HENT: ear canals and TM's normal.  Nose and mouth without ulcers, erythema or lesions  NECK: supple, nontender, no lymphadenopathy  RESP: lungs clear to auscultation - no rales, rhonchi or wheezes  CV: regular rates and rhythm, normal S1 S2, no murmur noted  ABDOMEN:  soft, nontender, no HSM or masses and bowel sounds normal  SKIN: no suspicious lesions or rashes  PSYCH: mentation appears normal    ASSESSMENT:  Travis was seen today for flank pain.    Diagnoses and all orders for this visit:    RUQ abdominal pain  -     CBC with platelets and differential  -     Comprehensive metabolic panel (BMP + Alb, Alk Phos, ALT, AST, Total. Bili, TP)  -     XR Abdomen 2 Views    Calculus of gallbladder without cholecystitis without obstruction    Urinary frequency  -     UA with Microscopic reflex to Culture      D/d-   Cholecystitis/gall stones /pancreatitis/  Discussed with pt about the lab findings   Suggested to follow up with surgery for further treatment plans   See Orders in Epic    Abdominal x-ray did show thickening of the stomach lining and ventriculoperitoneal coil was noted too    Discussed with the patient and wife with the help of  that the potassium was low sodium was also slightly low discuss about doing Pedialyte/Gatorade to help with improving the lab numbers.  Also discussed your x-ray showing thickening of the stomach lining for which I feel an endoscopy is needed so referral to gastroenterologist was done  Due to his prolonged course of right upper quadrant pain with gallstones referring surgery was also done.  Discussed about controlling the pain with Tylenol but the pain really gets bad which she does get at times and can do Tylenol with codeine did review that  the Tylenol with codeine can cause constipation  Advised to do more fiber supplements and also take high-fiber diet with more  vegetables  : Dulcolax to help with constipation.  Also discussed about diminished GFR which was noted during today's lab visit advised to recheck it in the next 2 to 3 weeks  He does have an appointment with primary in 6 weeks time.  did spent>45 minutes with patient and > 50% of the time was for answering questions, discussing findings, counseling and coordination of care     Ruth Obrien MD

## 2020-09-28 ENCOUNTER — TELEPHONE (OUTPATIENT)
Dept: SCHEDULING | Facility: CLINIC | Age: 80
End: 2020-09-28

## 2020-10-02 ENCOUNTER — OFFICE VISIT (OUTPATIENT)
Dept: SURGERY | Facility: CLINIC | Age: 80
End: 2020-10-02
Payer: COMMERCIAL

## 2020-10-02 VITALS
DIASTOLIC BLOOD PRESSURE: 62 MMHG | RESPIRATION RATE: 16 BRPM | OXYGEN SATURATION: 95 % | WEIGHT: 139 LBS | BODY MASS INDEX: 23.16 KG/M2 | SYSTOLIC BLOOD PRESSURE: 148 MMHG | HEIGHT: 65 IN | HEART RATE: 57 BPM

## 2020-10-02 DIAGNOSIS — K80.20 SYMPTOMATIC CHOLELITHIASIS: Primary | ICD-10-CM

## 2020-10-02 PROCEDURE — 99214 OFFICE O/P EST MOD 30 MIN: CPT | Performed by: SURGERY

## 2020-10-02 ASSESSMENT — MIFFLIN-ST. JEOR: SCORE: 1267.38

## 2020-10-02 NOTE — LETTER
[facility]    [address], [address]  [place], [place] [address]  Phone [phone]  Fax [phone] [address], [address]  [facility] [place] [address]  Phone [phone]  Fax [phone]    [email or web address]     2020    Re: Travis Peres  : 1940      [facility] Consultation   CHIEF COMPLAINT:        Chief Complaint   Patient presents with     Consult     Upper right abdominal pain, possible gallbladder   HISTORY OF PRESENT ILLNESS: Travis Peres is a 79 year old male who is seen in consultation for evaluation of right upper quadrant abdominal pain, possible gallbladder issues. The patient's visit was conducted utilizing a [employer] phone . The HPI was very difficult to obtain, as the patient was able to provide very little detail about his symptoms. The majority of the HPI was obtained from his wife. She reports that the patient began having pain in the right upper quadrant of the abdomen starting in early . He does not have any associated nausea or vomiting. He reports that the pain is constant. However, the symptoms do not seem to be progressively worsening. Review of the patient's chart indicates that he underwent right upper quadrant ultrasound and HIDA scan back in . Ultrasound demonstrated cholelithiasis and gallbladder wall thickening. HIDA scan did not demonstrate any cystic duct obstruction. Patient's recent LFTs and lipase are within normal limits. He did undergo a recent abdominal x-ray which demonstrated possible gastric wall thickening. Further evaluation by gastroenterology was recommended. Past abdominal surgical history seems to be significant only for  shunt. Abdominal x-ray demonstrates the shunt tubing to still be in place.   REVIEW OF SYSTEMS:   Constitutional: No fevers or chills   Eyes: Poor vision   HENT: Reports headaches, No rhinorrhea, No sore  throat   Respiratory: Cough and shortness of breath   Cardiovascular: Denies chest pain or palpitations   Gastrointestinal: Abdominal pain and constipation   Genitourinary: Issues related to enlarged prostate   Musculoskeletal: Arthritis   Neurologic: No numbness or tingling   Integumentary: No skin rashes   Past Medical History        Past Medical History:   Diagnosis Date     Arthritis      BPH (benign prostatic hyperplasia)      COPD (chronic obstructive pulmonary disease) (H)      Depression      Hyperlipidemia      Hypertension     no current meds     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     Hypovitaminosis D      Multiple pulmonary nodules 2009     Osteopenia 2011     Papillary thyroid carcinoma (H) 2007    4.8 cm, right, node positive;      Pituitary macroadenoma with extrasellar extension (H) 2007    causing obstructive hydrocephalus     Post-surgical hypothyroidism 2007     Uncomplicated asthma      Vocal cord paralysis 2014    right     Xerostomia 2010    due to radiation exposures     Past Surgical History         Past Surgical History:   Procedure Laterality Date     cymetra injection       ESOPHAGOSCOPY, GASTROSCOPY, DUODENOSCOPY (EGD), COMBINED  6/20/2013    Procedure: COMBINED ESOPHAGOSCOPY, GASTROSCOPY, DUODENOSCOPY (EGD), BIOPSY SINGLE OR MULTIPLE; gastroscopy; Surgeon: Leslie Guadarrama MD; Location:  GI     ESOPHAGOSCOPY, GASTROSCOPY, DUODENOSCOPY (EGD), COMBINED N/A 9/20/2019    Procedure: ESOPHAGOGASTRODUODENOSCOPY (EGD); Surgeon: Gilberto Gibson DO; Location:  GI     HERNIA REPAIR Right      lipoma resection chest wall Right 11/09     PHACOEMULSIFICATION CLEAR CORNEA WITH STANDARD INTRAOCULAR LENS IMPLANT Left 5/7/2018    Procedure: PHACOEMULSIFICATION CLEAR CORNEA WITH STANDARD INTRAOCULAR LENS IMPLANT; LEFT PHACOEMULSIFICATION CLEAR CORNEA WITH STANDARD INTRAOCULAR LENS IMPLANT ; Surgeon: Nadiya Allen MD; Location:  [facility]     PHACOEMULSIFICATION CLEAR CORNEA  WITH STANDARD INTRAOCULAR LENS IMPLANT Right 2018    Procedure: PHACOEMULSIFICATION CLEAR CORNEA WITH STANDARD INTRAOCULAR LENS IMPLANT; RIGHT EYE PHACOEMULSIFICATION CLEAR CORNEA WITH STANDARD INTRAOCULAR LENS IMPLANT; Surgeon: Nadiya Allen MD; Location: [facility]     right selective neck dissection level 2, 3, 4  11/3/09     right  shunt placement  07     THORACIC SURGERY  Fidencio 3 2017     THYMECTOMY N/A 1/3/2017    Procedure: THYMECTOMY; Surgeon: Ernesto Armstrong MD; Location: [facility]     THYROIDECTOMY  07     TRANSCERVICAL EXTENDED MEDIASTINAL LYMPHADENECTOMY N/A 1/3/2017    Procedure: TRANSCERVICAL EXTENDED MEDIASTINAL LYMPHADENECTOMY; Surgeon: Ernesto Armstrong MD; Location: [facility] OR     transnasal endoscopic resection of pituitary adenoma  2007     Family History         Family History   Problem Relation Age of Onset     Cancer No family hx of     no skin cancer     Glaucoma No family hx of      Macular Degeneration No family hx of      Social History           Tobacco Use     Smoking status: Former Smoker     Packs/day: 0.50     Years: 5.00     Pack years: 2.50     Types: Cigarettes     Start date: 1976     Quit date: 2001     Years since quittin.6     Smokeless tobacco: Never Used   Substance Use Topics     Alcohol use: No         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)      (ventriculoperitoneal) shunt status     Hydrocephalus (H)     Noncompliance with medication regimen     High serum thyroglobulin     anterior mediastinal mass 2.6 cm     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), wet (H)     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     Language barrier affecting health care     Low bone density     Deafness, unspecified laterality     Hypocalcemia     Acute and chronic respiratory failure with hypoxia (H)     Benign localized hyperplasia of prostate with urinary obstruction     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)     Chronic obstructive pulmonary disease (H)     Essential hypertension     Hyperlipidemia     Cholelithiasis with possible cholecystitis     GI bleed     Abnormal CT of the chest     Anemia, unspecified     Benign neoplasm of pituitary gland and craniopharyngeal duct (pouch) (H)     Coronary atherosclerosis     Diabetes insipidus (H)     Hepatitis B carrier (H)     Hypergammaglobulinemia     Inguinal hernia recurrent bilateral     Osteopenia     Psoriasis     Statin myopathy     Unilateral recurrent inguinal hernia without obstruction or gangrene     Vision loss     Vitamin D deficiency           Allergies   Allergen Reactions     Metoprolol Shortness Of Breath     Bradycardia, fatigue, COPD worsening.      Fenofibrate Hives     Lisinopril Cough     Losartan Cough     Niacin      Simvastatin Cramps     Current Outpatient Prescriptions          Current Outpatient Medications   Medication Sig Dispense Refill     albuterol (PROAIR HFA) 108 (90 Base) MCG/ACT inhaler Inhale 2 puffs into the lungs every 4 hours as needed for shortness of breath / dyspnea or wheezing 3 Inhaler 11     albuterol (PROAIR HFA/PROVENTIL HFA/VENTOLIN HFA) 108 (90 Base) MCG/ACT inhaler Inhale 2 puffs into the lungs every 4 hours as needed for shortness of breath / dyspnea or wheezing 3 Inhaler 11     alendronate (FOSAMAX) 70 MG tablet Take 1 tablet (70 mg) by mouth every 7 days (Patient not taking: Reported on 9/25/2020) 32 tablet 1     amLODIPine (NORVASC) 2.5 MG tablet Take 2.5 mg by mouth daily       B Complex Vitamins (VITAMIN B COMPLEX PO) Take 1 capsule by mouth daily        benzonatate (TESSALON) 100 MG capsule Take 1 capsule (100 mg) by mouth 3  "times daily as needed for cough (Patient not taking: Reported on 9/25/2020) 30 capsule 0     docusate sodium (COLACE) 100 MG capsule Take 1 capsule (100 mg) by mouth 2 times daily 60 capsule 0     fish oil-omega-3 fatty acids 1000 MG capsule Take 2 g by mouth daily       fluticasone (FLONASE) 50 MCG/ACT spray Spray 2 sprays into both nostrils daily       GuaiFENesin (MUCUS RELIEF ADULT PO) Take 400 mg by mouth 2 times daily as needed        guaiFENesin-dextromethorphan (ROBITUSSIN DM) 100-10 MG/5ML syrup Take 10 mLs by mouth 3 times daily as needed for cough (Patient not taking: Reported on 9/25/2020) 236 mL 0     hydrocortisone (CORTEF) 10 MG tablet 10 mg AM and 5 mg afternoon 300 tablet 1     Ipratropium-Albuterol (COMBIVENT RESPIMAT)  MCG/ACT inhaler Inhale 1 puff into the lungs 4 times daily       levothyroxine (SYNTHROID/LEVOTHROID) 137 MCG tablet Take 1 tablet (137 mcg) by mouth daily 180 tablet 1     loperamide (IMODIUM) 2 MG capsule   1     mirtazapine (REMERON) 15 MG tablet Take 15 mg by mouth At Bedtime        tamsulosin (FLOMAX) 0.4 MG capsule Take 0.4 mg by mouth daily       umeclidinium-vilanterol (ANORO ELLIPTA) 62.5-25 MCG/INH oral inhaler Inhale 1 puff into the lungs daily     Vitals: BP (!) 148/62 (BP Location: Left arm, Patient Position: Sitting, Cuff Size: Adult Regular)  Pulse 57  Resp 16  Ht 1.651 m (5' 5\")  Wt 63 kg (139 lb)  SpO2 95%  BMI 23.13 kg/m    BMI= Body mass index is 23.13 kg/m .   EXAM:   General: Vital signs reviewed, in no apparent distress   Eyes: Anicteric   HENT: Normocephalic, atraumatic, trachea midline   Respiratory: Breathing nonlabored   Cardiovascular: Regular rate and rhythm   GI: Abdomen soft, slightly distended, focally tender with palpation in the right upper quadrant   Musculoskeletal: No gross deformities   Neurologic: Grossly nonfocal exam   Psychiatric: Flat affect   Integumentary: Warm and dry   All labs and imaging personally reviewed and significant " for:   ULTRASOUND ABDOMEN LIMITED 9/17/2019 8:48 PM   HISTORY: Elevated LFTs.   COMPARISON: November 24, 2008   FINDINGS: Liver is mildly fatty infiltrated as evidenced by a diffuse   increase in echogenicity without focal lesions. Gallbladder   demonstrates cholelithiasis with mild gallbladder wall thickening.   Extrahepatic bile duct is mildly generous at 6 mm although this may be   related to age and may not have clinical significance. Pancreas is   normal where visualized. Right kidney is normal in size. There is no   hydronephrosis.   IMPRESSION: Cholelithiasis and mild gallbladder wall thickening,   differential includes acute cholecystitis.   NUCLEAR MEDICINE HEPATOBILIARY SCAN September 18, 2019 at 1156 hours   INDICATION: Right upper quadrant pain, cholecystitis suspected.   ADDITIONAL INFORMATION: None.   TECHNIQUE: The patient received 6.9 mCi of Tc-99m Choletec   intravenously. Images were obtained out through 44 minutes.   FINDINGS: There is prompt clearance of the radionuclide from the blood   pool into the liver. By 10 minutes there is clear visualization of the   intrahepatic ducts as well as the upper common bile duct. By 12   minutes there is visualization of the gallbladder. At 15 minutes there   is emptying from the common bile duct into the small bowel.   IMPRESSION: Normal hepatobiliary scan without evidence for acute or   chronic cholecystitis.   XR ABDOMEN TWO VIEWS 9/25/2020 4:42 PM   HISTORY: Right upper quadrant pain. Right upper quadrant abdominal   pain.   IMPRESSION: Flat and upright views of the abdomen are performed. Bowel   gas pattern is unremarkable. No abnormal air-fluid levels to suggest   obstruction. Fecal debris is scattered throughout the colon.   Ventriculoperitoneal shunt catheter coils in the lower abdomen and   pelvis. No abnormal calcifications are appreciated. Degenerative spine   changes are noted in the lumbar region. On the supine view question   the possibility of  gastric wall thickening in the left upper quadrant.   Follow-up endoscopy if clinically warranted for further assessment.   Otherwise upper GI examination could be performed.         Lab Results   Component Value Date    AST 33 09/25/2020           Lab Results   Component Value Date    ALT 24 09/25/2020           Lab Results   Component Value Date    BILICONJ 0.0 03/14/2011           Lab Results   Component Value Date    BILITOTAL 0.5 09/25/2020           Lab Results   Component Value Date    ALBUMIN 4.0 09/25/2020           Lab Results   Component Value Date    PROTTOTAL 8.8 09/25/2020           Lab Results   Component Value Date    ALKPHOS 55 09/25/2020           Lipase   Date Value Ref Range Status   09/25/2020 241 73 - 393 U/L Final   ASSESSMENT:   Travis Peres is a 79 year old who presents with right upper quadrant abdominal pain, probable symptomatic cholelithiasis, possible cholecystitis. Significant pertinent co-morbidities include: None.   PLAN:   Following a thorough discussion with the patient and his wife, via [employer] phone , the decision was made to proceed with laparoscopic cholecystectomy. The risks and benefits of surgery were discussed. The patient's wife is requesting overnight admission for observation postoperatively, and I believe this is reasonable. Prior to the procedure, the patient will need to be medically cleared by his primary provider. The patient's wife is under the impression that the patient will not be able to be medically cleared for the procedure. I do not see anything in the chart to indicate this. In addition, the patient will need to undergo preoperative upper endoscopic evaluation with gastroenterology.   It was my pleasure to participate in the care of Travis Peres in clinic today. Thank you for this consultation.   Sabrina Vance MD

## 2020-10-02 NOTE — PROGRESS NOTES
[facility] General Surgery Clinic Consultation    CHIEF COMPLAINT:  Chief Complaint   Patient presents with     Consult     Upper right abdominal pain, possible gallbladder       HISTORY OF PRESENT ILLNESS:  Travis Peres is a 79 year old male who is seen in consultation for evaluation of right upper quadrant abdominal pain, possible gallbladder issues.  The patient's visit was conducted utilizing a [employer] phone .  The HPI was very difficult to obtain, as the patient was able to provide very little detail about his symptoms.  The majority of the HPI was obtained from his wife.  She reports that the patient began having pain in the right upper quadrant of the abdomen starting in early 2020.  He does not have any associated nausea or vomiting.  He reports that the pain is constant.  However, the symptoms do not seem to be progressively worsening.  Review of the patient's chart indicates that he underwent right upper quadrant ultrasound and HIDA scan back in 2019.  Ultrasound demonstrated cholelithiasis and gallbladder wall thickening.  HIDA scan did not demonstrate any cystic duct obstruction.  Patient's recent LFTs and lipase are within normal limits.  He did undergo a recent abdominal x-ray which demonstrated possible gastric wall thickening.  Further evaluation by gastroenterology was recommended.  Past abdominal surgical history seems to be significant only for  shunt.  Abdominal x-ray demonstrates the shunt tubing to still be in place.    REVIEW OF SYSTEMS:  Constitutional: No fevers or chills  Eyes: Poor vision  HENT: Reports headaches, No rhinorrhea, No sore throat  Respiratory: Cough and shortness of breath  Cardiovascular: Denies chest pain or palpitations  Gastrointestinal: Abdominal pain and constipation  Genitourinary: Issues related to enlarged prostate  Musculoskeletal: Arthritis  Neurologic: No numbness or tingling  Integumentary: No skin rashes    Past Medical History:   Diagnosis Date      Arthritis      BPH (benign prostatic hyperplasia)      COPD (chronic obstructive pulmonary disease) (H)      Depression      Hyperlipidemia      Hypertension     no current meds     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     Hypovitaminosis D      Multiple pulmonary nodules 2009     Osteopenia 2011     Papillary thyroid carcinoma (H) 2007    4.8 cm, right, node positive;      Pituitary macroadenoma with extrasellar extension (H) 2007    causing obstructive hydrocephalus     Post-surgical hypothyroidism 2007     Uncomplicated asthma      Vocal cord paralysis 2014    right     Xerostomia 2010    due to radiation exposures       Past Surgical History:   Procedure Laterality Date     cymetra injection       ESOPHAGOSCOPY, GASTROSCOPY, DUODENOSCOPY (EGD), COMBINED  6/20/2013    Procedure: COMBINED ESOPHAGOSCOPY, GASTROSCOPY, DUODENOSCOPY (EGD), BIOPSY SINGLE OR MULTIPLE;  gastroscopy;  Surgeon: Leslie Guadarrama MD;  Location:  GI     ESOPHAGOSCOPY, GASTROSCOPY, DUODENOSCOPY (EGD), COMBINED N/A 9/20/2019    Procedure: ESOPHAGOGASTRODUODENOSCOPY (EGD);  Surgeon: Gilberto Gibson DO;  Location:  GI     HERNIA REPAIR Right      lipoma resection chest wall Right 11/09     PHACOEMULSIFICATION CLEAR CORNEA WITH STANDARD INTRAOCULAR LENS IMPLANT Left 5/7/2018    Procedure: PHACOEMULSIFICATION CLEAR CORNEA WITH STANDARD INTRAOCULAR LENS IMPLANT;  LEFT PHACOEMULSIFICATION CLEAR CORNEA WITH STANDARD INTRAOCULAR LENS IMPLANT ;  Surgeon: Nadiya Allen MD;  Location: [facility]     PHACOEMULSIFICATION CLEAR CORNEA WITH STANDARD INTRAOCULAR LENS IMPLANT Right 8/21/2018    Procedure: PHACOEMULSIFICATION CLEAR CORNEA WITH STANDARD INTRAOCULAR LENS IMPLANT;  RIGHT EYE PHACOEMULSIFICATION CLEAR CORNEA WITH STANDARD INTRAOCULAR LENS IMPLANT;  Surgeon: Nadiya Allen MD;  Location:  [facility]     right selective neck dissection level 2, 3, 4  11/3/09     right  shunt placement  6/28/07     THORACIC SURGERY   Fidencio 3 2017     THYMECTOMY N/A 1/3/2017    Procedure: THYMECTOMY;  Surgeon: Ernesto Armstrong MD;  Location: [facility] OR     THYROIDECTOMY  07     TRANSCERVICAL EXTENDED MEDIASTINAL LYMPHADENECTOMY N/A 1/3/2017    Procedure: TRANSCERVICAL EXTENDED MEDIASTINAL LYMPHADENECTOMY;  Surgeon: Ernesto Armstrong MD;  Location: [facility] OR     transnasal endoscopic resection of pituitary adenoma  2007       Family History   Problem Relation Age of Onset     Cancer No family hx of         no skin cancer     Glaucoma No family hx of      Macular Degeneration No family hx of        Social History     Tobacco Use     Smoking status: Former Smoker     Packs/day: 0.50     Years: 5.00     Pack years: 2.50     Types: Cigarettes     Start date: 1976     Quit date: 2001     Years since quittin.6     Smokeless tobacco: Never Used   Substance Use Topics     Alcohol use: No       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)      (ventriculoperitoneal) shunt status     Hydrocephalus (H)     Noncompliance with medication regimen     High serum thyroglobulin     anterior mediastinal mass 2.6 cm     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), wet (H)     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     Language barrier affecting health care     Low bone density     Deafness, unspecified laterality     Hypocalcemia     Acute and chronic respiratory failure with hypoxia (H)     Benign localized hyperplasia of prostate with urinary obstruction     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)     Chronic obstructive pulmonary disease (H)     Essential hypertension     Hyperlipidemia     Cholelithiasis with  possible cholecystitis     GI bleed     Abnormal CT of the chest     Anemia, unspecified     Benign neoplasm of pituitary gland and craniopharyngeal duct (pouch) (H)     Coronary atherosclerosis     Diabetes insipidus (H)     Hepatitis B carrier (H)     Hypergammaglobulinemia     Inguinal hernia recurrent bilateral     Osteopenia     Psoriasis     Statin myopathy     Unilateral recurrent inguinal hernia without obstruction or gangrene     Vision loss     Vitamin D deficiency       Allergies   Allergen Reactions     Metoprolol Shortness Of Breath     Bradycardia, fatigue, COPD worsening.         Fenofibrate Hives     Lisinopril Cough     Losartan Cough     Niacin      Simvastatin Cramps       Current Outpatient Medications   Medication Sig Dispense Refill     albuterol (PROAIR HFA) 108 (90 Base) MCG/ACT inhaler Inhale 2 puffs into the lungs every 4 hours as needed for shortness of breath / dyspnea or wheezing 3 Inhaler 11     albuterol (PROAIR HFA/PROVENTIL HFA/VENTOLIN HFA) 108 (90 Base) MCG/ACT inhaler Inhale 2 puffs into the lungs every 4 hours as needed for shortness of breath / dyspnea or wheezing 3 Inhaler 11     alendronate (FOSAMAX) 70 MG tablet Take 1 tablet (70 mg) by mouth every 7 days (Patient not taking: Reported on 9/25/2020) 32 tablet 1     amLODIPine (NORVASC) 2.5 MG tablet Take 2.5 mg by mouth daily       B Complex Vitamins (VITAMIN B COMPLEX PO) Take 1 capsule by mouth daily        benzonatate (TESSALON) 100 MG capsule Take 1 capsule (100 mg) by mouth 3 times daily as needed for cough (Patient not taking: Reported on 9/25/2020) 30 capsule 0     docusate sodium (COLACE) 100 MG capsule Take 1 capsule (100 mg) by mouth 2 times daily 60 capsule 0     fish oil-omega-3 fatty acids 1000 MG capsule Take 2 g by mouth daily       fluticasone (FLONASE) 50 MCG/ACT spray Spray 2 sprays into both nostrils daily       GuaiFENesin (MUCUS RELIEF ADULT PO) Take 400 mg by mouth 2 times daily as needed         "guaiFENesin-dextromethorphan (ROBITUSSIN DM) 100-10 MG/5ML syrup Take 10 mLs by mouth 3 times daily as needed for cough (Patient not taking: Reported on 9/25/2020) 236 mL 0     hydrocortisone (CORTEF) 10 MG tablet 10 mg AM and 5 mg afternoon 300 tablet 1     Ipratropium-Albuterol (COMBIVENT RESPIMAT)  MCG/ACT inhaler Inhale 1 puff into the lungs 4 times daily       levothyroxine (SYNTHROID/LEVOTHROID) 137 MCG tablet Take 1 tablet (137 mcg) by mouth daily 180 tablet 1     loperamide (IMODIUM) 2 MG capsule   1     mirtazapine (REMERON) 15 MG tablet Take 15 mg by mouth At Bedtime        tamsulosin (FLOMAX) 0.4 MG capsule Take 0.4 mg by mouth daily       umeclidinium-vilanterol (ANORO ELLIPTA) 62.5-25 MCG/INH oral inhaler Inhale 1 puff into the lungs daily         Vitals: BP (!) 148/62 (BP Location: Left arm, Patient Position: Sitting, Cuff Size: Adult Regular)   Pulse 57   Resp 16   Ht 1.651 m (5' 5\")   Wt 63 kg (139 lb)   SpO2 95%   BMI 23.13 kg/m    BMI= Body mass index is 23.13 kg/m .    EXAM:  General: Vital signs reviewed, in no apparent distress  Eyes: Anicteric  HENT: Normocephalic, atraumatic, trachea midline   Respiratory: Breathing nonlabored  Cardiovascular: Regular rate and rhythm  GI: Abdomen soft, slightly distended, focally tender with palpation in the right upper quadrant  Musculoskeletal: No gross deformities  Neurologic: Grossly nonfocal exam  Psychiatric: Flat affect  Integumentary: Warm and dry    All labs and imaging personally reviewed and significant for:   ULTRASOUND ABDOMEN LIMITED  9/17/2019 8:48 PM      HISTORY: Elevated LFTs.     COMPARISON: November 24, 2008     FINDINGS:  Liver is mildly fatty infiltrated as evidenced by a diffuse  increase in echogenicity without focal lesions. Gallbladder  demonstrates cholelithiasis with mild gallbladder wall thickening.  Extrahepatic bile duct is mildly generous at 6 mm although this may be  related to age and may not have clinical " significance. Pancreas is  normal where visualized. Right kidney is normal in size. There is no  hydronephrosis.                                                                      IMPRESSION:  Cholelithiasis and mild gallbladder wall thickening,  differential includes acute cholecystitis.    NUCLEAR MEDICINE HEPATOBILIARY SCAN September 18, 2019 at 1156 hours     INDICATION: Right upper quadrant pain, cholecystitis suspected.      ADDITIONAL INFORMATION: None.     TECHNIQUE: The patient received 6.9 mCi of Tc-99m Choletec  intravenously. Images were obtained out through 44 minutes.       FINDINGS: There is prompt clearance of the radionuclide from the blood  pool into the liver. By 10 minutes there is clear visualization of the  intrahepatic ducts as well as the upper common bile duct. By 12  minutes there is visualization of the gallbladder. At 15 minutes there  is emptying from the common bile duct into the small bowel.                                                                      IMPRESSION: Normal hepatobiliary scan without evidence for acute or  chronic cholecystitis.     XR ABDOMEN TWO VIEWS 9/25/2020 4:42 PM      HISTORY: Right upper quadrant pain. Right upper quadrant abdominal  pain.                                                                      IMPRESSION: Flat and upright views of the abdomen are performed. Bowel  gas pattern is unremarkable. No abnormal air-fluid levels to suggest  obstruction. Fecal debris is scattered throughout the colon.  Ventriculoperitoneal shunt catheter coils in the lower abdomen and  pelvis. No abnormal calcifications are appreciated. Degenerative spine  changes are noted in the lumbar region. On the supine view question  the possibility of gastric wall thickening in the left upper quadrant.  Follow-up endoscopy if clinically warranted for further assessment.  Otherwise upper GI examination could be performed.    Lab Results   Component Value Date    AST 33  09/25/2020     Lab Results   Component Value Date    ALT 24 09/25/2020     Lab Results   Component Value Date    BILICONJ 0.0 03/14/2011      Lab Results   Component Value Date    BILITOTAL 0.5 09/25/2020     Lab Results   Component Value Date    ALBUMIN 4.0 09/25/2020     Lab Results   Component Value Date    PROTTOTAL 8.8 09/25/2020      Lab Results   Component Value Date    ALKPHOS 55 09/25/2020     Lipase   Date Value Ref Range Status   09/25/2020 241 73 - 393 U/L Final         ASSESSMENT:  Travis Peres is a 79 year old who presents with right upper quadrant abdominal pain, probable symptomatic cholelithiasis, possible cholecystitis.  Significant pertinent co-morbidities include: None.       PLAN:  Following a thorough discussion with the patient and his wife, via [employer] phone , the decision was made to proceed with laparoscopic cholecystectomy.  The risks and benefits of surgery were discussed.  The patient's wife is requesting overnight admission for observation postoperatively, and I believe this is reasonable.  Prior to the procedure, the patient will need to be medically cleared by his primary provider.  The patient's wife is under the impression that the patient will not be able to be medically cleared for the procedure.  I do not see anything in the chart to indicate this.  In addition, the patient will need to undergo preoperative upper endoscopic evaluation with gastroenterology.    It was my pleasure to participate in the care of Travis Peres in clinic today. Thank you for this consultation.         Sabrina Vance MD    Please route or send letter to:  Primary Care Provider (PCP) and Referring Provider

## 2020-10-06 DIAGNOSIS — Z11.59 ENCOUNTER FOR SCREENING FOR OTHER VIRAL DISEASES: Primary | ICD-10-CM

## 2020-10-12 ENCOUNTER — TRANSFERRED RECORDS (OUTPATIENT)
Dept: HEALTH INFORMATION MANAGEMENT | Facility: CLINIC | Age: 80
End: 2020-10-12

## 2020-10-13 ENCOUNTER — ANCILLARY PROCEDURE (OUTPATIENT)
Dept: CT IMAGING | Facility: CLINIC | Age: 80
End: 2020-10-13
Attending: INTERNAL MEDICINE
Payer: COMMERCIAL

## 2020-10-13 ENCOUNTER — VIRTUAL VISIT (OUTPATIENT)
Dept: PULMONOLOGY | Facility: CLINIC | Age: 80
End: 2020-10-13
Attending: INTERNAL MEDICINE
Payer: COMMERCIAL

## 2020-10-13 DIAGNOSIS — R91.8 PULMONARY NODULES/LESIONS, MULTIPLE: ICD-10-CM

## 2020-10-13 DIAGNOSIS — J44.9 CHRONIC OBSTRUCTIVE PULMONARY DISEASE, UNSPECIFIED COPD TYPE (H): Primary | ICD-10-CM

## 2020-10-13 DIAGNOSIS — J44.1 COPD EXACERBATION (H): ICD-10-CM

## 2020-10-13 LAB — RADIOLOGIST FLAGS: NORMAL

## 2020-10-13 PROCEDURE — 71250 CT THORAX DX C-: CPT | Mod: GC | Performed by: RADIOLOGY

## 2020-10-13 PROCEDURE — 99214 OFFICE O/P EST MOD 30 MIN: CPT | Mod: TEL | Performed by: INTERNAL MEDICINE

## 2020-10-13 RX ORDER — PREDNISONE 20 MG/1
40 TABLET ORAL DAILY
Qty: 10 TABLET | Refills: 0 | Status: SHIPPED | OUTPATIENT
Start: 2020-10-13 | End: 2020-10-18

## 2020-10-13 RX ORDER — LEVOFLOXACIN 500 MG/1
500 TABLET, FILM COATED ORAL DAILY
Qty: 7 TABLET | Refills: 0 | Status: SHIPPED | OUTPATIENT
Start: 2020-10-13 | End: 2020-10-20

## 2020-10-13 NOTE — PROGRESS NOTES
"Travis Peres is a 79 year old male who is being evaluated via a billable telephone visit.      The patient has been notified of following:     \"This telephone visit will be conducted via a call between you and your physician/provider. We have found that certain health care needs can be provided without the need for a physical exam.  This service lets us provide the care you need with a short phone conversation.  If a prescription is necessary we can send it directly to your pharmacy.  If lab work is needed we can place an order for that and you can then stop by our lab to have the test done at a later time.    Telephone visits are billed at different rates depending on your insurance coverage. During this emergency period, for some insurers they may be billed the same as an in-person visit.  Please reach out to your insurance provider with any questions.    If during the course of the call the physician/provider feels a telephone visit is not appropriate, you will not be charged for this service.\"    Patient has given verbal consent for Telephone visit?  Yes    What phone number would you like to be contacted at? [phone]    How would you like to obtain your AVS? Mail a copy     Russell Willoughby LPN    Phone call duration: 25 minutes    Spenser Haro MD      "

## 2020-10-13 NOTE — PROGRESS NOTES
"LUNG NODULE & INTERVENTIONAL PULMONARY CLINIC  CLINICS & SURGERY CENTER, [facility], [facility]   VIRTUAL TELEPHONE VISIT    Travis Peres MRN# [id number]   Age: 79 year old YOB: 1940     Reason for Consultation: lung nodule(s)    Requesting Physician: Karol MARTIN [address] [address]    Travis Peres is a 79 year old male who is being evaluated via a billable telephone visit.      The patient has been notified of following: \"This telephone visit will be conducted via a call between you and your physician/provider. We have found that certain health care needs can be provided without the need for a physical exam.  This service lets us provide the care you need with a short phone conversation.  If a prescription is necessary we can send it directly to your pharmacy.  If lab work is needed we can place an order for that and you can then stop by our lab to have the test done at a later time. Telephone visits are billed at different rates depending on your insurance coverage. During this emergency period, for some insurers they may be billed the same as an in-person visit.  Please reach out to your insurance provider with any questions. If during the course of the call the physician/provider feels a telephone visit is not appropriate, you will not be charged for this service.\"    Patient has given verbal consent for Telephone visit?  Yes    How would you like to obtain your AVS? Rajnihart    Phone call duration: 25 minutes    Additional provider notes: see below     Assessment and Plan:    1. Established multiple pulmonary lung nodule(s). Given the characteristics on current/previous imaging and risk factors; I would classify this to lower risk for cancer given stability. RUL posterior area with cluster of nodules likely atypical infection and has been stable/slightly better. Other nodules are stable. Repeat CT in 12mo.      2. COPD " and likely acute exacerbation. Severe COPD based on PFT in 2009. I will give him course of pred+abx. We discussed to hold his hydrocortisone for the 5-days he is on prednisone and restart afterward. However, the  was not confident that this was understood by the patient. He can cont on anora ellipta only. Will prescribe albuterol prn.       Billing: I spent more than 30 minutes face to face and greater than 50% of time was for counseling and coordination of care about the issues above.     Spenser Haro MD   of Medicine  Interventional Pulmonology  Department of Pulmonary, Allergy, Critical Care and Sleep Medicine   [facility]  Pager: [phone]           History:      Travis Peres is a 78 year old male with sig h/o for hypothyroidism, COPD, HTN, hyperlipidemia, MDD,  who is here for evaluation/followup of lung nodule(s).     - Has increased productive cough over the past few weeks.  -  used for the entire visit   - CT chest and found incidental nodules. Here for followup  - Personal hx of cancer: thyroid cancer  - Family hx of cancer: No  - Exposure hx: Denies asbestos or radon exposure   - Tobacco hx: Past Smoker: 0.5ppd for 5years. Quit 2001.   - My interpretation of the images relevant for this visit includes: right posterior nodules   - My interpretation of the PFT's relevant for this visit includes: Obstructive pattern      Culprit Nodule(s):   1: Bilateral sub4mm nodules and centrilobular/tree-in-bud have improved since 4/2019.      Other active medical problems include:   - had thyroid cancer (papillary thyroid cancer) s/p thyroidectomy 2007. On synthroid.    - Has COPD. On anora ellipta and daily. Up-to-date on flu and pna vaccine. No recent AECOPD. Previous PFT obstructed with reduced DLCO.   - has HTN, hyperlipidemia. Stable.             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     Post-surgical hypothyroidism 2007     Uncomplicated asthma      Vocal cord paralysis 2014    right     Xerostomia 2010    due to radiation exposures           Past Surgical History:      Past Surgical History:   Procedure Laterality Date     cymetra injection       ESOPHAGOSCOPY, GASTROSCOPY, DUODENOSCOPY (EGD), COMBINED  6/20/2013    Procedure: COMBINED ESOPHAGOSCOPY, GASTROSCOPY, DUODENOSCOPY (EGD), BIOPSY SINGLE OR MULTIPLE;  gastroscopy;  Surgeon: Leslie Guadarrama MD;  Location:  GI     ESOPHAGOSCOPY, GASTROSCOPY, DUODENOSCOPY (EGD), COMBINED N/A 9/20/2019    Procedure: ESOPHAGOGASTRODUODENOSCOPY (EGD);  Surgeon: Gilberto Gibson DO;  Location:  GI     HERNIA REPAIR Right      lipoma resection chest wall Right 11/09     PHACOEMULSIFICATION CLEAR CORNEA WITH STANDARD INTRAOCULAR LENS IMPLANT Left 5/7/2018    Procedure: PHACOEMULSIFICATION CLEAR CORNEA WITH STANDARD INTRAOCULAR LENS IMPLANT;  LEFT PHACOEMULSIFICATION CLEAR CORNEA WITH STANDARD INTRAOCULAR LENS IMPLANT ;  Surgeon: Nadiya Allen MD;  Location: [facility]     PHACOEMULSIFICATION CLEAR CORNEA WITH STANDARD INTRAOCULAR LENS IMPLANT Right 8/21/2018    Procedure: PHACOEMULSIFICATION CLEAR CORNEA WITH STANDARD INTRAOCULAR LENS IMPLANT;  RIGHT EYE PHACOEMULSIFICATION CLEAR CORNEA WITH STANDARD INTRAOCULAR LENS IMPLANT;  Surgeon: Nadiya Allen MD;  Location:  EC     right selective neck dissection level 2, 3, 4  11/3/09     right  shunt placement  6/28/07     THORACIC  SURGERY  Fidencio 3 2017     THYMECTOMY N/A 1/3/2017    Procedure: THYMECTOMY;  Surgeon: Ernesto Armstrong MD;  Location: [facility]     THYROIDECTOMY  07     TRANSCERVICAL EXTENDED MEDIASTINAL LYMPHADENECTOMY N/A 1/3/2017    Procedure: TRANSCERVICAL EXTENDED MEDIASTINAL LYMPHADENECTOMY;  Surgeon: Ernesto Armstrong MD;  Location: [facility] OR     transnasal endoscopic resection of pituitary adenoma  2007          Social History:     Social History     Tobacco Use     Smoking status: Former Smoker     Packs/day: 0.50     Years: 5.00     Pack years: 2.50     Types: Cigarettes     Start date: 1976     Quit date: 2001     Years since quittin.7     Smokeless tobacco: Never Used   Substance Use Topics     Alcohol use: No          Family History:     Family History   Problem Relation Age of Onset     Cancer No family hx of         no skin cancer     Glaucoma No family hx of      Macular Degeneration No family hx of            Allergies:      Allergies   Allergen Reactions     Metoprolol Shortness Of Breath     Bradycardia, fatigue, COPD worsening.         Fenofibrate Hives     Lisinopril Cough     Losartan Cough     Niacin      Simvastatin Cramps          Medications:     Current Outpatient Medications   Medication Sig     albuterol (PROAIR HFA) 108 (90 Base) MCG/ACT inhaler Inhale 2 puffs into the lungs every 4 hours as needed for shortness of breath / dyspnea or wheezing     albuterol (PROAIR HFA/PROVENTIL HFA/VENTOLIN HFA) 108 (90 Base) MCG/ACT inhaler Inhale 2 puffs into the lungs every 4 hours as needed for shortness of breath / dyspnea or wheezing     amLODIPine (NORVASC) 2.5 MG tablet Take 2.5 mg by mouth daily     B Complex Vitamins (VITAMIN B COMPLEX PO) Take 1 capsule by mouth daily      docusate sodium (COLACE) 100 MG capsule Take 1 capsule (100 mg) by mouth 2 times daily     fish oil-omega-3 fatty acids 1000 MG capsule Take 2 g by mouth daily     fluticasone (FLONASE) 50 MCG/ACT spray  Spray 2 sprays into both nostrils daily     GuaiFENesin (MUCUS RELIEF ADULT PO) Take 400 mg by mouth 2 times daily as needed      hydrocortisone (CORTEF) 10 MG tablet 10 mg AM and 5 mg afternoon     Ipratropium-Albuterol (COMBIVENT RESPIMAT)  MCG/ACT inhaler Inhale 1 puff into the lungs 4 times daily     levothyroxine (SYNTHROID/LEVOTHROID) 137 MCG tablet Take 1 tablet (137 mcg) by mouth daily     loperamide (IMODIUM) 2 MG capsule      mirtazapine (REMERON) 15 MG tablet Take 15 mg by mouth At Bedtime      tamsulosin (FLOMAX) 0.4 MG capsule Take 0.4 mg by mouth daily     umeclidinium-vilanterol (ANORO ELLIPTA) 62.5-25 MCG/INH oral inhaler Inhale 1 puff into the lungs daily     alendronate (FOSAMAX) 70 MG tablet Take 1 tablet (70 mg) by mouth every 7 days (Patient not taking: Reported on 9/25/2020)     benzonatate (TESSALON) 100 MG capsule Take 1 capsule (100 mg) by mouth 3 times daily as needed for cough (Patient not taking: Reported on 9/25/2020)     guaiFENesin-dextromethorphan (ROBITUSSIN DM) 100-10 MG/5ML syrup Take 10 mLs by mouth 3 times daily as needed for cough (Patient not taking: Reported on 9/25/2020)     No current facility-administered medications for this visit.      Facility-Administered Medications Ordered in Other Visits   Medication     gadobutrol (GADAVIST) injection 7.5 mL          Review of Systems:     CONSTITUTIONAL: negative for fever, chills, change in weight  INTEGUMENTARY/SKIN: no rash or obvious new lesions  ENT/MOUTH: no sore throat, new sinus pain or nasal drainage  RESP: see interval history  CV: negative for chest pain, palpitations or peripheral edema  GI: no nausea, vomiting, change in stools  : no dysuria  MUSCULOSKELETAL: no myalgias, arthralgias  ENDOCRINE: blood sugars with adequate control  PSYCHIATRIC: mood stable  LYMPHATIC: no new lymphadenopathy  HEME: no bleeding or easy bruisability  NEURO: no numbness, weakness, headaches         Physical Exam:      A  comprehensive physical examination is deferred due to PHE (public health emergency) telephone visit restrictions.         Current Laboratory Data:   All laboratory and imaging data reviewed.    No results found. However, due to the size of the patient record, not all encounters were searched. Please check Results Review for a complete set of results.         Previous Pulmonary Function Testing   No results found for: 20001  No results found for: 20002  No results found for: 20003  No results found for: 20015  No results found for: 20016  No results found for: 20027  No results found for: 20028  No results found for: 20029  No results found for: 20079  No results found for: 20080  No results found for: 20081  No results found for: 20335  No results found for: 20105  No results found for: 20053  No results found for: 20054  No results found for: 20055         Previous Chest Imaging   No images are attached to the encounter.  No images are attached to the encounter or orders placed in the encounter.         Previous Cardiology Imaging   No results found for this or any previous visit (from the past 8760 hour(s)).

## 2020-10-13 NOTE — LETTER
"10/13/2020       RE: Travis Peres  [address] [address]     Dear Colleague,    Thank you for referring your patient, Travis Peres, to the [facility] at [facility]. Please see a copy of my visit note below.    Travis Peres is a 79 year old male who is being evaluated via a billable telephone visit.      The patient has been notified of following:     \"This telephone visit will be conducted via a call between you and your physician/provider. We have found that certain health care needs can be provided without the need for a physical exam.  This service lets us provide the care you need with a short phone conversation.  If a prescription is necessary we can send it directly to your pharmacy.  If lab work is needed we can place an order for that and you can then stop by our lab to have the test done at a later time.    Telephone visits are billed at different rates depending on your insurance coverage. During this emergency period, for some insurers they may be billed the same as an in-person visit.  Please reach out to your insurance provider with any questions.    If during the course of the call the physician/provider feels a telephone visit is not appropriate, you will not be charged for this service.\"    Patient has given verbal consent for Telephone visit?  Yes    What phone number would you like to be contacted at? [phone]    How would you like to obtain your AVS? Mail a copy     Russell Willoughby LPN    Phone call duration: 25 minutes    Spenser Haro MD        LUNG NODULE & INTERVENTIONAL PULMONARY CLINIC  CLINICS & SURGERY CENTER, [facility]   VIRTUAL TELEPHONE VISIT    Travis Peres MRN# [id number]   Age: 79 year old YOB: 1940     Reason for Consultation: lung nodule(s)    Requesting Physician: Karol Alvarez  [address] " "[id number]    Travis Peres is a 79 year old male who is being evaluated via a billable telephone visit.      The patient has been notified of following: \"This telephone visit will be conducted via a call between you and your physician/provider. We have found that certain health care needs can be provided without the need for a physical exam.  This service lets us provide the care you need with a short phone conversation.  If a prescription is necessary we can send it directly to your pharmacy.  If lab work is needed we can place an order for that and you can then stop by our lab to have the test done at a later time. Telephone visits are billed at different rates depending on your insurance coverage. During this emergency period, for some insurers they may be billed the same as an in-person visit.  Please reach out to your insurance provider with any questions. If during the course of the call the physician/provider feels a telephone visit is not appropriate, you will not be charged for this service.\"    Patient has given verbal consent for Telephone visit?  Yes    How would you like to obtain your AVS? Serget    Phone call duration: 25 minutes    Additional provider notes: see below     Assessment and Plan:    1. Established multiple pulmonary lung nodule(s). Given the characteristics on current/previous imaging and risk factors; I would classify this to lower risk for cancer given stability. RUL posterior area with cluster of nodules likely atypical infection and has been stable/slightly better. Other nodules are stable. Repeat CT in 12mo.      2. COPD and likely acute exacerbation. Severe COPD based on PFT in 2009. I will give him course of pred+abx. We discussed to hold his hydrocortisone for the 5-days he is on prednisone and restart afterward. However, the  was not confident that this was understood by the patient. He can cont on anora ellipta only. Will prescribe albuterol prn.       Billing: I spent " more than 30 minutes face to face and greater than 50% of time was for counseling and coordination of care about the issues above.     Spenser Haro MD   of Medicine  Interventional Pulmonology  Department of Pulmonary, Allergy, Critical Care and Sleep Medicine   [email or web address]  Pager: [phone]           History:      Travis Peres is a 78 year old male with sig h/o for hypothyroidism, COPD, HTN, hyperlipidemia, MDD,  who is here for evaluation/followup of lung nodule(s).     - Has increased productive cough over the past few weeks.  -  used for the entire visit   - CT chest and found incidental nodules. Here for followup  - Personal hx of cancer: thyroid cancer  - Family hx of cancer: No  - Exposure hx: Denies asbestos or radon exposure   - Tobacco hx: Past Smoker: 0.5ppd for 5years. Quit 2001.   - My interpretation of the images relevant for this visit includes: right posterior nodules   - My interpretation of the PFT's relevant for this visit includes: Obstructive pattern      Culprit Nodule(s):   1: Bilateral sub4mm nodules and centrilobular/tree-in-bud have improved since 4/2019.      Other active medical problems include:   - had thyroid cancer (papillary thyroid cancer) s/p thyroidectomy 2007. On synthroid.    - Has COPD. On anora ellipta and daily. Up-to-date on flu and pna vaccine. No recent AECOPD. Previous PFT obstructed with reduced DLCO.   - has HTN, hyperlipidemia. Stable.             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     Hypovitaminosis D      Multiple pulmonary nodules 2009     Osteopenia 2011     Papillary thyroid carcinoma (H) 2007    4.8 cm, right, node positive;      Pituitary  macroadenoma with extrasellar extension (H) 2007    causing obstructive hydrocephalus     Post-surgical hypothyroidism 2007     Uncomplicated asthma      Vocal cord paralysis 2014    right     Xerostomia 2010    due to radiation exposures           Past Surgical History:      Past Surgical History:   Procedure Laterality Date     cymetra injection       ESOPHAGOSCOPY, GASTROSCOPY, DUODENOSCOPY (EGD), COMBINED  6/20/2013    Procedure: COMBINED ESOPHAGOSCOPY, GASTROSCOPY, DUODENOSCOPY (EGD), BIOPSY SINGLE OR MULTIPLE;  gastroscopy;  Surgeon: Leslie Guadarrama MD;  Location:  GI     ESOPHAGOSCOPY, GASTROSCOPY, DUODENOSCOPY (EGD), COMBINED N/A 9/20/2019    Procedure: ESOPHAGOGASTRODUODENOSCOPY (EGD);  Surgeon: Gilberto Gibson DO;  Location:  GI     HERNIA REPAIR Right      lipoma resection chest wall Right 11/09     PHACOEMULSIFICATION CLEAR CORNEA WITH STANDARD INTRAOCULAR LENS IMPLANT Left 5/7/2018    Procedure: PHACOEMULSIFICATION CLEAR CORNEA WITH STANDARD INTRAOCULAR LENS IMPLANT;  LEFT PHACOEMULSIFICATION CLEAR CORNEA WITH STANDARD INTRAOCULAR LENS IMPLANT ;  Surgeon: Nadiya Allen MD;  Location:  [facility]     PHACOEMULSIFICATION CLEAR CORNEA WITH STANDARD INTRAOCULAR LENS IMPLANT Right 8/21/2018    Procedure: PHACOEMULSIFICATION CLEAR CORNEA WITH STANDARD INTRAOCULAR LENS IMPLANT;  RIGHT EYE PHACOEMULSIFICATION CLEAR CORNEA WITH STANDARD INTRAOCULAR LENS IMPLANT;  Surgeon: Nadiya Allen MD;  Location:  EC     right selective neck dissection level 2, 3, 4  11/3/09     right  shunt placement  6/28/07     THORACIC SURGERY  Fidencio 3 2017     THYMECTOMY N/A 1/3/2017    Procedure: THYMECTOMY;  Surgeon: Ernesto Armstrong MD;  Location: [facility]     THYROIDECTOMY  11/27/07     TRANSCERVICAL EXTENDED MEDIASTINAL LYMPHADENECTOMY N/A 1/3/2017    Procedure: TRANSCERVICAL EXTENDED MEDIASTINAL LYMPHADENECTOMY;  Surgeon: Ernesto Armstrong MD;  Location: [facility]     transnasal endoscopic  resection of pituitary adenoma  2007          Social History:     Social History     Tobacco Use     Smoking status: Former Smoker     Packs/day: 0.50     Years: 5.00     Pack years: 2.50     Types: Cigarettes     Start date: 1976     Quit date: 2001     Years since quittin.7     Smokeless tobacco: Never Used   Substance Use Topics     Alcohol use: No          Family History:     Family History   Problem Relation Age of Onset     Cancer No family hx of         no skin cancer     Glaucoma No family hx of      Macular Degeneration No family hx of            Allergies:      Allergies   Allergen Reactions     Metoprolol Shortness Of Breath     Bradycardia, fatigue, COPD worsening.         Fenofibrate Hives     Lisinopril Cough     Losartan Cough     Niacin      Simvastatin Cramps          Medications:     Current Outpatient Medications   Medication Sig     albuterol (PROAIR HFA) 108 (90 Base) MCG/ACT inhaler Inhale 2 puffs into the lungs every 4 hours as needed for shortness of breath / dyspnea or wheezing     albuterol (PROAIR HFA/PROVENTIL HFA/VENTOLIN HFA) 108 (90 Base) MCG/ACT inhaler Inhale 2 puffs into the lungs every 4 hours as needed for shortness of breath / dyspnea or wheezing     amLODIPine (NORVASC) 2.5 MG tablet Take 2.5 mg by mouth daily     B Complex Vitamins (VITAMIN B COMPLEX PO) Take 1 capsule by mouth daily      docusate sodium (COLACE) 100 MG capsule Take 1 capsule (100 mg) by mouth 2 times daily     fish oil-omega-3 fatty acids 1000 MG capsule Take 2 g by mouth daily     fluticasone (FLONASE) 50 MCG/ACT spray Spray 2 sprays into both nostrils daily     GuaiFENesin (MUCUS RELIEF ADULT PO) Take 400 mg by mouth 2 times daily as needed      hydrocortisone (CORTEF) 10 MG tablet 10 mg AM and 5 mg afternoon     Ipratropium-Albuterol (COMBIVENT RESPIMAT)  MCG/ACT inhaler Inhale 1 puff into the lungs 4 times daily     levothyroxine (SYNTHROID/LEVOTHROID) 137 MCG tablet Take 1 tablet  (137 mcg) by mouth daily     loperamide (IMODIUM) 2 MG capsule      mirtazapine (REMERON) 15 MG tablet Take 15 mg by mouth At Bedtime      tamsulosin (FLOMAX) 0.4 MG capsule Take 0.4 mg by mouth daily     umeclidinium-vilanterol (ANORO ELLIPTA) 62.5-25 MCG/INH oral inhaler Inhale 1 puff into the lungs daily     alendronate (FOSAMAX) 70 MG tablet Take 1 tablet (70 mg) by mouth every 7 days (Patient not taking: Reported on 9/25/2020)     benzonatate (TESSALON) 100 MG capsule Take 1 capsule (100 mg) by mouth 3 times daily as needed for cough (Patient not taking: Reported on 9/25/2020)     guaiFENesin-dextromethorphan (ROBITUSSIN DM) 100-10 MG/5ML syrup Take 10 mLs by mouth 3 times daily as needed for cough (Patient not taking: Reported on 9/25/2020)     No current facility-administered medications for this visit.      Facility-Administered Medications Ordered in Other Visits   Medication     gadobutrol (GADAVIST) injection 7.5 mL          Review of Systems:     CONSTITUTIONAL: negative for fever, chills, change in weight  INTEGUMENTARY/SKIN: no rash or obvious new lesions  ENT/MOUTH: no sore throat, new sinus pain or nasal drainage  RESP: see interval history  CV: negative for chest pain, palpitations or peripheral edema  GI: no nausea, vomiting, change in stools  : no dysuria  MUSCULOSKELETAL: no myalgias, arthralgias  ENDOCRINE: blood sugars with adequate control  PSYCHIATRIC: mood stable  LYMPHATIC: no new lymphadenopathy  HEME: no bleeding or easy bruisability  NEURO: no numbness, weakness, headaches         Physical Exam:      A comprehensive physical examination is deferred due to PHE (public health emergency) telephone visit restrictions.         Current Laboratory Data:   All laboratory and imaging data reviewed.    No results found. However, due to the size of the patient record, not all encounters were searched. Please check Results Review for a complete set of results.         Previous Pulmonary Function  Testing   No results found for: 20001  No results found for: 20002  No results found for: 20003  No results found for: 20015  No results found for: 20016  No results found for: 20027  No results found for: 20028  No results found for: 20029  No results found for: 20079  No results found for: 20080  No results found for: 20081  No results found for: 20335  No results found for: 20105  No results found for: 20053  No results found for: 20054  No results found for: 20055         Previous Chest Imaging   No images are attached to the encounter.  No images are attached to the encounter or orders placed in the encounter.         Previous Cardiology Imaging   No results found for this or any previous visit (from the past 8760 hour(s)).                   Again, thank you for allowing me to participate in the care of your patient.      Sincerely,    Spenser Haro MD

## 2020-10-14 ENCOUNTER — TELEPHONE (OUTPATIENT)
Dept: SURGERY | Facility: CLINIC | Age: 80
End: 2020-10-14

## 2020-10-14 NOTE — TELEPHONE ENCOUNTER
Patient scheduled for EGD with Dr. Granda on the 16th.    Dr.Amy Maxwell, provider performing pre-op calling to discuss a few things found during pre-op.    1. (primary concern):  Creatine of 1.9.  Patient's baseline is around 1 and this increase is without explanation.  Patient is not on any medications that could affect kidney functions and cause increase in creatine levels.  She thinks maybe dehydration?    He is not presenting with any new symptoms of concern.    2.  O2 sats 89-90% at pre op visit.  He does have known COPD, but these sats are out of his normal ranges.  He does not appear to be short of breath/breathless.    3.  CT of chest was done on Tuesday per his pulmonologist for recheck of nodules, which are unchanged.  Otherwise, unremarkable CT.    4. No Lower extremity swelling to suggest cardiac issues.    5.  Pulmonologist did start him on a 5 day course of prednisone and levaquin for a presumed COPD flare up.    Dr. Chavira does believe that he is stable for the scope on Friday. However, is concerned with this findings without any presumed explanation.    She is going to order a repeat creatinine check for tomorrow morning, with stat reading.  If creatinine is still increasing, she may want to admit him for further workup and evaluation.    She will be faxing pre op dictation/clearance, but wanted to run this by Dr. Granda.    Will send to Dr. Granda and make sure to discuss with him tomorrow morning when he is in clinic.    Meenakshi Seals, RN-BSN

## 2020-10-16 ENCOUNTER — APPOINTMENT (OUTPATIENT)
Dept: SURGERY | Facility: PHYSICIAN GROUP | Age: 80
End: 2020-10-16
Payer: COMMERCIAL

## 2020-10-16 ENCOUNTER — HOSPITAL ENCOUNTER (OUTPATIENT)
Facility: CLINIC | Age: 80
Discharge: HOME | End: 2020-10-16
Attending: SURGERY | Admitting: SURGERY
Payer: COMMERCIAL

## 2020-10-16 VITALS
RESPIRATION RATE: 8 BRPM | SYSTOLIC BLOOD PRESSURE: 140 MMHG | HEART RATE: 66 BPM | OXYGEN SATURATION: 97 % | TEMPERATURE: 98.3 F | DIASTOLIC BLOOD PRESSURE: 99 MMHG

## 2020-10-16 LAB — UPPER GI ENDOSCOPY: NORMAL

## 2020-10-16 PROCEDURE — 250N000009 HC RX 250: Performed by: SURGERY

## 2020-10-16 PROCEDURE — 88305 TISSUE EXAM BY PATHOLOGIST: CPT | Mod: 26 | Performed by: PATHOLOGY

## 2020-10-16 PROCEDURE — 250N000011 HC RX IP 250 OP 636: Performed by: SURGERY

## 2020-10-16 PROCEDURE — 43239 EGD BIOPSY SINGLE/MULTIPLE: CPT | Performed by: SURGERY

## 2020-10-16 PROCEDURE — 88305 TISSUE EXAM BY PATHOLOGIST: CPT | Mod: TC | Performed by: SURGERY

## 2020-10-16 PROCEDURE — G0500 MOD SEDAT ENDO SERVICE >5YRS: HCPCS | Performed by: SURGERY

## 2020-10-16 RX ORDER — LIDOCAINE 40 MG/G
CREAM TOPICAL
Status: DISCONTINUED | OUTPATIENT
Start: 2020-10-16 | End: 2020-10-16 | Stop reason: HOSPADM

## 2020-10-16 RX ORDER — ONDANSETRON 2 MG/ML
4 INJECTION INTRAMUSCULAR; INTRAVENOUS
Status: DISCONTINUED | OUTPATIENT
Start: 2020-10-16 | End: 2020-10-16 | Stop reason: HOSPADM

## 2020-10-16 RX ORDER — FENTANYL CITRATE 50 UG/ML
INJECTION, SOLUTION INTRAMUSCULAR; INTRAVENOUS PRN
Status: DISCONTINUED | OUTPATIENT
Start: 2020-10-16 | End: 2020-10-16 | Stop reason: HOSPADM

## 2020-10-19 LAB — COPATH REPORT: NORMAL

## 2020-10-26 ENCOUNTER — OFFICE VISIT (OUTPATIENT)
Dept: URGENT CARE | Facility: URGENT CARE | Age: 80
End: 2020-10-26
Payer: COMMERCIAL

## 2020-10-26 VITALS
WEIGHT: 139 LBS | HEART RATE: 77 BPM | RESPIRATION RATE: 20 BRPM | OXYGEN SATURATION: 93 % | DIASTOLIC BLOOD PRESSURE: 100 MMHG | TEMPERATURE: 98.5 F | SYSTOLIC BLOOD PRESSURE: 144 MMHG | BODY MASS INDEX: 23.13 KG/M2

## 2020-10-26 DIAGNOSIS — I10 ESSENTIAL HYPERTENSION: ICD-10-CM

## 2020-10-26 DIAGNOSIS — B37.0 THRUSH: ICD-10-CM

## 2020-10-26 DIAGNOSIS — R07.0 THROAT PAIN: Primary | ICD-10-CM

## 2020-10-26 DIAGNOSIS — N18.4 CKD (CHRONIC KIDNEY DISEASE) STAGE 4, GFR 15-29 ML/MIN (H): ICD-10-CM

## 2020-10-26 DIAGNOSIS — K30 UPSET STOMACH: ICD-10-CM

## 2020-10-26 LAB
DEPRECATED S PYO AG THROAT QL EIA: NEGATIVE
SPECIMEN SOURCE: NORMAL
SPECIMEN SOURCE: NORMAL
STREP GROUP A PCR: NOT DETECTED

## 2020-10-26 PROCEDURE — 87651 STREP A DNA AMP PROBE: CPT | Performed by: FAMILY MEDICINE

## 2020-10-26 PROCEDURE — 99214 OFFICE O/P EST MOD 30 MIN: CPT | Performed by: PHYSICIAN ASSISTANT

## 2020-10-26 PROCEDURE — 99N1174 PR STATISTIC STREP A RAPID: Performed by: FAMILY MEDICINE

## 2020-10-26 RX ORDER — AMLODIPINE BESYLATE 2.5 MG/1
2.5 TABLET ORAL DAILY
Qty: 30 TABLET | Refills: 0 | Status: SHIPPED | OUTPATIENT
Start: 2020-10-26 | End: 2020-10-26

## 2020-10-26 RX ORDER — NYSTATIN 100000/ML
500000 SUSPENSION, ORAL (FINAL DOSE FORM) ORAL 4 TIMES DAILY
Qty: 280 ML | Refills: 0 | Status: SHIPPED | OUTPATIENT
Start: 2020-10-26 | End: 2020-11-09

## 2020-10-26 RX ORDER — FLUCONAZOLE 150 MG/1
150 TABLET ORAL ONCE
Qty: 1 TABLET | Refills: 0 | Status: SHIPPED | OUTPATIENT
Start: 2020-10-26 | End: 2020-10-26

## 2020-10-26 RX ORDER — AMLODIPINE BESYLATE 2.5 MG/1
TABLET ORAL
Qty: 90 TABLET | Refills: 0 | Status: SHIPPED | OUTPATIENT
Start: 2020-10-26

## 2020-10-27 NOTE — PROGRESS NOTES
SUBJECTIVE:   Travis Peres is a 80 year old male presenting with a chief complaint of having mouth pain, throat pain, .  Onset of symptoms was 2 day(s) ago.  Course of illness is worsening.    Severity moderate  Current and Associated symptoms: dry mouth, pasty mouth  Treatment measures tried include none.  Predisposing factors include upper EGD.    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         Fenofibrate Hives     Lisinopril Cough     Losartan Cough     Niacin      Simvastatin Cramps     Family History   Problem Relation Age of Onset     Cancer No family hx of         no skin cancer     Glaucoma No family hx of      Macular Degeneration No family hx of        Social History     Tobacco Use     Smoking status: Former Smoker     Packs/day: 0.50     Years: 5.00     Pack years: 2.50     Types: Cigarettes     Start date: 1976     Quit date: 2001     Years since quittin.7     Smokeless tobacco: Never Used   Substance Use Topics     Alcohol use: No       ROS:  CONSTITUTIONAL:NEGATIVE for fever, chills, change in weight  INTEGUMENTARY/SKIN: NEGATIVE for worrisome rashes, moles or lesions  EYES: NEGATIVE for vision changes or irritation  ENT/MOUTH: POSITIVE for mouth,  throat and tongue pain, dryness  RESP:NEGATIVE for significant cough or SOB  CV: NEGATIVE for chest pain, palpitations or peripheral edema  GI: POSITIVE for stomach ache, upset  : negative for dysuria, hematuria, decreased urinary stream, erectile dysfunction  MUSCULOSKELETAL: NEGATIVE for significant arthralgias or myalgia  NEURO: NEGATIVE for weakness, dizziness or paresthesias    OBJECTIVE  :BP (!) 144/100   Pulse 77   Temp 98.5  F (36.9  C) (Temporal)   Resp 20   Wt 63 kg (139 lb)   SpO2 93%   BMI 23.13 kg/m    GENERAL APPEARANCE: healthy, alert and no distress  EYES: EOMI,  PERRL, conjunctiva clear  HENT: TM's normal bilaterally and white patches on tongue, roof of mouth and cheeks  NECK: supple, nontender, no lymphadenopathy  RESP: lungs clear to auscultation - no rales, rhonchi or wheezes  CV: regular rates and rhythm, normal S1 S2, no murmur noted  ABDOMEN:  soft, nontender, no HSM or masses and bowel sounds normal  NEURO: Normal strength and tone, sensory exam grossly normal,  normal speech and mentation  SKIN: no suspicious lesions or rashes    Results for orders placed or performed in visit on 10/26/20   Streptococcus A Rapid Scr w Reflx to PCR     Status: None    Specimen: Throat   Result Value Ref Range    Strep Specimen Description Throat     Streptococcus Group A Rapid Screen Negative NEG^Negative   Group A Streptococcus PCR Throat Swab     Status: None    Specimen: Throat   Result Value Ref Range    Specimen Description Throat     Strep Group A PCR Not Detected NDET^Not Detected       ASSESSMENT/PLAN      ICD-10-CM    1. Throat pain  R07.0 Streptococcus A Rapid Scr w Reflx to PCR     Group A Streptococcus PCR Throat Swab     nystatin (MYCOSTATIN) 431493 UNIT/ML suspension     fluconazole (DIFLUCAN) 150 MG tablet   2. Thrush  B37.0 nystatin (MYCOSTATIN) 680770 UNIT/ML suspension     fluconazole (DIFLUCAN) 150 MG tablet   3. CKD (chronic kidney disease) stage 4, GFR 15-29 ml/min (H)  N18.4     4. Upset stomach  K30    5. Essential hypertension  I10 amLODIPine (NORVASC) 2.5 MG tablet       Orders Placed This Encounter     nystatin (MYCOSTATIN) [id number] UNIT/ML suspension     fluconazole (DIFLUCAN) 150 MG tablet     amLODIPine (NORVASC) 2.5 MG tablet       Refill of blood pressure medications was done  Rapid strep test negative for strep  Mouth appears to be candidal  Due to CKD, only 1 diflucan , will finish other medications with nystatin  Follow up with PCP in 2 days if not better  If symptoms worsen then go to the ED

## 2020-10-28 ENCOUNTER — APPOINTMENT (OUTPATIENT)
Dept: CT IMAGING | Facility: CLINIC | Age: 80
DRG: 193 | End: 2020-10-28
Attending: EMERGENCY MEDICINE
Payer: COMMERCIAL

## 2020-10-28 ENCOUNTER — HOSPITAL ENCOUNTER (INPATIENT)
Facility: CLINIC | Age: 80
LOS: 4 days | Discharge: HOME HEALTH | DRG: 193 | End: 2020-11-01
Attending: EMERGENCY MEDICINE | Admitting: HOSPITALIST
Payer: COMMERCIAL

## 2020-10-28 ENCOUNTER — APPOINTMENT (OUTPATIENT)
Dept: GENERAL RADIOLOGY | Facility: CLINIC | Age: 80
DRG: 193 | End: 2020-10-28
Attending: EMERGENCY MEDICINE
Payer: COMMERCIAL

## 2020-10-28 DIAGNOSIS — R41.89 COGNITIVE IMPAIRMENT: Primary | ICD-10-CM

## 2020-10-28 DIAGNOSIS — N18.4 CKD (CHRONIC KIDNEY DISEASE) STAGE 4, GFR 15-29 ML/MIN (H): ICD-10-CM

## 2020-10-28 DIAGNOSIS — A41.9 SEPSIS, DUE TO UNSPECIFIED ORGANISM, UNSPECIFIED WHETHER ACUTE ORGAN DYSFUNCTION PRESENT (H): ICD-10-CM

## 2020-10-28 DIAGNOSIS — E87.6 HYPOKALEMIA: ICD-10-CM

## 2020-10-28 DIAGNOSIS — R00.1 BRADYCARDIA: ICD-10-CM

## 2020-10-28 DIAGNOSIS — J18.9 PNEUMONIA DUE TO INFECTIOUS ORGANISM, UNSPECIFIED LATERALITY, UNSPECIFIED PART OF LUNG: ICD-10-CM

## 2020-10-28 LAB
ALBUMIN SERPL-MCNC: 3.5 G/DL (ref 3.4–5)
ALBUMIN UR-MCNC: 30 MG/DL
ALP SERPL-CCNC: 57 U/L (ref 40–150)
ALT SERPL W P-5'-P-CCNC: 30 U/L (ref 0–70)
ANION GAP SERPL CALCULATED.3IONS-SCNC: 8 MMOL/L (ref 3–14)
APPEARANCE UR: CLEAR
AST SERPL W P-5'-P-CCNC: 39 U/L (ref 0–45)
BASE EXCESS BLDV CALC-SCNC: 5.5 MMOL/L
BASOPHILS # BLD AUTO: 0 10E9/L (ref 0–0.2)
BASOPHILS NFR BLD AUTO: 0.1 %
BILIRUB SERPL-MCNC: 1.5 MG/DL (ref 0.2–1.3)
BILIRUB UR QL STRIP: NEGATIVE
BUN SERPL-MCNC: 37 MG/DL (ref 7–30)
CALCIUM SERPL-MCNC: 7.8 MG/DL (ref 8.5–10.1)
CHLORIDE SERPL-SCNC: 93 MMOL/L (ref 94–109)
CO2 SERPL-SCNC: 30 MMOL/L (ref 20–32)
COLOR UR AUTO: YELLOW
CREAT SERPL-MCNC: 2.99 MG/DL (ref 0.66–1.25)
DIFFERENTIAL METHOD BLD: ABNORMAL
EOSINOPHIL # BLD AUTO: 0.1 10E9/L (ref 0–0.7)
EOSINOPHIL NFR BLD AUTO: 1.4 %
ERYTHROCYTE [DISTWIDTH] IN BLOOD BY AUTOMATED COUNT: 14.4 % (ref 10–15)
GFR SERPL CREATININE-BSD FRML MDRD: 19 ML/MIN/{1.73_M2}
GLUCOSE SERPL-MCNC: 125 MG/DL (ref 70–99)
GLUCOSE UR STRIP-MCNC: NEGATIVE MG/DL
HCO3 BLDV-SCNC: 32 MMOL/L (ref 21–28)
HCT VFR BLD AUTO: 33.8 % (ref 40–53)
HGB BLD-MCNC: 11.6 G/DL (ref 13.3–17.7)
HGB UR QL STRIP: ABNORMAL
HYALINE CASTS #/AREA URNS LPF: 25 /LPF (ref 0–2)
IMM GRANULOCYTES # BLD: 0 10E9/L (ref 0–0.4)
IMM GRANULOCYTES NFR BLD: 0.3 %
INR PPP: 1.14 (ref 0.86–1.14)
INTERPRETATION ECG - MUSE: NORMAL
KETONES UR STRIP-MCNC: NEGATIVE MG/DL
LACTATE BLD-SCNC: 2.6 MMOL/L (ref 0.7–2)
LEUKOCYTE ESTERASE UR QL STRIP: NEGATIVE
LYMPHOCYTES # BLD AUTO: 1 10E9/L (ref 0.8–5.3)
LYMPHOCYTES NFR BLD AUTO: 13.8 %
MCH RBC QN AUTO: 27.8 PG (ref 26.5–33)
MCHC RBC AUTO-ENTMCNC: 34.3 G/DL (ref 31.5–36.5)
MCV RBC AUTO: 81 FL (ref 78–100)
MONOCYTES # BLD AUTO: 0.4 10E9/L (ref 0–1.3)
MONOCYTES NFR BLD AUTO: 5 %
MUCOUS THREADS #/AREA URNS LPF: PRESENT /LPF
NEUTROPHILS # BLD AUTO: 5.7 10E9/L (ref 1.6–8.3)
NEUTROPHILS NFR BLD AUTO: 79.4 %
NITRATE UR QL: NEGATIVE
NRBC # BLD AUTO: 0 10*3/UL
NRBC BLD AUTO-RTO: 0 /100
O2/TOTAL GAS SETTING VFR VENT: ABNORMAL %
PCO2 BLDV: 58 MM HG (ref 40–50)
PH BLDV: 7.35 PH (ref 7.32–7.43)
PH UR STRIP: 5 PH (ref 5–7)
PLATELET # BLD AUTO: 142 10E9/L (ref 150–450)
PO2 BLDV: 19 MM HG (ref 25–47)
POTASSIUM SERPL-SCNC: 2.2 MMOL/L (ref 3.4–5.3)
POTASSIUM SERPL-SCNC: 2.9 MMOL/L (ref 3.4–5.3)
PROT SERPL-MCNC: 7.5 G/DL (ref 6.8–8.8)
RBC # BLD AUTO: 4.18 10E12/L (ref 4.4–5.9)
RBC #/AREA URNS AUTO: <1 /HPF (ref 0–2)
SARS-COV-2 RNA SPEC QL NAA+PROBE: NORMAL
SODIUM SERPL-SCNC: 131 MMOL/L (ref 133–144)
SOURCE: ABNORMAL
SP GR UR STRIP: 1.02 (ref 1–1.03)
SPECIMEN SOURCE: NORMAL
TROPONIN I SERPL-MCNC: <0.015 UG/L (ref 0–0.04)
UROBILINOGEN UR STRIP-MCNC: NORMAL MG/DL (ref 0–2)
WBC # BLD AUTO: 7.2 10E9/L (ref 4–11)
WBC #/AREA URNS AUTO: 4 /HPF (ref 0–5)

## 2020-10-28 PROCEDURE — 93005 ELECTROCARDIOGRAM TRACING: CPT

## 2020-10-28 PROCEDURE — 96361 HYDRATE IV INFUSION ADD-ON: CPT

## 2020-10-28 PROCEDURE — 81001 URINALYSIS AUTO W/SCOPE: CPT | Performed by: EMERGENCY MEDICINE

## 2020-10-28 PROCEDURE — 250N000013 HC RX MED GY IP 250 OP 250 PS 637: Performed by: HOSPITALIST

## 2020-10-28 PROCEDURE — 85025 COMPLETE CBC W/AUTO DIFF WBC: CPT | Performed by: EMERGENCY MEDICINE

## 2020-10-28 PROCEDURE — 250N000013 HC RX MED GY IP 250 OP 250 PS 637: Performed by: EMERGENCY MEDICINE

## 2020-10-28 PROCEDURE — 96365 THER/PROPH/DIAG IV INF INIT: CPT

## 2020-10-28 PROCEDURE — 36415 COLL VENOUS BLD VENIPUNCTURE: CPT | Performed by: HOSPITALIST

## 2020-10-28 PROCEDURE — 82803 BLOOD GASES ANY COMBINATION: CPT | Performed by: EMERGENCY MEDICINE

## 2020-10-28 PROCEDURE — 87086 URINE CULTURE/COLONY COUNT: CPT | Performed by: EMERGENCY MEDICINE

## 2020-10-28 PROCEDURE — 258N000003 HC RX IP 258 OP 636: Performed by: EMERGENCY MEDICINE

## 2020-10-28 PROCEDURE — 71045 X-RAY EXAM CHEST 1 VIEW: CPT

## 2020-10-28 PROCEDURE — 99291 CRITICAL CARE FIRST HOUR: CPT | Mod: 25

## 2020-10-28 PROCEDURE — 83605 ASSAY OF LACTIC ACID: CPT | Performed by: EMERGENCY MEDICINE

## 2020-10-28 PROCEDURE — 85610 PROTHROMBIN TIME: CPT | Performed by: EMERGENCY MEDICINE

## 2020-10-28 PROCEDURE — 250N000011 HC RX IP 250 OP 636: Performed by: HOSPITALIST

## 2020-10-28 PROCEDURE — 120N000004 HC R&B MS OVERFLOW

## 2020-10-28 PROCEDURE — 99223 1ST HOSP IP/OBS HIGH 75: CPT | Mod: AI | Performed by: HOSPITALIST

## 2020-10-28 PROCEDURE — 87040 BLOOD CULTURE FOR BACTERIA: CPT | Performed by: EMERGENCY MEDICINE

## 2020-10-28 PROCEDURE — 84484 ASSAY OF TROPONIN QUANT: CPT | Performed by: EMERGENCY MEDICINE

## 2020-10-28 PROCEDURE — 80053 COMPREHEN METABOLIC PANEL: CPT | Performed by: EMERGENCY MEDICINE

## 2020-10-28 PROCEDURE — C9803 HOPD COVID-19 SPEC COLLECT: HCPCS

## 2020-10-28 PROCEDURE — U0003 INFECTIOUS AGENT DETECTION BY NUCLEIC ACID (DNA OR RNA); SEVERE ACUTE RESPIRATORY SYNDROME CORONAVIRUS 2 (SARS-COV-2) (CORONAVIRUS DISEASE [COVID-19]), AMPLIFIED PROBE TECHNIQUE, MAKING USE OF HIGH THROUGHPUT TECHNOLOGIES AS DESCRIBED BY CMS-2020-01-R: HCPCS | Performed by: EMERGENCY MEDICINE

## 2020-10-28 PROCEDURE — 250N000011 HC RX IP 250 OP 636: Performed by: EMERGENCY MEDICINE

## 2020-10-28 PROCEDURE — 70450 CT HEAD/BRAIN W/O DYE: CPT

## 2020-10-28 PROCEDURE — 84132 ASSAY OF SERUM POTASSIUM: CPT | Performed by: HOSPITALIST

## 2020-10-28 RX ORDER — NYSTATIN 100000/ML
500000 SUSPENSION, ORAL (FINAL DOSE FORM) ORAL 4 TIMES DAILY
Status: DISCONTINUED | OUTPATIENT
Start: 2020-10-29 | End: 2020-11-01 | Stop reason: HOSPADM

## 2020-10-28 RX ORDER — POTASSIUM CHLORIDE 1500 MG/1
40 TABLET, EXTENDED RELEASE ORAL ONCE
Status: COMPLETED | OUTPATIENT
Start: 2020-10-28 | End: 2020-10-28

## 2020-10-28 RX ORDER — ONDANSETRON 2 MG/ML
4 INJECTION INTRAMUSCULAR; INTRAVENOUS EVERY 6 HOURS PRN
Status: DISCONTINUED | OUTPATIENT
Start: 2020-10-28 | End: 2020-11-01 | Stop reason: HOSPADM

## 2020-10-28 RX ORDER — POTASSIUM CHLORIDE 7.45 MG/ML
10 INJECTION INTRAVENOUS ONCE
Status: COMPLETED | OUTPATIENT
Start: 2020-10-28 | End: 2020-10-28

## 2020-10-28 RX ORDER — CEFTRIAXONE 2 G/1
2 INJECTION, POWDER, FOR SOLUTION INTRAMUSCULAR; INTRAVENOUS ONCE
Status: COMPLETED | OUTPATIENT
Start: 2020-10-28 | End: 2020-10-28

## 2020-10-28 RX ORDER — POTASSIUM CHLORIDE 1.5 G/1.58G
40 POWDER, FOR SOLUTION ORAL ONCE
Status: COMPLETED | OUTPATIENT
Start: 2020-10-28 | End: 2020-10-28

## 2020-10-28 RX ORDER — LIDOCAINE 40 MG/G
CREAM TOPICAL
Status: DISCONTINUED | OUTPATIENT
Start: 2020-10-28 | End: 2020-11-01 | Stop reason: HOSPADM

## 2020-10-28 RX ORDER — HEPARIN SODIUM 5000 [USP'U]/.5ML
5000 INJECTION, SOLUTION INTRAVENOUS; SUBCUTANEOUS EVERY 12 HOURS
Status: DISCONTINUED | OUTPATIENT
Start: 2020-10-28 | End: 2020-11-01 | Stop reason: HOSPADM

## 2020-10-28 RX ORDER — POTASSIUM CHLORIDE 1500 MG/1
40 TABLET, EXTENDED RELEASE ORAL ONCE
Status: DISCONTINUED | OUTPATIENT
Start: 2020-10-28 | End: 2020-10-28

## 2020-10-28 RX ORDER — AZITHROMYCIN 500 MG/1
500 INJECTION, POWDER, LYOPHILIZED, FOR SOLUTION INTRAVENOUS ONCE
Status: DISCONTINUED | OUTPATIENT
Start: 2020-10-28 | End: 2020-10-28

## 2020-10-28 RX ORDER — ALBUTEROL SULFATE 90 UG/1
2 AEROSOL, METERED RESPIRATORY (INHALATION) EVERY 4 HOURS PRN
Status: DISCONTINUED | OUTPATIENT
Start: 2020-10-28 | End: 2020-11-01 | Stop reason: HOSPADM

## 2020-10-28 RX ORDER — FLUTICASONE PROPIONATE 50 MCG
2 SPRAY, SUSPENSION (ML) NASAL DAILY PRN
Status: DISCONTINUED | OUTPATIENT
Start: 2020-10-28 | End: 2020-11-01 | Stop reason: HOSPADM

## 2020-10-28 RX ORDER — TAMSULOSIN HYDROCHLORIDE 0.4 MG/1
0.4 CAPSULE ORAL DAILY
Status: DISCONTINUED | OUTPATIENT
Start: 2020-10-29 | End: 2020-11-01 | Stop reason: HOSPADM

## 2020-10-28 RX ORDER — MIRTAZAPINE 15 MG/1
15 TABLET, FILM COATED ORAL AT BEDTIME
Status: DISCONTINUED | OUTPATIENT
Start: 2020-10-28 | End: 2020-11-01 | Stop reason: HOSPADM

## 2020-10-28 RX ORDER — CEFTRIAXONE 2 G/1
2 INJECTION, POWDER, FOR SOLUTION INTRAMUSCULAR; INTRAVENOUS EVERY 24 HOURS
Status: DISCONTINUED | OUTPATIENT
Start: 2020-10-29 | End: 2020-11-01

## 2020-10-28 RX ORDER — AMLODIPINE BESYLATE 2.5 MG/1
2.5 TABLET ORAL DAILY
Status: DISCONTINUED | OUTPATIENT
Start: 2020-10-29 | End: 2020-11-01 | Stop reason: HOSPADM

## 2020-10-28 RX ORDER — ONDANSETRON 4 MG/1
4 TABLET, ORALLY DISINTEGRATING ORAL EVERY 6 HOURS PRN
Status: DISCONTINUED | OUTPATIENT
Start: 2020-10-28 | End: 2020-11-01 | Stop reason: HOSPADM

## 2020-10-28 RX ORDER — SODIUM CHLORIDE 9 MG/ML
INJECTION, SOLUTION INTRAVENOUS CONTINUOUS
Status: ACTIVE | OUTPATIENT
Start: 2020-10-28 | End: 2020-10-30

## 2020-10-28 RX ORDER — POTASSIUM CHLORIDE 1.5 G/1.58G
40 POWDER, FOR SOLUTION ORAL ONCE
Status: DISCONTINUED | OUTPATIENT
Start: 2020-10-28 | End: 2020-10-28

## 2020-10-28 RX ORDER — LORAZEPAM 2 MG/ML
1 INJECTION INTRAMUSCULAR
Status: DISCONTINUED | OUTPATIENT
Start: 2020-10-28 | End: 2020-10-30

## 2020-10-28 RX ORDER — SODIUM CHLORIDE 9 MG/ML
INJECTION, SOLUTION INTRAVENOUS CONTINUOUS
Status: DISCONTINUED | OUTPATIENT
Start: 2020-10-28 | End: 2020-10-29

## 2020-10-28 RX ADMIN — SODIUM CHLORIDE: 9 INJECTION, SOLUTION INTRAVENOUS at 20:05

## 2020-10-28 RX ADMIN — SODIUM CHLORIDE 1000 ML: 9 INJECTION, SOLUTION INTRAVENOUS at 15:34

## 2020-10-28 RX ADMIN — CEFTRIAXONE 2 G: 2 INJECTION, POWDER, FOR SOLUTION INTRAMUSCULAR; INTRAVENOUS at 16:44

## 2020-10-28 RX ADMIN — POTASSIUM CHLORIDE 40 MEQ: 1500 TABLET, EXTENDED RELEASE ORAL at 22:44

## 2020-10-28 RX ADMIN — POTASSIUM CHLORIDE 40 MEQ: 1.5 POWDER, FOR SOLUTION ORAL at 16:38

## 2020-10-28 RX ADMIN — POTASSIUM CHLORIDE 10 MEQ: 7.46 INJECTION, SOLUTION INTRAVENOUS at 16:46

## 2020-10-28 RX ADMIN — HYDROCORTISONE SODIUM SUCCINATE 100 MG: 100 INJECTION, POWDER, FOR SOLUTION INTRAMUSCULAR; INTRAVENOUS at 22:52

## 2020-10-28 RX ADMIN — MIRTAZAPINE 15 MG: 15 TABLET, FILM COATED ORAL at 22:50

## 2020-10-28 RX ADMIN — HEPARIN SODIUM 5000 UNITS: 5000 INJECTION, SOLUTION INTRAVENOUS; SUBCUTANEOUS at 20:14

## 2020-10-28 RX ADMIN — SODIUM CHLORIDE 1893 ML: 9 INJECTION, SOLUTION INTRAVENOUS at 16:44

## 2020-10-28 ASSESSMENT — ACTIVITIES OF DAILY LIVING (ADL): ADLS_ACUITY_SCORE: 16

## 2020-10-28 ASSESSMENT — ENCOUNTER SYMPTOMS: HEADACHES: 1

## 2020-10-28 NOTE — ED TRIAGE NOTES
Patient at clinic and was found slumped over on floor after sliding to ground. Patient found to be hypoxic around 80% ra, and hypotensive with bradycardia.

## 2020-10-28 NOTE — ED NOTES
[facility]  ED Nurse Handoff Report    ED Chief complaint: Loss of Consciousness and Hypotension      ED Diagnosis:   Final diagnoses:   Pneumonia due to infectious organism, unspecified laterality, unspecified part of lung   Bradycardia   Hypokalemia   Sepsis, due to unspecified organism, unspecified whether acute organ dysfunction present (H)       Code Status: To be addressed by admitting MD.   Allergies:   Allergies   Allergen Reactions     Metoprolol Shortness Of Breath     Bradycardia, fatigue, COPD worsening.         Fenofibrate Hives     Lisinopril Cough     Losartan Cough     Niacin      Simvastatin Cramps       Patient Story: Pt presents to ED via EMS for evaluation following episode at clinic. Per EMS, pt was found on the floor after sliding to the ground in clinic. Hypoixc on RA in the 80%s. Per EMS, hypotensive and bradycardic en route to hospital. Pt is Cantonese speaking. Pt has a hx of COPD, asthma, and Hypertension.     Focused Assessment:  Elevated lactate. Low potassium. PNA on chest XR. Pt bradycardic in the 40s-50s in ED. Pt is alert and orientedx4. Pt is extremely [place]. Pocket talker and ipad interpretor used for communication. Blood pressure stabilized since arriving to ED.     Treatments and/or interventions provided:   Medications   0.9% sodium chloride BOLUS (1,000 mLs Intravenous New Bag 10/28/20 1534)     Followed by   sodium chloride 0.9% infusion (has no administration in time range)   0.9% sodium chloride BOLUS (1,893 mLs Intravenous New Bag 10/28/20 1644)   sodium chloride 0.9% infusion (has no administration in time range)   cefTRIAXone (ROCEPHIN) 2 g vial to attach to  ml bag for ADULTS or NS 50 ml bag for PEDS (2 g Intravenous New Bag 10/28/20 1644)   potassium chloride 10 mEq in 100 mL sterile water intermittent infusion (premix) (10 mEq Intravenous New Bag 10/28/20 1646)   potassium chloride (KLOR-CON) Packet 40 mEq (40 mEq Oral Given 10/28/20 1638)        Patient's response to treatments and/or interventions: Pt resting comfortably following interventions.     To be done/followed up on inpatient unit:  Further evaluation, IV potassium, antibiotics    Does this patient have any cognitive concerns?: No coginitive concerns     Activity level - Baseline/Home:  Unknown  Activity Level - Current:   Total Care    Patient's Preferred language: Cantonese   Needed?: Yes    Isolation: Contact , Droplet and COVID r/o and special precautions  Infection: COVID r/o and special precautions  Patient tested for COVID 19 prior to admission: YES  Bariatric?: No    Vital Signs:   Vitals:    10/28/20 1510 10/28/20 1530 10/28/20 1615   BP: 113/74 104/59 111/72   Pulse: (!) 47 67 (!) 49   Resp: 18 26 19   Temp: 96  F (35.6  C)     TempSrc: Temporal     SpO2: 100% 100% 97%       Cardiac Rhythm:     Was the PSS-3 completed:   Yes  What interventions are required if any?               Family Comments: Family in ED lobby. RN deferred to provider to update family at this time.   OBS brochure/video discussed/provided to patient/family: N/A              Name of person given brochure if not patient: NA              Relationship to patient: NA    For the majority of the shift this patient's behavior was Green.   Behavioral interventions performed were frequent rounding and encouragement.    ED NURSE PHONE NUMBER: [id number]

## 2020-10-28 NOTE — ED PROVIDER NOTES
History     Chief Complaint:  Loss of Consciousness and Hypotension      HPI   A Cantonese interpretor was used to provide history.    Travis Peres is a 80 year old male with a history of HTN, HLD, CKD stage 4, and COPD who presents to the emergency department via EMS for evaluation of loss of consciousness and hypotension. Per EMS, patient presented in clinic earlier today in [place] for follow up appointment for throat pain which he was seen at Urgent Care 2 days ago and was diagnosed with thrush. When the doctor entered the room patient was found unconscious on the floor next to wife. He initially had no palpable radial pulse but the patient began moaning and a pulse was then found. He was noted to be have O2 sats in the 80s on room air and bradycardic. He was subsequently sent to the ED for evaluation. Here he only complains of a headache and feeling cold. Of note patient just recently finished a 7-day course of Levaquin.      Allergies:  Metoprolol  Fenofibrate  Lisinopril  Losartan  Niacin  Simvastatin    Medications:    Albuterol  Fosamax  Amlodipine  Tessalon  Colace  Fluticasone  Hydrocortisone  Levothyroxine  Combivent Respimat  Loperamide  Remeron  Flomax  Anoro ellipta    Past Medical History:    Arthritis  BPH  COPD  Depression  HLD  HTN  Vitamin D deficiency  Hypopituitarism after adenoma resection  Xerostomia  Vocal cord paralysis  Asthma  GI bleed  CKD stage 4  Osteopenia  Coronary atherosclerosis  Hypothyroidism  Hearing loss    Past Surgical History:   EGD with biopsy x3  Hernia repair  Lipoma resection chest wall  Phacoemulsification clear cornea with intraocular lens implant, bilateral  Right selective neck dissection level 2, 3, 4  Right  shunt placement  Thoracic surgery  Thymectomy  Thyroidectomy  Transcervical extended mediastinal lymphadenectomy  Transnasal endoscopic resection of pituitary adenoma    Family History:    History reviewed. No pertinent family history.     Social  History:  Smoking Status: Former Smoker  Alcohol Use: No  Drug Use: No  PCP: Karol Alvarez   The patient presents to the emergency department via EMS.  Marital Status:     Review of Systems   Neurological: Positive for syncope and headaches.   All other systems reviewed and are negative.    Physical Exam   Physical Exam    Patient Vitals for the past 24 hrs:   BP Temp Temp src Pulse Resp SpO2   10/28/20 1700 118/76 -- -- 74 18 100 %   10/28/20 1655 119/62 -- -- 84 23 100 %   10/28/20 1650 130/81 -- -- 73 28 99 %   10/28/20 1645 123/70 -- -- 77 21 100 %   10/28/20 1640 115/67 -- -- 67 14 100 %   10/28/20 1635 127/67 -- -- 70 19 100 %   10/28/20 1630 133/68 -- -- 80 17 91 %   10/28/20 1625 113/80 -- -- 69 28 100 %   10/28/20 1615 111/72 -- -- (!) 49 19 97 %   10/28/20 1530 104/59 -- -- 67 26 100 %   10/28/20 1510 113/74 96  F (35.6  C) Temporal (!) 47 18 100 %       General: Alert and Interactive. Complains of feeling cold.  Head: No signs of trauma.   Mouth/Throat: Oropharynx is clear and moist.   Eyes: Conjunctivae are normal. Pupils are equal, round, and reactive to light.   Neck: Normal range of motion. No nuchal rigidity.   CV: Bradycardic but regular rhythm. Palpable symmetric radial pulses.  Resp: Effort normal and breath sounds normal. No respiratory distress.   GI: Soft. There is no tenderness or guarding.   MSK: Normal range of motion. no edema.   Neuro: The patient is alert and oriented to person, place, and time.  PERRLA, EOMI, strength in upper/lower extremities normal and symmetrical.   Sensation normal. Speech normal.  GCS eye subscore is 4. GCS verbal subscore is 5. GCS motor subscore is 6.   Skin: Skin is warm and dry. No rash noted.   Psych: normal mood and affect. behavior is normal.     Emergency Department Course   EKG  Indication: Loss of Consciousness  Time: 16:27:02  Rate 70 bpm. LA interval *. QRS duration 106. QT/QTc 502/542. P-R-T axes * 80 264  Accelerated Junctional rhythm.  ST&T wave abnormality, consider inferior ischemia.  Abnormal ECG.   Interpreted at 1640 by Toy Joy MD.    Imaging:  Radiographic findings were communicated with the patient who voiced understanding of the findings.  CT Head without contrast:   IMPRESSION:   1. No CT findings of acute intracranial abnormality.   2. Grossly stable postsurgical changes status post previous transnasal   transsphenoidal pituitary surgery.   3. Unchanged right parietal ventricular shunt catheter, as described.   No evidence for hydrocephalus.   4. Generalized brain parenchymal volume loss and presumed chronic   small-vessel ischemic disease. as per radiology.    XR Chest 1 view, portable:   IMPRESSION: The heart size is within normal limits. Pulmonary   vasculature not distended. Increased markings in the retrocardiac   region suspicious for infiltrate/pneumonia. Lungs are otherwise clear.   No pleural fluid. Monitor leads and ventriculoperitoneal shunt   identified. as per radiology.    Laboratory:  CBC: WBC: 7.2, HGB: 11.6 (L), PLT: 142 (L)  CMP: Glucose 125 (H), Sodium: 131 (L), Potassium: 2.2 (LL), Chloride: 93 (L), Urea Nitrogen: 37 (H), GFR: 19 (L), Calcium: 7.8 (L), Bilirubin Total: 1.5 (H), o/w WNL (Creatinine: 2.99 (H))  UA: Blood: Trace, Protein Albumin: 30, Hyaline Casts: 25 (H), Mucous: Present, o/w Negative  Troponin (1520): <0.015  Lactic acid (1520): 2.6 (H)  VBG: pH 7.35 / PCO2 58 (H) / PO2 19 (L) / Bicarb 32 (H)  INR: 1.14  Blood cultures (X2): Pending  Urine Culture: Pending  Symptomatic COVID-19 PCR: Pending    Interventions:  1534 NS 1000 mL IV  1638 potassium chloride 40 mEq Oral  1644 NS 1893 mL IV  1644 Rocephin 2 g IV  1646 potassium chloride 10 mEq IV    Emergency Department Course:  Nursing notes and vitals reviewed. (7218) I performed an exam of the patient as documented above.     IV inserted. Medicine administered as documented above. Blood drawn. Urine sample obtained. COVID swab obtained. This was  sent to the lab for further testing, results above.     The patient was sent for a CT and xray while in the emergency department, findings above.     An EKG was recorded. Results as noted above.     325 I rechecked the patient and discussed the results of his workup thus far.     6766 I spoke with Dr. Wooten of the hospitalist services, who is in agreement to accept the patient for admission for further monitoring, evaluation, and treatment. Findings and plan explained to the Patient who consents to admission.     Impression & Plan    Medical Decision Making:  Travis Peres is a 80 year old male who presents for evaluation after he lost consciousness while in clinic earlier today.  History, physical exam and imaging studies are consistent with pneumonia.  First dose of antibiotics given in ED within 2 hours of diagnosis.  There are no signs of complications of pneumonia at this point such as septic shock, bacteremia, empyema, hypoxia, respiratory failure or compromise.    This seems to be community acquired pneumonia and there are no risk factors at this point for coverage of antibiotic-resistant strains.  I doubt PE, dissection, acute coronary syndrome, or other worrisome etiology for patients symptoms.  Would not cover for aspiration or antibiotic resistant strains of pneumonia at this point.      Covid-19  Travis Peres was evaluated during a global COVID-19 pandemic, which necessitated consideration that the patient might be at risk for infection with the SARS-CoV-2 virus that causes COVID-19.   Applicable protocols for evaluation were followed during the patient's care.   COVID-19 was considered as part of the patient's evaluation. The plan for testing is:  a test was obtained during this visit.    Diagnosis:    ICD-10-CM    1. Pneumonia due to infectious organism, unspecified laterality, unspecified part of lung  J18.9 UA with Microscopic reflex to Culture     Symptomatic COVID-19 Virus (Coronavirus) by PCR   2.  Bradycardia  R00.1    3. Hypokalemia  E87.6    4. Sepsis, due to unspecified organism, unspecified whether acute organ dysfunction present (H)  A41.9        Disposition:  Admitted to the hospital    Oliverio Schaeffer  10/28/2020   EMERGENCY DEPARTMENT  Scribe Disclosure:  I, Oliverio Schaeffer, am serving as a scribe at 3:03 PM on 10/28/2020 to document services personally performed by Toy Joy MD based on my observations and the provider's statements to me.          Toy Joy MD  10/28/20 [id number]

## 2020-10-28 NOTE — H&P
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    LOC, initiallly hypotension with SBP 80, bradycardic in the 40s.  Improved in ED with IVF.   Per EMS, patient presented in clinic earlier today in [place] for follow up appointment for throat pain which he was seen at Urgent Care 2 days ago and was diagnosed with thrush. When the doctor entered the room patient was found unconscious on the floor next to wife. He initially had no palpable radial pulse but the patient began moaning and a pulse was then found. He was noted to be have O2 sats in the 80s on room air and bradycardic.  In ED alert, responsive [language barrier] pulse 47, SBP 110s, on 4 L nasal cannula, potassium 2.2, sodium 131, BUN/CR 37/2.99.  Continue on supplemental O2, received KCl 10 mEq IV and 40 mEq p.o., BP, pulse significantly repeat improved with IVF.  -Continue IVF until adequate p.o.'s and no longer hypotensive.  -Avoid AV node blockers, therefore transition from amlodipine to alternative antihypertensive, not on B2.  -Serial troponins, EKG in a.m., echocardiogram  -Cardiology/EP consult  -Telemetry      Left-sided pneumonia on CXR  COPD on inhalers  -Continue Rocephin from ED, azithromycin held due to long QT.  -Attempt to obtain sputum to establish antibiotic plan  -Continue supplemental O2 per NC, attempt to wean.  -Follow-up BC from ED.  -Continue PTA inhalers, pending Covid status if negative transition to neb treatment if needed.,  -Symptomatic COVID-19 testing.  If negative recommend DC isolation.    Acute on chronic kidney disease  Baseline BUNs/CR September 13/151, in ED 37/2.99.  Suspect prerenal component as son states that  he had oral thrush with odynophagia and decreased p.o. intake PTA, also just completed 7-day of Levaquin.  Received IV bolus in ED.  -Continue IVF until adequate p.o.'s, recheck renal function in a.m.  -Avoid nephrotoxins.  -Nephrology consult if no improvement.    Hypokalemia.  In ED 2.2.  Etiology unclear, no known potassium depleting medications, no emesis, diarrhea.  Known hypopit, however on Hydrocort; and would suspect hyperkalemia if adrenal insufficient.  -Continue potassium replacement protocol.     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 Potts (endocrine), review of records suggests long suspected underlying lung mets.  Recommended that TSH be kept low to avoid stimulating thyroid cancer. Cervical adenopathy treated in the past but overtime nodes noted to be progressing/enlarging.   Pituitary macroadenoma resulting in hydrocephalus, prompting debulking of mass and treatment with XRT. Also had  shunt placement.   PTA steroid regimen  (hydrocortisone 10mg in AM and 5mg at 1400)    -Will initiate on stress dose steroids for at least the first 24 hours in view of hypovolemia, presentation with pneumonia., reassess in a.m. to determine if able to return to PTA hydrocortisone  Continue PTA  levothyroxine 137mcg daily.  Follow up with Dr. Potts this fall as scheduled.     Possible seizure.   shunt  Patient found down in Clinic exam room, no observed seizure activity however in ED signs of tongue biting..  Head CT without acute changes,  shunt noted, no hydrocephalus.  -Neurology consult.  -Seizure precaution  -PRN Ativan for seizure       Possible Underlying Cognitive Impairment:  In the past some concerns as to whether  patient may have some underlying cognitive impairment vs difficulties with communication dt language barrier and [place].  - or family as needed to assist with communication  - follow up with PCP.     DVT Prophylaxis: Pneumatic Compression Devices  Code Status: Full Code  Expected discharge:TBD in patient presented with complex medical issues including bradycardia, hypokalemia, pneumonia, possible seizure; pending Cardiology and Neurology clearance.      Primary Care Physician   Karol Alvarez    Chief Complaint   LOC, bradycardia    History is obtained from the patient and Son and ED Provider    History of Present Illness   Travis Peres is a 80 year old male who presents per EMS to clinic earlier today in [place] for follow up appointment for throat pain which he was seen at Urgent Care 2 days ago and was diagnosed with thrush. When the doctor entered the room patient was found unconscious on the floor next to wife. He initially had no palpable radial pulse but the patient began moaning and a pulse was then found. He was noted to be have O2 sats in the 80s on room air and bradycardic.  Remainder of HPI as above.    Past Medical History    I have reviewed this patient's medical history and updated it with pertinent information if needed.   Past Medical History:   Diagnosis Date     Arthritis      BPH (benign prostatic hyperplasia)      COPD (chronic obstructive pulmonary disease) (H)      Depression      Hyperlipidemia      Hypertension     no current meds     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     Hypovitaminosis D      Multiple pulmonary nodules 2009     Osteopenia 2011     Papillary thyroid carcinoma (H) 2007    4.8 cm, right, node positive;      Pituitary macroadenoma with extrasellar extension (H) 2007    causing obstructive hydrocephalus     Post-surgical hypothyroidism 2007     Uncomplicated asthma      Vocal cord paralysis 2014    right     Xerostomia 2010    due to  radiation exposures       Past Surgical History   I have reviewed this patient's surgical history and updated it with pertinent information if needed.  Past Surgical History:   Procedure Laterality Date     cymetra injection       ESOPHAGOSCOPY, GASTROSCOPY, DUODENOSCOPY (EGD), COMBINED  6/20/2013    Procedure: COMBINED ESOPHAGOSCOPY, GASTROSCOPY, DUODENOSCOPY (EGD), BIOPSY SINGLE OR MULTIPLE;  gastroscopy;  Surgeon: Leslie Guadarrama MD;  Location:  GI     ESOPHAGOSCOPY, GASTROSCOPY, DUODENOSCOPY (EGD), COMBINED N/A 9/20/2019    Procedure: ESOPHAGOGASTRODUODENOSCOPY (EGD);  Surgeon: Gilberto Gibson DO;  Location:  GI     ESOPHAGOSCOPY, GASTROSCOPY, DUODENOSCOPY (EGD), COMBINED N/A 10/16/2020    Procedure: ESOPHAGOGASTRODUODENOSCOPY, WITH BIOPSY;  Surgeon: Bartolome Granda MD;  Location:  GI     HERNIA REPAIR Right      lipoma resection chest wall Right 11/09     PHACOEMULSIFICATION CLEAR CORNEA WITH STANDARD INTRAOCULAR LENS IMPLANT Left 5/7/2018    Procedure: PHACOEMULSIFICATION CLEAR CORNEA WITH STANDARD INTRAOCULAR LENS IMPLANT;  LEFT PHACOEMULSIFICATION CLEAR CORNEA WITH STANDARD INTRAOCULAR LENS IMPLANT ;  Surgeon: Nadiya Allen MD;  Location:  EC     PHACOEMULSIFICATION CLEAR CORNEA WITH STANDARD INTRAOCULAR LENS IMPLANT Right 8/21/2018    Procedure: PHACOEMULSIFICATION CLEAR CORNEA WITH STANDARD INTRAOCULAR LENS IMPLANT;  RIGHT EYE PHACOEMULSIFICATION CLEAR CORNEA WITH STANDARD INTRAOCULAR LENS IMPLANT;  Surgeon: Nadiya Allen MD;  Location:  EC     right selective neck dissection level 2, 3, 4  11/3/09     right  shunt placement  6/28/07     THORACIC SURGERY  Fidencio 3 2017     THYMECTOMY N/A 1/3/2017    Procedure: THYMECTOMY;  Surgeon: Ernesto Armstrong MD;  Location: [facility] OR     THYROIDECTOMY  11/27/07     TRANSCERVICAL EXTENDED MEDIASTINAL LYMPHADENECTOMY N/A 1/3/2017    Procedure: TRANSCERVICAL EXTENDED MEDIASTINAL LYMPHADENECTOMY;  Surgeon: Ernesto Armstrong  MD Gurinder;  Location: [facility] OR     transnasal endoscopic resection of pituitary adenoma  8/13/2007       Prior to Admission Medications   Prior to Admission Medications   Prescriptions Last Dose Informant Patient Reported? Taking?   B Complex Vitamins (VITAMIN B COMPLEX PO)  Self Yes No   Sig: Take 1 capsule by mouth daily    GuaiFENesin (MUCUS RELIEF ADULT PO)  Self Yes No   Sig: Take 400 mg by mouth 2 times daily as needed    Ipratropium-Albuterol (COMBIVENT RESPIMAT)  MCG/ACT inhaler  Pharmacy Yes No   Sig: Inhale 1 puff into the lungs 4 times daily   albuterol (PROAIR HFA) 108 (90 Base) MCG/ACT inhaler  Pharmacy No No   Sig: Inhale 2 puffs into the lungs every 4 hours as needed for shortness of breath / dyspnea or wheezing   albuterol (PROAIR HFA/PROVENTIL HFA/VENTOLIN HFA) 108 (90 Base) MCG/ACT inhaler   No No   Sig: Inhale 2 puffs into the lungs every 4 hours as needed for shortness of breath / dyspnea or wheezing   alendronate (FOSAMAX) 70 MG tablet   No No   Sig: Take 1 tablet (70 mg) by mouth every 7 days   Patient not taking: Reported on 9/25/2020   amLODIPine (NORVASC) 2.5 MG tablet   No No   Sig: TAKE 1 TABLET(2.5 MG) BY MOUTH DAILY   benzonatate (TESSALON) 100 MG capsule  Pharmacy No No   Sig: Take 1 capsule (100 mg) by mouth 3 times daily as needed for cough   Patient not taking: Reported on 9/25/2020   docusate sodium (COLACE) 100 MG capsule   No No   Sig: Take 1 capsule (100 mg) by mouth 2 times daily   fish oil-omega-3 fatty acids 1000 MG capsule  Self Yes No   Sig: Take 2 g by mouth daily   fluticasone (FLONASE) 50 MCG/ACT spray  Self Yes No   Sig: Spray 2 sprays into both nostrils daily   guaiFENesin-dextromethorphan (ROBITUSSIN DM) 100-10 MG/5ML syrup   No No   Sig: Take 10 mLs by mouth 3 times daily as needed for cough   Patient not taking: Reported on 9/25/2020   hydrocortisone (CORTEF) 10 MG tablet   No No   Sig: 10 mg AM and 5 mg afternoon   levothyroxine (SYNTHROID/LEVOTHROID) 137 MCG  tablet   No No   Sig: Take 1 tablet (137 mcg) by mouth daily   loperamide (IMODIUM) 2 MG capsule  Self Yes No   mirtazapine (REMERON) 15 MG tablet  Pharmacy Yes No   Sig: Take 15 mg by mouth At Bedtime    nystatin (MYCOSTATIN) 305200 UNIT/ML suspension   No No   Sig: Take 5 mLs (500,000 Units) by mouth 4 times daily for 14 days   tamsulosin (FLOMAX) 0.4 MG capsule  Pharmacy Yes No   Sig: Take 0.4 mg by mouth daily   umeclidinium-vilanterol (ANORO ELLIPTA) 62.5-25 MCG/INH oral inhaler  Pharmacy Yes No   Sig: Inhale 1 puff into the lungs daily      Facility-Administered Medications: None     Allergies   Allergies   Allergen Reactions     Metoprolol Shortness Of Breath     Bradycardia, fatigue, COPD worsening.         Fenofibrate Hives     Lisinopril Cough     Losartan Cough     Niacin      Simvastatin Cramps       Social History   I have reviewed this patient's social history and updated it with pertinent information if needed. Travis Peres  reports that he quit smoking about 19 years ago. His smoking use included cigarettes. He started smoking about 44 years ago. He has a 2.50 pack-year smoking history. He has never used smokeless tobacco. He reports that he does not drink alcohol or use drugs.    Family History   I have reviewed this patient's family history and updated it with pertinent information if needed.   Family History   Problem Relation Age of Onset     Cancer No family hx of         no skin cancer     Glaucoma No family hx of      Macular Degeneration No family hx of        Review of Systems   The 10 point Review of Systems is negative other than noted in the HPI    Physical Exam   Temp: 96  F (35.6  C) Temp src: Temporal BP: 112/72 Pulse: 71   Resp: 27 SpO2: 96 % O2 Device: Nasal cannula Oxygen Delivery: 3 LPM  Vital Signs with Ranges  Temp:  [96  F (35.6  C)] 96  F (35.6  C)  Pulse:  [47-86] 71  Resp:  [11-30] 27  BP: (104-140)/(49-81) 112/72  SpO2:  [91 %-100 %] 96 %  0 lbs 0 oz    General: Alert and  Interactive.   present.    Head: No signs of trauma.   Mouth/Throat: Oropharynx is clear and moist.   Eyes: Conjunctivae are normal. Pupils are equal, round, and reactive to light.   Neck: Normal range of motion. No nuchal rigidity.   CV: Bradycardic but regular rhythm. Palpable symmetric radial pulses.  Resp: Effort normal and breath sounds normal. No respiratory distress.   GI: Soft. There is no tenderness or guarding.   MSK: Normal range of motion. no edema.   Neuro:alert and oriented to person, place, and time.  PERRLA, EOMI, strength in upper/lower extremities normal and symmetrical.   Sensation normal. Speech normal.   Psych: Affect calm.  Due to COVID-19 PUI status the majority of exam findings per ED Provider performed within hours prior.    Data     Recent Labs   Lab 10/28/20  1520   WBC 7.2   HGB 11.6*   MCV 81   *   INR 1.14   *   POTASSIUM 2.2*   CHLORIDE 93*   CO2 30   BUN 37*   CR 2.99*   ANIONGAP 8   NIHARIKA 7.8*   *   ALBUMIN 3.5   PROTTOTAL 7.5   BILITOTAL 1.5*   ALKPHOS 57   ALT 30   AST 39   TROPI <0.015       Recent Results (from the past 24 hour(s))   XR Chest Port 1 View    Narrative    CHEST PORTABLE ONE VIEW   10/28/2020 3:45 PM     HISTORY: Bradycardia.    COMPARISON: Chest x-ray from 9/17/2019.      Impression    IMPRESSION: The heart size is within normal limits. Pulmonary  vasculature not distended. Increased markings in the retrocardiac  region suspicious for infiltrate/pneumonia. Lungs are otherwise clear.  No pleural fluid. Monitor leads and ventriculoperitoneal shunt  identified.    DARBY REDDY MD   Head CT w/o contrast    Narrative    CT SCAN OF THE HEAD WITHOUT CONTRAST   10/28/2020 4:00 PM     HISTORY: Altered level of consciousness (LOC), unexplained.    TECHNIQUE:  Axial images of the head and coronal reformations without  IV contrast material. Radiation dose for this scan was reduced using  automated exposure control, adjustment of the mA  and/or kV according  to patient size, or iterative reconstruction technique.    COMPARISON: MRI dated 10/24/2018.    FINDINGS: There is a right parietal approach ventricular shunt  catheter extending through the body of the right lateral ventricle and  crossing midline, also extending through the frontal horn of the left  lateral ventricle with the tip positioned in the region of the  periventricular white matter just anterior to the frontal horn of the  left lateral ventricle. This does not appear significantly changed  from prior MRI. The ventricles are normal in size and configuration,  without evidence for hydrocephalus.    Redemonstrated changes status post transnasal, transsphenoidal  pituitary resection. There is some amorphous/mildly nodular-appearing  soft tissue as well as fatty attenuation along the floor of the sella,  not grossly changed given differences in technique compared to prior  MRI of 10/24/2018. No abnormal soft tissue in the suprasellar cistern.    There is moderate generalized brain parenchymal volume loss. Mild  presumed chronic small-vessel ischemic changes in the cerebral white  matter.    Bilateral lens replacements. Visualized orbits otherwise appear  normal. Complete opacification of the right sphenoid sinus. Moderate  mucosal thickening in the left sphenoid sinus. The bony calvarium and  bones of the skull base appear intact.       Impression    IMPRESSION:  1. No CT findings of acute intracranial abnormality.  2. Grossly stable postsurgical changes status post previous transnasal  transsphenoidal pituitary surgery.  3. Unchanged right parietal ventricular shunt catheter, as described.  No evidence for hydrocephalus.  4. Generalized brain parenchymal volume loss and presumed chronic  small-vessel ischemic disease.     RICHARD SALAGDO MD

## 2020-10-28 NOTE — ED NOTES
Bed: ST03  Expected date:   Expected time:   Means of arrival:   [place]  [facility] [place] 80 M shawanda low O2 sats eta 1505

## 2020-10-29 ENCOUNTER — APPOINTMENT (OUTPATIENT)
Dept: CARDIOLOGY | Facility: CLINIC | Age: 80
DRG: 193 | End: 2020-10-29
Attending: HOSPITALIST
Payer: COMMERCIAL

## 2020-10-29 ENCOUNTER — HOSPITAL ENCOUNTER (OUTPATIENT)
Dept: NEUROLOGY | Facility: CLINIC | Age: 80
DRG: 193 | End: 2020-10-29
Attending: PSYCHIATRY & NEUROLOGY
Payer: COMMERCIAL

## 2020-10-29 DIAGNOSIS — H35.3221 EXUDATIVE AGE-RELATED MACULAR DEGENERATION OF LEFT EYE WITH ACTIVE CHOROIDAL NEOVASCULARIZATION (H): Primary | ICD-10-CM

## 2020-10-29 LAB
ANION GAP SERPL CALCULATED.3IONS-SCNC: 7 MMOL/L (ref 3–14)
BACTERIA SPEC CULT: NO GROWTH
BUN SERPL-MCNC: 22 MG/DL (ref 7–30)
CALCIUM SERPL-MCNC: 6.9 MG/DL (ref 8.5–10.1)
CHLORIDE SERPL-SCNC: 107 MMOL/L (ref 94–109)
CO2 SERPL-SCNC: 23 MMOL/L (ref 20–32)
CREAT SERPL-MCNC: 1.88 MG/DL (ref 0.66–1.25)
ERYTHROCYTE [DISTWIDTH] IN BLOOD BY AUTOMATED COUNT: 14.5 % (ref 10–15)
GFR SERPL CREATININE-BSD FRML MDRD: 33 ML/MIN/{1.73_M2}
GLUCOSE SERPL-MCNC: 111 MG/DL (ref 70–99)
HCT VFR BLD AUTO: 32.3 % (ref 40–53)
HGB BLD-MCNC: 11.1 G/DL (ref 13.3–17.7)
INTERPRETATION ECG - MUSE: NORMAL
LABORATORY COMMENT REPORT: NORMAL
Lab: NORMAL
MAGNESIUM SERPL-MCNC: 2.5 MG/DL (ref 1.6–2.3)
MCH RBC QN AUTO: 28 PG (ref 26.5–33)
MCHC RBC AUTO-ENTMCNC: 34.4 G/DL (ref 31.5–36.5)
MCV RBC AUTO: 81 FL (ref 78–100)
PHOSPHATE SERPL-MCNC: 2.6 MG/DL (ref 2.5–4.5)
PLATELET # BLD AUTO: 148 10E9/L (ref 150–450)
POTASSIUM SERPL-SCNC: 3.8 MMOL/L (ref 3.4–5.3)
RBC # BLD AUTO: 3.97 10E12/L (ref 4.4–5.9)
SARS-COV-2 RNA SPEC QL NAA+PROBE: NEGATIVE
SODIUM SERPL-SCNC: 137 MMOL/L (ref 133–144)
SPECIMEN SOURCE: NORMAL
SPECIMEN SOURCE: NORMAL
TROPONIN I SERPL-MCNC: <0.015 UG/L (ref 0–0.04)
WBC # BLD AUTO: 8.4 10E9/L (ref 4–11)

## 2020-10-29 PROCEDURE — 95816 EEG AWAKE AND DROWSY: CPT

## 2020-10-29 PROCEDURE — 250N000013 HC RX MED GY IP 250 OP 250 PS 637: Performed by: HOSPITALIST

## 2020-10-29 PROCEDURE — 999N000157 HC STATISTIC RCP TIME EA 10 MIN

## 2020-10-29 PROCEDURE — 93005 ELECTROCARDIOGRAM TRACING: CPT

## 2020-10-29 PROCEDURE — 250N000011 HC RX IP 250 OP 636: Performed by: HOSPITALIST

## 2020-10-29 PROCEDURE — 93321 DOPPLER ECHO F-UP/LMTD STD: CPT | Mod: 26 | Performed by: INTERNAL MEDICINE

## 2020-10-29 PROCEDURE — 258N000003 HC RX IP 258 OP 636: Performed by: HOSPITALIST

## 2020-10-29 PROCEDURE — 94640 AIRWAY INHALATION TREATMENT: CPT

## 2020-10-29 PROCEDURE — 99233 SBSQ HOSP IP/OBS HIGH 50: CPT | Performed by: HOSPITALIST

## 2020-10-29 PROCEDURE — 210N000002 HC R&B HEART CARE

## 2020-10-29 PROCEDURE — 93325 DOPPLER ECHO COLOR FLOW MAPG: CPT | Mod: 26 | Performed by: INTERNAL MEDICINE

## 2020-10-29 PROCEDURE — 93308 TTE F-UP OR LMTD: CPT | Mod: 26 | Performed by: INTERNAL MEDICINE

## 2020-10-29 PROCEDURE — 250N000009 HC RX 250: Performed by: HOSPITALIST

## 2020-10-29 PROCEDURE — 258N000003 HC RX IP 258 OP 636: Performed by: EMERGENCY MEDICINE

## 2020-10-29 PROCEDURE — 84484 ASSAY OF TROPONIN QUANT: CPT | Performed by: HOSPITALIST

## 2020-10-29 PROCEDURE — 36415 COLL VENOUS BLD VENIPUNCTURE: CPT | Performed by: HOSPITALIST

## 2020-10-29 PROCEDURE — 84100 ASSAY OF PHOSPHORUS: CPT | Performed by: HOSPITALIST

## 2020-10-29 PROCEDURE — 85027 COMPLETE CBC AUTOMATED: CPT | Performed by: HOSPITALIST

## 2020-10-29 PROCEDURE — 83735 ASSAY OF MAGNESIUM: CPT | Performed by: HOSPITALIST

## 2020-10-29 PROCEDURE — 93325 DOPPLER ECHO COLOR FLOW MAPG: CPT

## 2020-10-29 PROCEDURE — 94640 AIRWAY INHALATION TREATMENT: CPT | Mod: 76

## 2020-10-29 PROCEDURE — 80048 BASIC METABOLIC PNL TOTAL CA: CPT | Performed by: HOSPITALIST

## 2020-10-29 RX ORDER — TRAZODONE HYDROCHLORIDE 100 MG/1
100 TABLET ORAL AT BEDTIME
Status: DISCONTINUED | OUTPATIENT
Start: 2020-10-29 | End: 2020-11-01 | Stop reason: HOSPADM

## 2020-10-29 RX ORDER — HYDROCORTISONE 5 MG/1
5 TABLET ORAL DAILY
Status: DISCONTINUED | OUTPATIENT
Start: 2020-10-30 | End: 2020-10-30

## 2020-10-29 RX ORDER — ACETAMINOPHEN 325 MG/1
325 TABLET ORAL EVERY 4 HOURS PRN
Status: DISCONTINUED | OUTPATIENT
Start: 2020-10-29 | End: 2020-11-01 | Stop reason: HOSPADM

## 2020-10-29 RX ORDER — AZITHROMYCIN 500 MG/1
500 INJECTION, POWDER, LYOPHILIZED, FOR SOLUTION INTRAVENOUS ONCE
Status: DISCONTINUED | OUTPATIENT
Start: 2020-10-29 | End: 2020-10-29

## 2020-10-29 RX ORDER — DOCUSATE SODIUM 100 MG/1
100 CAPSULE, LIQUID FILLED ORAL 2 TIMES DAILY PRN
COMMUNITY

## 2020-10-29 RX ORDER — ZOLPIDEM TARTRATE 10 MG/1
10 TABLET ORAL
Status: ON HOLD | COMMUNITY
End: 2020-11-08

## 2020-10-29 RX ORDER — IPRATROPIUM BROMIDE AND ALBUTEROL SULFATE 2.5; .5 MG/3ML; MG/3ML
3 SOLUTION RESPIRATORY (INHALATION)
Status: DISCONTINUED | OUTPATIENT
Start: 2020-10-29 | End: 2020-10-30

## 2020-10-29 RX ORDER — POTASSIUM CHLORIDE 750 MG/1
10 TABLET, EXTENDED RELEASE ORAL ONCE
Status: COMPLETED | OUTPATIENT
Start: 2020-10-29 | End: 2020-10-29

## 2020-10-29 RX ORDER — TRAZODONE HYDROCHLORIDE 100 MG/1
100 TABLET ORAL AT BEDTIME
COMMUNITY
End: 2021-09-26

## 2020-10-29 RX ORDER — HYDROCORTISONE 10 MG/1
10 TABLET ORAL DAILY
Status: DISCONTINUED | OUTPATIENT
Start: 2020-10-30 | End: 2020-11-01 | Stop reason: HOSPADM

## 2020-10-29 RX ORDER — NYSTATIN 100000/ML
500000 SUSPENSION, ORAL (FINAL DOSE FORM) ORAL 4 TIMES DAILY
Status: ON HOLD | COMMUNITY
End: 2020-10-30

## 2020-10-29 RX ADMIN — UMECLIDINIUM BROMIDE AND VILANTEROL TRIFENATATE 1 PUFF: 62.5; 25 POWDER RESPIRATORY (INHALATION) at 09:07

## 2020-10-29 RX ADMIN — SODIUM CHLORIDE: 9 INJECTION, SOLUTION INTRAVENOUS at 09:15

## 2020-10-29 RX ADMIN — HYDROCORTISONE SODIUM SUCCINATE 25 MG: 100 INJECTION, POWDER, FOR SOLUTION INTRAMUSCULAR; INTRAVENOUS at 09:00

## 2020-10-29 RX ADMIN — NYSTATIN 500000 UNITS: 100000 SUSPENSION ORAL at 20:23

## 2020-10-29 RX ADMIN — MIRTAZAPINE 15 MG: 15 TABLET, FILM COATED ORAL at 21:20

## 2020-10-29 RX ADMIN — TRAZODONE HYDROCHLORIDE 100 MG: 100 TABLET ORAL at 21:20

## 2020-10-29 RX ADMIN — NYSTATIN 500000 UNITS: 100000 SUSPENSION ORAL at 13:07

## 2020-10-29 RX ADMIN — HYDROCORTISONE SODIUM SUCCINATE 25 MG: 100 INJECTION, POWDER, FOR SOLUTION INTRAMUSCULAR; INTRAVENOUS at 15:53

## 2020-10-29 RX ADMIN — HEPARIN SODIUM 5000 UNITS: 5000 INJECTION, SOLUTION INTRAVENOUS; SUBCUTANEOUS at 20:23

## 2020-10-29 RX ADMIN — POTASSIUM CHLORIDE 10 MEQ: 750 TABLET, EXTENDED RELEASE ORAL at 13:07

## 2020-10-29 RX ADMIN — TAMSULOSIN HYDROCHLORIDE 0.4 MG: 0.4 CAPSULE ORAL at 09:04

## 2020-10-29 RX ADMIN — IPRATROPIUM BROMIDE AND ALBUTEROL SULFATE 3 ML: .5; 3 SOLUTION RESPIRATORY (INHALATION) at 20:04

## 2020-10-29 RX ADMIN — CEFTRIAXONE SODIUM 2 G: 2 INJECTION, POWDER, FOR SOLUTION INTRAMUSCULAR; INTRAVENOUS at 15:54

## 2020-10-29 RX ADMIN — SODIUM CHLORIDE: 9 INJECTION, SOLUTION INTRAVENOUS at 19:46

## 2020-10-29 RX ADMIN — HEPARIN SODIUM 5000 UNITS: 5000 INJECTION, SOLUTION INTRAVENOUS; SUBCUTANEOUS at 09:01

## 2020-10-29 RX ADMIN — ACETAMINOPHEN 325 MG: 325 TABLET, FILM COATED ORAL at 21:20

## 2020-10-29 RX ADMIN — AMLODIPINE BESYLATE 2.5 MG: 2.5 TABLET ORAL at 09:04

## 2020-10-29 RX ADMIN — LEVOTHYROXINE SODIUM 137 MCG: 112 TABLET ORAL at 09:04

## 2020-10-29 ASSESSMENT — ACTIVITIES OF DAILY LIVING (ADL)
ADLS_ACUITY_SCORE: 17
ADLS_ACUITY_SCORE: 17
ADLS_ACUITY_SCORE: 18
ADLS_ACUITY_SCORE: 18
ADLS_ACUITY_SCORE: 17
ADLS_ACUITY_SCORE: 18

## 2020-10-29 NOTE — PROGRESS NOTES
[facility]    Hospitalist Progress Note      Assessment & Plan   Travis Peres is a 80 year old male who was admitted on 10/28/2020 with loss of consciousness, bradycardia, hypokalemia and pneumonia.    LOC, initiallly hypotension with SBP 80, bradycardic in the 40s.  Improved in ED with IVF.   Per EMS, patient presented in clinic earlier 10/28 in [place] for follow up appointment for throat pain which he was seen at Urgent Care 2 days ago and was diagnosed with thrush. When the doctor entered the room patient was found unconscious on the floor next to wife. He initially had no palpable radial pulse but the patient began moaning and a pulse was then found. He was noted to be have O2 sats in the 80s on room air and bradycardic.  In ED alert, responsive [language barrier] pulse 47, SBP 110s, on 4 L nasal cannula, potassium 2.2, sodium 131, BUN/CR 37/2.99.  Continue on supplemental O2, received KCl 10 mEq IV and 40 mEq p.o., BP, pulse significantly improved with IVF.  - IVF @100ml/hr while awaiting improved PO intake  - Monitor while on PTA amlodipine  - troponins negative, EKG with improved QTc  - echocardiogram not able to be completed secondary to some agitation--follow up partial result     Oral thrush  Continue PTA QID nystatin    Left-sided pneumonia on CXR  COPD on inhalers  COVID-19 screening-negative  CXR with increased markings in retrocardiac region suspicious for infiltrate.   - Continue Rocephin (just recently completed 7 day course levaquin few days prior to admission)  -Attempt to obtain sputum to establish antibiotic plan, blood cx negative to date  - Weaned from O2 on 10/29  -Continue PTA inhalers  - duoneb QID     Acute on chronic kidney disease  Baseline BUNs/CR September 13/1.51, in ED 37/2.99.  Suspect prerenal component as son states that he had oral thrush with odynophagia and decreased p.o. intake PTA, also just completed 7-day of Levaquin.  Received IV bolus in ED.  -  Cr improving, 1.88 (on 10/29)  - Continue low rate IVF  - encourage PO intake     Hypokalemia-improved  In ED 2.2.  Likely secondary to poor PO intake.   -Continue potassium replacement protocol.     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 Potts (endocrine), review of records suggests long suspected underlying lung mets.  Recommended that TSH be kept low to avoid stimulating thyroid cancer. Cervical adenopathy treated in the past but overtime nodes noted to be progressing/enlarging.   Pituitary macroadenoma resulting in hydrocephalus, prompting debulking of mass and treatment with XRT. Also had  shunt placement.   PTA steroid regimen  (hydrocortisone 10mg in AM and 5mg at 1400)    - Received 100mg IV hydrocortisone on admission, then decreased to 25mg q8h.  - Resume PTA hydrocortisone in AM 10/29  - Continue PTA  levothyroxine 137mcg daily.  - Follow up with Dr. Potts this fall as scheduled.      Possible seizure   shunt  Patient found down in Clinic exam room, no observed seizure activity however in ED signs of tongue biting..  Head CT without acute changes,  shunt noted, no hydrocephalus.  - Neurology consulted  - Seizure precaution  - PRN Ativan for seizure     Possible Underlying Cognitive Impairment:  In the past some concerns as to whether patient may have some underlying cognitive impairment vs difficulties with communication dt language barrier and [place].  -  or family as needed to assist with communication  - follow up with PCP.     DVT Prophylaxis: Heparin SQ  Code Status: Full Code  Expected discharge: 2 - 3 days, recommended to prior living arrangement once stable, tolerating diet  Okay to  transfer to floor    Nataly Valdes, DO  Text Page (7am - 6pm)    Interval History   Patient seen and examined. He speaks some english but is hard of hearing. Unable to talk to  and responds to questions in english. Some his answers were appropriate, some were not. Spoke with his wife via interpretor and updated her on plan of care (including his negative COVID-19 result). She will try to come to bedside tomorrow. Will see if his attitude/behaviors improve. Issue with echo tech earlier, he wanted to eat instead of finishing his echo and he hit the tech.    -Data reviewed today: I reviewed all new labs and imaging results over the last 24 hours. I personally reviewed no images or EKG's today.    Physical Exam   Temp: 98.7  F (37.1  C) Temp src: Oral BP: 118/62 Pulse: 64   Resp: 16 SpO2: 95 % O2 Device: None (Room air) Oxygen Delivery: 2 LPM  Vitals:    10/28/20 2000   Weight: 63 kg (139 lb)     Vital Signs with Ranges  Temp:  [96.9  F (36.1  C)-99.1  F (37.3  C)] 98.7  F (37.1  C)  Pulse:  [49-86] 64  Resp:  [11-30] 16  BP: ()/(49-92) 118/62  SpO2:  [91 %-100 %] 95 %  I/O last 3 completed shifts:  In: 3197.5 [P.O.:360; I.V.:1837.5; IV Piggyback:1000]  Out: 2100 [Urine:2100]    Constitutional: Awake, alert, cooperative, no apparent distress  Respiratory: Clear to auscultation bilaterally, no crackles or wheezing  Cardiovascular: Regular rate and rhythm, normal S1 and S2, and no murmur noted  GI: Normal bowel sounds, soft, non-distended, non-tender  Skin/Integumen: No rashes, no cyanosis, no edema  Other: Hard of hearing. Follows some commands    Medications     sodium chloride 100 mL/hr at 10/29/20 1307       amLODIPine  2.5 mg Oral Daily     cefTRIAXone  2 g Intravenous Q24H     heparin ANTICOAGULANT  5,000 Units Subcutaneous Q12H     [START ON 10/30/2020] hydrocortisone  10 mg Oral Daily     [START ON 10/30/2020] hydrocortisone  5 mg Oral Daily     hydrocortisone sodium succinate PF  25 mg  Intravenous Q8H     ipratropium - albuterol 0.5 mg/2.5 mg/3 mL  3 mL Nebulization 4x daily     levothyroxine  137 mcg Oral Daily     mirtazapine  15 mg Oral At Bedtime     nystatin  500,000 Units Oral 4x Daily     sodium chloride (PF)  3 mL Intracatheter Q8H     tamsulosin  0.4 mg Oral Daily     traZODone  100 mg Oral At Bedtime     umeclidinium-vilanterol  1 puff Inhalation Daily       Data   Recent Labs   Lab 10/29/20  0526 10/29/20  0112 10/28/20  2001 10/28/20  1520   WBC 8.4  --   --  7.2   HGB 11.1*  --   --  11.6*   MCV 81  --   --  81   *  --   --  142*   INR  --   --   --  1.14     --   --  131*   POTASSIUM 3.8  --  2.9* 2.2*   CHLORIDE 107  --   --  93*   CO2 23  --   --  30   BUN 22  --   --  37*   CR 1.88*  --   --  2.99*   ANIONGAP 7  --   --  8   NIHARIKA 6.9*  --   --  7.8*   *  --   --  125*   ALBUMIN  --   --   --  3.5   PROTTOTAL  --   --   --  7.5   BILITOTAL  --   --   --  1.5*   ALKPHOS  --   --   --  57   ALT  --   --   --  30   AST  --   --   --  39   TROPI  --  <0.015  --  <0.015       Recent Results (from the past 24 hour(s))   XR Chest Port 1 View    Narrative    CHEST PORTABLE ONE VIEW   10/28/2020 3:45 PM     HISTORY: Bradycardia.    COMPARISON: Chest x-ray from 9/17/2019.      Impression    IMPRESSION: The heart size is within normal limits. Pulmonary  vasculature not distended. Increased markings in the retrocardiac  region suspicious for infiltrate/pneumonia. Lungs are otherwise clear.  No pleural fluid. Monitor leads and ventriculoperitoneal shunt  identified.    DARBY REDDY MD   Head CT w/o contrast    Narrative    CT SCAN OF THE HEAD WITHOUT CONTRAST   10/28/2020 4:00 PM     HISTORY: Altered level of consciousness (LOC), unexplained.    TECHNIQUE:  Axial images of the head and coronal reformations without  IV contrast material. Radiation dose for this scan was reduced using  automated exposure control, adjustment of the mA and/or kV according  to patient size, or  iterative reconstruction technique.    COMPARISON: MRI dated 10/24/2018.    FINDINGS: There is a right parietal approach ventricular shunt  catheter extending through the body of the right lateral ventricle and  crossing midline, also extending through the frontal horn of the left  lateral ventricle with the tip positioned in the region of the  periventricular white matter just anterior to the frontal horn of the  left lateral ventricle. This does not appear significantly changed  from prior MRI. The ventricles are normal in size and configuration,  without evidence for hydrocephalus.    Redemonstrated changes status post transnasal, transsphenoidal  pituitary resection. There is some amorphous/mildly nodular-appearing  soft tissue as well as fatty attenuation along the floor of the sella,  not grossly changed given differences in technique compared to prior  MRI of 10/24/2018. No abnormal soft tissue in the suprasellar cistern.    There is moderate generalized brain parenchymal volume loss. Mild  presumed chronic small-vessel ischemic changes in the cerebral white  matter.    Bilateral lens replacements. Visualized orbits otherwise appear  normal. Complete opacification of the right sphenoid sinus. Moderate  mucosal thickening in the left sphenoid sinus. The bony calvarium and  bones of the skull base appear intact.       Impression    IMPRESSION:  1. No CT findings of acute intracranial abnormality.  2. Grossly stable postsurgical changes status post previous transnasal  transsphenoidal pituitary surgery.  3. Unchanged right parietal ventricular shunt catheter, as described.  No evidence for hydrocephalus.  4. Generalized brain parenchymal volume loss and presumed chronic  small-vessel ischemic disease.     RICHARD SALGADO MD

## 2020-10-29 NOTE — PLAN OF CARE
Pt arrived to floor at 2000. BILL orientation,cantonese speaking. VSS on 2L O2. Tele NSR. Denies pain. C/o being cold frequently. IVF infusing, IV abx. Passed bedside swallow study - advanced to clear liquid diet. K 2.9, replaced with 40 MeQ - MD aware. LS diminished. Plan for nephrology, neurology, and cardiology consult.

## 2020-10-29 NOTE — PLAN OF CARE
BILL orientation. Patient is cantonese speaking and does not interact with the . Unclear if this is due to language barrier or patient being hard of hearing. Patient does speak minimal English though and will make his needs known. One episode of agitation today towards an echo tech but has otherwise been calm. VSS on RA. Tele NSR. Not OOB today. Guzman catheter removed, voided in urinal. COVID negative, precautions discontinued. Discharge plan pending.

## 2020-10-29 NOTE — PHARMACY-ADMISSION MEDICATION HISTORY
Pharmacy Medication History  Admission medication history interview status for the 10/28/2020  admission is complete. See EPIC admission navigator for prior to admission medications       Medication history sources: Patient's family/friend (Spouse, Nazanin, through phone Cantonese interpretor), cross checked with surescripts and care everywhere  Location of interview: phone  Medication history source reliability: Good  Adherence assessment: Good    Significant changes made to the medication list:  Added: Nystatin (rx on 10/26 QID x 14 days), trazodone, ambien  Removed: B complex, tessalon, fish oil, flonase, guaifenesin tab and DM cough syrup, imodium, remeron  Docusate to PRN  Denied taking doxepin    Additional medication history information:   Got fluconazole 150mg x 1 on 10/26 which he took  Wife states he saw a GI doctor and he completed a course of some medication this past month  Wife thinks he is only taking 1 inhaler (average of 2-3x/day).  However Anoro has been prescribed and filled twice since September in addition.      Medication reconciliation completed by provider prior to medication history? Yes    Time spent in this activity: 45 mins      Prior to Admission medications    Medication Sig Last Dose Taking? Auth Provider   albuterol (PROAIR HFA) 108 (90 Base) MCG/ACT inhaler Inhale 2 puffs into the lungs every 4 hours as needed for shortness of breath / dyspnea or wheezing 10/28/2020 at Unknown time Yes Spenser Haro MD   alendronate (FOSAMAX) 70 MG tablet Take 1 tablet (70 mg) by mouth every 7 days 10/27/2020 at Tuesday Yes Corina Potts MD   amLODIPine (NORVASC) 2.5 MG tablet TAKE 1 TABLET(2.5 MG) BY MOUTH DAILY 10/28/2020 at Unknown time Yes Marquez Camargo, PAOrenC   docusate sodium (COLACE) 100 MG capsule Take 100 mg by mouth 2 times daily as needed for constipation prn Yes Unknown, Entered By History   hydrocortisone (CORTEF) 10 MG tablet 10 mg AM and 5 mg afternoon 10/28/2020 at Unknown  time Yes Corina Potts MD   levothyroxine (SYNTHROID/LEVOTHROID) 137 MCG tablet Take 1 tablet (137 mcg) by mouth daily 10/28/2020 at Unknown time Yes Corina Potts MD   nystatin (MYCOSTATIN) 461342 UNIT/ML suspension Take 500,000 Units by mouth 4 times daily Prescribed on 10/26 x 14 day supply 10/28/2020 at Unknown time Yes Unknown, Entered By History   nystatin (MYCOSTATIN) 997420 UNIT/ML suspension Take 5 mLs (500,000 Units) by mouth 4 times daily for 14 days 10/28/2020 at Unknown time Yes Marquez Camargo, PA-C   tamsulosin (FLOMAX) 0.4 MG capsule Take 0.4 mg by mouth daily 10/28/2020 at Unknown time Yes Unknown, Entered By History   traZODone (DESYREL) 100 MG tablet Take 100 mg by mouth At Bedtime 10/27/2020 Yes Unknown, Entered By History   zolpidem (AMBIEN) 10 MG tablet Take 10 mg by mouth nightly as needed for sleep prn Yes Unknown, Entered By History   umeclidinium-vilanterol (ANORO ELLIPTA) 62.5-25 MCG/INH oral inhaler Inhale 1 puff into the lungs daily ???  Unknown, Entered By History     Nichol Louis, PharmD

## 2020-10-29 NOTE — PROVIDER NOTIFICATION
MD Notification    Notified Person: BERYL Roman    Notified Person Name:    Notification Date/Time: 10/28/20  9:00 PM      Notification Interaction:    Purpose of Notification: Can you order Potassium replacement protocol? Also pt passed bedside swallow study - can we advance diet?    Orders Received: Does not want K+ given d/t high creat. Continue to monitor Potassium.   Advance diet to clear liquid and reassess with rounding MD in AM    Comments:

## 2020-10-29 NOTE — PROGRESS NOTES
Routine EEG  completed at patient's bedside. Procedure explained to patient prior to testing.   Ordered by Dr Fiore

## 2020-10-29 NOTE — PROGRESS NOTES
Dr Wooten called obs unit; requested to start potassium replacement protocol for potassium of 2.9. Bedside RN notified.

## 2020-10-29 NOTE — PROVIDER NOTIFICATION
MD Notification    Notified Person: MD    Notified Person Name: Dr. Valdes    Notification Date/Time: 10/29/2020 1300     Notification Interaction: Paged/in-person    Purpose of Notification: COVID-19 negative    Orders Received: Ok to discontinue COVID precautions and transfer patient.     Comments:

## 2020-10-29 NOTE — PLAN OF CARE
Summary:   [id number]  Care Plan Summary Note:  Orientation: BILL orientation, Pt is cantonese speaking. Inconsistent with commands, also with use of [employer]  services. Occasional clear statements.   Observation Goals (met & not met): NA  Activity Level: Not OOB, repositions self in bed  Fall Risk: Yes  Behavior & Aggression Tool Color: Green  Pain Management: Denies  ABNL VS/O2: 2L NC with O2 sats 96-98%  ABNL Lab/BG:K+ 2.9, replaced, recheck pending, Trops q6hrs WNL  Diet: Clears  Bowel/Bladder: Guzman  Drains/Devices: Tele SR, Guzman patent with adequate UOP, IVF NS 150ml/hr.  Tests/Procedures for next shift: Plan for ECHO today. Cardiology and Neurology consults today.   Anticipated DC date: Pending  Other Important Info: Rule out, COVID test pending.

## 2020-10-29 NOTE — PROGRESS NOTES
Echo tech was in room attempting to complete echocardiogram around 1415. At this time patient was waiting on lunch to arrive from the kitchen. During the exam patient became agitated and started asking for his food. Writer attempted to explain to patient that we would just need to finish the exam and he would get his food shortly. When the echo tech attempted to finish the imaging the patient hit at her and started yelling out. Echocardiogram not completed due to concerns for staff safety. Dr. Valdes notified of incident.

## 2020-10-29 NOTE — PROGRESS NOTES
RECEIVING UNIT ED HANDOFF REVIEW    ED Nurse Handoff Report was reviewed by: Kandis Newton RN on October 28, 2020 at 7:18 PM

## 2020-10-29 NOTE — CONSULTS
[facility]    Neurology Consultation     Date of Admission:  10/28/2020    Assessment & Plan   Travis Peres is a 80 year old male who was admitted on 10/28/2020. I was asked to see the patient for evaluation following episode of loss of consciousness questionable seizure.  The patient has had fall with loss of consciousness questionable syncope versus less likely seizures.  The patient was hypokalemic and bradycardia cardiac, a cardiac cause for the syncope should be considered.  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    At this time I would recommend to:  - Do an EEG to exclude epileptiform activity.  - Orthostatic blood pressure and heart rate.  - Cardiac monitoring to exclude cardiac arrhythmia and heart block.    At this time I would not recommend antiepileptic treatment since this is the first spell and I am not certain this was a seizure.    I will sign off for now, if there are any other questions or concerns, please feel free to contact us and will be happy to readdress.    Asha Fiore MD    Code Status    Full Code    Reason for Consult   Reason for consult: I was asked by Dr Lai to evaluate this patient for  episode of loss of consciousness questionable seizure.    Primary Care Physician   Karol Alvarez    Chief Complaint    episode of loss of consciousness questionable seizure.    History is obtained from the patient and electronic health record    History of Present Illness   Travis Peres is a 80 year old male who was admitted following episode of fall and loss of consciousness.  The patient was actually in doctors offices together with his wife waiting for the doctor.  When the doctor entered the room the patient was found unconscious on the floor next to the wife.  Initially radial pulse was not found however the patient started moaning and the pulse was found.  O2 sats were in the 80s on room air and the patient was bradycardic, I  was not able to find value of the heart rate.  The first vitals that I see in the chart from paramedics shows a blood pressure of 98/55 with a pulse of 50, blood sugar was 159.  I do not have any description of jerking movements or other changes.  The patient woke up more in the ambulance.  Uncertain if he was confused after the event or not.  The patient just finished a 7-day course of Levaquin for treatment of pneumonia.  Of note potassium was low at 2.9 upon admission the patient continues to be slightly bradycardic during emergency room visit.    The patient is not able to give me much history.  He does not talk much English.  He states he is cold.    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  He does have history of COPD.  Recent diagnosed with renal insufficiency and oral thrush.    Past Medical History   I have reviewed this patient's medical history and updated it with pertinent information if needed.   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  Past Surgical History:   Procedure Laterality Date     cymetra injection       ESOPHAGOSCOPY, GASTROSCOPY,  DUODENOSCOPY (EGD), COMBINED  6/20/2013    Procedure: COMBINED ESOPHAGOSCOPY, GASTROSCOPY, DUODENOSCOPY (EGD), BIOPSY SINGLE OR MULTIPLE;  gastroscopy;  Surgeon: Leslie Guadarrama MD;  Location:  GI     ESOPHAGOSCOPY, GASTROSCOPY, DUODENOSCOPY (EGD), COMBINED N/A 9/20/2019    Procedure: ESOPHAGOGASTRODUODENOSCOPY (EGD);  Surgeon: Gilberto Gibson DO;  Location:  GI     ESOPHAGOSCOPY, GASTROSCOPY, DUODENOSCOPY (EGD), COMBINED N/A 10/16/2020    Procedure: ESOPHAGOGASTRODUODENOSCOPY, WITH BIOPSY;  Surgeon: Bartolome Granda MD;  Location:  GI     HERNIA REPAIR Right      lipoma resection chest wall Right 11/09     PHACOEMULSIFICATION CLEAR CORNEA WITH STANDARD INTRAOCULAR LENS IMPLANT Left 5/7/2018    Procedure: PHACOEMULSIFICATION CLEAR CORNEA WITH STANDARD INTRAOCULAR LENS IMPLANT;  LEFT PHACOEMULSIFICATION CLEAR CORNEA WITH STANDARD INTRAOCULAR LENS IMPLANT ;  Surgeon: Nadiya Allen MD;  Location:  EC     PHACOEMULSIFICATION CLEAR CORNEA WITH STANDARD INTRAOCULAR LENS IMPLANT Right 8/21/2018    Procedure: PHACOEMULSIFICATION CLEAR CORNEA WITH STANDARD INTRAOCULAR LENS IMPLANT;  RIGHT EYE PHACOEMULSIFICATION CLEAR CORNEA WITH STANDARD INTRAOCULAR LENS IMPLANT;  Surgeon: Nadiya Allen MD;  Location:  EC     right selective neck dissection level 2, 3, 4  11/3/09     right  shunt placement  6/28/07     THORACIC SURGERY  Fidencio 3 2017     THYMECTOMY N/A 1/3/2017    Procedure: THYMECTOMY;  Surgeon: Ernesto Armstrong MD;  Location: [facility] OR     THYROIDECTOMY  11/27/07     TRANSCERVICAL EXTENDED MEDIASTINAL LYMPHADENECTOMY N/A 1/3/2017    Procedure: TRANSCERVICAL EXTENDED MEDIASTINAL LYMPHADENECTOMY;  Surgeon: Ernesto Armstrong MD;  Location: [facility] OR     transnasal endoscopic resection of pituitary adenoma  8/13/2007       Prior to Admission Medications   Prior to Admission Medications   Prescriptions Last Dose Informant Patient Reported? Taking?   albuterol (PROAIR HFA)  108 (90 Base) MCG/ACT inhaler 10/28/2020 at Unknown time Spouse/Significant Other No Yes   Sig: Inhale 2 puffs into the lungs every 4 hours as needed for shortness of breath / dyspnea or wheezing   alendronate (FOSAMAX) 70 MG tablet 10/27/2020 at Tuesday Spouse/Significant Other No Yes   Sig: Take 1 tablet (70 mg) by mouth every 7 days   amLODIPine (NORVASC) 2.5 MG tablet 10/28/2020 at Unknown time Spouse/Significant Other No Yes   Sig: TAKE 1 TABLET(2.5 MG) BY MOUTH DAILY   docusate sodium (COLACE) 100 MG capsule prn Spouse/Significant Other Yes Yes   Sig: Take 100 mg by mouth 2 times daily as needed for constipation   hydrocortisone (CORTEF) 10 MG tablet 10/28/2020 at Unknown time Spouse/Significant Other No Yes   Sig: 10 mg AM and 5 mg afternoon   levothyroxine (SYNTHROID/LEVOTHROID) 137 MCG tablet 10/28/2020 at Unknown time Spouse/Significant Other No Yes   Sig: Take 1 tablet (137 mcg) by mouth daily   nystatin (MYCOSTATIN) 365505 UNIT/ML suspension 10/28/2020 at Unknown time Spouse/Significant Other No Yes   Sig: Take 5 mLs (500,000 Units) by mouth 4 times daily for 14 days   nystatin (MYCOSTATIN) 618866 UNIT/ML suspension 10/28/2020 at Unknown time Spouse/Significant Other Yes Yes   Sig: Take 500,000 Units by mouth 4 times daily Prescribed on 10/26 x 14 day supply   tamsulosin (FLOMAX) 0.4 MG capsule 10/28/2020 at Unknown time Spouse/Significant Other Yes Yes   Sig: Take 0.4 mg by mouth daily   traZODone (DESYREL) 100 MG tablet 10/27/2020 Spouse/Significant Other Yes Yes   Sig: Take 100 mg by mouth At Bedtime   umeclidinium-vilanterol (ANORO ELLIPTA) 62.5-25 MCG/INH oral inhaler ??? Spouse/Significant Other Yes No   Sig: Inhale 1 puff into the lungs daily   zolpidem (AMBIEN) 10 MG tablet prn Spouse/Significant Other Yes Yes   Sig: Take 10 mg by mouth nightly as needed for sleep      Facility-Administered Medications: None     Allergies   Allergies   Allergen Reactions     Metoprolol Shortness Of Breath      Bradycardia, fatigue, COPD worsening.         Fenofibrate Hives     Lisinopril Cough     Losartan Cough     Niacin      Simvastatin Cramps       Social History   I have reviewed this patient's social history and updated it with pertinent information if needed. Travis Peres  reports that he quit smoking about 19 years ago. His smoking use included cigarettes. He started smoking about 44 years ago. He has a 2.50 pack-year smoking history. He has never used smokeless tobacco. He reports that he does not drink alcohol or use drugs.    Family History   I have reviewed this patient's family history and updated it with pertinent information if needed.   Family History   Problem Relation Age of Onset     Cancer No family hx of         no skin cancer     Glaucoma No family hx of      Macular Degeneration No family hx of        Review of Systems   The 10 point Review of system is impossible due to patient not speaking English.    Physical Exam   Temp: 99.3  F (37.4  C) Temp src: Oral BP: 119/59 Pulse: 64   Resp: 18 SpO2: 93 % O2 Device: None (Room air) Oxygen Delivery: 2 LPM  Vital Signs with Ranges  Temp:  [96.9  F (36.1  C)-99.3  F (37.4  C)] 99.3  F (37.4  C)  Pulse:  [49-86] 64  Resp:  [11-30] 18  BP: ()/(49-92) 119/59  SpO2:  [91 %-100 %] 93 %  139 lbs 0 oz    The patient is sleepy.  He wakes up to voice and follows only minimal commands.  He showed me his teeth.  He protrudes his tongue to command but does not follow further commands.  He moves equally both upper and lower extremities.  Face is symmetric.  Tongue protrudes midline.  Eyes are conjugate.  Extraocular movements are intact.    Head is normocephalic atraumatic.  Neck is supple.  Respiratory I do not hear any shortness of breath or wheezing.  Extremities there is no obvious pedal edema.    Data   Results for orders placed or performed during the hospital encounter of 10/28/20 (from the past 24 hour(s))   EKG 12-lead, tracing only   Result Value Ref Range     Interpretation ECG Click View Image link to view waveform and result    Potassium   Result Value Ref Range    Potassium 2.9 (L) 3.4 - 5.3 mmol/L   Troponin I   Result Value Ref Range    Troponin I ES <0.015 0.000 - 0.045 ug/L   Basic metabolic panel   Result Value Ref Range    Sodium 137 133 - 144 mmol/L    Potassium 3.8 3.4 - 5.3 mmol/L    Chloride 107 94 - 109 mmol/L    Carbon Dioxide 23 20 - 32 mmol/L    Anion Gap 7 3 - 14 mmol/L    Glucose 111 (H) 70 - 99 mg/dL    Urea Nitrogen 22 7 - 30 mg/dL    Creatinine 1.88 (H) 0.66 - 1.25 mg/dL    GFR Estimate 33 (L) >60 mL/min/[1.73_m2]    GFR Estimate If Black 38 (L) >60 mL/min/[1.73_m2]    Calcium 6.9 (L) 8.5 - 10.1 mg/dL   CBC with platelets   Result Value Ref Range    WBC 8.4 4.0 - 11.0 10e9/L    RBC Count 3.97 (L) 4.4 - 5.9 10e12/L    Hemoglobin 11.1 (L) 13.3 - 17.7 g/dL    Hematocrit 32.3 (L) 40.0 - 53.0 %    MCV 81 78 - 100 fl    MCH 28.0 26.5 - 33.0 pg    MCHC 34.4 31.5 - 36.5 g/dL    RDW 14.5 10.0 - 15.0 %    Platelet Count 148 (L) 150 - 450 10e9/L   Magnesium   Result Value Ref Range    Magnesium 2.5 (H) 1.6 - 2.3 mg/dL   Phosphorus   Result Value Ref Range    Phosphorus 2.6 2.5 - 4.5 mg/dL   EKG 12-lead, tracing only   Result Value Ref Range    Interpretation ECG Click View Image link to view waveform and result    Echo Limited    Narrative    [id number]  [id number]  [id number]  [id number]^MEGA^MALLORY^[facility]  Echocardiography Laboratory  [address] [place] [id number]        Name: BUCK CUEVAS  MRN: [id number]  : 1940  Study Date: 10/29/2020 02:07 PM  Age: 80 yrs  Gender: Male  Patient Location: [facility]  Reason For Study: Bradycardia - Sinus  Ordering Physician: MALLORY AYOUB  Referring Physician: Karol Alvarez  Performed By: Winnie Patel     BSA: 1.7 m2  Height: 65 in  Weight: 139 lb  HR: 72  BP: 103/65 mmHg  _____________________________________________________________________________  __         Procedure  Limited Portable Echo Adult.  _____________________________________________________________________________  __        Interpretation Summary     Technically difficult and limited study. Early termination of study due to  patient intolerance.     Left ventricular systolic function is normal.  The visual ejection fraction is estimated at 65-70%.  There is no pericardial effusion.  _____________________________________________________________________________  __        Left Ventricle  The left ventricle is normal in structure, function and size. Left ventricular  systolic function is normal. The visual ejection fraction is estimated at 65-  70%. Normal left ventricular wall motion.     Mitral Valve  There is moderate mitral annular calcification. There is trace mitral  regurgitation.     Tricuspid Valve  The tricuspid valve is normal in structure and function. There is mild (1+)  tricuspid regurgitation. The right ventricular systolic pressure is  approximated at 29.9 mmHg plus the right atrial pressure.     Aortic Valve  The aortic valve is trileaflet. There is mild (1+) aortic regurgitation.        Pulmonic Valve  The pulmonic valve is normal in structure and function. There is mild (1+)  pulmonic valvular regurgitation.     Vessels  The aortic root is normal size. Normal size ascending aorta. Inferior vena  cava not well visualized for estimation of right atrial pressure.     Pericardium  There is no pericardial effusion.  _____________________________________________________________________________  __     MMode/2D Measurements & Calculations  IVSd: 1.2 cm  LVIDd: 4.0 cm  LVIDs: 2.8 cm  LVPWd: 1.4 cm  FS: 29.1 %  LV mass(C)d: 188.0 grams  LV mass(C)dI: 111.0 grams/m2  LA dimension: 3.2 cm  asc Aorta Diam: 3.6 cm  RWT: 0.71        Doppler Measurements & Calculations  PA acc time: 0.09 sec     TR max benjamin: 270.5 cm/sec  TR max P.9 mmHg            _____________________________________________________________________________  __           Report approved by: Jatinder Dejesus 10/29/2020 03:55 PM        *Note: Due to a large number of results and/or encounters for the requested time period, some results have not been displayed. A complete set of results can be found in Results Review.     The patient has had a CT scan of the head which showed no CT no acute changes with post surgical changes regarding previous transnasal pituitary surgery.  There is a right parietal ventricular shunt.  Generalized volume loss and small vessel ischemic disease.  The films were reviewed by me.  This is a telemedicine visit that was performed with the originating site at patient's hospital bed at [facility] and the distant side at [facility] of neurology, in [place].  Verbal consent to participate in video visit was obtained.  This visit occurred during the coronavirus (COVID-19)  public health emergency.  I discussed with the patient the nature of our telemedicine visits, that:  - I would evaluate the patient and recommend diagnostics and treatments based on my assessment.   - Our sessions are not being recorded and that personal health information is protected.    - Our team would provide follow-up care in person if and when the patient need it.  Patient identification was verified before the start of the encounter.  Total time of visit: 40 minutes with the time spent on chart review, imaging and lab results review, and more than 50 % of the time spent on coordination of cares and face-to-face time with the patient including counseling regarding pathophysiology of the above conditions, results of the tests and further directions of care.

## 2020-10-30 LAB
ANION GAP SERPL CALCULATED.3IONS-SCNC: 4 MMOL/L (ref 3–14)
BUN SERPL-MCNC: 14 MG/DL (ref 7–30)
CALCIUM SERPL-MCNC: 6.9 MG/DL (ref 8.5–10.1)
CHLORIDE SERPL-SCNC: 112 MMOL/L (ref 94–109)
CO2 SERPL-SCNC: 24 MMOL/L (ref 20–32)
CREAT SERPL-MCNC: 1.53 MG/DL (ref 0.66–1.25)
ERYTHROCYTE [DISTWIDTH] IN BLOOD BY AUTOMATED COUNT: 14.8 % (ref 10–15)
GFR SERPL CREATININE-BSD FRML MDRD: 42 ML/MIN/{1.73_M2}
GLUCOSE SERPL-MCNC: 122 MG/DL (ref 70–99)
HCT VFR BLD AUTO: 28.1 % (ref 40–53)
HGB BLD-MCNC: 9.7 G/DL (ref 13.3–17.7)
MCH RBC QN AUTO: 28.3 PG (ref 26.5–33)
MCHC RBC AUTO-ENTMCNC: 34.5 G/DL (ref 31.5–36.5)
MCV RBC AUTO: 82 FL (ref 78–100)
PLATELET # BLD AUTO: 140 10E9/L (ref 150–450)
POTASSIUM SERPL-SCNC: 3.3 MMOL/L (ref 3.4–5.3)
POTASSIUM SERPL-SCNC: 3.7 MMOL/L (ref 3.4–5.3)
RBC # BLD AUTO: 3.43 10E12/L (ref 4.4–5.9)
SODIUM SERPL-SCNC: 140 MMOL/L (ref 133–144)
WBC # BLD AUTO: 5.7 10E9/L (ref 4–11)

## 2020-10-30 PROCEDURE — 250N000013 HC RX MED GY IP 250 OP 250 PS 637: Performed by: HOSPITALIST

## 2020-10-30 PROCEDURE — 250N000011 HC RX IP 250 OP 636: Performed by: HOSPITALIST

## 2020-10-30 PROCEDURE — 258N000003 HC RX IP 258 OP 636: Performed by: HOSPITALIST

## 2020-10-30 PROCEDURE — 85027 COMPLETE CBC AUTOMATED: CPT | Performed by: HOSPITALIST

## 2020-10-30 PROCEDURE — 99232 SBSQ HOSP IP/OBS MODERATE 35: CPT | Performed by: HOSPITALIST

## 2020-10-30 PROCEDURE — 250N000009 HC RX 250: Performed by: HOSPITALIST

## 2020-10-30 PROCEDURE — 94640 AIRWAY INHALATION TREATMENT: CPT

## 2020-10-30 PROCEDURE — 80048 BASIC METABOLIC PNL TOTAL CA: CPT | Performed by: HOSPITALIST

## 2020-10-30 PROCEDURE — 999N000157 HC STATISTIC RCP TIME EA 10 MIN

## 2020-10-30 PROCEDURE — 36415 COLL VENOUS BLD VENIPUNCTURE: CPT | Performed by: HOSPITALIST

## 2020-10-30 PROCEDURE — 210N000002 HC R&B HEART CARE

## 2020-10-30 PROCEDURE — 84132 ASSAY OF SERUM POTASSIUM: CPT | Performed by: HOSPITALIST

## 2020-10-30 RX ORDER — GUAIFENESIN 600 MG/1
600 TABLET, EXTENDED RELEASE ORAL 2 TIMES DAILY
Status: DISCONTINUED | OUTPATIENT
Start: 2020-10-30 | End: 2020-11-01 | Stop reason: HOSPADM

## 2020-10-30 RX ORDER — POTASSIUM CHLORIDE 1500 MG/1
20 TABLET, EXTENDED RELEASE ORAL ONCE
Status: COMPLETED | OUTPATIENT
Start: 2020-10-30 | End: 2020-10-30

## 2020-10-30 RX ORDER — CEFDINIR 300 MG/1
300 CAPSULE ORAL 2 TIMES DAILY
Qty: 8 CAPSULE | Refills: 0 | Status: SHIPPED | OUTPATIENT
Start: 2020-10-30 | End: 2020-10-31

## 2020-10-30 RX ORDER — HYDROCORTISONE 5 MG/1
5 TABLET ORAL DAILY
Status: DISCONTINUED | OUTPATIENT
Start: 2020-10-30 | End: 2020-10-30

## 2020-10-30 RX ORDER — AMOXICILLIN 250 MG
1 CAPSULE ORAL 2 TIMES DAILY
Status: DISCONTINUED | OUTPATIENT
Start: 2020-10-30 | End: 2020-10-31

## 2020-10-30 RX ORDER — POLYETHYLENE GLYCOL 3350 17 G/17G
17 POWDER, FOR SOLUTION ORAL 2 TIMES DAILY PRN
Status: DISCONTINUED | OUTPATIENT
Start: 2020-10-30 | End: 2020-11-01 | Stop reason: HOSPADM

## 2020-10-30 RX ORDER — GUAIFENESIN 600 MG/1
600 TABLET, EXTENDED RELEASE ORAL 2 TIMES DAILY
Qty: 14 TABLET | Refills: 0 | Status: SHIPPED | OUTPATIENT
Start: 2020-10-30

## 2020-10-30 RX ORDER — HYDROCORTISONE 10 MG/1
5 TABLET ORAL DAILY
Status: DISCONTINUED | OUTPATIENT
Start: 2020-10-30 | End: 2020-11-01 | Stop reason: HOSPADM

## 2020-10-30 RX ORDER — IPRATROPIUM BROMIDE AND ALBUTEROL SULFATE 2.5; .5 MG/3ML; MG/3ML
3 SOLUTION RESPIRATORY (INHALATION) EVERY 4 HOURS PRN
Status: DISCONTINUED | OUTPATIENT
Start: 2020-10-30 | End: 2020-11-01 | Stop reason: HOSPADM

## 2020-10-30 RX ORDER — POTASSIUM CHLORIDE 750 MG/1
10 TABLET, EXTENDED RELEASE ORAL ONCE
Status: COMPLETED | OUTPATIENT
Start: 2020-10-30 | End: 2020-10-30

## 2020-10-30 RX ADMIN — GUAIFENESIN 600 MG: 600 TABLET, EXTENDED RELEASE ORAL at 21:55

## 2020-10-30 RX ADMIN — NYSTATIN 500000 UNITS: 100000 SUSPENSION ORAL at 12:50

## 2020-10-30 RX ADMIN — NYSTATIN 500000 UNITS: 100000 SUSPENSION ORAL at 21:55

## 2020-10-30 RX ADMIN — LEVOTHYROXINE SODIUM 137 MCG: 112 TABLET ORAL at 08:35

## 2020-10-30 RX ADMIN — HYDROCORTISONE SODIUM SUCCINATE 25 MG: 100 INJECTION, POWDER, FOR SOLUTION INTRAMUSCULAR; INTRAVENOUS at 00:35

## 2020-10-30 RX ADMIN — HYDROCORTISONE 5 MG: 10 TABLET ORAL at 21:55

## 2020-10-30 RX ADMIN — SODIUM CHLORIDE: 9 INJECTION, SOLUTION INTRAVENOUS at 15:30

## 2020-10-30 RX ADMIN — IPRATROPIUM BROMIDE AND ALBUTEROL SULFATE 3 ML: .5; 3 SOLUTION RESPIRATORY (INHALATION) at 11:29

## 2020-10-30 RX ADMIN — GUAIFENESIN 600 MG: 600 TABLET, EXTENDED RELEASE ORAL at 12:49

## 2020-10-30 RX ADMIN — POTASSIUM CHLORIDE 20 MEQ: 1500 TABLET, EXTENDED RELEASE ORAL at 08:35

## 2020-10-30 RX ADMIN — HEPARIN SODIUM 5000 UNITS: 5000 INJECTION, SOLUTION INTRAVENOUS; SUBCUTANEOUS at 21:55

## 2020-10-30 RX ADMIN — DOCUSATE SODIUM 50 MG AND SENNOSIDES 8.6 MG 1 TABLET: 8.6; 5 TABLET, FILM COATED ORAL at 12:49

## 2020-10-30 RX ADMIN — TAMSULOSIN HYDROCHLORIDE 0.4 MG: 0.4 CAPSULE ORAL at 08:35

## 2020-10-30 RX ADMIN — NYSTATIN 500000 UNITS: 100000 SUSPENSION ORAL at 17:00

## 2020-10-30 RX ADMIN — NYSTATIN 500000 UNITS: 100000 SUSPENSION ORAL at 08:35

## 2020-10-30 RX ADMIN — UMECLIDINIUM BROMIDE AND VILANTEROL TRIFENATATE 1 PUFF: 62.5; 25 POWDER RESPIRATORY (INHALATION) at 12:49

## 2020-10-30 RX ADMIN — SODIUM CHLORIDE: 9 INJECTION, SOLUTION INTRAVENOUS at 05:27

## 2020-10-30 RX ADMIN — CEFTRIAXONE SODIUM 2 G: 2 INJECTION, POWDER, FOR SOLUTION INTRAMUSCULAR; INTRAVENOUS at 15:35

## 2020-10-30 RX ADMIN — POTASSIUM CHLORIDE 10 MEQ: 750 TABLET, EXTENDED RELEASE ORAL at 19:12

## 2020-10-30 RX ADMIN — HEPARIN SODIUM 5000 UNITS: 5000 INJECTION, SOLUTION INTRAVENOUS; SUBCUTANEOUS at 08:35

## 2020-10-30 RX ADMIN — MIRTAZAPINE 15 MG: 15 TABLET, FILM COATED ORAL at 21:55

## 2020-10-30 RX ADMIN — TRAZODONE HYDROCHLORIDE 100 MG: 100 TABLET ORAL at 21:55

## 2020-10-30 RX ADMIN — AMLODIPINE BESYLATE 2.5 MG: 2.5 TABLET ORAL at 08:35

## 2020-10-30 RX ADMIN — HYDROCORTISONE 10 MG: 10 TABLET ORAL at 08:35

## 2020-10-30 ASSESSMENT — ACTIVITIES OF DAILY LIVING (ADL)
ADLS_ACUITY_SCORE: 17
ADLS_ACUITY_SCORE: 18

## 2020-10-30 NOTE — PROGRESS NOTES
[facility]    Hospitalist Progress Note      Assessment & Plan   Travis Peres is a 80 year old male who was admitted on 10/28/2020 with loss of consciousness, bradycardia, hypokalemia and pneumonia.    Loss of consciousness   Per EMS, patient presented in clinic earlier 10/28 in [place] for follow up appointment for throat pain which he was seen at Urgent Care 2 days ago and was diagnosed with thrush. When the doctor entered the room patient was found unconscious on the floor next to wife. He initially had no palpable radial pulse but the patient began moaning and a pulse was then found. He was noted to be have O2 sats in the 80s on room air and bradycardic.  In ED alert, responsive [language barrier] pulse 47, SBP 110s, on 4 L nasal cannula, potassium 2.2, sodium 131, BUN/CR 37/2.99.  Continue on supplemental O2, received KCl 10 mEq IV and 40 mEq p.o., BP, pulse significantly improved with IVF. Suspect that decreased intake, low potassium and HR led to his loss of consciousness. troponins negative, EKG with improved QTc. Limited echo showed EF 65-70% with normal systolic function, no evidence of pericardial effusion  - IVF @50ml/hr, ending tonight  - Resumed PTA amlodipine  - stable on telemetry, without significant bradycardia     Oral thrush  Continue PTA QID nystatin, appetite is improving    Left-sided pneumonia on CXR  COPD on inhalers  COVID-19 screening-negative  CXR with increased markings in retrocardiac region suspicious for infiltrate.   - Continue Rocephin 2gm daily (just recently completed 7 day course levaquin few days prior to admission), plan to switch to cefdinir BID on discharge to complete 7 day total course.  - blood cx negative to date  - Weaned from O2 on 10/29  - Continue PTA inhalers  - duoneb PRN  - mucinex BID     Acute on chronic kidney disease  Baseline BUNs/CR September 13/1.51, in ED 37/2.99.  Suspect prerenal component as son states that he had oral thrush  with odynophagia and decreased p.o. intake PTA, also just completed 7-day of Levaquin.  Received IV bolus in ED.  - Cr improving, 1.53 on 10/30/20  - Continue low rate IVF  - encourage PO intake     Hypokalemia-improved  In ED 2.2.  Likely secondary to poor PO intake.   -Continue potassium replacement protocol.     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 Potts (endocrine), review of records suggests long suspected underlying lung mets.  Recommended that TSH be kept low to avoid stimulating thyroid cancer. Cervical adenopathy treated in the past but overtime nodes noted to be progressing/enlarging.   Pituitary macroadenoma resulting in hydrocephalus, prompting debulking of mass and treatment with XRT. Also had  shunt placement.   PTA steroid regimen  (hydrocortisone 10mg in AM and 5mg at 1400)    - Received 100mg IV hydrocortisone on admission, then decreased to 25mg q8h on 10/29.  - Resumed PTA hydrocortisone in AM 10/29  - Continue PTA  levothyroxine 137mcg daily.  - Follow up with Dr. Potts this fall as scheduled.       shunt  Patient found down in Clinic exam room, no observed seizure activity however in ED had signs of tongue biting. Head CT without acute changes,  shunt noted, no hydrocephalus.  - Neurology consult appreciated--underwent EEG which showed no epileptiform activity, just diffuse slowing     Underlying Cognitive Impairment:  In the past some concerns as to whether patient may have some underlying cognitive impairment in addition to difficulties with communication dt language barrier and [place].  -  or family as needed to assist with communication  - follow up with PCP.     Anemia of  "chronic disease  Hx anemia with Hgb near 8-9 ~1 year ago. Had labs done end of September with Hgb 13.8, but suspect he was heme-concentrated. Hx known anemia dating back to 2014  - Hgb down to 9.7 on 10/30--no signs active bleeding.    Constipation  Added BID senna-docusate and PRN miralax    DVT Prophylaxis: Heparin SQ  Code Status: Full Code  Expected discharge: 1 day, recommended to prior living arrangement once tolerating diet better, doing well off IVF    Nataly Valdes, DO  Text Page (7am - 6pm)    Interval History   Patient seen and examined. Wife at bedside. Used jabber and wife would speak the translation (and then some) into patient's ear aiding in discussion. She thinks he is doing much better than 2 days prior when they brought him into the ED. He reports mouth and throat pain and abdominal discomfort. He has not had a bowel movement in a few days and feels like he needs to urinate \"all the time\".    -Data reviewed today: I reviewed all new labs and imaging results over the last 24 hours. I personally reviewed no images or EKG's today.    Physical Exam   Temp: 97.9  F (36.6  C) Temp src: Oral BP: 119/67 Pulse: 63   Resp: 18 SpO2: 97 % O2 Device: None (Room air)    Vitals:    10/28/20 2000 10/30/20 0300   Weight: 63 kg (139 lb) 64.5 kg (142 lb 3.2 oz)     Vital Signs with Ranges  Temp:  [97.9  F (36.6  C)-99.3  F (37.4  C)] 97.9  F (36.6  C)  Pulse:  [63-80] 63  Resp:  [18] 18  BP: (117-120)/(59-73) 119/67  SpO2:  [93 %-100 %] 97 %  I/O last 3 completed shifts:  In: 1220 [P.O.:120; I.V.:1100]  Out: 1550 [Urine:1550]    Constitutional: Awake, alert, cooperative, no apparent distress  Respiratory: Clear to auscultation bilaterally, no crackles or wheezing, wet cough  Cardiovascular: Regular rate and rhythm, normal S1 and S2, and no murmur noted  GI: Normal bowel sounds, soft, non-distended, non-tender  Skin/Integumen: No rashes, no cyanosis, no edema  Other: Hard of hearing. Follows commands with wife " helping    Medications     sodium chloride 100 mL/hr at 10/30/20 1148       amLODIPine  2.5 mg Oral Daily     cefTRIAXone  2 g Intravenous Q24H     guaiFENesin  600 mg Oral BID     heparin ANTICOAGULANT  5,000 Units Subcutaneous Q12H     hydrocortisone  10 mg Oral Daily     hydrocortisone  5 mg Oral Daily     levothyroxine  137 mcg Oral Daily     mirtazapine  15 mg Oral At Bedtime     nystatin  500,000 Units Oral 4x Daily     senna-docusate  1 tablet Oral BID     sodium chloride (PF)  3 mL Intracatheter Q8H     tamsulosin  0.4 mg Oral Daily     traZODone  100 mg Oral At Bedtime     umeclidinium-vilanterol  1 puff Inhalation Daily       Data   Recent Labs   Lab 10/30/20  0726 10/29/20  0526 10/29/20  0112 10/28/20  2001 10/28/20  1520   WBC 5.7 8.4  --   --  7.2   HGB 9.7* 11.1*  --   --  11.6*   MCV 82 81  --   --  81   * 148*  --   --  142*   INR  --   --   --   --  1.14    137  --   --  131*   POTASSIUM 3.3* 3.8  --  2.9* 2.2*   CHLORIDE 112* 107  --   --  93*   CO2 24 23  --   --  30   BUN 14 22  --   --  37*   CR 1.53* 1.88*  --   --  2.99*   ANIONGAP 4 7  --   --  8   NIHARIKA 6.9* 6.9*  --   --  7.8*   * 111*  --   --  125*   ALBUMIN  --   --   --   --  3.5   PROTTOTAL  --   --   --   --  7.5   BILITOTAL  --   --   --   --  1.5*   ALKPHOS  --   --   --   --  57   ALT  --   --   --   --  30   AST  --   --   --   --  39   TROPI  --   --  <0.015  --  <0.015       Recent Results (from the past 24 hour(s))   Echo Limited    Narrative    [id number]  [id number]  [id number]  [id number]^MEGA^MALLORY^[facility]  Echocardiography Laboratory  [address] [place] [address]        Name: BUCK CUEVAS  MRN: [id number]  : 1940  Study Date: 10/29/2020 02:07 PM  Age: 80 yrs  Gender: Male  Patient Location: [facility]  Reason For Study: Bradycardia - Sinus  Ordering Physician: MALLORY AYOUB  Referring Physician: Karol Alvarez  Performed By: Winnie Patel     BSA: 1.7  m2  Height: 65 in  Weight: 139 lb  HR: 72  BP: 103/65 mmHg  _____________________________________________________________________________  __        Procedure  Limited Portable Echo Adult.  _____________________________________________________________________________  __        Interpretation Summary     Technically difficult and limited study. Early termination of study due to  patient intolerance.     Left ventricular systolic function is normal.  The visual ejection fraction is estimated at 65-70%.  There is no pericardial effusion.  _____________________________________________________________________________  __        Left Ventricle  The left ventricle is normal in structure, function and size. Left ventricular  systolic function is normal. The visual ejection fraction is estimated at 65-  70%. Normal left ventricular wall motion.     Mitral Valve  There is moderate mitral annular calcification. There is trace mitral  regurgitation.     Tricuspid Valve  The tricuspid valve is normal in structure and function. There is mild (1+)  tricuspid regurgitation. The right ventricular systolic pressure is  approximated at 29.9 mmHg plus the right atrial pressure.     Aortic Valve  The aortic valve is trileaflet. There is mild (1+) aortic regurgitation.        Pulmonic Valve  The pulmonic valve is normal in structure and function. There is mild (1+)  pulmonic valvular regurgitation.     Vessels  The aortic root is normal size. Normal size ascending aorta. Inferior vena  cava not well visualized for estimation of right atrial pressure.     Pericardium  There is no pericardial effusion.  _____________________________________________________________________________  __     MMode/2D Measurements & Calculations  IVSd: 1.2 cm  LVIDd: 4.0 cm  LVIDs: 2.8 cm  LVPWd: 1.4 cm  FS: 29.1 %  LV mass(C)d: 188.0 grams  LV mass(C)dI: 111.0 grams/m2  LA dimension: 3.2 cm  asc Aorta Diam: 3.6 cm  RWT: 0.71        Doppler Measurements &  Calculations  PA acc time: 0.09 sec     TR max benjamin: 270.5 cm/sec  TR max P.9 mmHg           _____________________________________________________________________________  __           Report approved by: Jatinder Dejesus 10/29/2020 03:55 PM

## 2020-10-30 NOTE — PLAN OF CARE
PT: Attempted to see for PT session. RN reports pt is very [place]. Attempted to utilize the pocket talker with the Jabber as family not present. Pt unable to hear the Jabber despite use of the pocket talker and appeared very frustrated. Discussed with RN, will plan for PT session tomorrow at 11AM with request for family to be present during session.

## 2020-10-30 NOTE — PLAN OF CARE
[place]e speaking/does understand some English. Does not interact with . A&O, withdrawn. VSS. RA. Tele: SB/. EMILIE. NS @ 100mL/hr. Tylenol x 1 for headache w/relief. Up SBA w/walker/GB. NPO at Midnight for Cardiology consult. Will continue w/plan of care.

## 2020-10-30 NOTE — CONSULTS
Care Management Initial Consult    General Information  Assessment completed with:: Children, Spouse or significant other, Spouse Nazanin and Son Maximiliano  Type of CM/SW Visit: Initial Assessment  Primary Care Provider verified and updated as needed?: Yes  Readmission Within the Last 30 Days: current reason for admission unrelated to previous admission        Advance Care Planning: Advance Care Planning Reviewed: no concerns identified          Communication Assessment  Patient's communication style: spoken language (non-English)  Hearing Difficulty or Deaf: yes      Cognitive  Cognitive/Neuro/Behavioral: .WDL except  Level of Consciousness: alert  Arousal Level: opens eyes spontaneously  Orientation: (BILL, patient refuses to interact with  )  Mood/Behavior: (appreciative when needs are met)  Best Language: (BILL)  Speech: (BILL, nonverbal, does not interact with )    Living Environment:   People in home: spouse  Name(s) of People in Home: Nazanin  Current living Arrangements: house          Family/Social Support:  Care provided by: self, spouse/significant other  Provides care for: no one  Marital Status:   Who is your support system?: Wife, Children  Spouse's Name: Nazanin  Description of Support System: Supportive, Involved  Description of Support System: Supportive, Involved           Description of Support System: Supportive, Involved       Current Resources:   Skilled Home Care Services: (none)  Community Resources: None  Equipment currently used at home: hospital bed, cane, straight  Supplies currently used at home:      Employment:  Employment Status: retired        Financial/Environmental Concerns: No concerns identified  Referral to Financial Counselor: No       Lifestyle & Psychosocial Needs:        Socioeconomic History     Marital status:      Spouse name: Not on file     Number of children: Not on file     Years of education: Not on file     Highest education level: Not on  file     Tobacco Use     Smoking status: Former Smoker     Packs/day: 0.50     Years: 5.00     Pack years: 2.50     Types: Cigarettes     Start date: 1976     Quit date: 2001     Years since quittin.7     Smokeless tobacco: Never Used   Substance and Sexual Activity     Alcohol use: No     Drug use: No     Sexual activity: Not Currently     Partners: Female     Birth control/protection: Other       Functional Status:  Prior to admission patient needed assistance: (N/A)        Mental Health Status:  WDL               Values/Beliefs:  Spiritual, Cultural Beliefs, Church Practices, Values that affect care: no               Additional Information:    Unable to complete assessment with patient due to language barrier and patient not willing to speak thru .  Writer spoke with spouse and son Maximiliano via phone.   Per spouse and Maximiliano.  Patient lives with his spouse in a multi level home.  Due to a past brain surgery, years ago patient sleeps in a hospital bed on the main level and has a few steps to up to the bathroom.  Patient uses a cane occasionally at baseline, but mostly independent with mobility.  Patient's spouse to come in soon and will attempt to communicate with assist of spouse and .  Will continue to follow and await therapy recommendations upon discharge.    Alysia Root RN  Care Coordinator  [facility]  [phone]

## 2020-10-30 NOTE — PLAN OF CARE
OT: attempted to see patient with Jabber . Patient did not respond to therapy/. Turned away and avoided therapist/ and did not engage. Bed alarm on, call light within reach.

## 2020-10-31 ENCOUNTER — APPOINTMENT (OUTPATIENT)
Dept: PHYSICAL THERAPY | Facility: CLINIC | Age: 80
DRG: 193 | End: 2020-10-31
Attending: HOSPITALIST
Payer: COMMERCIAL

## 2020-10-31 LAB
ANION GAP SERPL CALCULATED.3IONS-SCNC: 6 MMOL/L (ref 3–14)
BUN SERPL-MCNC: 10 MG/DL (ref 7–30)
CALCIUM SERPL-MCNC: 7.3 MG/DL (ref 8.5–10.1)
CHLORIDE SERPL-SCNC: 112 MMOL/L (ref 94–109)
CO2 SERPL-SCNC: 25 MMOL/L (ref 20–32)
CREAT SERPL-MCNC: 1.36 MG/DL (ref 0.66–1.25)
ERYTHROCYTE [DISTWIDTH] IN BLOOD BY AUTOMATED COUNT: 15 % (ref 10–15)
GFR SERPL CREATININE-BSD FRML MDRD: 48 ML/MIN/{1.73_M2}
GLUCOSE SERPL-MCNC: 105 MG/DL (ref 70–99)
HCT VFR BLD AUTO: 31.4 % (ref 40–53)
HGB BLD-MCNC: 10.4 G/DL (ref 13.3–17.7)
MAGNESIUM SERPL-MCNC: 2.5 MG/DL (ref 1.6–2.3)
MCH RBC QN AUTO: 27.4 PG (ref 26.5–33)
MCHC RBC AUTO-ENTMCNC: 33.1 G/DL (ref 31.5–36.5)
MCV RBC AUTO: 83 FL (ref 78–100)
PHOSPHATE SERPL-MCNC: 1.4 MG/DL (ref 2.5–4.5)
PLATELET # BLD AUTO: 171 10E9/L (ref 150–450)
POTASSIUM SERPL-SCNC: 3.1 MMOL/L (ref 3.4–5.3)
POTASSIUM SERPL-SCNC: 3.1 MMOL/L (ref 3.4–5.3)
POTASSIUM SERPL-SCNC: 3.2 MMOL/L (ref 3.4–5.3)
RBC # BLD AUTO: 3.79 10E12/L (ref 4.4–5.9)
SODIUM SERPL-SCNC: 143 MMOL/L (ref 133–144)
WBC # BLD AUTO: 6 10E9/L (ref 4–11)

## 2020-10-31 PROCEDURE — 99232 SBSQ HOSP IP/OBS MODERATE 35: CPT | Performed by: HOSPITALIST

## 2020-10-31 PROCEDURE — 36415 COLL VENOUS BLD VENIPUNCTURE: CPT | Performed by: HOSPITALIST

## 2020-10-31 PROCEDURE — 999N000128 HC STATISTIC PERIPHERAL IV START W/O US GUIDANCE

## 2020-10-31 PROCEDURE — 85027 COMPLETE CBC AUTOMATED: CPT | Performed by: HOSPITALIST

## 2020-10-31 PROCEDURE — 97530 THERAPEUTIC ACTIVITIES: CPT | Mod: GP

## 2020-10-31 PROCEDURE — 84100 ASSAY OF PHOSPHORUS: CPT | Performed by: HOSPITALIST

## 2020-10-31 PROCEDURE — 97116 GAIT TRAINING THERAPY: CPT | Mod: GP

## 2020-10-31 PROCEDURE — 210N000002 HC R&B HEART CARE

## 2020-10-31 PROCEDURE — 83735 ASSAY OF MAGNESIUM: CPT | Performed by: HOSPITALIST

## 2020-10-31 PROCEDURE — 84132 ASSAY OF SERUM POTASSIUM: CPT | Performed by: HOSPITALIST

## 2020-10-31 PROCEDURE — 258N000003 HC RX IP 258 OP 636: Performed by: HOSPITALIST

## 2020-10-31 PROCEDURE — 250N000013 HC RX MED GY IP 250 OP 250 PS 637: Performed by: HOSPITALIST

## 2020-10-31 PROCEDURE — 250N000011 HC RX IP 250 OP 636: Performed by: HOSPITALIST

## 2020-10-31 PROCEDURE — 80048 BASIC METABOLIC PNL TOTAL CA: CPT | Performed by: HOSPITALIST

## 2020-10-31 PROCEDURE — 97161 PT EVAL LOW COMPLEX 20 MIN: CPT | Mod: GP

## 2020-10-31 PROCEDURE — 97110 THERAPEUTIC EXERCISES: CPT | Mod: GP

## 2020-10-31 RX ORDER — POTASSIUM CHLORIDE 1500 MG/1
20 TABLET, EXTENDED RELEASE ORAL ONCE
Status: COMPLETED | OUTPATIENT
Start: 2020-10-31 | End: 2020-10-31

## 2020-10-31 RX ORDER — POTASSIUM CHLORIDE 1500 MG/1
20 TABLET, EXTENDED RELEASE ORAL ONCE
Status: DISCONTINUED | OUTPATIENT
Start: 2020-11-01 | End: 2020-10-31

## 2020-10-31 RX ORDER — CEFDINIR 300 MG/1
300 CAPSULE ORAL 2 TIMES DAILY
Qty: 5 CAPSULE | Refills: 0 | Status: SHIPPED | OUTPATIENT
Start: 2020-10-31 | End: 2020-11-01

## 2020-10-31 RX ORDER — POTASSIUM CHLORIDE 1500 MG/1
20 TABLET, EXTENDED RELEASE ORAL ONCE
Status: DISCONTINUED | OUTPATIENT
Start: 2020-10-31 | End: 2020-10-31

## 2020-10-31 RX ADMIN — HEPARIN SODIUM 5000 UNITS: 5000 INJECTION, SOLUTION INTRAVENOUS; SUBCUTANEOUS at 09:49

## 2020-10-31 RX ADMIN — HYDROCORTISONE 10 MG: 10 TABLET ORAL at 09:50

## 2020-10-31 RX ADMIN — HYDROCORTISONE 5 MG: 10 TABLET ORAL at 17:26

## 2020-10-31 RX ADMIN — NYSTATIN 500000 UNITS: 100000 SUSPENSION ORAL at 09:50

## 2020-10-31 RX ADMIN — UMECLIDINIUM BROMIDE AND VILANTEROL TRIFENATATE 1 PUFF: 62.5; 25 POWDER RESPIRATORY (INHALATION) at 10:03

## 2020-10-31 RX ADMIN — POTASSIUM & SODIUM PHOSPHATES POWDER PACK 280-160-250 MG 1 PACKET: 280-160-250 PACK at 21:38

## 2020-10-31 RX ADMIN — GUAIFENESIN 600 MG: 600 TABLET, EXTENDED RELEASE ORAL at 21:39

## 2020-10-31 RX ADMIN — POTASSIUM & SODIUM PHOSPHATES POWDER PACK 280-160-250 MG 1 PACKET: 280-160-250 PACK at 10:05

## 2020-10-31 RX ADMIN — NYSTATIN 500000 UNITS: 100000 SUSPENSION ORAL at 17:26

## 2020-10-31 RX ADMIN — POTASSIUM CHLORIDE 20 MEQ: 1500 TABLET, EXTENDED RELEASE ORAL at 09:50

## 2020-10-31 RX ADMIN — POTASSIUM CHLORIDE 20 MEQ: 1500 TABLET, EXTENDED RELEASE ORAL at 21:39

## 2020-10-31 RX ADMIN — TAMSULOSIN HYDROCHLORIDE 0.4 MG: 0.4 CAPSULE ORAL at 09:50

## 2020-10-31 RX ADMIN — LEVOTHYROXINE SODIUM 137 MCG: 112 TABLET ORAL at 09:51

## 2020-10-31 RX ADMIN — POTASSIUM CHLORIDE 20 MEQ: 1500 TABLET, EXTENDED RELEASE ORAL at 14:26

## 2020-10-31 RX ADMIN — TRAZODONE HYDROCHLORIDE 100 MG: 100 TABLET ORAL at 21:39

## 2020-10-31 RX ADMIN — AMLODIPINE BESYLATE 2.5 MG: 2.5 TABLET ORAL at 09:51

## 2020-10-31 RX ADMIN — POTASSIUM & SODIUM PHOSPHATES POWDER PACK 280-160-250 MG 1 PACKET: 280-160-250 PACK at 17:26

## 2020-10-31 RX ADMIN — CEFTRIAXONE SODIUM 2 G: 2 INJECTION, POWDER, FOR SOLUTION INTRAMUSCULAR; INTRAVENOUS at 18:44

## 2020-10-31 RX ADMIN — GUAIFENESIN 600 MG: 600 TABLET, EXTENDED RELEASE ORAL at 09:50

## 2020-10-31 RX ADMIN — HEPARIN SODIUM 5000 UNITS: 5000 INJECTION, SOLUTION INTRAVENOUS; SUBCUTANEOUS at 21:39

## 2020-10-31 RX ADMIN — NYSTATIN 500000 UNITS: 100000 SUSPENSION ORAL at 13:00

## 2020-10-31 RX ADMIN — NYSTATIN 500000 UNITS: 100000 SUSPENSION ORAL at 21:39

## 2020-10-31 RX ADMIN — SODIUM CHLORIDE, POTASSIUM CHLORIDE, SODIUM LACTATE AND CALCIUM CHLORIDE 500 ML: 600; 310; 30; 20 INJECTION, SOLUTION INTRAVENOUS at 14:24

## 2020-10-31 RX ADMIN — MIRTAZAPINE 15 MG: 15 TABLET, FILM COATED ORAL at 21:40

## 2020-10-31 ASSESSMENT — ACTIVITIES OF DAILY LIVING (ADL)
ADLS_ACUITY_SCORE: 20
ADLS_ACUITY_SCORE: 20
ADLS_ACUITY_SCORE: 16

## 2020-10-31 NOTE — PLAN OF CARE
Discharge Planner OT   Patient plan for discharge: Home with spouse  Current status: Reviewed notes, talked to PT who completed their evaluation.  Pt seen with  using Jabber and spouse.  Pt is very [place] making communication difficult, even with the setup as described.  Pt demonstrated SBA during PT session for toilet transfers and grooming, climbing stairs.  Spouse is home and says she helps pt as needed for all ADLS.  Pt appears to be at or near baseline at this time.    Barriers to return to prior living situation: Defer to PT  Recommendations for discharge: Defer to PT  Rationale for recommendations: Defer to PT       Entered by: Brian Bailon 10/31/2020 3:45 PM

## 2020-10-31 NOTE — PLAN OF CARE
Neuro: disoriented to situation and place per patient's wife, patient forgetful and impulsive.     Cardiac: SB/SR    Vitals: Temp: 98.7  F (37.1  C) Temp src: Oral BP: (!) 155/88 Pulse: 72   Resp: 18 SpO2: 95 % O2 Device: None (Room air)       Respiratory:  Lungs diminished.     GI/: stool x2, loose after stool softener. Incontinent at times.     Skin: fragile skin, generalized bruising.    Activity: up stand by assist    PIV/Drips: L PIV, NS @ 50    Discharge: potentially tomorrow    Updates/Plan:   -[place]e speaking. Attempted use of  but patient did not interact with them.   -Very hard of hearing. Tried pocket talker. Patient did not tolerate.   -PT/OT eval when wife is present on Saturday morning.   -Nystatin swallow for thrush in mouth  -Potassium replaced and recheck in the morning.

## 2020-10-31 NOTE — PLAN OF CARE
Neuro: BILL - confused, dementia per patient's wife, very forgetful and impulsive    Cardiac: off tele    Vitals: Temp: 99.5  F (37.5  C) Temp src: Oral BP: (!) 142/85 Pulse: 85   Resp: 16 SpO2: 93 % O2 Device: None (Room air)       Respiratory: lungs diminished with expiratory wheezes, PRN nebs available    GI/: urine and stool incontinence at times    Skin: generalized bruising    Activity: up stand by assist, bed/chair alarm    PIV/Drips: R PIV    Discharge: pending; home tomorrow with wife    Updates/Plan:   -500 ml LR bolus this afternoon  -No stool softeners given today  -Multiple loose stools today - No Cdiff test per hospitalist since patient got stool softener in the last 24 hours  -Nystatin for oral thrush  -Administered two doses of oral phosphorous, 1 dose left then recheck  -Potassium replacement ordered. Recheck at 18:30

## 2020-10-31 NOTE — PROGRESS NOTES
10/31/20 1200   Quick Adds   Type of Visit Initial PT Evaluation   Living Environment   People in home spouse   Current Living Arrangements house   Home Accessibility stairs to enter home;stairs within home   Number of Stairs, Main Entrance 6   Stair Railings, Main Entrance railings safe and in good condition;railing on right side (ascending)   Number of Stairs, Within Home, Primary 7   Stair Railings, Within Home, Primary railings safe and in good condition;railing on right side (ascending)   Transportation Anticipated family or friend will provide   Self-Care   Usual Activity Tolerance moderate   Current Activity Tolerance moderate   Regular Exercise No   Equipment Currently Used at Home hospital bed;cane, straight;walker, rolling   Disability/Function   Hearing Difficulty or Deaf yes   Fall history within last six months yes   Number of times patient has fallen within last six months 1   Change in Functional Status Since Onset of Current Illness/Injury no   General Information   Onset of Illness/Injury or Date of Surgery 10/28/20   Referring Physician Dr. Wooten   Patient/Family Therapy Goals Statement (PT) To go home   Pertinent History of Current Problem (include personal factors and/or comorbidities that impact the POC) Pt is an 80 year old male admitted with bradycardia.   Existing Precautions/Restrictions fall   Range of Motion (ROM)   ROM Quick Adds ROM WFL   Strength   Strength Comments Gross LE strength 4/5   Bed Mobility   Comment (Bed Mobility) SBA   Transfers   Transfer Safety Comments CGA   Gait/Stairs (Locomotion)   Comment (Gait/Stairs) 25' no AD, CGA   Balance   Balance Comments Good in sitting, fair in standing   Clinical Impression   Criteria for Skilled Therapeutic Intervention yes, treatment indicated   PT Diagnosis (PT) Impaired ambulation   Influenced by the following impairments Decreased strength, decreased endurance, decreased balance   Functional limitations due to impairments  Difficulty with bed mobility, transfers, ambulation, stair management.   Clinical Presentation Stable/Uncomplicated   Clinical Presentation Rationale VSS, pain controlled   Clinical Decision Making (Complexity) low complexity   Therapy Frequency (PT) Daily   Predicted Duration of Therapy Intervention (days/wks) 3 days   Planned Therapy Interventions (PT) balance training;bed mobility training;strengthening;stair training;gait training;transfer training   Anticipated Equipment Needs at Discharge (PT) walker, rolling   Risk & Benefits of therapy have been explained evaluation/treatment results reviewed;care plan/treatment goals reviewed;risks/benefits reviewed;current/potential barriers reviewed;participants voiced agreement with care plan;participants included;patient;spouse/significant other   PT Discharge Planning    PT Discharge Recommendation (DC Rec) home with assist;home with home care physical therapy   PT Rationale for DC Rec Anticipate with further medical management and therapy, patient will progress mobility and be safe to discharge home with family assist as needed. Also anticipate that patient is near baseline mobility. Would recommend use of FWW in home and community environment to increase safety, wife in agreement. Pt would benefit from HHPT to increase endurance, strength and mobility. Pt will require assist of one, assistive device, and considerable effort to come and go from house, therefore, home health PT is recommended.    PT Brief overview of current status  SBA bed mobility, SBA for transfers, 150' FWW SBA, up/down 5 steps CGA.   Total Evaluation Time   Total Evaluation Time (Minutes) 10

## 2020-10-31 NOTE — PLAN OF CARE
Patient slept well, up to BR x1 with SBA, triggers bed alarm when getting up. [place] and [place] speaking making communication difficult. Able to communicate he is hungry and needed urinal. VSS. SR SB 58. IV fld completed, on Rocephin Qday. T max 99.1 few scattered wheezes otherwise LS clear dim. Possible discharge home today.

## 2020-10-31 NOTE — PROGRESS NOTES
[facility]    Hospitalist Progress Note      Assessment & Plan   Travis Peres is a 80 year old male who was admitted on 10/28/2020 with loss of consciousness, bradycardia, hypokalemia and pneumonia.    Loss of consciousness   Per EMS, patient presented in clinic earlier 10/28 in [place] for follow up appointment for throat pain which he was seen at Urgent Care 2 days ago and was diagnosed with thrush. When the doctor entered the room patient was found unconscious on the floor next to wife. He initially had no palpable radial pulse but the patient began moaning and a pulse was then found. He was noted to be have O2 sats in the 80s on room air and bradycardic.  In ED alert, responsive [language barrier] pulse 47, SBP 110s, on 4 L nasal cannula, potassium 2.2, sodium 131, BUN/CR 37/2.99.  Continue on supplemental O2, received KCl 10 mEq IV and 40 mEq p.o., BP, pulse significantly improved with IVF. Suspect that decreased intake, low potassium and HR led to his loss of consciousness. troponins negative, EKG with improved QTc. Limited echo showed EF 65-70% with normal systolic function, no evidence of pericardial effusion  - Resumed PTA amlodipine  - stable on telemetry, without significant bradycardia-discontinue telemetry monitoring 10/31  - recommended to home with home therapy    Oral thrush  Continue PTA QID nystatin, appetite is improving    Left-sided pneumonia on CXR  COPD on inhalers  COVID-19 screening-negative  CXR with increased markings in retrocardiac region suspicious for infiltrate.   - Continue Rocephin 2gm daily #4/7 (just recently completed 7 day course levaquin few days prior to admission), plan to switch to cefdinir BID on discharge to complete 7 day total course.  - blood cx negative to date  - Weaned from O2 on 10/29  - Continue PTA inhalers  - duoneb PRN  - mucinex BID     Acute on chronic kidney disease- improved  Baseline BUNs/CR September 13/1.51, in ED 37/2.99.   Suspect prerenal component as son states that he had oral thrush with odynophagia and decreased p.o. intake PTA, also just completed 7-day of Levaquin.  Received IV bolus in ED.  - Cr improving, 1.36 on 10/31  - Given 500ml IVF on 10/31 due to diarrhea  - encourage PO intake     Hypokalemia-improved  Hypophosphatemia  In ED 2.2 on admit.  Likely secondary to poor PO intake.   -Continue potassium and phosphate replacement protocols.    Diarrhea  Chronic constipation  Frequent stools after stool softener on 10/30. Wife reports he gets diarrhea at least once per week. No suspicion of acute infection.  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 Potts (endocrine), review of records suggests long suspected underlying lung mets.  Recommended that TSH be kept low to avoid stimulating thyroid cancer. Cervical adenopathy treated in the past but overtime nodes noted to be progressing/enlarging.   Pituitary macroadenoma resulting in hydrocephalus, prompting debulking of mass and treatment with XRT. Also had  shunt placement.   PTA steroid regimen  (hydrocortisone 10mg in AM and 5mg at 1400)    - Received 100mg IV hydrocortisone on admission, then decreased to 25mg q8h on 10/29.  - Resumed PTA hydrocortisone in AM 10/29  - Continue PTA  levothyroxine 137mcg daily.  - Follow up with Dr. Potts this fall as scheduled.       shunt  Patient found down in Clinic exam room, no observed seizure activity however in ED had signs of tongue biting. Head CT without acute changes,  shunt noted,  no hydrocephalus.  - Neurology consult appreciated--underwent EEG which showed no epileptiform activity, just diffuse slowing     Underlying Cognitive Impairment:  In the past some concerns as to whether patient may have some underlying cognitive impairment in addition to difficulties with communication dt language barrier and [place].  -  or family as needed to assist with communication  - follow up with PCP.     Anemia of chronic disease  Hx anemia with Hgb near 8-9 ~1 year ago. Had labs done end of September with Hgb 13.8, but suspect he was heme-concentrated. Hx known anemia dating back to 2014  - Hgb down to 9.7 on 10/30--no signs active bleeding.    DVT Prophylaxis: Heparin SQ  Code Status: Full Code  Expected discharge: 1 day, recommended to prior living arrangement once stools improving    Nataly Valdes, DO  Text Page (7am - 6pm)    Interval History   Patient seen and examined. Wife at bedside. Used jabber. She is worried about him falling at home and wanted to see how he did with therapy. She did not feel comfortable taking him home today, but is agreeable to taking him home tomorrow. Given that he has had some diarrheal stools after his stool softener and has lower electrolytes today, reasonable to keep him one more day to ensure safe discharge in this elderly gentleman with underlying cognitive impairment.  Denies pain today. No complaints.    -Data reviewed today: I reviewed all new labs and imaging results over the last 24 hours. I personally reviewed no images or EKG's today.    Physical Exam   Temp: 99.2  F (37.3  C) Temp src: Oral BP: 128/88 Pulse: 78   Resp: 16 SpO2: 96 % O2 Device: None (Room air)    Vitals:    10/28/20 2000 10/30/20 0300 10/31/20 0611   Weight: 63 kg (139 lb) 64.5 kg (142 lb 3.2 oz) 64 kg (141 lb 1.6 oz)     Vital Signs with Ranges  Temp:  [98.7  F (37.1  C)-99.2  F (37.3  C)] 99.2  F (37.3  C)  Pulse:  [71-81] 78  Resp:  [16-18] 16  BP: (126-155)/(72-88)  128/88  SpO2:  [94 %-97 %] 96 %  I/O last 3 completed shifts:  In: 2785 [P.O.:1060; I.V.:1725]  Out: 1225 [Urine:1225]    Constitutional: Awake, alert, cooperative, no apparent distress  Respiratory: Clear to auscultation bilaterally, no crackles or wheezing, wet cough  Cardiovascular: Regular rate and rhythm, normal S1 and S2, and no murmur noted  GI: Normal bowel sounds, soft, non-distended, non-tender  Skin/Integumen: No rashes, no cyanosis, no edema  Other: Hard of hearing. Follows commands with wife interpreting and yelling loudly into his ear.    Medications       amLODIPine  2.5 mg Oral Daily     cefTRIAXone  2 g Intravenous Q24H     guaiFENesin  600 mg Oral BID     heparin ANTICOAGULANT  5,000 Units Subcutaneous Q12H     hydrocortisone  5 mg Oral Daily     hydrocortisone  10 mg Oral Daily     lactated ringers  500 mL Intravenous Once     levothyroxine  137 mcg Oral Daily     mirtazapine  15 mg Oral At Bedtime     nystatin  500,000 Units Oral 4x Daily     potassium & sodium phosphates  1 packet Oral or Feeding Tube TID     sodium chloride (PF)  3 mL Intracatheter Q8H     tamsulosin  0.4 mg Oral Daily     traZODone  100 mg Oral At Bedtime     umeclidinium-vilanterol  1 puff Inhalation Daily       Data   Recent Labs   Lab 10/31/20  1336 10/31/20  0638 10/30/20  1714 10/30/20  0726 10/29/20  0526 10/29/20  0112 10/28/20  1520 10/28/20  1520   WBC  --  6.0  --  5.7 8.4  --   --  7.2   HGB  --  10.4*  --  9.7* 11.1*  --   --  11.6*   MCV  --  83  --  82 81  --   --  81   PLT  --  171  --  140* 148*  --   --  142*   INR  --   --   --   --   --   --   --  1.14   NA  --  143  --  140 137  --   --  131*   POTASSIUM 3.1* 3.2* 3.7 3.3* 3.8  --    < > 2.2*   CHLORIDE  --  112*  --  112* 107  --   --  93*   CO2  --  25  --  24 23  --   --  30   BUN  --  10  --  14 22  --   --  37*   CR  --  1.36*  --  1.53* 1.88*  --   --  2.99*   ANIONGAP  --  6  --  4 7  --   --  8   NIHARIKA  --  7.3*  --  6.9* 6.9*  --   --  7.8*   GLC  --   105*  --  122* 111*  --   --  125*   ALBUMIN  --   --   --   --   --   --   --  3.5   PROTTOTAL  --   --   --   --   --   --   --  7.5   BILITOTAL  --   --   --   --   --   --   --  1.5*   ALKPHOS  --   --   --   --   --   --   --  57   ALT  --   --   --   --   --   --   --  30   AST  --   --   --   --   --   --   --  39   TROPI  --   --   --   --   --  <0.015  --  <0.015    < > = values in this interval not displayed.       No results found for this or any previous visit (from the past 24 hour(s)).

## 2020-10-31 NOTE — PROGRESS NOTES
Called to give patient nebulizer treatment When I arrived patient is comfortably sleeping BS are clear and SpO2 is 94% on room air no nebulizer treatment given.

## 2020-11-01 ENCOUNTER — APPOINTMENT (OUTPATIENT)
Dept: PHYSICAL THERAPY | Facility: CLINIC | Age: 80
DRG: 193 | End: 2020-11-01
Attending: HOSPITALIST
Payer: COMMERCIAL

## 2020-11-01 VITALS
TEMPERATURE: 98.5 F | WEIGHT: 137.1 LBS | DIASTOLIC BLOOD PRESSURE: 84 MMHG | HEART RATE: 69 BPM | RESPIRATION RATE: 18 BRPM | OXYGEN SATURATION: 96 % | BODY MASS INDEX: 22.81 KG/M2 | SYSTOLIC BLOOD PRESSURE: 136 MMHG

## 2020-11-01 LAB
BUN SERPL-MCNC: 7 MG/DL (ref 7–30)
CALCIUM SERPL-MCNC: 7.3 MG/DL (ref 8.5–10.1)
CHLORIDE SERPL-SCNC: 110 MMOL/L (ref 94–109)
CO2 SERPL-SCNC: 29 MMOL/L (ref 20–32)
CREAT SERPL-MCNC: 1.35 MG/DL (ref 0.66–1.25)
GFR SERPL CREATININE-BSD FRML MDRD: 49 ML/MIN/{1.73_M2}
GLUCOSE SERPL-MCNC: 86 MG/DL (ref 70–99)
MAGNESIUM SERPL-MCNC: 2.5 MG/DL (ref 1.6–2.3)
PHOSPHATE SERPL-MCNC: 2.3 MG/DL (ref 2.5–4.5)
POTASSIUM SERPL-SCNC: 3.5 MMOL/L (ref 3.4–5.3)
SODIUM SERPL-SCNC: 142 MMOL/L (ref 133–144)

## 2020-11-01 PROCEDURE — 80048 BASIC METABOLIC PNL TOTAL CA: CPT | Performed by: HOSPITALIST

## 2020-11-01 PROCEDURE — 97530 THERAPEUTIC ACTIVITIES: CPT | Mod: GP

## 2020-11-01 PROCEDURE — 36415 COLL VENOUS BLD VENIPUNCTURE: CPT | Performed by: HOSPITALIST

## 2020-11-01 PROCEDURE — 84439 ASSAY OF FREE THYROXINE: CPT | Performed by: HOSPITALIST

## 2020-11-01 PROCEDURE — 99239 HOSP IP/OBS DSCHRG MGMT >30: CPT | Performed by: HOSPITALIST

## 2020-11-01 PROCEDURE — 250N000013 HC RX MED GY IP 250 OP 250 PS 637: Performed by: HOSPITALIST

## 2020-11-01 PROCEDURE — 84100 ASSAY OF PHOSPHORUS: CPT | Performed by: HOSPITALIST

## 2020-11-01 PROCEDURE — 250N000011 HC RX IP 250 OP 636: Performed by: HOSPITALIST

## 2020-11-01 PROCEDURE — 97116 GAIT TRAINING THERAPY: CPT | Mod: GP

## 2020-11-01 PROCEDURE — 83735 ASSAY OF MAGNESIUM: CPT | Performed by: HOSPITALIST

## 2020-11-01 RX ORDER — CEFDINIR 300 MG/1
300 CAPSULE ORAL EVERY 12 HOURS SCHEDULED
Status: DISCONTINUED | OUTPATIENT
Start: 2020-11-01 | End: 2020-11-01 | Stop reason: HOSPADM

## 2020-11-01 RX ORDER — CEFDINIR 300 MG/1
300 CAPSULE ORAL 2 TIMES DAILY
Qty: 5 CAPSULE | Refills: 0 | Status: ON HOLD | OUTPATIENT
Start: 2020-11-01 | End: 2020-11-04

## 2020-11-01 RX ADMIN — HEPARIN SODIUM 5000 UNITS: 5000 INJECTION, SOLUTION INTRAVENOUS; SUBCUTANEOUS at 09:32

## 2020-11-01 RX ADMIN — TAMSULOSIN HYDROCHLORIDE 0.4 MG: 0.4 CAPSULE ORAL at 09:32

## 2020-11-01 RX ADMIN — NYSTATIN 500000 UNITS: 100000 SUSPENSION ORAL at 09:33

## 2020-11-01 RX ADMIN — AMLODIPINE BESYLATE 2.5 MG: 2.5 TABLET ORAL at 09:33

## 2020-11-01 RX ADMIN — POTASSIUM & SODIUM PHOSPHATES POWDER PACK 280-160-250 MG 1 PACKET: 280-160-250 PACK at 11:53

## 2020-11-01 RX ADMIN — LEVOTHYROXINE SODIUM 137 MCG: 112 TABLET ORAL at 09:32

## 2020-11-01 RX ADMIN — UMECLIDINIUM BROMIDE AND VILANTEROL TRIFENATATE 1 PUFF: 62.5; 25 POWDER RESPIRATORY (INHALATION) at 09:33

## 2020-11-01 RX ADMIN — POTASSIUM & SODIUM PHOSPHATES POWDER PACK 280-160-250 MG 1 PACKET: 280-160-250 PACK at 09:32

## 2020-11-01 RX ADMIN — CEFDINIR 300 MG: 300 CAPSULE ORAL at 11:53

## 2020-11-01 RX ADMIN — NYSTATIN 500000 UNITS: 100000 SUSPENSION ORAL at 11:53

## 2020-11-01 RX ADMIN — GUAIFENESIN 600 MG: 600 TABLET, EXTENDED RELEASE ORAL at 09:33

## 2020-11-01 RX ADMIN — HYDROCORTISONE 10 MG: 10 TABLET ORAL at 09:32

## 2020-11-01 ASSESSMENT — ACTIVITIES OF DAILY LIVING (ADL)
ADLS_ACUITY_SCORE: 20

## 2020-11-01 NOTE — PLAN OF CARE
Patient SBA, does not use call light for assist and triggers bed alarm.Two  small mostly loose stool during the night. Phos/ K+ replaced, needs additional phos replacement, order placed. Possible discharge today to home with services.

## 2020-11-01 NOTE — PLAN OF CARE
Patient discharge home with Home Care RN, PT/OT. Patient and wife present for discharge instructions. Cantonese  used. AVS, medications and follow ups discussed with wife and patient. All questions answered. Patient transported home via private vehicle.    Temp: 98.5  F (36.9  C) Temp src: Oral BP: 136/84 Pulse: 69   Resp: 18 SpO2: 96 % O2 Device: None (Room air)

## 2020-11-01 NOTE — DISCHARGE SUMMARY
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  The following problems were addressed during his hospitalization:    Loss of consciousness   Per EMS, patient presented in clinic earlier 10/28 in [place] for follow up appointment for throat pain which he was seen at Urgent Care 2 days ago and was diagnosed with thrush. When the doctor entered the room patient was found unconscious on the floor next to wife. He initially had no palpable radial pulse but the patient began moaning and a pulse was then found. He was noted to be have O2 sats in the 80s on room air and bradycardic.  In ED alert, responsive [language barrier] pulse 47, SBP 110s, on 4 L nasal cannula, potassium 2.2, sodium 131, BUN/CR 37/2.99.  Continue on supplemental O2, received KCl 10 mEq IV and 40 mEq p.o., BP, pulse significantly improved with IVF. Suspect that decreased intake, low potassium and HR led to his loss of consciousness. troponins negative, EKG with improved QTc. Limited echo showed EF 65-70% with normal systolic function, no evidence of pericardial  effusion. Stable on telemetry, without significant bradycardia noted  - Resumed PTA amlodipine  - Likely his loss of consciousness related to dehydration in setting of pneumonia along with electrolyte disturbance (hypokalemia), improved after IVF repletion and electrolyte repletion  - Encourage PO intake on discharge, continue to ambulate with walker while at home.  - recommended to home with home OT/PT, wife agreeable     Oral thrush  Continue PTA QID nystatin, appetite was improving while in hospital     Left-sided pneumonia on CXR  COPD on inhalers  Acute hypoxic respiratory failure secondary to pneumonia  COVID-19 screening-negative  CXR with increased markings in retrocardiac region suspicious for infiltrate. Needed O2 on admit with O2 sat in 80s. Improved with 4L O2 via NC. Weaned from O2 on 10/29. Blood cultures negative to date.  - Received 4 doses ceftriaxone (just recently completed 7 day course levaquin few days prior to admission), switched to cefdinir BID on discharge, received dose AM of 11/1, will have 5 more doses after discharge to complete 7 day course  - Continue PTA inhalers  - mucinex BID on discharge for about one week.     Acute on chronic kidney disease- improved  Baseline BUNs/CR September 13/1.51, in ED 37/2.99.  Suspect prerenal component as son states that he had oral thrush with odynophagia and decreased p.o. intake PTA, also just completed 7-day of Levaquin.  Received IV bolus in ED.  - Cr improving, 1.35 on 11/1  - Given 500ml IVF on 10/31 due to diarrhea  - encourage PO intake on discharge (wife understands)     Hypokalemia-improved  Hypophosphatemia  In ED K 2.2 on admit.  Likely secondary to poor PO intake. Phos decreased to 1.4  - received potassium and phosphate replacement protocols while in hospital  -Follow up BMP in one week with PCP     Diarrhea  Chronic constipation  Frequent stools after stool softener given on 10/30. Wife reports he gets diarrhea at least once per week. No  suspicion of acute infection. 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 Potts (endocrine), review of records suggests long suspected underlying lung mets.  Recommended that TSH be kept low to avoid stimulating thyroid cancer. Cervical adenopathy treated in the past but overtime nodes noted to be progressing/enlarging.   Pituitary macroadenoma resulting in hydrocephalus, prompting debulking of mass and treatment with XRT. Also had  shunt placement.   PTA steroid regimen  (hydrocortisone 10mg in AM and 5mg at 1400)    - Received 100mg IV hydrocortisone on admission, then decreased to 25mg q8h on 10/29.  - Resumed PTA hydrocortisone in AM 10/29, continue on discharge  - Continue PTA  levothyroxine 137mcg daily.  - Follow up with Dr. Potts this fall as scheduled.       shunt  Patient found down in Clinic exam room, no observed seizure activity however in ED had signs of tongue biting. Head CT without acute changes,  shunt noted, no hydrocephalus.  - Neurology consult appreciated--underwent EEG which showed no epileptiform activity, just diffuse slowing     Underlying Cognitive Impairment:  In the past some concerns as to whether patient may have some underlying cognitive impairment in addition to difficulties with communication dt language barrier and [place]. He was impulsive during hospitalization  -  or family as needed to assist with communication  - follow up with PCP.      Anemia of chronic disease  Hx anemia with Hgb near 8-9 ~1 year ago. Had  labs done end of September with Hgb 13.8, but suspect he was heme-concentrated. Hx known anemia dating back to 2014  - Hgb 10.4 on 10/31 prior to discharge    Nataly Valdes DO    Significant Results and Procedures   See below    Pending Results   These results will be followed up by PCP  Unresulted Labs Ordered in the Past 30 Days of this Admission     Date and Time Order Name Status Description    10/28/2020 1517 Blood culture Preliminary     10/28/2020 1517 Blood culture Preliminary           Code Status   Full Code       Primary Care Physician   Karol Alvarez    Physical Exam   Temp: 98.5  F (36.9  C) Temp src: Oral BP: 136/84 Pulse: 69   Resp: 18 SpO2: 96 % O2 Device: None (Room air)    Vitals:    10/30/20 0300 10/31/20 0611 11/01/20 0150   Weight: 64.5 kg (142 lb 3.2 oz) 64 kg (141 lb 1.6 oz) 62.2 kg (137 lb 1.6 oz)     Vital Signs with Ranges  Temp:  [98.5  F (36.9  C)-99.5  F (37.5  C)] 98.5  F (36.9  C)  Pulse:  [67-85] 69  Resp:  [16-18] 18  BP: (129-149)/(77-94) 136/84  SpO2:  [93 %-96 %] 96 %  I/O last 3 completed shifts:  In: 720 [P.O.:720]  Out: 1475 [Urine:1475]    Patient seen and examined on day of discharge. Wife at bedside. He feels okay today. Wife is agreeable to taking him home today and feels better after physical therapy had worked with patient. He indicated that he needed to have a bowel movement and while awaiting translation, he had the bowel movement while laying in bed. Stools should improve once he completes his antibiotics and returns to his regular diet. Stable for discharge.    Constitutional: Awake, alert, cooperative, no apparent distress.  Eyes: Conjunctiva and pupils examined and normal.  HEENT: Moist mucous membranes, normal dentition. Hard of hearing  Respiratory: Clear to auscultation bilaterally, no crackles or wheezing.  Cardiovascular: Regular rate and rhythm, normal S1 and S2, and no murmur noted.  GI: Soft, non-distended, non-tender, normal bowel  sounds.  Lymph/Hematologic: No anterior cervical or supraclavicular adenopathy.  Skin: No rashes, no cyanosis, no edema.  Musculoskeletal: No joint swelling, erythema or tenderness.  Neurologic: Cranial nerves 2-12 intact, normal strength and sensation.  Psychiatric: Alert, oriented to person, no obvious anxiety. Exhibits impulsive behaviors. Gestures sometimes to make needs known.    Discharge Disposition   Discharged to home  Condition at discharge: Stable    Consultations This Hospital Stay   CARDIOLOGY IP CONSULT  NEUROLOGY IP CONSULT  CARE MANAGEMENT / SOCIAL WORK IP CONSULT  PHYSICAL THERAPY ADULT IP CONSULT  OCCUPATIONAL THERAPY ADULT IP CONSULT  NEPHROLOGY IP CONSULT    Time Spent on this Encounter   INataly DO, personally saw the patient today and spent greater than 30 minutes discharging this patient.    Discharge Orders      Basic metabolic panel     Home Care PT Referral for Hospital Discharge      Home Care OT Referral for Hospital Discharge      Home care nursing referral      Reason for your hospital stay    Dehydration, pneumonia.     Activity    Your activity upon discharge: activity as tolerated     MD face to face encounter    Documentation of Face to Face and Certification for Home Health Services    I certify that patient: Travis Peres is under my care and that I, or a nurse practitioner or physician's assistant working with me, had a face-to-face encounter that meets the physician face-to-face encounter requirements with this patient on: 10/31/2020.    This encounter with the patient was in whole, or in part, for the following medical condition, which is the primary reason for home health care: pneumonia in setting of cognitive impairment.    I certify that, based on my findings, the following services are medically necessary home health services: Nursing, Occupational Therapy and Physical Therapy.    My clinical findings support the need for the above services because: Nurse is  needed: To assess compliance after changes in medications or other medical regimen and home safety, Occupational Therapy Services are needed to assess and treat cognitive ability and address ADL safety due to impairment in cognition. and Physical Therapy Services are needed to assess and treat the following functional impairments: deconditioning.    Further, I certify that my clinical findings support that this patient is homebound (i.e. absences from home require considerable and taxing effort and are for medical reasons or [employer] services or infrequently or of short duration when for other reasons) because: requires assistance of wife and front wheeled walker for mobility. Limited by cognitive impairment, impulsive and could get loss.    Based on the above findings. I certify that this patient is confined to the home and needs intermittent skilled nursing care, physical therapy and/or speech therapy.  The patient is under my care, and I have initiated the establishment of the plan of care.  This patient will be followed by a physician who will periodically review the plan of care.  Physician/Provider to provide follow up care: Karol Alvarez    Attending hospital physician (the Medicare certified [place] provider): Nataly Valdes DO  Physician Signature: See electronic signature associated with these discharge orders.  Date: 10/31/2020     Discharge Instructions    1. Continue you take nystatin four times daily with swishing to help treat the fungal infection in your mouth and throat.    2.  You will take 3 more days of antibiotics to treat your pneumonia.    3. You were started on mucinex twice daily, you will take this for one week to help improve your mucous congestion.     Follow-up and recommended labs and tests     Follow up with primary care provider, Karol Alvarez, within 7 days for hospital follow- up.  BMP in one week     Full Code     Robert BRO Documentation:   Describe the reason for  need to support medical necessity: unstable while walking.     I, the undersigned, certify that the above prescribed supplies are medically necessary for this patient and is both reasonable and necessary in reference to accepted standards of medical and necessary in reference to accepted standards of medical practice in the treatment of this patient's condition and is not prescribed as a convenience.     Diet    Follow this diet upon discharge: Regular Diet Adult     Discharge Medications   Current Discharge Medication List      START taking these medications    Details   cefdinir (OMNICEF) 300 MG capsule Take 1 capsule (300 mg) by mouth 2 times daily  Qty: 5 capsule, Refills: 0    Associated Diagnoses: Pneumonia due to infectious organism, unspecified laterality, unspecified part of lung      guaiFENesin (MUCINEX) 600 MG 12 hr tablet Take 1 tablet (600 mg) by mouth 2 times daily  Qty: 14 tablet, Refills: 0    Comments: Future refills by PCP Dr. Karol Alvarez with phone number [phone].  Associated Diagnoses: Pneumonia due to infectious organism, unspecified laterality, unspecified part of lung         CONTINUE these medications which have NOT CHANGED    Details   albuterol (PROAIR HFA) 108 (90 Base) MCG/ACT inhaler Inhale 2 puffs into the lungs every 4 hours as needed for shortness of breath / dyspnea or wheezing  Qty: 3 Inhaler, Refills: 11    Associated Diagnoses: Chronic obstructive pulmonary disease, unspecified COPD type (H)      alendronate (FOSAMAX) 70 MG tablet Take 1 tablet (70 mg) by mouth every 7 days  Qty: 32 tablet, Refills: 1    Comments: He is traveling and needs a 6 months supply for this.  Associated Diagnoses: Postsurgical hypothyroidism; Papillary thyroid carcinoma (H); Hypopituitarism (H); Pulmonary nodules; Pituitary macroadenoma (H); Low bone density; Abnormal finding on imaging      amLODIPine (NORVASC) 2.5 MG tablet TAKE 1 TABLET(2.5 MG) BY MOUTH DAILY  Qty: 90 tablet, Refills: 0     Comments: **Patient requests 90 days supply**  Associated Diagnoses: Essential hypertension      docusate sodium (COLACE) 100 MG capsule Take 100 mg by mouth 2 times daily as needed for constipation      hydrocortisone (CORTEF) 10 MG tablet 10 mg AM and 5 mg afternoon  Qty: 300 tablet, Refills: 1    Comments: He is traveling for 6 months and needs 6 months supply plus extra for emergencies  Associated Diagnoses: Papillary thyroid carcinoma (H); Postsurgical hypothyroidism; Pulmonary nodules; Pituitary macroadenoma (H); Low bone density; Abnormal finding on imaging; Hypopituitarism (H)      levothyroxine (SYNTHROID/LEVOTHROID) 137 MCG tablet Take 1 tablet (137 mcg) by mouth daily  Qty: 180 tablet, Refills: 1    Comments: He is traveling for 6 months and needs 6 months supply  Associated Diagnoses: Postsurgical hypothyroidism; Papillary thyroid carcinoma (H); Abnormal finding on imaging; Pulmonary nodules; Pituitary macroadenoma (H); Low bone density; Hypopituitarism (H)      nystatin (MYCOSTATIN) 814094 UNIT/ML suspension Take 5 mLs (500,000 Units) by mouth 4 times daily for 14 days  Qty: 280 mL, Refills: 0    Associated Diagnoses: Throat pain; Thrush      tamsulosin (FLOMAX) 0.4 MG capsule Take 0.4 mg by mouth daily      traZODone (DESYREL) 100 MG tablet Take 100 mg by mouth At Bedtime      zolpidem (AMBIEN) 10 MG tablet Take 10 mg by mouth nightly as needed for sleep      umeclidinium-vilanterol (ANORO ELLIPTA) 62.5-25 MCG/INH oral inhaler Inhale 1 puff into the lungs daily         STOP taking these medications       fluticasone (FLONASE) 50 MCG/ACT spray Comments:   Reason for Stopping:         mirtazapine (REMERON) 15 MG tablet Comments:   Reason for Stopping:             Allergies   Allergies   Allergen Reactions     Metoprolol Shortness Of Breath     Bradycardia, fatigue, COPD worsening.         Fenofibrate Hives     Lisinopril Cough     Losartan Cough     Niacin      Simvastatin Cramps     Data   Most Recent 3  CBC's:  Recent Labs   Lab Test 10/31/20  0638 10/30/20  0726 10/29/20  0526   WBC 6.0 5.7 8.4   HGB 10.4* 9.7* 11.1*   MCV 83 82 81    140* 148*      Most Recent 3 BMP's:  Recent Labs   Lab Test 11/01/20  0253 10/31/20  1914 10/31/20  1336 10/31/20  0638 10/30/20  0726 10/30/20  0726 10/29/20  0526     --   --  143  --  140 137   POTASSIUM 3.5 3.1* 3.1* 3.2*   < > 3.3* 3.8   CHLORIDE 110*  --   --  112*  --  112* 107   CO2 29  --   --  25  --  24 23   BUN 7  --   --  10  --  14 22   CR 1.35*  --   --  1.36*  --  1.53* 1.88*   ANIONGAP  --   --   --  6  --  4 7   NIHARIKA 7.3*  --   --  7.3*  --  6.9* 6.9*   GLC 86  --   --  105*  --  122* 111*    < > = values in this interval not displayed.     Most Recent 2 LFT's:  Recent Labs   Lab Test 10/28/20  1520 09/25/20  1621   AST 39 33   ALT 30 24   ALKPHOS 57 55   BILITOTAL 1.5* 0.5     Most Recent INR's and Anticoagulation Dosing History:  Anticoagulation Dose History     Recent Dosing and Labs Latest Ref Rng & Units 9/2/2007 9/3/2007 9/3/2007 12/19/2007 6/3/2014 1/5/2017 10/28/2020    INR 0.86 - 1.14 1.00 1.03 1.09 0.93 - 1.51(H) 1.14    INR Point of Care 0.86 - 1.14 - - - - <0.9 - -        Most Recent 3 Troponin's:  Recent Labs   Lab Test 10/29/20  0112 10/28/20  1520 09/18/19  0945   TROPI <0.015 <0.015 <0.015     Most Recent Cholesterol Panel:No lab results found.  Most Recent 6 Bacteria Isolates From Any Culture (See EPIC Reports for Culture Details):  Recent Labs   Lab Test 10/28/20  1616 10/28/20  1520 09/05/19  1303 08/31/19  2042 08/31/19  2007 05/24/18  1640   CULT No growth No growth after 4 days  No growth after 4 days No beta hemolytic Streptococcus Group A isolated No growth No growth  No growth <10,000 colonies/mL  urogenital yue  Susceptibility testing not routinely done       Most Recent TSH, T4 and A1c Labs:  Recent Labs   Lab Test 07/23/19  1613 04/16/13  1157 04/16/13  1157   TSH <0.01*   < > <0.02*   T4 1.32   < > 1.40   A1C  --   --   5.3    < > = values in this interval not displayed.     Results for orders placed or performed during the hospital encounter of 10/28/20   XR Chest Port 1 View    Narrative    CHEST PORTABLE ONE VIEW   10/28/2020 3:45 PM     HISTORY: Bradycardia.    COMPARISON: Chest x-ray from 9/17/2019.      Impression    IMPRESSION: The heart size is within normal limits. Pulmonary  vasculature not distended. Increased markings in the retrocardiac  region suspicious for infiltrate/pneumonia. Lungs are otherwise clear.  No pleural fluid. Monitor leads and ventriculoperitoneal shunt  identified.    DARBY REDDY MD   Head CT w/o contrast    Narrative    CT SCAN OF THE HEAD WITHOUT CONTRAST   10/28/2020 4:00 PM     HISTORY: Altered level of consciousness (LOC), unexplained.    TECHNIQUE:  Axial images of the head and coronal reformations without  IV contrast material. Radiation dose for this scan was reduced using  automated exposure control, adjustment of the mA and/or kV according  to patient size, or iterative reconstruction technique.    COMPARISON: MRI dated 10/24/2018.    FINDINGS: There is a right parietal approach ventricular shunt  catheter extending through the body of the right lateral ventricle and  crossing midline, also extending through the frontal horn of the left  lateral ventricle with the tip positioned in the region of the  periventricular white matter just anterior to the frontal horn of the  left lateral ventricle. This does not appear significantly changed  from prior MRI. The ventricles are normal in size and configuration,  without evidence for hydrocephalus.    Redemonstrated changes status post transnasal, transsphenoidal  pituitary resection. There is some amorphous/mildly nodular-appearing  soft tissue as well as fatty attenuation along the floor of the sella,  not grossly changed given differences in technique compared to prior  MRI of 10/24/2018. No abnormal soft tissue in the suprasellar  cistern.    There is moderate generalized brain parenchymal volume loss. Mild  presumed chronic small-vessel ischemic changes in the cerebral white  matter.    Bilateral lens replacements. Visualized orbits otherwise appear  normal. Complete opacification of the right sphenoid sinus. Moderate  mucosal thickening in the left sphenoid sinus. The bony calvarium and  bones of the skull base appear intact.       Impression    IMPRESSION:  1. No CT findings of acute intracranial abnormality.  2. Grossly stable postsurgical changes status post previous transnasal  transsphenoidal pituitary surgery.  3. Unchanged right parietal ventricular shunt catheter, as described.  No evidence for hydrocephalus.  4. Generalized brain parenchymal volume loss and presumed chronic  small-vessel ischemic disease.     RICHARD SALGADO MD   Echo Limited    Narrative    [id number]  [id number]  [id number]  [id number]^MEGA^MALLORY^[facility]  Echocardiography Laboratory  [address] [place], [place] [address]        Name: BUCK CUEVAS  MRN: [id number]  : 1940  Study Date: 10/29/2020 02:07 PM  Age: 80 yrs  Gender: Male  Patient Location: [facility]  Reason For Study: Bradycardia - Sinus  Ordering Physician: MALLORY AYOUB  Referring Physician: Karol Alvarez  Performed By: Winnie Patel     BSA: 1.7 m2  Height: 65 in  Weight: 139 lb  HR: 72  BP: 103/65 mmHg  _____________________________________________________________________________  __        Procedure  Limited Portable Echo Adult.  _____________________________________________________________________________  __        Interpretation Summary     Technically difficult and limited study. Early termination of study due to  patient intolerance.     Left ventricular systolic function is normal.  The visual ejection fraction is estimated at 65-70%.  There is no pericardial  effusion.  _____________________________________________________________________________  __        Left Ventricle  The left ventricle is normal in structure, function and size. Left ventricular  systolic function is normal. The visual ejection fraction is estimated at 65-  70%. Normal left ventricular wall motion.     Mitral Valve  There is moderate mitral annular calcification. There is trace mitral  regurgitation.     Tricuspid Valve  The tricuspid valve is normal in structure and function. There is mild (1+)  tricuspid regurgitation. The right ventricular systolic pressure is  approximated at 29.9 mmHg plus the right atrial pressure.     Aortic Valve  The aortic valve is trileaflet. There is mild (1+) aortic regurgitation.        Pulmonic Valve  The pulmonic valve is normal in structure and function. There is mild (1+)  pulmonic valvular regurgitation.     Vessels  The aortic root is normal size. Normal size ascending aorta. Inferior vena  cava not well visualized for estimation of right atrial pressure.     Pericardium  There is no pericardial effusion.  _____________________________________________________________________________  __     MMode/2D Measurements & Calculations  IVSd: 1.2 cm  LVIDd: 4.0 cm  LVIDs: 2.8 cm  LVPWd: 1.4 cm  FS: 29.1 %  LV mass(C)d: 188.0 grams  LV mass(C)dI: 111.0 grams/m2  LA dimension: 3.2 cm  asc Aorta Diam: 3.6 cm  RWT: 0.71        Doppler Measurements & Calculations  PA acc time: 0.09 sec     TR max benjamin: 270.5 cm/sec  TR max P.9 mmHg           _____________________________________________________________________________  __           Report approved by: Jatinder Dejesus 10/29/2020 03:55 PM        *Note: Due to a large number of results and/or encounters for the requested time period, some results have not been displayed. A complete set of results can be found in Results Review.

## 2020-11-01 NOTE — PROGRESS NOTES
Care Management Discharge Note    Discharge Planning:  Expected Discharge Date: 10/31/20     Concerns to be Addressed: discharge planning       Anticipated Discharge Disposition: Home Care  Anticipated Discharge Services:    Anticipated Discharge DME: Robert    Patient/family educated on Medicare website which has current facility and service quality ratings: no  Referrals Placed by CM/SW:   Homecare  Education Provided on the Discharge Plan:  yes  Patient/Family in Agreement with the Plan: unable to assess       Selected Continued Care - Admitted Since 10/28/2020    No services have been selected for the patient.         Additional Information: Referral made to [facility] for RN/PT/OT services and BMP lab draw on Nov. 6th. Writer unable to get thru to Vitaly to schedule follow up.  Bedside RN instructed to have spouse call to schedule.      Alysia Root RN  Care Coordinator  [facility]  [phone]

## 2020-11-01 NOTE — PLAN OF CARE
Physical Therapy Discharge Summary    Reason for therapy discharge:    Discharged to home.    Progress towards therapy goal(s). See goals on Care Plan in [facility] electronic health record for goal details.  Goals met    Therapy recommendation(s):    No further therapy is recommended.    PT: pt discharged to home. PT goals met.

## 2020-11-02 ENCOUNTER — TELEPHONE (OUTPATIENT)
Dept: ENDOCRINOLOGY | Facility: CLINIC | Age: 80
End: 2020-11-02

## 2020-11-02 ENCOUNTER — VIRTUAL VISIT (OUTPATIENT)
Dept: ENDOCRINOLOGY | Facility: CLINIC | Age: 80
End: 2020-11-02
Payer: COMMERCIAL

## 2020-11-02 DIAGNOSIS — C77.0 METASTASIS TO CERVICAL LYMPH NODE (H): ICD-10-CM

## 2020-11-02 DIAGNOSIS — E83.51 HYPOCALCEMIA: ICD-10-CM

## 2020-11-02 DIAGNOSIS — E89.0 POSTSURGICAL HYPOTHYROIDISM: ICD-10-CM

## 2020-11-02 DIAGNOSIS — R93.89 ABNORMAL FINDING ON IMAGING: ICD-10-CM

## 2020-11-02 DIAGNOSIS — C73 PAPILLARY THYROID CARCINOMA (H): ICD-10-CM

## 2020-11-02 DIAGNOSIS — E23.0 HYPOPITUITARISM (H): ICD-10-CM

## 2020-11-02 DIAGNOSIS — R91.8 PULMONARY NODULES: ICD-10-CM

## 2020-11-02 DIAGNOSIS — M85.9 LOW BONE DENSITY: ICD-10-CM

## 2020-11-02 PROCEDURE — 99496 TRANSJ CARE MGMT HIGH F2F 7D: CPT | Mod: 95

## 2020-11-02 RX ORDER — HYDROCORTISONE 10 MG/1
TABLET ORAL
Qty: 150 TABLET | Refills: 4 | Status: SHIPPED | OUTPATIENT
Start: 2020-11-02 | End: 2022-03-18

## 2020-11-02 RX ORDER — LEVOTHYROXINE SODIUM 137 UG/1
137 TABLET ORAL DAILY
Qty: 90 TABLET | Refills: 4 | Status: ON HOLD | OUTPATIENT
Start: 2020-11-02 | End: 2020-11-08

## 2020-11-02 NOTE — PATIENT INSTRUCTIONS
Get thyroid/cancer related labs at your earliest convenience.   Ask them to draw Dr Potts's labs

## 2020-11-02 NOTE — PROGRESS NOTES
Endocrinology  telephone visit  Transition of care  / Progress note -     Post hospital follow up - he was discharged yesterday and unable to participate actively in this appt today -- Barriers to care are significant  - language, hearing, dementia, multiple health systems.  He is dependent on the care of his wife and they have difficulty navigating the health care sysetm.  I am worried about him.  I have focused on post hospital transition of care due to the fact it is not apparent he has appropriate follow up scheduled on a short enough timeline.  I have counseled his wife extensively on what to watch for , when to call for help.     20 minutes of this appt needed to talk about how to get blood tests drawn and how to schedule appt.    The 60 minute  lentgh of the visit today is typical for every visit with him, despite the fact this was only with his wife, speaking to the complexity of his care and the difficulty getting history and making recommendations.  The thyroid cancer is the least of his problems right now.  My standing orders for thyroid cancer related labs have been on the system but were not drawn.      Hypocalcemia with low phos noted on recent labs - next labs to include iCa and 25OHD.      Somnolence prior to recent hospitalization.  He is sleeping now - Plan as per # 1 and 5.      Papillary thyroid carcinoma (HCC)  4.8 cm right, bilateral node positive. He has been treated with total thyroidectomy, 131I x 2 (cummulative dose 346.4 mCi) His last 131I TBS (2008) post therapy scan raised question of ? lung mets and also ? met above left kidney. Cervical adenopathy has been treated in the past with ETOH .    I have long suspected he had lung mets, but we haven't proven this.   Thyroglobulin indicates persistent /progressive disease.  We have biopsy proven positive LN involvement in the neck which we have not treated so far.      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    TSH should be kept low to avoid stimulating the thyroid cancer .   Thje TSH in [place] mid October was over 4.  Free T4 was not drawn.  I am ordering TFTS with next labs. I will try to add on  Free T4 to existing lab specimen from the hospital.  We can't trust his TSH alone.  IF he could have a level up to 4 he could be profoundly hypothyroxinemic and we wouldn't know it.       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   Cervical adenopathy has been treated in the past with ETOH . The 2 LNs likely correlate to the high FDG regions on the PET.   It is possible these are the same LNs that were treated with ETOH in the past (vs others in the same vicinity - it is hard to say)   I am concerned the level 7 mass can't be reached with ETOH treatment, that the only way to remove it is with surgery.  He declined surgery offered in July, 2018  Current status unknown - he is too sick right now to address this.      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   On LT4 and HC only.  Treat to high normal free T4 target  He has been noncompliant with testosterone in the past and is no longer on it.     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 The tumor has been significantly de bulked and He has completed XRT for  persistent tumor-. Tumor mass is stable on  MRI through 10/24/18.  Continue periodic imaging of the sella, treat the hypopituitarism medically.   Next pituitary MRI 10/2021 or sooner prn    Low bone density  He is on alendronate.  Next DXA 4/2021 or thereafter     ? Lytic bone lesions -stable on serial imaging -- not addressed    Due to the COVID 19 pandemic this visit was a  telephone/video visit in order to help prevent spread of infection in this high risk patient and the general population. The patient gave verbal consent for the visit today.    Start time 1403 call to  services - Syd [id number]; I had to repeat myself to the  many times.  I had to ask the  to interpret.  The  noted his wife is mixing Cantonese with another dialect   Stop time 1508   Total time 61 minutes -     Corina Potts MD       Cc/ HPI: Mr Peres has a history of multiple complicated endocrine issues, including thyroid cancer, surgical hypothyroidism, osteoporosis, pituitary macroadenoma with partial hypopituitarism and history of DI.   See my 4/9/18 note for his detailed history.   I last saw him 11/19.  I have been trying to see him more frequently due to the difficulties of communication and scheduling of tests.  He no showed for the 5/2020 appt.  Today his wife notes they didn't return from [place] until August. He did not have COVID while there.   He was seen in clinic 10/26/2020 with report of mouth pain.White patches were seen on the tongue. He was given nystatin and fluconazole.    He was hospitalized 10/28-11/1/2020 with loss of consciousness, oral thrush, left sided pneumonia on CXR.    The telephone visit today is with his wife.  She notes he is too hard of hearing for phone (I note he is also too [place] for meaningful conversation face to face in past) and he is also in bed sleeping right now.  His wife note that he could not eat prior to the hospitalization due to mouth inflammation;  He fell in the bathroom at home prior to bringing him in for the pre hospital clinic visit in [place].  I have reviewed the 10/28/2020 clinic note by Dr Rodriges - he was unresponsive in the exam room in the clinic, unresponsive to voice, touch and sternal rub.  He was dressed in 4 shifts/ jackets.      associated with transient loss of pulse, O2 sats in the 80s and bradycardia.   He was sent to [facility] ED and then admitted.  Wife was not allowed to see him for 2 days while in the hospital.  He was discharged from the hospital yesterday, getting home from the hospital at 1 PM yesterday. When he got home yesterday he showed and then went to bed.   This AM he ate BF, sat up for 10-15 minutes , walked  And then went back to bed. Mouth inflammation is not completelyl cured now.   he doesn't have energy to talk with you.  He is sleeping now.  He has been sleeping today ;   Wife is taking care of him at home all by herself now.  The children visited but he was sleeping  no fever at home (they don't have a thermometer);     We know he has persistent/recurrent/ progressive thyroid cancer in the neck.  On FNAB 4/23/18: + PTC, needle wash Tg 2082 ng/ml . Summer 2018 he met with surgeon and they decided against surgery at that time. Since then, we have seen slow progression in the size of the involved   He had neck US and chest CT on 10/15/19  In anticipation of this appt.  I have again reviewed the images on PACS today.     Papillary thyroid cancer 4.8 cm left, bilateral node positive (MACIS 6.88 minimum, pT3, pN1a (8), pMx, Stage III or IV). His course has included several operations and several 131I treatments  11/27/07 thyroidectomy  153.4 mCi 131I in 6/08. The radioiodine  was complicated by acute sialadenitis.   12/08  193 mCi 131I (with Thyrogen stimulation). The post therapy scan showed activity in the thyroid bed, lung base on the right and also superior left kidney region   11/3/09  right selective neck dissection levels 2, 3 and 4, removing many positive LNs.   4/16/14 FNAB of right level 6 and 7 cervical lymph nodes were  positive for papillary thyroid cancer. Needle wash Tg was also high on both samples (see below). He had  hoarseness within one hour after the FNAB procedure. He was treated with cymetra on 5/12/14 and he restarted thickened liquids.    6/3/14  ETOH ablation procedure of the  2  LNs    1/31/17  trasnscervical resection of retrosternal mediastinal lesions.  A cystic lesion was removed and drained.  Surgical path  benign thymic tissue.  4/12/18  PET/CT .  Right low neck /superior mediastinal high FDG lateral and more midline. The paratracheal mass is somewhat posterior and below the level of the clavicle (read as 1.4 cm, larger than before, SUB 68), but above the sternum  Tiny focus of fdg left lung     2.  Osteoporosis.  The last BMD was 4/25/19 , performed earlier than expected shows lowest T-score -2.3 at the right femoral neck. BMD is stable compared with 6/25/19.  Alendronate has been prescribed since 2012.  I suspect he isn't compliant.      Labs   4/9/18: Tg 18.2, FRANK < 0.4; TSH  ,0.01, free T4 1.52, Na 139, K 4.4;   6/20/18: Tg 12.4, FRANK < 0.4, TSH < 0.01, free T4 1.6 -   11/27/18: Tg 16.5, FRANK  0.4, TSH < 0.01, free T4 1.46  4/22/19: Tg 22, FRANK < 0.4, TSH < 0.01, free T4 1.51, Ca 8.4, albumin 3.3, phos 3.1, creatinine 1.03, glucose 122, vitamin D 22  7/23/19: Tg 32.1, FRANK < 0.4, TSH < 0.01  10/12/2020 [facility] creatinine 1.9, Ca 8.8, glucose 131; COVID 19 negative   10/15/2020 TSH 4.19l /wbc 8500, Hgb 12.7, platelets 182K  11/1/2020 Ca 7.3, Mg 2.5, phos 2.3, creatinien 1.35, glucose 86    Images:   8/29/18 CT chest: previously spiculted 1.7 x 1.5 cm nodule now 7 x 5 mm , ? Atelectasis.  Scattered upper lobe reticulonodular opacities as seen on prior CT, several more conspicuous, up to 4 mm, ? Infection .  To my eye I also note right level 7 neck mass 1.6 x 2 with some airway mass effect (this was 1.4 x 1.5 on 9/17 CT). This is also likely  the right level 7 mass we have bene following on US.    10/24/18 MRI brain residual mass 1.4 x 0.6 x 0.9 cm, extending into left cavernous sinus.  Stalk deviated to the left, thickened to 5 mm; optic chiasm atrophic  10/15/19 neck US compared with 4/25/19 , 11/27/18,  6/20/18 and  4/16/18:   Right level 6 thyroid bed 1.6 x 1.1 x 1.1 (was   1.5 x 1.1  x 1.2 ;  1.2 x 0.9 x 1.2; 1.2 x 01 x 1.1 ;  1.3 x 0.95 x 1.2 cm - suspicious; 1.2 x 0.93 x 0.9 ;  FNAB 4/23/18: + PTC, needle wash Tg 2082 ng/ml  Right level 7 # 1 1.7 x 1.6 x  1.7 cm (was  2 x 1.9 x 2.1 ;  1.8 x 1.5 x 1.8 ; 1.8 x 1.6 x 1.8 cm ; 1.4 x 1.5 x 1.5 cm; 1.1 x 1.2 x 1 -FNAB 4/23/18 + PTC, needle wash Tg 430 ng/ml  Left level 4  0.5 x 0.4 x 0.9 cm  (was 0.5 x 0.4 x 1;  0.4 x 0.6 x 0.9  0.3 x 0.6 x 1.1 ;  0.3 x 0.8 x 1 cm ; 0.6 x 0.3 x 0.8 )  Left level 4 # 2 0.3 x  0.5 x 0.4 cm  (was 0.4 x 0.4 x 0.6;  0.4 x0.6 x 0.6 ; 0.5 x 0.7 x 0.7 ; 0.8 x 0.5 x 0.6 cm;  0.5 x 0.5 x 1 )  Left level 4 # 3 0.4 x0.6 x 0.4 - ? new  4/25/19 DXA: lowest T-score -2.3 right femoral neck; no change in BMD compared with 2018.   4/25/19 chest CT: increase in centrilobular nodules posterior RUL-- I had discussed this with Dr Paz Magana after our last appt; today I have reviewed the images -right level 7 mass indents trachea slightly.    10/15/19 chest CT: no significant change   10/13/2020 CT Chest -right neck mass 1.4 x 1.8 cm     ROS:  Doesn't eat a lot now; this AM had pork bun and congee  Cardiac: negative  Respiratory: less coughing now than prior to hospital   GI: incontinence since discharge ; BM is not watery;   Mental status: He knows who wife is  Sometimes he is not understandable -- this is his normal baseline  He sometimes makes statements that don't make sense, sometimes uses his hands to signal and she doesn't know what he is saying - this is a change in the last 2 months  He is walking by himself , slowly, now; she walked with him about 10 minutes.   He is the same today as yesterady.     Past Medical History:   Diagnosis Date     Arthritis      BPH (benign prostatic hyperplasia)      COPD (chronic obstructive pulmonary disease) (H)      Depression      Hyperlipidemia      Hypertension     no current meds     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     Hypovitaminosis D      Multiple pulmonary nodules 2009      Osteopenia 2011     Papillary thyroid carcinoma (H) 2007    4.8 cm, right, node positive;      Pituitary macroadenoma with extrasellar extension (H) 2007    causing obstructive hydrocephalus     Post-surgical hypothyroidism 2007     Uncomplicated asthma      Vocal cord paralysis 2014    right     Xerostomia 2010    due to radiation exposures     Past Surgical History:   Procedure Laterality Date     cymetra injection       ESOPHAGOSCOPY, GASTROSCOPY, DUODENOSCOPY (EGD), COMBINED  6/20/2013    Procedure: COMBINED ESOPHAGOSCOPY, GASTROSCOPY, DUODENOSCOPY (EGD), BIOPSY SINGLE OR MULTIPLE;  gastroscopy;  Surgeon: Leslie Guadarrama MD;  Location:  GI     ESOPHAGOSCOPY, GASTROSCOPY, DUODENOSCOPY (EGD), COMBINED N/A 9/20/2019    Procedure: ESOPHAGOGASTRODUODENOSCOPY (EGD);  Surgeon: Gilberto Gibson DO;  Location:  GI     ESOPHAGOSCOPY, GASTROSCOPY, DUODENOSCOPY (EGD), COMBINED N/A 10/16/2020    Procedure: ESOPHAGOGASTRODUODENOSCOPY, WITH BIOPSY;  Surgeon: Bartolome Granda MD;  Location:  GI     HERNIA REPAIR Right      lipoma resection chest wall Right 11/09     PHACOEMULSIFICATION CLEAR CORNEA WITH STANDARD INTRAOCULAR LENS IMPLANT Left 5/7/2018    Procedure: PHACOEMULSIFICATION CLEAR CORNEA WITH STANDARD INTRAOCULAR LENS IMPLANT;  LEFT PHACOEMULSIFICATION CLEAR CORNEA WITH STANDARD INTRAOCULAR LENS IMPLANT ;  Surgeon: Nadiya Allen MD;  Location:  EC     PHACOEMULSIFICATION CLEAR CORNEA WITH STANDARD INTRAOCULAR LENS IMPLANT Right 8/21/2018    Procedure: PHACOEMULSIFICATION CLEAR CORNEA WITH STANDARD INTRAOCULAR LENS IMPLANT;  RIGHT EYE PHACOEMULSIFICATION CLEAR CORNEA WITH STANDARD INTRAOCULAR LENS IMPLANT;  Surgeon: Nadiya Allen MD;  Location:  EC     right selective neck dissection level 2, 3, 4  11/3/09     right  shunt placement  6/28/07     THORACIC SURGERY  Fidencio 3 2017     THYMECTOMY N/A 1/3/2017    Procedure: THYMECTOMY;  Surgeon: Ernesto Armstrong MD;   Location: [facility] OR     THYROIDECTOMY  11/27/07     TRANSCERVICAL EXTENDED MEDIASTINAL LYMPHADENECTOMY N/A 1/3/2017    Procedure: TRANSCERVICAL EXTENDED MEDIASTINAL LYMPHADENECTOMY;  Surgeon: Ernesto Armstrong MD;  Location: [facility] OR     transnasal endoscopic resection of pituitary adenoma  8/13/2007     Current Outpatient Medications   Medication Sig Dispense Refill     albuterol (PROAIR HFA) 108 (90 Base) MCG/ACT inhaler Inhale 2 puffs into the lungs every 4 hours as needed for shortness of breath / dyspnea or wheezing 3 Inhaler 11     alendronate (FOSAMAX) 70 MG tablet Take 1 tablet (70 mg) by mouth every 7 days 32 tablet 1     amLODIPine (NORVASC) 2.5 MG tablet TAKE 1 TABLET(2.5 MG) BY MOUTH DAILY 90 tablet 0     cefdinir (OMNICEF) 300 MG capsule Take 1 capsule (300 mg) by mouth 2 times daily 5 capsule 0     docusate sodium (COLACE) 100 MG capsule Take 100 mg by mouth 2 times daily as needed for constipation       guaiFENesin (MUCINEX) 600 MG 12 hr tablet Take 1 tablet (600 mg) by mouth 2 times daily 14 tablet 0     hydrocortisone (CORTEF) 10 MG tablet 10 mg AM and 5 mg afternoon 300 tablet 1     levothyroxine (SYNTHROID/LEVOTHROID) 137 MCG tablet Take 1 tablet (137 mcg) by mouth daily 180 tablet 1     nystatin (MYCOSTATIN) 178109 UNIT/ML suspension Take 5 mLs (500,000 Units) by mouth 4 times daily for 14 days 280 mL 0     tamsulosin (FLOMAX) 0.4 MG capsule Take 0.4 mg by mouth daily       traZODone (DESYREL) 100 MG tablet Take 100 mg by mouth At Bedtime       umeclidinium-vilanterol (ANORO ELLIPTA) 62.5-25 MCG/INH oral inhaler Inhale 1 puff into the lungs daily       zolpidem (AMBIEN) 10 MG tablet Take 10 mg by mouth nightly as needed for sleep     On 2 new medications including cefdinir  Guaifenesin  Wife says she gave him weekly alendronate today    Social History     Tobacco Use     Smoking status: Former Smoker     Packs/day: 0.50     Years: 5.00     Pack years: 2.50     Types: Cigarettes     Start date:  "1976     Quit date: 2001     Years since quittin.7     Smokeless tobacco: Never Used   Substance Use Topics     Alcohol use: No     Drug use: No    Wife is taking care of him at home all by herself now.  When she needs help she calls the children to help. They are local.  They children saw him today - he was sleeping .   Was in [place] until .  Going to see family doctor on 2020.      GENERAL  BP Readings from Last 1 Encounters:   20 136/84      Pulse Readings from Last 1 Encounters:   20 69      Resp Readings from Last 1 Encounters:   20 18      Temp Readings from Last 1 Encounters:   20 98.5  F (36.9  C) (Oral)      SpO2 Readings from Last 1 Encounters:   20 96%      Wt Readings from Last 1 Encounters:   20 62.2 kg (137 lb 1.6 oz)      Ht Readings from Last 1 Encounters:   10/02/20 1.651 m (5' 5\")         EXAMINATION: CT CHEST W/O CONTRAST, 10/13/2020 10:31 AMTECHNIQUE:  Helical CT images from the thoracic inlet through the lung  bases were obtained without IV contrast. COMPARISON: CT chest 10/15/2019 and 2019HISTORY: Lung nodule (Pulmonary nodule); Chronic obstructive pulmonary disease   FINDINGS:   Chest: Limited evaluation of the thyroid gland. Esophagus appears unremarkable. Tracheobronchial tree appears patent. Biapical scarring.  Mildly increased scattered bronchocentric tree-in-bud opacities throughout the peripheral aspects of the right lung, particularly in  the posterior right upper lobe. Unchanged calcified granuloma in the left upper lobe. New bronchocentric tree-in-bud opacities in the  anterior left lower lobe, just posterior to the major fissure. This is associated with two new 6 mm nodular opacities (series 4, images 330  and 325). There is an additional new 4 mm solid pulmonary nodule in the posterior right upper lobe (series 4, image 158. Bibasilar  atelectasis. Continued right middle lobe collapse. The pleura appears " unremarkable. No pleural effusion. No pneumothorax. Heart size within  normal limits.. No significant pericardial effusion. Mild coronary artery and mitral annular calcifications. Visualized thoracic aorta  and main pulmonary artery diameters appear within normal limits. There are no visualized pathologically enlarged mediastinal, hilar or axillary lymph nodes. Partially calcified mediastinal and right hilarnodes.   Abdomen: Cholelithiasis without evidence of acute cholecystitis. Unchanged 6 cm benign-appearing cyst in the left upper pole. Partially  visualized  shunt courses through the soft tissues of the right neck, chest, and upper abdomen.   Bones/soft tissues: Mild to moderate degenerative changes of the visualized spine. No acute osseous abnormalities are suspicious bony lesions.     IMPRESSION:   1. Increased peripheral predominant tree-in-bud opacities in the right lung, particularly in the posterior right upper lobe. Additionally, there are new bronchocentric nodular opacities in the left lower lobe. These findings are most suggestive of new/ongoing fungal or atypical infection. Superimposed malignancy cannot be excluded in the new left lower lobe opacities. Recommend follow-up CT to resolution.  2. New 4 mm solid pulmonary nodule in the posterior right upper lobe. Given the recommendation above, close attention on follow-up.  3. Continued right middle lobe collapse.  4. Cholelithiasis without evidence of acute cholecystitis.  [Recommend Follow Up: Lung nodule]  This report will be copied to the [facility] to ensure aprovider acknowledges the finding.    I have personally reviewed the examination and initial interpretationand I agree with the findings.     MANA TUCKER MD

## 2020-11-02 NOTE — PROGRESS NOTES
"Travis Peres is a 80 year old male who is being evaluated via a billable telephone visit.      The patient has been notified of following:     \"This telephone visit will be conducted via a call between you and your physician/provider. We have found that certain health care needs can be provided without the need for a physical exam.  This service lets us provide the care you need with a short phone conversation.  If a prescription is necessary we can send it directly to your pharmacy.  If lab work is needed we can place an order for that and you can then stop by our lab to have the test done at a later time.    Telephone visits are billed at different rates depending on your insurance coverage. During this emergency period, for some insurers they may be billed the same as an in-person visit.  Please reach out to your insurance provider with any questions.    If during the course of the call the physician/provider feels a telephone visit is not appropriate, you will not be charged for this service.\"    Patient has given verbal consent for Telephone visit?  Yes    What phone number would you like to be contacted at? [phone]    How would you like to obtain your AVS? Mail a copy    Brandee Mcpherson MA        "

## 2020-11-02 NOTE — PLAN OF CARE
Physical Therapy Discharge Summary    Reason for therapy discharge:    Discharged to home with home therapy.    Progress towards therapy goal(s). See goals on Care Plan in [facility] electronic health record for goal details.  Goals met    Therapy recommendation(s):    Continued therapy is recommended.  Rationale/Recommendations:  To further increase independence with mobility.

## 2020-11-02 NOTE — LETTER
"11/2/2020       RE: Travis Peres  [address] [facility] [address]     Dear Colleague,    Thank you for referring your patient, Travis Peres, to the [facility] [place] at [facility]. Please see a copy of my visit note below.    Travis Peres is a 80 year old male who is being evaluated via a billable telephone visit.      The patient has been notified of following:     \"This telephone visit will be conducted via a call between you and your physician/provider. We have found that certain health care needs can be provided without the need for a physical exam.  This service lets us provide the care you need with a short phone conversation.  If a prescription is necessary we can send it directly to your pharmacy.  If lab work is needed we can place an order for that and you can then stop by our lab to have the test done at a later time.    Telephone visits are billed at different rates depending on your insurance coverage. During this emergency period, for some insurers they may be billed the same as an in-person visit.  Please reach out to your insurance provider with any questions.    If during the course of the call the physician/provider feels a telephone visit is not appropriate, you will not be charged for this service.\"    Patient has given verbal consent for Telephone visit?  Yes    What phone number would you like to be contacted at? [phone]    How would you like to obtain your AVS? Mail a copy    Brandee Mcpherson MA          Endocrinology  telephone visit  Transition of care  / Progress note -     Post hospital follow up - he was discharged yesterday and unable to participate actively in this appt today -- Barriers to care are significant  - language, hearing, dementia, multiple health systems.  He is dependent on the care of his wife and they have difficulty navigating the health care sysetm.  I am worried about him.  I have focused on " post hospital transition of care due to the fact it is not apparent he has appropriate follow up scheduled on a short enough timeline.  I have counseled his wife extensively on what to watch for , when to call for help.     20 minutes of this appt needed to talk about how to get blood tests drawn and how to schedule appt.    The 60 minute  lentgh of the visit today is typical for every visit with him, despite the fact this was only with his wife, speaking to the complexity of his care and the difficulty getting history and making recommendations.  The thyroid cancer is the least of his problems right now.  My standing orders for thyroid cancer related labs have been on the system but were not drawn.      Hypocalcemia with low phos noted on recent labs - next labs to include iCa and 25OHD.      Somnolence prior to recent hospitalization.  He is sleeping now - Plan as per # 1 and 5.      Papillary thyroid carcinoma (HCC)  4.8 cm right, bilateral node positive. He has been treated with total thyroidectomy, 131I x 2 (cummulative dose 346.4 mCi) His last 131I TBS (2008) post therapy scan raised question of ? lung mets and also ? met above left kidney. Cervical adenopathy has been treated in the past with ETOH .    I have long suspected he had lung mets, but we haven't proven this.   Thyroglobulin indicates persistent /progressive disease.  We have biopsy proven positive LN involvement in the neck which we have not treated so far.      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   TSH should be kept low to avoid stimulating the thyroid cancer .   je TSH in [place] mid October was over 4.  Free T4 was not drawn.  I am ordering TFTS with next labs. I will try to add on  Free T4 to existing lab specimen from the hospital.  We can't trust his TSH alone.  IF he could have a level up to 4 he could be profoundly hypothyroxinemic and we wouldn't know it.       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   Cervical adenopathy has been treated in the past with ETOH . The 2 LNs likely correlate to the high FDG regions on the PET.   It is possible these are the same LNs that were treated with ETOH in the past (vs others in the same vicinity - it is hard to say)   I am concerned the level 7 mass can't be reached with ETOH treatment, that the only way to remove it is with surgery.  He declined surgery offered in July, 2018  Current status unknown - he is too sick right now to address this.      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   On LT4 and HC only.  Treat to high normal free T4 target  He has been noncompliant with testosterone in the past and is no longer on it.     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 The tumor has been significantly de bulked and He has completed XRT for  persistent tumor-. Tumor mass is stable on  MRI through 10/24/18.  Continue periodic imaging of the sella, treat the hypopituitarism medically.   Next pituitary MRI 10/2021 or sooner prn    Low bone density  He is on alendronate.  Next DXA 4/2021 or thereafter     ? Lytic bone lesions -stable on serial imaging -- not addressed    Due to the COVID 19 pandemic this visit was a telephone/video visit in order to help prevent spread of infection in this high risk patient and the general population. The patient gave verbal consent for the visit today.    Start time 1403 call to  services - Syd [id number]; I had to repeat myself to the  many times.  I had to ask the  to interpret.  The  noted his wife is mixing Cantonese with another dialect   Stop time 1892    Total time 61 minutes -     Corina Potts MD       Cc/ HPI: Mr Larisa has a history of multiple complicated endocrine issues, including thyroid cancer, surgical hypothyroidism, osteoporosis, pituitary macroadenoma with partial hypopituitarism and history of DI.   See my 4/9/18 note for his detailed history.   I last saw him 11/19.  I have been trying to see him more frequently due to the difficulties of communication and scheduling of tests.  He no showed for the 5/2020 appt.  Today his wife notes they didn't return from [place] until August. He did not have COVID while there.   He was seen in clinic 10/26/2020 with report of mouth pain.White patches were seen on the tongue. He was given nystatin and fluconazole.    He was hospitalized 10/28-11/1/2020 with loss of consciousness, oral thrush, left sided pneumonia on CXR.    The telephone visit today is with his wife.  She notes he is too hard of hearing for phone (I note he is also too [place] for meaningful conversation face to face in past) and he is also in bed sleeping right now.  His wife note that he could not eat prior to the hospitalization due to mouth inflammation;  He fell in the bathroom at home prior to bringing him in for the pre hospital clinic visit in [place].  I have reviewed the 10/28/2020 clinic note by Dr Rodriges - he was unresponsive in the exam room in the clinic, unresponsive to voice, touch and sternal rub.  He was dressed in 4 shifts/ jackets.      associated with transient loss of pulse, O2 sats in the 80s and bradycardia.  He was sent to [facility] ED and then admitted.  Wife was not allowed to see him for 2 days while in the hospital.  He was discharged from the hospital yesterday, getting home from the hospital at 1 PM yesterday. When he got home yesterday he showed and then went to bed.   This AM he ate BF, sat up for 10-15 minutes , walked  And then went back to bed. Mouth inflammation is not completelyl cured now.   he doesn't have energy  to talk with you.  He is sleeping now.  He has been sleeping today ;   Wife is taking care of him at home all by herself now.  The children visited but he was sleeping  no fever at home (they don't have a thermometer);     We know he has persistent/recurrent/ progressive thyroid cancer in the neck.  On FNAB 4/23/18: + PTC, needle wash Tg 2082 ng/ml . Summer 2018 he met with surgeon and they decided against surgery at that time. Since then, we have seen slow progression in the size of the involved   He had neck US and chest CT on 10/15/19  In anticipation of this appt.  I have again reviewed the images on PACS today.     Papillary thyroid cancer 4.8 cm left, bilateral node positive (MACIS 6.88 minimum, pT3, pN1a (8), pMx, Stage III or IV). His course has included several operations and several 131I treatments  11/27/07 thyroidectomy  153.4 mCi 131I in 6/08. The radioiodine  was complicated by acute sialadenitis.   12/08  193 mCi 131I (with Thyrogen stimulation). The post therapy scan showed activity in the thyroid bed, lung base on the right and also superior left kidney region   11/3/09  right selective neck dissection levels 2, 3 and 4, removing many positive LNs.   4/16/14 FNAB of right level 6 and 7 cervical lymph nodes were  positive for papillary thyroid cancer. Needle wash Tg was also high on both samples (see below). He had  hoarseness within one hour after the FNAB procedure. He was treated with cymetra on 5/12/14 and he restarted thickened liquids.    6/3/14  ETOH ablation procedure of the  2 LNs    1/31/17  trasnscervical resection of retrosternal mediastinal lesions.  A cystic lesion was removed and drained.  Surgical path  benign thymic tissue.  4/12/18  PET/CT .  Right low neck /superior mediastinal high FDG lateral and more midline. The paratracheal mass is somewhat posterior and below the level of the clavicle (read as 1.4 cm, larger than before, SUB 68), but above the sternum  Tiny focus of fdg left  lung     2.  Osteoporosis.  The last BMD was 4/25/19 , performed earlier than expected shows lowest T-score -2.3 at the right femoral neck. BMD is stable compared with 6/25/19.  Alendronate has been prescribed since 2012.  I suspect he isn't compliant.      Labs   4/9/18: Tg 18.2, FRANK < 0.4; TSH  ,0.01, free T4 1.52, Na 139, K 4.4;   6/20/18: Tg 12.4, FRANK < 0.4, TSH < 0.01, free T4 1.6 -   11/27/18: Tg 16.5, FRANK  0.4, TSH < 0.01, free T4 1.46  4/22/19: Tg 22, FRANK < 0.4, TSH < 0.01, free T4 1.51, Ca 8.4, albumin 3.3, phos 3.1, creatinine 1.03, glucose 122, vitamin D 22  7/23/19: Tg 32.1, FRANK < 0.4, TSH < 0.01  10/12/2020 [facility] creatinine 1.9, Ca 8.8, glucose 131; COVID 19 negative   10/15/2020 TSH 4.19l /wbc 8500, Hgb 12.7, platelets 182K  11/1/2020 Ca 7.3, Mg 2.5, phos 2.3, creatinien 1.35, glucose 86    Images:   8/29/18 CT chest: previously spiculted 1.7 x 1.5 cm nodule now 7 x 5 mm , ? Atelectasis.  Scattered upper lobe reticulonodular opacities as seen on prior CT, several more conspicuous, up to 4 mm, ? Infection .  To my eye I also note right level 7 neck mass 1.6 x 2 with some airway mass effect (this was 1.4 x 1.5 on 9/17 CT). This is also likely  the right level 7 mass we have bene following on US.    10/24/18 MRI brain residual mass 1.4 x 0.6 x 0.9 cm, extending into left cavernous sinus.  Stalk deviated to the left, thickened to 5 mm; optic chiasm atrophic  10/15/19 neck US compared with 4/25/19 , 11/27/18,  6/20/18 and  4/16/18:   Right level 6 thyroid bed 1.6 x 1.1 x 1.1 (was   1.5 x 1.1 x 1.2 ;  1.2 x 0.9 x 1.2; 1.2 x 01 x 1.1 ;  1.3 x 0.95 x 1.2 cm - suspicious; 1.2 x 0.93 x 0.9 ;  FNAB 4/23/18: + PTC, needle wash Tg 2082 ng/ml  Right level 7 # 1 1.7 x 1.6 x  1.7 cm (was  2 x 1.9 x 2.1 ;  1.8 x 1.5 x 1.8 ; 1.8 x 1.6 x 1.8 cm ; 1.4 x 1.5 x 1.5 cm; 1.1 x 1.2 x 1 -FNAB 4/23/18 + PTC, needle wash Tg 430 ng/ml  Left level 4  0.5 x 0.4 x 0.9 cm  (was 0.5 x 0.4 x 1;  0.4 x 0.6 x 0.9  0.3 x 0.6 x 1.1 ;  0.3  x 0.8 x 1 cm ; 0.6 x 0.3 x 0.8 )  Left level 4 # 2 0.3 x  0.5 x 0.4 cm  (was 0.4 x 0.4 x 0.6;  0.4 x0.6 x 0.6 ; 0.5 x 0.7 x 0.7 ; 0.8 x 0.5 x 0.6 cm;  0.5 x 0.5 x 1 )  Left level 4 # 3 0.4 x0.6 x 0.4 - ? new  4/25/19 DXA: lowest T-score -2.3 right femoral neck; no change in BMD compared with 2018.   4/25/19 chest CT: increase in centrilobular nodules posterior RUL-- I had discussed this with Dr Paz Magana after our last appt; today I have reviewed the images -right level 7 mass indents trachea slightly.    10/15/19 chest CT: no significant change   10/13/2020 CT Chest -right neck mass 1.4 x 1.8 cm     ROS:  Doesn't eat a lot now; this AM had pork bun and congee  Cardiac: negative  Respiratory: less coughing now than prior to hospital   GI: incontinence since discharge ; BM is not watery;   Mental status: He knows who wife is  Sometimes he is not understandable -- this is his normal baseline  He sometimes makes statements that don't make sense, sometimes uses his hands to signal and she doesn't know what he is saying - this is a change in the last 2 months  He is walking by himself , slowly, now; she walked with him about 10 minutes.   He is the same today as yesterady.     Past Medical History:   Diagnosis Date     Arthritis      BPH (benign prostatic hyperplasia)      COPD (chronic obstructive pulmonary disease) (H)      Depression      Hyperlipidemia      Hypertension     no current meds     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     Hypovitaminosis D      Multiple pulmonary nodules 2009     Osteopenia 2011     Papillary thyroid carcinoma (H) 2007    4.8 cm, right, node positive;      Pituitary macroadenoma with extrasellar extension (H) 2007    causing obstructive hydrocephalus     Post-surgical hypothyroidism 2007     Uncomplicated asthma      Vocal cord paralysis 2014    right     Xerostomia 2010    due to radiation exposures     Past Surgical History:   Procedure Laterality Date     metra  injection       ESOPHAGOSCOPY, GASTROSCOPY, DUODENOSCOPY (EGD), COMBINED  6/20/2013    Procedure: COMBINED ESOPHAGOSCOPY, GASTROSCOPY, DUODENOSCOPY (EGD), BIOPSY SINGLE OR MULTIPLE;  gastroscopy;  Surgeon: Leslie Guadarrama MD;  Location:  GI     ESOPHAGOSCOPY, GASTROSCOPY, DUODENOSCOPY (EGD), COMBINED N/A 9/20/2019    Procedure: ESOPHAGOGASTRODUODENOSCOPY (EGD);  Surgeon: Gilberto Gibson DO;  Location:  GI     ESOPHAGOSCOPY, GASTROSCOPY, DUODENOSCOPY (EGD), COMBINED N/A 10/16/2020    Procedure: ESOPHAGOGASTRODUODENOSCOPY, WITH BIOPSY;  Surgeon: Bartolome Granda MD;  Location:  GI     HERNIA REPAIR Right      lipoma resection chest wall Right 11/09     PHACOEMULSIFICATION CLEAR CORNEA WITH STANDARD INTRAOCULAR LENS IMPLANT Left 5/7/2018    Procedure: PHACOEMULSIFICATION CLEAR CORNEA WITH STANDARD INTRAOCULAR LENS IMPLANT;  LEFT PHACOEMULSIFICATION CLEAR CORNEA WITH STANDARD INTRAOCULAR LENS IMPLANT ;  Surgeon: Nadiya Allen MD;  Location:  EC     PHACOEMULSIFICATION CLEAR CORNEA WITH STANDARD INTRAOCULAR LENS IMPLANT Right 8/21/2018    Procedure: PHACOEMULSIFICATION CLEAR CORNEA WITH STANDARD INTRAOCULAR LENS IMPLANT;  RIGHT EYE PHACOEMULSIFICATION CLEAR CORNEA WITH STANDARD INTRAOCULAR LENS IMPLANT;  Surgeon: Nadiya Allen MD;  Location:  EC     right selective neck dissection level 2, 3, 4  11/3/09     right  shunt placement  6/28/07     THORACIC SURGERY  Fidencio 3 2017     THYMECTOMY N/A 1/3/2017    Procedure: THYMECTOMY;  Surgeon: Ernesto Armstrong MD;  Location: [facility] OR     THYROIDECTOMY  11/27/07     TRANSCERVICAL EXTENDED MEDIASTINAL LYMPHADENECTOMY N/A 1/3/2017    Procedure: TRANSCERVICAL EXTENDED MEDIASTINAL LYMPHADENECTOMY;  Surgeon: Ernesto Armstrong MD;  Location: [facility] OR     transnasal endoscopic resection of pituitary adenoma  8/13/2007     Current Outpatient Medications   Medication Sig Dispense Refill     albuterol (PROAIR HFA) 108 (90 Base)  MCG/ACT inhaler Inhale 2 puffs into the lungs every 4 hours as needed for shortness of breath / dyspnea or wheezing 3 Inhaler 11     alendronate (FOSAMAX) 70 MG tablet Take 1 tablet (70 mg) by mouth every 7 days 32 tablet 1     amLODIPine (NORVASC) 2.5 MG tablet TAKE 1 TABLET(2.5 MG) BY MOUTH DAILY 90 tablet 0     cefdinir (OMNICEF) 300 MG capsule Take 1 capsule (300 mg) by mouth 2 times daily 5 capsule 0     docusate sodium (COLACE) 100 MG capsule Take 100 mg by mouth 2 times daily as needed for constipation       guaiFENesin (MUCINEX) 600 MG 12 hr tablet Take 1 tablet (600 mg) by mouth 2 times daily 14 tablet 0     hydrocortisone (CORTEF) 10 MG tablet 10 mg AM and 5 mg afternoon 300 tablet 1     levothyroxine (SYNTHROID/LEVOTHROID) 137 MCG tablet Take 1 tablet (137 mcg) by mouth daily 180 tablet 1     nystatin (MYCOSTATIN) 709493 UNIT/ML suspension Take 5 mLs (500,000 Units) by mouth 4 times daily for 14 days 280 mL 0     tamsulosin (FLOMAX) 0.4 MG capsule Take 0.4 mg by mouth daily       traZODone (DESYREL) 100 MG tablet Take 100 mg by mouth At Bedtime       umeclidinium-vilanterol (ANORO ELLIPTA) 62.5-25 MCG/INH oral inhaler Inhale 1 puff into the lungs daily       zolpidem (AMBIEN) 10 MG tablet Take 10 mg by mouth nightly as needed for sleep     On 2 new medications including cefdinir  Guaifenesin  Wife says she gave him weekly alendronate today    Social History     Tobacco Use     Smoking status: Former Smoker     Packs/day: 0.50     Years: 5.00     Pack years: 2.50     Types: Cigarettes     Start date: 1976     Quit date: 2001     Years since quittin.7     Smokeless tobacco: Never Used   Substance Use Topics     Alcohol use: No     Drug use: No    Wife is taking care of him at home all by herself now.  When she needs help she calls the children to help. They are local.  They children saw him today - he was sleeping .   Was in [place] until .  Going to see family doctor on 2020.   "    GENERAL  BP Readings from Last 1 Encounters:   11/01/20 136/84      Pulse Readings from Last 1 Encounters:   11/01/20 69      Resp Readings from Last 1 Encounters:   11/01/20 18      Temp Readings from Last 1 Encounters:   11/01/20 98.5  F (36.9  C) (Oral)      SpO2 Readings from Last 1 Encounters:   11/01/20 96%      Wt Readings from Last 1 Encounters:   11/01/20 62.2 kg (137 lb 1.6 oz)      Ht Readings from Last 1 Encounters:   10/02/20 1.651 m (5' 5\")         EXAMINATION: CT CHEST W/O CONTRAST, 10/13/2020 10:31 AMTECHNIQUE:  Helical CT images from the thoracic inlet through the lung  bases were obtained without IV contrast. COMPARISON: CT chest 10/15/2019 and 8/31/2019HISTORY: Lung nodule (Pulmonary nodule); Chronic obstructive pulmonary disease   FINDINGS:   Chest: Limited evaluation of the thyroid gland. Esophagus appears unremarkable. Tracheobronchial tree appears patent. Biapical scarring.  Mildly increased scattered bronchocentric tree-in-bud opacities throughout the peripheral aspects of the right lung, particularly in  the posterior right upper lobe. Unchanged calcified granuloma in the left upper lobe. New bronchocentric tree-in-bud opacities in the  anterior left lower lobe, just posterior to the major fissure. This is associated with two new 6 mm nodular opacities (series 4, images 330  and 325). There is an additional new 4 mm solid pulmonary nodule in the posterior right upper lobe (series 4, image 158. Bibasilar  atelectasis. Continued right middle lobe collapse. The pleura appears unremarkable. No pleural effusion. No pneumothorax. Heart size within  normal limits.. No significant pericardial effusion. Mild coronary artery and mitral annular calcifications. Visualized thoracic aorta  and main pulmonary artery diameters appear within normal limits. There are no visualized pathologically enlarged mediastinal, hilar or axillary lymph nodes. Partially calcified mediastinal and right " hilarnodes.   Abdomen: Cholelithiasis without evidence of acute cholecystitis. Unchanged 6 cm benign-appearing cyst in the left upper pole. Partially  visualized  shunt courses through the soft tissues of the right neck, chest, and upper abdomen.   Bones/soft tissues: Mild to moderate degenerative changes of the visualized spine. No acute osseous abnormalities are suspicious bony lesions.     IMPRESSION:   1. Increased peripheral predominant tree-in-bud opacities in the right lung, particularly in the posterior right upper lobe. Additionally, there are new bronchocentric nodular opacities in the left lower lobe. These findings are most suggestive of new/ongoing fungal or atypical infection. Superimposed malignancy cannot be excluded in the new left lower lobe opacities. Recommend follow-up CT to resolution.  2. New 4 mm solid pulmonary nodule in the posterior right upper lobe. Given the recommendation above, close attention on follow-up.  3. Continued right middle lobe collapse.  4. Cholelithiasis without evidence of acute cholecystitis.  [Recommend Follow Up: Lung nodule]  This report will be copied to the [facility] to ensure aprovider acknowledges the finding.    I have personally reviewed the examination and initial interpretationand I agree with the findings.     MANA TUCKER MD

## 2020-11-03 ENCOUNTER — HOSPITAL ENCOUNTER (OUTPATIENT)
Dept: LAB | Facility: CLINIC | Age: 80
Discharge: HOME | End: 2020-11-03
Payer: COMMERCIAL

## 2020-11-03 DIAGNOSIS — R91.8 PULMONARY NODULES: ICD-10-CM

## 2020-11-03 DIAGNOSIS — E83.51 HYPOCALCEMIA: ICD-10-CM

## 2020-11-03 DIAGNOSIS — R93.89 ABNORMAL FINDING ON IMAGING: ICD-10-CM

## 2020-11-03 DIAGNOSIS — M85.9 LOW BONE DENSITY: ICD-10-CM

## 2020-11-03 DIAGNOSIS — C73 PAPILLARY THYROID CARCINOMA (H): ICD-10-CM

## 2020-11-03 DIAGNOSIS — E89.0 POSTSURGICAL HYPOTHYROIDISM: ICD-10-CM

## 2020-11-03 DIAGNOSIS — E23.0 HYPOPITUITARISM (H): ICD-10-CM

## 2020-11-03 DIAGNOSIS — C77.0 METASTASIS TO CERVICAL LYMPH NODE (H): ICD-10-CM

## 2020-11-03 LAB
BACTERIA SPEC CULT: NO GROWTH
BACTERIA SPEC CULT: NO GROWTH
SPECIMEN SOURCE: NORMAL
SPECIMEN SOURCE: NORMAL
T4 FREE SERPL-MCNC: 0.59 NG/DL (ref 0.76–1.46)

## 2020-11-03 NOTE — TELEPHONE ENCOUNTER
Please Call lab and see if they can add on free T4 specimen order to existing serum from the last few days.   Thanks  Corina Potts MD

## 2020-11-04 ENCOUNTER — APPOINTMENT (OUTPATIENT)
Dept: GENERAL RADIOLOGY | Facility: CLINIC | Age: 80
DRG: 641 | End: 2020-11-04
Attending: EMERGENCY MEDICINE
Payer: COMMERCIAL

## 2020-11-04 ENCOUNTER — HOSPITAL ENCOUNTER (INPATIENT)
Facility: CLINIC | Age: 80
LOS: 4 days | Discharge: HOME HEALTH | DRG: 641 | End: 2020-11-08
Attending: EMERGENCY MEDICINE | Admitting: INTERNAL MEDICINE
Payer: COMMERCIAL

## 2020-11-04 DIAGNOSIS — E89.0 POSTSURGICAL HYPOTHYROIDISM: ICD-10-CM

## 2020-11-04 DIAGNOSIS — N17.9 ACUTE RENAL FAILURE, UNSPECIFIED ACUTE RENAL FAILURE TYPE (H): ICD-10-CM

## 2020-11-04 DIAGNOSIS — E87.6 HYPOKALEMIA: ICD-10-CM

## 2020-11-04 DIAGNOSIS — R55 SYNCOPE, UNSPECIFIED SYNCOPE TYPE: ICD-10-CM

## 2020-11-04 DIAGNOSIS — E23.0 HYPOPITUITARISM (H): ICD-10-CM

## 2020-11-04 DIAGNOSIS — R93.89 ABNORMAL FINDING ON IMAGING: ICD-10-CM

## 2020-11-04 DIAGNOSIS — H35.3221 EXUDATIVE AGE-RELATED MACULAR DEGENERATION OF LEFT EYE WITH ACTIVE CHOROIDAL NEOVASCULARIZATION (H): Primary | ICD-10-CM

## 2020-11-04 DIAGNOSIS — C73 PAPILLARY THYROID CARCINOMA (H): ICD-10-CM

## 2020-11-04 DIAGNOSIS — R91.8 PULMONARY NODULES: ICD-10-CM

## 2020-11-04 DIAGNOSIS — M85.9 LOW BONE DENSITY: ICD-10-CM

## 2020-11-04 DIAGNOSIS — R00.1 BRADYCARDIA: ICD-10-CM

## 2020-11-04 DIAGNOSIS — E86.0 DEHYDRATION: ICD-10-CM

## 2020-11-04 DIAGNOSIS — R41.89 UNRESPONSIVE: Primary | ICD-10-CM

## 2020-11-04 LAB
ALBUMIN SERPL-MCNC: 3.6 G/DL (ref 3.4–5)
ALBUMIN UR-MCNC: NEGATIVE MG/DL
ALP SERPL-CCNC: 58 U/L (ref 40–150)
ALT SERPL W P-5'-P-CCNC: 26 U/L (ref 0–70)
ANION GAP SERPL CALCULATED.3IONS-SCNC: 4 MMOL/L (ref 3–14)
APPEARANCE UR: CLEAR
AST SERPL W P-5'-P-CCNC: 22 U/L (ref 0–45)
BASOPHILS # BLD AUTO: 0 10E9/L (ref 0–0.2)
BASOPHILS NFR BLD AUTO: 0.3 %
BILIRUB SERPL-MCNC: 0.4 MG/DL (ref 0.2–1.3)
BILIRUB UR QL STRIP: NEGATIVE
BUN SERPL-MCNC: 14 MG/DL (ref 7–30)
CALCIUM SERPL-MCNC: 8 MG/DL (ref 8.5–10.1)
CHLORIDE SERPL-SCNC: 102 MMOL/L (ref 94–109)
CO2 SERPL-SCNC: 30 MMOL/L (ref 20–32)
COLOR UR AUTO: YELLOW
CREAT SERPL-MCNC: 1.77 MG/DL (ref 0.66–1.25)
DIFFERENTIAL METHOD BLD: ABNORMAL
EOSINOPHIL # BLD AUTO: 0.1 10E9/L (ref 0–0.7)
EOSINOPHIL NFR BLD AUTO: 1.8 %
ERYTHROCYTE [DISTWIDTH] IN BLOOD BY AUTOMATED COUNT: 14.9 % (ref 10–15)
ETHANOL SERPL-MCNC: <0.01 G/DL
FLUAV+FLUBV AG SPEC QL: NEGATIVE
FLUAV+FLUBV AG SPEC QL: NEGATIVE
GFR SERPL CREATININE-BSD FRML MDRD: 35 ML/MIN/{1.73_M2}
GLUCOSE SERPL-MCNC: 154 MG/DL (ref 70–99)
GLUCOSE UR STRIP-MCNC: NEGATIVE MG/DL
HCT VFR BLD AUTO: 35.5 % (ref 40–53)
HGB BLD-MCNC: 11.7 G/DL (ref 13.3–17.7)
HGB UR QL STRIP: NEGATIVE
HYALINE CASTS #/AREA URNS LPF: 1 /LPF (ref 0–2)
IMM GRANULOCYTES # BLD: 0 10E9/L (ref 0–0.4)
IMM GRANULOCYTES NFR BLD: 0.5 %
INR PPP: 1.03 (ref 0.86–1.14)
INTERPRETATION ECG - MUSE: NORMAL
KETONES UR STRIP-MCNC: NEGATIVE MG/DL
LACTATE BLD-SCNC: 1.7 MMOL/L (ref 0.7–2)
LEUKOCYTE ESTERASE UR QL STRIP: NEGATIVE
LYMPHOCYTES # BLD AUTO: 1.5 10E9/L (ref 0.8–5.3)
LYMPHOCYTES NFR BLD AUTO: 24.4 %
MAGNESIUM SERPL-MCNC: 2.7 MG/DL (ref 1.6–2.3)
MCH RBC QN AUTO: 27.9 PG (ref 26.5–33)
MCHC RBC AUTO-ENTMCNC: 33 G/DL (ref 31.5–36.5)
MCV RBC AUTO: 85 FL (ref 78–100)
MONOCYTES # BLD AUTO: 0.3 10E9/L (ref 0–1.3)
MONOCYTES NFR BLD AUTO: 5.3 %
MUCOUS THREADS #/AREA URNS LPF: PRESENT /LPF
NEUTROPHILS # BLD AUTO: 4.1 10E9/L (ref 1.6–8.3)
NEUTROPHILS NFR BLD AUTO: 67.7 %
NITRATE UR QL: NEGATIVE
NRBC # BLD AUTO: 0 10*3/UL
NRBC BLD AUTO-RTO: 0 /100
PH UR STRIP: 6.5 PH (ref 5–7)
PHOSPHATE SERPL-MCNC: 4 MG/DL (ref 2.5–4.5)
PLATELET # BLD AUTO: 180 10E9/L (ref 150–450)
POTASSIUM SERPL-SCNC: 2.8 MMOL/L (ref 3.4–5.3)
PROT SERPL-MCNC: 7.7 G/DL (ref 6.8–8.8)
RBC # BLD AUTO: 4.19 10E12/L (ref 4.4–5.9)
RBC #/AREA URNS AUTO: 1 /HPF (ref 0–2)
RSV AG SPEC QL: NEGATIVE
SODIUM SERPL-SCNC: 136 MMOL/L (ref 133–144)
SOURCE: ABNORMAL
SP GR UR STRIP: 1.01 (ref 1–1.03)
SPECIMEN SOURCE: NORMAL
SPECIMEN SOURCE: NORMAL
T4 FREE SERPL-MCNC: 0.8 NG/DL (ref 0.76–1.46)
TROPONIN I SERPL-MCNC: <0.015 UG/L (ref 0–0.04)
TSH SERPL DL<=0.005 MIU/L-ACNC: 5.78 MU/L (ref 0.4–4)
UROBILINOGEN UR STRIP-MCNC: NORMAL MG/DL (ref 0–2)
WBC # BLD AUTO: 6 10E9/L (ref 4–11)
WBC #/AREA URNS AUTO: 2 /HPF (ref 0–5)

## 2020-11-04 PROCEDURE — 83735 ASSAY OF MAGNESIUM: CPT | Performed by: EMERGENCY MEDICINE

## 2020-11-04 PROCEDURE — U0003 INFECTIOUS AGENT DETECTION BY NUCLEIC ACID (DNA OR RNA); SEVERE ACUTE RESPIRATORY SYNDROME CORONAVIRUS 2 (SARS-COV-2) (CORONAVIRUS DISEASE [COVID-19]), AMPLIFIED PROBE TECHNIQUE, MAKING USE OF HIGH THROUGHPUT TECHNOLOGIES AS DESCRIBED BY CMS-2020-01-R: HCPCS | Performed by: EMERGENCY MEDICINE

## 2020-11-04 PROCEDURE — 96375 TX/PRO/DX INJ NEW DRUG ADDON: CPT

## 2020-11-04 PROCEDURE — 250N000013 HC RX MED GY IP 250 OP 250 PS 637: Performed by: INTERNAL MEDICINE

## 2020-11-04 PROCEDURE — 84100 ASSAY OF PHOSPHORUS: CPT | Performed by: EMERGENCY MEDICINE

## 2020-11-04 PROCEDURE — 80053 COMPREHEN METABOLIC PANEL: CPT | Performed by: EMERGENCY MEDICINE

## 2020-11-04 PROCEDURE — 83605 ASSAY OF LACTIC ACID: CPT | Performed by: EMERGENCY MEDICINE

## 2020-11-04 PROCEDURE — 85025 COMPLETE CBC W/AUTO DIFF WBC: CPT | Performed by: EMERGENCY MEDICINE

## 2020-11-04 PROCEDURE — 84439 ASSAY OF FREE THYROXINE: CPT | Performed by: EMERGENCY MEDICINE

## 2020-11-04 PROCEDURE — 99285 EMERGENCY DEPT VISIT HI MDM: CPT | Mod: 25

## 2020-11-04 PROCEDURE — 99223 1ST HOSP IP/OBS HIGH 75: CPT | Mod: AI | Performed by: INTERNAL MEDICINE

## 2020-11-04 PROCEDURE — 93005 ELECTROCARDIOGRAM TRACING: CPT

## 2020-11-04 PROCEDURE — 84484 ASSAY OF TROPONIN QUANT: CPT | Performed by: EMERGENCY MEDICINE

## 2020-11-04 PROCEDURE — 84132 ASSAY OF SERUM POTASSIUM: CPT | Performed by: INTERNAL MEDICINE

## 2020-11-04 PROCEDURE — 258N000003 HC RX IP 258 OP 636: Performed by: EMERGENCY MEDICINE

## 2020-11-04 PROCEDURE — 87804 INFLUENZA ASSAY W/OPTIC: CPT | Performed by: INTERNAL MEDICINE

## 2020-11-04 PROCEDURE — 80320 DRUG SCREEN QUANTALCOHOLS: CPT | Performed by: EMERGENCY MEDICINE

## 2020-11-04 PROCEDURE — 87807 RSV ASSAY W/OPTIC: CPT | Performed by: INTERNAL MEDICINE

## 2020-11-04 PROCEDURE — 85610 PROTHROMBIN TIME: CPT | Performed by: EMERGENCY MEDICINE

## 2020-11-04 PROCEDURE — 250N000011 HC RX IP 250 OP 636: Performed by: EMERGENCY MEDICINE

## 2020-11-04 PROCEDURE — 96374 THER/PROPH/DIAG INJ IV PUSH: CPT

## 2020-11-04 PROCEDURE — 81001 URINALYSIS AUTO W/SCOPE: CPT | Performed by: EMERGENCY MEDICINE

## 2020-11-04 PROCEDURE — 71045 X-RAY EXAM CHEST 1 VIEW: CPT

## 2020-11-04 PROCEDURE — 36415 COLL VENOUS BLD VENIPUNCTURE: CPT | Performed by: INTERNAL MEDICINE

## 2020-11-04 PROCEDURE — 96361 HYDRATE IV INFUSION ADD-ON: CPT

## 2020-11-04 PROCEDURE — 120N000001 HC R&B MED SURG/OB

## 2020-11-04 PROCEDURE — 84443 ASSAY THYROID STIM HORMONE: CPT | Performed by: EMERGENCY MEDICINE

## 2020-11-04 RX ORDER — ONDANSETRON 4 MG/1
4 TABLET, ORALLY DISINTEGRATING ORAL EVERY 6 HOURS PRN
Status: DISCONTINUED | OUTPATIENT
Start: 2020-11-04 | End: 2020-11-08 | Stop reason: HOSPADM

## 2020-11-04 RX ORDER — GUAIFENESIN 600 MG/1
600 TABLET, EXTENDED RELEASE ORAL 2 TIMES DAILY
Status: DISCONTINUED | OUTPATIENT
Start: 2020-11-04 | End: 2020-11-08 | Stop reason: HOSPADM

## 2020-11-04 RX ORDER — LEVOTHYROXINE SODIUM 75 UG/1
150 TABLET ORAL
Status: DISCONTINUED | OUTPATIENT
Start: 2020-11-05 | End: 2020-11-08 | Stop reason: HOSPADM

## 2020-11-04 RX ORDER — NYSTATIN 100000/ML
500000 SUSPENSION, ORAL (FINAL DOSE FORM) ORAL 4 TIMES DAILY
Status: DISCONTINUED | OUTPATIENT
Start: 2020-11-04 | End: 2020-11-08 | Stop reason: HOSPADM

## 2020-11-04 RX ORDER — LEVOTHYROXINE SODIUM 137 UG/1
137 TABLET ORAL
Status: DISCONTINUED | OUTPATIENT
Start: 2020-11-05 | End: 2020-11-04

## 2020-11-04 RX ORDER — TAMSULOSIN HYDROCHLORIDE 0.4 MG/1
0.4 CAPSULE ORAL DAILY
Status: DISCONTINUED | OUTPATIENT
Start: 2020-11-05 | End: 2020-11-08 | Stop reason: HOSPADM

## 2020-11-04 RX ORDER — ACETAMINOPHEN 325 MG/1
650 TABLET ORAL EVERY 4 HOURS PRN
Status: DISCONTINUED | OUTPATIENT
Start: 2020-11-04 | End: 2020-11-08 | Stop reason: HOSPADM

## 2020-11-04 RX ORDER — ACETAMINOPHEN 650 MG/1
650 SUPPOSITORY RECTAL EVERY 4 HOURS PRN
Status: DISCONTINUED | OUTPATIENT
Start: 2020-11-04 | End: 2020-11-08 | Stop reason: HOSPADM

## 2020-11-04 RX ORDER — SODIUM CHLORIDE 9 MG/ML
INJECTION, SOLUTION INTRAVENOUS CONTINUOUS
Status: DISCONTINUED | OUTPATIENT
Start: 2020-11-04 | End: 2020-11-06

## 2020-11-04 RX ORDER — HYDROCORTISONE 10 MG/1
10 TABLET ORAL EVERY MORNING
Status: DISCONTINUED | OUTPATIENT
Start: 2020-11-05 | End: 2020-11-04

## 2020-11-04 RX ORDER — LIDOCAINE 40 MG/G
CREAM TOPICAL
Status: DISCONTINUED | OUTPATIENT
Start: 2020-11-04 | End: 2020-11-08 | Stop reason: HOSPADM

## 2020-11-04 RX ORDER — POTASSIUM CHLORIDE 20MEQ/15ML
40 LIQUID (ML) ORAL ONCE
Status: COMPLETED | OUTPATIENT
Start: 2020-11-04 | End: 2020-11-05

## 2020-11-04 RX ORDER — POTASSIUM CHLORIDE 7.45 MG/ML
10 INJECTION INTRAVENOUS ONCE
Status: COMPLETED | OUTPATIENT
Start: 2020-11-04 | End: 2020-11-04

## 2020-11-04 RX ORDER — DEXTROSE MONOHYDRATE 25 G/50ML
25-50 INJECTION, SOLUTION INTRAVENOUS
Status: DISCONTINUED | OUTPATIENT
Start: 2020-11-04 | End: 2020-11-08 | Stop reason: HOSPADM

## 2020-11-04 RX ORDER — POLYETHYLENE GLYCOL 3350 17 G/17G
17 POWDER, FOR SOLUTION ORAL DAILY
Status: DISCONTINUED | OUTPATIENT
Start: 2020-11-04 | End: 2020-11-08 | Stop reason: HOSPADM

## 2020-11-04 RX ORDER — ONDANSETRON 2 MG/ML
4 INJECTION INTRAMUSCULAR; INTRAVENOUS EVERY 6 HOURS PRN
Status: DISCONTINUED | OUTPATIENT
Start: 2020-11-04 | End: 2020-11-08 | Stop reason: HOSPADM

## 2020-11-04 RX ORDER — NICOTINE POLACRILEX 4 MG
15-30 LOZENGE BUCCAL
Status: DISCONTINUED | OUTPATIENT
Start: 2020-11-04 | End: 2020-11-08 | Stop reason: HOSPADM

## 2020-11-04 RX ORDER — AMOXICILLIN 250 MG
1 CAPSULE ORAL 2 TIMES DAILY
Status: DISCONTINUED | OUTPATIENT
Start: 2020-11-04 | End: 2020-11-08 | Stop reason: HOSPADM

## 2020-11-04 RX ORDER — AMOXICILLIN 250 MG
2 CAPSULE ORAL 2 TIMES DAILY
Status: DISCONTINUED | OUTPATIENT
Start: 2020-11-04 | End: 2020-11-08 | Stop reason: HOSPADM

## 2020-11-04 RX ORDER — HYDROCORTISONE 5 MG/1
5 TABLET ORAL EVERY EVENING
Status: DISCONTINUED | OUTPATIENT
Start: 2020-11-04 | End: 2020-11-04

## 2020-11-04 RX ADMIN — NYSTATIN 500000 UNITS: 100000 SUSPENSION ORAL at 23:01

## 2020-11-04 RX ADMIN — POTASSIUM CHLORIDE 10 MEQ: 7.46 INJECTION, SOLUTION INTRAVENOUS at 15:25

## 2020-11-04 RX ADMIN — GUAIFENESIN 600 MG: 600 TABLET, EXTENDED RELEASE ORAL at 20:30

## 2020-11-04 RX ADMIN — HYDROCORTISONE 5 MG: 5 TABLET ORAL at 20:31

## 2020-11-04 RX ADMIN — POLYETHYLENE GLYCOL 3350 17 G: 17 POWDER, FOR SOLUTION ORAL at 20:31

## 2020-11-04 RX ADMIN — SODIUM CHLORIDE: 9 INJECTION, SOLUTION INTRAVENOUS at 19:28

## 2020-11-04 RX ADMIN — NYSTATIN 500000 UNITS: 100000 SUSPENSION ORAL at 20:31

## 2020-11-04 RX ADMIN — SODIUM CHLORIDE 1000 ML: 9 INJECTION, SOLUTION INTRAVENOUS at 15:25

## 2020-11-04 RX ADMIN — HYDROCORTISONE SODIUM SUCCINATE 100 MG: 100 INJECTION, POWDER, FOR SOLUTION INTRAMUSCULAR; INTRAVENOUS at 15:25

## 2020-11-04 RX ADMIN — SENNOSIDES AND DOCUSATE SODIUM 1 TABLET: 8.6; 5 TABLET ORAL at 20:30

## 2020-11-04 ASSESSMENT — MIFFLIN-ST. JEOR: SCORE: 1310

## 2020-11-04 ASSESSMENT — ACTIVITIES OF DAILY LIVING (ADL): ADLS_ACUITY_SCORE: 19

## 2020-11-04 NOTE — ED NOTES
Attempted to changed his Oxygen to NC at 6L.  Sat dropped to 84 %.  Changed back to NRB at 10 L O sat increased to 92 %

## 2020-11-04 NOTE — ED TRIAGE NOTES
Family found patient unresponsive and call EMS.  EMS got an O2 sat of 80 %.  They started him on NRB at 15 L which helped.  His pupils were pin point.  They gave him Narcan 2 mg and pt woke up.  His HR was 40 so they gave him Atropine 0.5mg.  His HR increased to 80.  He is taking Norco at home.

## 2020-11-04 NOTE — ED NOTES
Bed: ST01  Expected date:   Expected time:   Means of arrival:   [place]  [facility] [phone] overdose eta [id number]

## 2020-11-04 NOTE — LETTER
Transition Communication Hand-off for Care Transitions to Next Level of Care Provider    Name: Travis Peres  : 1940  MRN #: [id number]  Primary Care Provider: Karol Alvarez     Primary Clinic: [address] [address]     Reason for Hospitalization:  Dehydration [E86.0]  Hypokalemia [E87.6]  Bradycardia [R00.1]  Acute renal failure, unspecified acute renal failure type (H) [N17.9]  Syncope, unspecified syncope type [R55]  Admit Date/Time: 2020  1:38 PM  Discharge Date:2020  Payor Source: Payor: MEDICA / Plan: MED[employer] / Product Type: HMO /     Reason for Communication Hand-off Referral: Other Home Care    Discharge Plan:  Discharge Plan:      Most Recent Value   Disposition Comments  See previous CM notes, heads up referral sent to [facility] regarding possible dischage today. AVS updated with [facility] agency contact number.           Concern for non-adherence with plan of care:   Y/N no  Discharge Needs Assessment:  Needs      Most Recent Value   Equipment Currently Used at Home  cane, straight, hospital bed [per chart]   # of Referrals Placed by [facility]  Homecare          Follow-up specialty is recommended: Yes    Follow-up plan:    Future Appointments   Date Time Provider Department Center   2021  9:40 AM [facility]   10/5/2021 10:40 AM Spenser Haro MD [facility]       Any outstanding tests or procedures:        Radiology & Cardiology Orders     Future Labs/Procedures Complete By Expires    Cardiac Event Monitor Adult Pediatric  2020 (Approximate) 2021        Referrals     Future Labs/Procedures    Home care nursing referral     Comments:    RN skilled nursing visit. RN to assess hydration, nutrition and home safety.    You are being discharged with home care services through [facility]. [facility] will contact you to schedule initial visit. If you have any questions prior to this call, please  contact the 24 hour patient line at [phone].     Your provider has ordered home care nursing services. If you have not been contacted within 2 days of your discharge please call the inpatient department phone number at [phone] .    Home Care PT Referral for Hospital Discharge     Comments:    PT to eval and treat    Your provider has ordered home care - physical therapy. If you have not been contacted within 2 days of your discharge please call the department phone number listed on the top of this document.            Key Recommendations:  Home today with family and HHC through [place].     Syeda Preston RN   [facility]   Phone [phone]    AVS/Discharge Summary is the source of truth; this is a helpful guide for improved communication of patient story

## 2020-11-04 NOTE — H&P
[facility]    History and Physical  Hospitalist       Date of Admission:  11/4/2020  Date of Service (when I saw the patient): 11/04/20    Assessment & Plan   Travis Peres is a pleasant 80 year old gentleman with a complicated Hx of papillary thyroid carcinoma, hypothyroidism, panhypopituitarism, HTN, COPD, and D.M., who presented today after being found unresponsive at home, with symptoms similar to his presentation here just over a week ago.  Current problems include:    Unresponsive episode, recurrent, unclear cause.  Could be combination of hypotension, bradycardia, sedating medications, hypothyroidism, adrenal insufficiency or other causes.  - telemetry  - Neurology consult 11/5 (was also seen here last admit)  - neuro checks Q4 hours; seizure precautions  - review this recurrence with his Endocrinologist Dr. Corina Potts tomorrow, if possible -> to address concerns she raised in visit with Travis on 11/2  - hold trazodone, and ambien; ED note suggests Travis may have been taking hydrocodone recently, in addition.    Bradycardia preceding his last and this admissions; unclear cause. No apparent cardiac Hx.  - telemetry  - Cardiology consult to determine further workup    HTN, with hypotension earlier today.  - hold amlodipine    Hypokalemia; multifactorial.  - replace and add K order set    Poor PO intake x weeks, or longer.    - RD eval. 11/5.    MILI in setting of CKD; likely pre-renal.  - IVF overnight; repeat BMP in a.m.    Chronic epigastric abdominal pain; per previous note: epigastric abdominal pain for > 6months. Extensive workup for symptoms including a HIDA scan (2019) with cholelithiasis. Planning EGD first and then may consider gall bladder removal if study is normal  - outpatient records suggest cholecystectomy was planned for this week, but now has been cancelled.  - no Sx at present    Insomnia; takes Ambien PRN.  Outpatient note: new refill for this was called in 11/3,  "yesterday.  Not sure IF it was filled, or whether Travis took more than one tablet.  This could be complicating his recent level of alertness.  - no further Ambien until cause of unresponsive episodes is clarified.    Panhypopituitarism/Hypopituitarism; Per 11/2 note by Dr. Corina Potts (Endocrine): \"hypopituitary with hypogonadotropic hypogonadism, probable secondary hypothyroidism, and secondary adrenal insufficiency, probable GH deficiency.\" Their rec's:  - on LT4 and HC only. ( = levothyroxine and hydrocortisone)  - Treat to high normal free T4 target  - He has been noncompliant with testosterone in the past and is no longer on it.    COPD with chronic bronchitis; no Sx at present.  - oximetry overnight  - continue Anoro Ellipta and mucinex    Dementia; listed in previous notes, but son Maximiliano was not aware of this diagnosis.  - consider OT eval. this admit; may need further outpatient evaluation    Papillary thyroid carcinoma; persistent/progressive with possible lung mets (per his Endocrinologist, Dr. FRANKLIN Potts; see 11/2 note)  - need Endocrinology (or Oncology) to help determine when to do further imaging for this.    Post-surgical Hypothyroidism; due to thyroidectomy plus hypopituitarism.  Free T4 11/1 = 0.59; today = 0.80 (low normal)  - per Dr. Potts:TSH should be kept low to avoid stimulating the thyroid cancer   - and free T4 should be kept high normal; it has been below normal (last admission) and is still low, and unclear if his synthroid has been increased recently.  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 The tumor has been significantly de bulked and He has completed XRT for  persistent tumor-. Tumor mass is stable on  MRI through " 10/24/18.  - continue periodic imaging of the sella, treat the hypopituitarism medically.   - Next pituitary MRI 10/2021 or sooner PRN; doesn't appear this has been done yet (had CT head here 10/28)  - Coordinate timing of this while here; suggest reviewing with Dr. Potts tomorrow. 11/5    D. M. Type II; not on meds for this, and BG elevated earlier today.  - POC glucose monitoring overnight/tomorrow.  - check Hgb A1C  - will hold on starting mod-CHO diet, as his intake has been poor lately and concerned limitations will compromise his appetite.    Oral thrush; unclear cause, but appears to be improving.  - continue nystatin solution QID, begun recently    Anemia; likely chronic disease.  - repeat CBC in a.m.    Recent hypocalcemia;   - check ionized Ca in a.m.    Hydrocephalus,  shunt in place and appears to functioning based on recent CT head.    Hearing loss    Visual field deficits      DVT Prophylaxis: Pneumatic Compression Devices and Ambulate every shift  Code Status: Full Code    Disposition: Expected discharge in 2-3 days.      DEL Rae MD, FACP     Internal Medicine Hospitalist    Primary Care Physician   Karol Alvarez    Chief Complaint   Found unresponsive, bradycardic and hypoxic at home this morning.    History was obtained from the ED provider, the EHR, Travsi's son Maximiliano (by phone); Travis speaks mainly Cantonese and little English, and also was not communicating with his son when I handed him the phone.    History of Present Illness   Per ED provider: Travis Peres is an 80 year old male with a history of COPD, Papillary thyroid carcinoma, DM, hypertension, and admission 10/28-11/1 after a similar syncopal episode for pneumonia (workup below) who presents via EMS from home for evaluation of altered mental status. Per EMS report, the patient was found cold and unresponsive on the floor by his wife, who then called 911. The patient reportedly takes hydrocodone but wife does not believe he  "overdosed. On EMS arrival the patient was still unresponsive with HR in the low 40's. 2 mg Narcan was administered IM and patient became more responsive.  A dose of 0.5 milligrams of atropine was also given, bringing his HR up to 80's. BG was 260's. It is noted that the patient speaks Cantanese.  Wife and son are on their way.  No further initial history was able to be obtained.  However the wife indicates that he has not wanted to eat or drink much lately.    Per son Maximiliano, by phone:  Sees his parents regularly in their home.  Travis was \"stuck\" in [place] for several months prior to returning to home in [place] in August.  Last few days: not talking much, and with decreased appetite. No cough, apparent f/c/ or SOB.  No leg/arm Sx.  Maximiliano does not think his Dad has any Sx of depression.    Past Medical History    I have reviewed this patient's medical history and updated it with pertinent information if needed.   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  Past Surgical History:   Procedure Laterality Date     cymetra injection       ESOPHAGOSCOPY, GASTROSCOPY, DUODENOSCOPY (EGD), COMBINED  6/20/2013    Procedure: COMBINED ESOPHAGOSCOPY, GASTROSCOPY, DUODENOSCOPY (EGD), " BIOPSY SINGLE OR MULTIPLE;  gastroscopy;  Surgeon: Leslie Guadarrama MD;  Location:  GI     ESOPHAGOSCOPY, GASTROSCOPY, DUODENOSCOPY (EGD), COMBINED N/A 9/20/2019    Procedure: ESOPHAGOGASTRODUODENOSCOPY (EGD);  Surgeon: Gilberto Gibson DO;  Location:  GI     ESOPHAGOSCOPY, GASTROSCOPY, DUODENOSCOPY (EGD), COMBINED N/A 10/16/2020    Procedure: ESOPHAGOGASTRODUODENOSCOPY, WITH BIOPSY;  Surgeon: Bartolome Granda MD;  Location:  GI     HERNIA REPAIR Right      lipoma resection chest wall Right 11/09     PHACOEMULSIFICATION CLEAR CORNEA WITH STANDARD INTRAOCULAR LENS IMPLANT Left 5/7/2018    Procedure: PHACOEMULSIFICATION CLEAR CORNEA WITH STANDARD INTRAOCULAR LENS IMPLANT;  LEFT PHACOEMULSIFICATION CLEAR CORNEA WITH STANDARD INTRAOCULAR LENS IMPLANT ;  Surgeon: Nadiya Allen MD;  Location:  EC     PHACOEMULSIFICATION CLEAR CORNEA WITH STANDARD INTRAOCULAR LENS IMPLANT Right 8/21/2018    Procedure: PHACOEMULSIFICATION CLEAR CORNEA WITH STANDARD INTRAOCULAR LENS IMPLANT;  RIGHT EYE PHACOEMULSIFICATION CLEAR CORNEA WITH STANDARD INTRAOCULAR LENS IMPLANT;  Surgeon: Nadiya Allen MD;  Location:  EC     right selective neck dissection level 2, 3, 4  11/3/09     right  shunt placement  6/28/07     THORACIC SURGERY  Fidencio 3 2017     THYMECTOMY N/A 1/3/2017    Procedure: THYMECTOMY;  Surgeon: Ernesto Armstrong MD;  Location: [facility] OR     THYROIDECTOMY  11/27/07     TRANSCERVICAL EXTENDED MEDIASTINAL LYMPHADENECTOMY N/A 1/3/2017    Procedure: TRANSCERVICAL EXTENDED MEDIASTINAL LYMPHADENECTOMY;  Surgeon: Ernesto Armstrong MD;  Location: [facility] OR     transnasal endoscopic resection of pituitary adenoma  8/13/2007       Prior to Admission Medications   Prior to Admission Medications   Prescriptions Last Dose Informant Patient Reported? Taking?   albuterol (PROAIR HFA) 108 (90 Base) MCG/ACT inhaler  Spouse/Significant Other No No   Sig: Inhale 2 puffs into the lungs every 4 hours  as needed for shortness of breath / dyspnea or wheezing   alendronate (FOSAMAX) 70 MG tablet  Spouse/Significant Other No No   Sig: Take 1 tablet (70 mg) by mouth every 7 days   amLODIPine (NORVASC) 2.5 MG tablet  Spouse/Significant Other No No   Sig: TAKE 1 TABLET(2.5 MG) BY MOUTH DAILY   cefdinir (OMNICEF) 300 MG capsule   No No   Sig: Take 1 capsule (300 mg) by mouth 2 times daily   docusate sodium (COLACE) 100 MG capsule  Spouse/Significant Other Yes No   Sig: Take 100 mg by mouth 2 times daily as needed for constipation   guaiFENesin (MUCINEX) 600 MG 12 hr tablet   No No   Sig: Take 1 tablet (600 mg) by mouth 2 times daily   hydrocortisone (CORTEF) 10 MG tablet   No No   Sig: 10 mg AM and 5 mg afternoon   levothyroxine (SYNTHROID/LEVOTHROID) 137 MCG tablet   No No   Sig: Take 1 tablet (137 mcg) by mouth daily   nystatin (MYCOSTATIN) 607733 UNIT/ML suspension  Spouse/Significant Other No No   Sig: Take 5 mLs (500,000 Units) by mouth 4 times daily for 14 days   tamsulosin (FLOMAX) 0.4 MG capsule  Spouse/Significant Other Yes No   Sig: Take 0.4 mg by mouth daily   traZODone (DESYREL) 100 MG tablet  Spouse/Significant Other Yes No   Sig: Take 100 mg by mouth At Bedtime   umeclidinium-vilanterol (ANORO ELLIPTA) 62.5-25 MCG/INH oral inhaler  Spouse/Significant Other Yes No   Sig: Inhale 1 puff into the lungs daily   zolpidem (AMBIEN) 10 MG tablet  Spouse/Significant Other Yes No   Sig: Take 10 mg by mouth nightly as needed for sleep      Facility-Administered Medications Last Administration Doses Remaining   ranibizumab (LUCENTIS) injection syringe 0.5 mg None recorded 12        Allergies   Allergies   Allergen Reactions     Metoprolol Shortness Of Breath     Bradycardia, fatigue, COPD worsening.         Fenofibrate Hives     Lisinopril Cough     Losartan Cough     Niacin      Simvastatin Cramps       Social History   I have reviewed this patient's social history and updated it with pertinent information if needed.  Travis Peres  reports that he quit smoking about 19 years ago. His smoking use included cigarettes. He started smoking about 44 years ago. He has a 2.50 pack-year smoking history. He has never used smokeless tobacco. He reports that he does not drink alcohol or use drugs.  Update:  Is  and lives with his wife in their house.  Retired years ago; worked as a .     Family History   I have reviewed this patient's family history and updated it with pertinent information if needed.   Family History   Problem Relation Age of Onset     Cancer No family hx of         no skin cancer     Glaucoma No family hx of      Macular Degeneration No family hx of    Mother - had dementia and  in her 80's    Review of Systems   The 10 point Review of Systems is negative other than noted in the HPI or here.    Physical Exam   Temp: 97.5  F (36.4  C) Temp src: Oral BP: 108/60 Pulse: 55   Resp: 15 SpO2: 100 % O2 Device: Non-rebreather mask Oxygen Delivery: 15 LPM  Vital Signs with Ranges  Temp:  [97.5  F (36.4  C)-98.1  F (36.7  C)] 97.5  F (36.4  C)  Pulse:  [51-72] 55  Resp:  [9-22] 15  BP: ()/(52-92) 108/60  SpO2:  [94 %-100 %] 100 %  146 lbs 0 oz    Constitutional: awake, lying in bed with head elevated; in no apparent distress  Eyes: sclerae clear; PERRLA/EOMI; no nystagmus  HEENT: AT/NC; moist mucous membranes, normal dentition.  Tongue: scattered white spots  Neck: supple, good ROM; no LA, no bruits, no JVD, no thyromegaly    Respiratory: good air entry bilaterally; no wheezing or rhonchi. Decreased breath sounds at bases.  Back: no focal tenderness or other abnormalities  Cardiovascular: Regular rate and rhythm; S1, S2 noted; no murmur, rub or gallop  GI: abdomen protuberant/slightly distended (but just finished eating), + bowel sounds, soft, non-tender; no masses or organomegaly  Skin: no rash, no cyanosis  Musculoskeletal: no edema; no obvious joint abnormalities  Neurologic: alert; unable to test orientation  (isn't talking to his son (on phone) or me, at present; follows some directions all right; no focal motor or sensory deficits  Psychiatric: no obvious signs of anxiety or depression.    Data reviewed today:  I personally reviewed all recent labs/imaging results.    Recent Labs   Lab 11/04/20  1359 11/01/20  0253 10/31/20  1914 10/31/20  0638 10/31/20  0638 10/30/20  0726 10/30/20  0726 10/29/20  0112 10/29/20  0112   WBC 6.0  --   --   --  6.0  --  5.7   < >  --    HGB 11.7*  --   --   --  10.4*  --  9.7*   < >  --    MCV 85  --   --   --  83  --  82   < >  --      --   --   --  171  --  140*   < >  --    INR 1.03  --   --   --   --   --   --   --   --     142  --   --  143  --  140   < >  --    POTASSIUM 2.8* 3.5 3.1*   < > 3.2*   < > 3.3*   < >  --    CHLORIDE 102 110*  --   --  112*  --  112*   < >  --    CO2 30 29  --   --  25  --  24   < >  --    BUN 14 7  --   --  10  --  14   < >  --    CR 1.77* 1.35*  --   --  1.36*  --  1.53*   < >  --    ANIONGAP 4  --   --   --  6  --  4   < >  --    NIHARIKA 8.0* 7.3*  --   --  7.3*  --  6.9*   < >  --    * 86  --   --  105*  --  122*   < >  --    ALBUMIN 3.6  --   --   --   --   --   --   --   --    PROTTOTAL 7.7  --   --   --   --   --   --   --   --    BILITOTAL 0.4  --   --   --   --   --   --   --   --    ALKPHOS 58  --   --   --   --   --   --   --   --    ALT 26  --   --   --   --   --   --   --   --    AST 22  --   --   --   --   --   --   --   --    TROPI <0.015  --   --   --   --   --   --   --  <0.015    < > = values in this interval not displayed.       Recent Results (from the past 24 hour(s))   XR Chest Port 1 View    Narrative    XR CHEST PORT 1 VW 11/4/2020 2:04 PM    HISTORY: Decreased level consciousness    COMPARISON: Chest x-ray 10/28/2020    FINDINGS: Partially visualized right-sided  shunt catheter. No acute  chest findings. The lungs are clear and there are no pleural  effusions. Stable heart size.    TEMO PAUL MD      EXAMINATION: CT CHEST W/O CONTRAST, 10/13/2020 10:31 AM     TECHNIQUE:  Helical CT images from the thoracic inlet through the lung  bases were obtained without IV contrast.      COMPARISON: CT chest 10/15/2019 and 8/31/2019     HISTORY: Lung nodule (Pulmonary nodule); Chronic obstructive pulmonary  disease     FINDINGS:     Chest: Limited evaluation of the thyroid gland. Esophagus appears  unremarkable. Tracheobronchial tree appears patent. Biapical scarring.  Mildly increased scattered bronchocentric tree-in-bud opacities  throughout the peripheral aspects of the right lung, particularly in  the posterior right upper lobe. Unchanged calcified granuloma in the  left upper lobe. New bronchocentric tree-in-bud opacities in the  anterior left lower lobe, just posterior to the major fissure. This is  associated with two new 6 mm nodular opacities (series 4, images 330  and 325). There is an additional new 4 mm solid pulmonary nodule in  the posterior right upper lobe (series 4, image 158. Bibasilar  atelectasis. Continued right middle lobe collapse. The pleura appears  unremarkable. No pleural effusion. No pneumothorax. Heart size within  normal limits.. No significant pericardial effusion. Mild coronary  artery and mitral annular calcifications. Visualized thoracic aorta  and main pulmonary artery diameters appear within normal limits. There  are no visualized pathologically enlarged mediastinal, hilar or  axillary lymph nodes. Partially calcified mediastinal and right hilar  nodes.     Abdomen: Cholelithiasis without evidence of acute cholecystitis.  Unchanged 6 cm benign-appearing cyst in the left upper pole. Partially  visualized  shunt courses through the soft tissues of the right  neck, chest, and upper abdomen.     Bones/soft tissues: Mild to moderate degenerative changes of the  visualized spine. No acute osseous abnormalities are suspicious bony  lesions.                                                                       IMPRESSION:   1. Increased peripheral predominant tree-in-bud opacities in the right  lung, particularly in the posterior right upper lobe. Additionally,  there are new bronchocentric nodular opacities in the left lower lobe.  These findings are most suggestive of new/ongoing fungal or atypical  infection. Superimposed malignancy cannot be excluded in the new left  lower lobe opacities. Recommend follow-up CT to resolution.  2. New 4 mm solid pulmonary nodule in the posterior right upper lobe.  Given the recommendation above, close attention on follow-up.  3. Continued right middle lobe collapse.  4. Cholelithiasis without evidence of acute cholecystitis.        [Recommend Follow Up: Lung nodule]     This report will be copied to the [facility] to ensure a  provider acknowledges the finding.      I have personally reviewed the examination and initial interpretation  and I agree with the findings.     MANA TUCKER MD

## 2020-11-04 NOTE — PROGRESS NOTES
RECEIVING UNIT ED HANDOFF REVIEW    ED Nurse Handoff Report was reviewed by: Mesha Huizar RN on November 4, 2020 at 4:05 PM

## 2020-11-04 NOTE — ED NOTES
Attempted to use the interpeter [employer].  Pt unable to use. Unable to triage pt or ask him any questions.  Called for an inperson interpeter

## 2020-11-04 NOTE — ED PROVIDER NOTES
History   Chief Complaint:  Altered Mental Status    HPI  Travis Peres is an 80 year old male with a history of COPD, Papillary thyroid carcinoma, DM, hypertension, and admission 10/28-11/1 after a similar syncopal episode for pneumonia (workup below) who presents via EMS from home for evaluation of altered mental status. Per EMS report, the patient was found cold and unresponsive on the floor by his wife, who then called 911. The patient reportedly takes hydrocodone but wife does not believe he overdosed. On EMS arrival the patient was still unresponsive with HR in the low 40's. 2 mg Narcan was administered IM and patient became more responsive.  A dose of 0.5 milligrams of atropine was also given, bringing his HR up to 80's. BG was 260's. It is noted that the patient speaks Cantanese.  Wife and son are on their way.  No further initial history was able to be obtained.  However the wife indicates that he has not wanted to eat or drink much lately.    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  CKD stage 4  GI bleed  DM  Sepsis    Past Surgical History:    Cymetra injection  EGD x3  Hernia repair  Cataract removal bilateral  Right neck dissection  Right  shunt placement  Thymectomy  Thoracic surgery  Thyroidectomy  Lymphadenectomy  Resection of pituitary adenoma    Family History:    Cancer  Glaucoma  Macular degeneration     Social History:  Marital Status:   Tobacco use: Former smoker; 0.5 packs/day  Alcohol use: No  Drug use: No    Review of Systems   Unable to perform ROS: Acuity of condition  "      Physical Exam     Patient Vitals for the past 24 hrs:   BP Temp Temp src Pulse Resp SpO2 Height Weight   11/04/20 1341 110/65 98.1  F (36.7  C) Temporal 67 16 94 % 1.676 m (5' 6\") 66.2 kg (146 lb)       Physical Exam  Nursing note and vitals reviewed.  Constitutional:  Cooperative. Awake but altered. Moaning.  HENT:   Nose:    Nose normal.   Mouth/Throat:   Mucous membranes are normal.   Eyes:    Conjunctivae normal and EOM are normal.      Pupils are equal, round, and reactive to light.   Neck:    Trachea normal.   Cardiovascular:  Bradycardic, regular rhythm, normal heart sounds and normal pulses. No murmur heard.  Pulmonary/Chest:  Effort normal and breath sounds normal.   Abdominal:   Soft. Normal appearance and bowel sounds are normal.      There is no tenderness.      There is no rebound and no CVA tenderness.   Musculoskeletal:  Extremities atraumatic x 4.   Lymphadenopathy:  No cervical adenopathy.   Neurological:   Awake but altered. Normal strength.      No cranial nerve deficit or sensory deficit. GCS eye subscore is 4. GCS verbal subscore is 5. GCS motor subscore is 6.  Skin:    Skin is intact. No rash noted.   Psychiatric:   Altered.    Emergency Department Course     ECG:  Indication: Altered mental status  Time: 1341  Vent. Rate 76 bpm. WY interval 142. QRS duration 96. QT/QTc 486/546. P-R-T axis 50 78 -70.   NSR, ST & T wave abnormality, consider inferior ischemia   Read time: 1342    Imaging:  Radiology findings were communicated with the patient who voiced understanding of the findings.    XR Chest Port 1 View:  Partially visualized right-sided  shunt catheter. No acute   chest findings. The lungs are clear and there are no pleural   effusions. Stable heart size, as per radiology.     Laboratory:  Laboratory findings were communicated with the patient who voiced understanding of the findings.    CBC: WBC: 6.0, HGB: 11.7 (L), PLT: 180    CMP: Glucose 154 (H), Potassium 2.8 (L), GFR 35 (L), " Calcium 8.0 (L), o/w WNL (Creatinine: 1.77 (H))    Lactic Acid (Resulted 1426): 1.7    Troponin (Collected 1359): <0.015    Phosphorus: 4.0    TSH: 5.78 (H)    T4 free: 0.08    Magnesium: 2.7 (H)    INR: 1.03    Alcohol ethyl: <0.01    UA with micro: pending    Interventions:  1525 Cortef 100 mg IV  1525 NS 1000 mL IV   1525 Klor Con 10 mEq IV     Emergency Department Course:  Past medical records, nursing notes, and vitals reviewed.    1334: EMS report was given and I performed an exam of the patient as documented above.     EKG obtained in the ED, see results above.   IV was inserted and blood was drawn for laboratory testing, results above.  The patient provided a urine sample here in the emergency department. This was sent for laboratory testing, findings above.  The patient was sent for a XR while in the emergency department, results above.     1500: I rechecked the patient and discussed the results of his workup thus far. I spoke with the family at bedside.    1530:   I spoke with Dr. Rae of the hospitalist service regarding patient's presentation, findings, and plan of care.    Findings and plan explained to the Patient who consents to admission. Discussed the patient with Dr. Rae, who will admit the patient [facility] a [facility] bed for further monitoring, evaluation, and treatment.    I personally reviewed the laboratory and imaging results with the Patient and answered all related questions prior to admission.     Impression & Plan     Covid-19  Travis Peres was evaluated during a global COVID-19 pandemic, which necessitated consideration that the patient might be at risk for infection with the SARS-CoV-2 virus that causes COVID-19.   Applicable protocols for evaluation were followed during the patient's care.   COVID-19 was considered as part of the patient's evaluation. The plan for testing is:  a test was obtained during this visit     Medical Decision Making:  This is an 80-year-old male brought in by EMS from home  for altered mental status.  There was an initial concern that this might have been an opiate overdose, as he apparently responded somewhat to Narcan.  However based on the history, I suspect that he did not overdose on opiates.  He also was bradycardic and hypotensive, and that I think is related to dehydration and acute renal failure as well as hypokalemia.  He was provided IV fluids, IV hydrocortisone, as well as dose of IV potassium.  He does not appear to have ongoing pneumonia, which is reassuring, and he does not appear septic either.  I think it is best that he be admitted to the hospital for further evaluation and management again.  Essentially spoke with Dr. Rae, who will be admitting him.    Diagnosis:    ICD-10-CM    1. Syncope, unspecified syncope type  R55    2. Hypokalemia  E87.6    3. Bradycardia  R00.1    4. Acute renal failure, unspecified acute renal failure type (H)  N17.9    5. Dehydration  E86.0        Disposition:  Admitted to Dr. Rae.    Scribe Disclosure:  I, Sofya Arambula, am serving as a scribe at 1:39 PM on 11/4/2020 to document services personally performed by Nickolas Eckert MD based on my observations and the provider's statements to me.      Nickolas Eckert MD  11/04/20 2523

## 2020-11-04 NOTE — PHARMACY-ADMISSION MEDICATION HISTORY
Pharmacy Medication History  Admission medication history interview status for the 11/4/2020  admission is complete. See EPIC admission navigator for prior to admission medications       Medication history sources: Patient's family/friend (sig other, through )  Location of interview: outside pt room but on unit  Medication history source reliability: Good  Adherence assessment: Good    Significant changes made to the medication list:  Deleted cefdinir as this was finished      Additional medication history information:   Was due for ranibizumab (lucentis) ocular injection tomorrow, but the appointment is not cancelled    Medication reconciliation completed by provider prior to medication history? No    Time spent in this activity: 15 minutes      Prior to Admission medications    Medication Sig Last Dose Taking? Auth Provider   albuterol (PROAIR HFA) 108 (90 Base) MCG/ACT inhaler Inhale 2 puffs into the lungs every 4 hours as needed for shortness of breath / dyspnea or wheezing 11/3/2020 at am Yes Spenser Haro MD   alendronate (FOSAMAX) 70 MG tablet Take 1 tablet (70 mg) by mouth every 7 days 11/2/2020 at Unknown time Yes Corina Potts MD   amLODIPine (NORVASC) 2.5 MG tablet TAKE 1 TABLET(2.5 MG) BY MOUTH DAILY 11/4/2020 at am Yes Marquez Camargo PA-C   guaiFENesin (MUCINEX) 600 MG 12 hr tablet Take 1 tablet (600 mg) by mouth 2 times daily 11/4/2020 at am Yes Nataly Valdes,    hydrocortisone (CORTEF) 10 MG tablet 10 mg AM and 5 mg afternoon 11/4/2020 at am 10mg Yes Corina Potts MD   levothyroxine (SYNTHROID/LEVOTHROID) 137 MCG tablet Take 1 tablet (137 mcg) by mouth daily 11/4/2020 at am Yes Corina Potts MD   nystatin (MYCOSTATIN) 865892 UNIT/ML suspension Take 5 mLs (500,000 Units) by mouth 4 times daily for 14 days 11/4/2020 at am X1 dose Yes Marquez Camargo PA-C   tamsulosin (FLOMAX) 0.4 MG capsule Take 0.4 mg by mouth daily 11/4/2020 at am Yes Unknown, Entered  By History   traZODone (DESYREL) 100 MG tablet Take 100 mg by mouth At Bedtime 11/3/2020 at pm Yes Unknown, Entered By History   umeclidinium-vilanterol (ANORO ELLIPTA) 62.5-25 MCG/INH oral inhaler Inhale 1 puff into the lungs daily 11/4/2020 at am Yes Unknown, Entered By History   docusate sodium (COLACE) 100 MG capsule Take 100 mg by mouth 2 times daily as needed for constipation prn  Unknown, Entered By History   zolpidem (AMBIEN) 10 MG tablet Take 10 mg by mouth nightly as needed for sleep prn  Unknown, Entered By History

## 2020-11-05 ENCOUNTER — APPOINTMENT (OUTPATIENT)
Dept: INTERPRETER SERVICES | Facility: CLINIC | Age: 80
End: 2020-11-05
Payer: COMMERCIAL

## 2020-11-05 LAB
ANION GAP SERPL CALCULATED.3IONS-SCNC: 4 MMOL/L (ref 3–14)
BUN SERPL-MCNC: 10 MG/DL (ref 7–30)
CALCIUM SERPL-MCNC: 7.4 MG/DL (ref 8.5–10.1)
CHLORIDE SERPL-SCNC: 112 MMOL/L (ref 94–109)
CO2 SERPL-SCNC: 26 MMOL/L (ref 20–32)
CREAT SERPL-MCNC: 1.17 MG/DL (ref 0.66–1.25)
ERYTHROCYTE [DISTWIDTH] IN BLOOD BY AUTOMATED COUNT: 15 % (ref 10–15)
GFR SERPL CREATININE-BSD FRML MDRD: 58 ML/MIN/{1.73_M2}
GLUCOSE BLDC GLUCOMTR-MCNC: 114 MG/DL (ref 70–99)
GLUCOSE BLDC GLUCOMTR-MCNC: 129 MG/DL (ref 70–99)
GLUCOSE BLDC GLUCOMTR-MCNC: 159 MG/DL (ref 70–99)
GLUCOSE BLDC GLUCOMTR-MCNC: 171 MG/DL (ref 70–99)
GLUCOSE SERPL-MCNC: 97 MG/DL (ref 70–99)
HBA1C MFR BLD: 5.7 % (ref 0–5.6)
HCT VFR BLD AUTO: 29.3 % (ref 40–53)
HGB BLD-MCNC: 9.7 G/DL (ref 13.3–17.7)
MCH RBC QN AUTO: 27.8 PG (ref 26.5–33)
MCHC RBC AUTO-ENTMCNC: 33.1 G/DL (ref 31.5–36.5)
MCV RBC AUTO: 84 FL (ref 78–100)
PLATELET # BLD AUTO: 194 10E9/L (ref 150–450)
POTASSIUM SERPL-SCNC: 3.7 MMOL/L (ref 3.4–5.3)
POTASSIUM SERPL-SCNC: 3.8 MMOL/L (ref 3.4–5.3)
RBC # BLD AUTO: 3.49 10E12/L (ref 4.4–5.9)
SARS-COV-2 RNA SPEC QL NAA+PROBE: NOT DETECTED
SODIUM SERPL-SCNC: 142 MMOL/L (ref 133–144)
SPECIMEN SOURCE: NORMAL
WBC # BLD AUTO: 7.2 10E9/L (ref 4–11)

## 2020-11-05 PROCEDURE — 250N000013 HC RX MED GY IP 250 OP 250 PS 637: Performed by: INTERNAL MEDICINE

## 2020-11-05 PROCEDURE — 85027 COMPLETE CBC AUTOMATED: CPT | Performed by: INTERNAL MEDICINE

## 2020-11-05 PROCEDURE — 250N000011 HC RX IP 250 OP 636: Performed by: INTERNAL MEDICINE

## 2020-11-05 PROCEDURE — 999N001017 HC STATISTIC GLUCOSE BY METER IP

## 2020-11-05 PROCEDURE — 99232 SBSQ HOSP IP/OBS MODERATE 35: CPT | Performed by: INTERNAL MEDICINE

## 2020-11-05 PROCEDURE — 258N000003 HC RX IP 258 OP 636: Performed by: EMERGENCY MEDICINE

## 2020-11-05 PROCEDURE — 83036 HEMOGLOBIN GLYCOSYLATED A1C: CPT | Performed by: INTERNAL MEDICINE

## 2020-11-05 PROCEDURE — 80048 BASIC METABOLIC PNL TOTAL CA: CPT | Performed by: INTERNAL MEDICINE

## 2020-11-05 PROCEDURE — 120N000001 HC R&B MED SURG/OB

## 2020-11-05 RX ORDER — ALBUTEROL SULFATE 90 UG/1
2 AEROSOL, METERED RESPIRATORY (INHALATION) EVERY 4 HOURS PRN
Status: DISCONTINUED | OUTPATIENT
Start: 2020-11-04 | End: 2020-11-08 | Stop reason: HOSPADM

## 2020-11-05 RX ADMIN — NYSTATIN 500000 UNITS: 100000 SUSPENSION ORAL at 08:45

## 2020-11-05 RX ADMIN — TAMSULOSIN HYDROCHLORIDE 0.4 MG: 0.4 CAPSULE ORAL at 08:45

## 2020-11-05 RX ADMIN — GUAIFENESIN 600 MG: 600 TABLET, EXTENDED RELEASE ORAL at 08:45

## 2020-11-05 RX ADMIN — UMECLIDINIUM BROMIDE AND VILANTEROL TRIFENATATE 1 PUFF: 62.5; 25 POWDER RESPIRATORY (INHALATION) at 08:45

## 2020-11-05 RX ADMIN — SENNOSIDES AND DOCUSATE SODIUM 1 TABLET: 8.6; 5 TABLET ORAL at 08:45

## 2020-11-05 RX ADMIN — HYDROCORTISONE SODIUM SUCCINATE 25 MG: 100 INJECTION, POWDER, FOR SOLUTION INTRAMUSCULAR; INTRAVENOUS at 16:28

## 2020-11-05 RX ADMIN — NYSTATIN 500000 UNITS: 100000 SUSPENSION ORAL at 18:15

## 2020-11-05 RX ADMIN — HYDROCORTISONE SODIUM SUCCINATE 25 MG: 100 INJECTION, POWDER, FOR SOLUTION INTRAMUSCULAR; INTRAVENOUS at 06:39

## 2020-11-05 RX ADMIN — HYDROCORTISONE SODIUM SUCCINATE 25 MG: 100 INJECTION, POWDER, FOR SOLUTION INTRAMUSCULAR; INTRAVENOUS at 00:29

## 2020-11-05 RX ADMIN — LEVOTHYROXINE SODIUM 150 MCG: 75 TABLET ORAL at 06:39

## 2020-11-05 RX ADMIN — GUAIFENESIN 600 MG: 600 TABLET, EXTENDED RELEASE ORAL at 22:00

## 2020-11-05 RX ADMIN — SODIUM CHLORIDE: 9 INJECTION, SOLUTION INTRAVENOUS at 18:05

## 2020-11-05 RX ADMIN — SODIUM CHLORIDE: 9 INJECTION, SOLUTION INTRAVENOUS at 08:49

## 2020-11-05 RX ADMIN — POTASSIUM CHLORIDE 40 MEQ: 20 SOLUTION ORAL at 00:31

## 2020-11-05 RX ADMIN — POLYETHYLENE GLYCOL 3350 17 G: 17 POWDER, FOR SOLUTION ORAL at 08:45

## 2020-11-05 RX ADMIN — SODIUM CHLORIDE: 9 INJECTION, SOLUTION INTRAVENOUS at 00:47

## 2020-11-05 RX ADMIN — NYSTATIN 500000 UNITS: 100000 SUSPENSION ORAL at 22:00

## 2020-11-05 RX ADMIN — NYSTATIN 500000 UNITS: 100000 SUSPENSION ORAL at 13:56

## 2020-11-05 ASSESSMENT — ACTIVITIES OF DAILY LIVING (ADL)
ADLS_ACUITY_SCORE: 20
ADLS_ACUITY_SCORE: 19
ADLS_ACUITY_SCORE: 20
ADLS_ACUITY_SCORE: 19

## 2020-11-05 NOTE — PROGRESS NOTES
Transfer    S- Transfer to 625 from .    B- patient admitted for altered mental status    A- Brief systems assessment: patient speaks cantanese but speaks and understand some part of English.    R- Transfer to 66 per physician orders. Continue to monitor pt and update physician as needed.      Code status: Full Code  Skin: slight redness blanchable on coccyx  Fall Risk: Yes- Department fall risk interventions implemented.  Isolation: Contact/droplet  Patient belongings: in patient room  Medication drips upon transfer: yes  Bedside Report Letter Given and explained to pt: NA  Visitor Designated? Yes  If patient is a 72 hour hold/Commitment are belongings removed from room and locked up? NA

## 2020-11-05 NOTE — PLAN OF CARE
DATE & TIME: 11/4/20, 5663 - 8297   Cognitive Concerns/ Orientation : BILL. Cantonese speaking. Speaks minimal English  BEHAVIOR & AGGRESSION TOOL COLOR: Green  CIWA SCORE: N/a   ABNL VS/O2: Tmax 99. Other VSS on room air  MOBILITY: Assist x 1 with GB  PAIN MANAGMENT: Absence of non verbal indicators of pain  DIET: Regular  BOWEL/BLADDER: Continent B/B. Up to bathroom/urinal. Had BM x 1  ABNL LAB/BG:   DRAIN/DEVICES: Right PIV infusing at 125 ml/hr. Left PIV SL  TELEMETRY RHYTHM: NSR  SKIN: Mild blanchable redness to coccyx  TESTS/PROCEDURES: N/a  D/C DAY/GOALS/PLACE: Expected discharge in 2-3 days  OTHER IMPORTANT INFO: Inconsistent in following all directions for neuro assessments.  MD/RN ROUNDING SIGNED OFF D/E SHIFT: N/a  COMMIT TO SIT DONE AND SIGNED OFF Yes

## 2020-11-05 NOTE — PROGRESS NOTES
Discussed with Dr. Bonds. Tele reviewed. There is sinus bradycardia with Mobitz Type I AV block without signs of high grade/ infra hisian conduction disease. HR on tele has been normal today. Patient is in COVID precautions. Full consult to be done in the am. Watch on telemetry, no AV aydee blocking agents. Unlikely that the cause of his presentation is related to low HRs, but possible. In any case, patient will need to be a monitor at discharge.

## 2020-11-05 NOTE — PROVIDER NOTIFICATION
Received a call from Asad from [facility] lab updating that they are shortage of PCR test for influenza and they will do influenza anegin test. Updated Dr Rae regarding this

## 2020-11-05 NOTE — PROGRESS NOTES
[facility]  Patient is currently open to home care services with [facility]. The patient is currently receiving Skilled Nursing services and was scheduled to begin PT and OT services today (visits have been cancelled due to this hospitalization).  and home health team have been notified of patient admission. [facility] liaison will continue to follow patient during stay. If appropriate provide orders to resume home care at time of discharge.

## 2020-11-05 NOTE — PLAN OF CARE
CogCnitive Concerns/ Orientation : Alert unable to assess orientation due to not following command. Speaks Cantanese, speaks some English.   BEHAVIOR & AGGRESSION TOOL COLOR: Green  CIWA SCORE: NA    ADATE & TIME: 11/4/20 Pm shift   CBNL VS/O2: VSS, RA  MOBILITY: A x 1 with belt  PAIN MANAGMENT: No S&S of pain, appears comfortable  DIET: regular  BOWEL/BLADDER: continent of urine. Using urinal at bedside. Patient calls appropriately. No bm. Gave schedule bm meds.  ABNL LAB/BG: K 2.8. MD aware, will place replacement protocol.  DRAIN/DEVICES: 2 PIV's, NS @ 125ml/hr  TELEMETRY RHYTHM: SD  SKIN: slight redness blanchable on coccyx. Patient able to reposition self.  TESTS/PROCEDURES: NA  D/C DAY/GOALS/PLACE: pending  OTHER IMPORTANT INFO: BMP and CBC in AM  MD/RN ROUNDING SIGNED OFF D/E SHIFT: yes  COMMIT TO SIT DONE AND SIGNED OFF yes

## 2020-11-05 NOTE — CONSULTS
"CLINICAL NUTRITION SERVICES  -  ASSESSMENT NOTE    Recommendations Ordered by Registered Dietitian (RD):   - Supplement TID w/ meals    Malnutrition:   % Weight Loss:  None noted  % Intake: <75% for > 7 days (non-severe malnutrition)   Subcutaneous Fat Loss:  Orbital region mil depletion - not enough to diagnose   Muscle Loss:  Temporal region mild depletion - not enough to diagnose   Fluid Retention:  None noted    Malnutrition Diagnosis: Unable to determine due to lack of full physical exam. Will be unable to obtain Nutrition Focused Physical Exam until taken off airborne precautions (PPE conservation).      REASON FOR ASSESSMENT  Travis Peres is a 80 year old male seen by Registered Dietitian for Provider Order - \"Poor PO intake, recurrent. Nutritional Risk\"    NUTRITION HISTORY  - Information obtained from chart review.   - Patient sleeping during attempt, was able to obtain partial nutrition focused physical exam through door.   - H/o COPD, papillary thyroid carcinoma, DM, HTN, admitted 10/28-11/01 after a syncopal episode. Spouse on admission indicated that he has not been agreeable to eating/drinking much lately. Per outpatient MD note, wife had mentioned that pt was eating very little prior to his recent admission due to mouth inflammation. Of note, pt was consuming 50%, 75%, or 100% of his meals during last admission.   - Pt was found unresponsive by family.     - Per H&P pt has been c/o epigastric abdominal pain for the past 6 months or longer. Noted that patient has also had mouth and throat pain recently.    CURRENT NUTRITION ORDERS  Diet Order:     Regular     Current Intake/Tolerance:  - No intakes recorded in flowsheet. Two meals have been ordered since admission, one evening meal yesterday and breakfast this morning.     NUTRITION FOCUSED PHYSICAL ASSESSMENT FOR DIAGNOSING MALNUTRITION)  Yes - face seen from door only (Airborne precautions - did not enter room due to PPE conservation measures during " "Pandemic)          Observed:    Muscle wasting (refer to documentation in Malnutrition section) and Subcutaneous fat loss (refer to documentation in Malnutrition section)    Obtained from Chart/Interdisciplinary Team:  Abdomen slightly distended on H&P exam  Last BM not noted   Damaso - Nutrition 3; total 21    Noted to be inconsistent in following directions, PT attempted to see for eval this AM however patient refused the eval and the video interpretor.      ANTHROPOMETRICS  Height: 5' 6\"  Weight: 146 lbs 0 oz (66.2 kg)  Body mass index is 23.57 kg/m .  Weight Status:  Normal BMI  IBW: 64.5 kg   % IBW: 103%  Weight History: Weight is elevated from recent admission.   Wt Readings from Last 10 Encounters:   11/04/20 66.2 kg (146 lb)   11/01/20 62.2 kg (137 lb 1.6 oz)   10/26/20 63 kg (139 lb)   10/02/20 63 kg (139 lb)   09/25/20 63 kg (139 lb)   11/06/19 63.1 kg (139 lb 3.2 oz)   10/15/19 62.1 kg (137 lb)   09/20/19 58.4 kg (128 lb 11.2 oz)   09/17/19 58.4 kg (128 lb 11.2 oz)   09/05/19 59.4 kg (131 lb)       LABS  Labs reviewed  Lab Results   Component Value Date    A1C 5.3 04/16/2013    A1C 5.6 09/02/2010    A1C 5.3 11/24/2009    A1C 5.6 05/27/2008     Recent Labs   Lab 11/05/20  0229 11/04/20  1359 11/01/20  0253 10/31/20  0638 10/30/20  0726   GLC  --  154* 86 105* 122*   *  --   --   --   --        MEDICATIONS  Medications reviewed  Miralax, senokot     ASSESSED NUTRITION NEEDS PER APPROVED PRACTICE GUIDELINES:  Dosing Weight 66.2 kg - admit  Estimated Energy Needs: 3403-0081 kcals (25-30 Kcal/Kg)  Justification: maintenance  Estimated Protein Needs: 79-99 grams protein (1.2-1.5 g pro/Kg)  Justification: preservation of lean body mass  Estimated Fluid Needs: 1 mL/kcal   Justification: maintenance    MALNUTRITION:  % Weight Loss:  None noted  % Intake: <75% for > 7 days (non-severe malnutrition)   Subcutaneous Fat Loss:  Orbital region mil depletion - not enough to diagnose   Muscle Loss:  Temporal region " mild depletion - not enough to diagnose   Fluid Retention:  None noted    Malnutrition Diagnosis: Unable to determine due to lack of full physical exam.     NUTRITION DIAGNOSIS:  Predicted inadequate nutrient intake (energy/protein related to reduced appetite with epigastric pain, recently documented throat and mouth pain.       NUTRITION INTERVENTIONS  Recommendations / Nutrition Prescription  Regular diet as ordered  Send boost glucose control TID w/ meals       Implementation  Nutrition education: Not appropriate at this time due to patient condition  Medical Food Supplement: Boost Glucose Control TID w/ meals       Nutrition Goals  Intake of at least 50% for meals BID-TID.       MONITORING AND EVALUATION:  Progress towards goals will be monitored and evaluated per protocol and Practice Guidelines    Gracie Bronson RD, LD  [facility] [place], [address], [facility], [facility]   Pager: [phone]  Weekend Pager: [phone]

## 2020-11-05 NOTE — PROGRESS NOTES
ogCnitive Concerns/ Orientation : Alert unable to assess orientation due to not following command. Speaks Cantanese, speaks some English.   BEHAVIOR & AGGRESSION TOOL COLOR: Green  CIWA SCORE: NA    ADATE & TIME: 11/4/20 Pm shift   CBNL VS/O2: VSS, RA  MOBILITY: A x 1 with belt  PAIN MANAGMENT: No S&S of pain, appears comfortable  DIET: regular  BOWEL/BLADDER: continent of urine. Using urinal at bedside. Patient calls appropriately. No bm. Gave schedule bm meds.  ABNL LAB/BG: K 2.8. MD aware, will place replacement protocol.  DRAIN/DEVICES: 2 PIV's, NS @ 125ml/hr  TELEMETRY RHYTHM: SD  SKIN: slight redness blanchable on coccyx. Patient able to reposition self.  TESTS/PROCEDURES: NA  D/C DAY/GOALS/PLACE: pending  OTHER IMPORTANT INFO: BMP and CBC in AM  MD/RN ROUNDING SIGNED OFF D/E SHIFT: yes  COMMIT TO SIT DONE AND SIGNED OFF yes

## 2020-11-05 NOTE — CONSULTS
Care Management Initial Consult    General Information  Assessment completed with: Maximiliano Meehan  Type of CM/SW Visit: Offer D/C Planning  Primary Care Provider verified and updated as needed: Yes   Readmission within the last 30 days: previous discharge plan unsuccessful   Return Category: Progression of disease  Reason for Consult: care coordination/care conference;discharge planning;medication concerns  Advance Care Planning:            Communication Assessment  Patient's communication style: spoken language (non-English)    Hearing Difficulty or Deaf: yes   Wear Glasses or Blind: no    Cognitive  Cognitive/Neuro/Behavioral: WDL  Level of Consciousness: alert  Arousal Level: opens eyes spontaneously  Orientation: other (see comments)(re)  Mood/Behavior: calm  Best Language: 0 - No aphasia  Speech: clear    Living Environment:   People in home: spouse     Current living Arrangements: house      Able to return to prior arrangements: yes       Family/Social Support:  Care provided by: spouse/significant other  Provides care for:    Marital Status:   Wife;Children          Description of Support System: Supportive    Support Assessment: Adequate family and caregiver support    Current Resources:   Skilled Home Care Services: Skilled Nursing  Community Resources:    Equipment currently used at home: cane, straight  Supplies currently used at home:      Employment/Financial:  Employment Status: retired        Financial Concerns: No concerns identified                   Socioeconomic History     Marital status:      Spouse name: Not on file     Number of children: Not on file     Years of education: Not on file     Highest education level: Not on file     Tobacco Use     Smoking status: Former Smoker     Packs/day: 0.50     Years: 5.00     Pack years: 2.50     Types: Cigarettes     Start date: 1976     Quit date: 2001     Years since quittin.7     Smokeless tobacco: Never Used   Substance  and Sexual Activity     Alcohol use: No     Drug use: No     Sexual activity: Not Currently     Partners: Female     Birth control/protection: Other       Functional Status:  Prior to admission patient needed assistance: with adls from spouse        Assesssment of Functional Status: Not at baseline with mobility    Mental Health Status:  Mental Health Status: No Current Concerns       Chemical Dependency Status:None                Values/Beliefs:  Spiritual, Cultural Beliefs, [employer] Practices, Values that affect care:   None              Additional Information:    Discussed d/c plans with patients son Maximiliano. Son states patient and spouse lives together. Spouse assist with cares when needed. Patient is open to [facility] form previous admission and will resume at discharge. Patient haspoor appetite. Has some difficulty eating hospital food. Son states patient's appetite has been poor for the last few weeks.. Nutrition following.       Peggy Estevez RN

## 2020-11-05 NOTE — PLAN OF CARE
, 07-19           Cognitive Concerns/ Orientation : BILL. Cantonese speaking. Speaks minimal English. Pt refused to talk through ,closed eyes and puule up the covers.  BEHAVIOR & AGGRESSION TOOL COLOR: Green. Impulsive, doesn't call to go to bathroom. Pt took seizure pad off to regulate bed.  CIWA SCORE: N/a    ABNL VS/O2: Tmax 99. Other VSS on room air. Neuros intact  MOBILITY: Assist x 1 with GB  PAIN MANAGMENT: Absence of non verbal indicators of pain  DIET: Regular  BOWEL/BLADDER: Continent B/B. Up to bathroom/urinal. Had BM x 1. soft  ABNL LAB/BG: BG 1  DRAIN/DEVICES: Right PIV infusing at 125 ml/hr. Left PIV SL  TELEMETRY RHYTHM: NSR  SKIN: Mild blanchable redness to coccyx  TESTS/PROCEDURES: N/a  D/C DAY/GOALS/PLACE: Expected discharge in 2-3 days  OTHER IMPORTANT INFO: Inconsistent in following all directions for neuro assessments.  MD/RN ROUNDING SIGNED OFF D/E SHIFT: N/a  COMMIT TO SIT DONE AND SIGNED OFF Yes

## 2020-11-05 NOTE — PROGRESS NOTES
[facility]    Medicine Progress Note - Hospitalist Service       Date of Admission:  11/4/2020  Assessment & Plan       Travis Peres is a pleasant 80 year old gentleman with a complicated Hx of papillary thyroid carcinoma, hypothyroidism, panhypopituitarism, HTN, COPD, and D.M., who presented today after being found unresponsive at home, with symptoms similar to his presentation here just over a week ago.  Current problems include:     Loss of consciousness   Recurrent and was just discharged for a similar episode.  At that time thought due to hypotension from hypovolemia.  Noted to have some bradycardia on presentation so some concern for symptomatic bradycardia.  Other etiology including sedating medications, hypothyroidism, adrenal insufficiency can not be ruled out   - Monitor on telemetry  - Neuro checks Q4 hours; seizure precautions  - Holding trazodone and Ambien.  May have also been taking hydrocodone recently  - Neurology consulted and appreciate their recommendations     Bradycardia   Noted on his last and this admissions; unclear cause. No apparent cardiac Hx.  - telemetry  - Cardiology consult to determine further workup     HTN  Had an episode of hypotension on presentation   - Continue to hold amlodipine as pressures soft     Hypokalemia  - replace and add K order set     Poor PO intake  - Nutrition consulted      MILI in setting of CKD. Resolved  Likely pre-renal.  - IVF overnight and improved on repeat      Chronic epigastric abdominal pain  epigastric abdominal pain for > 6months. Extensive workup for symptoms including a HIDA scan (2019) with cholelithiasis. Planning EGD first and then may consider gall bladder removal if study is normal.  Outpatient records suggest cholecystectomy was planned for this week, but now has been cancelled.  - Continue to monitor      Insomnia  Takes Ambien PRN.   - No further Ambien until cause of unresponsive episodes is clarified.     COPD  No  flare up suspected  - Monitor on oximetry overnight  - Continue Anoro Ellipta and mucinex     Dementia  Listed in previous notes, but son Maximiliano was not aware of this diagnosis.  - OT consulted      Papillary thyroid carcinoma; persistent/progressive with possible lung mets (per his Endocrinologist, Dr. FRANKLIN Potts; see 11/2 note)  - need Endocrinology (or Oncology) to help determine when to do further imaging for this.      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 The tumor has been significantly de bulked and He has completed XRT for  persistent tumor-. Tumor mass is stable on  MRI through 10/24/18.  Due to thyroidectomy plus hypopituitarism.  Free T4 11/1 = 0.59; today = 0.80 (low normal)  - TSH should be kept low to avoid stimulating the thyroid cancer   - and free T4 should be kept high normal; it has been below normal (last admission) and is still low, and unclear if his synthroid has been increased recently.  - On LT4 and HC only. ( = levothyroxine and hydrocortisone)  - Treat to high normal free T4 target  - He has been noncompliant with testosterone in the past and is no longer on it  - Increased synthroid to 150 mcg Q day, starting 11/5  - continue periodic imaging of the sella, treat the hypopituitarism medically.   - Next pituitary MRI 10/2021 or sooner PRN; doesn't appear this has been done yet (had CT head here 10/28)  - Coordinate timing of this while here; suggest reviewing with Dr. Potts tomorrow. 11/5     D. M. Type II  Not on meds for this.  HgbA1c is 5.7  - POC glucose monitoring overnight/tomorrow.     Oral thrush  Unclear cause, but appears to be improving.  - Continue nystatin solution QID, begun recently     Anemia; likely chronic disease  Hemoglobins 10-11.  No evidence of obvious bleeding  - Continue to monitor     Recent  hypocalcemia  No evidence at this time      Hydrocephalus   shunt in place and appears to functioning based on recent CT head.        Diet: Regular Diet Adult  Snacks/Supplements Adult: Boost Glucose Control; With Meals    DVT Prophylaxis: Pneumatic Compression Devices  Guzman Catheter: not present  Code Status: Full Code           Disposition Plan   Expected discharge: 2 - 3 days, once evaluation complete.  Will need therapy evaluation   Entered: Hank Bonds DO 11/05/2020, 3:22 PM       The patient's care was discussed with the Bedside Nurse, Care Coordinator/ and Patient.    Hank Bonds DO  Hospitalist Service  [facility]  Contact information available via [facility] Paging/Directory    ______________________________________________________________________    Interval History   Patient seen and examined.  Refusing assessment at this time.  Just stating he wants to go home.      Data reviewed today: I reviewed all medications, new labs and imaging results over the last 24 hours. I personally reviewed no images or EKG's today.    Physical Exam   Vital Signs: Temp: 98.1  F (36.7  C) Temp src: Oral BP: 114/58 Pulse: 80   Resp: 16 SpO2: 94 % O2 Device: None (Room air) Oxygen Delivery: 2 LPM  Weight: 146 lbs 0 oz  General Appearance: Resting comfortably  Respiratory: No respiratory distress.  No audible wheezing  Cardiovascular: RRR at this time  GI: Soft.  Non-distended  Skin: No obvious rashes or cyanosis to exposed skin  Other:  Moving all extremities grossly    Data   Recent Labs   Lab 11/05/20  1355 11/04/20  2355 11/04/20  1359 11/01/20  0253 10/31/20  0638 10/31/20  0638   WBC 7.2  --  6.0  --   --  6.0   HGB 9.7*  --  11.7*  --   --  10.4*   MCV 84  --  85  --   --  83     --  180  --   --  171   INR  --   --  1.03  --   --   --      --  136 142  --  143   POTASSIUM 3.8 3.7 2.8* 3.5   < > 3.2*   CHLORIDE 112*  --  102 110*  --  112*   CO2 26  --  30  29  --  25   BUN 10  --  14 7  --  10   CR 1.17  --  1.77* 1.35*  --  1.36*   ANIONGAP 4  --  4  --   --  6   NIHARIKA 7.4*  --  8.0* 7.3*  --  7.3*   GLC 97  --  154* 86  --  105*   ALBUMIN  --   --  3.6  --   --   --    PROTTOTAL  --   --  7.7  --   --   --    BILITOTAL  --   --  0.4  --   --   --    ALKPHOS  --   --  58  --   --   --    ALT  --   --  26  --   --   --    AST  --   --  22  --   --   --    TROPI  --   --  <0.015  --   --   --     < > = values in this interval not displayed.     No results found for this or any previous visit (from the past 24 hour(s)).

## 2020-11-05 NOTE — PLAN OF CARE
PT: Orders received, chart reviewed. Attempted to see patient for PT eval using the video  but pt repeating that he wants to go home and that he does not want to use that (the video ). Per chart notes, pt has been up with SBA/Assist of 1 in the room and per discussion with RN, pt got up by himself to the bathroom this AM.

## 2020-11-06 ENCOUNTER — APPOINTMENT (OUTPATIENT)
Dept: PHYSICAL THERAPY | Facility: CLINIC | Age: 80
DRG: 641 | End: 2020-11-06
Attending: INTERNAL MEDICINE
Payer: COMMERCIAL

## 2020-11-06 LAB
ANION GAP SERPL CALCULATED.3IONS-SCNC: 5 MMOL/L (ref 3–14)
BUN SERPL-MCNC: 12 MG/DL (ref 7–30)
CALCIUM SERPL-MCNC: 7.2 MG/DL (ref 8.5–10.1)
CHLORIDE SERPL-SCNC: 109 MMOL/L (ref 94–109)
CO2 SERPL-SCNC: 25 MMOL/L (ref 20–32)
CREAT SERPL-MCNC: 1.09 MG/DL (ref 0.66–1.25)
ERYTHROCYTE [DISTWIDTH] IN BLOOD BY AUTOMATED COUNT: 15.1 % (ref 10–15)
GFR SERPL CREATININE-BSD FRML MDRD: 63 ML/MIN/{1.73_M2}
GLUCOSE BLDC GLUCOMTR-MCNC: 117 MG/DL (ref 70–99)
GLUCOSE BLDC GLUCOMTR-MCNC: 95 MG/DL (ref 70–99)
GLUCOSE SERPL-MCNC: 144 MG/DL (ref 70–99)
HCT VFR BLD AUTO: 29.6 % (ref 40–53)
HGB BLD-MCNC: 9.7 G/DL (ref 13.3–17.7)
MCH RBC QN AUTO: 27.5 PG (ref 26.5–33)
MCHC RBC AUTO-ENTMCNC: 32.8 G/DL (ref 31.5–36.5)
MCV RBC AUTO: 84 FL (ref 78–100)
PLATELET # BLD AUTO: 197 10E9/L (ref 150–450)
POTASSIUM SERPL-SCNC: 3.1 MMOL/L (ref 3.4–5.3)
POTASSIUM SERPL-SCNC: 3.4 MMOL/L (ref 3.4–5.3)
RBC # BLD AUTO: 3.53 10E12/L (ref 4.4–5.9)
SODIUM SERPL-SCNC: 139 MMOL/L (ref 133–144)
WBC # BLD AUTO: 6.4 10E9/L (ref 4–11)

## 2020-11-06 PROCEDURE — 120N000001 HC R&B MED SURG/OB

## 2020-11-06 PROCEDURE — 250N000013 HC RX MED GY IP 250 OP 250 PS 637: Performed by: INTERNAL MEDICINE

## 2020-11-06 PROCEDURE — 97116 GAIT TRAINING THERAPY: CPT | Mod: GP

## 2020-11-06 PROCEDURE — 85027 COMPLETE CBC AUTOMATED: CPT | Performed by: INTERNAL MEDICINE

## 2020-11-06 PROCEDURE — 999N001017 HC STATISTIC GLUCOSE BY METER IP

## 2020-11-06 PROCEDURE — 258N000003 HC RX IP 258 OP 636: Performed by: EMERGENCY MEDICINE

## 2020-11-06 PROCEDURE — 84132 ASSAY OF SERUM POTASSIUM: CPT | Performed by: INTERNAL MEDICINE

## 2020-11-06 PROCEDURE — 97161 PT EVAL LOW COMPLEX 20 MIN: CPT | Mod: GP

## 2020-11-06 PROCEDURE — 36415 COLL VENOUS BLD VENIPUNCTURE: CPT | Performed by: INTERNAL MEDICINE

## 2020-11-06 PROCEDURE — 97530 THERAPEUTIC ACTIVITIES: CPT | Mod: GP

## 2020-11-06 PROCEDURE — 99233 SBSQ HOSP IP/OBS HIGH 50: CPT | Performed by: INTERNAL MEDICINE

## 2020-11-06 PROCEDURE — 80048 BASIC METABOLIC PNL TOTAL CA: CPT | Performed by: INTERNAL MEDICINE

## 2020-11-06 PROCEDURE — 250N000011 HC RX IP 250 OP 636: Performed by: INTERNAL MEDICINE

## 2020-11-06 RX ORDER — HYDROCORTISONE 5 MG/1
5 TABLET ORAL DAILY
Status: DISCONTINUED | OUTPATIENT
Start: 2020-11-06 | End: 2020-11-08 | Stop reason: HOSPADM

## 2020-11-06 RX ORDER — HYDROCORTISONE 10 MG/1
10 TABLET ORAL DAILY
Status: DISCONTINUED | OUTPATIENT
Start: 2020-11-07 | End: 2020-11-08 | Stop reason: HOSPADM

## 2020-11-06 RX ORDER — POTASSIUM CHLORIDE 1500 MG/1
40 TABLET, EXTENDED RELEASE ORAL ONCE
Status: COMPLETED | OUTPATIENT
Start: 2020-11-06 | End: 2020-11-06

## 2020-11-06 RX ADMIN — POTASSIUM CHLORIDE 40 MEQ: 1500 TABLET, EXTENDED RELEASE ORAL at 15:06

## 2020-11-06 RX ADMIN — SODIUM CHLORIDE: 9 INJECTION, SOLUTION INTRAVENOUS at 02:35

## 2020-11-06 RX ADMIN — SENNOSIDES AND DOCUSATE SODIUM 1 TABLET: 8.6; 5 TABLET ORAL at 20:23

## 2020-11-06 RX ADMIN — LEVOTHYROXINE SODIUM 150 MCG: 75 TABLET ORAL at 06:35

## 2020-11-06 RX ADMIN — NYSTATIN 500000 UNITS: 100000 SUSPENSION ORAL at 14:38

## 2020-11-06 RX ADMIN — HYDROCORTISONE 5 MG: 5 TABLET ORAL at 15:06

## 2020-11-06 RX ADMIN — HYDROCORTISONE SODIUM SUCCINATE 25 MG: 100 INJECTION, POWDER, FOR SOLUTION INTRAMUSCULAR; INTRAVENOUS at 08:32

## 2020-11-06 RX ADMIN — TAMSULOSIN HYDROCHLORIDE 0.4 MG: 0.4 CAPSULE ORAL at 08:31

## 2020-11-06 RX ADMIN — NYSTATIN 500000 UNITS: 100000 SUSPENSION ORAL at 08:33

## 2020-11-06 RX ADMIN — SODIUM CHLORIDE: 9 INJECTION, SOLUTION INTRAVENOUS at 10:15

## 2020-11-06 RX ADMIN — GUAIFENESIN 600 MG: 600 TABLET, EXTENDED RELEASE ORAL at 20:23

## 2020-11-06 RX ADMIN — HYDROCORTISONE SODIUM SUCCINATE 25 MG: 100 INJECTION, POWDER, FOR SOLUTION INTRAMUSCULAR; INTRAVENOUS at 00:39

## 2020-11-06 RX ADMIN — POLYETHYLENE GLYCOL 3350 17 G: 17 POWDER, FOR SOLUTION ORAL at 08:39

## 2020-11-06 RX ADMIN — GUAIFENESIN 600 MG: 600 TABLET, EXTENDED RELEASE ORAL at 08:32

## 2020-11-06 RX ADMIN — UMECLIDINIUM BROMIDE AND VILANTEROL TRIFENATATE 1 PUFF: 62.5; 25 POWDER RESPIRATORY (INHALATION) at 08:38

## 2020-11-06 RX ADMIN — SENNOSIDES AND DOCUSATE SODIUM 1 TABLET: 8.6; 5 TABLET ORAL at 08:31

## 2020-11-06 ASSESSMENT — ACTIVITIES OF DAILY LIVING (ADL)
ADLS_ACUITY_SCORE: 20

## 2020-11-06 NOTE — PROGRESS NOTES
[facility]    Medicine Progress Note - Hospitalist Service       Date of Admission:  11/4/2020  Assessment & Plan       Travis Peres is a pleasant 80 year old gentleman with a complicated Hx of papillary thyroid carcinoma, hypothyroidism, panhypopituitarism, HTN, COPD, and D.M., who presented today after being found unresponsive at home, with symptoms similar to his presentation here just over a week ago.  Current problems include:     Loss of consciousness, unclear etiology  Recurrent and was just discharged for a similar episode.  At that time thought due to hypotension from hypovolemia.  Noted to have some bradycardia on presentation so some concern for symptomatic bradycardia.  Other etiology including sedating medications, hypothyroidism, adrenal insufficiency and seizure can not be ruled out   Of note, EEG during previous work up was consistent with encephalopathy   - Monitor on telemetry.  No significant arrhythmias noted  - Neuro checks Q4 hours; seizure precautions  - Holding trazodone and Ambien.  May have also been taking hydrocodone recently  - Neurology consulted and appreciate their recommendations    Type I AV block w/bradycardia  Noted on his last and this admissions; unclear cause. No apparent cardiac Hx.  - Cardiology evaluated the patient's chart and telemetry.  Other than the type I AV block no significant findings.  Avoid AV blocking medications.  Plan for cardiac event monitor at discharge and follow up     HTN  Had an episode of hypotension on presentation   - Continue to hold amlodipine as pressures soft     Hypokalemia  - replace and add K order set     Poor PO intake  - Nutrition consulted      MILI in setting of CKD. Resolved  Likely pre-renal.  - IVF stopped     Chronic epigastric abdominal pain  Been present for over 6 months now.  Work up including HIDA scan showed cholelithiasis.  Outpatient records suggest cholecystectomy was planned for this week, but now has  been cancelled.  - Continue to monitor      Insomnia  Takes Ambien PRN.   - No further Ambien until cause of unresponsive episodes is clarified     COPD  No flare up suspected  - Monitor on oximetry overnight  - Continue Anoro Ellipta and mucinex     Dementia  Listed in previous notes, but son Maximiliano was not aware of this diagnosis.  - OT consulted      Papillary thyroid carcinoma  Persistent/progressive with possible lung mets (per his Endocrinologist, Dr. 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 The tumor has been significantly de bulked and He has completed XRT for  persistent tumor-. Tumor mass is stable on  MRI through 10/24/18.  Due to thyroidectomy plus hypopituitarism.  Free T4 11/1 0.59; here is 0.80 (low normal)  - TSH should be kept low to avoid stimulating the thyroid cancer   - Free T4 should be kept high normal; it has been below normal (last admission) and is still low, and unclear if his synthroid has been increased recently.  - On LT4 and HC only. (levothyroxine and hydrocortisone)  - Treat to high normal free T4 target  - He has been noncompliant with testosterone in the past and is no longer on it  - Increased synthroid to 150 mcg Q day, starting 11/5  - Continue periodic imaging of the sella, treat the hypopituitarism medically.   - Next pituitary MRI 10/2021 or sooner PRN; doesn't appear this has been done yet (had CT head here 10/28)  - Switched back to PO Hydrocortisone  - May need to discuss with patient's primary endocrinologist if work up here is unremarkable.  Do not feel this patient needs inpatient endocrinology but if he continues to have recurrence would recommend transfer to facility  with inpatient endocrinology rather than admission here      H/o DM type II?    Not on meds for this.  HgbA1c is 5.7.  Blood glucoses are controlled      Oral thrush  Unclear cause, but appears to be improving.  - Continue nystatin solution QID, begun recently     Anemia; likely chronic disease  Hemoglobins 10-11.  No evidence of obvious bleeding  - Continue to monitor     Recent hypocalcemia  No evidence at this time      Hydrocephalus   shunt in place and appears to functioning based on recent CT head.        Diet: Regular Diet Adult  Snacks/Supplements Adult: Boost Glucose Control; With Meals    DVT Prophylaxis: Pneumatic Compression Devices  Guzman Catheter: not present  Code Status: Full Code           Disposition Plan   Expected discharge: 2 - 3 days, recommended to prior living arrangement once evaluation complete.  Entered: Hank Bonds DO 11/06/2020, 1:56 PM       The patient's care was discussed with the Bedside Nurse, Care Coordinator/, Patient and Patient's Family.    Over 35 minutes was spent examining and discussing the patient with greater than 50% time spent in counseling and/or coordination of care.     Hank Bonds DO  Hospitalist Service  [facility]  Contact information available via [facility] Paging/Directory    ______________________________________________________________________    Interval History   Patient seen and examined.  No acute events over night.  Patient is very hard of hearing and it is difficult to assess with the Jabber as interpretor.  No pain or trouble breathing.  No fevers reported.  No further episodes of unresponsiveness    Data reviewed today: I reviewed all medications, new labs and imaging results over the last 24 hours. I personally reviewed no images or EKG's today.    Physical Exam   Vital Signs: Temp: 98.2  F (36.8  C) Temp src: Oral BP: 128/84 Pulse: 65   Resp: 16 SpO2: 98 % O2 Device: None (Room air)    Weight: 146 lbs  0 oz  General Appearance: Resting comfortably.  NAD   Respiratory: Clear to auscultation.  No respiratory distress  Cardiovascular: RRR.  No murmurs  GI: Bowel sounds noted.  Non-tender  Skin: No rashes.  No cyanosis  Other: No edema.  No calf tenderness     Data   Recent Labs   Lab 11/06/20  1214 11/05/20  1355 11/04/20  2355 11/04/20  1359   WBC 6.4 7.2  --  6.0   HGB 9.7* 9.7*  --  11.7*   MCV 84 84  --  85    194  --  180   INR  --   --   --  1.03    142  --  136   POTASSIUM 3.1* 3.8 3.7 2.8*   CHLORIDE 109 112*  --  102   CO2 25 26  --  30   BUN 12 10  --  14   CR 1.09 1.17  --  1.77*   ANIONGAP 5 4  --  4   NIHARIKA 7.2* 7.4*  --  8.0*   * 97  --  154*   ALBUMIN  --   --   --  3.6   PROTTOTAL  --   --   --  7.7   BILITOTAL  --   --   --  0.4   ALKPHOS  --   --   --  58   ALT  --   --   --  26   AST  --   --   --  22   TROPI  --   --   --  <0.015     No results found for this or any previous visit (from the past 24 hour(s)).

## 2020-11-06 NOTE — PLAN OF CARE
DATE & TIME: 11/5/2020 1900-0730       Cognitive Concerns/ Orientation : BILL. Cantonese speaking. Speaks minimal English. [place]. Utilized jabber and pocket-talker when able but was not very effective.   BEHAVIOR & AGGRESSION TOOL COLOR: Green. Impulsive, doesn't call to go to bathroom.   CIWA SCORE: N/A    ABNL VS/O2: VSS on RA, ex shawanda. Neuros intact  MOBILITY: Assist x1 with GB  PAIN MANAGMENT: Absence of non verbal indicators of pain  DIET: Regular  BOWEL/BLADDER: Continent B/B. Up to bathroom/urinal. Having loose stools today, bedtime senna held.  ABNL LAB/BG: , 117. Hgb 9.7  DRAIN/DEVICES: PIV x2 SL  TELEMETRY RHYTHM: NSR  SKIN: Mild blanchable redness to coccyx  TESTS/PROCEDURES: N/a  D/C DAY/GOALS/PLACE: Expected discharge in 2-3 days  OTHER IMPORTANT INFO: Inconsistent in following all directions for neuro assessments.

## 2020-11-06 NOTE — PROGRESS NOTES
"OT: Attempted to see pt using [employer] video . Pt not responding to any questions and shaking head \"no\". Pocket talker not available at this time. Per discussion with RN, pt spouse will be her in the am. Will attempt back at that time for OT eval.     Neela Hauser, OT on 11/6/2020 at 2:55 PM    "

## 2020-11-06 NOTE — PROGRESS NOTES
2020    Brief Cardiology Note  Pt: Travis Peres    1940      Travis Peres is a 80 year old  Gentleman with PMH significant for papillary thyroid carcinoma, hypothyroidism, panhypopituitarism, HTN, COPD, and D.M who presented being found unresponsive.  Initial ECG was bradycardia with second degree heart block mobitz type I.  No shawanda or heart block noted on telemetry while inpatient and HRs on vitals have been 60-90s with the exception of day of admission that had HR as low as 50.    At this time recommend ongoing tele metery and outpatient event monitor.    Event monitor ordered and I'll follow up on outpatient results.     Cardiology will sign off, please call with further questions.      Alejandra Yanes PA-C  9:53 AM 2020   [facility]  Pager: [phone]

## 2020-11-06 NOTE — CONSULTS
"REPORT OF CONSULTATION  Patient Name: Travis Peres   MRN: [id number]   : 1940   Admission Date/Time: 2020  1:38 PM   Primary Care Physician: Karol Alvarez   Consulting Physician: Emmy Cabrera MD    REASON FOR CONSULTATION  I am asked to see this patient in consultation at the request of Que Rae MD for evaluation of LOC episode.     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   Per ER notes patient at home was found to be cold and with HR of 40. Patient became responsive after dose of narcane and atropine. He was also hypotensive. He reportedly has not been eating or drinking much fluids.   Patient was placed on telemetry and admitted.     Of note he was seen by Dr Fiore on 10/29 for the similar concern, also was hypokalemic and bradycardiac at that time. EEG was done and showed encephalopathy.   CT head showed IMPRESSION:  1. No CT findings of acute intracranial abnormality.  2. Grossly stable postsurgical changes status post previous transnasaltranssphenoidal pituitary surgery.  3. Unchanged right parietal ventricular shunt catheter, as described.  No evidence for hydrocephalus.  4. Generalized brain parenchymal volume loss and presumed chronic  small-vessel ischemic disease.     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 The tumor has been significantly de bulked and He has completed XRT for  persistent tumor-. Tumor mass is stable on  MRI through 10/24/18.\"     Today patient wasn't able to provide additional Hx. He is speaking cantonese, but for some reasons was " "speaking mandarin with the  and wasn't able to answer her questions, but kept repeating \"tomorrow morning\" which per RN this is patient's understanding he is going home.     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     Hypovitaminosis D      Multiple pulmonary nodules 2009     Osteopenia 2011     Papillary thyroid carcinoma (H) 2007    4.8 cm, right, node positive;      Pituitary macroadenoma with extrasellar extension (H) 2007    causing obstructive hydrocephalus     Post-surgical hypothyroidism 2007     Uncomplicated asthma      Vocal cord paralysis 2014    right     Xerostomia 2010    due to radiation exposures        PAST SURGICAL HISTORY  Past Surgical History:   Procedure Laterality Date     cymetra injection       ESOPHAGOSCOPY, GASTROSCOPY, DUODENOSCOPY (EGD), COMBINED  6/20/2013    Procedure: COMBINED ESOPHAGOSCOPY, GASTROSCOPY, DUODENOSCOPY (EGD), BIOPSY SINGLE OR MULTIPLE;  gastroscopy;  Surgeon: Leslie Guadarrama MD;  Location:  GI     ESOPHAGOSCOPY, GASTROSCOPY, DUODENOSCOPY (EGD), COMBINED N/A 9/20/2019    Procedure: ESOPHAGOGASTRODUODENOSCOPY (EGD);  Surgeon: Gilberto Gibson DO;  Location:  GI     ESOPHAGOSCOPY, GASTROSCOPY, DUODENOSCOPY (EGD), COMBINED N/A 10/16/2020    Procedure: ESOPHAGOGASTRODUODENOSCOPY, WITH BIOPSY;  Surgeon: Bartolome Granda MD;  Location:  GI     HERNIA REPAIR Right      lipoma resection chest wall Right 11/09     PHACOEMULSIFICATION CLEAR CORNEA WITH STANDARD INTRAOCULAR LENS IMPLANT Left 5/7/2018    Procedure: PHACOEMULSIFICATION CLEAR CORNEA WITH STANDARD INTRAOCULAR LENS IMPLANT;  LEFT PHACOEMULSIFICATION CLEAR CORNEA WITH STANDARD INTRAOCULAR LENS IMPLANT ;  Surgeon: Nadiya Allen, " MD;  Location:  [facility]     PHACOEMULSIFICATION CLEAR CORNEA WITH STANDARD INTRAOCULAR LENS IMPLANT Right 2018    Procedure: PHACOEMULSIFICATION CLEAR CORNEA WITH STANDARD INTRAOCULAR LENS IMPLANT;  RIGHT EYE PHACOEMULSIFICATION CLEAR CORNEA WITH STANDARD INTRAOCULAR LENS IMPLANT;  Surgeon: Nadiya Allen MD;  Location:  [facility]     right selective neck dissection level 2, 3, 4  11/3/09     right  shunt placement  07     THORACIC SURGERY  Fidencio 3 2017     THYMECTOMY N/A 1/3/2017    Procedure: THYMECTOMY;  Surgeon: Ernesto Armstrong MD;  Location: [facility] OR     THYROIDECTOMY  07     TRANSCERVICAL EXTENDED MEDIASTINAL LYMPHADENECTOMY N/A 1/3/2017    Procedure: TRANSCERVICAL EXTENDED MEDIASTINAL LYMPHADENECTOMY;  Surgeon: Ernesto Armstrong MD;  Location: [facility] OR     transnasal endoscopic resection of pituitary adenoma  2007        ALLERGIES/SENSITIVITIES  Allergies   Allergen Reactions     Metoprolol Shortness Of Breath     Bradycardia, fatigue, COPD worsening.         Fenofibrate Hives     Lisinopril Cough     Losartan Cough     Niacin      Simvastatin Cramps         CURRENT MEDS  No current outpatient medications on file.        SOCIAL HISTORY  Social History     Socioeconomic History     Marital status:      Spouse name: Not on file     Number of children: Not on file     Years of education: Not on file     Highest education level: Not on file   Occupational History     Not on file   Social Needs     Financial resource strain: Not on file     Food insecurity     Worry: Not on file     Inability: Not on file     Transportation needs     Medical: Not on file     Non-medical: Not on file   Tobacco Use     Smoking status: Former Smoker     Packs/day: 0.50     Years: 5.00     Pack years: 2.50     Types: Cigarettes     Start date: 1976     Quit date: 2001     Years since quittin.7     Smokeless tobacco: Never Used   Substance and Sexual Activity     Alcohol use: No  "    Drug use: No     Sexual activity: Not Currently     Partners: Female     Birth control/protection: Other   Lifestyle     Physical activity     Days per week: Not on file     Minutes per session: Not on file     Stress: Not on file   Relationships     Social connections     Talks on phone: Not on file     Gets together: Not on file     Attends Zoroastrian service: Not on file     Active member of club or organization: Not on file     Attends meetings of clubs or organizations: Not on file     Relationship status: Not on file     Intimate partner violence     Fear of current or ex partner: Not on file     Emotionally abused: Not on file     Physically abused: Not on file     Forced sexual activity: Not on file   Other Topics Concern     Parent/sibling w/ CABG, MI or angioplasty before 65F 55M? Not Asked   Social History Narrative    Mom  at age 93 from a fall    Dad  at age 98 from old age     40 plus years    Two adult children in good health.    Occupation: retired from running a [employer]    Originally from [place]       FAMILY HISTORY  Family History   Problem Relation Age of Onset     Cancer No family hx of         no skin cancer     Glaucoma No family hx of      Macular Degeneration No family hx of         REVIEW OF SYSTEMS: not able to obtain due to pt's condition    EXAM: /84 (BP Location: Right arm)   Pulse 65   Temp 98.2  F (36.8  C) (Oral)   Resp 16   Ht 1.676 m (5' 6\")   Wt 66.2 kg (146 lb)   SpO2 98%   BMI 23.57 kg/m     General Appearance: no acute distress  HEENT: NC/AT  Neck: full passive ROM; no carotid bruits  Chest: CTAB; normal respiratory effort  Cardio: RRR; no murmurs; normal peripheral pulses  Abd: NT/ND  Mental status: Alert and not able to assess orientation. Speech - seems to be clear in pronouncing words and fluent.  was able to understand some.   Cranial nerves: Pupils equal, round, and reactive to light. Extraocular movements intact. Visual fields - " unable to test. Facial strength normal and sensation - I was unable to get patient to answer how he feels it.  Palate elevation symmetric. Hearing decreased bilaterally. Tongue protrusion midline. Shoulder shrug symmetric.  Motor: Muscle tone and bulk are normal.  Strength within normal limits in all extremities.  Sensory: Light touch - patient seems to feel bilaterally, he wasn't able to indicate whether it symmetric or not  Coordination: Finger-to-nose are performed normally.   Reflexes: Symmetric and within normal limits. Mute plantar responses bilaterally.  Gait: deferred    IMAGING: I have personally reviewed the patient's imaging:   No new head imaging.   I reviewed old imaging:  CT head from 10/28/20  IMPRESSION:  1. No CT findings of acute intracranial abnormality.  2. Grossly stable postsurgical changes status post previous transnasal  transsphenoidal pituitary surgery.  3. Unchanged right parietal ventricular shunt catheter, as described.  No evidence for hydrocephalus.  4. Generalized brain parenchymal volume loss and presumed chronic  small-vessel ischemic disease.     LABS:     Component      Latest Ref Rng & Units 11/6/2020   Sodium      133 - 144 mmol/L 139   Potassium      3.4 - 5.3 mmol/L 3.1 (L)   Chloride      94 - 109 mmol/L 109   Carbon Dioxide      20 - 32 mmol/L 25   Anion Gap      3 - 14 mmol/L 5   Glucose      70 - 99 mg/dL 144 (H)   Urea Nitrogen      7 - 30 mg/dL 12   Creatinine      0.66 - 1.25 mg/dL 1.09   GFR Estimate      >60 mL/min/1.73:m2 63   GFR Estimate If Black      >60 mL/min/1.73:m2 73   Calcium      8.5 - 10.1 mg/dL 7.2 (L)   WBC      4.0 - 11.0 10e9/L 6.4   RBC Count      4.4 - 5.9 10e12/L 3.53 (L)   Hemoglobin      13.3 - 17.7 g/dL 9.7 (L)   Hematocrit      40.0 - 53.0 % 29.6 (L)   MCV      78 - 100 fl 84   MCH      26.5 - 33.0 pg 27.5   MCHC      31.5 - 36.5 g/dL 32.8   RDW      10.0 - 15.0 % 15.1 (H)   Platelet Count      150 - 450 10e9/L 197     CONSULTATION  IMPRESSION/RECOMMENDATIONS:   Active Problems:    Hypokalemia    Bradycardia    Dehydration    Acute renal failure, unspecified acute renal failure type (H)    Syncope, unspecified syncope type     This is a 80 year old man who presents with recurrent episodes of bradycardia, and some hypotension, hypokalemia and LOC.   Prior neuro eval was unremarkable. EEG was negative for epileptiform changes. Patient notably has VPS, but I wasn't able to find any notes about it. No hydrocephalus per CT, hence unlikely his ssx relate to shunt malfunction.   Patient has notable endocrine Hx - with one of the conditions noted being secondary adrenal insufficiency. Given patient's presentation I'm concerned that it might be related to change in his endocrine state.   Patient will be due to MRI brain in upcoming year anyway, therefore given current complaints I recommend to repeat brain MRI to evaluate for any changes.   Patient has VPS of unknown type - will ask neurosurgery to see to help with answering questions whether patient can get MRI or shunt needs programming  Otherwise I recommend evaluation for his adrenal state - will defer to medicine team, Endocrinology consult/evlauation/opinion would be of great help as well.    Will continue to follow.   Please call or page with questions: [phone] or pager [phone]  I thank Dr. Que Rae for the opportunity to participate in the patient's care.     Total consult time 110 minutes with the time spent on chart review, imaging and lab results review, and more than 50 % of the time spent on coordination of cares and face-to-face time with the patient including counseling regarding pathophysiology of the above conditions, results of the tests and further directions of care.     Emmy Cabrera MD  [facility]

## 2020-11-06 NOTE — PLAN OF CARE
DATE & TIME: 11/6/2020 DAY   Cognitive Concerns/ Orientation : waxes and wanes- sometimes alert to self and place. BILL at times. Cantonese speaking. Speaks minimal English. Able to communicate via  at times. Pt also [place].   BEHAVIOR & AGGRESSION TOOL COLOR: Green. - impulsive when has to use restroom.   CIWA SCORE: N/A    ABNL VS/O2: VSS on RA, ex shawanda at times. Neuros Q4H checks- intact  MOBILITY: Ax1 with GB  PAIN MANAGMENT: denies/Absence of non- verbal indicators of pain  DIET: Regular  BOWEL/BLADDER: Cont. B/B. Up to bathroom/urinal. BM today x1.   ABNL LAB/BG: , 117. Hgb 9.7  DRAIN/DEVICES: PIV SL- continuous fluids D/C'ed  TELEMETRY RHYTHM: NSR  SKIN: Mild blanchable redness to coccyx  TESTS/PROCEDURES: NA  D/C DAY/GOALS/PLACE: Expected discharge in 2-3 days; pending workup  OTHER IMPORTANT INFO: inconsistent in following commands. Neuro following. Cardiology saw pt today- will go home on tele monitor.

## 2020-11-07 ENCOUNTER — APPOINTMENT (OUTPATIENT)
Dept: MRI IMAGING | Facility: CLINIC | Age: 80
DRG: 641 | End: 2020-11-07
Attending: PSYCHIATRY & NEUROLOGY
Payer: COMMERCIAL

## 2020-11-07 PROCEDURE — 250N000013 HC RX MED GY IP 250 OP 250 PS 637: Performed by: INTERNAL MEDICINE

## 2020-11-07 PROCEDURE — 99232 SBSQ HOSP IP/OBS MODERATE 35: CPT | Performed by: INTERNAL MEDICINE

## 2020-11-07 PROCEDURE — A9585 GADOBUTROL INJECTION: HCPCS | Performed by: INTERNAL MEDICINE

## 2020-11-07 PROCEDURE — 120N000001 HC R&B MED SURG/OB

## 2020-11-07 PROCEDURE — 255N000002 HC RX 255 OP 636: Performed by: INTERNAL MEDICINE

## 2020-11-07 PROCEDURE — 70553 MRI BRAIN STEM W/O & W/DYE: CPT

## 2020-11-07 RX ORDER — GADOBUTROL 604.72 MG/ML
7 INJECTION INTRAVENOUS ONCE
Status: COMPLETED | OUTPATIENT
Start: 2020-11-07 | End: 2020-11-07

## 2020-11-07 RX ADMIN — SENNOSIDES AND DOCUSATE SODIUM 1 TABLET: 8.6; 5 TABLET ORAL at 21:45

## 2020-11-07 RX ADMIN — NYSTATIN 500000 UNITS: 100000 SUSPENSION ORAL at 21:45

## 2020-11-07 RX ADMIN — UMECLIDINIUM BROMIDE AND VILANTEROL TRIFENATATE 1 PUFF: 62.5; 25 POWDER RESPIRATORY (INHALATION) at 09:48

## 2020-11-07 RX ADMIN — HYDROCORTISONE 10 MG: 10 TABLET ORAL at 08:27

## 2020-11-07 RX ADMIN — SENNOSIDES AND DOCUSATE SODIUM 1 TABLET: 8.6; 5 TABLET ORAL at 08:27

## 2020-11-07 RX ADMIN — NYSTATIN 500000 UNITS: 100000 SUSPENSION ORAL at 17:59

## 2020-11-07 RX ADMIN — NYSTATIN 500000 UNITS: 100000 SUSPENSION ORAL at 08:27

## 2020-11-07 RX ADMIN — POLYETHYLENE GLYCOL 3350 17 G: 17 POWDER, FOR SOLUTION ORAL at 08:32

## 2020-11-07 RX ADMIN — GUAIFENESIN 600 MG: 600 TABLET, EXTENDED RELEASE ORAL at 08:27

## 2020-11-07 RX ADMIN — HYDROCORTISONE 5 MG: 5 TABLET ORAL at 15:14

## 2020-11-07 RX ADMIN — GUAIFENESIN 600 MG: 600 TABLET, EXTENDED RELEASE ORAL at 21:45

## 2020-11-07 RX ADMIN — LEVOTHYROXINE SODIUM 150 MCG: 75 TABLET ORAL at 06:32

## 2020-11-07 RX ADMIN — TAMSULOSIN HYDROCHLORIDE 0.4 MG: 0.4 CAPSULE ORAL at 08:27

## 2020-11-07 RX ADMIN — NYSTATIN 500000 UNITS: 100000 SUSPENSION ORAL at 15:14

## 2020-11-07 RX ADMIN — GADOBUTROL 7 ML: 604.72 INJECTION INTRAVENOUS at 12:01

## 2020-11-07 ASSESSMENT — ACTIVITIES OF DAILY LIVING (ADL)
ADLS_ACUITY_SCORE: 20

## 2020-11-07 NOTE — PLAN OF CARE
DATE & TIME: 11/7/2020 DAY   Cognitive Concerns/ Orientation : BILL today- pt not communicating via jabber. Cantonese speaking. Speaks minimal English. Pt also [place]. Uses hand gestures at times.   BEHAVIOR & AGGRESSION TOOL COLOR: Green. - impulsive when has to use restroom.   CIWA SCORE: N/A    ABNL VS/O2: VSS on RA, ex shawanda at times. Neuros Q4H checks- intact.   MOBILITY: A1 with GB. Pt usually sets off bed alarm and standing by bed.   PAIN MANAGMENT: Denies/Absence of non- verbal indicators of pain  DIET: Regular, good appetite. Feeds self with set up. Needs help ordering food.  BOWEL/BLADDER: Cont. Using bathroom, passing gas.   ABNL LAB/BG: NA  DRAIN/DEVICES: NEW PIV SL  TELEMETRY RHYTHM: NSR  SKIN: Mild blanchable redness to coccyx. Pt moves in bed freq.   TESTS/PROCEDURES: MRI today- no change.   D/C DAY/GOALS/PLACE: Expected discharge in 2-3 days; pending workup. Pt will go home on cardiac monitor per cardiology.   OTHER IMPORTANT INFO: Inconsistent in following commands. Neuro/OT/PT. Neurosurgery saw pt twice today to check shunt. Patients wife would like to be called with updates instead of son.

## 2020-11-07 NOTE — PROGRESS NOTES
[facility]    Medicine Progress Note - Hospitalist Service        Date of Admission:  11/4/2020  1:38 PM    Assessment & Plan:   Travis Peres is a pleasant 80 year old gentleman with a complicated Hx of papillary thyroid carcinoma, hypothyroidism, panhypopituitarism, HTN, COPD, and D.M., who presented today after being found unresponsive at home, with symptoms similar to his presentation here just over a week ago.  Current problems include:     Acute encephalopathy  Loss of consciousness  -Patient had similar presentation and was admitted to [facility] between 10/28/2020-11/1/2020   -It was felt that his loss of consciousness at that time was likely due to dehydration as the work-up was nonrevealing.  -He was hypotensive and bradycardia again during this presentation, again with concerns for acute dehydration as he has not been eating and drinking well at home.  His creatinine was also up suggesting dehydration.  -Appears to be improving  -Neurology following, plan for MRI of the brain today  -Continue to hold trazodone and Ambien  -Also reportedly per EMS he responded to Narcan ?  If his presentation was due to hydrocodone  -Monitor clinically    Acute kidney injury  -Creatinine 1.77 at presentation, improved with IV hydration  -As mentioned above, patient was most likely dehydrated     Bradycardia    -Per EMS his heart rate was in the 40s  -No further bradycardia in the hospital  -Evaluated by cardiology, plan for discharge on event monitor     HTN  -Blood pressure initially was soft, amlodipine held, blood pressure now creeping up, resume prior to admission amlodipine.     Chronic epigastric abdominal pain  -Outpatient records suggest cholecystectomy was planned for this week, but now has been cancelled.  -Continue to monitor, no active issues at the moment, follow-up outpatient at discharge.      COPD  -Not in exacerbation.  -Continue Anoro Ellipta and  mucinex     Dementia  Listed in previous notes, but son Maximiliano was not aware of this diagnosis.  -OT consulted      Papillary thyroid carcinoma  -persistent/progressive with possible lung mets (per his Endocrinologist, Dr. FRANKLIN Potts; see 11/2 note)  -Outpatient follow-up with endocrinologist at discharge.      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 The tumor has been significantly de bulked and He has completed XRT for  persistent tumor. Tumor mass is stable on  MRI through 10/24/18.  -On levothyroxine and hydrocortisone  -Treat to high normal free T4 target  -synthroid increased to 150 mcg Q day, starting 11/5     Diabetes mellitus type II  Not on meds for this.  HgbA1c is 5.7  -POC glucose monitoring     Anemia; likely chronic disease  Hemoglobins 10-11.  No evidence of obvious bleeding  -Stable  -Continue to monitor      Hydrocephalus  - shunt in place and appears to functioning based on recent CT head.  -MRI today, neurosurgery assisting       Diet: Regular Diet Adult  Snacks/Supplements Adult: Boost Glucose Control; With Meals     DVT Prophylaxis: Pneumatic Compression Devices   Guzman Catheter: not present  Code Status: Full Code     Disposition Plan    Expected discharge: 1-2 days, pending MRI findings and PT OT evaluation  Entered: Mark Silva MD 11/07/2020, 12:01 PM        The patient's care was discussed with the Bedside Nurse, Patient and Patient's wife at the bedside..    Mark Silva MD  Hospitalist Service  [facility]    ______________________________________________________________________    Interval History   No acute events overnight.  Patient was evaluated with [place] in the presence of his wife at the bedside.  Per the wife he might be marginally worse compared to yesterday.  Afebrile.  Patient getting MRI of  "the brain today.  Patient pretty much nonverbal with me.    Data reviewed today: I reviewed all medications, new labs and imaging results over the last 24 hours. I personally reviewed no images or EKG's today.    Physical Exam   Vital signs:  Temp: 99.3  F (37.4  C) Temp src: Oral BP: (!) 156/91 Pulse: 81   Resp: 16 SpO2: 97 % O2 Device: None (Room air) Oxygen Delivery: 2 LPM Height: 167.6 cm (5' 6\") Weight: 66.2 kg (146 lb)  Estimated body mass index is 23.57 kg/m  as calculated from the following:    Height as of this encounter: 1.676 m (5' 6\").    Weight as of this encounter: 66.2 kg (146 lb).      Wt Readings from Last 2 Encounters:   11/04/20 66.2 kg (146 lb)   11/01/20 62.2 kg (137 lb 1.6 oz)       Gen: Awake, nonverbal.  HEENT:  no pallor  Resp: CTA B/L, normal WOB  CVS: RRR, no murmur  Abd/GI: Soft, non-tender. BS- normoactive.   Skin: Warm, dry no rashes  MSK: No joint deformities, no pedal edema  Neuro- CN- intact. No focal deficits.      Data   Recent Labs   Lab 11/06/20  1921 11/06/20  1214 11/05/20  1355 11/04/20  1359 11/04/20  1359   WBC  --  6.4 7.2  --  6.0   HGB  --  9.7* 9.7*  --  11.7*   MCV  --  84 84  --  85   PLT  --  197 194  --  180   INR  --   --   --   --  1.03   NA  --  139 142  --  136   POTASSIUM 3.4 3.1* 3.8   < > 2.8*   CHLORIDE  --  109 112*  --  102   CO2  --  25 26  --  30   BUN  --  12 10  --  14   CR  --  1.09 1.17  --  1.77*   ANIONGAP  --  5 4  --  4   NIHARIKA  --  7.2* 7.4*  --  8.0*   GLC  --  144* 97  --  154*   ALBUMIN  --   --   --   --  3.6   PROTTOTAL  --   --   --   --  7.7   BILITOTAL  --   --   --   --  0.4   ALKPHOS  --   --   --   --  58   ALT  --   --   --   --  26   AST  --   --   --   --  22   TROPI  --   --   --   --  <0.015    < > = values in this interval not displayed.       No results found for this or any previous visit (from the past 24 hour(s)).  Medications       guaiFENesin  600 mg Oral BID     hydrocortisone  10 mg Oral Daily     hydrocortisone  5 mg Oral " Daily     levothyroxine  150 mcg Oral QAM AC     nystatin  500,000 Units Oral 4x Daily     polyethylene glycol  17 g Oral Daily     senna-docusate  1 tablet Oral BID    Or     senna-docusate  2 tablet Oral BID     sodium chloride (PF)  3 mL Intracatheter Q8H     tamsulosin  0.4 mg Oral Daily     umeclidinium-vilanterol  1 puff Inhalation Daily

## 2020-11-07 NOTE — PROGRESS NOTES
"Neurology Progress Note.    Subjective: No acute events overnight. This am patient had MRI brain done, Neurosurgery followed and re-programmed pt's shunt, appreciate help.    Today patient wasn't providing any active complaints. Tries to speak even less per his RN.     Objective:  BP (!) 156/91 (BP Location: Left arm)   Pulse 81   Temp 99.3  F (37.4  C) (Oral)   Resp 16   Ht 1.676 m (5' 6\")   Wt 66.2 kg (146 lb)   SpO2 97%   BMI 23.57 kg/m    General: no acute distress  Neuro:  No change compared to yesterday's exam - even without  patient was able to follow motor commands with all four extremities. Woke up and made eye contact. Nodded head and waved hi and goodbuy appropriately. Didn't try to speak.     I have personally reviewed all the labs and imaging studies. Pertinent findings:   MRI brain:  IMPRESSION:  1. Stable residual soft tissue in left half of the sella involving the  infundibulum and left cavernous sinus. This may represent stable  residual adenoma. There is also some stable residual relatively  nonenhancing tissue in right half of the sella which is presumably  fibrotic change.  2. Prior transsphenoidal surgery with presumed mucosal thickening and  possibly retention cyst within the sphenoid sinus.  3. Right parietal ventricular shunt catheter.  4. No evidence for acute brain pathology.    Component      Latest Ref Rng & Units 11/6/2020 11/6/2020          12:14 PM  7:21 PM   Sodium      133 - 144 mmol/L 139    Potassium      3.4 - 5.3 mmol/L 3.1 (L) 3.4   Chloride      94 - 109 mmol/L 109    Carbon Dioxide      20 - 32 mmol/L 25    Anion Gap      3 - 14 mmol/L 5    Glucose      70 - 99 mg/dL 144 (H)    Urea Nitrogen      7 - 30 mg/dL 12    Creatinine      0.66 - 1.25 mg/dL 1.09    GFR Estimate      >60 mL/min/1.73:m2 63    GFR Estimate If Black      >60 mL/min/1.73:m2 73    Calcium      8.5 - 10.1 mg/dL 7.2 (L)    WBC      4.0 - 11.0 10e9/L 6.4    RBC Count      4.4 - 5.9 10e12/L " 3.53 (L)    Hemoglobin      13.3 - 17.7 g/dL 9.7 (L)    Hematocrit      40.0 - 53.0 % 29.6 (L)    MCV      78 - 100 fl 84    MCH      26.5 - 33.0 pg 27.5    MCHC      31.5 - 36.5 g/dL 32.8    RDW      10.0 - 15.0 % 15.1 (H)    Platelet Count      150 - 450 10e9/L 197      Assessment and Plan:  This is a 80 year old man with Hx of HTN, hypokalemia, MILI, chronic abdominal pain, COPD, DM, papillary thyroid carcinoma and 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   Pt's neuroexam was limited due to patient not being able to answer questions well, but otherwise unremarkable.   His MRI brain was unchanged from prior one. EEG didn't show epileptiform changes. Most likely ssx due to bradycardia and resultant syncope from low flow. The cause for vascular compromise remains unclear, either arrythythmias or hormonal related given patient's secondary Bayfield's disease.   -recommend follow up with cardiology and endocrinology  -no further work up from neuro stand point  -ensure patient's hydration and potassium supplement otherwise    Will sign off, please call with questions.     Thank you for allowing me to participate in cares of this patient. Please contact me with any questions of concerns (cell phone [phone] or pager [phone]).     Total time of visit: 35 minutes with the time spent on chart review, imaging and lab results review, and more than 50 % of the time spent on coordination of cares and face-to-face time with the patient including counseling regarding pathophysiology of the above conditions, results of the tests and further directions of care.     Emmy Cabrera MD  [facility]

## 2020-11-07 NOTE — PROGRESS NOTES
Neurosurgery is asked to check shunt pre and post MRI. Last documented setting in EMR appears to be 2.5. Upon checking now at bedside, it remains set at 2.5. We will f/u after MRI to verify setting. Please contact NS when MRI complete.       MRI completed, shunt was checked and found to have moved to 2.0. Shunt was reprogrammed to 2.5 with the [employer] shunt . Repeat verification showed the shunt to be at 2.5.     Alden Neves PA-C  [facility]  [address] [address]    Tel [phone]  Pager [phone]

## 2020-11-07 NOTE — PLAN OF CARE
DATE & TIME: 11/6/2020 [id number]  Cognitive Concerns/ Orientation : Waxes and wanes. BILL at times. Cantonese speaking. Speaks minimal English. Able to communicate via  at times. Pt also [place]. Uses hand gestures at times. Sets off bed alarm occasionally.   BEHAVIOR & AGGRESSION TOOL COLOR: Green. - impulsive when has to use restroom.   CIWA SCORE: N/A    ABNL VS/O2: VSS on RA, ex shawanda at times. Neuros Q4H checks- intact. BILL to perform full assessment at times.   MOBILITY: A1 with GB. Pt usually sets off bed alarm and standing by bed.   PAIN MANAGMENT: Denies/Absence of non- verbal indicators of pain  DIET: Regular, good appetite. Feeds self with set up. Needs help ordering breakfast  BOWEL/BLADDER: Continent of bowel and bladder. Using bathroom, passing gas.   ABNL LAB/BG: BGL discontinued. Hgb 9.7  DRAIN/DEVICES: PIV SL- continuous fluids D/C'ed  TELEMETRY RHYTHM: NSR, shawanda at times.   SKIN: Mild blanchable redness to coccyx. Pt moves in bed freq.   TESTS/PROCEDURES: MRI scheduled for tomorrow. Please see sticky note and consultation orders for special instructions.   D/C DAY/GOALS/PLACE: Expected discharge in 2-3 days; pending workup  OTHER IMPORTANT INFO: Inconsistent in following commands. Neuro following. Cardiology saw pt today- will go home on tele monitor. MRI planned for tomorrow per Neurology. Pt has programmable shunt and needs to be seen before MRI and After. RN, Neuro Surgery, and MRI will need to coordinate schedule for this to be completed. Responds to Jabber at times. Bed alarm on for safety.

## 2020-11-08 ENCOUNTER — APPOINTMENT (OUTPATIENT)
Dept: CARDIOLOGY | Facility: CLINIC | Age: 80
DRG: 641 | End: 2020-11-08
Attending: PHYSICIAN ASSISTANT
Payer: COMMERCIAL

## 2020-11-08 VITALS
HEIGHT: 66 IN | OXYGEN SATURATION: 96 % | HEART RATE: 60 BPM | BODY MASS INDEX: 23.46 KG/M2 | DIASTOLIC BLOOD PRESSURE: 89 MMHG | RESPIRATION RATE: 18 BRPM | WEIGHT: 146 LBS | SYSTOLIC BLOOD PRESSURE: 142 MMHG | TEMPERATURE: 98.4 F

## 2020-11-08 PROCEDURE — 250N000013 HC RX MED GY IP 250 OP 250 PS 637: Performed by: INTERNAL MEDICINE

## 2020-11-08 PROCEDURE — 93272 ECG/REVIEW INTERPRET ONLY: CPT | Performed by: INTERNAL MEDICINE

## 2020-11-08 PROCEDURE — 93270 REMOTE 30 DAY ECG REV/REPORT: CPT

## 2020-11-08 PROCEDURE — 99238 HOSP IP/OBS DSCHRG MGMT 30/<: CPT | Performed by: INTERNAL MEDICINE

## 2020-11-08 RX ORDER — LEVOTHYROXINE SODIUM 150 UG/1
150 TABLET ORAL
Qty: 30 TABLET | Refills: 0 | Status: SHIPPED | OUTPATIENT
Start: 2020-11-09 | End: 2021-08-02 | Stop reason: DRUGHIGH

## 2020-11-08 RX ADMIN — UMECLIDINIUM BROMIDE AND VILANTEROL TRIFENATATE 1 PUFF: 62.5; 25 POWDER RESPIRATORY (INHALATION) at 09:07

## 2020-11-08 RX ADMIN — TAMSULOSIN HYDROCHLORIDE 0.4 MG: 0.4 CAPSULE ORAL at 09:07

## 2020-11-08 RX ADMIN — NYSTATIN 500000 UNITS: 100000 SUSPENSION ORAL at 09:06

## 2020-11-08 RX ADMIN — LEVOTHYROXINE SODIUM 150 MCG: 75 TABLET ORAL at 09:06

## 2020-11-08 RX ADMIN — SENNOSIDES AND DOCUSATE SODIUM 1 TABLET: 8.6; 5 TABLET ORAL at 09:07

## 2020-11-08 RX ADMIN — GUAIFENESIN 600 MG: 600 TABLET, EXTENDED RELEASE ORAL at 09:06

## 2020-11-08 RX ADMIN — POLYETHYLENE GLYCOL 3350 17 G: 17 POWDER, FOR SOLUTION ORAL at 09:06

## 2020-11-08 RX ADMIN — HYDROCORTISONE 10 MG: 10 TABLET ORAL at 09:06

## 2020-11-08 ASSESSMENT — ACTIVITIES OF DAILY LIVING (ADL)
ADLS_ACUITY_SCORE: 20

## 2020-11-08 NOTE — PROGRESS NOTES
Discharge    Patient discharged to HOME via private transportation with wife  Care plan note: complete    Listed belongings gathered and returned to patient. Yes  Care Plan and Patient education resolved: Yes  Prescriptions if needed, hard copies sent with patient  Yes  Home and hospital acquired medications returned to patient: Yes  Medication Bin checked and emptied on discharge Yes  Follow up appointment made for patient: Yes

## 2020-11-08 NOTE — PLAN OF CARE
Discharge Planner OT   Patient plan for discharge: Home with assist of family  Current status: Pt is awake and in bed, ate breakfast today with setup.  Has had limited verbalizations and limited response to  using Jabber.  MD, nursing and myself tried today with Jabber with no response from pt.  Responding at times to gestures.  Pt lives with spouse who takes care of his needs from chart notes.  Spouse not present to talk to yesterday or today when I was in the room.  Pt recently here for a cardiac issue, wife at that time felt she could care for him at home.  Talked to MD who agreed to complete OT order at this time.  Barriers to return to prior living situation: language barrier with limited cooperation unless family present.  Recommendations for discharge: Home with family support.  Pt could benefit from a home OT evaluation to determine supports at home and provide recommendations for best care for daily ADLS and IADLS.  Rationale for recommendations: Family needs to be present to effectively look at home needs, evaluation at home will provide the best information to suggest changes and techniques that will be meaningful for home setting and more likely to be followed through by family.  No further IP OT needs at this time. Will complete OT order.       Entered by: Brian Bailon 11/08/2020 9:22 AM

## 2020-11-08 NOTE — PROGRESS NOTES
Physical Therapy Discharge Summary    Reason for therapy discharge:    Discharged to home.    Progress towards therapy goal(s). See goals on Care Plan in [facility] electronic health record for goal details.  Goals partially met.  Barriers to achieving goals:   discharge from facility.    Therapy recommendation(s):    Continued therapy is recommended.  Rationale/Recommendations:   .     Last PT visit 11/6/2020:      11/06/20 0900   Signing Clinician's Name / Credentials   Signing clinician's name / credentials Brittany Dressler, PT   Quick Adds   Rehab Discipline PT   Gait Training   Minutes of Treatment (Gait Training) 10   Symptoms Noted During/After Treatment (Gait Training) shortness of breath   Distance in Feet (Required for LE Total Joints) 200' no AD  supervision slow, mostly steady but then has a LOB L needing stepping strategies to self-correct. Stairs per below.  O2 100% on RA, mild SOB.   Stair, Performance   Treatment Detail 5 stairs supervision 1 rail step-to - slow but steady up, increased posterior lean but no overt posterior LOB.    PT Discharge Planning    PT Discharge Recommendation (DC Rec) home with assist;home with home care physical therapy   PT Rationale for DC Rec Pt able to access home, mildly unsteady - superivsion on stairs and when moving around indicated (wife able to provide this). Balance & activity tolerance tune-up iwth home PT indicated.    PT Brief overview of current status  Jennifer bed mob & transfers, Supervision 200' no AD & 5 stairs 1 rail given balance, mild SOB with stable vitals.    Additional Documentation   PT Plan DGI or Tinetti for objective AD recs and balance training.   Total Session Time   Total Session Time (minutes) 25 minutes

## 2020-11-08 NOTE — PLAN OF CARE
DATE & TIME: 11/8/2020 DAY  Cognitive Concerns/ Orientation : Unable to do full assessment. Pt not interacting with jabber. Cantonese speaking. Speaks minimal English. Pt also [place]. Uses hand gestures at times.   BEHAVIOR & AGGRESSION TOOL COLOR: Green. - impulsive when has to use restroom.   CIWA SCORE: N/A    ABNL VS/O2: VSS on RA; Neuros Q4H checks- intact.   MOBILITY: Ax1 GB- steady on feet. Fall precautions in place.  PAIN MANAGMENT: Denies/Absence of non- verbal indicators of pain  DIET: Regular, good appetite. Feeds self with set up.   BOWEL/BLADDER: Cont. Using bathroom, passing gas.   ABNL LAB/BG: NA  DRAIN/DEVICES: SL  TELEMETRY RHYTHM: NSR   SKIN: Mild blanchable redness to coccyx. Pt moves in bed freq.   TESTS/PROCEDURES: NA  D/C DAY/GOALS/PLACE: home today; Pt will go home on cardiac monitor per cardiology. PT and nursing home care referral in place.   OTHER IMPORTANT INFO: Neuro signed off. OT/PT following. Patient will follow up oupt with endocrinologist.

## 2020-11-08 NOTE — PLAN OF CARE
Cognitive Concerns/ Orientation : BILL.  Pt not interacting with jabber. Cantonese speaking. Speaks minimal English. Pt also [place]. Uses hand gestures at times.   BEHAVIOR & AGGRESSION TOOL COLOR: Green. - impulsive when has to use restroom.   CIWA SCORE: N/A    ABNL VS/O2: VSS on RA, ex shawanda at times. Neuros Q4H checks- intact.   MOBILITY: A1 with GB. Pt usually sets off bed alarm and standing by bed.   PAIN MANAGMENT: Denies/Absence of non- verbal indicators of pain  DIET: Regular, good appetite. Feeds self with set up. Needs help ordering food.  BOWEL/BLADDER: Cont. Using bathroom, passing gas.   ABNL LAB/BG: NA  DRAIN/DEVICES: PIV SL in LUE   TELEMETRY RHYTHM: NSR   SKIN: Mild blanchable redness to coccyx. Pt moves in bed freq.   TESTS/PROCEDURES: MRI completed- no changes noted.   D/C DAY/GOALS/PLACE: Expected discharge in 2-3 days; pending workup. Pt will go home on cardiac monitor per cardiology.   OTHER IMPORTANT INFO: Inconsistent in following commands. Neuro/OT/PT. Neurosurgery saw pt twice today to check shunt during MRI today. Bed alarm on for safety. Rounded on freq.

## 2020-11-08 NOTE — PROGRESS NOTES
Care Management Discharge Note    Discharge Date:  11/8/2020    Discharge Disposition: Home;Home Care--established through [facility]    Discharge Services:  Home RN and therapies    Discharge DME:       Discharge Transportation: family or friend will provide    Private pay costs discussed: Not applicable    PAS Confirmation Code:  N/A  Patient/family educated on Medicare website which has current facility and service quality ratings: no    Education Provided on the Discharge Plan:    Persons Notified of Discharge Plans: per notes discussed with spouse and son  Patient/Family in Agreement with the Plan: yes    Handoff Referral Completed: Yes    Additional Information:  Anticipated home with [facility] services with [facility] with RN and therapies.         Syeda Adames, RN

## 2020-11-08 NOTE — DISCHARGE SUMMARY
[facility]    Discharge Summary  Hospitalist    Date of Admission:  11/4/2020  Date of Discharge:  11/8/2020  Discharging Provider: Mark Silva MD    Discharge Diagnoses       Acute encephalopathy-most likely due to dehydration  ?Loss of consciousness   Acute kidney injury  Bradycardia     HTN  Chronic epigastric abdominal pain  COPD  Dementia  History of papillary thyroid carcinoma  Post-surgical Hypothyroidism  Pituitary adenoma  Panhypopituitarism  Diabetes mellitus type II   Anemia; likely chronic disease   Hydrocephalus      Hospital Course:    Travis Peres is a pleasant 80 year old gentleman with a complicated Hx of papillary thyroid carcinoma, hypothyroidism, panhypopituitarism, HTN, COPD, and D.M., who presented today after being found unresponsive at home, with symptoms similar to his presentation here just over a week ago.  Current problems include:     Acute encephalopathy  Loss of consciousness  -Patient had similar presentation and was admitted to [facility] between 10/28/2020-11/1/2020   -It was felt that his loss of consciousness at that time was likely due to dehydration as the work-up was nonrevealing.  -He was hypotensive initially, however blood pressure were subsequently stable.  -Per EMS he was bradycardic at home however his heart rate was stable during the hospitalization   -This time again his presentation was most likely related to acute dehydration as he has not been eating and drinking well at home.  His creatinine was also up suggesting dehydration.  -However given the concern for bradycardia, cardiology was consulted, they recommended discharge with 30-day event monitor  -Neurology also followed, work-up was essentially negative.  EEG without seizure activity.  MRI was done which showed stable residual soft tissue in the left top of the sella involving the infundibulum and the left cavernous sinus.  No evidence of acute brain pathology.   Please see radiology reports for details.  Given negative work-up neurology signed off.  They recommended further work-up for bradycardia and also endocrinology follow-up.  -Patient clinically improved with treatment of dehydration.  Physical therapy evaluated the patient, recommended home PT.     Acute kidney injury  -Creatinine 1.77 at presentation, improved with IV hydration  -As mentioned above, patient was most likely dehydrated     Bradycardia    -Per EMS his heart rate was in the 40s  -No further bradycardia in the hospital  -Evaluated by cardiology, plan for discharge on event monitor     HTN  -Blood pressure initially was soft, amlodipine held, blood pressure now creeping up, resume prior to admission amlodipine.     Chronic epigastric abdominal pain  -Outpatient records suggest cholecystectomy was planned for this week, but now has been cancelled.  -Continue to monitor, no active issues at the moment, follow-up outpatient at discharge.      COPD  -Not in exacerbation.  -Continue Anoro Ellipta and mucinex     Dementia  Listed in previous notes, but son Maximiliano was not aware of this diagnosis.       Papillary thyroid carcinoma  -persistent/progressive with possible lung mets (per his Endocrinologist, Dr. FRANKLIN Potts; see 11/2 note)  -Outpatient follow-up with endocrinologist at discharge.      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 The tumor has been significantly de bulked and He has completed XRT for  persistent tumor. Tumor mass is stable on  MRI through 10/24/18.  -On levothyroxine and hydrocortisone  -Treat to high normal free T4 target  -synthroid increased to 150 mcg Q day, starting 11/5     Diabetes mellitus type II  Not on meds for this.  HgbA1c is 5.7  -POC glucose monitoring     Anemia; likely chronic disease  Hemoglobins 10-11.  No evidence  of obvious bleeding  -Stable     Hydrocephalus  - shunt in place and appears to functioning based on recent CT head.    Mark Silva MD    Significant Results and Procedures   See below    Pending Results     Unresulted Labs Ordered in the Past 30 Days of this Admission     No orders found from 10/5/2020 to 11/5/2020.          Code Status   Full Code       Primary Care Physician   Karol Alvarez    Physical Exam   Temp: 98.4  F (36.9  C) Temp src: Oral BP: (!) 142/89 Pulse: 60   Resp: 18 SpO2: 96 % O2 Device: None (Room air)      Constitutional: AA, difficult to assess orientation accurately  Respiratory: CTA B/L, Normal WOBs  Cardiovascular: RRR, No murmur  GI: Soft, Non- tender, BS- normoactive  Neuro: CN- grossly intact, Moing all 4 extremities.     Discharge Disposition   Discharged to home  Condition at discharge: Stable    Consultations This Hospital Stay   PHYSICAL THERAPY ADULT IP CONSULT  NUTRITION SERVICES ADULT IP CONSULT  CARDIOLOGY IP CONSULT  CARE MANAGEMENT / SOCIAL WORK IP CONSULT  OCCUPATIONAL THERAPY ADULT IP CONSULT  NEUROLOGY IP CONSULT  NEUROSURGERY IP CONSULT    Time Spent on this Encounter   I, Mark Silva MD, personally saw the patient today and spent greater than 30 minutes discharging this patient.    Discharge Orders      Home care nursing referral      Home Care PT Referral for Hospital Discharge      Follow-up and recommended labs and tests    Follow up with primary care provider, Karol Alvarez, within 7 days for hospital follow- up.    F/u cardiology in 2-3 weeks  F/u primary endocrinologist in 1-2 weeks     Activity    Your activity upon discharge: activity as tolerated     MD face to face encounter    Documentation of Face to Face and Certification for Home Health Services    I certify that patient: Travis Peres is under my care and that I, or a nurse practitioner or physician's assistant working with me, had a face-to-face encounter that meets the physician  face-to-face encounter requirements with this patient on: 11/8/2020.    This encounter with the patient was in whole, or in part, for the following medical condition, which is the primary reason for home health care: Deconditioning.    I certify that, based on my findings, the following services are medically necessary home health services: Nursing.    My clinical findings support the need for the above services because: Nurse is needed: To provide assessment and oversight required in the home to assure adherence to the medical plan due to: Deconditioning.    Further, I certify that my clinical findings support that this patient is homebound (i.e. absences from home require considerable and taxing effort and are for medical reasons or [employer] services or infrequently or of short duration when for other reasons) because: Requires assistance of another person or specialized equipment to access medical services because patient:  Deconditioning.    Based on the above findings. I certify that this patient is confined to the home and needs intermittent skilled nursing care, physical therapy and/or speech therapy.  The patient is under my care, and I have initiated the establishment of the plan of care.  This patient will be followed by a physician who will periodically review the plan of care.  Physician/Provider to provide follow up care: Karol Alvarez    Attending hospital physician (the Medicare certified [place] provider): Mark Silva MD  Physician Signature: See electronic signature associated with these discharge orders.  Date: 11/8/2020     Full Code     Cardiac Event Monitor Adult Pediatric     Diet    Follow this diet upon discharge: Orders Placed This Encounter      Snacks/Supplements Adult: Boost Glucose Control; With Meals      Regular Diet Adult       Discharge Medications   Current Discharge Medication List      CONTINUE these medications which have CHANGED    Details   levothyroxine  (SYNTHROID/LEVOTHROID) 150 MCG tablet Take 1 tablet (150 mcg) by mouth every morning (before breakfast)  Qty: 30 tablet, Refills: 0    Comments: Future refills by PCP Dr. Karol Alvarez with phone number [phone].  Associated Diagnoses: Hypopituitarism (H)         CONTINUE these medications which have NOT CHANGED    Details   albuterol (PROAIR HFA) 108 (90 Base) MCG/ACT inhaler Inhale 2 puffs into the lungs every 4 hours as needed for shortness of breath / dyspnea or wheezing  Qty: 3 Inhaler, Refills: 11    Associated Diagnoses: Chronic obstructive pulmonary disease, unspecified COPD type (H)      alendronate (FOSAMAX) 70 MG tablet Take 1 tablet (70 mg) by mouth every 7 days  Qty: 32 tablet, Refills: 1    Comments: He is traveling and needs a 6 months supply for this.  Associated Diagnoses: Postsurgical hypothyroidism; Papillary thyroid carcinoma (H); Hypopituitarism (H); Pulmonary nodules; Pituitary macroadenoma (H); Low bone density; Abnormal finding on imaging      amLODIPine (NORVASC) 2.5 MG tablet TAKE 1 TABLET(2.5 MG) BY MOUTH DAILY  Qty: 90 tablet, Refills: 0    Comments: **Patient requests 90 days supply**  Associated Diagnoses: Essential hypertension      guaiFENesin (MUCINEX) 600 MG 12 hr tablet Take 1 tablet (600 mg) by mouth 2 times daily  Qty: 14 tablet, Refills: 0    Comments: Future refills by PCP Dr. Karol Alvarez with phone number [phone].  Associated Diagnoses: Pneumonia due to infectious organism, unspecified laterality, unspecified part of lung      hydrocortisone (CORTEF) 10 MG tablet 10 mg AM and 5 mg afternoon  Qty: 150 tablet, Refills: 4    Associated Diagnoses: Papillary thyroid carcinoma (H); Postsurgical hypothyroidism; Pulmonary nodules; Low bone density; Abnormal finding on imaging; Hypopituitarism (H)      nystatin (MYCOSTATIN) 827441 UNIT/ML suspension Take 5 mLs (500,000 Units) by mouth 4 times daily for 14 days  Qty: 280 mL, Refills: 0    Associated Diagnoses:  Throat pain; Thrush      tamsulosin (FLOMAX) 0.4 MG capsule Take 0.4 mg by mouth daily      traZODone (DESYREL) 100 MG tablet Take 100 mg by mouth At Bedtime      umeclidinium-vilanterol (ANORO ELLIPTA) 62.5-25 MCG/INH oral inhaler Inhale 1 puff into the lungs daily      docusate sodium (COLACE) 100 MG capsule Take 100 mg by mouth 2 times daily as needed for constipation         STOP taking these medications       zolpidem (AMBIEN) 10 MG tablet Comments:   Reason for Stopping:             Allergies   Allergies   Allergen Reactions     Metoprolol Shortness Of Breath     Bradycardia, fatigue, COPD worsening.         Fenofibrate Hives     Lisinopril Cough     Losartan Cough     Niacin      Simvastatin Cramps     Data   Most Recent 3 CBC's:  Recent Labs   Lab Test 11/06/20  1214 11/05/20  1355 11/04/20  1359   WBC 6.4 7.2 6.0   HGB 9.7* 9.7* 11.7*   MCV 84 84 85    194 180      Most Recent 3 BMP's:  Recent Labs   Lab Test 11/06/20  1921 11/06/20  1214 11/05/20  1355 11/04/20  1359 11/04/20  1359   NA  --  139 142  --  136   POTASSIUM 3.4 3.1* 3.8   < > 2.8*   CHLORIDE  --  109 112*  --  102   CO2  --  25 26  --  30   BUN  --  12 10  --  14   CR  --  1.09 1.17  --  1.77*   ANIONGAP  --  5 4  --  4   NIHARIKA  --  7.2* 7.4*  --  8.0*   GLC  --  144* 97  --  154*    < > = values in this interval not displayed.     Most Recent 2 LFT's:  Recent Labs   Lab Test 11/04/20  1359 10/28/20  1520   AST 22 39   ALT 26 30   ALKPHOS 58 57   BILITOTAL 0.4 1.5*     Most Recent INR's and Anticoagulation Dosing History:  Anticoagulation Dose History     Recent Dosing and Labs Latest Ref Rng & Units 9/3/2007 9/3/2007 12/19/2007 6/3/2014 1/5/2017 10/28/2020 11/4/2020    INR 0.86 - 1.14 1.03 1.09 0.93 - 1.51(H) 1.14 1.03    INR Point of Care 0.86 - 1.14 - - - <0.9 - - -        Most Recent 3 Troponin's:  Recent Labs   Lab Test 11/04/20  1359 10/29/20  0112 10/28/20  1520   TROPI <0.015 <0.015 <0.015     Most Recent Cholesterol Panel:No lab  results found.  Most Recent 6 Bacteria Isolates From Any Culture (See EPIC Reports for Culture Details):  Recent Labs   Lab Test 10/28/20  1616 10/28/20  1520 09/05/19  1303 08/31/19 2042 08/31/19 2007 05/24/18  1640   CULT No growth No growth  No growth No beta hemolytic Streptococcus Group A isolated No growth No growth  No growth <10,000 colonies/mL  urogenital yue  Susceptibility testing not routinely done       Most Recent TSH, T4 and A1c Labs:  Recent Labs   Lab Test 11/05/20  1355 11/04/20  1359   TSH  --  5.78*   T4  --  0.80   A1C 5.7*  --        Results for orders placed or performed during the hospital encounter of 11/04/20   XR Chest Port 1 View    Narrative    XR CHEST PORT 1 VW 11/4/2020 2:04 PM    HISTORY: Decreased level consciousness    COMPARISON: Chest x-ray 10/28/2020    FINDINGS: Partially visualized right-sided  shunt catheter. No acute  chest findings. The lungs are clear and there are no pleural  effusions. Stable heart size.    TEMO PAUL MD   MR Brain w/o & w Contrast    Narrative    MRI BRAIN WITHOUT AND WITH CONTRAST  11/7/2020 12:56 PM    HISTORY: Pituitary / endocrine dysfunction. Neurological deficit(s),  subacute. Patient has MRI-compatible  shunt. Neurosurgery will be  programming it before and after the scan. Patient has known pituitary  mass.     TECHNIQUE: Multiplanar, multisequence MRI of the brain and sella  without and with 7 mL Gadavist.    COMPARISON: 10/24/2018    FINDINGS: There is a ventricular shunt reservoir causing significant  metallic artifact over the right posterior cerebral hemisphere.  Diffusion-weighted images are normal. There is no evidence for acute  infarct. There is a tiny old left cerebellar infarct. Mild-to-moderate  cerebral atrophy is present. There is some increased T2 signal  intensity around the shunt catheter, but this is similar to that seen  on prior exam. Minimal increased signal intensity is also seen in the  left osman also  similar to that seen on the prior exam. Brain  parenchyma otherwise appears within normal limits. Vascular structures  are patent to the skull base.    The patient is undergone previous transsphenoidal surgery. There is  residual abnormal enhancing soft tissue in primarily the left half of  the sella which also involves the infundibulum and left cavernous  sinus. This tissue is difficult to measure, but appears unchanged from  the prior exam. This tissue is not significantly changed since the  prior exam. There is also some nonenhancing tissue in the right half  of the sella which is presumably scar tissue. Postoperative changes  are seen in the sphenoid sinus with some associated mucosal  thickening. Remainder of the paranasal sinuses are clear.      Impression    IMPRESSION:  1. Stable residual soft tissue in left half of the sella involving the  infundibulum and left cavernous sinus. This may represent stable  residual adenoma. There is also some stable residual relatively  nonenhancing tissue in right half of the sella which is presumably  fibrotic change.  2. Prior transsphenoidal surgery with presumed mucosal thickening and  possibly retention cyst within the sphenoid sinus.  3. Right parietal ventricular shunt catheter.  4. No evidence for acute brain pathology.      SHARAN DE JESUS MD     *Note: Due to a large number of results and/or encounters for the requested time period, some results have not been displayed. A complete set of results can be found in Results Review.

## 2020-11-08 NOTE — PLAN OF CARE
DATE & TIME: 11/8/2020 0631  Cognitive Concerns/ Orientation : Unable to do full assessment. Pt not interacting with jabber. Cantonese speaking. Speaks minimal English. Pt also [place]. Uses hand gestures at times.   BEHAVIOR & AGGRESSION TOOL COLOR: Green. - impulsive when has to use restroom.   CIWA SCORE: N/A    ABNL VS/O2: VSS on RA, ex shawanda at times. Neuros Q4H checks- intact.   MOBILITY: A1 with GB. Pt usually sets off bed alarm and standing by bed. Bed alarm on at all times.  PAIN MANAGMENT: Denies/Absence of non- verbal indicators of pain  DIET: Regular, good appetite. Feeds self with set up. Needs help ordering food.  BOWEL/BLADDER: Continent of bowel and bladder. Using bathroom, passing gas.   ABNL LAB/BG: NA  DRAIN/DEVICES: SL  TELEMETRY RHYTHM: SB/SR   SKIN: Mild blanchable redness to coccyx. Pt moves in bed freq.   TESTS/PROCEDURES: NA  D/C DAY/GOALS/PLACE: TBD pending workup. Pt will go home on cardiac monitor per cardiology.   OTHER IMPORTANT INFO: Neuro/OT/PT. Neurosurgery saw pt to check shunt during MRI. No issues noted.

## 2020-11-13 ENCOUNTER — DOCUMENTATION ONLY (OUTPATIENT)
Dept: CARDIOLOGY | Facility: CLINIC | Age: 80
End: 2020-11-13

## 2020-11-13 NOTE — PROGRESS NOTES
BioTel tracings-    11/8 Baseline: Leads reversed but shows sinus rhythm at 88 bpm with isolated PVC.    11/10 2nd degree AVB Type 1 Wenkeback w/ PVC at 60 bpm at 16:44 -AUTO trigger    Patient was hospitalized for LOC and bradycardia . Had Mobitz Type 1 AV block on tele in hospital-Was seen by Dr. Morales.    No return visit scheduled

## 2020-11-16 ENCOUNTER — HOSPITAL ENCOUNTER (EMERGENCY)
Facility: CLINIC | Age: 80
Discharge: HOME | End: 2020-11-16
Attending: EMERGENCY MEDICINE | Admitting: EMERGENCY MEDICINE
Payer: COMMERCIAL

## 2020-11-16 ENCOUNTER — APPOINTMENT (OUTPATIENT)
Dept: GENERAL RADIOLOGY | Facility: CLINIC | Age: 80
End: 2020-11-16
Attending: EMERGENCY MEDICINE
Payer: COMMERCIAL

## 2020-11-16 VITALS
SYSTOLIC BLOOD PRESSURE: 136 MMHG | RESPIRATION RATE: 13 BRPM | TEMPERATURE: 98.3 F | DIASTOLIC BLOOD PRESSURE: 77 MMHG | OXYGEN SATURATION: 96 % | HEART RATE: 50 BPM

## 2020-11-16 DIAGNOSIS — R07.9 CHEST PAIN, UNSPECIFIED TYPE: ICD-10-CM

## 2020-11-16 DIAGNOSIS — F03.90 DEMENTIA WITHOUT BEHAVIORAL DISTURBANCE, UNSPECIFIED DEMENTIA TYPE: ICD-10-CM

## 2020-11-16 LAB
ALBUMIN SERPL-MCNC: 4.2 G/DL (ref 3.4–5)
ALP SERPL-CCNC: 65 U/L (ref 40–150)
ALT SERPL W P-5'-P-CCNC: 31 U/L (ref 0–70)
ANION GAP SERPL CALCULATED.3IONS-SCNC: 4 MMOL/L (ref 3–14)
AST SERPL W P-5'-P-CCNC: 21 U/L (ref 0–45)
BASOPHILS # BLD AUTO: 0.1 10E9/L (ref 0–0.2)
BASOPHILS NFR BLD AUTO: 0.8 %
BILIRUB SERPL-MCNC: 0.4 MG/DL (ref 0.2–1.3)
BUN SERPL-MCNC: 22 MG/DL (ref 7–30)
CALCIUM SERPL-MCNC: 8.1 MG/DL (ref 8.5–10.1)
CHLORIDE SERPL-SCNC: 103 MMOL/L (ref 94–109)
CO2 SERPL-SCNC: 29 MMOL/L (ref 20–32)
CREAT SERPL-MCNC: 1.13 MG/DL (ref 0.66–1.25)
D DIMER PPP FEU-MCNC: 0.4 UG/ML FEU (ref 0–0.5)
DIFFERENTIAL METHOD BLD: ABNORMAL
EOSINOPHIL # BLD AUTO: 0.1 10E9/L (ref 0–0.7)
EOSINOPHIL NFR BLD AUTO: 1.1 %
ERYTHROCYTE [DISTWIDTH] IN BLOOD BY AUTOMATED COUNT: 15.3 % (ref 10–15)
GFR SERPL CREATININE-BSD FRML MDRD: 61 ML/MIN/{1.73_M2}
GLUCOSE SERPL-MCNC: 90 MG/DL (ref 70–99)
HCT VFR BLD AUTO: 35.5 % (ref 40–53)
HGB BLD-MCNC: 12 G/DL (ref 13.3–17.7)
IMM GRANULOCYTES # BLD: 0.1 10E9/L (ref 0–0.4)
IMM GRANULOCYTES NFR BLD: 0.8 %
INTERPRETATION ECG - MUSE: NORMAL
INTERPRETATION ECG - MUSE: NORMAL
LIPASE SERPL-CCNC: 216 U/L (ref 73–393)
LYMPHOCYTES # BLD AUTO: 1.5 10E9/L (ref 0.8–5.3)
LYMPHOCYTES NFR BLD AUTO: 23.4 %
MCH RBC QN AUTO: 28.6 PG (ref 26.5–33)
MCHC RBC AUTO-ENTMCNC: 33.8 G/DL (ref 31.5–36.5)
MCV RBC AUTO: 85 FL (ref 78–100)
MONOCYTES # BLD AUTO: 0.6 10E9/L (ref 0–1.3)
MONOCYTES NFR BLD AUTO: 9.7 %
NEUTROPHILS # BLD AUTO: 4 10E9/L (ref 1.6–8.3)
NEUTROPHILS NFR BLD AUTO: 64.2 %
NRBC # BLD AUTO: 0 10*3/UL
NRBC BLD AUTO-RTO: 0 /100
PLATELET # BLD AUTO: 273 10E9/L (ref 150–450)
POTASSIUM SERPL-SCNC: 3.9 MMOL/L (ref 3.4–5.3)
PROT SERPL-MCNC: 8.3 G/DL (ref 6.8–8.8)
RBC # BLD AUTO: 4.19 10E12/L (ref 4.4–5.9)
SODIUM SERPL-SCNC: 136 MMOL/L (ref 133–144)
TROPONIN I SERPL-MCNC: <0.015 UG/L (ref 0–0.04)
TROPONIN I SERPL-MCNC: <0.015 UG/L (ref 0–0.04)
WBC # BLD AUTO: 6.2 10E9/L (ref 4–11)

## 2020-11-16 PROCEDURE — 99285 EMERGENCY DEPT VISIT HI MDM: CPT | Mod: 25

## 2020-11-16 PROCEDURE — 83690 ASSAY OF LIPASE: CPT | Performed by: EMERGENCY MEDICINE

## 2020-11-16 PROCEDURE — 84484 ASSAY OF TROPONIN QUANT: CPT | Mod: 91 | Performed by: EMERGENCY MEDICINE

## 2020-11-16 PROCEDURE — 85379 FIBRIN DEGRADATION QUANT: CPT | Performed by: EMERGENCY MEDICINE

## 2020-11-16 PROCEDURE — 80053 COMPREHEN METABOLIC PANEL: CPT | Performed by: EMERGENCY MEDICINE

## 2020-11-16 PROCEDURE — 71046 X-RAY EXAM CHEST 2 VIEWS: CPT

## 2020-11-16 PROCEDURE — 93005 ELECTROCARDIOGRAM TRACING: CPT

## 2020-11-16 PROCEDURE — 93005 ELECTROCARDIOGRAM TRACING: CPT | Mod: 76

## 2020-11-16 PROCEDURE — 85025 COMPLETE CBC W/AUTO DIFF WBC: CPT | Performed by: EMERGENCY MEDICINE

## 2020-11-16 NOTE — PROGRESS NOTES
11- 15:15:24 Sinus Rhythm with fainted Fell unconfirmed sent to file Contacted Bio Tel for clarification of Findings and state patient pressed faint button and 3 attempts  Were made to patient Sinus Rhythm 70-80 bpm. Will attempt to contact patient with [employer] .   Spoke with patient's wife via [employer]  and she states the patient did not faint on 11- it was an error.  Per Discharge note needs a follow up OV to discuss findings of Event monitor after 12-7-2020 Dr. Morales was Cardiologist in the hospital. Patient verbalized understanding and agreed to plan of care.

## 2020-11-16 NOTE — ED AVS SNAPSHOT
[facility] Emergency Dept  [address] [address]  Phone: [phone]  Fax: [phone]                                    Travis Peres   MRN: [id number]    Department: [facility] Emergency Dept   Date of Visit: 11/16/2020           After Visit Summary Signature Page    I have received my discharge instructions, and my questions have been answered. I have discussed any challenges I see with this plan with the nurse or doctor.    ..........................................................................................................................................  Patient/Patient Representative Signature      ..........................................................................................................................................  Patient Representative Print Name and Relationship to Patient    ..................................................               ................................................  Date                                   Time    ..........................................................................................................................................  Reviewed by Signature/Title    ...................................................              ..............................................  Date                                               Time          22EPIC Rev 08/18

## 2020-11-16 NOTE — ED PROVIDER NOTES
"  History     Chief Complaint:  Chest Pain       The history is provided by the patient and the spouse. History limited by: Dementia and Cantonese Speaking. A  was used.      Travis Peres is a 80 year old male who presents with his wife for evaluation of chest pain.  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  He was hospitalized here from November 4-8 for encephalopathy that was attributed to dehydration.  Recent studies include a transthoracic echocardiogram without acute findings in late October, as well as an MRI of the brain and EEG did not reveal any acutely worrisome findings earlier this month.  Chart review shows \"chronic epigastric pain\".  He was apparently scheduled to be evaluated for cholecystectomy in the near future.  While at clinic around 230 today, as part of a follow-up from his recent hospitalization, he reported chest discomfort that started at rest.  Due to his dementia, he is unable to describe this in more detail, and is not even able to point now to the location where he previously had the pain.  No medications were given, and he now feels improved.  No cough or vomiting.  No leg swelling.  History was obtained through an official Cantonese  via iPad.      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  Hypovitaminosis D  Multiple pulmonary nodules  Osteopenia  Papillary thyroid carcinoma  Pituitary macroadenoma with extrasellar extension  Pneumonia "   Post-surgical hypothyroidism  Sepsis   Sphenoid sinusitis   Uncomplicated asthma  Ventriculoperitoneal shunt status   Vocal cord paralysis  Xerostomia     Past Surgical History:    Cymetra injection  Esophagoscopy, gastroscopy, duodenoscopy, combined x3  Hernia repair  Lipoma resection chest wall  Phacoemulsification clear cornea with standard intraocular lens implant x2  Right selective neck dissection level 2,3,4  Right  shunt placement  Thoracic surgery  Thymectomy  Transcervical extended mediastinal lymphadenectomy  Transnasal endoscopic resection of pituitary adenoma     Family History:    No past pertinent family history.     Social History:  Marital Status:   [2]  Presents with his wife  PCP: Karol Alvarez  Negative for tobacco use. Quit 2001  Negative for smokeless tobacco use.  Negative for alcohol use.  Negative for drug use.     Review of Systems   Unable to perform ROS: Dementia     Physical Exam     Patient Vitals for the past 24 hrs:   BP Temp Temp src Pulse Resp SpO2   11/16/20 1730 -- -- -- 50 13 96 %   11/16/20 1715 136/77 -- -- 51 16 98 %   11/16/20 1700 -- -- -- 52 15 99 %   11/16/20 1645 -- -- -- 53 20 --   11/16/20 1630 139/80 -- -- -- -- --   11/16/20 1607 129/60 98.3  F (36.8  C) Oral 56 18 98 %     Physical Exam  General: Nontoxic-appearing male sitting upright in room 3, wife at bedside  HENT: mucous membranes moist  CV: rate as above, regular rhythm, no lower extremity edema, no JVD, palpable symmetric radial pulses  Resp: normal effort, speaks in full phrases, no stridor, no cough observed  GI: abdomen soft and nontender, no guarding, negative Figueroa's sign  MSK: no bony tenderness to chest  Skin: appropriately warm and dry, no erythema or vesicles to chest wall  Neuro: alert, clear speech, very poor [place], moves all extremities, no nuchal rigidity,  equal, lift both legs off bed  Psych: cooperative    Emergency Department Course     ECG:  Indication: Chest pain   Time:  1643  Vent. Rate 54 bpm. LA interval 196. QRS duration 80. QT/QTc 482/457. P-R-T axis 65 79 82.  Sinus bradycardia. Voltage criteria for left ventricular hypertrophy. T wave abnormality, consider lateral ischemia. Abnormal ECG. No significant change compared to ECG dated 11/4/2020. Read time: 1645    Indication: chest pain  Time: 1813  Vent. Rate 54 bpm. LA interval 184. QRS duration 92. QT/QTc 484/458. P-R-T axis 61 80 72.  Sinus bradycardia. Moderate voltage criteria for LVH, may be normal variant. Nonspecific T wave abnormality. No ST depression. Abnormal ECG. No significant change compared to ECG dated 11/16/2020. Read time: 1818       Imaging:  Radiology findings were communicated with the patient and spouse who voiced understanding of the findings.    XR Chest 2 views:  AP and lateral views of the chest were obtained. Partially   visualized ventricular peritoneal shunt catheter coursing over the   right chest. Stable cardiomediastinal silhouette. Left upper lobe   calcified pulmonary granuloma, otherwise, no suspicious focal   pulmonary opacities. No significant pleural effusion or pneumothorax, as per radiology.      Laboratory:  Laboratory findings were communicated with the patient and spouse who voiced understanding of the findings.    CBC: WBC: 6.2, HGB: 12.0(L), PLT: 273  CMP: Glucose 90, Calcium: 8.1(L), o/w WNL (Creatinine: 1.13)    1709 Troponin: <0.015    1914 Repeat Troponin: <0.015    D dimer: 0.4  Lipase: 216    Emergency Department Course:  Past medical records, nursing notes, and vitals reviewed.    1650 I performed an exam of the patient as documented above.     EKG obtained in the ED, see results above.   IV was inserted and blood was drawn for laboratory testing, results above.  The patient was sent for a chest x-ray while in the emergency department, results above.     I performed electronic chart review in [employer].  The patient was placed on continuous cardiac and pulse ox  monitoring.    1932 I rechecked the patient and discussed the results of his workup thus far.     Findings and plan explained to the Patient and spouse. Patient discharged home with instructions regarding supportive care, medications, and reasons to return. The importance of close follow-up was reviewed.     I personally reviewed the laboratory and imaging results with the Patient and spouse and answered all related questions prior to discharge.     Impression & Plan   Medical Decision Making:  Given the patient's combination of dementia, language barrier, and him being extremely hard of hearing, it was very challenging to obtain a history, which is primarily through the patient's wife at bedside with the [email or web address] .  The cause of his acute nonexertional chest discomfort is unconfirmed despite testing as above.  Serial troponins did not reveal evidence of an acute coronary syndrome.  Normal D-dimer makes pulmonary embolism extremely unlikely, especially in light of low suspicion and his presenting vitals.  His discomfort resolved without treatment.  His abdomen is completely soft, mild considered abdominal conditions such as pancreatitis or biliary colic among numerous other possibilities such as infection, pneumothorax and aortic pathology, I think these are sufficiently unlikely that he can be reasonably discharged in the care of his wife for close outpatient follow-up.  The patient and his wife were very eager for discharge and did not wish to be hospitalized if not absolutely necessary.  He is welcome back for acute troubles at any hour.      Diagnosis:    ICD-10-CM    1. Chest pain, unspecified type  R07.9    2. Dementia without behavioral disturbance, unspecified dementia type (H)  F03.90        Disposition:  Discharged with wife    This note was completed in part using Dragon voice recognition software. Although reviewed after completion, some word and grammatical errors may occur.    Scribe  Disclosure:  I, Lizy Denadavintj, am serving as a scribe at 4:50 PM on 11/16/2020 to document services personally performed by George Reyna MD based on my observations and the provider's statements to me.     [facility] EMERGENCY DEPT       George Reyna MD  11/16/20 2818

## 2020-11-17 NOTE — ED NOTES
Patient assisted to restroom & getting dressed by ERT.    Wheelchair to ED waiting room with ERT also.

## 2020-11-24 ENCOUNTER — TRANSFERRED RECORDS (OUTPATIENT)
Dept: HEALTH INFORMATION MANAGEMENT | Facility: CLINIC | Age: 80
End: 2020-11-24

## 2020-11-30 ENCOUNTER — HOSPITAL ENCOUNTER (OUTPATIENT)
Dept: ULTRASOUND IMAGING | Facility: CLINIC | Age: 80
Discharge: HOME | End: 2020-11-30
Attending: INTERNAL MEDICINE | Admitting: INTERNAL MEDICINE
Payer: COMMERCIAL

## 2020-11-30 DIAGNOSIS — R79.89 ELEVATED SERUM CREATININE: ICD-10-CM

## 2020-11-30 PROCEDURE — 76770 US EXAM ABDO BACK WALL COMP: CPT

## 2020-12-02 ENCOUNTER — OFFICE VISIT (OUTPATIENT)
Dept: OPHTHALMOLOGY | Facility: CLINIC | Age: 80
End: 2020-12-02
Attending: OPHTHALMOLOGY
Payer: COMMERCIAL

## 2020-12-02 DIAGNOSIS — H35.3221 EXUDATIVE AGE-RELATED MACULAR DEGENERATION OF LEFT EYE WITH ACTIVE CHOROIDAL NEOVASCULARIZATION (H): ICD-10-CM

## 2020-12-02 DIAGNOSIS — H26.491 PCO (POSTERIOR CAPSULAR OPACIFICATION), RIGHT: Primary | ICD-10-CM

## 2020-12-02 PROCEDURE — 92134 CPTRZ OPH DX IMG PST SGM RTA: CPT | Performed by: OPHTHALMOLOGY

## 2020-12-02 PROCEDURE — 66821 AFTER CATARACT LASER SURGERY: CPT | Mod: RT | Performed by: OPHTHALMOLOGY

## 2020-12-02 PROCEDURE — G0463 HOSPITAL OUTPT CLINIC VISIT: HCPCS

## 2020-12-02 ASSESSMENT — VISUAL ACUITY
OD_SC: 20/25
OD_SC+: -2
OS_SC: 4'/200
METHOD: SNELLEN - LINEAR

## 2020-12-02 ASSESSMENT — CUP TO DISC RATIO
OD_RATIO: 0.5
OS_RATIO: 0.65

## 2020-12-02 ASSESSMENT — TONOMETRY
OD_IOP_MMHG: 11
IOP_METHOD: TONOPEN
OS_IOP_MMHG: 15

## 2020-12-02 ASSESSMENT — CONF VISUAL FIELD
OD_NORMAL: 1
OS_NORMAL: 1
METHOD: COUNTING FINGERS

## 2020-12-02 ASSESSMENT — EXTERNAL EXAM - LEFT EYE: OS_EXAM: NORMAL

## 2020-12-02 ASSESSMENT — EXTERNAL EXAM - RIGHT EYE: OD_EXAM: NORMAL

## 2020-12-02 ASSESSMENT — SLIT LAMP EXAM - LIDS
COMMENTS: NORMAL
COMMENTS: NORMAL

## 2020-12-02 NOTE — PROGRESS NOTES
Cc: follow up  status post CE/PCIOL right eye 08/21/18 ; status post yag 8.26.2020 left eye   Complaining of decreased vision and blurry vision right eye; no flashes and floaters      Interval history:  Follow up delayed due to COVID.  Vision is stable compared to prior. They feel like his allergies are worse.     Ok with resident injections  Likes subconj lido    HPI: reports far vision stable, difficulty with near vision, does not have reading glasses     OCULAR IMAGING  OCT Mac 12-2-20  Right eye: few small drusen - stable   Left eye: subfoveal non active CNVM without subretinal fluid / +intraretinal fluid slightly increased     Assessment and plan      1. 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 Stable VA and trace intraretinal fluid. non active CNVM     2.  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   - Weekly Amsler Grid use was discussed and training performed.   - A diet of leafy green vegetables was encouraged.   - Return precautions were given.    3. PVD OU   - RT/RD precautions    4. S/p CEIOL both eyes with posterior capsular opacity right eye   Status post yag left eye   Retina attached  Patient complaining of decreased vision right eye   Plan for yag right eye today and follow up in one week   Retina detachment precautions were discussed with the patient (presence or increased in flashes, floaters or a curtain in the visual field) and was asked to return if any of the those occur    ~~~~~~~~~~~~~~~~~~~~~~~~~~~~~~~~~~   Complete documentation of historical and exam elements from today's encounter can be found in the full encounter summary report (not reduplicated in this progress note).  I personally obtained the chief complaint(s) and  history of present illness.  I confirmed and edited as necessary the review of systems, past medical/surgical history, family history, social history, and examination findings as documented by others; and I examined the patient myself.  I personally reviewed the relevant tests, images, and reports as documented above.  I formulated and edited as necessary the assessment and plan and discussed the findings and management plan with the patient and family and I was present for the entire procedure performed by the resident/fellow.    Nadiya Allen MD   of Ophthalmology.  Retina Service   Department of Ophthalmology and Visual Neurosciences   [facility]  Phone: [phone]   Fax: [phone]

## 2020-12-04 NOTE — PROGRESS NOTES
BioTel strips from 12/2 received.    1. SR at 82 bpm-Auto trigger    2. SR with isolated PVC-80 bpm-Auto trigger    Tracings sent to file

## 2020-12-09 ENCOUNTER — OFFICE VISIT (OUTPATIENT)
Dept: OPHTHALMOLOGY | Facility: CLINIC | Age: 80
End: 2020-12-09
Attending: OPHTHALMOLOGY
Payer: COMMERCIAL

## 2020-12-09 DIAGNOSIS — Z48.810 AFTERCARE FOLLOWING SURGERY OF A SENSORY ORGAN: Primary | ICD-10-CM

## 2020-12-09 PROCEDURE — 99024 POSTOP FOLLOW-UP VISIT: CPT | Performed by: OPHTHALMOLOGY

## 2020-12-09 PROCEDURE — G0463 HOSPITAL OUTPT CLINIC VISIT: HCPCS

## 2020-12-09 ASSESSMENT — TONOMETRY
OD_IOP_MMHG: 15
IOP_METHOD: TONOPEN
OS_IOP_MMHG: 17

## 2020-12-09 ASSESSMENT — VISUAL ACUITY
OS_SC: 4/200 E
METHOD: SNELLEN - LINEAR
OD_SC+: -2
OD_SC: 20/25

## 2020-12-09 ASSESSMENT — SLIT LAMP EXAM - LIDS
COMMENTS: NORMAL
COMMENTS: NORMAL

## 2020-12-09 ASSESSMENT — EXTERNAL EXAM - LEFT EYE: OS_EXAM: NORMAL

## 2020-12-09 ASSESSMENT — CUP TO DISC RATIO
OD_RATIO: 0.5
OS_RATIO: 0.65

## 2020-12-09 ASSESSMENT — EXTERNAL EXAM - RIGHT EYE: OD_EXAM: NORMAL

## 2020-12-09 NOTE — PROGRESS NOTES
Cc: follow up  status post CE/PCIOL right eye 08/21/18 ; status post yag 8.26.2020 left eye  Now  s/p yag 12-2-20 right eye     Interval history:  Vision stable/mild improvement right eye since YAG post capsulotomy. Denies floaters, flashing lights     Ok with resident injections  Likes subconj lido    HPI: reports far vision stable, difficulty with near vision, does not have reading glasses     OCULAR IMAGING  OCT Mac 12-2-20  Right eye: few small drusen - stable   Left eye: subfoveal non active CNVM without subretinal fluid / +intraretinal fluid slightly increased     Assessment and plan      1. 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   - Weekly Amsler Grid use was discussed and training performed.   - A diet of leafy green vegetables was encouraged.   - Return precautions were given.   - weekly amsler grid, follow up in 6 months    3. PVD OU   - RT/RD precautions    4. S/p CEIOL both eyes with posterior capsular opacity right eye   -status post yag 8.26.2020 left eye s/p yag 12-2-20 right eye   - monitor     Return in 6 mo w DFE, OCT macula    Johnny Barron MD  Vitreoretinal Surgery Fellow  [facility]     ~~~~~~~~~~~~~~~~~~~~~~~~~~~~~~~~~~   Complete documentation of historical and exam elements from today's encounter can be found in the full encounter summary report (not reduplicated in this progress note).  I personally obtained the chief complaint(s) and history of present illness.  I confirmed and edited as necessary the review of systems, past medical/surgical history, family history, social history, and examination findings as documented by others; and I  examined the patient myself.  I personally reviewed the relevant tests, images, and reports as documented above.  I formulated and edited as necessary the assessment and plan and discussed the findings and management plan with the patient and family    Nadiya Allen MD   of Ophthalmology.  Retina Service   Department of Ophthalmology and Visual Neurosciences   [facility]  Phone: [phone]   Fax: [phone]

## 2020-12-09 NOTE — NURSING NOTE
Chief Complaints and History of Present Illnesses   Patient presents with     Post Op (Ophthalmology) Right Eye     Post YAG Capsulotomy right eye on 12/02/2020     Chief Complaint(s) and History of Present Illness(es)     Post Op (Ophthalmology) Right Eye     Laterality: right eye    Onset: 1 week ago    Course: stable    Associated symptoms: Negative for dryness, eye pain and redness    Treatments tried: no treatments    Pain scale: 0/10    Comments: Post YAG Capsulotomy right eye on 12/02/2020              Comments     He states that his vision has seemed stable, the same int he right eye, since the laser procedure.       Kay Zarate, LUIZ 10:50 AM  December 9, 2020

## 2020-12-10 NOTE — PROGRESS NOTES
Event monitor summary report received. Pt has 12/16/20 visit with . Copy of summary below. Will also have the summary placed with 's prep for 12/16/20 so he can sign it at the visit.

## 2020-12-16 ENCOUNTER — OFFICE VISIT (OUTPATIENT)
Dept: CARDIOLOGY | Facility: CLINIC | Age: 80
End: 2020-12-16
Payer: COMMERCIAL

## 2020-12-16 VITALS
HEART RATE: 65 BPM | HEIGHT: 65 IN | DIASTOLIC BLOOD PRESSURE: 77 MMHG | WEIGHT: 136 LBS | SYSTOLIC BLOOD PRESSURE: 125 MMHG | BODY MASS INDEX: 22.66 KG/M2

## 2020-12-16 DIAGNOSIS — E87.6 HYPOKALEMIA: ICD-10-CM

## 2020-12-16 DIAGNOSIS — R00.1 BRADYCARDIA: Primary | ICD-10-CM

## 2020-12-16 DIAGNOSIS — I10 ESSENTIAL HYPERTENSION: ICD-10-CM

## 2020-12-16 DIAGNOSIS — I25.10 ATHEROSCLEROSIS OF CORONARY ARTERY OF NATIVE HEART WITHOUT ANGINA PECTORIS, UNSPECIFIED VESSEL OR LESION TYPE: ICD-10-CM

## 2020-12-16 PROCEDURE — 99203 OFFICE O/P NEW LOW 30 MIN: CPT | Performed by: INTERNAL MEDICINE

## 2020-12-16 ASSESSMENT — MIFFLIN-ST. JEOR: SCORE: 1248.77

## 2020-12-16 NOTE — PROGRESS NOTES
CARDIOLOGY CLINIC CONSULTATION    REASON FOR CONSULT: Bradycardia    PRIMARY CARE PHYSICIAN:  Karol Alvarez    HISTORY OF PRESENT ILLNESS:  This is a pleasant 80-year-old male who is seeing me in clinic for consultation for low heart rates.  The patient denies any prior cardiovascular history however he is got known coronary disease on chest CT previously.  No MI or stenting reported.  He had a couple of admissions in November of this year to [facility] for being found down.  This was thought to be related to dehydration.  However EMS mentioned that his heart rate was low and bradycardia may have been contributing.  As a result of this we were asked to help with a event monitor on the patient.  That was done in November and the patient wore for 30 days.  No episodes of heart block however some second-degree type I AV block were noted.  No pauses or VT.  Patient has not had any further syncope since hospital discharge.  He complains of mild chest discomfort and is atypical and does not happen with activity all the time.  No heart failure symptoms reported.    History taking was hard and was done in the presence of an  on the phone.  However the wife was present in the room was talking to the .  The patient has dementia and flat affect and did not communicate much.  He was also very hard of hearing.      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     Post-surgical hypothyroidism 2007      Uncomplicated asthma      Vocal cord paralysis 2014    right     Xerostomia 2010    due to radiation exposures       MEDICATIONS:  Current Outpatient Medications   Medication     albuterol (PROAIR HFA) 108 (90 Base) MCG/ACT inhaler     alendronate (FOSAMAX) 70 MG tablet     amLODIPine (NORVASC) 2.5 MG tablet     docusate sodium (COLACE) 100 MG capsule     guaiFENesin (MUCINEX) 600 MG 12 hr tablet     hydrocortisone (CORTEF) 10 MG tablet     levothyroxine (SYNTHROID/LEVOTHROID) 150 MCG tablet     tamsulosin (FLOMAX) 0.4 MG capsule     traZODone (DESYREL) 100 MG tablet     umeclidinium-vilanterol (ANORO ELLIPTA) 62.5-25 MCG/INH oral inhaler     Current Facility-Administered Medications   Medication     ranibizumab (LUCENTIS) injection syringe 0.5 mg     Facility-Administered Medications Ordered in Other Visits   Medication     gadobutrol (GADAVIST) injection 7.5 mL       ALLERGIES:  Allergies   Allergen Reactions     Metoprolol Shortness Of Breath     Bradycardia, fatigue, COPD worsening.         Fenofibrate Hives     Lisinopril Cough     Losartan Cough     Niacin      Simvastatin Cramps       SOCIAL HISTORY:  I have reviewed this patient's social history and updated it with pertinent information if needed. Travis Peres  reports that he quit smoking about 19 years ago. His smoking use included cigarettes. He started smoking about 44 years ago. He has a 2.50 pack-year smoking history. He has never used smokeless tobacco. He reports that he does not drink alcohol or use drugs.    FAMILY HISTORY:  I have reviewed this patient's family history and updated it with pertinent information if needed.   Family History   Problem Relation Age of Onset     Cancer No family hx of         no skin cancer     Glaucoma No family hx of      Macular Degeneration No family hx of        REVIEW OF SYSTEMS:  Skin:  Negative     Eyes:  Negative    ENT:  Negative    Respiratory:  Negative    Cardiovascular:  Negative    Gastroenterology:  Negative    Genitourinary:  not assessed    Musculoskeletal:  Positive for joint stiffness  Neurologic:  Negative    Psychiatric:  Negative    Heme/Lymph/Imm:  Negative    Endocrine:  Negative        PHYSICAL EXAM:      BP: 125/77 Pulse: 65            Vital Signs with Ranges  Pulse:  [65-75] 65  BP: (125-145)/(74-77) 125/77  136 lbs 0 oz    Constitutional: awake, alert, no distress  Eyes: sclera nonicteric  ENT: trachea midline  Respiratory: Clear  Cardiovascular: Normal heart sounds systolic murmur normal JVP no edema  GI: nondistended  Neuropsychiatric: Flat affect    DATA:  Labs: Pertinent cardiac labs reviewed    Echocardiogram: Done in the hospital normal biventricular function.  No major valve disease.    ASSESSMENT:  Syncope of unclear etiology with encephalopathy noted during recent inpatient admission to [facility] chest pain    RECOMMENDATIONS:  1. At this point based on the event monitor his heart rate is fine.  I do not see any cardiac evidence of bradycardia arrhythmias or tachyarrhythmias that have contributed to syncope.  I told him to seek cardiology attention if this happens again.  In that case potentially a loop recorder could be considered.  2. I offered him a stress test for his atypical chest discomfort however they want to be conservative at this time.  They will call us if symptoms worsen.  3. I told him to follow-up with PCP and call us if needed.    ROBERT Lizarraga, [facility]  Cardiology - [facility]  December 16, 2020

## 2020-12-16 NOTE — LETTER
12/16/2020    Karol Alvarez  [address] [address]    RE: Travis MARY Peres       Dear Colleague,    I had the pleasure of seeing Travis HERNANDEZ Peres in the [facility].    CARDIOLOGY CLINIC CONSULTATION    REASON FOR CONSULT: Bradycardia    PRIMARY CARE PHYSICIAN:  Karol Alvarez    HISTORY OF PRESENT ILLNESS:  This is a pleasant 80-year-old male who is seeing me in clinic for consultation for low heart rates.  The patient denies any prior cardiovascular history however he is got known coronary disease on chest CT previously.  No MI or stenting reported.  He had a couple of admissions in November of this year to [facility] for being found down.  This was thought to be related to dehydration.  However EMS mentioned that his heart rate was low and bradycardia may have been contributing.  As a result of this we were asked to help with a event monitor on the patient.  That was done in November and the patient wore for 30 days.  No episodes of heart block however some second-degree type I AV block were noted.  No pauses or VT.  Patient has not had any further syncope since hospital discharge.  He complains of mild chest discomfort and is atypical and does not happen with activity all the time.  No heart failure symptoms reported.    History taking was hard and was done in the presence of an  on the phone.  However the wife was present in the room was talking to the .  The patient has dementia and flat affect and did not communicate much.  He was also very hard of hearing.      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     Hypovitaminosis D      Multiple pulmonary nodules 2009      Osteopenia 2011     Papillary thyroid carcinoma (H) 2007    4.8 cm, right, node positive;      Pituitary macroadenoma with extrasellar extension (H) 2007    causing obstructive hydrocephalus     Post-surgical hypothyroidism 2007     Uncomplicated asthma      Vocal cord paralysis 2014    right     Xerostomia 2010    due to radiation exposures       MEDICATIONS:  Current Outpatient Medications   Medication     albuterol (PROAIR HFA) 108 (90 Base) MCG/ACT inhaler     alendronate (FOSAMAX) 70 MG tablet     amLODIPine (NORVASC) 2.5 MG tablet     docusate sodium (COLACE) 100 MG capsule     guaiFENesin (MUCINEX) 600 MG 12 hr tablet     hydrocortisone (CORTEF) 10 MG tablet     levothyroxine (SYNTHROID/LEVOTHROID) 150 MCG tablet     tamsulosin (FLOMAX) 0.4 MG capsule     traZODone (DESYREL) 100 MG tablet     umeclidinium-vilanterol (ANORO ELLIPTA) 62.5-25 MCG/INH oral inhaler     Current Facility-Administered Medications   Medication     ranibizumab (LUCENTIS) injection syringe 0.5 mg     Facility-Administered Medications Ordered in Other Visits   Medication     gadobutrol (GADAVIST) injection 7.5 mL       ALLERGIES:  Allergies   Allergen Reactions     Metoprolol Shortness Of Breath     Bradycardia, fatigue, COPD worsening.         Fenofibrate Hives     Lisinopril Cough     Losartan Cough     Niacin      Simvastatin Cramps       SOCIAL HISTORY:  I have reviewed this patient's social history and updated it with pertinent information if needed. Travis Peres  reports that he quit smoking about 19 years ago. His smoking use included cigarettes. He started smoking about 44 years ago. He has a 2.50 pack-year smoking history. He has never used smokeless tobacco. He reports that he does not drink alcohol or use drugs.    FAMILY HISTORY:  I have reviewed this patient's family history and updated it with pertinent information if needed.   Family History   Problem Relation Age of Onset     Cancer No family hx of         no skin cancer      Glaucoma No family hx of      Macular Degeneration No family hx of        REVIEW OF SYSTEMS:  Skin:  Negative     Eyes:  Negative    ENT:  Negative    Respiratory:  Negative    Cardiovascular:  Negative    Gastroenterology: Negative    Genitourinary:  not assessed    Musculoskeletal:  Positive for joint stiffness  Neurologic:  Negative    Psychiatric:  Negative    Heme/Lymph/Imm:  Negative    Endocrine:  Negative        PHYSICAL EXAM:      BP: 125/77 Pulse: 65            Vital Signs with Ranges  Pulse:  [65-75] 65  BP: (125-145)/(74-77) 125/77  136 lbs 0 oz    Constitutional: awake, alert, no distress  Eyes: sclera nonicteric  ENT: trachea midline  Respiratory: Clear  Cardiovascular: Normal heart sounds systolic murmur normal JVP no edema  GI: nondistended  Neuropsychiatric: Flat affect    DATA:  Labs: Pertinent cardiac labs reviewed    Echocardiogram: Done in the hospital normal biventricular function.  No major valve disease.    ASSESSMENT:  Syncope of unclear etiology with encephalopathy noted during recent inpatient admission to [facility]  Atypical chest pain    RECOMMENDATIONS:  1. At this point based on the event monitor his heart rate is fine.  I do not see any cardiac evidence of bradycardia arrhythmias or tachyarrhythmias that have contributed to syncope.  I told him to seek cardiology attention if this happens again.  In that case potentially a loop recorder could be considered.  2. I offered him a stress test for his atypical chest discomfort however they want to be conservative at this time.  They will call us if symptoms worsen.  3. I told him to follow-up with PCP and call us if needed.    ROBERT Lizarraga, [facility]  Cardiology - [facility]  December 16, 2020        Thank you for allowing me to participate in the care of your patient.    Sincerely,     Bessie Morales MD     [facility]

## 2020-12-16 NOTE — LETTER
12/16/2020    Karol Alvarez  [address] [address]    RE: Travis MARY Peres       Dear Colleague,    I had the pleasure of seeing Travis HERNANDEZ Peres in the [facility].    CARDIOLOGY CLINIC CONSULTATION    REASON FOR CONSULT: Bradycardia    PRIMARY CARE PHYSICIAN:  Karol Alvarez    HISTORY OF PRESENT ILLNESS:  This is a pleasant 80-year-old male who is seeing me in clinic for consultation for low heart rates.  The patient denies any prior cardiovascular history however he is got known coronary disease on chest CT previously.  No MI or stenting reported.  He had a couple of admissions in November of this year to [facility] for being found down.  This was thought to be related to dehydration.  However EMS mentioned that his heart rate was low and bradycardia may have been contributing.  As a result of this we were asked to help with a event monitor on the patient.  That was done in November and the patient wore for 30 days.  No episodes of heart block however some second-degree type I AV block were noted.  No pauses or VT.  Patient has not had any further syncope since hospital discharge.  He complains of mild chest discomfort and is atypical and does not happen with activity all the time.  No heart failure symptoms reported.    History taking was hard and was done in the presence of an  on the phone.  However the wife was present in the room was talking to the .  The patient has dementia and flat affect and did not communicate much.  He was also very hard of hearing.      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     Hypovitaminosis D      Multiple pulmonary nodules 2009      Osteopenia 2011     Papillary thyroid carcinoma (H) 2007    4.8 cm, right, node positive;      Pituitary macroadenoma with extrasellar extension (H) 2007    causing obstructive hydrocephalus     Post-surgical hypothyroidism 2007     Uncomplicated asthma      Vocal cord paralysis 2014    right     Xerostomia 2010    due to radiation exposures       MEDICATIONS:  Current Outpatient Medications   Medication     albuterol (PROAIR HFA) 108 (90 Base) MCG/ACT inhaler     alendronate (FOSAMAX) 70 MG tablet     amLODIPine (NORVASC) 2.5 MG tablet     docusate sodium (COLACE) 100 MG capsule     guaiFENesin (MUCINEX) 600 MG 12 hr tablet     hydrocortisone (CORTEF) 10 MG tablet     levothyroxine (SYNTHROID/LEVOTHROID) 150 MCG tablet     tamsulosin (FLOMAX) 0.4 MG capsule     traZODone (DESYREL) 100 MG tablet     umeclidinium-vilanterol (ANORO ELLIPTA) 62.5-25 MCG/INH oral inhaler     Current Facility-Administered Medications   Medication     ranibizumab (LUCENTIS) injection syringe 0.5 mg     Facility-Administered Medications Ordered in Other Visits   Medication     gadobutrol (GADAVIST) injection 7.5 mL       ALLERGIES:  Allergies   Allergen Reactions     Metoprolol Shortness Of Breath     Bradycardia, fatigue, COPD worsening.         Fenofibrate Hives     Lisinopril Cough     Losartan Cough     Niacin      Simvastatin Cramps       SOCIAL HISTORY:  I have reviewed this patient's social history and updated it with pertinent information if needed. Travis Peres  reports that he quit smoking about 19 years ago. His smoking use included cigarettes. He started smoking about 44 years ago. He has a 2.50 pack-year smoking history. He has never used smokeless tobacco. He reports that he does not drink alcohol or use drugs.    FAMILY HISTORY:  I have reviewed this patient's family history and updated it with pertinent information if needed.   Family History   Problem Relation Age of Onset     Cancer No family hx of         no skin cancer      Glaucoma No family hx of      Macular Degeneration No family hx of        REVIEW OF SYSTEMS:  Skin:  Negative     Eyes:  Negative    ENT:  Negative    Respiratory:  Negative    Cardiovascular:  Negative    Gastroenterology: Negative    Genitourinary:  not assessed    Musculoskeletal:  Positive for joint stiffness  Neurologic:  Negative    Psychiatric:  Negative    Heme/Lymph/Imm:  Negative    Endocrine:  Negative        PHYSICAL EXAM:      BP: 125/77 Pulse: 65            Vital Signs with Ranges  Pulse:  [65-75] 65  BP: (125-145)/(74-77) 125/77  136 lbs 0 oz    Constitutional: awake, alert, no distress  Eyes: sclera nonicteric  ENT: trachea midline  Respiratory: Clear  Cardiovascular: Normal heart sounds systolic murmur normal JVP no edema  GI: nondistended  Neuropsychiatric: Flat affect    DATA:  Labs: Pertinent cardiac labs reviewed    Echocardiogram: Done in the hospital normal biventricular function.  No major valve disease.    ASSESSMENT:  Syncope of unclear etiology with encephalopathy noted during recent inpatient admission to [facility]  Atypical chest pain    RECOMMENDATIONS:  1. At this point based on the event monitor his heart rate is fine.  I do not see any cardiac evidence of bradycardia arrhythmias or tachyarrhythmias that have contributed to syncope.  I told him to seek cardiology attention if this happens again.  In that case potentially a loop recorder could be considered.  2. I offered him a stress test for his atypical chest discomfort however they want to be conservative at this time.  They will call us if symptoms worsen.  3. I told him to follow-up with PCP and call us if needed.    ROBERT Lizarraga, [facility]  Cardiology - [facility]  December 16, 2020            Thank you for allowing me to participate in the care of your patient.      Sincerely,     Bessie Morales MD     [facility]    cc:   Karol Alvarez  [address]  [facility]  [place] [address]

## 2021-01-12 ENCOUNTER — HOSPITAL ENCOUNTER (OUTPATIENT)
Dept: SPEECH THERAPY | Facility: CLINIC | Age: 81
Setting detail: THERAPIES SERIES
End: 2021-01-12
Attending: PEDIATRICS
Payer: COMMERCIAL

## 2021-01-12 PROCEDURE — 92610 EVALUATE SWALLOWING FUNCTION: CPT | Mod: GN | Performed by: STUDENT IN AN ORGANIZED HEALTH CARE EDUCATION/TRAINING PROGRAM

## 2021-01-12 NOTE — PROGRESS NOTES
[facility] OP SLP Clinical Swallow Evaluation  01/12/21 1800       Present Yes   Language Other  (Cantonese)   Comments  provided services via telephone   General Information   Type Of Visit Initial   Start Of Care Date 01/12/21   Referring Physician Karol Alvarez MD  (PCP)   Orders Evaluate And Treat   Orders Comment Swallow evaluation   Medical Diagnosis Aspiration, Dysphagia   Onset Of Illness/injury Or Date Of Surgery 01/04/21  (order date)   Precautions/limitations Fall Precautions   Hearing Significant hearing loss; pocket talker used during evaluation   Pertinent History of Current Problem/OT: Additional Occupational Profile Info 79yo male with PMH significant for COPD, CKD, osteopenia, 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  Pt reports that swallowing is no longer easy, noting that it feels like something is in the way when he swallows.  He experiences increased difficulty with solids and liquids.  His spouse reports frequent coughing during meals, noting that he almost always coughs when drinking liquids.  Pt feels that he only coughs occasionally, but spouse disagrees.  No recent PNA.   Respiratory Status Room air   Prior Level Of Function Swallowing   Prior Level Of Function Comment Pt currently consuming a regular diet with thin liquids   Patient Role/employment History Retired   Living Environment [place]   General Observations Pt was accompanied by his wife.   Patient/family Goals To have a safe swallow   Clinical Swallow Evaluation   Oral Musculature generally intact   Structural Abnormalities none present   Dentition present and adequate   Mucosal Quality adequate   Mandibular Strength and Mobility intact   Oral Labial Strength and Mobility WFL   Lingual Strength and Mobility impaired left lateral movement   Velar  Elevation intact   Buccal Strength and Mobility intact   Laryngeal Function Cough;Swallow;Voicing initiated;Dry swallow palpated  (Weak cough/voice, reduced hyolaryngeal excursion)   Oral Musculature Comments Difficult to assess d/t language and hearing barriers.  Weak left lingual musculature, weak cough, and weak/hoarse voice observed.   Additional Documentation Yes   Additional evaluation(s) completed today Recommended   Rationale for completing additional evaluation R/o aspiration, determine most appropriate diet   Clinical Swallow Eval: Thin Liquid Texture Trial   Mode of Presentation, Thin Liquids cup;self-fed   Volume of Liquid or Food Presented Sips of water x2   Oral Phase of Swallow Premature pharyngeal entry   Pharyngeal Phase of Swallow impaired;coughing/choking;wet vocal quality after swallow   Diagnostic Statement Cough and wet voice quality observed in 2/2 trials of thin liquid.   Clinical Swallow Eval: Puree Solid Texture Trial   Mode of Presentation, Puree spoon;self-fed   Volume of Puree Presented 1 tsp applesauce x2   Oral Phase, Puree WFL   Pharyngeal Phase, Puree impaired;repeated swallows   Diagnostic Statement Multiple swallows observed in 1/2 trials of puree.  No overt s/sx of aspiration/penetration observed.   Clinical Swallow Eval: Solid Food Texture Trial   Mode of Presentation, Solid self-fed   Volume of Solid Food Presented 1/4 kina cracker x1   Oral Phase, Solid Residue in oral cavity;other (see comments)  (Prolonged mastication)   Oral Residue, Solid mid posterior tongue   Pharyngeal Phase, Solid impaired;repeated swallows   Diagnostic Statement Multiple swallows observed.  Coughing with thin liquid wash, although residue was cleared from oral cavity.  Coughing persisted after PO trials.   Educational Assessment   Barriers to Learning Hearing   Preferred Learning Style Reading;Demonstration;Pictures/video   General Therapy Interventions   Planned Therapy Interventions Dysphagia  Treatment   Dysphagia treatment Modified diet education;Instruction of safe swallow strategies;Compensatory strategies for swallowing   Intervention Comments Eval only today.  Additional services TBD following VFSS.   Swallow Eval: Clinical Impressions   Skilled Criteria for Therapy Intervention Skilled criteria met.  Treatment indicated.   Functional Assessment Scale (FAS) 3   Dysphagia Outcome Severity Scale (ROHINI) Level 3 - ROHINI   Treatment Diagnosis Moderate oropharyngeal dysphagia   Diet texture recommendations Regular diet;Thin liquids  (Until VFSS)   Recommended Feeding/Eating Techniques maintain upright posture during/after eating for 30 mins;small sips/bites;other (see comments)  (Go slow, one bite/sip at a time, chew well)   Rehab Potential other (see comments)  (TBD following VFSS)   Predicted Duration of Therapy Intervention (days/wks) TBD following VFSS   Risks and Benefits of Treatment have been explained. Yes   Patient, family and/or staff in agreement with Plan of Care Yes   Clinical Impression Comments Mr. Peres presents with moderate oropharyngeal dysphagia on clinical swallow evaluation today.  Dysphagia symptoms include coughing/choking on liquids, repeated swallows of puree and solids, prolonged mastication of solids, and mid posterior lingual residue of solids.  Coughing observed with every trial of thin liquids today, representing a high risk for aspiration.  Cough is weak and likely not adequate to protect the airway.  RECOMMEND: 1. VFSS to assess swallow safety and determine most appropriate diet and swallow strategies/compensations.  2. Regular diet with thin liquids with use of the safe swallow strategies noted above until VFSS can take place.   Total Session Time   SLP Eval: oral/pharyngeal swallow function, clinical minutes (34653) 30   Total Evaluation Time 30     Thank you for the referral of this patient.    Allison Alpers Ackmann, B.A. (HUEY pichardo, CCC-SLP  Speech-Language  Pathologist  Certificate of Vocology  [facility]  [phone]

## 2021-02-02 ENCOUNTER — ANCILLARY PROCEDURE (OUTPATIENT)
Dept: GENERAL RADIOLOGY | Facility: CLINIC | Age: 81
End: 2021-02-02
Attending: FAMILY MEDICINE
Payer: COMMERCIAL

## 2021-02-02 ENCOUNTER — OFFICE VISIT (OUTPATIENT)
Dept: URGENT CARE | Facility: URGENT CARE | Age: 81
End: 2021-02-02
Payer: COMMERCIAL

## 2021-02-02 VITALS
OXYGEN SATURATION: 100 % | SYSTOLIC BLOOD PRESSURE: 142 MMHG | BODY MASS INDEX: 21.63 KG/M2 | TEMPERATURE: 97.8 F | RESPIRATION RATE: 20 BRPM | HEART RATE: 83 BPM | DIASTOLIC BLOOD PRESSURE: 84 MMHG | WEIGHT: 130 LBS

## 2021-02-02 DIAGNOSIS — R10.11 RUQ ABDOMINAL PAIN: Primary | ICD-10-CM

## 2021-02-02 DIAGNOSIS — R11.2 NAUSEA AND VOMITING, INTRACTABILITY OF VOMITING NOT SPECIFIED, UNSPECIFIED VOMITING TYPE: ICD-10-CM

## 2021-02-02 LAB
ALBUMIN SERPL-MCNC: 3.1 G/DL (ref 3.4–5)
ALP SERPL-CCNC: 66 U/L (ref 40–150)
ALT SERPL W P-5'-P-CCNC: 30 U/L (ref 0–70)
AMYLASE SERPL-CCNC: 17 U/L (ref 30–110)
ANION GAP SERPL CALCULATED.3IONS-SCNC: 3 MMOL/L (ref 3–14)
AST SERPL W P-5'-P-CCNC: 21 U/L (ref 0–45)
BASOPHILS # BLD AUTO: 0 10E9/L (ref 0–0.2)
BASOPHILS NFR BLD AUTO: 0.7 %
BILIRUB SERPL-MCNC: 0.4 MG/DL (ref 0.2–1.3)
BUN SERPL-MCNC: 12 MG/DL (ref 7–30)
CALCIUM SERPL-MCNC: 8.6 MG/DL (ref 8.5–10.1)
CHLORIDE SERPL-SCNC: 108 MMOL/L (ref 94–109)
CO2 SERPL-SCNC: 31 MMOL/L (ref 20–32)
CREAT SERPL-MCNC: 0.83 MG/DL (ref 0.66–1.25)
DIFFERENTIAL METHOD BLD: ABNORMAL
EOSINOPHIL # BLD AUTO: 0.3 10E9/L (ref 0–0.7)
EOSINOPHIL NFR BLD AUTO: 5.9 %
ERYTHROCYTE [DISTWIDTH] IN BLOOD BY AUTOMATED COUNT: 12.7 % (ref 10–15)
GFR SERPL CREATININE-BSD FRML MDRD: 82 ML/MIN/{1.73_M2}
GLUCOSE SERPL-MCNC: 140 MG/DL (ref 70–99)
HCT VFR BLD AUTO: 40.3 % (ref 40–53)
HGB BLD-MCNC: 13 G/DL (ref 13.3–17.7)
LIPASE SERPL-CCNC: 126 U/L (ref 73–393)
LYMPHOCYTES # BLD AUTO: 1.3 10E9/L (ref 0.8–5.3)
LYMPHOCYTES NFR BLD AUTO: 29.6 %
MCH RBC QN AUTO: 25.4 PG (ref 26.5–33)
MCHC RBC AUTO-ENTMCNC: 32.3 G/DL (ref 31.5–36.5)
MCV RBC AUTO: 79 FL (ref 78–100)
MONOCYTES # BLD AUTO: 0.6 10E9/L (ref 0–1.3)
MONOCYTES NFR BLD AUTO: 13.9 %
NEUTROPHILS # BLD AUTO: 2.2 10E9/L (ref 1.6–8.3)
NEUTROPHILS NFR BLD AUTO: 49.9 %
PLATELET # BLD AUTO: 185 10E9/L (ref 150–450)
POTASSIUM SERPL-SCNC: 3.3 MMOL/L (ref 3.4–5.3)
PROT SERPL-MCNC: 7.5 G/DL (ref 6.8–8.8)
RBC # BLD AUTO: 5.11 10E12/L (ref 4.4–5.9)
SODIUM SERPL-SCNC: 142 MMOL/L (ref 133–144)
WBC # BLD AUTO: 4.4 10E9/L (ref 4–11)

## 2021-02-02 PROCEDURE — 36415 COLL VENOUS BLD VENIPUNCTURE: CPT | Performed by: FAMILY MEDICINE

## 2021-02-02 PROCEDURE — 82150 ASSAY OF AMYLASE: CPT | Performed by: FAMILY MEDICINE

## 2021-02-02 PROCEDURE — 83690 ASSAY OF LIPASE: CPT | Performed by: FAMILY MEDICINE

## 2021-02-02 PROCEDURE — 80053 COMPREHEN METABOLIC PANEL: CPT | Performed by: FAMILY MEDICINE

## 2021-02-02 PROCEDURE — 71046 X-RAY EXAM CHEST 2 VIEWS: CPT | Performed by: RADIOLOGY

## 2021-02-02 PROCEDURE — 85025 COMPLETE CBC W/AUTO DIFF WBC: CPT | Performed by: FAMILY MEDICINE

## 2021-02-02 PROCEDURE — 99214 OFFICE O/P EST MOD 30 MIN: CPT | Performed by: FAMILY MEDICINE

## 2021-02-02 RX ORDER — ONDANSETRON 4 MG/1
4 TABLET, FILM COATED ORAL EVERY 8 HOURS PRN
Qty: 9 TABLET | Refills: 0 | Status: SHIPPED | OUTPATIENT
Start: 2021-02-02 | End: 2021-02-05

## 2021-02-02 NOTE — PROGRESS NOTES
"SUBJECTIVE  HPI: Travis Peres is a 80 year old male  who presents with the CC of abdominal/pelvic pain.   Pain is located in the RUQ area, with radiation to None    The pain is characterized as \"pain\".    Pain has been present for 2 week(s) and is slowly progressive.     EXACERBATING FACTORS: NEGATIVE.   RELIEVING FACTORS: NEGATIVE.    ASSOCIATED SX: nausea and vomiting.     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         Fenofibrate Hives     Lisinopril Cough     Losartan Cough     Niacin      Simvastatin Cramps     Social History     Tobacco Use     Smoking status: Former Smoker     Packs/day: 0.50     Years: 5.00     Pack years: 2.50     Types: Cigarettes     Start date: 1976     Quit date: 2001     Years since quittin.0     Smokeless tobacco: Never Used   Substance Use Topics     Alcohol use: No       ROS:CONSTITUTIONAL:NEGATIVE for fever, chills, change in weight  RESP:NEGATIVE for significant cough or SOB  GI: NEGATIVE for constipation and diarrhea    EXAMINATION:  BP (!) 142/84   Pulse 83   Temp 97.8  F (36.6  C)   Resp 20   Wt 59 kg (130 lb)   SpO2 100%   BMI 21.63 kg/m  GENERAL APPEARANCE: healthy, alert and no distress  RESP: lungs clear to " auscultation - no rales, rhonchi or wheezes  ABDOMEN: soft, normal bowel sounds, tenderness moderate RUQ      ICD-10-CM    1. RUQ abdominal pain  R10.11 CBC with platelets differential     Comprehensive metabolic panel     Amylase     Lipase     XR Chest 2 Views     omeprazole (PRILOSEC) 20 MG DR capsule     GASTROENTEROLOGY ADULT REF CONSULT ONLY   2. Nausea and vomiting, intractability of vomiting not specified, unspecified vomiting type  R11.2 CBC with platelets differential     Comprehensive metabolic panel     Amylase     Lipase     XR Chest 2 Views     omeprazole (PRILOSEC) 20 MG DR capsule     ondansetron (ZOFRAN) 4 MG tablet     GASTROENTEROLOGY ADULT REF CONSULT ONLY     F/U PCP/IM/FP, ED if worse

## 2021-02-17 DIAGNOSIS — C73 PAPILLARY THYROID CARCINOMA (H): ICD-10-CM

## 2021-02-17 DIAGNOSIS — E23.0 HYPOPITUITARISM (H): ICD-10-CM

## 2021-02-17 DIAGNOSIS — M85.9 LOW BONE DENSITY: ICD-10-CM

## 2021-02-17 DIAGNOSIS — E89.0 POSTSURGICAL HYPOTHYROIDISM: ICD-10-CM

## 2021-02-17 DIAGNOSIS — R91.8 PULMONARY NODULES: ICD-10-CM

## 2021-02-17 DIAGNOSIS — R93.89 ABNORMAL FINDING ON IMAGING: ICD-10-CM

## 2021-02-19 RX ORDER — LEVOTHYROXINE SODIUM 137 UG/1
TABLET ORAL
Qty: 90 TABLET | Refills: 4 | OUTPATIENT
Start: 2021-02-19

## 2021-02-19 NOTE — TELEPHONE ENCOUNTER
LEVOTHYROXINE 0.137MG (137MCG) TAB    levothyroxine (SYNTHROID/LEVOTHROID) tablet 137 mcg (Discontinued) 137 mcg EVERY MORNING BEFORE BREAKFAST 11/5/2020 11/4/2020 --   Admin Instructions: Separate oral administration of iron- or calcium-containing products and levothyroxine by at least 4 hours.   Class: E-Prescribe   Route: Oral   Order: [id number]   Printout Tracking    External Result Report   Medication Administration Instructions    Separate oral administration of iron- or calcium-containing products and levothyroxine by at least 4 hours.   Most Recent Administration    No Administrations Recorded   This Order Has Been Discontinued    Order Status Reason By On   Discontinued None Que Rae MD 11/4/20 5734

## 2021-02-23 NOTE — TELEPHONE ENCOUNTER
REFERRAL INFORMATION:    Referring Provider:  Dr. Phoenix Sanz    Referring Clinic:  [facility]     Reason for Visit/Diagnosis: RUQ Abdominal pain, Nausea, Vomiting      FUTURE VISIT INFORMATION:    Appointment Date: 3/16/2021    Appointment Time: 10 AM      NOTES STATUS DETAILS   OFFICE NOTE from Referring Provider Internal 2/2/2021 Office visit with Dr. Sanz     OFFICE NOTE from Other Specialist Internal 9/25/2020 Office visit with Dr. Ruth Obrien ([facility])     7/5/18 Office visit with Dr. Lyndsay Kinney ([facility])      HOSPITAL DISCHARGE SUMMARY/  ED VISITS Internal 9/17/19 ([facility])   10/4/16 ([facility])   OPERATIVE REPORT N/A    MEDICATION LIST Internal         ENDOSCOPY  Internal EGD: 10/16/2020, 9/20/19, 6/19/13, 6/11/09     COLONOSCOPY Internal 7/1/10   ERCP N/A    EUS N/A    STOOL TESTING N/A    PERTINENT LABS Internal    PATHOLOGY REPORTS (RELATED) Internal 10/16/2020,    IMAGING (CT, MRI, EGD, MRCP, Small Bowel Follow Through/SBT, MR/CT Enterography) Internal US Abdomen: 7/6/18

## 2021-03-16 ENCOUNTER — PRE VISIT (OUTPATIENT)
Dept: GASTROENTEROLOGY | Facility: CLINIC | Age: 81
End: 2021-03-16

## 2021-04-23 ENCOUNTER — OFFICE VISIT (OUTPATIENT)
Dept: URGENT CARE | Facility: URGENT CARE | Age: 81
End: 2021-04-23
Payer: COMMERCIAL

## 2021-04-23 VITALS
DIASTOLIC BLOOD PRESSURE: 74 MMHG | HEART RATE: 90 BPM | OXYGEN SATURATION: 95 % | RESPIRATION RATE: 18 BRPM | TEMPERATURE: 97.9 F | SYSTOLIC BLOOD PRESSURE: 147 MMHG

## 2021-04-23 DIAGNOSIS — R05.9 COUGH: Primary | ICD-10-CM

## 2021-04-23 DIAGNOSIS — T17.310A CHOKING DUE TO PHLEGM IN LARYNX, INITIAL ENCOUNTER: ICD-10-CM

## 2021-04-23 DIAGNOSIS — R68.2 DRY MOUTH: ICD-10-CM

## 2021-04-23 DIAGNOSIS — J02.9 SORE THROAT: ICD-10-CM

## 2021-04-23 LAB
DEPRECATED S PYO AG THROAT QL EIA: NEGATIVE
LABORATORY COMMENT REPORT: NORMAL
SARS-COV-2 RNA RESP QL NAA+PROBE: NEGATIVE
SARS-COV-2 RNA RESP QL NAA+PROBE: NORMAL
SPECIMEN SOURCE: NORMAL
STREP GROUP A PCR: NOT DETECTED

## 2021-04-23 PROCEDURE — U0005 INFEC AGEN DETEC AMPLI PROBE: HCPCS | Performed by: PHYSICIAN ASSISTANT

## 2021-04-23 PROCEDURE — 99214 OFFICE O/P EST MOD 30 MIN: CPT | Performed by: PHYSICIAN ASSISTANT

## 2021-04-23 PROCEDURE — 99N1174 PR STATISTIC STREP A RAPID: Performed by: PHYSICIAN ASSISTANT

## 2021-04-23 PROCEDURE — U0003 INFECTIOUS AGENT DETECTION BY NUCLEIC ACID (DNA OR RNA); SEVERE ACUTE RESPIRATORY SYNDROME CORONAVIRUS 2 (SARS-COV-2) (CORONAVIRUS DISEASE [COVID-19]), AMPLIFIED PROBE TECHNIQUE, MAKING USE OF HIGH THROUGHPUT TECHNOLOGIES AS DESCRIBED BY CMS-2020-01-R: HCPCS | Performed by: PHYSICIAN ASSISTANT

## 2021-04-23 PROCEDURE — 87651 STREP A DNA AMP PROBE: CPT | Performed by: PHYSICIAN ASSISTANT

## 2021-04-23 RX ORDER — AMOXICILLIN 875 MG
875 TABLET ORAL 2 TIMES DAILY
Qty: 20 TABLET | Refills: 0 | Status: SHIPPED | OUTPATIENT
Start: 2021-04-23 | End: 2021-05-03

## 2021-04-23 RX ORDER — NYSTATIN 100000/ML
500000 SUSPENSION, ORAL (FINAL DOSE FORM) ORAL 4 TIMES DAILY
Qty: 60 ML | Refills: 0 | Status: SHIPPED | OUTPATIENT
Start: 2021-04-23 | End: 2021-05-07

## 2021-04-23 RX ORDER — BENZONATATE 200 MG/1
200 CAPSULE ORAL 3 TIMES DAILY PRN
Qty: 21 CAPSULE | Refills: 0 | Status: SHIPPED | OUTPATIENT
Start: 2021-04-23 | End: 2021-04-30

## 2021-04-23 NOTE — PROGRESS NOTES
Assessment & Plan     Cough  Tessalon for coughing  Covid pending  - Symptomatic COVID-19 Virus (Coronavirus) by PCR  - Group A Streptococcus PCR Throat Swab  - benzonatate (TESSALON) 200 MG capsule; Take 1 capsule (200 mg) by mouth 3 times daily as needed    Sore throat  Strep throat neg  Mouth is erythema, dry  Possibly from thrush  - Streptococcus A Rapid Scr w Reflx to PCR  - nystatin (MYCOSTATIN) 292295 UNIT/ML suspension; Take 5 mLs (500,000 Units) by mouth 4 times daily for 14 days    Dry mouth  Nystatin mouthwash  - nystatin (MYCOSTATIN) 521596 UNIT/ML suspension; Take 5 mLs (500,000 Units) by mouth 4 times daily for 14 days    Choking due to phlegm in larynx, initial encounter  amox for discharge  - amoxicillin (AMOXIL) 875 MG tablet; Take 1 tablet (875 mg) by mouth 2 times daily for 10 days      No follow-ups on file.    Marquez Camargo PA-C  [facility] URGENT CARE JAE Robles is a 80 year old who presents for the following health issues     HPI     Throat pain, dry mouth  Cough  Purulent drainage    Review of Systems   Constitutional, HEENT, cardiovascular, pulmonary, gi and gu systems are negative, except as otherwise noted.      Objective    BP (!) 147/74 (BP Location: Left arm, Patient Position: Chair, Cuff Size: Adult Regular)   Pulse 90   Temp 97.9  F (36.6  C) (Tympanic)   Resp 18   SpO2 95%   There is no height or weight on file to calculate BMI.  Physical Exam   GENERAL: healthy, alert and no distress  EYES: Eyes grossly normal to inspection, PERRL and conjunctivae and sclerae normal  HENT: Positive for erythematous mouth, tongue, purulent drainage  NECK: no adenopathy, no asymmetry, masses, or scars and thyroid normal to palpation  RESP: lungs clear to auscultation - no rales, rhonchi or wheezes  CV: regular rate and rhythm, normal S1 S2, no S3 or S4, no murmur, click or rub, no peripheral edema and peripheral pulses strong  ABDOMEN: soft, nontender, no  hepatosplenomegaly, no masses and bowel sounds normal  MS: no gross musculoskeletal defects noted, no edema  SKIN: no suspicious lesions or rashes  NEURO: Normal strength and tone, mentation intact and speech normal  PSYCH: mentation appears normal, affect normal/bright    Results for orders placed or performed in visit on 04/23/21   Streptococcus A Rapid Scr w Reflx to PCR     Status: None    Specimen: Throat   Result Value Ref Range    Strep Specimen Description Throat     Streptococcus Group A Rapid Screen Negative NEG^Negative

## 2021-05-30 ENCOUNTER — ANCILLARY PROCEDURE (OUTPATIENT)
Dept: GENERAL RADIOLOGY | Facility: CLINIC | Age: 81
End: 2021-05-30
Attending: INTERNAL MEDICINE
Payer: COMMERCIAL

## 2021-05-30 ENCOUNTER — OFFICE VISIT (OUTPATIENT)
Dept: URGENT CARE | Facility: URGENT CARE | Age: 81
End: 2021-05-30
Payer: COMMERCIAL

## 2021-05-30 VITALS
SYSTOLIC BLOOD PRESSURE: 140 MMHG | RESPIRATION RATE: 20 BRPM | DIASTOLIC BLOOD PRESSURE: 80 MMHG | HEART RATE: 99 BPM | BODY MASS INDEX: 20.47 KG/M2 | OXYGEN SATURATION: 98 % | WEIGHT: 123 LBS | TEMPERATURE: 99.4 F

## 2021-05-30 DIAGNOSIS — R07.1 CHEST PAIN ON BREATHING: ICD-10-CM

## 2021-05-30 DIAGNOSIS — S20.211A CONTUSION OF RIGHT CHEST WALL, INITIAL ENCOUNTER: Primary | ICD-10-CM

## 2021-05-30 DIAGNOSIS — S63.502A WRIST SPRAIN, LEFT, INITIAL ENCOUNTER: ICD-10-CM
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
## 2021-05-30 DIAGNOSIS — M25.532 LEFT WRIST PAIN: ICD-10-CM

## 2021-05-30 LAB
SARS-COV-2 RNA RESP QL NAA+PROBE: NORMAL
SPECIMEN SOURCE: NORMAL

## 2021-05-30 PROCEDURE — 99214 OFFICE O/P EST MOD 30 MIN: CPT | Performed by: INTERNAL MEDICINE

## 2021-05-30 PROCEDURE — U0003 INFECTIOUS AGENT DETECTION BY NUCLEIC ACID (DNA OR RNA); SEVERE ACUTE RESPIRATORY SYNDROME CORONAVIRUS 2 (SARS-COV-2) (CORONAVIRUS DISEASE [COVID-19]), AMPLIFIED PROBE TECHNIQUE, MAKING USE OF HIGH THROUGHPUT TECHNOLOGIES AS DESCRIBED BY CMS-2020-01-R: HCPCS | Performed by: INTERNAL MEDICINE

## 2021-05-30 PROCEDURE — 71101 X-RAY EXAM UNILAT RIBS/CHEST: CPT | Mod: RT | Performed by: RADIOLOGY

## 2021-05-30 PROCEDURE — U0005 INFEC AGEN DETEC AMPLI PROBE: HCPCS | Performed by: INTERNAL MEDICINE

## 2021-05-30 PROCEDURE — 93000 ELECTROCARDIOGRAM COMPLETE: CPT | Performed by: INTERNAL MEDICINE

## 2021-05-30 PROCEDURE — 73110 X-RAY EXAM OF WRIST: CPT | Mod: LT | Performed by: RADIOLOGY

## 2021-05-30 NOTE — PROGRESS NOTES
Assessment & Plan     Contusion of right chest wall, initial encounter  Considered differential diagnosis of acute right-sided chest pain following a fall with associated shortness of breath.  This includes rib fracture, pneumothorax, pulmonary embolus, chest wall contusion, pectoral muscle strain, referred pain from the shoulder.  The CXR rules out rib fracture and pneumothorax and the character of pain is very unlikely to be pulmonary embolus without associated risk factors.  There is no other physical exam findings in the right clavicle or shoulder.  Consequently, this appears to be a chest wall contusion as a diagnosis of exclusion.  Conservative management discussed.  - acetaminophen-codeine (TYLENOL #3) 300-30 MG tablet; Take 1 tablet by mouth every 6 hours as needed for severe pain    Wrist sprain, left, initial encounter  No fracture seen on x-ray.  Exam is benign.  The wrist is ACE wrapped.  RICE.  - acetaminophen-codeine (TYLENOL #3) 300-30 MG tablet; Take 1 tablet by mouth every 6 hours as needed for severe pain    Chest pain on breathing  - EKG 12-lead complete w/read - Clinics  - Symptomatic COVID-19 Virus (Coronavirus) by PCR  - XR Ribs & Chest Right G/E 3 Views    Left wrist pain  - XR Wrist Left G/E 3 Views    Fall on same level from slipping, tripping or stumbling, initial encounter  - XR Ribs & Chest Right G/E 3 Views  - XR Wrist Left G/E 3 Views    Total time spent in obtaining history, working through , care coordination and counseling was 40 minutes.    Louie Chavez MD  [facility] is a 80 year old who presents for the following health issues  accompanied by his spouse:    HPI   History is obtained with .  Wife reports that patient was playing with dog when he tripped and fell.  This occurred yesterday afternoon.  The fall was unwitnessed. Now reporting pain in the right chest and left hand.  Right chest pain is  pleuritic.  Also noting that the left wrist is hurting, particularly with wrist extension and pressure.  He is able to spontaneously move the left wrist and is able to walk.      Review of Systems   Constitutional, HEENT, cardiovascular, pulmonary, gi and gu systems are negative, except as otherwise noted.      Objective    BP (!) 140/80   Pulse 99   Temp 99.4  F (37.4  C)   Resp 20   Wt 55.8 kg (123 lb)   SpO2 98%   BMI 20.47 kg/m    Body mass index is 20.47 kg/m .  Physical Exam   GENERAL APPEARANCE: elderly male, lying supine on exam table and moaning  RESP: lungs clear to auscultation - no rales, rhonchi or wheezes  CV: regular rates and rhythm, normal S1 S2, no S3 or S4 and no murmur, click or rub  ABDOMEN: soft, nontender, without hepatosplenomegaly or masses and bowel sounds normal  MS: focal tenderness to palpation over the right anterior chest wall (4th/5th ribs) without associated ecchymosis, deformity, swelling; the left wrist and forearm also does not show significant ecchymosis, swelling; no bony tenderness of the radius/ulna/metacarpals.  Some tenderness focally in the wrist on the dorsal/medial aspect.  PROM is intact with forearm supination/pronation/flexion/extension and pain-free.  PROM is intact in the wrist with flexion/extension and moderately painful.  4/5 .  AROM is intact in the wrist with flexion/extension with increase in pain.    CXR - Reviewed and interpreted by me demonstrates no rib fracture or pneumothroax.  EKG - Reviewed and interpreted by me shows sinus rhythm with baseline increased QRS voltage and baseline flattening of the ST segments throughout with T wave inversion laterally.  This is unchanged from tracing obtained in 10/2020 and 11/2020.    Results for orders placed or performed in visit on 05/30/21 (from the past 24 hour(s))   XR Wrist Left G/E 3 Views    Narrative    WRIST LEFT THREE OR MORE VIEWS  5/30/2021 10:49 AM    CLINICAL INFORMATION: Fall on left arm; pain  and tenderness in the  left wrist, evaluate for fracture. Left wrist pain. Fall on same level  from slipping, tripping or stumbling, initial encounter.     ADDITIONAL INFORMATION: none    COMPARISON: none    FINDINGS:  Degenerative change at the wrist joint, DRUJ, STT joint,  and first CMC joint. No evidence for fracture. No erosive changes are  identified. Normal carpal alignment.    VLADIMIR GARZA MD     *Note: Due to a large number of results and/or encounters for the requested time period, some results have not been displayed. A complete set of results can be found in Results Review.

## 2021-05-31 LAB
LABORATORY COMMENT REPORT: NORMAL
SARS-COV-2 RNA RESP QL NAA+PROBE: NEGATIVE
SPECIMEN SOURCE: NORMAL

## 2021-06-02 DIAGNOSIS — H35.3221 EXUDATIVE AGE-RELATED MACULAR DEGENERATION OF LEFT EYE WITH ACTIVE CHOROIDAL NEOVASCULARIZATION (H): Primary | ICD-10-CM

## 2021-06-04 ENCOUNTER — TELEPHONE (OUTPATIENT)
Dept: ENDOCRINOLOGY | Facility: CLINIC | Age: 81
End: 2021-06-04

## 2021-06-04 NOTE — TELEPHONE ENCOUNTER
[facility] Call Center    Phone Message    May a detailed message be left on voicemail: yes     Reason for Call: Other: Pt wanted to schedule a follow up appt with [facility], and stated that their PCP told them they need an in-person appt with [facility]. No in-person appt available in provider's template per autosearch. Please review, and call Pt back with Cantonese  if in-person request can be accommodated. (If unable to get a Cantonese  OK to send response in mail, so they can have it translated for them.)     Action Taken: Message routed to:  [facility] ([facility] endo    Travel Screening: Not Applicable

## 2021-06-08 NOTE — TELEPHONE ENCOUNTER
Please schedule him to the 8/2/2021 4 PM PAN 1 hour space.  Appts with Mr Peres always take an  Hour.   Thanks  Corina Potts MD

## 2021-06-09 ENCOUNTER — OFFICE VISIT (OUTPATIENT)
Dept: OPHTHALMOLOGY | Facility: CLINIC | Age: 81
End: 2021-06-09
Attending: OPHTHALMOLOGY
Payer: COMMERCIAL

## 2021-06-09 DIAGNOSIS — H35.3221 EXUDATIVE AGE-RELATED MACULAR DEGENERATION OF LEFT EYE WITH ACTIVE CHOROIDAL NEOVASCULARIZATION (H): ICD-10-CM

## 2021-06-09 PROCEDURE — G0463 HOSPITAL OUTPT CLINIC VISIT: HCPCS

## 2021-06-09 PROCEDURE — 92134 CPTRZ OPH DX IMG PST SGM RTA: CPT | Performed by: OPHTHALMOLOGY

## 2021-06-09 PROCEDURE — 92012 INTRM OPH EXAM EST PATIENT: CPT | Performed by: OPHTHALMOLOGY

## 2021-06-09 ASSESSMENT — EXTERNAL EXAM - LEFT EYE: OS_EXAM: NORMAL

## 2021-06-09 ASSESSMENT — VISUAL ACUITY
OS_SC: 20/400
METHOD: SNELLEN - LINEAR
OD_SC: 20/25
OD_SC+: -3

## 2021-06-09 ASSESSMENT — CONF VISUAL FIELD
OD_SUPERIOR_NASAL_RESTRICTION: 3
OS_SUPERIOR_TEMPORAL_RESTRICTION: 3
OD_INFERIOR_NASAL_RESTRICTION: 3
OD_INFERIOR_TEMPORAL_RESTRICTION: 3
OS_SUPERIOR_NASAL_RESTRICTION: 3
OS_INFERIOR_NASAL_RESTRICTION: 3
OD_SUPERIOR_TEMPORAL_RESTRICTION: 3
OS_INFERIOR_TEMPORAL_RESTRICTION: 3

## 2021-06-09 ASSESSMENT — TONOMETRY
OS_IOP_MMHG: 14
OD_IOP_MMHG: 12
IOP_METHOD: TONOPEN

## 2021-06-09 ASSESSMENT — CUP TO DISC RATIO
OS_RATIO: 0.65
OD_RATIO: 0.5

## 2021-06-09 ASSESSMENT — EXTERNAL EXAM - RIGHT EYE: OD_EXAM: NORMAL

## 2021-06-09 ASSESSMENT — SLIT LAMP EXAM - LIDS
COMMENTS: NORMAL
COMMENTS: NORMAL

## 2021-06-09 NOTE — NURSING NOTE
Chief Complaints and History of Present Illnesses   Patient presents with     Exudative Macular Degeneration Follow Up     Chief Complaint(s) and History of Present Illness(es)     Exudative Macular Degeneration Follow Up     Laterality: left eye    Onset: gradual    Associated symptoms: Negative for eye pain, flashes and floaters    Pain scale: 0/10              Comments     Travis is here to continue care for Exudative age-related macular degeneration of left eye with active choroidal neovascularization. He states vision is about the same as last visit.      Rayshawn Fulton COT 10:46 AM June 9, 2021

## 2021-06-09 NOTE — PROGRESS NOTES
Cc: follow up choroidal neovascular membrane left eye   Interval history: Vision stable. Denies floaters, flashing lights   HPI: reports far vision stable, difficulty with near vision, does not have reading glasses   Ok with resident injections. Likes subconj lido    OCULAR IMAGING  OCT Mac 06/09/21   Right eye: few small drusen - stable   Left eye: subfoveal non active CNVM without subretinal fluid / +intraretinal fluid slightly increased     Assessment and plan      1. 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   - Weekly Amsler Grid use was discussed and training performed.   - A diet of leafy green vegetables was encouraged.   - Return precautions were given.   - weekly amsler grid, follow up in 6 months    3. PVD OU   - RT/RD precautions    4. S/p CEIOL both eyes with posterior capsular opacity right eye   -status post yag 8.26.2020 left eye s/p yag 12-2-20 right eye   - monitor     Return in 6 mo w DFE, OCT macula  ~~~~~~~~~~~~~~~~~~~~~~~~~~~~~~~~~~   Complete documentation of historical and exam elements from today's encounter can be found in the full encounter summary report (not reduplicated in this progress note).  I personally obtained the chief complaint(s) and history of present illness.  I confirmed and edited as necessary the review of systems, past medical/surgical history, family history, social history, and examination findings as documented by others; and I examined the patient myself.  I personally reviewed the relevant tests, images, and reports as documented above.  I formulated and edited as  necessary the assessment and plan and discussed the findings and management plan with the patient and family    Nadiya Allen MD   of Ophthalmology.  Retina Service   Department of Ophthalmology and Visual Neurosciences   [facility]  Phone: [phone]   Fax: [phone]

## 2021-07-23 ENCOUNTER — OFFICE VISIT (OUTPATIENT)
Dept: URGENT CARE | Facility: URGENT CARE | Age: 81
End: 2021-07-23
Payer: COMMERCIAL

## 2021-07-23 VITALS
SYSTOLIC BLOOD PRESSURE: 127 MMHG | RESPIRATION RATE: 20 BRPM | DIASTOLIC BLOOD PRESSURE: 74 MMHG | BODY MASS INDEX: 20.14 KG/M2 | OXYGEN SATURATION: 96 % | WEIGHT: 121 LBS | HEART RATE: 63 BPM | TEMPERATURE: 97.9 F

## 2021-07-23 DIAGNOSIS — R05.9 COUGH: ICD-10-CM

## 2021-07-23 DIAGNOSIS — R09.82 POST-NASAL DRAINAGE: ICD-10-CM

## 2021-07-23 DIAGNOSIS — J02.9 SORE THROAT: Primary | ICD-10-CM

## 2021-07-23 DIAGNOSIS — R09.89 CHEST CONGESTION: ICD-10-CM

## 2021-07-23 DIAGNOSIS — Z20.822 SUSPECTED COVID-19 VIRUS INFECTION: ICD-10-CM

## 2021-07-23 LAB
DEPRECATED S PYO AG THROAT QL EIA: NEGATIVE
GROUP A STREP BY PCR: NOT DETECTED
SARS-COV-2 RNA RESP QL NAA+PROBE: NEGATIVE

## 2021-07-23 PROCEDURE — 99214 OFFICE O/P EST MOD 30 MIN: CPT | Performed by: PHYSICIAN ASSISTANT

## 2021-07-23 PROCEDURE — U0005 INFEC AGEN DETEC AMPLI PROBE: HCPCS | Performed by: PHYSICIAN ASSISTANT

## 2021-07-23 PROCEDURE — U0003 INFECTIOUS AGENT DETECTION BY NUCLEIC ACID (DNA OR RNA); SEVERE ACUTE RESPIRATORY SYNDROME CORONAVIRUS 2 (SARS-COV-2) (CORONAVIRUS DISEASE [COVID-19]), AMPLIFIED PROBE TECHNIQUE, MAKING USE OF HIGH THROUGHPUT TECHNOLOGIES AS DESCRIBED BY CMS-2020-01-R: HCPCS | Performed by: PHYSICIAN ASSISTANT

## 2021-07-23 PROCEDURE — 87651 STREP A DNA AMP PROBE: CPT | Performed by: PHYSICIAN ASSISTANT

## 2021-07-23 RX ORDER — GUAIFENESIN 600 MG/1
1200 TABLET, EXTENDED RELEASE ORAL 2 TIMES DAILY
Qty: 30 TABLET | Refills: 1 | Status: SHIPPED | OUTPATIENT
Start: 2021-07-23 | End: 2021-09-26

## 2021-07-23 RX ORDER — BENZONATATE 200 MG/1
200 CAPSULE ORAL 3 TIMES DAILY PRN
Qty: 28 CAPSULE | Refills: 1 | Status: SHIPPED | OUTPATIENT
Start: 2021-07-23 | End: 2021-08-06

## 2021-07-23 NOTE — PROGRESS NOTES
Assessment & Plan     Sore throat  Strep test neg  Strep culture pendng  - Streptococcus A Rapid Scr w Reflx to PCR - Lab Collect  - Group A Streptococcus PCR Throat Swab    Suspected COVID-19 virus infection  covid pending  Check my charge  - Asymptomatic COVID-19 Virus (Coronavirus) by PCR Nasopharyngeal    Post-nasal drainage  This purulent nasal drainage, congestion  mucinex to thin out mucous  - amoxicillin-clavulanate (AUGMENTIN) 875-125 MG tablet; Take 1 tablet by mouth 2 times daily for 14 days  - guaiFENesin (MUCINEX) 600 MG 12 hr tablet; Take 2 tablets (1,200 mg) by mouth 2 times daily    Chest congestion  augmentin for chest congestion, productive cough  - amoxicillin-clavulanate (AUGMENTIN) 875-125 MG tablet; Take 1 tablet by mouth 2 times daily for 14 days  - guaiFENesin (MUCINEX) 600 MG 12 hr tablet; Take 2 tablets (1,200 mg) by mouth 2 times daily    Cough  Tessalon for cough  - benzonatate (TESSALON) 200 MG capsule; Take 1 capsule (200 mg) by mouth 3 times daily as needed for cough      Follow up with PCP if symptoms persist or worsen    Marquez Camargo PA-C  [facility] URGENT CARE JAE Robles is a 80 year old who presents for the following health issues  accompanied by his spouse:    HPI     Chest congestion  nasal drainage  Thick mucous in throat    Review of Systems   Constitutional, HEENT, cardiovascular, pulmonary, gi and gu systems are negative, except as otherwise noted.      Objective    /74   Pulse 63   Temp 97.9  F (36.6  C)   Resp 20   Wt 54.9 kg (121 lb)   SpO2 96%   BMI 20.14 kg/m    Body mass index is 20.14 kg/m .  Physical Exam   GENERAL: healthy, alert and no distress  EYES: Eyes grossly normal to inspection, PERRL and conjunctivae and sclerae normal  HENT: normal cephalic/atraumatic and rhinorrhea thick and purulent  NECK: no adenopathy, no asymmetry, masses, or scars and thyroid normal to palpation  RESP: lungs clear to auscultation - no rales,  rhonchi or wheezes  CV: regular rate and rhythm, normal S1 S2, no S3 or S4, no murmur, click or rub, no peripheral edema and peripheral pulses strong  ABDOMEN: soft, nontender, no hepatosplenomegaly, no masses and bowel sounds normal  MS: no gross musculoskeletal defects noted, no edema  SKIN: no suspicious lesions or rashes  NEURO: Normal strength and tone, mentation intact and speech normal

## 2021-08-02 ENCOUNTER — OFFICE VISIT (OUTPATIENT)
Dept: ENDOCRINOLOGY | Facility: CLINIC | Age: 81
End: 2021-08-02
Payer: COMMERCIAL

## 2021-08-02 ENCOUNTER — LAB (OUTPATIENT)
Dept: LAB | Facility: CLINIC | Age: 81
End: 2021-08-02

## 2021-08-02 ENCOUNTER — TELEPHONE (OUTPATIENT)
Dept: ENDOCRINOLOGY | Facility: CLINIC | Age: 81
End: 2021-08-02

## 2021-08-02 VITALS
WEIGHT: 121.5 LBS | HEART RATE: 73 BPM | SYSTOLIC BLOOD PRESSURE: 116 MMHG | BODY MASS INDEX: 20.22 KG/M2 | DIASTOLIC BLOOD PRESSURE: 71 MMHG

## 2021-08-02 DIAGNOSIS — R63.4 WEIGHT LOSS: ICD-10-CM

## 2021-08-02 DIAGNOSIS — E89.0 POSTSURGICAL HYPOTHYROIDISM: ICD-10-CM

## 2021-08-02 DIAGNOSIS — C73 PAPILLARY THYROID CARCINOMA (H): ICD-10-CM

## 2021-08-02 DIAGNOSIS — R29.6 FALLS FREQUENTLY: ICD-10-CM

## 2021-08-02 DIAGNOSIS — E83.51 HYPOCALCEMIA: ICD-10-CM

## 2021-08-02 DIAGNOSIS — Z79.83 LONG TERM (CURRENT) USE OF BISPHOSPHONATES: ICD-10-CM

## 2021-08-02 DIAGNOSIS — E89.0 POSTSURGICAL HYPOTHYROIDISM: Primary | ICD-10-CM

## 2021-08-02 DIAGNOSIS — M85.9 LOW BONE DENSITY: ICD-10-CM

## 2021-08-02 DIAGNOSIS — M85.80 OSTEOPENIA, UNSPECIFIED LOCATION: ICD-10-CM

## 2021-08-02 LAB
FASTING STATUS PATIENT QL REPORTED: NO
GLUCOSE BLD-MCNC: 91 MG/DL (ref 70–99)
T4 FREE SERPL-MCNC: 1.68 NG/DL (ref 0.76–1.46)
TSH SERPL DL<=0.005 MIU/L-ACNC: <0.01 MU/L (ref 0.4–4)

## 2021-08-02 PROCEDURE — 82947 ASSAY GLUCOSE BLOOD QUANT: CPT | Performed by: PATHOLOGY

## 2021-08-02 PROCEDURE — 84432 ASSAY OF THYROGLOBULIN: CPT

## 2021-08-02 PROCEDURE — 82306 VITAMIN D 25 HYDROXY: CPT

## 2021-08-02 PROCEDURE — 84439 ASSAY OF FREE THYROXINE: CPT | Performed by: PATHOLOGY

## 2021-08-02 PROCEDURE — 99215 OFFICE O/P EST HI 40 MIN: CPT

## 2021-08-02 PROCEDURE — 36415 COLL VENOUS BLD VENIPUNCTURE: CPT | Performed by: PATHOLOGY

## 2021-08-02 PROCEDURE — 99417 PROLNG OP E/M EACH 15 MIN: CPT

## 2021-08-02 PROCEDURE — 84443 ASSAY THYROID STIM HORMONE: CPT | Performed by: PATHOLOGY

## 2021-08-02 PROCEDURE — 86800 THYROGLOBULIN ANTIBODY: CPT

## 2021-08-02 RX ORDER — LEVOTHYROXINE SODIUM 137 UG/1
137 TABLET ORAL DAILY
Qty: 90 TABLET | Refills: 4 | Status: SHIPPED | OUTPATIENT
Start: 2021-08-02 | End: 2022-05-23

## 2021-08-02 ASSESSMENT — PAIN SCALES - GENERAL: PAINLEVEL: NO PAIN (0)

## 2021-08-02 NOTE — PROGRESS NOTES
Chief Complaint   Patient presents with     RECHECK     Postsurgical Hypothyroidism     Brandee Mcpherson MA

## 2021-08-02 NOTE — LETTER
Patient:  Travis Peres  :   1940  MRN:     [id number]        Mr.Hau MARY Peres  [address] [address]        2021    Dear ,    We are writing to inform you of your test results from today.  Some tests are still pending.  I recommend to reduce the levothyroxine from 150 to 137 mcg/day.  I have submitted new Rx to your pharmacy.       Resulted Orders   TSH   Result Value Ref Range    TSH <0.01 (L) 0.40 - 4.00 mU/L   T4 free   Result Value Ref Range    Free T4 1.68 (H) 0.76 - 1.46 ng/dL   Glucose   Result Value Ref Range    Glucose 91 70 - 99 mg/dL    Patient Fasting > 8hrs? No        Sincerely,    Corina Potts MD    [id number]  1940

## 2021-08-02 NOTE — PROGRESS NOTES
Endocrinology clinic visit note.     Papillary thyroid carcinoma (HCC)  4.8 cm right, bilateral node positive. He has been treated with total thyroidectomy, 131I x 2 (cummulative dose 346.4 mCi) His last 131I TBS (2008) post therapy scan raised question of ? lung mets and also ? met above left kidney. Cervical adenopathy has been treated in the past with ETOH .    I have long suspected he had lung mets, but we haven't proven this.   Thyroglobulin indicates persistent /progressive disease.  We have biopsy proven positive LN involvement in the neck which we have not treated so far.      Postsurgical hypothyroidism.  On LT4   due to thyroidectomy plus hypopituitarism.   TSH should be kept low to avoid stimulating the thyroid cancer .     Reduce LT4 from 150 to 137 mcg/day based on labs obtained following the appt.       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   Cervical adenopathy has been treated in the past with ETOH . The 2 LNs likely correlate to the high FDG regions on the PET.   It is possible these are the same LNs that were treated with ETOH in the past (vs others in the same vicinity - it is hard to say)   I am concerned the level 7 mass can't be reached with ETOH treatment, that the only way to remove it is with surgery.  He declined surgery offered in July, 2018  Current status unknown - repeat neck US    Addendum: neck US 8/4/2021 as read by me:  Compared with 10/15/19, 4/25/19 , 11/27/18,  6/20/18 and  4/16/18:   Right level 7 # 1 3.1 x 1.4 x 1.8 cm - now showing clear confluence of past right level 6 1.6 x 1.1 x 1.1 and right level 7 #1  1.7 x 1.6 x  1.7 cm- both are known PTC on past FNAB from 4/23/2018  Right level 7 # 2 0.9 x 0.8 x 1  Right level 7 # 3 0.7 x 0.4 x 0.8 cm   Left level 4   0.3 x 0.5 x 0.8 cm  was  0.5 x 0.4 x 0.9 cm ; 0.5 x 0.4 x 1;  0.4 x 0.6 x 0.9  0.3 x 0.6 x 1.1 ;  0.3 x 0.8 x 1 cm ; 0.6 x 0.3 x 0.8 )  Left level 4 # 2  0.6 x 0.5 x 0.7 cm was 0.3 x  0.5 x 0.4 cm;  0.4 x 0.4 x 0.6;  0.4 x0.6 x 0.6 ; 0.5 x 0.7 x 0.7 ; 0.8 x 0.5 x 0.6 cm;  0.5 x 0.5 x 1 )  Left level 4 # 3 0.5 x 0.5 x 0.7 cm  was 0.4 x0.6 x 0.4 -     Hypopituitarism (H)  Hypopituitary with hypogonadotropic hypogonadism, probable secondary hypothyroidism, and secondary adrenal insufficiency, probable GH deficiency.   On LT4 and HC only.  Treat to high normal free T4 target. 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 The tumor has been significantly de bulked and He has completed XRT for  persistent tumor-. Tumor mass is stable on  MRI through 10/24/18.  Continue periodic imaging of the sella, treat the hypopituitarism medically.   Next pituitary MRI 10/2021 or sooner prn    Low bone density; frequent falls within the last year   He is on alendronate.  Repeat DXA     Addendum: DXA 8/4/2/2021 lowest T-score -2.6 right femoral neck ; significant bone loss right total hip    Frequent falls, frailty -    Weight loss is of concern .  Plan as per # 2.  I have discussed supplementing his oral intake with something like Boost .    Labs to include glucose     Lung nodules - He already has orders for repeat chest CT originating from Dr Guardado in pulmonary      ? Lytic bone lesions -stable on serial imaging -- not addressed    94__ minutes spent on the date of the encounter doing chart review, history and exam, documentation and further activities as noted above.    Corina Potts MD       Cc/ HPI: Mr Peres presents today along with his wife. We also had the  bot.   I have been trying to see him more frequently due to the difficulties of communication and scheduling of tests but we have not been very successful with this.    He has a history of multiple complicated endocrine issues, including thyroid cancer, surgical hypothyroidism, osteoporosis, pituitary macroadenoma with partial  hypopituitarism and history of DI.   See my 4/9/18 note for his detailed history.   I last saw him 11/20 as a post hospital telephone visit with his wife.    He was hospitalized 10/28-11/1/2020 with loss of consciousness, oral thrush, left sided pneumonia on CXR.   He was again hospitalized 11/4/2020-11/8/2020. The diagnosis was acute encephalopathy, most likely due to dehydration.      We know he has persistent/recurrent/ progressive thyroid cancer in the neck.  On FNAB 4/23/18: + PTC, needle wash Tg 2082 ng/ml . Summer 2018 he met with surgeon and they decided against surgery at that time. Since then, we have seen slow progression in the size of the involved   He had neck US and chest CT on 10/15/19  In anticipation of this appt.  I have again reviewed the images on PACS today.     Papillary thyroid cancer 4.8 cm left, bilateral node positive (MACIS 6.88 minimum, pT3, pN1a (8), pMx, Stage III or IV). His course has included several operations and several 131I treatments  11/27/07 thyroidectomy  153.4 mCi 131I in 6/08. The radioiodine  was complicated by acute sialadenitis.   12/08  193 mCi 131I (with Thyrogen stimulation). The post therapy scan showed activity in the thyroid bed, lung base on the right and also superior left kidney region   11/3/09  right selective neck dissection levels 2, 3 and 4, removing many positive LNs.   4/16/14 FNAB of right level 6 and 7 cervical lymph nodes were  positive for papillary thyroid cancer. Needle wash Tg was also high on both samples (see below). He had  hoarseness within one hour after the FNAB procedure. He was treated with cymetra on 5/12/14 and he restarted thickened liquids.    6/3/14  ETOH ablation procedure of the  2 LNs    1/31/17  trasnscervical resection of retrosternal mediastinal lesions.  A cystic lesion was removed and drained.  Surgical path  benign thymic tissue.  4/12/18  PET/CT .  Right low neck /superior mediastinal high FDG lateral and more midline. The  paratracheal mass is somewhat posterior and below the level of the clavicle (read as 1.4 cm, larger than before, SUB 68), but above the sternum  Tiny focus of fdg left lung     2.  Osteoporosis.  The last BMD was 4/25/19 , performed earlier than expected shows lowest T-score -2.3 at the right femoral neck. BMD is stable compared with 6/25/19.  Alendronate has been prescribed since 2012.  He continues to take it weekly per his wife.       Labs   4/9/18: Tg 18.2, FRANK < 0.4; TSH  ,0.01, free T4 1.52, Na 139, K 4.4;   6/20/18: Tg 12.4, FRANK < 0.4, TSH < 0.01, free T4 1.6 -   11/27/18: Tg 16.5, FRANK  0.4, TSH < 0.01, free T4 1.46  4/22/19: Tg 22, FRANK < 0.4, TSH < 0.01, free T4 1.51, Ca 8.4, albumin 3.3, phos 3.1, creatinine 1.03, glucose 122, vitamin D 22  7/23/19: Tg 32.1, FRANK < 0.4, TSH < 0.01  10/12/2020 [facility] creatinine 1.9, Ca 8.8, glucose 131; COVID 19 negative   10/15/2020 TSH 4.19l /wbc 8500, Hgb 12.7, platelets 182K  11/1/2020 Ca 7.3, Mg 2.5, phos 2.3, creatinien 1.35, glucose 86   11/4/2020 TSH 5.78, free T4 0.8  11/5/2020 HgbA1c 5.7%  2/2/2021 Ca 8.6, creatinine 0.83, Na 142, K 3.3,     Images:   8/29/18 CT chest: previously spiculted 1.7 x 1.5 cm nodule now 7 x 5 mm , ? Atelectasis.  Scattered upper lobe reticulonodular opacities as seen on prior CT, several more conspicuous, up to 4 mm, ? Infection .  To my eye I also note right level 7 neck mass 1.6 x 2 with some airway mass effect (this was 1.4 x 1.5 on 9/17 CT). This is also likely  the right level 7 mass we have bene following on US.    10/24/18 MRI brain residual mass 1.4 x 0.6 x 0.9 cm, extending into left cavernous sinus.  Stalk deviated to the left, thickened to 5 mm; optic chiasm atrophic  10/15/19 neck US compared with 4/25/19 , 11/27/18,  6/20/18 and  4/16/18:   Right level 6 thyroid bed 1.6 x 1.1 x 1.1 (was   1.5 x 1.1 x 1.2 ;  1.2 x 0.9 x 1.2; 1.2 x 01 x 1.1 ;  1.3 x 0.95 x 1.2 cm - suspicious; 1.2 x 0.93 x 0.9 ;  FNAB 4/23/18: + PTC, needle wash  Tg 2082 ng/ml  Right level 7 # 1 1.7 x 1.6 x  1.7 cm (was  2 x 1.9 x 2.1 ;  1.8 x 1.5 x 1.8 ; 1.8 x 1.6 x 1.8 cm ; 1.4 x 1.5 x 1.5 cm; 1.1 x 1.2 x 1 -FNAB 4/23/18 + PTC, needle wash Tg 430 ng/ml  Left level 4  0.5 x 0.4 x 0.9 cm  (was 0.5 x 0.4 x 1;  0.4 x 0.6 x 0.9  0.3 x 0.6 x 1.1 ;  0.3 x 0.8 x 1 cm ; 0.6 x 0.3 x 0.8 )  Left level 4 # 2 0.3 x  0.5 x 0.4 cm  (was 0.4 x 0.4 x 0.6;  0.4 x0.6 x 0.6 ; 0.5 x 0.7 x 0.7 ; 0.8 x 0.5 x 0.6 cm;  0.5 x 0.5 x 1 )  Left level 4 # 3 0.4 x0.6 x 0.4 - ? new  4/25/19 DXA: lowest T-score -2.3 right femoral neck; no change in BMD compared with 2018.   4/25/19 chest CT: increase in centrilobular nodules posterior RUL-- I had discussed this with Dr Paz Magana after our last appt; today I have reviewed the images -right level 7 mass indents trachea slightly.    10/15/19 chest CT: no significant change   10/13/2020 CT Chest -right neck mass 1.4 x 1.8 cm ; new 4 mm lung nodule posterior RUL    ROS:  Stable   Worries  Weight loss p he eats little per his wife ; appetites is not too great.   Cancer signs spreading.  Referred to endocrinologist  Chronic bronchitis   Fell last month.    Continues to take HC   Conversations make sense; memory is worse and worse.    Sleep not too great at night;       Past Medical History:   Diagnosis Date     Arthritis      BPH (benign prostatic hyperplasia)      COPD (chronic obstructive pulmonary disease) (H)      Depression      Hyperlipidemia      Hypertension     no current meds     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     Hypovitaminosis D      Multiple pulmonary nodules 2009     Osteopenia 2011     Papillary thyroid carcinoma (H) 2007    4.8 cm, right, node positive;      Pituitary macroadenoma with extrasellar extension (H) 2007    causing obstructive hydrocephalus     Post-surgical hypothyroidism 2007     Uncomplicated asthma      Vocal cord paralysis 2014    right     Xerostomia 2010    due to radiation exposures     Past Surgical  History:   Procedure Laterality Date     cymetra injection       ESOPHAGOSCOPY, GASTROSCOPY, DUODENOSCOPY (EGD), COMBINED  6/20/2013    Procedure: COMBINED ESOPHAGOSCOPY, GASTROSCOPY, DUODENOSCOPY (EGD), BIOPSY SINGLE OR MULTIPLE;  gastroscopy;  Surgeon: Leslie Guadarrama MD;  Location:  GI     ESOPHAGOSCOPY, GASTROSCOPY, DUODENOSCOPY (EGD), COMBINED N/A 9/20/2019    Procedure: ESOPHAGOGASTRODUODENOSCOPY (EGD);  Surgeon: Gilberto Gibson DO;  Location:  GI     ESOPHAGOSCOPY, GASTROSCOPY, DUODENOSCOPY (EGD), COMBINED N/A 10/16/2020    Procedure: ESOPHAGOGASTRODUODENOSCOPY, WITH BIOPSY;  Surgeon: Bartolome Granda MD;  Location:  GI     HERNIA REPAIR Right      lipoma resection chest wall Right 11/09     PHACOEMULSIFICATION CLEAR CORNEA WITH STANDARD INTRAOCULAR LENS IMPLANT Left 5/7/2018    Procedure: PHACOEMULSIFICATION CLEAR CORNEA WITH STANDARD INTRAOCULAR LENS IMPLANT;  LEFT PHACOEMULSIFICATION CLEAR CORNEA WITH STANDARD INTRAOCULAR LENS IMPLANT ;  Surgeon: Nadiya Allen MD;  Location:  [facility]     PHACOEMULSIFICATION CLEAR CORNEA WITH STANDARD INTRAOCULAR LENS IMPLANT Right 8/21/2018    Procedure: PHACOEMULSIFICATION CLEAR CORNEA WITH STANDARD INTRAOCULAR LENS IMPLANT;  RIGHT EYE PHACOEMULSIFICATION CLEAR CORNEA WITH STANDARD INTRAOCULAR LENS IMPLANT;  Surgeon: Nadiya Allen MD;  Location:  [facility]     right selective neck dissection level 2, 3, 4  11/3/09     right  shunt placement  6/28/07     THORACIC SURGERY  Fidencio 3 2017     THYMECTOMY N/A 1/3/2017    Procedure: THYMECTOMY;  Surgeon: Ernesto Armstrong MD;  Location: [facility] OR     THYROIDECTOMY  11/27/07     TRANSCERVICAL EXTENDED MEDIASTINAL LYMPHADENECTOMY N/A 1/3/2017    Procedure: TRANSCERVICAL EXTENDED MEDIASTINAL LYMPHADENECTOMY;  Surgeon: Ernesto Armstrong MD;  Location: [facility] OR     transnasal endoscopic resection of pituitary adenoma  8/13/2007     Current Outpatient Medications   Medication Sig Dispense  Refill     albuterol (PROAIR HFA) 108 (90 Base) MCG/ACT inhaler Inhale 2 puffs into the lungs every 4 hours as needed for shortness of breath / dyspnea or wheezing 3 Inhaler 11     alendronate (FOSAMAX) 70 MG tablet Take 1 tablet (70 mg) by mouth every 7 days 32 tablet 1     amLODIPine (NORVASC) 2.5 MG tablet TAKE 1 TABLET(2.5 MG) BY MOUTH DAILY 90 tablet 0     amoxicillin-clavulanate (AUGMENTIN) 875-125 MG tablet Take 1 tablet by mouth 2 times daily for 14 days 28 tablet 1     benzonatate (TESSALON) 200 MG capsule Take 1 capsule (200 mg) by mouth 3 times daily as needed for cough 28 capsule 1     docusate sodium (COLACE) 100 MG capsule Take 100 mg by mouth 2 times daily as needed for constipation       guaiFENesin (MUCINEX) 600 MG 12 hr tablet Take 2 tablets (1,200 mg) by mouth 2 times daily 30 tablet 1     guaiFENesin (MUCINEX) 600 MG 12 hr tablet Take 1 tablet (600 mg) by mouth 2 times daily 14 tablet 0     hydrocortisone (CORTEF) 10 MG tablet 10 mg AM and 5 mg afternoon 150 tablet 4     levothyroxine (SYNTHROID/LEVOTHROID) 150 MCG tablet Take 1 tablet (150 mcg) by mouth every morning (before breakfast) 30 tablet 0     tamsulosin (FLOMAX) 0.4 MG capsule Take 0.4 mg by mouth daily       traZODone (DESYREL) 100 MG tablet Take 100 mg by mouth At Bedtime       umeclidinium-vilanterol (ANORO ELLIPTA) 62.5-25 MCG/INH oral inhaler Inhale 1 puff into the lungs daily       Social History     Tobacco Use     Smoking status: Former Smoker     Packs/day: 0.50     Years: 5.00     Pack years: 2.50     Types: Cigarettes     Start date: 1976     Quit date: 2001     Years since quittin.5     Smokeless tobacco: Never Used   Substance Use Topics     Alcohol use: No     Drug use: No   Not swimming; takes walks sometimes; reads the new spaper.     GENERAL  BP Readings from Last 1 Encounters:   21 127/74      Pulse Readings from Last 1 Encounters:   21 63      Resp Readings from Last 1 Encounters:   21  "20      Temp Readings from Last 1 Encounters:   07/23/21 97.9  F (36.6  C)      SpO2 Readings from Last 1 Encounters:   07/23/21 96%      Wt Readings from Last 1 Encounters:   07/23/21 54.9 kg (121 lb)      Ht Readings from Last 1 Encounters:   12/16/20 1.651 m (5' 5\")     GENERAL he looks thinner; he didn't speak the whole time; mask . Coughing midway through .  I don't believe he knew who I was despite my attempt to interact with him (and I note I had on mask and plastic face shield which wouldn't make this easier for him)  /71   Pulse 73   Wt 55.1 kg (121 lb 8 oz)   BMI 20.22 kg/m    SKIN: normal color, temperature, texture   HEENT: PER,, no scleral icterus, eyelid retraction, stare, lid lag, proptosis or conjunctival injection.     NECK: No visible neck masses, cervical adenopathy. I am unable to palpate any defined neck mass  LUNGS: clear to auscultation bilaterally. distant  CARDIAC: distant, slow, RRR, S1, S2 without murmurs, rubs or gallops.    BACK: normal spinal contour.     NEURO: Awake, appears alert, but hd didn't speak; moves all extremities, DTRs 0/4,gait normal, no tremor of the outstretched hand    EXAMINATION: CT CHEST W/O CONTRAST, 10/13/2020 10:31 AMTECHNIQUE:  Helical CT images from the thoracic inlet through the lung  bases were obtained without IV contrast. COMPARISON: CT chest 10/15/2019 and 8/31/2019HISTORY: Lung nodule (Pulmonary nodule); Chronic obstructive pulmonary disease   FINDINGS:   Chest: Limited evaluation of the thyroid gland. Esophagus appears unremarkable. Tracheobronchial tree appears patent. Biapical scarring.  Mildly increased scattered bronchocentric tree-in-bud opacities throughout the peripheral aspects of the right lung, particularly in  the posterior right upper lobe. Unchanged calcified granuloma in the left upper lobe. New bronchocentric tree-in-bud opacities in the  anterior left lower lobe, just posterior to the major fissure. This is associated with two new " 6 mm nodular opacities (series 4, images 330  and 325). There is an additional new 4 mm solid pulmonary nodule in the posterior right upper lobe (series 4, image 158. Bibasilar  atelectasis. Continued right middle lobe collapse. The pleura appears unremarkable. No pleural effusion. No pneumothorax. Heart size within  normal limits.. No significant pericardial effusion. Mild coronary artery and mitral annular calcifications. Visualized thoracic aorta  and main pulmonary artery diameters appear within normal limits. There are no visualized pathologically enlarged mediastinal, hilar or axillary lymph nodes. Partially calcified mediastinal and right hilarnodes.   Abdomen: Cholelithiasis without evidence of acute cholecystitis. Unchanged 6 cm benign-appearing cyst in the left upper pole. Partially  visualized  shunt courses through the soft tissues of the right neck, chest, and upper abdomen.   Bones/soft tissues: Mild to moderate degenerative changes of the visualized spine. No acute osseous abnormalities are suspicious bony lesions.     IMPRESSION:   1. Increased peripheral predominant tree-in-bud opacities in the right lung, particularly in the posterior right upper lobe. Additionally, there are new bronchocentric nodular opacities in the left lower lobe. These findings are most suggestive of new/ongoing fungal or atypical infection. Superimposed malignancy cannot be excluded in the new left lower lobe opacities. Recommend follow-up CT to resolution.  2. New 4 mm solid pulmonary nodule in the posterior right upper lobe. Given the recommendation above, close attention on follow-up.  3. Continued right middle lobe collapse.  4. Cholelithiasis without evidence of acute cholecystitis.  [Recommend Follow Up: Lung nodule]  This report will be copied to the [facility] to ensure aprovider acknowledges the finding.    I have personally reviewed the examination and initial interpretationand I agree with the  findings.     MANA TUCKER MD      EXAMINATION: US HEAD NECK SOFT TISSUE, 8/4/2021 2:51 PM      COMPARISON: 10/15/2019     HISTORY: Papillary thyroid carcinoma, postsurgical hypothyroidism     TECHNIQUE: Sonographic imaging performed of the neck to evaluate for  lymph nodes using grey scale and limited Doppler technique.     FINDINGS:     Lymph nodes are measured bilaterally with measurements given in  transverse, AP, and craniocaudal dimensions as follows:     Right:  Level 2: No suspicious lymph nodes  Level 3: No suspicious lymph nodes  Level 4: No suspicious lymph nodes  Level 5: 1.0 x 0.3 x 1.1 cm lymph node is unchanged from prior.  Level 6: No suspicious lymph nodes.  Level 7: Heterogeneous, lobular, 3.1 x 1.4 x 1.8 cm enlarged prevascular lymph node, previously measured 1.6 x 1.1 x 1.1 cm  although appeared level 6 at that time.   There is also an enlarged level 0.9 x 0.8 x 1.0 cm lymph node with suspicious morphologydifficult to correlate to prior exam. A third 0.7 x 0.4 x 0.8 cm lymph node is also seen.     Left:  Level 2: Benign-appearing 1.0 x 0.6 x 2.2 cm lymph node.  Level 3: No suspicious lymph nodes  Level 4: Benign-appearing lymph nodes measuring 0.3 x 0.5 x 0.8 cm,0.6 x 0.5 x 0.7 cm, and 0.5 x 0.6 x 0.7 cm  Level 5: Benign-appearing 1.0 x 0.5 x 1.1 cm and 0.9 x 0.6 x 1.2 cm  Level 6: No suspicious lymph nodes.  Level 7: No suspicious lymph nodes.                                                                      IMPRESSION: Enlarging suspicious right-sided level 7 lymph node. There is also a suspicious second level 7 lymph node difficult to accuratelycorrelate in location to prior exam.     I have personally reviewed the examination and initial interpretation  and I agree with the findings.     KEENA GUTIERREZ MD        [address] [address]  Phone: [phone]   Fax: [phone]        Patient name:                               Travis MARY Peres  Patient demographics:                 80 year old  Male   History:                                        Cancer - thyroid, Evaluation of Bone Density, Thyroid Condition and Tobacco Habit - Past  Current treatments:                     Fosamax/Alendronate, Thyroid Medications  Scan:                                           XYZE        Conclusions:  The most negative and valid T-score of -2.6 at the level of the right femoral neck corresponds with osteoporosis according to WHO criteria for postmenopausal females and men age 50 and over.      The risk of osteoporotic fracture increases approximately 2-fold for each 1.0 SD decrease in T-score. Low bone density is not the only risk factor for fracture; consider factors such as patient's age, fall risk, injury risk, previous osteoporotic fracture, family history of osteoporosis, etc.  People with an elevated risk of fracture should be regularly evaluated for low bone mineral density.  For patients eligible for Medicare, routine testing is allowed once every 2 years. Testing frequency can be increased for patients on corticosteroids. Clinical correlation is recommended.      Comparison Exams:  Taking into account the precision errors (ref #3 below) for this center:  These calculated changes in BMD to the previous exam (4/25/2019)  are significantly decreased at the level of the right total hip (-5.7%).  These calculated changes in BMD to the baseline exam (4/28/2011) are significantly decreased at the level of the lumbar spine (-2.8%), left total hip (-7.0%) and right total hip (-10.2%)     Percent changes not mentioned or within remaining regions are either insignificant or invalid.     Please note that the differential diagnosis of BMD increase in the spine includes improvement due to pharmacotherapy vs inter-current progression of spine degeneration or fracture.     Please refer to BMD values in PACS.     Bone  densitometry cannot rule out secondary causes of bone loss. Therefore, further metabolic testing to look for secondary causes of low BMD should be performed if indicated. Clinical correlation is recommended      Feel free to contact DXA services if you have any questions or comments.  Thank you for the opportunity to be of service to you and your patient.     Principal result :  Ovidio Stacy MD, CCD  Division of Endocrinology and Diabetes    Department of Medicine

## 2021-08-02 NOTE — LETTER
8/2/2021       RE: Travis Peres  [address]  [facility] [address]     Dear Colleague,    Thank you for referring your patient, rTavis Peres, to the [facility] [place] at [facility]. Please see a copy of my visit note below.    Endocrinology clinic visit note.     Papillary thyroid carcinoma (HCC)  4.8 cm right, bilateral node positive. He has been treated with total thyroidectomy, 131I x 2 (cummulative dose 346.4 mCi) His last 131I TBS (2008) post therapy scan raised question of ? lung mets and also ? met above left kidney. Cervical adenopathy has been treated in the past with ETOH .    I have long suspected he had lung mets, but we haven't proven this.   Thyroglobulin indicates persistent /progressive disease.  We have biopsy proven positive LN involvement in the neck which we have not treated so far.      Postsurgical hypothyroidism.  On LT4   due to thyroidectomy plus hypopituitarism.   TSH should be kept low to avoid stimulating the thyroid cancer .     Reduce LT4 from 150 to 137 mcg/day based on labs obtained following the appt.       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   Cervical adenopathy has been treated in the past with ETOH . The 2 LNs likely correlate to the high FDG regions on the PET.   It is possible these are the same LNs that were treated with ETOH in the past (vs others in the same vicinity - it is hard to say)   I am concerned the level 7 mass can't be reached with ETOH treatment, that the only way to remove it is with surgery.  He declined surgery offered in July, 2018  Current status unknown - repeat neck US    Addendum: neck US 8/4/2021 as read by me:  Compared with 10/15/19, 4/25/19 , 11/27/18,  6/20/18 and  4/16/18:   Right level 7 # 1 3.1 x 1.4 x 1.8 cm - now showing clear confluence of past right level 6 1.6 x 1.1 x 1.1 and right level 7 #1   1.7 x 1.6 x  1.7 cm- both are known PTC on past FNAB from 4/23/2018  Right level 7 # 2 0.9 x 0.8 x 1  Right level 7 # 3 0.7 x 0.4 x 0.8 cm   Left level 4   0.3 x 0.5 x 0.8 cm  was  0.5 x 0.4 x 0.9 cm ; 0.5 x 0.4 x 1;  0.4 x 0.6 x 0.9  0.3 x 0.6 x 1.1 ;  0.3 x 0.8 x 1 cm ; 0.6 x 0.3 x 0.8 )  Left level 4 # 2 0.6 x 0.5 x 0.7 cm was 0.3 x  0.5 x 0.4 cm;  0.4 x 0.4 x 0.6;  0.4 x0.6 x 0.6 ; 0.5 x 0.7 x 0.7 ; 0.8 x 0.5 x 0.6 cm;  0.5 x 0.5 x 1 )  Left level 4 # 3 0.5 x 0.5 x 0.7 cm  was 0.4 x0.6 x 0.4 -     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   On LT4 and HC only.  Treat to high normal free T4 target. 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 The tumor has been significantly de bulked and He has completed XRT for  persistent tumor-. Tumor mass is stable on  MRI through 10/24/18.  Continue periodic imaging of the sella, treat the hypopituitarism medically.   Next pituitary MRI 10/2021 or sooner prn    Low bone density; frequent falls within the last year   He is on alendronate.  Repeat DXA     Addendum: DXA 8/4/2/2021 lowest T-score -2.6 right femoral neck ; significant bone loss right total hip    Frequent falls, frailty -    Weight loss is of concern .  Plan as per # 2.  I have discussed supplementing his oral intake with something like Boost .    Labs to include glucose     Lung nodules - He already has orders for repeat chest CT originating from Dr Guardado in pulmonary      ? Lytic bone lesions -stable on serial imaging -- not addressed    94__ minutes spent on the date of the encounter doing chart review, history and exam, documentation and further activities as noted above.    Corina Potts MD       Cc/ HPI: Mr Peres presents today along with his wife. We also had the  bot.   I have been  trying to see him more frequently due to the difficulties of communication and scheduling of tests but we have not been very successful with this.    He has a history of multiple complicated endocrine issues, including thyroid cancer, surgical hypothyroidism, osteoporosis, pituitary macroadenoma with partial hypopituitarism and history of DI.   See my 4/9/18 note for his detailed history.   I last saw him 11/20 as a post hospital telephone visit with his wife.    He was hospitalized 10/28-11/1/2020 with loss of consciousness, oral thrush, left sided pneumonia on CXR.   He was again hospitalized 11/4/2020-11/8/2020. The diagnosis was acute encephalopathy, most likely due to dehydration.      We know he has persistent/recurrent/ progressive thyroid cancer in the neck.  On FNAB 4/23/18: + PTC, needle wash Tg 2082 ng/ml . Summer 2018 he met with surgeon and they decided against surgery at that time. Since then, we have seen slow progression in the size of the involved   He had neck US and chest CT on 10/15/19  In anticipation of this appt.  I have again reviewed the images on PACS today.     Papillary thyroid cancer 4.8 cm left, bilateral node positive (MACIS 6.88 minimum, pT3, pN1a (8), pMx, Stage III or IV). His course has included several operations and several 131I treatments  11/27/07 thyroidectomy  153.4 mCi 131I in 6/08. The radioiodine  was complicated by acute sialadenitis.   12/08  193 mCi 131I (with Thyrogen stimulation). The post therapy scan showed activity in the thyroid bed, lung base on the right and also superior left kidney region   11/3/09  right selective neck dissection levels 2, 3 and 4, removing many positive LNs.   4/16/14 FNAB of right level 6 and 7 cervical lymph nodes were  positive for papillary thyroid cancer. Needle wash Tg was also high on both samples (see below). He had  hoarseness within one hour after the FNAB procedure. He was treated with cymetra on 5/12/14 and he restarted thickened  liquids.    6/3/14  ETOH ablation procedure of the  2 LNs    1/31/17  trasnscervical resection of retrosternal mediastinal lesions.  A cystic lesion was removed and drained.  Surgical path  benign thymic tissue.  4/12/18  PET/CT .  Right low neck /superior mediastinal high FDG lateral and more midline. The paratracheal mass is somewhat posterior and below the level of the clavicle (read as 1.4 cm, larger than before, SUB 68), but above the sternum  Tiny focus of fdg left lung     2.  Osteoporosis.  The last BMD was 4/25/19 , performed earlier than expected shows lowest T-score -2.3 at the right femoral neck. BMD is stable compared with 6/25/19.  Alendronate has been prescribed since 2012.  He continues to take it weekly per his wife.       Labs   4/9/18: Tg 18.2, FRANK < 0.4; TSH  ,0.01, free T4 1.52, Na 139, K 4.4;   6/20/18: Tg 12.4, FRANK < 0.4, TSH < 0.01, free T4 1.6 -   11/27/18: Tg 16.5, FRANK  0.4, TSH < 0.01, free T4 1.46  4/22/19: Tg 22, FRANK < 0.4, TSH < 0.01, free T4 1.51, Ca 8.4, albumin 3.3, phos 3.1, creatinine 1.03, glucose 122, vitamin D 22  7/23/19: Tg 32.1, FRANK < 0.4, TSH < 0.01  10/12/2020 [facility] creatinine 1.9, Ca 8.8, glucose 131; COVID 19 negative   10/15/2020 TSH 4.19l /wbc 8500, Hgb 12.7, platelets 182K  11/1/2020 Ca 7.3, Mg 2.5, phos 2.3, creatinien 1.35, glucose 86   11/4/2020 TSH 5.78, free T4 0.8  11/5/2020 HgbA1c 5.7%  2/2/2021 Ca 8.6, creatinine 0.83, Na 142, K 3.3,     Images:   8/29/18 CT chest: previously spiculted 1.7 x 1.5 cm nodule now 7 x 5 mm , ? Atelectasis.  Scattered upper lobe reticulonodular opacities as seen on prior CT, several more conspicuous, up to 4 mm, ? Infection .  To my eye I also note right level 7 neck mass 1.6 x 2 with some airway mass effect (this was 1.4 x 1.5 on 9/17 CT). This is also likely  the right level 7 mass we have bene following on US.    10/24/18 MRI brain residual mass 1.4 x 0.6 x 0.9 cm, extending into left cavernous sinus.  Stalk deviated to the left,  thickened to 5 mm; optic chiasm atrophic  10/15/19 neck US compared with 4/25/19 , 11/27/18,  6/20/18 and  4/16/18:   Right level 6 thyroid bed 1.6 x 1.1 x 1.1 (was   1.5 x 1.1 x 1.2 ;  1.2 x 0.9 x 1.2; 1.2 x 01 x 1.1 ;  1.3 x 0.95 x 1.2 cm - suspicious; 1.2 x 0.93 x 0.9 ;  FNAB 4/23/18: + PTC, needle wash Tg 2082 ng/ml  Right level 7 # 1 1.7 x 1.6 x  1.7 cm (was  2 x 1.9 x 2.1 ;  1.8 x 1.5 x 1.8 ; 1.8 x 1.6 x 1.8 cm ; 1.4 x 1.5 x 1.5 cm; 1.1 x 1.2 x 1 -FNAB 4/23/18 + PTC, needle wash Tg 430 ng/ml  Left level 4  0.5 x 0.4 x 0.9 cm  (was 0.5 x 0.4 x 1;  0.4 x 0.6 x 0.9  0.3 x 0.6 x 1.1 ;  0.3 x 0.8 x 1 cm ; 0.6 x 0.3 x 0.8 )  Left level 4 # 2 0.3 x  0.5 x 0.4 cm  (was 0.4 x 0.4 x 0.6;  0.4 x0.6 x 0.6 ; 0.5 x 0.7 x 0.7 ; 0.8 x 0.5 x 0.6 cm;  0.5 x 0.5 x 1 )  Left level 4 # 3 0.4 x0.6 x 0.4 - ? new  4/25/19 DXA: lowest T-score -2.3 right femoral neck; no change in BMD compared with 2018.   4/25/19 chest CT: increase in centrilobular nodules posterior RUL-- I had discussed this with Dr Paz Magana after our last appt; today I have reviewed the images -right level 7 mass indents trachea slightly.    10/15/19 chest CT: no significant change   10/13/2020 CT Chest -right neck mass 1.4 x 1.8 cm ; new 4 mm lung nodule posterior RUL    ROS:  Stable   Worries  Weight loss p he eats little per his wife ; appetites is not too great.   Cancer signs spreading.  Referred to endocrinologist  Chronic bronchitis   Fell last month.    Continues to take HC   Conversations make sense; memory is worse and worse.    Sleep not too great at night;       Past Medical History:   Diagnosis Date     Arthritis      BPH (benign prostatic hyperplasia)      COPD (chronic obstructive pulmonary disease) (H)      Depression      Hyperlipidemia      Hypertension     no current meds     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     Hypovitaminosis D      Multiple pulmonary nodules 2009     Osteopenia 2011     Papillary thyroid carcinoma (H) 2007     4.8 cm, right, node positive;      Pituitary macroadenoma with extrasellar extension (H) 2007    causing obstructive hydrocephalus     Post-surgical hypothyroidism 2007     Uncomplicated asthma      Vocal cord paralysis 2014    right     Xerostomia 2010    due to radiation exposures     Past Surgical History:   Procedure Laterality Date     cymetra injection       ESOPHAGOSCOPY, GASTROSCOPY, DUODENOSCOPY (EGD), COMBINED  6/20/2013    Procedure: COMBINED ESOPHAGOSCOPY, GASTROSCOPY, DUODENOSCOPY (EGD), BIOPSY SINGLE OR MULTIPLE;  gastroscopy;  Surgeon: Leslie Guadarrama MD;  Location:  GI     ESOPHAGOSCOPY, GASTROSCOPY, DUODENOSCOPY (EGD), COMBINED N/A 9/20/2019    Procedure: ESOPHAGOGASTRODUODENOSCOPY (EGD);  Surgeon: Gilberto Gibson DO;  Location:  GI     ESOPHAGOSCOPY, GASTROSCOPY, DUODENOSCOPY (EGD), COMBINED N/A 10/16/2020    Procedure: ESOPHAGOGASTRODUODENOSCOPY, WITH BIOPSY;  Surgeon: Bartolome Granda MD;  Location:  GI     HERNIA REPAIR Right      lipoma resection chest wall Right 11/09     PHACOEMULSIFICATION CLEAR CORNEA WITH STANDARD INTRAOCULAR LENS IMPLANT Left 5/7/2018    Procedure: PHACOEMULSIFICATION CLEAR CORNEA WITH STANDARD INTRAOCULAR LENS IMPLANT;  LEFT PHACOEMULSIFICATION CLEAR CORNEA WITH STANDARD INTRAOCULAR LENS IMPLANT ;  Surgeon: Nadiya Allen MD;  Location: [facility]     PHACOEMULSIFICATION CLEAR CORNEA WITH STANDARD INTRAOCULAR LENS IMPLANT Right 8/21/2018    Procedure: PHACOEMULSIFICATION CLEAR CORNEA WITH STANDARD INTRAOCULAR LENS IMPLANT;  RIGHT EYE PHACOEMULSIFICATION CLEAR CORNEA WITH STANDARD INTRAOCULAR LENS IMPLANT;  Surgeon: Nadiya Allen MD;  Location:  [facility]     right selective neck dissection level 2, 3, 4  11/3/09     right  shunt placement  6/28/07     THORACIC SURGERY  Fidencio 3 2017     THYMECTOMY N/A 1/3/2017    Procedure: THYMECTOMY;  Surgeon: Ernesto Armstrong MD;  Location: [facility] OR     THYROIDECTOMY  11/27/07     TRANSCERVICAL  EXTENDED MEDIASTINAL LYMPHADENECTOMY N/A 1/3/2017    Procedure: TRANSCERVICAL EXTENDED MEDIASTINAL LYMPHADENECTOMY;  Surgeon: Ernesto Armstrong MD;  Location: [facility] OR     transnasal endoscopic resection of pituitary adenoma  2007     Current Outpatient Medications   Medication Sig Dispense Refill     albuterol (PROAIR HFA) 108 (90 Base) MCG/ACT inhaler Inhale 2 puffs into the lungs every 4 hours as needed for shortness of breath / dyspnea or wheezing 3 Inhaler 11     alendronate (FOSAMAX) 70 MG tablet Take 1 tablet (70 mg) by mouth every 7 days 32 tablet 1     amLODIPine (NORVASC) 2.5 MG tablet TAKE 1 TABLET(2.5 MG) BY MOUTH DAILY 90 tablet 0     amoxicillin-clavulanate (AUGMENTIN) 875-125 MG tablet Take 1 tablet by mouth 2 times daily for 14 days 28 tablet 1     benzonatate (TESSALON) 200 MG capsule Take 1 capsule (200 mg) by mouth 3 times daily as needed for cough 28 capsule 1     docusate sodium (COLACE) 100 MG capsule Take 100 mg by mouth 2 times daily as needed for constipation       guaiFENesin (MUCINEX) 600 MG 12 hr tablet Take 2 tablets (1,200 mg) by mouth 2 times daily 30 tablet 1     guaiFENesin (MUCINEX) 600 MG 12 hr tablet Take 1 tablet (600 mg) by mouth 2 times daily 14 tablet 0     hydrocortisone (CORTEF) 10 MG tablet 10 mg AM and 5 mg afternoon 150 tablet 4     levothyroxine (SYNTHROID/LEVOTHROID) 150 MCG tablet Take 1 tablet (150 mcg) by mouth every morning (before breakfast) 30 tablet 0     tamsulosin (FLOMAX) 0.4 MG capsule Take 0.4 mg by mouth daily       traZODone (DESYREL) 100 MG tablet Take 100 mg by mouth At Bedtime       umeclidinium-vilanterol (ANORO ELLIPTA) 62.5-25 MCG/INH oral inhaler Inhale 1 puff into the lungs daily       Social History     Tobacco Use     Smoking status: Former Smoker     Packs/day: 0.50     Years: 5.00     Pack years: 2.50     Types: Cigarettes     Start date: 1976     Quit date: 2001     Years since quittin.5     Smokeless tobacco: Never Used  "  Substance Use Topics     Alcohol use: No     Drug use: No   Not swimming; takes walks sometimes; reads the new spaper.     GENERAL  BP Readings from Last 1 Encounters:   07/23/21 127/74      Pulse Readings from Last 1 Encounters:   07/23/21 63      Resp Readings from Last 1 Encounters:   07/23/21 20      Temp Readings from Last 1 Encounters:   07/23/21 97.9  F (36.6  C)      SpO2 Readings from Last 1 Encounters:   07/23/21 96%      Wt Readings from Last 1 Encounters:   07/23/21 54.9 kg (121 lb)      Ht Readings from Last 1 Encounters:   12/16/20 1.651 m (5' 5\")     GENERAL he looks thinner; he didn't speak the whole time; mask . Coughing midway through .  I don't believe he knew who I was despite my attempt to interact with him (and I note I had on mask and plastic face shield which wouldn't make this easier for him)  /71   Pulse 73   Wt 55.1 kg (121 lb 8 oz)   BMI 20.22 kg/m    SKIN: normal color, temperature, texture   HEENT: PER,, no scleral icterus, eyelid retraction, stare, lid lag, proptosis or conjunctival injection.     NECK: No visible neck masses, cervical adenopathy. I am unable to palpate any defined neck mass  LUNGS: clear to auscultation bilaterally. distant  CARDIAC: distant, slow, RRR, S1, S2 without murmurs, rubs or gallops.    BACK: normal spinal contour.     NEURO: Awake, appears alert, but hd didn't speak; moves all extremities, DTRs 0/4,gait normal, no tremor of the outstretched hand    EXAMINATION: CT CHEST W/O CONTRAST, 10/13/2020 10:31 AMTECHNIQUE:  Helical CT images from the thoracic inlet through the lung  bases were obtained without IV contrast. COMPARISON: CT chest 10/15/2019 and 8/31/2019HISTORY: Lung nodule (Pulmonary nodule); Chronic obstructive pulmonary disease   FINDINGS:   Chest: Limited evaluation of the thyroid gland. Esophagus appears unremarkable. Tracheobronchial tree appears patent. Biapical scarring.  Mildly increased scattered bronchocentric tree-in-bud opacities " throughout the peripheral aspects of the right lung, particularly in  the posterior right upper lobe. Unchanged calcified granuloma in the left upper lobe. New bronchocentric tree-in-bud opacities in the  anterior left lower lobe, just posterior to the major fissure. This is associated with two new 6 mm nodular opacities (series 4, images 330  and 325). There is an additional new 4 mm solid pulmonary nodule in the posterior right upper lobe (series 4, image 158. Bibasilar  atelectasis. Continued right middle lobe collapse. The pleura appears unremarkable. No pleural effusion. No pneumothorax. Heart size within  normal limits.. No significant pericardial effusion. Mild coronary artery and mitral annular calcifications. Visualized thoracic aorta  and main pulmonary artery diameters appear within normal limits. There are no visualized pathologically enlarged mediastinal, hilar or axillary lymph nodes. Partially calcified mediastinal and right hilarnodes.   Abdomen: Cholelithiasis without evidence of acute cholecystitis. Unchanged 6 cm benign-appearing cyst in the left upper pole. Partially  visualized  shunt courses through the soft tissues of the right neck, chest, and upper abdomen.   Bones/soft tissues: Mild to moderate degenerative changes of the visualized spine. No acute osseous abnormalities are suspicious bony lesions.     IMPRESSION:   1. Increased peripheral predominant tree-in-bud opacities in the right lung, particularly in the posterior right upper lobe. Additionally, there are new bronchocentric nodular opacities in the left lower lobe. These findings are most suggestive of new/ongoing fungal or atypical infection. Superimposed malignancy cannot be excluded in the new left lower lobe opacities. Recommend follow-up CT to resolution.  2. New 4 mm solid pulmonary nodule in the posterior right upper lobe. Given the recommendation above, close attention on follow-up.  3. Continued right middle lobe  collapse.  4. Cholelithiasis without evidence of acute cholecystitis.  [Recommend Follow Up: Lung nodule]  This report will be copied to the [facility] to ensure aprovider acknowledges the finding.    I have personally reviewed the examination and initial interpretationand I agree with the findings.     MANA TUCKER MD      EXAMINATION: US HEAD NECK SOFT TISSUE, 8/4/2021 2:51 PM      COMPARISON: 10/15/2019     HISTORY: Papillary thyroid carcinoma, postsurgical hypothyroidism     TECHNIQUE: Sonographic imaging performed of the neck to evaluate for  lymph nodes using grey scale and limited Doppler technique.     FINDINGS:     Lymph nodes are measured bilaterally with measurements given in  transverse, AP, and craniocaudal dimensions as follows:     Right:  Level 2: No suspicious lymph nodes  Level 3: No suspicious lymph nodes  Level 4: No suspicious lymph nodes  Level 5: 1.0 x 0.3 x 1.1 cm lymph node is unchanged from prior.  Level 6: No suspicious lymph nodes.  Level 7: Heterogeneous, lobular, 3.1 x 1.4 x 1.8 cm enlarged prevascular lymph node, previously measured 1.6 x 1.1 x 1.1 cm  although appeared level 6 at that time.   There is also an enlarged level 0.9 x 0.8 x 1.0 cm lymph node with suspicious morphologydifficult to correlate to prior exam. A third 0.7 x 0.4 x 0.8 cm lymph node is also seen.     Left:  Level 2: Benign-appearing 1.0 x 0.6 x 2.2 cm lymph node.  Level 3: No suspicious lymph nodes  Level 4: Benign-appearing lymph nodes measuring 0.3 x 0.5 x 0.8 cm,0.6 x 0.5 x 0.7 cm, and 0.5 x 0.6 x 0.7 cm  Level 5: Benign-appearing 1.0 x 0.5 x 1.1 cm and 0.9 x 0.6 x 1.2 cm  Level 6: No suspicious lymph nodes.  Level 7: No suspicious lymph nodes.                                                                      IMPRESSION: Enlarging suspicious right-sided level 7 lymph node. There is also a suspicious second level 7 lymph node difficult to accuratelycorrelate in location to prior exam.     I have  personally reviewed the examination and initial interpretation  and I agree with the findings.     KEENA GUTIERREZ MD        [address] [address]  Phone: [phone]   Fax: [phone]        Patient name:                              Travis Peres  Patient demographics:                 80 year old  Male   History:                                        Cancer - thyroid, Evaluation of Bone Density, Thyroid Condition and Tobacco Habit - Past  Current treatments:                     Fosamax/Alendronate, Thyroid Medications  Scan:                                           [employer]        Conclusions:  The most negative and valid T-score of -2.6 at the level of the right femoral neck corresponds with osteoporosis according to WHO criteria for postmenopausal females and men age 50 and over.      The risk of osteoporotic fracture increases approximately 2-fold for each 1.0 SD decrease in T-score. Low bone density is not the only risk factor for fracture; consider factors such as patient's age, fall risk, injury risk, previous osteoporotic fracture, family history of osteoporosis, etc.  People with an elevated risk of fracture should be regularly evaluated for low bone mineral density.  For patients eligible for Medicare, routine testing is allowed once every 2 years. Testing frequency can be increased for patients on corticosteroids. Clinical correlation is recommended.      Comparison Exams:  Taking into account the precision errors (ref #3 below) for this center:  These calculated changes in BMD to the previous exam (4/25/2019)  are significantly decreased at the level of the right total hip (-5.7%).  These calculated changes in BMD to the baseline exam (4/28/2011) are significantly decreased at the level of the lumbar spine (-2.8%), left total hip (-7.0%) and right total hip (-10.2%)     Percent changes not mentioned or  within remaining regions are either insignificant or invalid.     Please note that the differential diagnosis of BMD increase in the spine includes improvement due to pharmacotherapy vs inter-current progression of spine degeneration or fracture.     Please refer to BMD values in PACS.     Bone densitometry cannot rule out secondary causes of bone loss. Therefore, further metabolic testing to look for secondary causes of low BMD should be performed if indicated. Clinical correlation is recommended      Feel free to contact DXA services if you have any questions or comments.  Thank you for the opportunity to be of service to you and your patient.     Principal result :  Ovidio Stacy MD, [facility]  Division of Endocrinology and Diabetes    Department of Medicine         Chief Complaint   Patient presents with     RECHECK     Postsurgical Hypothyroidism     Brandee Mcpherson MA

## 2021-08-02 NOTE — LETTER
8/2/2021       RE: Travis Peres  [address] [facility] [id number]     Dear Colleague,    Thank you for referring your patient, Travis Peres, to the [facility]. Please see a copy of my visit note below.    11 minute Phone conversation with Dr Karol Alvarez, his PCP. She is asking where we are as it pertains to the thyroid cancer treatment. She notes he is in the office with her now today and he is seeing pulmonary tomorrow.  She notes they are worried the cancer is killing him and that he doesn't have follow up with me until May.  I have noted that we can get him in sooner if /when he is willing to engage in decision making but this has been impossible in the recent past.  I also reminded her that every time we have attempted a treatment on him he has had complication so I see him as high risk with intervention of any type.  I had forgotten about the letters I had sent and also asked to have  read him since our last appt (8/5 and 8/12).    Corina Potts MD        Again, thank you for allowing me to participate in the care of your patient.      Sincerely,    Lab

## 2021-08-02 NOTE — LETTER
Patient:  Travis Peres  :   1940  MRN:     [id number]        Mr.Hau MARY Peres  [address] [facility] [address]        2021    Dear ,      We are writing to inform you of your test results from today.  Some tests are still pending.  I recommend to reduce the levothyroxine from 150 to 137 mcg/day.  I have submitted new Rx to your pharmacy.       Resulted Orders   TSH   Result Value Ref Range    TSH <0.01 (L) 0.40 - 4.00 mU/L   T4 free   Result Value Ref Range    Free T4 1.68 (H) 0.76 - 1.46 ng/dL   Glucose   Result Value Ref Range    Glucose 91 70 - 99 mg/dL    Patient Fasting > 8hrs? No        Sincerely,    Corina Potts MD

## 2021-08-02 NOTE — LETTER
Patient:  Travis Peres  :   1940  MRN:     [id number]        Mr.Hau MARY Peres  [address] [facility] [address]        2021    Dear ,    We are writing to inform you of your test results.    The  thyroglobulin is 62 ng/ml with antithyroglobuln antibody < 0.4 U/ml.  The level is higher (worse) than the last time we measured it.  This means that the cancer is worse.    What we do depends on your goals and what risks we are willing to take (or not).   My questions to you remain the same as on the letter I wrote on 2021.         Resulted Orders   Vitamin D Deficiency (D3 Only)   Result Value Ref Range    Vitamin D, Total (25-Hydroxy) 25 20 - 75 ug/L    Narrative    Season, race, dietary intake, and treatment affect the concentration of 25-hydroxy-Vitamin D. Values may decrease during winter months and increase during summer months. Values 20-29 ug/L may indicate Vitamin D insufficiency and values <20 ug/L may indicate Vitamin D deficiency.    Vitamin D determination is routinely performed by an immunoassay specific for 25 hydroxyvitamin D3.  If an individual is on vitamin D2(ergocalciferol) supplementation, please specify 25 OH vitamin D2 and D3 level determination by LCMSMS test VITD23.     TSH   Result Value Ref Range    TSH <0.01 (L) 0.40 - 4.00 mU/L   T4 free   Result Value Ref Range    Free T4 1.68 (H) 0.76 - 1.46 ng/dL   Thyroglobulin and Antibody (Sendout to [place])   Result Value Ref Range    See Scanned Result       THYROGLOBULIN AND ANTIBODY (SENDOUT TO [facility])-Scanned   Glucose   Result Value Ref Range    Glucose 91 70 - 99 mg/dL    Patient Fasting > 8hrs? No      Sincerely    Corina Potts MD      [id number]  1940

## 2021-08-03 LAB — DEPRECATED CALCIDIOL+CALCIFEROL SERPL-MC: 25 UG/L (ref 20–75)

## 2021-08-03 NOTE — TELEPHONE ENCOUNTER
LVM for Pt/Pts spouse Via cantonese speaking  with review and recommendations from Dr Potts and number to call with questions.   Daisy Abad, RN on 8/4/2021 at 12:52 PM       Dear ,     We are writing to inform you of your test results from today.  Some tests are still pending.  I recommend to reduce the levothyroxine from 150 to 137 mcg/day.  I have submitted new Rx to your pharmacy.   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  Reduce levothyroxine from 150 to 137 mcg/day.  Thanks  Corina Potts MD

## 2021-08-04 ENCOUNTER — ANCILLARY PROCEDURE (OUTPATIENT)
Dept: ULTRASOUND IMAGING | Facility: CLINIC | Age: 81
End: 2021-08-04
Payer: COMMERCIAL

## 2021-08-04 ENCOUNTER — ANCILLARY PROCEDURE (OUTPATIENT)
Dept: BONE DENSITY | Facility: CLINIC | Age: 81
End: 2021-08-04
Payer: COMMERCIAL

## 2021-08-04 DIAGNOSIS — R29.6 FALLS FREQUENTLY: ICD-10-CM

## 2021-08-04 DIAGNOSIS — Z79.83 LONG TERM (CURRENT) USE OF BISPHOSPHONATES: ICD-10-CM

## 2021-08-04 DIAGNOSIS — C73 PAPILLARY THYROID CARCINOMA (H): ICD-10-CM

## 2021-08-04 DIAGNOSIS — E89.0 POSTSURGICAL HYPOTHYROIDISM: ICD-10-CM

## 2021-08-04 DIAGNOSIS — M85.9 LOW BONE DENSITY: ICD-10-CM

## 2021-08-04 PROCEDURE — 77080 DXA BONE DENSITY AXIAL: CPT | Performed by: INTERNAL MEDICINE

## 2021-08-04 PROCEDURE — 76536 US EXAM OF HEAD AND NECK: CPT | Mod: GC | Performed by: RADIOLOGY

## 2021-08-04 NOTE — TELEPHONE ENCOUNTER
"So sorry.  I see that the letter I wrote was deleted and I am not sure why. I have now copied and pasted it and created yet another letter, with subject \"letter to patient\".  Please try again.  Thanks  Corina Potts MD  "

## 2021-08-10 ENCOUNTER — TELEPHONE (OUTPATIENT)
Dept: ENDOCRINOLOGY | Facility: CLINIC | Age: 81
End: 2021-08-10

## 2021-08-10 LAB — SCANNED LAB RESULT: NORMAL

## 2021-08-10 NOTE — TELEPHONE ENCOUNTER
I see something went wrong again on the letters I sent him that I wanted you to read them along with .  There are actually 2 letters.  One is dated 8/4 and one is dated 8/5.  Please read them the 8/4 and 8/5 letters if you didn't already do this.  See if you can get an answer to the questions I ask on the 8/5/2021 letter.  Let me know if they want another visit to discuss which I recommend be a virtual visit since we can't hear each other with the [facility]  bot anyway.  I think the communication (speech volume and clarity) may be more clear if we can use virtual method.    Thanks  Corina Potts MD

## 2021-09-01 ENCOUNTER — OFFICE VISIT (OUTPATIENT)
Dept: URGENT CARE | Facility: URGENT CARE | Age: 81
End: 2021-09-01
Payer: COMMERCIAL

## 2021-09-01 VITALS
WEIGHT: 120.25 LBS | SYSTOLIC BLOOD PRESSURE: 122 MMHG | BODY MASS INDEX: 20.01 KG/M2 | TEMPERATURE: 96.4 F | DIASTOLIC BLOOD PRESSURE: 68 MMHG | HEART RATE: 52 BPM

## 2021-09-01 DIAGNOSIS — R30.0 DYSURIA: ICD-10-CM

## 2021-09-01 DIAGNOSIS — N48.1 BALANITIS: ICD-10-CM

## 2021-09-01 DIAGNOSIS — N39.0 ACUTE UTI: Primary | ICD-10-CM

## 2021-09-01 LAB
ALBUMIN UR-MCNC: NEGATIVE MG/DL
APPEARANCE UR: CLEAR
BACTERIA #/AREA URNS HPF: ABNORMAL /HPF
BILIRUB UR QL STRIP: NEGATIVE
COLOR UR AUTO: YELLOW
GLUCOSE UR STRIP-MCNC: NEGATIVE MG/DL
HGB UR QL STRIP: ABNORMAL
KETONES UR STRIP-MCNC: NEGATIVE MG/DL
LEUKOCYTE ESTERASE UR QL STRIP: NEGATIVE
NITRATE UR QL: NEGATIVE
PH UR STRIP: 5.5 [PH] (ref 5–7)
RBC #/AREA URNS AUTO: ABNORMAL /HPF
SP GR UR STRIP: 1.02 (ref 1–1.03)
SQUAMOUS #/AREA URNS AUTO: ABNORMAL /LPF
UROBILINOGEN UR STRIP-ACNC: 0.2 E.U./DL
WBC #/AREA URNS AUTO: ABNORMAL /HPF

## 2021-09-01 PROCEDURE — 99214 OFFICE O/P EST MOD 30 MIN: CPT | Performed by: PHYSICIAN ASSISTANT

## 2021-09-01 PROCEDURE — 81001 URINALYSIS AUTO W/SCOPE: CPT | Performed by: PHYSICIAN ASSISTANT

## 2021-09-01 RX ORDER — NYSTATIN 100000 U/G
OINTMENT TOPICAL 2 TIMES DAILY
Qty: 30 G | Refills: 1 | Status: SHIPPED | OUTPATIENT
Start: 2021-09-01 | End: 2021-09-15

## 2021-09-01 RX ORDER — SULFAMETHOXAZOLE/TRIMETHOPRIM 800-160 MG
1 TABLET ORAL 2 TIMES DAILY
Qty: 20 TABLET | Refills: 0 | Status: SHIPPED | OUTPATIENT
Start: 2021-09-01 | End: 2021-09-11

## 2021-09-01 NOTE — PROGRESS NOTES
Patient presents with:  Urgent Care: pain when urinating for 4 days.     (N39.0) Acute UTI  (primary encounter diagnosis)  Comment:   Plan: sulfamethoxazole-trimethoprim (BACTRIM DS)         800-160 MG tablet            (R30.0) Dysuria  Comment:   Plan: UA reflex to Microscopic and Culture, Urine         Microscopic, Urine Culture Aerobic Bacterial -         lab collect            (N48.1) Balanitis  Comment:   Plan: nystatin (MYCOSTATIN) [id number] UNIT/GM external         ointment              41 minutes spent on the date of the encounter doing chart review, review of test results, documentation, discussion with family and use of So1  for visit       SUBJECTIVE:   Travis Peres is a 80 year old male who presents today with dysuria for the past 4 days.      SH: he is here with his wife today    Plizy  is used via ipad.        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 (Patient not taking: Reported on 10/19/2020) 30 tablet 7     Social History     Tobacco Use     Smoking status: Never Smoker     Smokeless tobacco: Never Used   Substance Use Topics     Alcohol use: Not on file     Family History   Problem  Relation Age of Onset     Diabetes Mother      Diabetes Father          ROS:    10 point ROS of systems including Constitutional, Eyes, Respiratory, Cardiovascular, Gastroenterology, Genitourinary, Integumentary, Muscularskeletal, Psychiatric ,neurological were all negative except for pertinent positives noted in my HPI       OBJECTIVE:  /68   Pulse 52   Temp (!) 96.4  F (35.8  C) (Tympanic)   Wt 54.5 kg (120 lb 4 oz)   BMI 20.01 kg/m    Physical Exam:  GENERAL APPEARANCE: healthy, alert and no distress  RESP: lungs clear to auscultation - no rales, rhonchi or wheezes  CV: regular rates and rhythm, normal S1 S2, no murmur noted  ABDOMEN:  soft, nontender, no HSM or masses and bowel sounds normal  GU_male: foreskin is erythematous and edematous at tip with erythematous tip of penis without open sores or penile discharge  NEURO: Normal strength and tone, sensory exam grossly normal,  normal speech and mentation  SKIN: no suspicious lesions or rashes

## 2021-09-01 NOTE — PATIENT INSTRUCTIONS
Patient Education     Balanitis     Balanitis is an inflammation of the head of the penis. It can happen if there is a buildup of germs under the foreskin. These germs could be bacteria, viruses, or fungi. It can also occur from exposure to soaps and other chemicals. In adults, this is most often a complication of diabetes. It can also be due to obesity or poor genital cleaning habits.  If not treated right away, this can lead to a condition called phimosis. This means you can't pull back the foreskin from the head of the penis.  Symptoms of balanitis may include:    Penis pain or soreness    Discharge    Inability to retract the foreskin    Trouble urinating    Inability to get an erection (erectile dysfunction or impotence)  Home care  The following guidelines will help you care for your condition at home:    If you can retract your foreskin:  ? Children. Retract the foreskin and clean with water. Put antibiotic cream or ointment on the penis 3 times a day.  ? Adults. Retract the foreskin and clean with water. Apply clotrimazole cream to the penis 3 times a day unless another medicine was prescribed. You can buy this cream over the counter. Don't have sex while being treated.  ? Sitz baths. Soak the penis and foreskin in warm water while there is inflammation.    If you have diabetes, talk with your healthcare provider about keeping your diabetes in good control.    If you are overweight, talk with your provider about a weight-loss plan.  Follow-up care  Follow up with your healthcare provider, or as advised.  When to get medical advice  Call your healthcare provider right away if any of these occur:    You can't return the retracted foreskin to the forward position (get medical care right away)    You can't retract the foreskin    Your symptoms get worse    Urine flow is partly or fully blocked  Catherine last reviewed this educational content on 10/1/2019    7775-7085 [employer]. All rights  reserved. This information is not intended as a substitute for professional medical care. Always follow your healthcare professional's instructions.           Patient Education     Urinary Tract Infections in Men    Urinary tract infections (UTIs) are most often caused by bacteria that invade the urinary tract. The bacteria may come from outside the body. Or they may travel from the skin outside of the rectum into the urethra. Pain in or around the urinary tract is a common symptom for most UTIs. But the only way to know for sure if you have a UTI is to have a urinalysis and urine culture.   Types of UTIs    Cystitis. This is a bladder infection. It is often linked to a blockage from an enlarged prostate. You may have an urgent or frequent need to urinate, and bloody urine. Treatment includes antibiotics and medicine to relax or shrink the prostate. Sometimes you will need surgery.    Urethritis. This is an infection of the urethra. You may have a discharge from the urethra or burning when you urinate. You may also have pain in the urethra or penis. It is treated with antibiotics.    Prostatitis. This is an inflammation or infection of the prostate. You may have an urgent or frequent need to urinate, fever, or burning when you urinate. Or you may have a tender prostate, or a vague feeling of pressure. Prostatitis is treated with a range of medicines, depending on the cause.    Pyelonephritis. This is a kidney infection. If not treated, it can be serious and damage your kidneys. In severe cases you may need to stay in the hospital. You may have a fever and lower back pain.  Medicines to treat a UTI  Most UTIs are treated with antibiotics. These kill the bacteria. The length of time you need to take them depends on the type of infection. Take antibiotics exactly as directed until all of the medicine is gone. If you don't, the infection may not go away and may become harder to treat. For certain types of UTIs, you may  be given other medicine to help treat your symptoms.  Lifestyle changes to treat and prevent UTIs  The lifestyle changes below will help get rid of your current infection. They may also help prevent future UTIs.    Drink plenty of fluids such as water, juice, or other caffeine-free drinks. This helps flush bacteria out of your system.    Empty your bladder when you feel the urge to urinate and before going to sleep. Urine that stays in your bladder promotes infection.    Use condoms during sex. These help prevent UTIs caused by sexually transmitted bacteria.    Keep follow-up appointments with your healthcare provider. He or she can may do tests to make sure the infection has cleared. If needed, more treatment can be started.  Other treatments to prevent UTIs  Most UTIs respond to medicine. But sometimes you will need a procedure or surgery. This can treat an enlarged prostate, or remove a kidney stone or other blockage. Surgery may also treat problems caused by scarring or long-term infections.  Date Last Reviewed: 1/1/2017 2000-2017 [employer]. [address] [place] [address], [place] [address]. All rights reserved. This information is not intended as a substitute for professional medical care. Always follow your healthcare professional's instructions. This information has been modified by your health care provider with permission from the publisher.

## 2021-09-26 ENCOUNTER — OFFICE VISIT (OUTPATIENT)
Dept: URGENT CARE | Facility: URGENT CARE | Age: 81
End: 2021-09-26
Payer: COMMERCIAL

## 2021-09-26 VITALS
DIASTOLIC BLOOD PRESSURE: 79 MMHG | WEIGHT: 120 LBS | OXYGEN SATURATION: 98 % | RESPIRATION RATE: 16 BRPM | HEART RATE: 61 BPM | BODY MASS INDEX: 19.97 KG/M2 | SYSTOLIC BLOOD PRESSURE: 124 MMHG | TEMPERATURE: 99.5 F

## 2021-09-26 DIAGNOSIS — L29.9 PRURITIC DISORDER: Primary | ICD-10-CM

## 2021-09-26 DIAGNOSIS — R09.89 CHEST CONGESTION: ICD-10-CM

## 2021-09-26 DIAGNOSIS — R09.82 POST-NASAL DRAINAGE: ICD-10-CM

## 2021-09-26 DIAGNOSIS — K59.01 SLOW TRANSIT CONSTIPATION: ICD-10-CM

## 2021-09-26 PROCEDURE — 99214 OFFICE O/P EST MOD 30 MIN: CPT | Performed by: INTERNAL MEDICINE

## 2021-09-26 RX ORDER — AMOXICILLIN 250 MG
1 CAPSULE ORAL 2 TIMES DAILY PRN
Qty: 60 TABLET | Refills: 0 | Status: SHIPPED | OUTPATIENT
Start: 2021-09-26

## 2021-09-26 RX ORDER — TRAZODONE HYDROCHLORIDE 100 MG/1
200 TABLET ORAL AT BEDTIME
Qty: 60 TABLET | Refills: 3 | Status: SHIPPED | OUTPATIENT
Start: 2021-09-26

## 2021-09-26 RX ORDER — GUAIFENESIN 600 MG/1
1200 TABLET, EXTENDED RELEASE ORAL 2 TIMES DAILY
Qty: 60 TABLET | Refills: 3 | Status: SHIPPED | OUTPATIENT
Start: 2021-09-26

## 2021-09-26 NOTE — PATIENT INSTRUCTIONS
For constipation:  Take fiber capsule (Psyllium) twice daily every day  If he has not had a bowel movement in more than 24 hours, use senna-docusate laxative tablet.    For itching:  Increase dose of trazodone to 200 mg at bedtime  If feeling particularly itchy and wanting to scratch at a particular spot, use Sarna lotion to temporarily soothe the itching.    For mucus:  Use guaifenesin (Mucinex) twice daily    I recommend following up with Dr. Magana in the next month or so.

## 2021-09-26 NOTE — PROGRESS NOTES
Assessment & Plan     Pruritic disorder  - traZODone (DESYREL) 100 MG tablet; Take 2 tablets (200 mg) by mouth At Bedtime    Post-nasal drainage  - guaiFENesin (MUCINEX) 600 MG 12 hr tablet; Take 2 tablets (1,200 mg) by mouth 2 times daily    Chest congestion  - guaiFENesin (MUCINEX) 600 MG 12 hr tablet; Take 2 tablets (1,200 mg) by mouth 2 times daily    Slow transit constipation  - psyllium (METAMUCIL/KONSYL) capsule; Take 1 capsule by mouth 2 times daily  - senna-docusate (SENOKOT-S/PERICOLACE) 8.6-50 MG tablet; Take 1 tablet by mouth 2 times daily as needed for constipation    Patient Instructions   For constipation:  Take fiber capsule (Psyllium) twice daily every day  If he has not had a bowel movement in more than 24 hours, use senna-docusate laxative tablet.    For itching:  Increase dose of trazodone to 200 mg at bedtime  If feeling particularly itchy and wanting to scratch at a particular spot, use Sarna lotion to temporarily soothe the itching.    For mucus:  Use guaifenesin (Mucinex) twice daily    I recommend following up with Dr. Magana in the next month or so.     Total time spent with patient, wife and  in counseling, care coordination and chart review was 35 minutes.    Louie Chavez MD  [facility]UMA Robles is a 80 year old who presents for the following health issues  accompanied by his spouse:    HPI   Patient is complaining of an itchy rash that is extensive over his body.  The itching is predominant at night.  Keeping him awake.  Wife is also asking for something to help with cough (has had guaifenesin in the past) and also with constipation.  Reviewing the patient's medical record, it is apparent that he is already on some sedating medications at bedtime that could interact with attempts to control his generalized pruritus.  This includes trazodone.  Has not had laboratory screening within the past year other than thyroid  testing.    Review of Systems   Constitutional, HEENT, cardiovascular, pulmonary, gi and gu systems are negative, except as otherwise noted.      Objective    /79   Pulse 61   Temp 99.5  F (37.5  C)   Resp 16   Wt 54.4 kg (120 lb)   SpO2 98%   BMI 19.97 kg/m    Body mass index is 19.97 kg/m .  Physical Exam   GENERAL APPEARANCE: elderly male, not in acute distress  EYES: sclerae are anicteric  NECK: no adenopathy, no asymmetry, masses, or scars and thyroid normal to palpation  LYMPH: no palpable lymphadenopathy   SKIN: other than dry skin, there are no other discrete skin lesions

## 2021-09-28 ENCOUNTER — ANCILLARY PROCEDURE (OUTPATIENT)
Dept: CT IMAGING | Facility: CLINIC | Age: 81
End: 2021-09-28
Attending: INTERNAL MEDICINE
Payer: COMMERCIAL

## 2021-09-28 DIAGNOSIS — R91.8 PULMONARY NODULES/LESIONS, MULTIPLE: ICD-10-CM

## 2021-09-28 LAB — RADIOLOGIST FLAGS: ABNORMAL

## 2021-09-28 PROCEDURE — 71250 CT THORAX DX C-: CPT | Mod: GC | Performed by: RADIOLOGY

## 2021-10-17 ENCOUNTER — OFFICE VISIT (OUTPATIENT)
Dept: URGENT CARE | Facility: URGENT CARE | Age: 81
End: 2021-10-17
Payer: COMMERCIAL

## 2021-10-17 VITALS
TEMPERATURE: 97.7 F | WEIGHT: 120 LBS | OXYGEN SATURATION: 97 % | HEART RATE: 80 BPM | RESPIRATION RATE: 20 BRPM | DIASTOLIC BLOOD PRESSURE: 66 MMHG | SYSTOLIC BLOOD PRESSURE: 130 MMHG | BODY MASS INDEX: 19.97 KG/M2

## 2021-10-17 DIAGNOSIS — R09.89 CHEST CONGESTION: ICD-10-CM

## 2021-10-17 DIAGNOSIS — D84.9 IMMUNOCOMPROMISED (H): ICD-10-CM

## 2021-10-17 DIAGNOSIS — R05.9 COUGH: Primary | ICD-10-CM

## 2021-10-17 DIAGNOSIS — R07.0 THROAT PAIN: ICD-10-CM

## 2021-10-17 PROCEDURE — U0005 INFEC AGEN DETEC AMPLI PROBE: HCPCS | Performed by: PHYSICIAN ASSISTANT

## 2021-10-17 PROCEDURE — U0003 INFECTIOUS AGENT DETECTION BY NUCLEIC ACID (DNA OR RNA); SEVERE ACUTE RESPIRATORY SYNDROME CORONAVIRUS 2 (SARS-COV-2) (CORONAVIRUS DISEASE [COVID-19]), AMPLIFIED PROBE TECHNIQUE, MAKING USE OF HIGH THROUGHPUT TECHNOLOGIES AS DESCRIBED BY CMS-2020-01-R: HCPCS | Performed by: PHYSICIAN ASSISTANT

## 2021-10-17 PROCEDURE — 87651 STREP A DNA AMP PROBE: CPT | Performed by: PHYSICIAN ASSISTANT

## 2021-10-17 PROCEDURE — 99214 OFFICE O/P EST MOD 30 MIN: CPT | Performed by: PHYSICIAN ASSISTANT

## 2021-10-17 RX ORDER — AZITHROMYCIN 250 MG/1
TABLET, FILM COATED ORAL
Qty: 6 TABLET | Refills: 0 | Status: SHIPPED | OUTPATIENT
Start: 2021-10-17 | End: 2021-10-22

## 2021-10-17 RX ORDER — BENZONATATE 200 MG/1
200 CAPSULE ORAL 3 TIMES DAILY PRN
Qty: 21 CAPSULE | Refills: 0 | Status: SHIPPED | OUTPATIENT
Start: 2021-10-17 | End: 2021-10-24

## 2021-10-17 NOTE — PROGRESS NOTES
Assessment & Plan     Cough  Tessalon for coughing prn  covid pending  Check my chart for results  - Symptomatic COVID-19 Virus (Coronavirus) by PCR Nose  - benzonatate (TESSALON) 200 MG capsule; Take 1 capsule (200 mg) by mouth 3 times daily as needed    Throat pain  Strep neg, cutlure pending  Due to infection and duration will start trial course of antibiotics  Magic mouthwash for sore throat prn  - Streptococcus A Rapid Scr w Reflx to PCR - Lab Collect  - Group A Streptococcus PCR Throat Swab  - magic mouthwash (ENTER INGREDIENTS IN COMMENTS) suspension; Swish and spit 5-10 mLs in mouth every 6 hours as needed 30 ml of Benadryl (12.5 mg/5 ml), 60 ml Maalox, 30 ml Viscous Lidocaine    Chest congestion  zpak for chest congestion  - azithromycin (ZITHROMAX) 250 MG tablet; Take 2 tablets (500 mg) by mouth daily for 1 day, THEN 1 tablet (250 mg) daily for 4 days.    Immunocompromised (H)  Monitor symptoms  Patient is on daily steroids and has lowered immune system      Review of external notes as documented elsewhere in note      No follow-ups on file.    Marquez Camargo PA-C  [facility] JAE Robles is a 81 year old who presents for the following health issues  accompanied by his daughter:    HPI     Sore throat, swollen glands  Coughing, congestion      Review of Systems   Constitutional, HEENT, cardiovascular, pulmonary, gi and gu systems are negative, except as otherwise noted.      Objective    /66   Pulse 80   Temp 97.7  F (36.5  C)   Resp 20   Wt 54.4 kg (120 lb)   SpO2 97%   BMI 19.97 kg/m    Body mass index is 19.97 kg/m .  Physical Exam   GENERAL: healthy, alert and no distress  EYES: Eyes grossly normal to inspection, PERRL and conjunctivae and sclerae normal  HENT: normal cephalic/atraumatic, ear canals and TM's normal, nose and mouth without ulcers or lesions, oropharynx clear, oral mucous membranes moist, tonsillar hypertrophy and tonsillar  erythema  NECK: cervical adenopathy bilaterally, no asymmetry, masses, or scars and thyroid normal to palpation  RESP: lungs clear to auscultation - no rales, rhonchi or wheezes  CV: regular rate and rhythm, normal S1 S2, no S3 or S4, no murmur, click or rub, no peripheral edema and peripheral pulses strong  SKIN: no suspicious lesions or rashes    Results for orders placed or performed in visit on 10/17/21   Streptococcus A Rapid Scr w Reflx to PCR - Lab Collect     Status: Normal    Specimen: Throat; Swab   Result Value Ref Range    Group A Strep antigen Negative Negative

## 2021-10-18 ENCOUNTER — TELEPHONE (OUTPATIENT)
Dept: ENDOCRINOLOGY | Facility: CLINIC | Age: 81
End: 2021-10-18

## 2021-10-18 NOTE — PROGRESS NOTES
11 minute Phone conversation with Dr Karol Alvarez, his PCP. She is asking where we are as it pertains to the thyroid cancer treatment. She notes he is in the office with her now today and he is seeing pulmonary tomorrow.  She notes they are worried the cancer is killing him and that he doesn't have follow up with me until May.  I have noted that we can get him in sooner if /when he is willing to engage in decision making but this has been impossible in the recent past.  I also reminded her that every time we have attempted a treatment on him he has had complication so I see him as high risk with intervention of any type.  I had forgotten about the letters I had sent and also asked to have  read him since our last appt (8/5 and 8/12).    Corina Potts MD

## 2021-10-19 ENCOUNTER — VIRTUAL VISIT (OUTPATIENT)
Dept: PULMONOLOGY | Facility: CLINIC | Age: 81
End: 2021-10-19
Attending: INTERNAL MEDICINE
Payer: COMMERCIAL

## 2021-10-19 ENCOUNTER — TELEPHONE (OUTPATIENT)
Dept: ENDOCRINOLOGY | Facility: CLINIC | Age: 81
End: 2021-10-19

## 2021-10-19 DIAGNOSIS — R91.8 PULMONARY NODULES/LESIONS, MULTIPLE: Primary | ICD-10-CM

## 2021-10-19 PROCEDURE — 99214 OFFICE O/P EST MOD 30 MIN: CPT | Mod: 95 | Performed by: INTERNAL MEDICINE

## 2021-10-19 NOTE — PROGRESS NOTES
"LUNG NODULE & INTERVENTIONAL PULMONARY CLINIC  CLINICS & SURGERY CENTER, [facility], [facility]   VIRTUAL TELEPHONE VISIT    Travis Peres MRN# [id number]   Age: 81 year old YOB: 1940     Reason for Consultation: lung nodule(s)    Requesting Physician: Spenser Haro MD  [address] [place] [address]    Travis Peres is a 81 year old male who is being evaluated via a billable telephone visit.      The patient has been notified of following: \"This telephone visit will be conducted via a call between you and your physician/provider. We have found that certain health care needs can be provided without the need for a physical exam.  This service lets us provide the care you need with a short phone conversation.  If a prescription is necessary we can send it directly to your pharmacy.  If lab work is needed we can place an order for that and you can then stop by our lab to have the test done at a later time. Telephone visits are billed at different rates depending on your insurance coverage. During this emergency period, for some insurers they may be billed the same as an in-person visit.  Please reach out to your insurance provider with any questions. If during the course of the call the physician/provider feels a telephone visit is not appropriate, you will not be charged for this service.\"    Patient has given verbal consent for Telephone visit?  Yes    How would you like to obtain your AVS? Rajnihart    Phone call duration: 25 minutes    Additional provider notes: see below     Assessment and Plan:    1. Established multiple pulmonary lung nodule(s). Given the characteristics on current/previous imaging and risk factors; I would classify this to lower risk for cancer given stability. RUL posterior area with cluster of nodules with waxing/waning pattern. Other nodules are stable. Repeat CT in 6mo. If waxing/waning pattern resumes, will consider stopping surveillance. "      2. COPD and likely acute exacerbation. Severe COPD based on PFT in 2009.On anora ellipta daily and prn albuterol.       Billing: I spent more than 30 minutes face to face and greater than 50% of time was for counseling and coordination of care about the issues above.     Spenser Haro MD   of Medicine  Interventional Pulmonology  Department of Pulmonary, Allergy, Critical Care and Sleep Medicine   [facility]  Pager: [phone]           History:      Travis Peres is a 78 year old male with sig h/o for hypothyroidism, COPD, HTN, hyperlipidemia, MDD,  who is here for evaluation/followup of lung nodule(s).     - No new respiratory issues  -  used for the entire visit   - CT chest and found incidental nodules. Here for followup  - Personal hx of cancer: thyroid cancer  - Family hx of cancer: No  - Exposure hx: Denies asbestos or radon exposure   - Tobacco hx: Past Smoker: 0.5ppd for 5years. Quit 2001.   - My interpretation of the images relevant for this visit includes: right posterior nodules   - My interpretation of the PFT's relevant for this visit includes: Obstructive pattern      Culprit Nodule(s):   1: Bilateral sub4mm nodules and centrilobular/tree-in-bud with waxing/waning pattern     Other active medical problems include:   - had thyroid cancer (papillary thyroid cancer) s/p thyroidectomy 2007. On synthroid.    - Has COPD. On anora ellipta and daily. Up-to-date on flu and pna vaccine. No recent AECOPD. Previous PFT obstructed with reduced DLCO.   - has HTN, hyperlipidemia. Stable.             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     Hypovitaminosis D      Multiple pulmonary nodules 2009      Osteopenia 2011     Papillary thyroid carcinoma (H) 2007    4.8 cm, right, node positive;      Pituitary macroadenoma with extrasellar extension (H) 2007    causing obstructive hydrocephalus     Post-surgical hypothyroidism 2007     Uncomplicated asthma      Vocal cord paralysis 2014    right     Xerostomia 2010    due to radiation exposures           Past Surgical History:      Past Surgical History:   Procedure Laterality Date     cymetra injection       ESOPHAGOSCOPY, GASTROSCOPY, DUODENOSCOPY (EGD), COMBINED  6/20/2013    Procedure: COMBINED ESOPHAGOSCOPY, GASTROSCOPY, DUODENOSCOPY (EGD), BIOPSY SINGLE OR MULTIPLE;  gastroscopy;  Surgeon: Leslie Guadarrama MD;  Location:  GI     ESOPHAGOSCOPY, GASTROSCOPY, DUODENOSCOPY (EGD), COMBINED N/A 9/20/2019    Procedure: ESOPHAGOGASTRODUODENOSCOPY (EGD);  Surgeon: Gilberto Gibson DO;  Location:  GI     ESOPHAGOSCOPY, GASTROSCOPY, DUODENOSCOPY (EGD), COMBINED N/A 10/16/2020    Procedure: ESOPHAGOGASTRODUODENOSCOPY, WITH BIOPSY;  Surgeon: Bartolome Granda MD;  Location:  GI     HERNIA REPAIR Right      lipoma resection chest wall Right 11/09     PHACOEMULSIFICATION CLEAR CORNEA WITH STANDARD INTRAOCULAR LENS IMPLANT Left 5/7/2018    Procedure: PHACOEMULSIFICATION CLEAR CORNEA WITH STANDARD INTRAOCULAR LENS IMPLANT;  LEFT PHACOEMULSIFICATION CLEAR CORNEA WITH STANDARD INTRAOCULAR LENS IMPLANT ;  Surgeon: Nadiya Allen MD;  Location:  EC     PHACOEMULSIFICATION CLEAR CORNEA WITH STANDARD INTRAOCULAR LENS IMPLANT Right 8/21/2018    Procedure: PHACOEMULSIFICATION CLEAR CORNEA WITH STANDARD INTRAOCULAR LENS IMPLANT;  RIGHT EYE PHACOEMULSIFICATION CLEAR CORNEA WITH STANDARD INTRAOCULAR LENS IMPLANT;  Surgeon: Nadiya Allen MD;  Location:  EC     right selective neck dissection level 2, 3, 4  11/3/09     right  shunt placement  6/28/07     THORACIC SURGERY  Fidencio 3 2017     THYMECTOMY N/A 1/3/2017    Procedure: THYMECTOMY;  Surgeon:  Ernesto Armstrong MD;  Location: [facility] OR     THYROIDECTOMY  07     TRANSCERVICAL EXTENDED MEDIASTINAL LYMPHADENECTOMY N/A 1/3/2017    Procedure: TRANSCERVICAL EXTENDED MEDIASTINAL LYMPHADENECTOMY;  Surgeon: Ernesto Armstrong MD;  Location: [facility] OR     transnasal endoscopic resection of pituitary adenoma  2007          Social History:     Social History     Tobacco Use     Smoking status: Former Smoker     Packs/day: 0.50     Years: 5.00     Pack years: 2.50     Types: Cigarettes     Start date: 1976     Quit date: 2001     Years since quittin.7     Smokeless tobacco: Never Used   Substance Use Topics     Alcohol use: No          Family History:     Family History   Problem Relation Age of Onset     Cancer No family hx of         no skin cancer     Glaucoma No family hx of      Macular Degeneration No family hx of            Allergies:      Allergies   Allergen Reactions     Metoprolol Shortness Of Breath     Bradycardia, fatigue, COPD worsening.         Fenofibrate Hives     Lisinopril Cough     Losartan Cough     Niacin      Simvastatin Cramps          Medications:     Current Outpatient Medications   Medication Sig     albuterol (PROAIR HFA) 108 (90 Base) MCG/ACT inhaler Inhale 2 puffs into the lungs every 4 hours as needed for shortness of breath / dyspnea or wheezing     alendronate (FOSAMAX) 70 MG tablet Take 1 tablet (70 mg) by mouth every 7 days     amLODIPine (NORVASC) 2.5 MG tablet TAKE 1 TABLET(2.5 MG) BY MOUTH DAILY     azithromycin (ZITHROMAX) 250 MG tablet Take 2 tablets (500 mg) by mouth daily for 1 day, THEN 1 tablet (250 mg) daily for 4 days.     benzonatate (TESSALON) 200 MG capsule Take 1 capsule (200 mg) by mouth 3 times daily as needed     docusate sodium (COLACE) 100 MG capsule Take 100 mg by mouth 2 times daily as needed for constipation     guaiFENesin (MUCINEX) 600 MG 12 hr tablet Take 2 tablets (1,200 mg) by mouth 2 times daily     guaiFENesin (MUCINEX)  600 MG 12 hr tablet Take 1 tablet (600 mg) by mouth 2 times daily     hydrocortisone (CORTEF) 10 MG tablet 10 mg AM and 5 mg afternoon     levothyroxine (SYNTHROID/LEVOTHROID) 137 MCG tablet Take 1 tablet (137 mcg) by mouth daily     magic mouthwash (ENTER INGREDIENTS IN COMMENTS) suspension Swish and spit 5-10 mLs in mouth every 6 hours as needed 30 ml of Benadryl (12.5 mg/5 ml), 60 ml Maalox, 30 ml Viscous Lidocaine     psyllium (METAMUCIL/KONSYL) capsule Take 1 capsule by mouth 2 times daily     senna-docusate (SENOKOT-S/PERICOLACE) 8.6-50 MG tablet Take 1 tablet by mouth 2 times daily as needed for constipation     tamsulosin (FLOMAX) 0.4 MG capsule Take 0.4 mg by mouth daily     traZODone (DESYREL) 100 MG tablet Take 2 tablets (200 mg) by mouth At Bedtime     umeclidinium-vilanterol (ANORO ELLIPTA) 62.5-25 MCG/INH oral inhaler Inhale 1 puff into the lungs daily  (Patient not taking: Reported on 10/17/2021)     No current facility-administered medications for this visit.     Facility-Administered Medications Ordered in Other Visits   Medication     gadobutrol (GADAVIST) injection 7.5 mL          Review of Systems:     CONSTITUTIONAL: negative for fever, chills, change in weight  INTEGUMENTARY/SKIN: no rash or obvious new lesions  ENT/MOUTH: no sore throat, new sinus pain or nasal drainage  RESP: see interval history  CV: negative for chest pain, palpitations or peripheral edema  GI: no nausea, vomiting, change in stools  : no dysuria  MUSCULOSKELETAL: no myalgias, arthralgias  ENDOCRINE: blood sugars with adequate control  PSYCHIATRIC: mood stable  LYMPHATIC: no new lymphadenopathy  HEME: no bleeding or easy bruisability  NEURO: no numbness, weakness, headaches         Physical Exam:      A comprehensive physical examination is deferred due to PHE (public health emergency) telephone visit restrictions.         Current Laboratory Data:   All laboratory and imaging data reviewed.    No results found. However,  due to the size of the patient record, not all encounters were searched. Please check Results Review for a complete set of results.         Previous Pulmonary Function Testing   No results found for: 20001  No results found for: 20002  No results found for: 20003  No results found for: 20015  No results found for: 20016  No results found for: 20027  No results found for: 20028  No results found for: 20029  No results found for: 20079  No results found for: 20080  No results found for: 20081  No results found for: 20335  No results found for: 20105  No results found for: 20053  No results found for: 20054  No results found for: 20055         Previous Chest Imaging   No images are attached to the encounter.  No images are attached to the encounter or orders placed in the encounter.         Previous Cardiology Imaging   No results found for this or any previous visit (from the past 8760 hour(s)).

## 2021-10-19 NOTE — PROGRESS NOTES
Travis is a 81 year old who is being evaluated via a billable video visit.      How would you like to obtain your AVS? Mail a copy  If the video visit is dropped, the invitation should be resent by: Text to cell phone: [phone]  Will anyone else be joining your video visit? Yes: Wife . How would they like to receive their invitation? Other e-mail: N/A  Tele 25min

## 2021-10-19 NOTE — TELEPHONE ENCOUNTER
Please call Mr Peres ( call) and offer him the following 2 visit options  Virtual visit 10/27/2021 4 PM-- 1 hour will be needed. If they can't do virtual from home perhaps they could go to clinic and be in exam room for virtual visit.  This is strongly preferred over telephone visit.   Face to face 12/6/2021 5 PM   Note he is very hard of hearing no matter what we do.  It is important he wear his hearing aides for the visit.      Note: he also should  schedule follow up with Dr Kinney thyroid surgeon whom he last saw in 2018. This could be either before or after the visit with me.     Thanks  Corina

## 2021-10-19 NOTE — LETTER
"10/19/2021       RE: Travis Peres  [address]  [place] [facility] [address]     Dear Colleague,    Thank you for referring your patient, Travis Peres, to the [facility] at [facility]. Please see a copy of my visit note below.    Travis is a 81 year old who is being evaluated via a billable video visit.      How would you like to obtain your AVS? Mail a copy  If the video visit is dropped, the invitation should be resent by: Text to cell phone: [phone]  Will anyone else be joining your video visit? Yes: Wife . How would they like to receive their invitation? Other e-mail: N/A  Tele 25min    LUNG NODULE & INTERVENTIONAL PULMONARY CLINIC  CLINICS & SURGERY CENTER, [facility]   VIRTUAL TELEPHONE VISIT    Travis Peres MRN# [id number]   Age: 81 year old YOB: 1940     Reason for Consultation: lung nodule(s)    Requesting Physician: Spenser Haro MD  [address] [place] [id number]    Travis Peres is a 81 year old male who is being evaluated via a billable telephone visit.      The patient has been notified of following: \"This telephone visit will be conducted via a call between you and your physician/provider. We have found that certain health care needs can be provided without the need for a physical exam.  This service lets us provide the care you need with a short phone conversation.  If a prescription is necessary we can send it directly to your pharmacy.  If lab work is needed we can place an order for that and you can then stop by our lab to have the test done at a later time. Telephone visits are billed at different rates depending on your insurance coverage. During this emergency period, for some insurers they may be billed the same as an in-person visit.  Please reach out to your insurance provider with any questions. If during the course of the call the physician/provider " "feels a telephone visit is not appropriate, you will not be charged for this service.\"    Patient has given verbal consent for Telephone visit?  Yes    How would you like to obtain your AVS? Sander    Phone call duration: 25 minutes    Additional provider notes: see below     Assessment and Plan:    1. Established multiple pulmonary lung nodule(s). Given the characteristics on current/previous imaging and risk factors; I would classify this to lower risk for cancer given stability. RUL posterior area with cluster of nodules with waxing/waning pattern. Other nodules are stable. Repeat CT in 6mo. If waxing/waning pattern resumes, will consider stopping surveillance.      2. COPD and likely acute exacerbation. Severe COPD based on PFT in 2009.On anora ellipta daily and prn albuterol.       Billing: I spent more than 30 minutes face to face and greater than 50% of time was for counseling and coordination of care about the issues above.     Spenser Haro MD   of Medicine  Interventional Pulmonology  Department of Pulmonary, Allergy, Critical Care and Sleep Medicine   [facility]  Pager: [phone]           History:      Travis Peres is a 78 year old male with sig h/o for hypothyroidism, COPD, HTN, hyperlipidemia, MDD,  who is here for evaluation/followup of lung nodule(s).     - No new respiratory issues  -  used for the entire visit   - CT chest and found incidental nodules. Here for followup  - Personal hx of cancer: thyroid cancer  - Family hx of cancer: No  - Exposure hx: Denies asbestos or radon exposure   - Tobacco hx: Past Smoker: 0.5ppd for 5years. Quit 2001.   - My interpretation of the images relevant for this visit includes: right posterior nodules   - My interpretation of the PFT's relevant for this visit includes: Obstructive pattern      Culprit Nodule(s):   1: Bilateral sub4mm nodules and centrilobular/tree-in-bud with waxing/waning pattern     Other " active medical problems include:   - had thyroid cancer (papillary thyroid cancer) s/p thyroidectomy 2007. On synthroid.    - Has COPD. On anora ellipta and daily. Up-to-date on flu and pna vaccine. No recent AECOPD. Previous PFT obstructed with reduced DLCO.   - has HTN, hyperlipidemia. Stable.             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     Post-surgical hypothyroidism 2007     Uncomplicated asthma      Vocal cord paralysis 2014    right     Xerostomia 2010    due to radiation exposures           Past Surgical History:      Past Surgical History:   Procedure Laterality Date     cymetra injection       ESOPHAGOSCOPY, GASTROSCOPY, DUODENOSCOPY (EGD), COMBINED  6/20/2013    Procedure: COMBINED ESOPHAGOSCOPY, GASTROSCOPY, DUODENOSCOPY (EGD), BIOPSY SINGLE OR MULTIPLE;  gastroscopy;  Surgeon: Leslie Guadarrama MD;  Location:  GI     ESOPHAGOSCOPY, GASTROSCOPY, DUODENOSCOPY (EGD), COMBINED N/A 9/20/2019    Procedure: ESOPHAGOGASTRODUODENOSCOPY (EGD);  Surgeon: Gilberto Gibson DO;  Location:  GI     ESOPHAGOSCOPY, GASTROSCOPY, DUODENOSCOPY (EGD), COMBINED N/A 10/16/2020    Procedure: ESOPHAGOGASTRODUODENOSCOPY, WITH BIOPSY;  Surgeon: Bartolome Granda MD;  Location:  GI     HERNIA REPAIR Right      lipoma resection chest wall Right 11/09     PHACOEMULSIFICATION CLEAR CORNEA WITH STANDARD INTRAOCULAR LENS IMPLANT Left 5/7/2018    Procedure: PHACOEMULSIFICATION CLEAR CORNEA WITH STANDARD  INTRAOCULAR LENS IMPLANT;  LEFT PHACOEMULSIFICATION CLEAR CORNEA WITH STANDARD INTRAOCULAR LENS IMPLANT ;  Surgeon: Nadiya Allen MD;  Location: [facility]     PHACOEMULSIFICATION CLEAR CORNEA WITH STANDARD INTRAOCULAR LENS IMPLANT Right 2018    Procedure: PHACOEMULSIFICATION CLEAR CORNEA WITH STANDARD INTRAOCULAR LENS IMPLANT;  RIGHT EYE PHACOEMULSIFICATION CLEAR CORNEA WITH STANDARD INTRAOCULAR LENS IMPLANT;  Surgeon: Nadiya Allen MD;  Location: [facility]     right selective neck dissection level 2, 3, 4  11/3/09     right  shunt placement  07     THORACIC SURGERY  Fidencio 3 2017     THYMECTOMY N/A 1/3/2017    Procedure: THYMECTOMY;  Surgeon: Ernesto Armstrong MD;  Location: [facility]     THYROIDECTOMY  07     TRANSCERVICAL EXTENDED MEDIASTINAL LYMPHADENECTOMY N/A 1/3/2017    Procedure: TRANSCERVICAL EXTENDED MEDIASTINAL LYMPHADENECTOMY;  Surgeon: Ernesto Armstrong MD;  Location: [facility] OR     transnasal endoscopic resection of pituitary adenoma  2007          Social History:     Social History     Tobacco Use     Smoking status: Former Smoker     Packs/day: 0.50     Years: 5.00     Pack years: 2.50     Types: Cigarettes     Start date: 1976     Quit date: 2001     Years since quittin.7     Smokeless tobacco: Never Used   Substance Use Topics     Alcohol use: No          Family History:     Family History   Problem Relation Age of Onset     Cancer No family hx of         no skin cancer     Glaucoma No family hx of      Macular Degeneration No family hx of            Allergies:      Allergies   Allergen Reactions     Metoprolol Shortness Of Breath     Bradycardia, fatigue, COPD worsening.         Fenofibrate Hives     Lisinopril Cough     Losartan Cough     Niacin      Simvastatin Cramps          Medications:     Current Outpatient Medications   Medication Sig     albuterol (PROAIR HFA) 108 (90 Base) MCG/ACT inhaler Inhale 2 puffs into the lungs every 4 hours as  needed for shortness of breath / dyspnea or wheezing     alendronate (FOSAMAX) 70 MG tablet Take 1 tablet (70 mg) by mouth every 7 days     amLODIPine (NORVASC) 2.5 MG tablet TAKE 1 TABLET(2.5 MG) BY MOUTH DAILY     azithromycin (ZITHROMAX) 250 MG tablet Take 2 tablets (500 mg) by mouth daily for 1 day, THEN 1 tablet (250 mg) daily for 4 days.     benzonatate (TESSALON) 200 MG capsule Take 1 capsule (200 mg) by mouth 3 times daily as needed     docusate sodium (COLACE) 100 MG capsule Take 100 mg by mouth 2 times daily as needed for constipation     guaiFENesin (MUCINEX) 600 MG 12 hr tablet Take 2 tablets (1,200 mg) by mouth 2 times daily     guaiFENesin (MUCINEX) 600 MG 12 hr tablet Take 1 tablet (600 mg) by mouth 2 times daily     hydrocortisone (CORTEF) 10 MG tablet 10 mg AM and 5 mg afternoon     levothyroxine (SYNTHROID/LEVOTHROID) 137 MCG tablet Take 1 tablet (137 mcg) by mouth daily     magic mouthwash (ENTER INGREDIENTS IN COMMENTS) suspension Swish and spit 5-10 mLs in mouth every 6 hours as needed 30 ml of Benadryl (12.5 mg/5 ml), 60 ml Maalox, 30 ml Viscous Lidocaine     psyllium (METAMUCIL/KONSYL) capsule Take 1 capsule by mouth 2 times daily     senna-docusate (SENOKOT-S/PERICOLACE) 8.6-50 MG tablet Take 1 tablet by mouth 2 times daily as needed for constipation     tamsulosin (FLOMAX) 0.4 MG capsule Take 0.4 mg by mouth daily     traZODone (DESYREL) 100 MG tablet Take 2 tablets (200 mg) by mouth At Bedtime     umeclidinium-vilanterol (ANORO ELLIPTA) 62.5-25 MCG/INH oral inhaler Inhale 1 puff into the lungs daily  (Patient not taking: Reported on 10/17/2021)     No current facility-administered medications for this visit.     Facility-Administered Medications Ordered in Other Visits   Medication     gadobutrol (GADAVIST) injection 7.5 mL          Review of Systems:     CONSTITUTIONAL: negative for fever, chills, change in weight  INTEGUMENTARY/SKIN: no rash or obvious new lesions  ENT/MOUTH: no sore  throat, new sinus pain or nasal drainage  RESP: see interval history  CV: negative for chest pain, palpitations or peripheral edema  GI: no nausea, vomiting, change in stools  : no dysuria  MUSCULOSKELETAL: no myalgias, arthralgias  ENDOCRINE: blood sugars with adequate control  PSYCHIATRIC: mood stable  LYMPHATIC: no new lymphadenopathy  HEME: no bleeding or easy bruisability  NEURO: no numbness, weakness, headaches         Physical Exam:      A comprehensive physical examination is deferred due to PHE (public health emergency) telephone visit restrictions.         Current Laboratory Data:   All laboratory and imaging data reviewed.    No results found. However, due to the size of the patient record, not all encounters were searched. Please check Results Review for a complete set of results.         Previous Pulmonary Function Testing   No results found for: 20001  No results found for: 20002  No results found for: 20003  No results found for: 20015  No results found for: 20016  No results found for: 20027  No results found for: 20028  No results found for: 20029  No results found for: 20079  No results found for: 20080  No results found for: 20081  No results found for: 20335  No results found for: 20105  No results found for: 20053  No results found for: 20054  No results found for: 20055         Previous Chest Imaging   No images are attached to the encounter.  No images are attached to the encounter or orders placed in the encounter.         Previous Cardiology Imaging   No results found for this or any previous visit (from the past 8760 hour(s)).                   Again, thank you for allowing me to participate in the care of your patient.      Sincerely,    Spenser Haro MD

## 2021-10-27 ENCOUNTER — OFFICE VISIT (OUTPATIENT)
Dept: URGENT CARE | Facility: URGENT CARE | Age: 81
End: 2021-10-27
Payer: COMMERCIAL

## 2021-10-27 VITALS
HEART RATE: 96 BPM | DIASTOLIC BLOOD PRESSURE: 78 MMHG | TEMPERATURE: 97.2 F | SYSTOLIC BLOOD PRESSURE: 114 MMHG | BODY MASS INDEX: 20.15 KG/M2 | WEIGHT: 121.1 LBS | OXYGEN SATURATION: 95 %

## 2021-10-27 DIAGNOSIS — R07.0 THROAT PAIN: Primary | ICD-10-CM

## 2021-10-27 DIAGNOSIS — K04.7 DENTAL INFECTION: ICD-10-CM

## 2021-10-27 LAB
DEPRECATED S PYO AG THROAT QL EIA: NEGATIVE
GROUP A STREP BY PCR: NOT DETECTED

## 2021-10-27 PROCEDURE — 87651 STREP A DNA AMP PROBE: CPT | Performed by: FAMILY MEDICINE

## 2021-10-27 PROCEDURE — 99213 OFFICE O/P EST LOW 20 MIN: CPT | Performed by: FAMILY MEDICINE

## 2021-10-27 PROCEDURE — U0005 INFEC AGEN DETEC AMPLI PROBE: HCPCS | Performed by: FAMILY MEDICINE

## 2021-10-27 PROCEDURE — U0003 INFECTIOUS AGENT DETECTION BY NUCLEIC ACID (DNA OR RNA); SEVERE ACUTE RESPIRATORY SYNDROME CORONAVIRUS 2 (SARS-COV-2) (CORONAVIRUS DISEASE [COVID-19]), AMPLIFIED PROBE TECHNIQUE, MAKING USE OF HIGH THROUGHPUT TECHNOLOGIES AS DESCRIBED BY CMS-2020-01-R: HCPCS | Performed by: FAMILY MEDICINE

## 2021-10-27 RX ORDER — AMOXICILLIN 500 MG/1
500 CAPSULE ORAL 2 TIMES DAILY
Qty: 20 CAPSULE | Refills: 0 | Status: SHIPPED | OUTPATIENT
Start: 2021-10-27 | End: 2021-11-06

## 2021-10-27 NOTE — PROGRESS NOTES
SUBJECTIVE: Travis Peres is a 81 year old male presenting with a chief complaint of dental pain.  Onset of symptoms was day(s) ago.  Course of illness is worsening.    Current and Associated symptoms: none  Predisposing factors include dental carries.    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         Fenofibrate Hives     Lisinopril Cough     Losartan Cough     Niacin      Simvastatin Cramps     Social History     Tobacco Use     Smoking status: Former Smoker     Packs/day: 0.50     Years: 5.00     Pack years: 2.50     Types: Cigarettes     Start date: 1976     Quit date: 2001     Years since quittin.7     Smokeless tobacco: Never Used   Substance Use Topics     Alcohol use: No       ROS:  SKIN: no rash  GI: no vomiting    OBJECTIVE:  /78 (BP Location: Right arm, Patient Position: Sitting, Cuff Size: Adult Regular)   Pulse 96   Temp 97.2  F (36.2  C) (Oral)   Wt 54.9 kg (121 lb 1.6 oz)   SpO2 95%   BMI 20.15 kg/m  GENERAL APPEARANCE: healthy, alert and no distress  EYES: EOMI,  PERRL, conjunctiva clear  HENT: TM's normal bilaterally, oral mucous membranes moist, no erythema noted and front lower teeth painful  NECK: supple, nontender, no  lymphadenopathy  RESP: lungs clear to auscultation - no rales, rhonchi or wheezes  SKIN: no suspicious lesions or rashes      ICD-10-CM    1. Throat pain  R07.0 Symptomatic COVID-19 Virus (Coronavirus) by PCR Nose     Streptococcus A Rapid Scr w Reflx to PCR - Lab Collect     Group A Streptococcus PCR Throat Swab   2. Dental infection  K04.7 amoxicillin (AMOXIL) 500 MG capsule     F/u dentist  Fluids/Rest, f/u if worse/not any better

## 2021-10-27 NOTE — PATIENT INSTRUCTIONS
"Discharge Instructions for COVID-19 Patients  You have--or may have--COVID-19. Please follow the instructions listed below.   If you have a weakened immune system, discuss with your doctor any other actions you need to take.  How can I protect others?  If you have symptoms (fever, cough, body aches or trouble breathing):    Stay home and away from others (self-isolate) until:  ? Your other symptoms have resolved (gotten better). And   ? You've had no fever--and no medicine that reduces fever--for 1 full day (24 hours). And   ? At least 10 days have passed since your symptoms started. (You may need to wait 20 days. Follow the advice of your care team.)  If you don't show symptoms, but testing showed that you have COVID-19:    Stay home and away from others (self-isolate) until at least 10 days have passed since the date of your first positive COVID-19 test.  During this time    Stay in your own room, even for meals. Use your own bathroom if you can.    Stay away from others in your home. No hugging, kissing or shaking hands. No visitors.    Don't go to work, school or anywhere else.    Clean \"high touch\" surfaces often (doorknobs, counters, handles). Use household cleaning spray or wipes.    You'll find a full list of  on the EPA website: www.epa.gov/pesticide-registration/list-n-disinfectants-use-against-sars-cov-2.    Cover your mouth and nose with a mask or other face covering to avoid spreading germs.    Wash your hands and face often. Use soap and water.    Caregivers in these groups are at risk for severe illness due to COVID-19:  ? People 65 years and older  ? People who live in a nursing home or long-term care facility  ? People with chronic disease (lung, heart, cancer, diabetes, kidney, liver, immunologic)  ? People who have a weakened immune system, including those who:    Are in cancer treatment    Take medicine that weakens the immune system, such as corticosteroids    Had a bone marrow or organ " transplant    Have an immune deficiency    Have poorly controlled HIV or AIDS    Are obese (body mass index of 40 or higher)    Smoke regularly    Caregivers should wear gloves while washing dishes, handling laundry and cleaning bedrooms and bathrooms.    Use caution when washing and drying laundry: Don't shake dirty laundry and use the warmest water setting that you can.    For more tips on managing your health at home, go to www.cdc.gov/coronavirus/2019-ncov/downloads/10Things.pdf.  How can I take care of myself at home?  1. Get lots of rest. Drink extra fluids (unless a doctor has told you not to).  2. Take Tylenol (acetaminophen) for fever or pain. If you have liver or kidney problems, ask your family doctor if it's okay to take Tylenol.   Adults can take either:   ? 650 mg (two 325 mg pills) every 4 to 6 hours, or   ? 1,000 mg (two 500 mg pills) every 8 hours as needed.  ? Note: Don't take more than 3,000 mg in one day. Acetaminophen is found in many medicines (both prescribed and over-the-counter medicines). Read all labels to be sure you don't take too much.   For children, check the Tylenol bottle for the right dose. The dose is based on the child's age or weight.  3. If you have other health problems (like cancer, heart failure, an organ transplant or severe kidney disease): Call your specialty clinic if you don't feel better in the next 2 days.  4. Know when to call 911. Emergency warning signs include:  ? Trouble breathing or shortness of breath  ? Pain or pressure in the chest that doesn't go away  ? Feeling confused like you haven't felt before, or not being able to wake up  ? Bluish-colored lips or face  5. Your doctor may have prescribed a blood thinner medicine. Follow their instructions.  Where can I get more information?     [facility] - About COVID-19:   [email or web address]    CDC - What to Do If You're Sick:  www.cdc.gov/coronavirus/2019-ncov/about/steps-when-sick.html    CDC - Ending Home Isolation: www.cdc.gov/coronavirus/2019-ncov/hcp/disposition-in-home-patients.html    CDC - Caring for Someone: www.cdc.gov/coronavirus/2019-ncov/if-you-are-sick/care-for-someone.html    [facility] - Interim Guidance for Hospital Discharge to Home: www.health.[facility].[place]./diseases/coronavirus/hcp/hospdischarge.pdf    Below are the COVID-19 hotlines at the [place] Department of Health ([facility]). Interpreters are available.  ? For health questions: Call 312-330-7300 or 1-662.865.5427 (7 a.m. to 7 p.m.)  ? For questions about schools and childcare: Call 190-642-8845 or 1-834.828.1726 (7 a.m. to 7 p.m.)    For informational purposes only. Not to replace the advice of your health care provider. Clinically reviewed by Dr. Abhi Story.   Copyright   2020 [facility]. All rights reserved. [employer] 158074 - REV 01/05/21.

## 2021-10-28 LAB — SARS-COV-2 RNA RESP QL NAA+PROBE: NEGATIVE

## 2021-11-03 NOTE — TELEPHONE ENCOUNTER
Waiting for callback from another pt regarding 12/6 at 5pm will reach out to pt tomorrow to offer the appt if still available

## 2021-11-03 NOTE — TELEPHONE ENCOUNTER
Love Vela, RN  Clinic [id number] 2 weeks ago     Please call Mr Peres ( call) and offer him the following 2 visit options   Virtual visit 10/27/2021 4 PM-- 1 hour will be needed. If they can't do virtual from home perhaps they could go to clinic and be in exam room for virtual visit.  This is strongly preferred over telephone visit.   Face to face 12/6/2021 5 PM   Note he is very hard of hearing no matter what we do.  It is important he wear his hearing aides for the visit.         Note: he also should  schedule follow up with Dr Kinney thyroid surgeon whom he last saw in 2018. This could be either before or after the visit with me.       Thanks   Corina    Message text

## 2021-11-04 ENCOUNTER — TELEPHONE (OUTPATIENT)
Dept: SURGERY | Facility: CLINIC | Age: 81
End: 2021-11-04

## 2021-11-04 NOTE — TELEPHONE ENCOUNTER
[facility] Call Center    Phone Message    May a detailed message be left on voicemail: yes     Reason for Call: Other: Pt requesting an itinerary for his upcoming appt. be mailed to his home address, if possible. Thank you.     Action Taken: Message routed to:  [facility] ENT    Travel Screening: Not Applicable

## 2021-11-04 NOTE — TELEPHONE ENCOUNTER
FUTURE VISIT INFORMATION      FUTURE VISIT INFORMATION:    Date: 11/15/21    Time: 3:20 PM    Location[place] [facility]  REFERRAL INFORMATION:    Referring provider:     Referring providers clinic:     Reason for visit/diagnosis: Papillary Thyroid Carcinoma    RECORDS REQUESTED FROM:       Clinic name Comments Records Status Imaging Status   [facility] 10/27/21 -  OV with Dr. Sanz  10/17/21 -  [facility] 8/2/21 - ENDO OV with Dr. Potts  7/5/18 - ENT OV with Dr. Gregoria Vazquez    [facility] - Surgery 12/19/07 - OP Note for TOTAL THYROIDECTOMY WITH CENTRAL NECK DISSECTION with Dr. Gregoria Vazquez    [facility] - Imaging 8/4/21 - US Head Neck  10/15/19 - US Head Neck  4/25/19 - US Head Neck  11/27/18 - US Head Neck  6/20/18 - US Head Neck  4/23/18 - US FNA Lymph Nodes [facility]   [facility] 11/3/21 - PCC OV with Dr. Alvarez Care Everywhere    [facility] - Lab 10/18/21 - TSH, T4 Care Everywhere

## 2021-11-11 ENCOUNTER — TELEPHONE (OUTPATIENT)
Dept: ENDOCRINOLOGY | Facility: CLINIC | Age: 81
End: 2021-11-11

## 2021-11-11 NOTE — TELEPHONE ENCOUNTER
[facility] Call Center    Phone Message    May a detailed message be left on voicemail: yes     Reason for Call: Other: Jen with [facility] called regarding mutual patients thyroid cancer, she would like a member of care team to call her to discuss patients medical history further. Please call her at [phone].     Action Taken: Message routed to:  [facility] Endo     Travel Screening: Not Applicable

## 2021-11-11 NOTE — TELEPHONE ENCOUNTER
Spoke w/ Jen, RN Medical Oncology Navigator at  [facility],   Dr Yahir Bueno  cell.   Pt had PET Scan at [facility] yesterday. Showed progression of thyroid cancer.   Dr Bueno Ordered a needle biopsy with   [facility] testing and return visit to [place] January 03. He would like to follow up with Dr Potts after they have the results and Pt has visit with Dr Kinney and Dr Potts.  Language Barrier, with  present..   Confirmed with Pt that he must continue to see Dr Potts.  Providers notified.   Daisy Abad RN on 11/11/2021 at 3:35 PM      RE    May a detailed message be left on voicemail: yes      Reason for Call: Other: Jen with [facility] called regarding mutual patients thyroid cancer, she would like a member of care team to call her to discuss patients medical history further. Please call her at [phone]

## 2021-11-15 ENCOUNTER — PRE VISIT (OUTPATIENT)
Dept: OTOLARYNGOLOGY | Facility: CLINIC | Age: 81
End: 2021-11-15

## 2021-11-15 ENCOUNTER — OFFICE VISIT (OUTPATIENT)
Dept: OTOLARYNGOLOGY | Facility: CLINIC | Age: 81
End: 2021-11-15
Payer: COMMERCIAL

## 2021-11-15 VITALS — WEIGHT: 122 LBS | HEART RATE: 82 BPM | OXYGEN SATURATION: 94 % | BODY MASS INDEX: 20.3 KG/M2 | TEMPERATURE: 98 F

## 2021-11-15 DIAGNOSIS — J38.01 UNILATERAL VOCAL CORD PARALYSIS: Primary | ICD-10-CM

## 2021-11-15 PROCEDURE — 99202 OFFICE O/P NEW SF 15 MIN: CPT | Performed by: SURGERY

## 2021-11-15 ASSESSMENT — PAIN SCALES - GENERAL: PAINLEVEL: NO PAIN (0)

## 2021-11-15 NOTE — LETTER
11/15/2021       RE: Travis Peres  [address]  [place] [address]     Dear Colleague,    Thank you for referring your patient, Travis Peres, to the [facility] [place] [facility]. Please see a copy of my visit note below.    Patient presents with his wife present to discuss options of resection metastatic thyroid cancer. Last surgery for thyroid cancer 2009. Patient has know right TVC paralysis Recently US guided FNA right level 7 node c/w PTC. Here for evaluation     Patient is clearly more debilitated since our last visit. Reviewed medical records and I am concerned with oral intake. Spoke with wife and she admits he is not eating much at all.     Neck exam: Well healed incisional scar. No palpable masses.   No flexible laryngoscopy performed at this time. Had patient sip water-coughed. C/w aspiration.    I personally reviewed the radiographic images and laboratory data    Plan:  At this time, I don't think this patient would benefit from surgery as he has significant medical issues that need dealt with. I would like him evaluated by our swallow team. PCP should determine is FT is beneficial. Patient needs nutritional and physical strength before I would recommend surgery.    ETOH ablation is a option as surgical intervention is not recommended at this time.    Lyndsay Kinney MD

## 2021-11-15 NOTE — PATIENT INSTRUCTIONS
1. Please schedule swallow study  2. Please call the ENT clinic with any questions,concerns, new or worsening symptoms.    -Clinic number is [phone]   - Desi's direct line (Dr. Kinney's nurse) [phone]

## 2021-11-15 NOTE — PROGRESS NOTES
Patient presents with his wife present to discuss options of resection metastatic thyroid cancer. Last surgery for thyroid cancer 2009. Patient has know right TVC paralysis Recently US guided FNA right level 7 node c/w PTC. Here for evaluation     Patient is clearly more debilitated since our last visit. Reviewed medical records and I am concerned with oral intake. Spoke with wife and she admits he is not eating much at all.     Neck exam: Well healed incisional scar. No palpable masses.   No flexible laryngoscopy performed at this time. Had patient sip water-coughed. C/w aspiration.    I personally reviewed the radiographic images and laboratory data    Plan:  At this time, I don't think this patient would benefit from surgery as he has significant medical issues that need dealt with. I would like him evaluated by our swallow team. PCP should determine is FT is beneficial. Patient needs nutritional and physical strength before I would recommend surgery.    ETOH ablation is a option as surgical intervention is not recommended at this time.    Lyndsay Kinney MD

## 2021-12-02 ENCOUNTER — THERAPY VISIT (OUTPATIENT)
Dept: SPEECH THERAPY | Facility: CLINIC | Age: 81
End: 2021-12-02
Attending: SURGERY
Payer: COMMERCIAL

## 2021-12-02 ENCOUNTER — ANCILLARY PROCEDURE (OUTPATIENT)
Dept: GENERAL RADIOLOGY | Facility: CLINIC | Age: 81
End: 2021-12-02
Attending: SURGERY
Payer: COMMERCIAL

## 2021-12-02 DIAGNOSIS — J38.01 UNILATERAL VOCAL CORD PARALYSIS: ICD-10-CM

## 2021-12-02 PROCEDURE — 74230 X-RAY XM SWLNG FUNCJ C+: CPT | Mod: GC | Performed by: RADIOLOGY

## 2021-12-02 PROCEDURE — 92610 EVALUATE SWALLOWING FUNCTION: CPT | Mod: GN | Performed by: SPEECH-LANGUAGE PATHOLOGIST

## 2021-12-02 PROCEDURE — 92526 ORAL FUNCTION THERAPY: CPT | Mod: GN | Performed by: SPEECH-LANGUAGE PATHOLOGIST

## 2021-12-02 PROCEDURE — 92611 MOTION FLUOROSCOPY/SWALLOW: CPT | Mod: GN | Performed by: SPEECH-LANGUAGE PATHOLOGIST

## 2021-12-02 RX ORDER — BARIUM SULFATE 400 MG/ML
25 SUSPENSION ORAL ONCE
Status: COMPLETED | OUTPATIENT
Start: 2021-12-02 | End: 2021-12-02

## 2021-12-02 RX ADMIN — BARIUM SULFATE 25 ML: 400 SUSPENSION ORAL at 13:35

## 2021-12-06 ENCOUNTER — OFFICE VISIT (OUTPATIENT)
Dept: ENDOCRINOLOGY | Facility: CLINIC | Age: 81
End: 2021-12-06
Payer: COMMERCIAL

## 2021-12-06 ENCOUNTER — LAB (OUTPATIENT)
Dept: LAB | Facility: CLINIC | Age: 81
End: 2021-12-06
Payer: COMMERCIAL

## 2021-12-06 VITALS
SYSTOLIC BLOOD PRESSURE: 135 MMHG | HEART RATE: 59 BPM | HEIGHT: 66 IN | BODY MASS INDEX: 19.44 KG/M2 | DIASTOLIC BLOOD PRESSURE: 77 MMHG | TEMPERATURE: 98.3 F | WEIGHT: 121 LBS

## 2021-12-06 DIAGNOSIS — R73.9 HYPERGLYCEMIA: ICD-10-CM

## 2021-12-06 DIAGNOSIS — C73 PAPILLARY THYROID CARCINOMA (H): ICD-10-CM

## 2021-12-06 DIAGNOSIS — E89.0 POSTSURGICAL HYPOTHYROIDISM: ICD-10-CM

## 2021-12-06 DIAGNOSIS — E89.0 POSTSURGICAL HYPOTHYROIDISM: Primary | ICD-10-CM

## 2021-12-06 DIAGNOSIS — R63.4 WEIGHT LOSS: ICD-10-CM

## 2021-12-06 DIAGNOSIS — M81.0 AGE-RELATED OSTEOPOROSIS WITHOUT CURRENT PATHOLOGICAL FRACTURE: ICD-10-CM

## 2021-12-06 LAB
FASTING STATUS PATIENT QL REPORTED: NO
GLUCOSE BLD-MCNC: 90 MG/DL (ref 70–99)
HBA1C MFR BLD: 5 % (ref 0–5.6)
T4 FREE SERPL-MCNC: 1.53 NG/DL (ref 0.76–1.46)
TOTAL PROTEIN SERUM FOR ELP: 8.3 G/DL (ref 6.8–8.8)
TSH SERPL DL<=0.005 MIU/L-ACNC: <0.01 MU/L (ref 0.4–4)

## 2021-12-06 PROCEDURE — 86800 THYROGLOBULIN ANTIBODY: CPT

## 2021-12-06 PROCEDURE — 84443 ASSAY THYROID STIM HORMONE: CPT | Performed by: PATHOLOGY

## 2021-12-06 PROCEDURE — 99215 OFFICE O/P EST HI 40 MIN: CPT

## 2021-12-06 PROCEDURE — 82947 ASSAY GLUCOSE BLOOD QUANT: CPT | Performed by: PATHOLOGY

## 2021-12-06 PROCEDURE — 36415 COLL VENOUS BLD VENIPUNCTURE: CPT | Performed by: PATHOLOGY

## 2021-12-06 PROCEDURE — 84155 ASSAY OF PROTEIN SERUM: CPT

## 2021-12-06 PROCEDURE — 84165 PROTEIN E-PHORESIS SERUM: CPT | Mod: TC | Performed by: PATHOLOGY

## 2021-12-06 PROCEDURE — 84439 ASSAY OF FREE THYROXINE: CPT | Performed by: PATHOLOGY

## 2021-12-06 PROCEDURE — 84165 PROTEIN E-PHORESIS SERUM: CPT | Mod: 26 | Performed by: PATHOLOGY

## 2021-12-06 PROCEDURE — 83036 HEMOGLOBIN GLYCOSYLATED A1C: CPT | Performed by: PATHOLOGY

## 2021-12-06 ASSESSMENT — MIFFLIN-ST. JEOR: SCORE: 1191.6

## 2021-12-06 ASSESSMENT — PAIN SCALES - GENERAL: PAINLEVEL: NO PAIN (0)

## 2021-12-06 NOTE — PROGRESS NOTES
OUTPATIENT SWALLOW  EVALUATION  PLAN OF TREATMENT FOR OUTPATIENT REHABILITATION  (COMPLETE FOR INITIAL CLAIMS ONLY)  Patient's Last Name, First Name, M.I.  YOB: 1940  Travis Peres     Provider's Name   Nataly Ferguson, SLP   Medical Record No.  [id number]     Start of Care Date:  12/02/21   Onset Date:  11/15/21   Type:     ___PT   ____OT  ___X_SLP Medical Diagnosis:        Treatment Diagnosis:  Moderate to severe oropharyngeal dysphagia  Visits from SOC:  1     _________________________________________________________________________________  Plan of Treatment/Functional Goals:  Planned Therapy Interventions: Dysphagia Treatment  Dysphagia treatment: Modified diet education,Compensatory strategies for swallowing,Instruction of safe swallow strategies,Oropharyngeal exercise training                     Goals   1. Goal Identifier: 1       Goal Description: Pt and wife will demonstrated understanding of diet recommendations, compensatory strategies, and rationale as determined by the treating SLP.         Target Date: 12/06/21       Date Met: 12/06/21     Therapy Frequency: other (see comments) (1x )       Nataly Ferguson, SLP       I CERTIFY THE NEED FOR THESE SERVICES FURNISHED UNDER        THIS PLAN OF TREATMENT AND WHILE UNDER MY CARE     (Physician attestation of this document indicates review and certification of the therapy plan).                               Referring Physician: Lyndsay Kinney MD      Initial Assessment        See Epic Evaluation Start Of Care Date: 12/02/21                   Speech-Language Pathology Department   EVALUATION  [facility]  VIDEO SWALLOW STUDY RESULTS        12/02/21 1300       Present Yes   Language Other  (Cantonese )   Comments [id number]   General Information   Type Of Visit Initial   Start Of Care Date  12/02/21   Referring Physician Lyndsay Kinney MD     Orders Evaluate And Treat   Orders Comment Video swallow study    Medical Diagnosis Dysphagia, unilateral vocal fold paralysis    Onset Of Illness/injury Or Date Of Surgery 11/15/21   Precautions/limitations No Known Precautions/limitations   Hearing Extremely [place]    Pertinent History of Current Problem/OT: Additional Occupational Profile Info Pt is an 80 year old male with PMH significant for dementia, COPD, CKD, osteopenia, 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  Pt was unable to provide report but notes that things don't feel like they go down.  Wife reports pt is coughing both with eating and drinking.  Pt has a history of a clinical swallow evaluation in January of 2021; chart review reveals no prior video swallow study.  Wife reports pt has been eating and drinking regular textures and thin liquids.  No recent PNA.   Respiratory Status Room air   Prior Level Of Function Swallowing   Prior Level Of Function Comment Regular textures and thin liquids   (Per wife, but she reports to have thickener at home )   Patient Role/employment History Retired   Living Environment [place]   General Observations Pt is pleasant but extremely [place].    Patient/family Goals Goals not stated at the time of evaluation.     Clinical Swallow Evaluation   Oral Musculature generally intact   Structural Abnormalities none present   Dentition present and adequate   Mucosal Quality adequate   Oral Labial Strength and Mobility WFL   Lingual Strength and Mobility impaired coordination   Laryngeal Function Cough;Throat clear;Swallow  (Voicing dysphonic, cough weak )   Additional Documentation Yes   Additional evaluation(s) completed today Yes   Rationale for completing additional  evaluation View pharyngeal phase and r/o aspiration.    Clinical Swallow Eval: Thin Liquid Texture Trial   Mode of Presentation, Thin Liquids cup;self-fed   Volume of Liquid or Food Presented single sip thin liquid x1   Oral Phase of Swallow Premature pharyngeal entry   Pharyngeal Phase of Swallow impaired;coughing/choking;reduction in laryngeal movement;repeated swallows;wet vocal quality after swallow   Diagnostic Statement Immediate positive s/s of aspiration.     VFSS Evaluation   VFSS Additional Documentation Yes   VFSS Eval: Radiology   Radiologist [facility] Radiology Resident    Views Taken left lateral;A/P   Physical Location of Procedure [facility] Radiology #2    VFSS Eval: Thin Liquid Texture Trial   Mode of Presentation, Thin Liquid cup;self-fed   Order of Presentation 1, 2   Preparatory Phase Poor bolus control   Oral Phase, Thin Liquid Premature pharyngeal entry   Pharyngeal Phase, Thin Liquid Delayed swallow reflex;Residue in valleculae;Residue in pyriform sinus;other (see comments)  (No epiglottic inversion)   Rosenbek's Penetration Aspiration Scale: Thin Liquid Trial Results 8 - contrast passes glottis, visible subglottic residue remains, absent patient response (aspiration)   Response to Aspiration absent response, silent aspiration   Diagnostic Statement Sammy silent aspiration.     VFSS Eval: Slightly Thick Liquids   Mode of Presentation cup;self-fed   Order of Presentation 3   Preparatory Phase WFL   Oral Phase Premature pharyngeal entry   Pharyngeal Phase Residue in valleculae;Residue in pyriform sinus;Other (see comments)  (No epiglottic inversion )   Rosenbek's Penetration Aspiration Scale 7 - contrast passes glottis, visable residue remains despite patient's response   Response to Aspiration unproductive reflexive involuntary cough/throat clear   Diagnostic Statement Aspiration resulting in weak throat clear.    VFSS Eval: Mildly Thick Liquids    Mode of Presentation  cup;self-fed   Order of Presentation 4, 7, 9   Preparatory Phase WFL   Oral Phase Premature pharyngeal entry   Pharyngeal Phase Delayed swallow reflex;Residue in valleculae;Residue in pyriform sinus   Rosenbek's Penetration Aspiration Scale 3 - contrast remains above the vocal cords, visible residue remains (penetration)   Successful Strategies Trialed During Procedure chin tuck   Diagnostic Statement Persistent penetration with residue remaining in the laryngeal vestibule.  Reduced with implementation of the chin tuck.    VFSS Eval: Puree Solid Texture Trial   Mode of Presentation, Puree spoon;self-fed   Order of Presentation 5, 6, 10   Preparatory Phase Poor bolus control   Oral Phase, Puree Effortful AP movement   Pharyngeal Phase, Puree Delayed swallow reflex;Residue in valleculae;Residue in pyriform sinus;Pharyngeal wall coating   Rosenbek's Penetration Aspiration Scale: Puree Food Trial Results 1 - no aspiration, contrast does not enter airway   Diagnostic Statement No penetration or aspiration.  Severe residue through the valleculae and pyriforms.  Second swallow and liquid wash reduces but does not clear the residue.    VFSS Eval: Soft & Bite Sized   Mode of Presentation self-fed;spoon   Order of Presentation 8   Preparatory Phase Insufficient mastication   Oral Phase Residue in oral cavity;Effortful AP movement   Pharyngeal Phase Other (see comments)  (Unable to elicit)   Diagnostic Statement Pt unable to move bolus posteriorly to elicit a pharyngeal swallow response.    Swallow Compensations   Swallow Compensations Reduce amounts;Pacing;Alternate viscosity of consistencies;Chin tuck;Effortful swallow   Educational Assessment   Barriers to Learning Cognitive;Hearing;Language   Esophageal Phase of Swallow   Patient reports or presents with symptoms of esophageal dysphagia Yes   Esophageal sweep performed during today s vidofluoroscopic exam  Please refer to radiologist's report for details   Esophageal  comments Presbyesophagus    General Therapy Interventions   Planned Therapy Interventions Dysphagia Treatment   Dysphagia treatment Modified diet education;Compensatory strategies for swallowing;Instruction of safe swallow strategies;Oropharyngeal exercise training   Swallow Eval: Clinical Impressions   Skilled Criteria for Therapy Intervention Skilled criteria met.  Treatment indicated.   Dysphagia Outcome Severity Scale (ROHINI) Level 2 - ROHINI   Treatment Diagnosis Moderate to severe oropharyngeal dysphagia    Diet texture recommendations Mildly thick liquids (level 2);Minced & Moist diet (level 5)   Recommended Feeding/Eating Techniques alternate between small bites and sips of food/liquid;maintain upright posture during/after eating for 30 mins;small sips/bites;tuck chin during every swallow;hard swallow w/ each bite or sip   Rehab Potential good, to achieve stated therapy goals   Therapy Frequency other (see comments)  (1x )   Anticipated Discharge Disposition home   Risks and Benefits of Treatment have been explained. Yes   Patient, family and/or staff in agreement with Plan of Care Yes   Clinical Impression Comments Pt presents with moderate to severe oropharyngeal dysphagia in the setting of weakness and reduced airway protection.  Oral mechanism exam marked for generalized weakness.  Voice dysphonic; concern for VF paralysis/paresis noted.  Cough and throat clearing response weak.  Trials under flourosocpy were reduced due to communication limitations (i.e., extremely [place], language barrier despite presence of iPAD , difficulty following directions).  Pt exhibited hayden aspiration of thin and slightly thick consistencies.  Penetration observed on nectar which was reduced with use of the chin tuck strategy.  Poor epiglottic inversion was noted.  Pureed trials resulted in no penetration or aspiration, but severe residue was observed in the valleculae and pyriforms.  Pt was unable to move minced and  moist texture items posteriorly to elicit a pharyngeal swallow response.  At this time, pt is at high risk for aspiration with his current level of skills.  It is recommended that be initiate a minced and moist diet with mildly thick liquids (IDDSI level 5;2) with the plan to downgrade to pureed textures (IDDSI level 4) should difficulty occur.   Pt will need to be upright for intake and should take small bites/sips at a slow pace, tuck chin, and alternate consistencies.  Pt and wife were educated on these recommendations and rationale.  Pt was also seen for one treatment session to probe for exercise potential which was determined to be poor.  Pt and wife may consider follow up for education and exercise but suspect pt it at or near baseline.     Swallow Goals   SLP Swallow Goals 1   Swallow Goal 1   Goal Identifier 1   Goal Description Pt and wife will demonstrated understanding of diet recommendations, compensatory strategies, and rationale as determined by the treating SLP.     Target Date 12/06/21   Date Met 12/06/21   Total Session Time   SLP Eval: oral/pharyngeal swallow function, clinical minutes (09776) 15   SLP Eval: VideoFluoroscopic Swallow function Minutes (63965) 20   Total Evaluation Time 35     Thank you for the referral of Travis Peres.  If you have any questions about this report, please contact me using the information below.         Nataly Ferguson MS CCC-SLP    Speech Language Pathologist   Clinical Specialist Level 1   Rehabilitation Services  [facility]   [address]  [address] [place] [id number]  Office: [phone]  [email or web address]

## 2021-12-06 NOTE — LETTER
12/6/2021       RE: Travis Peres  [address]  [facility] [place] [address]     Dear Colleague,    Thank you for referring your patient, Travis Peres, to the [facility] ENDOCRINOLOG[facility] [place] at [facility]. Please see a copy of my visit note below.    Endocrinology clinic visit note.     Papillary thyroid carcinoma  4.8 cm right, bilateral node positive. He has been treated with neck operation x 3 (total thyroidectomy, right lateral neck dissection;  Resection retrosternal mediastinal masses) 131I x 2 (cummulative dose 346.4 mCi) His last 131I TBS (2008) post therapy scan raised question of ? lung mets and also ? met above left kidney. Progressive cervical adenopathy has been treated in the past with ETOH and has been progressive since then.   .     We have biopsy proven positive LN involvement in the neck (FNAB 2018 and again 11/2021)  which we have not treated so far.   I do not have the most recent images which are  needed to understand current state.  I have long suspected he has lung mets but we haven't clearly proven this.    [facility] PET/CT and US images , Foundation one results   Retrieve foundation one results from most recent FNAB - does he have actionable molecular changes?   He is a high risk surgical candidate (3 neck operations already; history of complications related to many past procedures). He has already recently met with Dr Kinney, surgeon, to again discuss surgery.  I have connected with her on her impression.  She shared my concern of his surgical risks.   Perhaps lenvatinib or other targeted therapy would be better option.  His performance status has been significantly declining in the last years.    Addendum review of PACS  11/10/2021 outside PET scan as read by me-- as compared with 8/4/2021 neck US :  High FDG activity low neck /thyroid bed region midline (we don't have good US images /labeling of this region , I am not sure of  the US correlate for this), right lateral (clavicle level; corresponds to right level 7 # 1 3.1 x 1.4 x 1.8 cm  ) and right posterior thyroid bed (corresponds to right level 7 # 2  0.9 x 0.8 x 1    Postsurgical hypothyroidism.  On LT4 137 mcg/day, unstable due to LT4 dose reduction 8/2021  due to thyroidectomy plus hypopituitarism.   TSH should be kept low to avoid stimulating the thyroid cancer .         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  He has already had 3 neck operations and ETOh to cervical adenopathy.  The 2 LNs likely correlate to the high FDG regions on the PET.   It is possible these are the same LNs that were treated with ETOH in the past (vs others in the same vicinity - it is hard to say)   I am concerned the level 7 mass can't be reached with ETOH treatment, that the only way to remove it is with surgery.  He declined surgery offered in July, 2018  He is a high risk operative candidate.  We have discussed this many times before.      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   On LT4 and HC only.  Treat to high normal free T4 target. 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 The tumor has been significantly de bulked and He has completed XRT for  persistent tumor-. Tumor mass is stable on  MRI through 10/24/18.  Continue periodic imaging of the sella, treat the hypopituitarism medically.   Pituitary MRI could be repeated anytime but this is low priority compared with his other problems.  Discussed.      Osteoporosis on 8/20221 DXA ; frequent falls within the last year ; Bone loss on alendronate since 2012.  Is he really getting the alendronate or is this a sign of another problem we are missing ?  Calcium  was low on last test 11/4/2021 which would preclude Prolia or Reclast   Repeat spep    Labs 12/6/2021 Tg pending, TSH <0.01, free T4 1.63, HgbA1c 5, SPEP polyclonal increase in gamma - no monoclonal    Frequent falls, frailty -    Weight loss is of concern .  Poor po intake/ aspiration risk .  Hyperglycemia has been noted in [facility] system, circumstances unclear.   Labs to include HgbA1c, glucose     Aspiration risk per recent studies. He already follows with pulmonary, Dr Haro.     Lung nodules -worse - differential per radiologist is fungal or atypical pneumonia - can't exclude mets . He already follows with pulmonary, Dr Haro.     Deafness, Language barrier - both of these , combined with? Dementia, are barriers to his optimal care.   Today his wife was more engaged than on past appointments, seems more determined to find treatment options.       ? Lytic bone lesions in the past -stable on serial imaging --repeat SPEP     I have independently reviewed and interpreted labs, imaging as indicated.     65__ minutes spent on the date of the encounter doing chart review, history and exam, documentation and further activities as noted above.     12/13/2021 chart review- the requested images and reports are not yet on available to me.    Corina Potts MD      Cc/ HPI: Mr Peres presents today along with his wife. We also had the  masha.   He was last seen by me 8/2/2021.  That day we reduced the LT4 from 150 to 137 mcg/day due to the fact he was losing weight.  Since then, I have had several conversations with his PCP Dr Paz Car.  He has also seen [facility]  oncologist Dr Martins ,  had  PET scan and FNAB in the [facility] system, not ordered by me.  I do not have the images related to these studies however by description it is likely the same area was biopsied that we have previously biopsied and we already knew was positive. The notes suggest Foundation one was included on the biopsy specimen but  this is not reported on Care everywhere.      He has a history of multiple complicated endocrine issues, including thyroid cancer, surgical hypothyroidism, osteoporosis, pituitary macroadenoma with partial hypopituitarism and history of DI.   See my 4/9/18 note for his detailed history.     We know he has persistent/recurrent/ progressive thyroid cancer in the neck.  On FNAB 4/23/18: + PTC, needle wash Tg 2082 ng/ml .   Repeat FNAB 11/12/2021 in the [facility] system is likely the same mass or nearby   Summer 2018 he met with surgeon and they decided against surgery at that time. Since then, we have seen slow progression in the size of the involved  adenopathy.  At my last visit with Mr Peres in August shared decision making was impossible, as on many prior visits.  He has hac complications with nearly every procedure in the last years, though apparently he didn't have complications with the FNAB performed in 11/2021.  We had defaulted to the stance of doing no harm.      Papillary thyroid cancer 4.8 cm left, bilateral node positive (MACIS 6.88 minimum, pT3, pN1a (8), pMx, Stage III or IV). His course has included several operations and several 131I treatments  11/27/07 thyroidectomy  153.4 mCi 131I in 6/08. The radioiodine  was complicated by acute sialadenitis.   12/08  193 mCi 131I (with Thyrogen stimulation). The post therapy scan showed activity in the thyroid bed, lung base on the right and also superior left kidney region   11/3/09  right selective neck dissection levels 2, 3 and 4, removing many positive LNs.   4/16/14 FNAB of right level 6 and 7 cervical lymph nodes-  positive for papillary thyroid cancer. Needle wash Tg was also high on both samples. He had  hoarseness within one hour after the FNAB procedure. He was treated with cymetra on 5/12/14 and he restarted thickened liquids.    6/3/14  ETOH ablation procedure of the  2 LNs    1/31/17  trasnscervical resection of retrosternal mediastinal lesions.  A  cystic lesion was removed and drained.  Surgical path  benign thymic tissue.    2.  Osteoporosis.   Alendronate has been prescribed since 2012.   DXA 8/4/2021 showed osteoporosis , lowest T-score -2.6 and bone loss right hip compared with 2019.       Labs   4/9/18: Tg 18.2, FRANK < 0.4; TSH  ,0.01, free T4 1.52, Na 139, K 4.4;   4/23/18 FNAB right level 6 and right level 7 LN - + PTC  6/20/18: Tg 12.4, FRANK < 0.4, TSH < 0.01, free T4 1.6 -   11/27/18: Tg 16.5, FRANK  0.4, TSH < 0.01, free T4 1.46  4/22/19: Tg 22, FRANK < 0.4, TSH < 0.01, free T4 1.51, Ca 8.4, albumin 3.3, phos 3.1, creatinine 1.03, glucose 122, vitamin D 22  7/23/19: Tg 32.1, FRANK < 0.4, TSH < 0.01  10/12/2020 [facility] creatinine 1.9, Ca 8.8, glucose 131; COVID 19 negative   10/15/2020 TSH 4.19l /wbc 8500, Hgb 12.7, platelets 182K  11/1/2020 Ca 7.3, Mg 2.5, phos 2.3, creatinien 1.35, glucose 86   11/4/2020 TSH 5.78, free T4 0.8  11/5/2020 HgbA1c 5.7%  2/2/2021 Ca 8.6, creatinine 0.83, Na 142, K 3.3,   8/2/2021 Tg 62, FRANK < 0.4, TSH < 0.01, free T4 1.68   1/18/2021 [facility] TSH < 0.01  11/4/2021 [facility] Tg 109 ng/dl , FRANK < 1, TSH not measured.  Na 141 , K 3.5, glucose 123, Ca 8.4, creatinine 0.85      Images:   4/12/18  PET/CT .  Right low neck /superior mediastinal high FDG lateral and more midline. The paratracheal mass is somewhat posterior and below the level of the clavicle (read as 1.4 cm, larger than before, SUB 68), but above the sternum  Tiny focus of fdg left lung \  8/29/18 CT chest: previously spiculted 1.7 x 1.5 cm nodule now 7 x 5 mm , ? Atelectasis.  Scattered upper lobe reticulonodular opacities as seen on prior CT, several more conspicuous, up to 4 mm, ? Infection .  To my eye I also note right level 7 neck mass 1.6 x 2 with some airway mass effect (this was 1.4 x 1.5 on 9/17 CT). This is also likely  the right level 7 mass we have bene following on US.    10/24/18 MRI brain residual mass 1.4 x 0.6 x 0.9 cm, extending into left cavernous sinus.   Stalk deviated to the left, thickened to 5 mm; optic chiasm atrophic  10/15/19 neck US compared with 4/25/19 , 11/27/18,  6/20/18 and  4/16/18:   Right level 6 thyroid bed 1.6 x 1.1 x 1.1 (was   1.5 x 1.1 x 1.2 ;  1.2 x 0.9 x 1.2; 1.2 x 01 x 1.1 ;  1.3 x 0.95 x 1.2 cm - suspicious; 1.2 x 0.93 x 0.9 ;  FNAB 4/23/18: + PTC, needle wash Tg 2082 ng/ml  Right level 7 # 1 1.7 x 1.6 x  1.7 cm (was  2 x 1.9 x 2.1 ;  1.8 x 1.5 x 1.8 ; 1.8 x 1.6 x 1.8 cm ; 1.4 x 1.5 x 1.5 cm; 1.1 x 1.2 x 1 -FNAB 4/23/18 + PTC, needle wash Tg 430 ng/ml  Left level 4  0.5 x 0.4 x 0.9 cm  (was 0.5 x 0.4 x 1;  0.4 x 0.6 x 0.9  0.3 x 0.6 x 1.1 ;  0.3 x 0.8 x 1 cm ; 0.6 x 0.3 x 0.8 )  Left level 4 # 2 0.3 x  0.5 x 0.4 cm  (was 0.4 x 0.4 x 0.6;  0.4 x0.6 x 0.6 ; 0.5 x 0.7 x 0.7 ; 0.8 x 0.5 x 0.6 cm;  0.5 x 0.5 x 1 )  Left level 4 # 3 0.4 x0.6 x 0.4 - ? new  4/25/19 DXA: lowest T-score -2.3 right femoral neck; no change in BMD compared with 2018.   4/25/19 chest CT: increase in centrilobular nodules posterior RUL-- I had discussed this with Dr Paz Magana after our last appt; today I have reviewed the images -right level 7 mass indents trachea slightly.    10/15/19 chest CT: no significant change   10/13/2020 CT Chest -right neck mass 1.4 x 1.8 cm ; new 4 mm lung nodule posterior RUL   8/4/2/2021 DXA lowest T-score -2.6 right femoral neck ; significant bone loss right total hip  8/4/2021 neck US 8/4/2021 :  Compared with 10/15/19, 4/25/19 , 11/27/18,  6/20/18 and  4/16/18:   Right level 7 # 1 3.1 x 1.4 x 1.8 cm - now showing clear confluence of past right level 6 1.6 x 1.1 x 1.1 and right level 7 #1  1.7 x 1.6 x  1.7 cm- both are known PTC on past FNAB from 4/23/2018  Right level 7 # 2 0.9 x 0.8 x 1  Right level 7 # 3 0.7 x 0.4 x 0.8 cm   Left level 4   0.3 x 0.5 x 0.8 cm  was  0.5 x 0.4 x 0.9 cm ; 0.5 x 0.4 x 1;  0.4 x 0.6 x 0.9  0.3 x 0.6 x 1.1 ;  0.3 x 0.8 x 1 cm ; 0.6 x 0.3 x 0.8 )  Left level 4 # 2 0.6 x 0.5 x 0.7 cm was 0.3 x  0.5 x 0.4 cm;   0.4 x 0.4 x 0.6;  0.4 x0.6 x 0.6 ; 0.5 x 0.7 x 0.7 ; 0.8 x 0.5 x 0.6 cm;  0.5 x 0.5 x 1 )  Left level 4 # 3 0.5 x 0.5 x 0.7 cm  was 0.4 x0.6 x 0.4 -  9/28/2021 CT chest without contrast:   12/2/2021 Modified barium swallow - silent aspiration with thin, slightly thick and mildly thickened liquids.   Presbyesophagus.       ROS: (per his wife)  Weights   11/15/2021 122  8/2/2021 121  2/2/2021 130  11/4/2020 146  Not eating as well   Spends the day resting; independent with ADLs  Forgetful  80% understands when [place] barrier  Lost hearing aide - doesn't like it ; he doesn't have it on today .    Past Medical History:   Diagnosis Date     Arthritis      BPH (benign prostatic hyperplasia)      COPD (chronic obstructive pulmonary disease) (H)      Depression      Hyperlipidemia      Hypertension     no current meds     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     Hypovitaminosis D      Multiple pulmonary nodules 2009     Osteopenia 2011     Papillary thyroid carcinoma (H) 2007    4.8 cm, right, node positive;      Pituitary macroadenoma with extrasellar extension (H) 2007    causing obstructive hydrocephalus     Post-surgical hypothyroidism 2007     Uncomplicated asthma      Vocal cord paralysis 2014    right     Xerostomia 2010    due to radiation exposures     Past Surgical History:   Procedure Laterality Date     cymetra injection       ESOPHAGOSCOPY, GASTROSCOPY, DUODENOSCOPY (EGD), COMBINED  6/20/2013    Procedure: COMBINED ESOPHAGOSCOPY, GASTROSCOPY, DUODENOSCOPY (EGD), BIOPSY SINGLE OR MULTIPLE;  gastroscopy;  Surgeon: Leslie Guadarrama MD;  Location:  GI     ESOPHAGOSCOPY, GASTROSCOPY, DUODENOSCOPY (EGD), COMBINED N/A 9/20/2019    Procedure: ESOPHAGOGASTRODUODENOSCOPY (EGD);  Surgeon: Gilberto Gibson DO;  Location:  GI     ESOPHAGOSCOPY, GASTROSCOPY, DUODENOSCOPY (EGD), COMBINED N/A 10/16/2020    Procedure: ESOPHAGOGASTRODUODENOSCOPY, WITH BIOPSY;  Surgeon: Bartolome Granda MD;  Location:   GI     HERNIA REPAIR Right      lipoma resection chest wall Right 11/09     PHACOEMULSIFICATION CLEAR CORNEA WITH STANDARD INTRAOCULAR LENS IMPLANT Left 5/7/2018    Procedure: PHACOEMULSIFICATION CLEAR CORNEA WITH STANDARD INTRAOCULAR LENS IMPLANT;  LEFT PHACOEMULSIFICATION CLEAR CORNEA WITH STANDARD INTRAOCULAR LENS IMPLANT ;  Surgeon: Nadiya Allen MD;  Location:  EC     PHACOEMULSIFICATION CLEAR CORNEA WITH STANDARD INTRAOCULAR LENS IMPLANT Right 8/21/2018    Procedure: PHACOEMULSIFICATION CLEAR CORNEA WITH STANDARD INTRAOCULAR LENS IMPLANT;  RIGHT EYE PHACOEMULSIFICATION CLEAR CORNEA WITH STANDARD INTRAOCULAR LENS IMPLANT;  Surgeon: Nadiya Allen MD;  Location: [facility]     right selective neck dissection level 2, 3, 4  11/3/09     right  shunt placement  6/28/07     THORACIC SURGERY  Fidencio 3 2017     THYMECTOMY N/A 1/3/2017    Procedure: THYMECTOMY;  Surgeon: Ernesto Armstrong MD;  Location: [facility] OR     THYROIDECTOMY  11/27/07     TRANSCERVICAL EXTENDED MEDIASTINAL LYMPHADENECTOMY N/A 1/3/2017    Procedure: TRANSCERVICAL EXTENDED MEDIASTINAL LYMPHADENECTOMY;  Surgeon: Ernesto Armstrong MD;  Location: [facility] OR     transnasal endoscopic resection of pituitary adenoma  8/13/2007     Current Outpatient Medications   Medication Sig Dispense Refill     albuterol (PROAIR HFA) 108 (90 Base) MCG/ACT inhaler Inhale 2 puffs into the lungs every 4 hours as needed for shortness of breath / dyspnea or wheezing 3 Inhaler 11     alendronate (FOSAMAX) 70 MG tablet Take 1 tablet (70 mg) by mouth every 7 days 32 tablet 1     amLODIPine (NORVASC) 2.5 MG tablet TAKE 1 TABLET(2.5 MG) BY MOUTH DAILY 90 tablet 0     docusate sodium (COLACE) 100 MG capsule Take 100 mg by mouth 2 times daily as needed for constipation       guaiFENesin (MUCINEX) 600 MG 12 hr tablet Take 2 tablets (1,200 mg) by mouth 2 times daily 60 tablet 3     guaiFENesin (MUCINEX) 600 MG 12 hr tablet Take 1 tablet (600 mg) by mouth 2  "times daily 14 tablet 0     hydrocortisone (CORTEF) 10 MG tablet 10 mg AM and 5 mg afternoon 150 tablet 4     levothyroxine (SYNTHROID/LEVOTHROID) 137 MCG tablet Take 1 tablet (137 mcg) by mouth daily 90 tablet 4     magic mouthwash (ENTER INGREDIENTS IN COMMENTS) suspension Swish and spit 5-10 mLs in mouth every 6 hours as needed 30 ml of Benadryl (12.5 mg/5 ml), 60 ml Maalox, 30 ml Viscous Lidocaine 120 mL 0     psyllium (METAMUCIL/KONSYL) capsule Take 1 capsule by mouth 2 times daily 180 capsule 3     senna-docusate (SENOKOT-S/PERICOLACE) 8.6-50 MG tablet Take 1 tablet by mouth 2 times daily as needed for constipation 60 tablet 0     tamsulosin (FLOMAX) 0.4 MG capsule Take 0.4 mg by mouth daily       traZODone (DESYREL) 100 MG tablet Take 2 tablets (200 mg) by mouth At Bedtime 60 tablet 3     umeclidinium-vilanterol (ANORO ELLIPTA) 62.5-25 MCG/INH oral inhaler Inhale 1 puff into the lungs daily        Social History     Tobacco Use     Smoking status: Former Smoker     Packs/day: 0.50     Years: 5.00     Pack years: 2.50     Types: Cigarettes     Start date: 1976     Quit date: 2001     Years since quittin.8     Smokeless tobacco: Never Used   Substance Use Topics     Alcohol use: No     Drug use: No   Not swimming; takes walks sometimes; reads the new spaper.     GENERAL  BP Readings from Last 1 Encounters:   21 135/77      Pulse Readings from Last 1 Encounters:   21 59      Resp Readings from Last 1 Encounters:   10/17/21 20      Temp Readings from Last 1 Encounters:   21 98.3  F (36.8  C) (Oral)      SpO2 Readings from Last 1 Encounters:   11/15/21 94%      Wt Readings from Last 1 Encounters:   21 54.9 kg (121 lb)      Ht Readings from Last 1 Encounters:   21 1.676 m (5' 6\")     GENERAL sitting in chair very still ; He doesn't appear to hear the conversation at all; no words uttered    /77 (BP Location: Right arm, Patient Position: Sitting, Cuff Size: Adult " "Regular)   Pulse 59   Temp 98.3  F (36.8  C) (Oral)   Ht 1.676 m (5' 6\")   Wt 54.9 kg (121 lb)   BMI 19.53 kg/m    SKIN: normal color, temperature, texture   HEENT: PER,, no scleral icterus, eyelid retraction, stare, lid lag, proptosis or conjunctival injection.     NECK: No visible neck masses, cervical adenopathy. I am unable to palpate any defined neck mass  LUNGS: clear to auscultation bilaterally. distant  CARDIAC: distant, slow, RRR, S1, S2 without murmurs, rubs or gallops.    BACK: normal spinal contour.     NEURO: Awake, appears alert, but hd didn't speak; moves all extremities, DTRs 0/4,gait normal, no tremor of the outstretched hand      EXAMINATION: Chest CT  9/28/2021 10:46 AMCLINICAL HISTORY: Lung nodule (Pulmonary nodule); Pulmonarynodules/lesions, multiple   COMPARISON: Chest x-ray 5/30/2021, CT chest 10/13/2020.   TECHNIQUE: CT imaging obtained through the chest without contrast.Axial, coronal, and sagittal reconstructions and axial MIP reformatted  images are reviewed.      FINDINGS:  Lungs: The trachea and central airways are patent. No pneumothorax or pleural effusion. Multiple new solid right upper lobe nodules associated with grossly stable tree-in-bud opacities involving the posterior aspect of the right upper lobe, the largest measures 6 mm (series 4 image 110). Unchanged calcified granuloma in the left upper lobe. Slightly improved chewing but opacities in the anterior left lower lobe, previously described 6 mm nodule associated with these opacities is not well appreciated on today's exam. Redemonstration of complete collapse of the right middle lobe.  Mediastinum: The visualized thyroid gland is unremarkable. The heart size is within normal limits. No pericardial effusion. The ascending  aorta and main pulmonary artery diameters are within normal limits. Normal appearance and configuration of the great vessels off of theaortic arch. Mild atherosclerotic calcification of the coronary " arteries. No suspicious mediastinal, hilar, or axillary lymph nodes.Stable scattered calcified mediastinal lymph nodes.Bones and soft tissues: No suspicious bone findings. Upper Abdomen: Multiple nonobstructive calcified gallstones without  evidence of cholecystitis. Large simple cyst at the superior pole ofthe left kidney, similar to previous. Partially visualized  shunt courses across the anterior right hemithorax into the right neck.                                                             IMPRESSION:   1. Redemonstration of peripheral predominant tree-in-bud opacities in the right lung with multiple new associated solid nodules measuring up to 6 mm as above at the posterior aspect of the right upper lobe. Suggestive of ongoing fungal or atypical infection (mycobacteria). Superimposed malignancy is not excluded. Recommend CT to resolution.  2. Previously described solid nodules associated with the tree in bud opacities in the anterior left lower lobe have resolved.  3. Continued right middle lobe cicatricial atelectasis.  4. Cholelithiasis without evidence of cholecystitis.  [Access Center: Recommend follow-up for lung nodules]  MANA TUCKER MD        Component 3 wk ago   Case Report  Medical Cytology Report                           Case: [id number]                                   Authorizing Provider:  Yahir Bueno      Collected:           11/12/2021 [facility]                                      MD Fer                                                                   Ordering Location:     [facility]        Received:            11/12/2021 [address] [place] [address] Medical Imaging                                                     Pathologist:           Elton Manuel MD                                                                   Specimen:    Right Supra  "Clavicular Lymph Node                                                       Final Diagnosis  A) LYMPH NODE, SUPRACLAVICULAR, ULTRASOUND-GUIDED FINE NEEDLE ASPIRATION:   1. Positive for malignancy, metastatic papillary thyroid carcinoma     2. See comment    Electronically signed by Elton Manuel MD on 11/15/2021 at  1:00 PM   Comment  The cytomorphologic features along with the patients history is compatible with a metastatic papillary thyroid carcinoma.    Clinical Information  Mr. Peres is a 81 y.o. woman with a history of node positive papillary thyroid carcinoma after thyroidectomy on 11/27/2007 and recurrence in 2018.  She now presents with multiple bilateral PET positive neck lymph nodes.    Gross Description  A) Received identified as \"Right Supra Clavicular Lymph Node\", is a fine needle aspirate specimen.     The following were prepared from the specimen submitted:        - 5 slides, air-dried, stained with Diff-Quik stain         - 1 ThinPrep slide Papanicolaou-stained        - 1 CytoLyt vial is received        - 1 RPMI vial is received   Adequacy Assessment  A) N.M. assessed adequacy from the air-dried smears at the time of the procedure with an impression of \"Adequate\".    Microscopic Description  Specimen adequacy: Adequate for interpretation.     All slides were reviewed. The microscopic appearance substantiates the diagnosis.    Additional Information  Cytology is screened at [facility], [facility] - [address], [place] [address] and [facility] - [address] [place] [address] and [facility] - [address] [place] [address]     Interpreted at [facility], [facility] Laboratory - [address]        PET/CT 11/10/2021 [facility] -   INDICATION:   81 year-old male. Metastatic papillary thyroid carcinoma. Thyroidectomy. Ablation x2. Lymphadenopathy. History of a left upper lobe " nodule. Follow-up.   TECHNIQUE:   7.73 mCi 18 FDG (18 fluoro-de-oxy-glucose) injected intravenously.   Imaging performed from the mid forehead to the proximal thighs 60 minutes following injection.   CT performed for anatomic correlation and attenuation correction. Pre scan glucose: 124 mg/dL.   COMPARISON:   Correlation is made with an outside PET/CT scan report from the [facility] system April 12, 2018. Those images cannot be displayed on the current imaging platform.   FINDINGS:   Physiologic activity is identified in the brain, salivary glands, tongue, paralaryngeal soft tissues, myocardium, GI, and  tract. Focal activity in the left antecubital fossa is the radiotracer injection site.   The included intracranial structures and included soft tissues of the face are within normal limits.   Few mm subtle metabolically active left level V lymph node, image 174, SUV max 1.9.   Subcutaneous nodular lymph node posterior left neck, image 177, SUV max 2.4.   Additional tiny lymph nodes in the low left neck extending into the left supraclavicular fossa, image 163, SUV max 2.8.     In the chest, there are metabolically active supraclavicular upper anterior mediastinal and an AP window lymph node with low-level activity in 2 or 3 bilateral axillary lymph nodes.   For example:   Medial left supraclavicular space SUV max 3.9.   Medial right supraclavicular space, high-level metabolically active soft tissue mass, image 152, SUV max 55.6.   Adjacent much smaller subcutaneous nodule/metastasis demonstrates an SUV max of 10.8.   Upper pretracheal metastasis, image 153, SUV max 13.7.   Upper right paratracheal lymph node metastasis, SUV max of 30.1 image 151.   Tiny left thoracic inlet lymph node, image 148 SUV max 4.6.   AP window lymph node SUV max 4.6.   Small right hilar lymph node SUV max 4.2.     Low intermediate level activity within a few axillary lymph nodes. For example on the left, image 131, SUV max 3.1 and  a similar level on the right, image 136, SUV max 2.4.     Multiple tiny peribronchial peripheral nodules within the posterior right upper lobe of the lung image 137 likely infectious or inflammatory in nature with an SUV max of 2.4 measured on image 137.   Densely calcified granuloma anteromedial left upper lobe of the lung. Densely calcified right paratracheal and right hilar lymph nodes indicating a benign granulomatous process.   No focal metabolically active nodules within the left lung. Focal area of atelectasis medial right middle lobe/perihilar distribution nonspecific with low level activity SUV max 2.2.     The abdomen, pelvis, both inguinal regions, soft tissues of the included thighs, and the included skeleton are within normal limits. Tiny benign bone island in the right acetabulum posteriorly and the proximal right femur.     CT Findings: Surgically absent thyroid gland. Calcified granuloma left upper lobe of the lung with calcified right hilar/mediastinal lymph nodes. Multiple tiny peribronchial nodules posterior right upper lobe of the lung with an appearance and distribution suggesting a postinfectious or postinflammatory process. Focal atelectasis/consolidation right perihilar middle lobe of the lung. Coronary artery calcification compatible with coronary arterial disease. Right-sided ventriculoperitoneal shunt tube terminating in the pelvis. Cholelithiasis. Large left renal cyst. No hydronephrosis or splenomegaly. Scattered calcification within a normal caliber abdominal aorta and iliac arteries. Mild prostatic enlargement with prostatic calcifications.     IMPRESSION:   1. Small metabolically active low left neck/left supraclavicular lymph nodes. High-level metabolically active lymph metastases/lymph node within the low right neck/medial right supraclavicular space and along the trachea both toward the right and left side.   2. Small left AP window lymph node, and a small right hilar lymph node,  both of which could be reactive or potentially metastatic.   3. Mild metabolic activity within a few minimally prominent axillary lymph nodes nonspecific potentially reactive.   4. Low intermediate level activity associated tiny peribronchial nodules in the right upper lobe laterally is more likely postinfectious or postinflammatory with an SUV max of 2.4.   5. When correlated with the previous report there has likely been progression in the neck/upper chest. However the previous report mentioned spiculated nodule in the lingula left upper lobe but that is not seen today.   Dictated by Brian Valle MD @ Nov 10 2021  2:58PM   (Electronically Signed)      This result has an attachment that is not available.   RADIOLOGY IMMEDIATE POST PROCEDURE NOTE     11/12/2021 US FNAB   Travis Peres   [id number]   1940   INDICATIONS: Papillary thyroid carcinoma, abnormal PET/CT.   PROCEDURE PERFORMED:  Ultrasound guided fine needle aspiration, 16 mm  short axis dimension mass, right supraclavicular fossa.     FINDINGS (DESCRIPTION OF EACH PROCEDURE):  See above.   POST-PROCEDURE DIAGNOSIS:  Status post fine needle aspiration.   PATIENT POSITION: supine   ANTISEPTIC PREPARATION and BARRIER TECHNIQUES USED:  skin was prepped and draped in the usual sterile fashion   IMAGING GUIDANCE FOR ACCESS / PROCEDURE:  Ultrasound   ACCESS LOCATION / SITE / TECHNIQUE:  Right supraclavicular fossa.  Note  that a medial to lateral approach was chosen to avoid the adjacent shunt tubing seen on the recent PET/CT.   EQUIPMENT UTILIZED: 25 gauge needles.   CLOSURE:  none   RADIATION DOSE:   None.   MEDICATIONS GIVEN:    Lidocaine for local anesthesia.   SPECIMEN(S): Five 25 gauge FNA specimens, adequate for interpretation.   COMPLICATIONS: no complications noted   DRAINS:  None     ESTIMATED BLOOD LOSS:  Less than 10 cc.     PHYSICIAN(S) AND ASSISTANTS (if any):  Mekhi Gay MD     Additional Comments:     In my discussion, prior to the  signing of the consent, I reviewed the   procedure, benefits, risks, long-term effects, treatment options, possible   use of pain or sedation medications, and how the procedure will meet the   treatment goal with the patient and/or family. The patient was given ample   time to ask questions. All questions were answered.     Utilizing sterile technique, local anesthesia with 1% lidocaine, and   sonographic guidance, 5 passes using 25 gauge needles were made through a   mixed echogenicity mass in the right supraclavicular space.  Specimens   were deemed adequate for interpretation.  No immediate complications.     Patient tolerated the procedure well.     Please call with questions. Mekhi Gay MD     [facility] Protocol     A. Pre-procedure verification complete yes   1-relevant information / documentation available, reviewed and properly   matched to the patient; 2-consent accurate and complete, 3-equipment and   supplies available     B. Site marking complete Yes   Site marked if not in continuous attendance with patient     C. TIME OUT completed yes   Time Out was conducted just prior to starting procedure to verify the   eight required elements: 1-patient identity, 2-consent accurate and   complete, 3-position, 4-correct side/site marked (if applicable),   5-procedure, 6-relevant images / results properly labeled and displayed   (if applicable), 7-antibiotics / irrigation fluids (if applicable),   8-safety precautions.  Exam End: 11/12/21  2:50 PM Last Resulted: 11/12/21  3:09 PM   Received From: [facility]  Result Received: 12/02/21 12:44 PM         Again, thank you for allowing me to participate in the care of your patient.      Sincerely,    Corina Potts MD

## 2021-12-06 NOTE — PROGRESS NOTES
Endocrinology clinic visit note.     Papillary thyroid carcinoma  4.8 cm right, bilateral node positive. He has been treated with neck operation x 3 (total thyroidectomy, right lateral neck dissection;  Resection retrosternal mediastinal masses) 131I x 2 (cummulative dose 346.4 mCi) His last 131I TBS (2008) post therapy scan raised question of ? lung mets and also ? met above left kidney. Progressive cervical adenopathy has been treated in the past with ETOH and has been progressive since then.   .     We have biopsy proven positive LN involvement in the neck (FNAB 2018 and again 11/2021)  which we have not treated so far.   I do not have the most recent images which are  needed to understand current state.  I have long suspected he has lung mets but we haven't clearly proven this.    [facility] PET/CT and US images , Foundation one results   Retrieve foundation one results from most recent FNAB - does he have actionable molecular changes?   He is a high risk surgical candidate (3 neck operations already; history of complications related to many past procedures). He has already recently met with Dr Kinney, surgeon, to again discuss surgery.  I have connected with her on her impression.  She shared my concern of his surgical risks.   Perhaps lenvatinib or other targeted therapy would be better option.  His performance status has been significantly declining in the last years.    Addendum review of PACS  11/10/2021 outside PET scan as read by me-- as compared with 8/4/2021 neck US :  High FDG activity low neck /thyroid bed region midline (we don't have good US images /labeling of this region , I am not sure of the US correlate for this), right lateral (clavicle level; corresponds to right level 7 # 1 3.1 x 1.4 x 1.8 cm  ) and right posterior thyroid bed (corresponds to right level 7 # 2  0.9 x 0.8 x 1    Postsurgical hypothyroidism.  On LT4 137 mcg/day, unstable due to LT4 dose reduction 8/2021  due to  thyroidectomy plus hypopituitarism.   TSH should be kept low to avoid stimulating the thyroid cancer .         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  He has already had 3 neck operations and ETOh to cervical adenopathy.  The 2 LNs likely correlate to the high FDG regions on the PET.   It is possible these are the same LNs that were treated with ETOH in the past (vs others in the same vicinity - it is hard to say)   I am concerned the level 7 mass can't be reached with ETOH treatment, that the only way to remove it is with surgery.  He declined surgery offered in July, 2018  He is a high risk operative candidate.  We have discussed this many times before.      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   On LT4 and HC only.  Treat to high normal free T4 target. 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 The tumor has been significantly de bulked and He has completed XRT for  persistent tumor-. Tumor mass is stable on  MRI through 10/24/18.  Continue periodic imaging of the sella, treat the hypopituitarism medically.   Pituitary MRI could be repeated anytime but this is low priority compared with his other problems.  Discussed.      Osteoporosis on 8/20221 DXA ; frequent falls within the last year ; Bone loss on alendronate since 2012.  Is he really getting the alendronate or is this a sign of another problem we are missing ?  Calcium was low on last test 11/4/2021 which would preclude Prolia or Reclast   Repeat spep    Labs 12/6/2021 Tg pending, TSH <0.01, free T4 1.63, HgbA1c 5, SPEP polyclonal increase in gamma - no monoclonal    Frequent falls, frailty -    Weight loss is of concern .  Poor po intake/ aspiration risk .   Hyperglycemia has been noted in [facility] system, circumstances unclear.   Labs to include HgbA1c, glucose     Aspiration risk per recent studies. He already follows with pulmonary, Dr Haro.     Lung nodules -worse - differential per radiologist is fungal or atypical pneumonia - can't exclude mets . He already follows with pulmonary, Dr Haro.     Deafness, Language barrier - both of these , combined with? Dementia, are barriers to his optimal care.   Today his wife was more engaged than on past appointments, seems more determined to find treatment options.       ? Lytic bone lesions in the past -stable on serial imaging --repeat SPEP     I have independently reviewed and interpreted labs, imaging as indicated.     65__ minutes spent on the date of the encounter doing chart review, history and exam, documentation and further activities as noted above.     12/13/2021 chart review- the requested images and reports are not yet on available to me.    Corina Potts MD      Cc/ HPI: Mr Peres presents today along with his wife. We also had the  bot.   He was last seen by me 8/2/2021.  That day we reduced the LT4 from 150 to 137 mcg/day due to the fact he was losing weight.  Since then, I have had several conversations with his PCP Dr Paz Car.  He has also seen [facility]  oncologist Dr Martins ,  had  PET scan and FNAB in the [facility] system, not ordered by me.  I do not have the images related to these studies however by description it is likely the same area was biopsied that we have previously biopsied and we already knew was positive. The notes suggest Foundation one was included on the biopsy specimen but this is not reported on Care everywhere.      He has a history of multiple complicated endocrine issues, including thyroid cancer, surgical hypothyroidism, osteoporosis, pituitary macroadenoma with partial hypopituitarism and history of DI.   See my 4/9/18 note for his detailed history.     We  know he has persistent/recurrent/ progressive thyroid cancer in the neck.  On FNAB 4/23/18: + PTC, needle wash Tg 2082 ng/ml .   Repeat FNAB 11/12/2021 in the [facility] system is likely the same mass or nearby   Summer 2018 he met with surgeon and they decided against surgery at that time. Since then, we have seen slow progression in the size of the involved  adenopathy.  At my last visit with Mr Peres in August shared decision making was impossible, as on many prior visits.  He has hac complications with nearly every procedure in the last years, though apparently he didn't have complications with the FNAB performed in 11/2021.  We had defaulted to the stance of doing no harm.      Papillary thyroid cancer 4.8 cm left, bilateral node positive (MACIS 6.88 minimum, pT3, pN1a (8), pMx, Stage III or IV). His course has included several operations and several 131I treatments  11/27/07 thyroidectomy  153.4 mCi 131I in 6/08. The radioiodine  was complicated by acute sialadenitis.   12/08  193 mCi 131I (with Thyrogen stimulation). The post therapy scan showed activity in the thyroid bed, lung base on the right and also superior left kidney region   11/3/09  right selective neck dissection levels 2, 3 and 4, removing many positive LNs.   4/16/14 FNAB of right level 6 and 7 cervical lymph nodes-  positive for papillary thyroid cancer. Needle wash Tg was also high on both samples. He had  hoarseness within one hour after the FNAB procedure. He was treated with cymetra on 5/12/14 and he restarted thickened liquids.    6/3/14  ETOH ablation procedure of the  2 LNs    1/31/17  trasnscervical resection of retrosternal mediastinal lesions.  A cystic lesion was removed and drained.  Surgical path  benign thymic tissue.    2.  Osteoporosis.   Alendronate has been prescribed since 2012.   DXA 8/4/2021 showed osteoporosis , lowest T-score -2.6 and bone loss right hip compared with 2019.       Labs   4/9/18: Tg 18.2, FRANK < 0.4; TSH  ,0.01,  free T4 1.52, Na 139, K 4.4;   4/23/18 FNAB right level 6 and right level 7 LN - + PTC  6/20/18: Tg 12.4, FRANK < 0.4, TSH < 0.01, free T4 1.6 -   11/27/18: Tg 16.5, FRANK  0.4, TSH < 0.01, free T4 1.46  4/22/19: Tg 22, FRANK < 0.4, TSH < 0.01, free T4 1.51, Ca 8.4, albumin 3.3, phos 3.1, creatinine 1.03, glucose 122, vitamin D 22  7/23/19: Tg 32.1, FRANK < 0.4, TSH < 0.01  10/12/2020 [facility] creatinine 1.9, Ca 8.8, glucose 131; COVID 19 negative   10/15/2020 TSH 4.19l /wbc 8500, Hgb 12.7, platelets 182K  11/1/2020 Ca 7.3, Mg 2.5, phos 2.3, creatinien 1.35, glucose 86   11/4/2020 TSH 5.78, free T4 0.8  11/5/2020 HgbA1c 5.7%  2/2/2021 Ca 8.6, creatinine 0.83, Na 142, K 3.3,   8/2/2021 Tg 62, FRANK < 0.4, TSH < 0.01, free T4 1.68   1/18/2021 [facility] TSH < 0.01  11/4/2021 [facility] Tg 109 ng/dl , FRANK < 1, TSH not measured.  Na 141 , K 3.5, glucose 123, Ca 8.4, creatinine 0.85      Images:   4/12/18  PET/CT .  Right low neck /superior mediastinal high FDG lateral and more midline. The paratracheal mass is somewhat posterior and below the level of the clavicle (read as 1.4 cm, larger than before, SUB 68), but above the sternum  Tiny focus of fdg left lung \  8/29/18 CT chest: previously spiculted 1.7 x 1.5 cm nodule now 7 x 5 mm , ? Atelectasis.  Scattered upper lobe reticulonodular opacities as seen on prior CT, several more conspicuous, up to 4 mm, ? Infection .  To my eye I also note right level 7 neck mass 1.6 x 2 with some airway mass effect (this was 1.4 x 1.5 on 9/17 CT). This is also likely  the right level 7 mass we have bene following on US.    10/24/18 MRI brain residual mass 1.4 x 0.6 x 0.9 cm, extending into left cavernous sinus.  Stalk deviated to the left, thickened to 5 mm; optic chiasm atrophic  10/15/19 neck US compared with 4/25/19 , 11/27/18,  6/20/18 and  4/16/18:   Right level 6 thyroid bed 1.6 x 1.1 x 1.1 (was   1.5 x 1.1 x 1.2 ;  1.2 x 0.9 x 1.2; 1.2 x 01 x 1.1 ;  1.3 x 0.95 x 1.2 cm - suspicious; 1.2 x 0.93 x  0.9 ;  FNAB 4/23/18: + PTC, needle wash Tg 2082 ng/ml  Right level 7 # 1 1.7 x 1.6 x  1.7 cm (was  2 x 1.9 x 2.1 ;  1.8 x 1.5 x 1.8 ; 1.8 x 1.6 x 1.8 cm ; 1.4 x 1.5 x 1.5 cm; 1.1 x 1.2 x 1 -FNAB 4/23/18 + PTC, needle wash Tg 430 ng/ml  Left level 4  0.5 x 0.4 x 0.9 cm  (was 0.5 x 0.4 x 1;  0.4 x 0.6 x 0.9  0.3 x 0.6 x 1.1 ;  0.3 x 0.8 x 1 cm ; 0.6 x 0.3 x 0.8 )  Left level 4 # 2 0.3 x  0.5 x 0.4 cm  (was 0.4 x 0.4 x 0.6;  0.4 x0.6 x 0.6 ; 0.5 x 0.7 x 0.7 ; 0.8 x 0.5 x 0.6 cm;  0.5 x 0.5 x 1 )  Left level 4 # 3 0.4 x0.6 x 0.4 - ? new  4/25/19 DXA: lowest T-score -2.3 right femoral neck; no change in BMD compared with 2018.   4/25/19 chest CT: increase in centrilobular nodules posterior RUL-- I had discussed this with Dr Paz Magana after our last appt; today I have reviewed the images -right level 7 mass indents trachea slightly.    10/15/19 chest CT: no significant change   10/13/2020 CT Chest -right neck mass 1.4 x 1.8 cm ; new 4 mm lung nodule posterior RUL   8/4/2/2021 DXA lowest T-score -2.6 right femoral neck ; significant bone loss right total hip  8/4/2021 neck US 8/4/2021 :  Compared with 10/15/19, 4/25/19 , 11/27/18,  6/20/18 and  4/16/18:   Right level 7 # 1 3.1 x 1.4 x 1.8 cm - now showing clear confluence of past right level 6 1.6 x 1.1 x 1.1 and right level 7 #1  1.7 x 1.6 x  1.7 cm- both are known PTC on past FNAB from 4/23/2018  Right level 7 # 2 0.9 x 0.8 x 1  Right level 7 # 3 0.7 x 0.4 x 0.8 cm   Left level 4   0.3 x 0.5 x 0.8 cm  was  0.5 x 0.4 x 0.9 cm ; 0.5 x 0.4 x 1;  0.4 x 0.6 x 0.9  0.3 x 0.6 x 1.1 ;  0.3 x 0.8 x 1 cm ; 0.6 x 0.3 x 0.8 )  Left level 4 # 2 0.6 x 0.5 x 0.7 cm was 0.3 x  0.5 x 0.4 cm;  0.4 x 0.4 x 0.6;  0.4 x0.6 x 0.6 ; 0.5 x 0.7 x 0.7 ; 0.8 x 0.5 x 0.6 cm;  0.5 x 0.5 x 1 )  Left level 4 # 3 0.5 x 0.5 x 0.7 cm  was 0.4 x0.6 x 0.4 -  9/28/2021 CT chest without contrast:   12/2/2021 Modified barium swallow - silent aspiration with thin, slightly thick and mildly thickened  liquids.   Presbyesophagus.       ROS: (per his wife)  Weights   11/15/2021 122  8/2/2021 121  2/2/2021 130  11/4/2020 146  Not eating as well   Spends the day resting; independent with ADLs  Forgetful  80% understands when [place] barrier  Lost hearing aide - doesn't like it ; he doesn't have it on today .    Past Medical History:   Diagnosis Date     Arthritis      BPH (benign prostatic hyperplasia)      COPD (chronic obstructive pulmonary disease) (H)      Depression      Hyperlipidemia      Hypertension     no current meds     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     Hypovitaminosis D      Multiple pulmonary nodules 2009     Osteopenia 2011     Papillary thyroid carcinoma (H) 2007    4.8 cm, right, node positive;      Pituitary macroadenoma with extrasellar extension (H) 2007    causing obstructive hydrocephalus     Post-surgical hypothyroidism 2007     Uncomplicated asthma      Vocal cord paralysis 2014    right     Xerostomia 2010    due to radiation exposures     Past Surgical History:   Procedure Laterality Date     cymetra injection       ESOPHAGOSCOPY, GASTROSCOPY, DUODENOSCOPY (EGD), COMBINED  6/20/2013    Procedure: COMBINED ESOPHAGOSCOPY, GASTROSCOPY, DUODENOSCOPY (EGD), BIOPSY SINGLE OR MULTIPLE;  gastroscopy;  Surgeon: Leslie Guadarrama MD;  Location:  GI     ESOPHAGOSCOPY, GASTROSCOPY, DUODENOSCOPY (EGD), COMBINED N/A 9/20/2019    Procedure: ESOPHAGOGASTRODUODENOSCOPY (EGD);  Surgeon: Gilberto Gibson DO;  Location:  GI     ESOPHAGOSCOPY, GASTROSCOPY, DUODENOSCOPY (EGD), COMBINED N/A 10/16/2020    Procedure: ESOPHAGOGASTRODUODENOSCOPY, WITH BIOPSY;  Surgeon: Bartolome Granda MD;  Location:  GI     HERNIA REPAIR Right      lipoma resection chest wall Right 11/09     PHACOEMULSIFICATION CLEAR CORNEA WITH STANDARD INTRAOCULAR LENS IMPLANT Left 5/7/2018    Procedure: PHACOEMULSIFICATION CLEAR CORNEA WITH STANDARD INTRAOCULAR LENS IMPLANT;  LEFT PHACOEMULSIFICATION CLEAR CORNEA  WITH STANDARD INTRAOCULAR LENS IMPLANT ;  Surgeon: Nadiya Allen MD;  Location: [facility]     PHACOEMULSIFICATION CLEAR CORNEA WITH STANDARD INTRAOCULAR LENS IMPLANT Right 8/21/2018    Procedure: PHACOEMULSIFICATION CLEAR CORNEA WITH STANDARD INTRAOCULAR LENS IMPLANT;  RIGHT EYE PHACOEMULSIFICATION CLEAR CORNEA WITH STANDARD INTRAOCULAR LENS IMPLANT;  Surgeon: Nadiya Allen MD;  Location: [facility]     right selective neck dissection level 2, 3, 4  11/3/09     right  shunt placement  6/28/07     THORACIC SURGERY  Fidencio 3 2017     THYMECTOMY N/A 1/3/2017    Procedure: THYMECTOMY;  Surgeon: Ernesto Armstrong MD;  Location: [facility]     THYROIDECTOMY  11/27/07     TRANSCERVICAL EXTENDED MEDIASTINAL LYMPHADENECTOMY N/A 1/3/2017    Procedure: TRANSCERVICAL EXTENDED MEDIASTINAL LYMPHADENECTOMY;  Surgeon: Ernesto Armstrong MD;  Location: [facility] OR     transnasal endoscopic resection of pituitary adenoma  8/13/2007     Current Outpatient Medications   Medication Sig Dispense Refill     albuterol (PROAIR HFA) 108 (90 Base) MCG/ACT inhaler Inhale 2 puffs into the lungs every 4 hours as needed for shortness of breath / dyspnea or wheezing 3 Inhaler 11     alendronate (FOSAMAX) 70 MG tablet Take 1 tablet (70 mg) by mouth every 7 days 32 tablet 1     amLODIPine (NORVASC) 2.5 MG tablet TAKE 1 TABLET(2.5 MG) BY MOUTH DAILY 90 tablet 0     docusate sodium (COLACE) 100 MG capsule Take 100 mg by mouth 2 times daily as needed for constipation       guaiFENesin (MUCINEX) 600 MG 12 hr tablet Take 2 tablets (1,200 mg) by mouth 2 times daily 60 tablet 3     guaiFENesin (MUCINEX) 600 MG 12 hr tablet Take 1 tablet (600 mg) by mouth 2 times daily 14 tablet 0     hydrocortisone (CORTEF) 10 MG tablet 10 mg AM and 5 mg afternoon 150 tablet 4     levothyroxine (SYNTHROID/LEVOTHROID) 137 MCG tablet Take 1 tablet (137 mcg) by mouth daily 90 tablet 4     magic mouthwash (ENTER INGREDIENTS IN COMMENTS) suspension Swish and  "spit 5-10 mLs in mouth every 6 hours as needed 30 ml of Benadryl (12.5 mg/5 ml), 60 ml Maalox, 30 ml Viscous Lidocaine 120 mL 0     psyllium (METAMUCIL/KONSYL) capsule Take 1 capsule by mouth 2 times daily 180 capsule 3     senna-docusate (SENOKOT-S/PERICOLACE) 8.6-50 MG tablet Take 1 tablet by mouth 2 times daily as needed for constipation 60 tablet 0     tamsulosin (FLOMAX) 0.4 MG capsule Take 0.4 mg by mouth daily       traZODone (DESYREL) 100 MG tablet Take 2 tablets (200 mg) by mouth At Bedtime 60 tablet 3     umeclidinium-vilanterol (ANORO ELLIPTA) 62.5-25 MCG/INH oral inhaler Inhale 1 puff into the lungs daily        Social History     Tobacco Use     Smoking status: Former Smoker     Packs/day: 0.50     Years: 5.00     Pack years: 2.50     Types: Cigarettes     Start date: 1976     Quit date: 2001     Years since quittin.8     Smokeless tobacco: Never Used   Substance Use Topics     Alcohol use: No     Drug use: No   Not swimming; takes walks sometimes; reads the new spaper.     GENERAL  BP Readings from Last 1 Encounters:   21 135/77      Pulse Readings from Last 1 Encounters:   21 59      Resp Readings from Last 1 Encounters:   10/17/21 20      Temp Readings from Last 1 Encounters:   21 98.3  F (36.8  C) (Oral)      SpO2 Readings from Last 1 Encounters:   11/15/21 94%      Wt Readings from Last 1 Encounters:   21 54.9 kg (121 lb)      Ht Readings from Last 1 Encounters:   21 1.676 m (5' 6\")     GENERAL sitting in chair very still ; He doesn't appear to hear the conversation at all; no words uttered    /77 (BP Location: Right arm, Patient Position: Sitting, Cuff Size: Adult Regular)   Pulse 59   Temp 98.3  F (36.8  C) (Oral)   Ht 1.676 m (5' 6\")   Wt 54.9 kg (121 lb)   BMI 19.53 kg/m    SKIN: normal color, temperature, texture   HEENT: PER,, no scleral icterus, eyelid retraction, stare, lid lag, proptosis or conjunctival injection.     NECK: No visible " neck masses, cervical adenopathy. I am unable to palpate any defined neck mass  LUNGS: clear to auscultation bilaterally. distant  CARDIAC: distant, slow, RRR, S1, S2 without murmurs, rubs or gallops.    BACK: normal spinal contour.     NEURO: Awake, appears alert, but hd didn't speak; moves all extremities, DTRs 0/4,gait normal, no tremor of the outstretched hand      EXAMINATION: Chest CT  9/28/2021 10:46 AMCLINICAL HISTORY: Lung nodule (Pulmonary nodule); Pulmonarynodules/lesions, multiple   COMPARISON: Chest x-ray 5/30/2021, CT chest 10/13/2020.   TECHNIQUE: CT imaging obtained through the chest without contrast.Axial, coronal, and sagittal reconstructions and axial MIP reformatted  images are reviewed.      FINDINGS:  Lungs: The trachea and central airways are patent. No pneumothorax or pleural effusion. Multiple new solid right upper lobe nodules associated with grossly stable tree-in-bud opacities involving the posterior aspect of the right upper lobe, the largest measures 6 mm (series 4 image 110). Unchanged calcified granuloma in the left upper lobe. Slightly improved chewing but opacities in the anterior left lower lobe, previously described 6 mm nodule associated with these opacities is not well appreciated on today's exam. Redemonstration of complete collapse of the right middle lobe.  Mediastinum: The visualized thyroid gland is unremarkable. The heart size is within normal limits. No pericardial effusion. The ascending  aorta and main pulmonary artery diameters are within normal limits. Normal appearance and configuration of the great vessels off of theaortic arch. Mild atherosclerotic calcification of the coronary arteries. No suspicious mediastinal, hilar, or axillary lymph nodes.Stable scattered calcified mediastinal lymph nodes.Bones and soft tissues: No suspicious bone findings. Upper Abdomen: Multiple nonobstructive calcified gallstones without  evidence of cholecystitis. Large simple cyst at the  superior pole ofthe left kidney, similar to previous. Partially visualized  shunt courses across the anterior right hemithorax into the right neck.                                                             IMPRESSION:   1. Redemonstration of peripheral predominant tree-in-bud opacities in the right lung with multiple new associated solid nodules measuring up to 6 mm as above at the posterior aspect of the right upper lobe. Suggestive of ongoing fungal or atypical infection (mycobacteria). Superimposed malignancy is not excluded. Recommend CT to resolution.  2. Previously described solid nodules associated with the tree in bud opacities in the anterior left lower lobe have resolved.  3. Continued right middle lobe cicatricial atelectasis.  4. Cholelithiasis without evidence of cholecystitis.  [Access Center: Recommend follow-up for lung nodules]  MANA TUCKER MD        Component 3 wk ago   Case Report  Medical Cytology Report                           Case: [id number]                                   Authorizing Provider:  Yahir Bueno      Collected:           11/12/2021 142                                      MD Fer                                                                   Ordering Location:     [facility]        Received:            11/12/2021 [address] [place] [address] Medical Imaging                                                     Pathologist:           Elton Manuel MD                                                                   Specimen:    Right Supra Clavicular Lymph Node                                                       Final Diagnosis  A) LYMPH NODE, SUPRACLAVICULAR, ULTRASOUND-GUIDED FINE NEEDLE ASPIRATION:   1. Positive for malignancy, metastatic papillary thyroid carcinoma     2. See comment    Electronically signed by Elton Manuel  "Gunnar Ruby MD on 11/15/2021 at  1:00 PM   Comment  The cytomorphologic features along with the patients history is compatible with a metastatic papillary thyroid carcinoma.    Clinical Information  Mr. Peres is a 81 y.o. woman with a history of node positive papillary thyroid carcinoma after thyroidectomy on 11/27/2007 and recurrence in 2018.  She now presents with multiple bilateral PET positive neck lymph nodes.    Gross Description  A) Received identified as \"Right Supra Clavicular Lymph Node\", is a fine needle aspirate specimen.     The following were prepared from the specimen submitted:        - 5 slides, air-dried, stained with Diff-Quik stain         - 1 ThinPrep slide Papanicolaou-stained        - 1 CytoLyt vial is received        - 1 RPMI vial is received   Adequacy Assessment  A) N.M. assessed adequacy from the air-dried smears at the time of the procedure with an impression of \"Adequate\".    Microscopic Description  Specimen adequacy: Adequate for interpretation.     All slides were reviewed. The microscopic appearance substantiates the diagnosis.    Additional Information  Cytology is screened at [facility] - [address], [place] [address] and [facility] - [address], [place] [address] and [facility] - [address] [place] [address]     Interpreted at [facility], [facility] Laboratory - [address]        PET/CT 11/10/2021 [facility] -   INDICATION:   81 year-old male. Metastatic papillary thyroid carcinoma. Thyroidectomy. Ablation x2. Lymphadenopathy. History of a left upper lobe nodule. Follow-up.   TECHNIQUE:   7.73 mCi 18 FDG (18 fluoro-de-oxy-glucose) injected intravenously.   Imaging performed from the mid forehead to the proximal thighs 60 minutes following injection.   CT performed for anatomic correlation and attenuation correction. Pre scan glucose: 124 mg/dL. "   COMPARISON:   Correlation is made with an outside PET/CT scan report from the [facility] system April 12, 2018. Those images cannot be displayed on the current imaging platform.   FINDINGS:   Physiologic activity is identified in the brain, salivary glands, tongue, paralaryngeal soft tissues, myocardium, GI, and  tract. Focal activity in the left antecubital fossa is the radiotracer injection site.   The included intracranial structures and included soft tissues of the face are within normal limits.   Few mm subtle metabolically active left level V lymph node, image 174, SUV max 1.9.   Subcutaneous nodular lymph node posterior left neck, image 177, SUV max 2.4.   Additional tiny lymph nodes in the low left neck extending into the left supraclavicular fossa, image 163, SUV max 2.8.     In the chest, there are metabolically active supraclavicular upper anterior mediastinal and an AP window lymph node with low-level activity in 2 or 3 bilateral axillary lymph nodes.   For example:   Medial left supraclavicular space SUV max 3.9.   Medial right supraclavicular space, high-level metabolically active soft tissue mass, image 152, SUV max 55.6.   Adjacent much smaller subcutaneous nodule/metastasis demonstrates an SUV max of 10.8.   Upper pretracheal metastasis, image 153, SUV max 13.7.   Upper right paratracheal lymph node metastasis, SUV max of 30.1 image 151.   Tiny left thoracic inlet lymph node, image 148 SUV max 4.6.   AP window lymph node SUV max 4.6.   Small right hilar lymph node SUV max 4.2.     Low intermediate level activity within a few axillary lymph nodes. For example on the left, image 131, SUV max 3.1 and a similar level on the right, image 136, SUV max 2.4.     Multiple tiny peribronchial peripheral nodules within the posterior right upper lobe of the lung image 137 likely infectious or inflammatory in nature with an SUV max of 2.4 measured on image 137.   Densely calcified granuloma  anteromedial left upper lobe of the lung. Densely calcified right paratracheal and right hilar lymph nodes indicating a benign granulomatous process.   No focal metabolically active nodules within the left lung. Focal area of atelectasis medial right middle lobe/perihilar distribution nonspecific with low level activity SUV max 2.2.     The abdomen, pelvis, both inguinal regions, soft tissues of the included thighs, and the included skeleton are within normal limits. Tiny benign bone island in the right acetabulum posteriorly and the proximal right femur.     CT Findings: Surgically absent thyroid gland. Calcified granuloma left upper lobe of the lung with calcified right hilar/mediastinal lymph nodes. Multiple tiny peribronchial nodules posterior right upper lobe of the lung with an appearance and distribution suggesting a postinfectious or postinflammatory process. Focal atelectasis/consolidation right perihilar middle lobe of the lung. Coronary artery calcification compatible with coronary arterial disease. Right-sided ventriculoperitoneal shunt tube terminating in the pelvis. Cholelithiasis. Large left renal cyst. No hydronephrosis or splenomegaly. Scattered calcification within a normal caliber abdominal aorta and iliac arteries. Mild prostatic enlargement with prostatic calcifications.     IMPRESSION:   1. Small metabolically active low left neck/left supraclavicular lymph nodes. High-level metabolically active lymph metastases/lymph node within the low right neck/medial right supraclavicular space and along the trachea both toward the right and left side.   2. Small left AP window lymph node, and a small right hilar lymph node, both of which could be reactive or potentially metastatic.   3. Mild metabolic activity within a few minimally prominent axillary lymph nodes nonspecific potentially reactive.   4. Low intermediate level activity associated tiny peribronchial nodules in the right upper lobe laterally  is more likely postinfectious or postinflammatory with an SUV max of 2.4.   5. When correlated with the previous report there has likely been progression in the neck/upper chest. However the previous report mentioned spiculated nodule in the lingula left upper lobe but that is not seen today.   Dictated by Brian Valle MD @ Nov 10 2021  2:58PM   (Electronically Signed)      This result has an attachment that is not available.   RADIOLOGY IMMEDIATE POST PROCEDURE NOTE     11/12/2021 US FNAB   Travis Peres   [id number]   1940   INDICATIONS: Papillary thyroid carcinoma, abnormal PET/CT.   PROCEDURE PERFORMED:  Ultrasound guided fine needle aspiration, 16 mm  short axis dimension mass, right supraclavicular fossa.     FINDINGS (DESCRIPTION OF EACH PROCEDURE):  See above.   POST-PROCEDURE DIAGNOSIS:  Status post fine needle aspiration.   PATIENT POSITION: supine   ANTISEPTIC PREPARATION and BARRIER TECHNIQUES USED:  skin was prepped and draped in the usual sterile fashion   IMAGING GUIDANCE FOR ACCESS / PROCEDURE:  Ultrasound   ACCESS LOCATION / SITE / TECHNIQUE:  Right supraclavicular fossa.  Note  that a medial to lateral approach was chosen to avoid the adjacent shunt tubing seen on the recent PET/CT.   EQUIPMENT UTILIZED: 25 gauge needles.   CLOSURE:  none   RADIATION DOSE:   None.   MEDICATIONS GIVEN:    Lidocaine for local anesthesia.   SPECIMEN(S): Five 25 gauge FNA specimens, adequate for interpretation.   COMPLICATIONS: no complications noted   DRAINS:  None     ESTIMATED BLOOD LOSS:  Less than 10 cc.     PHYSICIAN(S) AND ASSISTANTS (if any):  Mekhi Gay MD     Additional Comments:     In my discussion, prior to the signing of the consent, I reviewed the   procedure, benefits, risks, long-term effects, treatment options, possible   use of pain or sedation medications, and how the procedure will meet the   treatment goal with the patient and/or family. The patient was given ample   time to ask  questions. All questions were answered.     Utilizing sterile technique, local anesthesia with 1% lidocaine, and   sonographic guidance, 5 passes using 25 gauge needles were made through a   mixed echogenicity mass in the right supraclavicular space.  Specimens   were deemed adequate for interpretation.  No immediate complications.     Patient tolerated the procedure well.       Please call with questions. Mekhi Gay MD     [facility] Protocol     A. Pre-procedure verification complete yes   1-relevant information / documentation available, reviewed and properly   matched to the patient; 2-consent accurate and complete, 3-equipment and   supplies available     B. Site marking complete Yes   Site marked if not in continuous attendance with patient     C. TIME OUT completed yes   Time Out was conducted just prior to starting procedure to verify the   eight required elements: 1-patient identity, 2-consent accurate and   complete, 3-position, 4-correct side/site marked (if applicable),   5-procedure, 6-relevant images / results properly labeled and displayed   (if applicable), 7-antibiotics / irrigation fluids (if applicable),   8-safety precautions.  Exam End: 11/12/21  2:50 PM Last Resulted: 11/12/21  3:09 PM   [facility]  Result Received: 12/02/21 12:44 PM

## 2021-12-06 NOTE — NURSING NOTE
"Chief Complaint   Patient presents with     Thyroid Problem     Vital signs:  Temp: 98.3  F (36.8  C) Temp src: Oral BP: 135/77 Pulse: 59           Height: 167.6 cm (5' 6\") Weight: 54.9 kg (121 lb)  Estimated body mass index is 19.53 kg/m  as calculated from the following:    Height as of this encounter: 1.676 m (5' 6\").    Weight as of this encounter: 54.9 kg (121 lb).    Melisa Greco, EMT        "

## 2021-12-08 ENCOUNTER — OFFICE VISIT (OUTPATIENT)
Dept: OPHTHALMOLOGY | Facility: CLINIC | Age: 81
End: 2021-12-08
Attending: OPHTHALMOLOGY
Payer: COMMERCIAL

## 2021-12-08 DIAGNOSIS — H35.3221 EXUDATIVE AGE-RELATED MACULAR DEGENERATION OF LEFT EYE WITH ACTIVE CHOROIDAL NEOVASCULARIZATION (H): Primary | ICD-10-CM

## 2021-12-08 DIAGNOSIS — H35.3221 EXUDATIVE AGE-RELATED MACULAR DEGENERATION OF LEFT EYE WITH ACTIVE CHOROIDAL NEOVASCULARIZATION (H): ICD-10-CM

## 2021-12-08 LAB
ALBUMIN SERPL ELPH-MCNC: 4.3 G/DL (ref 3.7–5.1)
ALPHA1 GLOB SERPL ELPH-MCNC: 0.3 G/DL (ref 0.2–0.4)
ALPHA2 GLOB SERPL ELPH-MCNC: 0.6 G/DL (ref 0.5–0.9)
B-GLOBULIN SERPL ELPH-MCNC: 1 G/DL (ref 0.6–1)
GAMMA GLOB SERPL ELPH-MCNC: 2.1 G/DL (ref 0.7–1.6)
M PROTEIN SERPL ELPH-MCNC: 0 G/DL
PROT PATTERN SERPL ELPH-IMP: ABNORMAL

## 2021-12-08 PROCEDURE — G0463 HOSPITAL OUTPT CLINIC VISIT: HCPCS | Mod: 25

## 2021-12-08 PROCEDURE — 92014 COMPRE OPH EXAM EST PT 1/>: CPT | Mod: GC | Performed by: OPHTHALMOLOGY

## 2021-12-08 PROCEDURE — 92134 CPTRZ OPH DX IMG PST SGM RTA: CPT | Performed by: OPHTHALMOLOGY

## 2021-12-08 ASSESSMENT — CONF VISUAL FIELD
OD_SUPERIOR_NASAL_RESTRICTION: 3
OD_INFERIOR_TEMPORAL_RESTRICTION: 3
OD_SUPERIOR_TEMPORAL_RESTRICTION: 3
OD_INFERIOR_NASAL_RESTRICTION: 3
OS_INFERIOR_NASAL_RESTRICTION: 3
OS_SUPERIOR_TEMPORAL_RESTRICTION: 3
OS_SUPERIOR_NASAL_RESTRICTION: 3
OS_INFERIOR_TEMPORAL_RESTRICTION: 3

## 2021-12-08 ASSESSMENT — CUP TO DISC RATIO
OD_RATIO: 0.5
OS_RATIO: 0.65

## 2021-12-08 ASSESSMENT — VISUAL ACUITY
METHOD: TUMBLING E 'S
OD_SC: 20/30
OD_SC+: -1
OS_SC: 20/400

## 2021-12-08 ASSESSMENT — EXTERNAL EXAM - LEFT EYE: OS_EXAM: NORMAL

## 2021-12-08 ASSESSMENT — TONOMETRY
OS_IOP_MMHG: 18
IOP_METHOD: TONOPEN
OD_IOP_MMHG: 15

## 2021-12-08 ASSESSMENT — SLIT LAMP EXAM - LIDS
COMMENTS: NORMAL
COMMENTS: NORMAL

## 2021-12-08 ASSESSMENT — EXTERNAL EXAM - RIGHT EYE: OD_EXAM: NORMAL

## 2021-12-08 NOTE — NURSING NOTE
"Chief Complaints and History of Present Illnesses   Patient presents with     Macular Degeneration Follow Up     Chief Complaint(s) and History of Present Illness(es)     Macular Degeneration Follow Up     Laterality: both eyes    Onset: months ago    Associated symptoms: Negative for eye pain, floaters and flashes    Pain scale: 0/10              Comments     6 month follow up for Wet AMD left eye with CNVM and Dry AMD right eye with OCT testing today.   Per patient's , \"He says his vision is stable each eye within the last several months. He denies any floaters or flashing lights. He denies any eye pain.\"     Perri Munson, COT 10:08 AM 12/08/2021                  "

## 2021-12-08 NOTE — PROGRESS NOTES
Cc: follow up choroidal neovascular membrane left eye   Interval history: Vision stable. Denies floaters, flashing lights   HPI: reports far vision stable, difficulty with near vision, does not have reading glasses   Ok with resident injections. 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   - Weekly Amsler Grid use was discussed and training performed.   - A diet of leafy green vegetables was encouraged.   - Return precautions were given.   - weekly amsler grid, follow up in 6 months    # PVD OU   - RT/RD precautions    # S/p CEIOL both eyes with posterior capsular opacity right eye   -status post yag 8.26.2020 left eye s/p yag 12-2-20 right eye   - monitor     Return in 6 mo w DFE, OCT macula  .  Johnny Barron MD  Vitreoretinal Surgery Fellow  [facility]   ~~~~~~~~~~~~~~~~~~~~~~~~~~~~~~~~~~   Complete documentation of historical and exam elements from today's encounter can be found in the full encounter summary report (not reduplicated in this progress note).  I personally obtained the chief complaint(s) and history of present illness.  I confirmed and edited as necessary the review of systems, past medical/surgical history, family history, social history, and examination findings as documented by others; and I examined the patient myself.  I personally reviewed the  relevant tests, images, and reports as documented above.  I formulated and edited as necessary the assessment and plan and discussed the findings and management plan with the patient and family    Nadiya Allen MD   of Ophthalmology.  Retina Service   Department of Ophthalmology and Visual Neurosciences   [facility]  Phone: [phone]   Fax: [phone]

## 2021-12-09 ENCOUNTER — DOCUMENTATION ONLY (OUTPATIENT)
Dept: OTOLARYNGOLOGY | Facility: CLINIC | Age: 81
End: 2021-12-09
Payer: COMMERCIAL

## 2021-12-09 ENCOUNTER — TELEPHONE (OUTPATIENT)
Dept: ENDOCRINOLOGY | Facility: CLINIC | Age: 81
End: 2021-12-09
Payer: COMMERCIAL

## 2021-12-09 NOTE — TELEPHONE ENCOUNTER
----- Message from Lyndsay Kinney MD sent at 12/9/2021 10:39 AM CST -----  Agree with this. His medical problems affect his longevity more so than his PTC-in that case targeted chemo is the best option for him. This would increase the need for good nutrition.    Evonne  ----- Message -----  From: Corina Potts MD  Sent: 12/9/2021  10:01 AM CST  To: Corina Potts MD, Lyndsay Kinney MD, #    When I talked to him it wasn't clear that surgery was an option or planned.  I told him you thought he was too high of risk. We didn't discuss anything about PEG and he also didn't tell me you had done so . In this regard, if you did discuss PEG and they didin't mention it it again perpetuates my concern of complete lack of understanding of risks and benefits.     Molecular testing was performed on the [facility] FNAB and the results are currently not available to me, but I have asked for CAR to get them and also the PET images pushed to PACS.  IF he has an actionable mutation, targeted TKI could be used, or if he doesnn't, lenvatinib might be another option . I wanted to try to get the data and then reach out to the [facility] oncologist he has already seen .  What do you think of this idea?     Corina Potts    ----- Message -----  From: Lyndsay Kinney MD  Sent: 12/9/2021   9:32 AM CST  To: Corina Potts MD, Desi Dale, RN, #    This patient has 2 problems-progression of disease and sever malnutrition    He is seeing speech path to help with swallowing, but may benefit from seeing Dr. Rg or her partner for possible thyroplasty. If thyroplasty is not an option, he will require a PEG for nutrition    Desi: How do we engage social work or someone to organize a PEG placement and home nutrition?    We can coordinate having one of our surgeons do a PEG at the same time I do the surgery.    Corina: what do you think of this plan?    Evonne Kinney

## 2021-12-09 NOTE — CONFIDENTIAL NOTE
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

## 2021-12-13 ENCOUNTER — TELEPHONE (OUTPATIENT)
Dept: ENDOCRINOLOGY | Facility: CLINIC | Age: 81
End: 2021-12-13

## 2021-12-13 LAB — SCANNED LAB RESULT: NORMAL

## 2021-12-13 NOTE — TELEPHONE ENCOUNTER
When I saw him in clinic 12/6/2021 I asked for release of information for some tests based at [facility] including the PET scan to be pushed to pacs and the 11/12/2021 FNAB molecular report from [facility].  As of 12/13/2021 AM these records are not yet on the system. Can you follow up on this, track them down, get this task completed?  Let me know when they are available.  Thanks  Corina Potts MD

## 2021-12-14 ENCOUNTER — TELEPHONE (OUTPATIENT)
Dept: ENDOCRINOLOGY | Facility: CLINIC | Age: 81
End: 2021-12-14
Payer: COMMERCIAL

## 2021-12-14 NOTE — TELEPHONE ENCOUNTER
[facility] , #3  Spoke w/Representative: Mckenna states as Pt had FNAB at [facility] Pt will need to sign CAR for [facility]. They will fax form to our clinic today.       LVM with Jen Peck, Oncology RN at [facility] to ask for FNAB to be shared with our clinic to avoid delay associated with waiting for CAR to be completed.   Provider notified.   Daisy Abad RN on 12/14/2021 at 2:24 PM     RE      US FNA BIOPSY W IMAGING GUIDANCE    11/12/2021       RE     I have now seen the requested images on PACS.  However, we still need the 11/12/2021 FNAB molecular report from [facility]

## 2021-12-14 NOTE — TELEPHONE ENCOUNTER
Thanks. I have now seen the requested images on PACS.  However, we still need the 11/12/2021 FNAB molecular report from [facility].  Can we try again to track this down?  Thanks  Corina Potts MD

## 2021-12-14 NOTE — TELEPHONE ENCOUNTER
[facility] CAR Mailed to PT with DIEUDONNE via [employer].   Daisy Abad RN on 12/14/2021 at 2:31 PM

## 2021-12-16 ENCOUNTER — TELEPHONE (OUTPATIENT)
Dept: ENDOCRINOLOGY | Facility: CLINIC | Age: 81
End: 2021-12-16
Payer: COMMERCIAL

## 2021-12-16 NOTE — TELEPHONE ENCOUNTER
12/16/2021 phone conversation with his PCP Dr Paz Magana. I have shared the recent recommendation for PEG and that he stop oral feeding .   I have noted the current status of the oncology work up - the 11/12/2021 FNAB was quantity insufficient for the molecular that was ordered.  It is unclear if that specimen can be salvaged for that or not.    She reminded me he is scheduled for cholecystectomy at [facility] in January.  Perhaps PEG could be placed at the same time. She will try to get him back to her clinic to discuss the swallow/malnutrition concerns, ? Palliative option .  28 minutes conversation.  Corina Potts MD

## 2021-12-16 NOTE — TELEPHONE ENCOUNTER
LVM for [phone] Jen with [facility]   Spoke w/ [phone] [facility] Triage Onco Nurse Sandra  States they need to resubmit specimen not enough liquid/tissue no results available.   They are sending out what they have today. They may need to repeat the biopsy.   They do not have a timeline. They will forward result as soon as they have it.   Provider notified.   Daisy Abad RN on 12/16/2021 at 8:54 AM       RE    FNAB to be shared with our clinic to avoid delay associated with waiting for CAR   We still need the 11/12/2021 FNAB molecular report from [facility].

## 2021-12-16 NOTE — TELEPHONE ENCOUNTER
----- Message from Lyndsay Kinney MD sent at 12/15/2021  8:28 AM CST -----  Thank you!!  I agree with discussion with his PCP an possible feeding tube.    Evonne  ----- Message -----  From: Asha Cee MD  Sent: 12/14/2021   1:01 PM CST  To: Corina Potts MD, Lyndsay Kinney MD, #    Hello everyone    We reviewed Mr Peres's swallow today    He has severe dysphagia due to oral phase dysfunction and pharyngeal weakness. Vocal fold augmentation or another surgical intervention would not be beneficial in this case unfortunately.    He aspirated significantly and he could not move the solid food bolus to the back of his mouth to even start swallowing it. He is not safe for an oral diet but given his dementia I think a goals of care discussion with his PCP is most helpful for his swallowing since he has not developed an aspiration pneumonia so far.      Asha    ----- Message -----  From: Courtney Rg MD  Sent: 12/9/2021   2:07 PM CST  To: Corina Potts MD, Lyndsay Kinney MD, #    Hi! I think it would be good for this patient to be discussed at Swallow Rounds re: potential improvement with vocal fold medialization, as it looks like he has silent aspiration.     Given significant dementia, language barrier, and hearing loss, thyroplasty might be tricky to safely accomplish, but a long-lasting vocal fold injection could be another option. In either case the medialization would not prevent the aspiration but might help him clear things more effectively when aspiration does occur. However given the silent nature of the aspiration, he still might not be able to tell when to use the techniques. It would be great to get further info from the swallow speech pathologist who saw him.    Dr Cee, could you pls add this patient for the next Swallow rounds? Question is whether intervention at the level of the vocal folds would be enough to help him avoid needing PEG, given his concurrent  complexities. He was seen by Nataly.    ThanksCourtney    ----- Message -----  From: Corina Potts MD  Sent: 12/9/2021  10:01 AM CST  To: Corina Potts MD, Lyndsay Kinney MD, #    When I talked to him it wasn't clear that surgery was an option or planned.  I told him you thought he was too high of risk. We didn't discuss anything about PEG and he also didn't tell me you had done so . In this regard, if you did discuss PEG and they didin't mention it it again perpetuates my concern of complete lack of understanding of risks and benefits.     [facility] and the results are currently not available to me, but I have asked for CAR to get them and also the PET images pushed to PACS.  IF he has an actionable mutation, targeted TKI could be used, or if he doesnn't, lenvatinib might be another option . I wanted to try to get the data and then reach out to the [facility] oncologist he has already seen .  What do you think of this idea?     Corina Potts    ----- Message -----  From: Lyndsay Kinney MD  Sent: 12/9/2021   9:32 AM CST  To: Corina Potts MD, Desi Dale, RN, #    This patient has 2 problems-progression of disease and sever malnutrition    He is seeing speech path to help with swallowing, but may benefit from seeing Dr. Rg or her partner for possible thyroplasty. If thyroplasty is not an option, he will require a PEG for nutrition    Desi: How do we engage social work or someone to organize a PEG placement and home nutrition?    We can coordinate having one of our surgeons do a PEG at the same time I do the surgery.    Corina: what do you think of this plan?    Evonne Kinney

## 2022-01-18 ENCOUNTER — TELEPHONE (OUTPATIENT)
Dept: UROLOGY | Facility: CLINIC | Age: 82
End: 2022-01-18
Payer: COMMERCIAL

## 2022-01-18 ENCOUNTER — TRANSCRIBE ORDERS (OUTPATIENT)
Dept: OTHER | Age: 82
End: 2022-01-18
Payer: COMMERCIAL

## 2022-01-18 DIAGNOSIS — R33.9 URINARY RETENTION: Primary | ICD-10-CM

## 2022-01-18 NOTE — TELEPHONE ENCOUNTER
[facility] Call Center    Phone Message    May a detailed message be left on voicemail: yes     Reason for Call: Appointment Intake    Referring Provider Name: Karol Alvarez   Diagnosis and/or Symptoms: Urinary retention    Action Taken: Message routed to:  [facility] ([facility] Urology    Travel Screening: Not Applicable

## 2022-01-19 NOTE — TELEPHONE ENCOUNTER
Spoke with wife using a [facility] and they want to be seen in person. At this time we are booked in person. Wife said she would speak with his PCP.    None

## 2022-02-19 NOTE — PROGRESS NOTES
Chart check  I was notified by Dr Karol Alvarez on 2/15/2022 . She noted she had a very productive conversation with Mr Peres and they were opting for a palliative approach.  She said her note is on the [facility] chart but the last note I can see from her on the [facility] record is from 1/17/2022.   Corina Potts MD

## 2022-03-18 ENCOUNTER — OFFICE VISIT (OUTPATIENT)
Dept: URGENT CARE | Facility: URGENT CARE | Age: 82
End: 2022-03-18
Payer: COMMERCIAL

## 2022-03-18 VITALS
SYSTOLIC BLOOD PRESSURE: 136 MMHG | TEMPERATURE: 97.9 F | RESPIRATION RATE: 18 BRPM | HEART RATE: 74 BPM | WEIGHT: 121 LBS | OXYGEN SATURATION: 100 % | DIASTOLIC BLOOD PRESSURE: 72 MMHG | BODY MASS INDEX: 19.53 KG/M2

## 2022-03-18 DIAGNOSIS — L30.9 ECZEMA, UNSPECIFIED TYPE: ICD-10-CM

## 2022-03-18 DIAGNOSIS — R05.9 COUGH: ICD-10-CM

## 2022-03-18 DIAGNOSIS — E89.0 POSTSURGICAL HYPOTHYROIDISM: ICD-10-CM

## 2022-03-18 DIAGNOSIS — C73 PAPILLARY THYROID CARCINOMA (H): ICD-10-CM

## 2022-03-18 DIAGNOSIS — R93.89 ABNORMAL FINDING ON IMAGING: ICD-10-CM

## 2022-03-18 DIAGNOSIS — M85.9 LOW BONE DENSITY: ICD-10-CM

## 2022-03-18 DIAGNOSIS — E23.0 HYPOPITUITARISM (H): ICD-10-CM

## 2022-03-18 DIAGNOSIS — Z20.822 SUSPECTED COVID-19 VIRUS INFECTION: Primary | ICD-10-CM

## 2022-03-18 DIAGNOSIS — R91.8 PULMONARY NODULES: ICD-10-CM

## 2022-03-18 LAB — SARS-COV-2 RNA RESP QL NAA+PROBE: NEGATIVE

## 2022-03-18 PROCEDURE — U0003 INFECTIOUS AGENT DETECTION BY NUCLEIC ACID (DNA OR RNA); SEVERE ACUTE RESPIRATORY SYNDROME CORONAVIRUS 2 (SARS-COV-2) (CORONAVIRUS DISEASE [COVID-19]), AMPLIFIED PROBE TECHNIQUE, MAKING USE OF HIGH THROUGHPUT TECHNOLOGIES AS DESCRIBED BY CMS-2020-01-R: HCPCS | Performed by: FAMILY MEDICINE

## 2022-03-18 PROCEDURE — U0005 INFEC AGEN DETEC AMPLI PROBE: HCPCS | Performed by: FAMILY MEDICINE

## 2022-03-18 PROCEDURE — 99214 OFFICE O/P EST MOD 30 MIN: CPT | Performed by: FAMILY MEDICINE

## 2022-03-18 RX ORDER — HYDROCORTISONE 10 MG/1
TABLET ORAL
Qty: 150 TABLET | Refills: 4 | Status: SHIPPED | OUTPATIENT
Start: 2022-03-18 | End: 2023-04-24

## 2022-03-18 RX ORDER — CETIRIZINE HYDROCHLORIDE 10 MG/1
10 TABLET ORAL DAILY
Qty: 10 TABLET | Refills: 0 | Status: SHIPPED | OUTPATIENT
Start: 2022-03-18 | End: 2022-03-28

## 2022-03-18 RX ORDER — TRIAMCINOLONE ACETONIDE 1 MG/G
CREAM TOPICAL 2 TIMES DAILY
Qty: 45 G | Refills: 0 | Status: SHIPPED | OUTPATIENT
Start: 2022-03-18 | End: 2022-04-17

## 2022-03-18 RX ORDER — BENZONATATE 100 MG/1
100 CAPSULE ORAL 3 TIMES DAILY PRN
Qty: 21 CAPSULE | Refills: 0 | Status: SHIPPED | OUTPATIENT
Start: 2022-03-18 | End: 2022-03-25

## 2022-03-18 NOTE — PATIENT INSTRUCTIONS
"Discharge Instructions for COVID-19 Patients  You have--or may have--COVID-19. Please follow the instructions listed below.   If you have a weakened immune system, discuss with your doctor any other actions you need to take.  How can I protect others?  If you have symptoms (fever, cough, body aches or trouble breathing):    Stay home and away from others (self-isolate) until:  ? Your other symptoms have resolved (gotten better). And   ? You've had no fever--and no medicine that reduces fever--for 1 full day (24 hours). And   ? At least 10 days have passed since your symptoms started. (You may need to wait 20 days. Follow the advice of your care team.)  If you don't show symptoms, but testing showed that you have COVID-19:    Stay home and away from others (self-isolate) until at least 10 days have passed since the date of your first positive COVID-19 test.  During this time    Stay in your own room, even for meals. Use your own bathroom if you can.    Stay away from others in your home. No hugging, kissing or shaking hands. No visitors.    Don't go to work, school or anywhere else.    Clean \"high touch\" surfaces often (doorknobs, counters, handles). Use household cleaning spray or wipes.    You'll find a full list of  on the EPA website: www.epa.gov/pesticide-registration/list-n-disinfectants-use-against-sars-cov-2.    Cover your mouth and nose with a mask or other face covering to avoid spreading germs.    Wash your hands and face often. Use soap and water.    Caregivers in these groups are at risk for severe illness due to COVID-19:  ? People 65 years and older  ? People who live in a nursing home or long-term care facility  ? People with chronic disease (lung, heart, cancer, diabetes, kidney, liver, immunologic)  ? People who have a weakened immune system, including those who:    Are in cancer treatment    Take medicine that weakens the immune system, such as corticosteroids    Had a bone marrow or organ " transplant    Have an immune deficiency    Have poorly controlled HIV or AIDS    Are obese (body mass index of 40 or higher)    Smoke regularly    Caregivers should wear gloves while washing dishes, handling laundry and cleaning bedrooms and bathrooms.    Use caution when washing and drying laundry: Don't shake dirty laundry and use the warmest water setting that you can.    For more tips on managing your health at home, go to www.cdc.gov/coronavirus/2019-ncov/downloads/10Things.pdf.  How can I take care of myself at home?  1. Get lots of rest. Drink extra fluids (unless a doctor has told you not to).  2. Take Tylenol (acetaminophen) for fever or pain. If you have liver or kidney problems, ask your family doctor if it's okay to take Tylenol.   Adults can take either:   ? 650 mg (two 325 mg pills) every 4 to 6 hours, or   ? 1,000 mg (two 500 mg pills) every 8 hours as needed.  ? Note: Don't take more than 3,000 mg in one day. Acetaminophen is found in many medicines (both prescribed and over-the-counter medicines). Read all labels to be sure you don't take too much.   For children, check the Tylenol bottle for the right dose. The dose is based on the child's age or weight.  3. If you have other health problems (like cancer, heart failure, an organ transplant or severe kidney disease): Call your specialty clinic if you don't feel better in the next 2 days.  4. Know when to call 911. Emergency warning signs include:  ? Trouble breathing or shortness of breath  ? Pain or pressure in the chest that doesn't go away  ? Feeling confused like you haven't felt before, or not being able to wake up  ? Bluish-colored lips or face  5. Your doctor may have prescribed a blood thinner medicine. Follow their instructions.  Where can I get more information?     [employer] - About COVID-19:   [email or web address]    CDC - What to Do If You're Sick:  www.cdc.gov/coronavirus/2019-ncov/about/steps-when-sick.html    CDC - Ending Home Isolation: www.cdc.gov/coronavirus/2019-ncov/hcp/disposition-in-home-patients.html    CDC - Caring for Someone: www.cdc.gov/coronavirus/2019-ncov/if-you-are-sick/care-for-someone.html    [facility] - Interim Guidance for Hospital Discharge to Home: www.health.[facility].[place]./diseases/coronavirus/hcp/hospdischarge.pdf    Below are the COVID-19 hotlines at the [place] Department of Health ([facility]). Interpreters are available.  ? For health questions: Call 899-447-7176 or 1-341.245.4602 (7 a.m. to 7 p.m.)  ? For questions about schools and childcare: Call 781-836-7970 or 1-108.798.3106 (7 a.m. to 7 p.m.)    For informational purposes only. Not to replace the advice of your health care provider. Clinically reviewed by Dr. Abhi Story.   Copyright   2020 [facility]. All rights reserved. [employer] 455840 - REV 01/05/21.

## 2022-03-18 NOTE — PROGRESS NOTES
SUBJECTIVE: Travis Peres is a 81 year old male presenting with a chief complaint of cough  and itchy skin.  Onset of symptoms was day(s) ago.  Course of illness is worsening.    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         Fenofibrate Hives     Lisinopril Cough     Losartan Cough     Niacin      Simvastatin Cramps     Social History     Tobacco Use     Smoking status: Former Smoker     Packs/day: 0.50     Years: 5.00     Pack years: 2.50     Types: Cigarettes     Start date: 1976     Quit date: 2001     Years since quittin.1     Smokeless tobacco: Never Used   Substance Use Topics     Alcohol use: No       ROS:  SKIN: no rash  GI: no vomiting    OBJECTIVE:  /72   Pulse 74   Temp 97.9  F (36.6  C)   Resp 18   Wt 54.9 kg (121 lb)   SpO2 100%   BMI 19.53 kg/m  GENERAL APPEARANCE: healthy, alert and no distress  EYES: EOMI,  PERRL, conjunctiva clear  HENT: ear canals and TM's normal.  Nose and mouth without ulcers, erythema or lesions  RESP: lungs clear to auscultation - no rales, rhonchi or wheezes  SKIN: dry skin      ICD-10-CM    1. Suspected COVID-19 virus infection  Z20.822 Symptomatic; Unknown COVID-19 Virus (Coronavirus) by PCR  Nose   2. Cough  R05.9 benzonatate (TESSALON) 100 MG capsule   3. Eczema, unspecified type  L30.9 triamcinolone (KENALOG) 0.1 % external cream     cetirizine (ZYRTEC) 10 MG tablet     Moisturizers   Fluids/Rest, f/u if worse/not any better

## 2022-05-23 ENCOUNTER — VIRTUAL VISIT (OUTPATIENT)
Dept: ENDOCRINOLOGY | Facility: CLINIC | Age: 82
End: 2022-05-23
Payer: COMMERCIAL

## 2022-05-23 DIAGNOSIS — D35.2 BENIGN NEOPLASM OF PITUITARY GLAND AND CRANIOPHARYNGEAL DUCT (POUCH) (H): ICD-10-CM

## 2022-05-23 DIAGNOSIS — E89.0 POSTSURGICAL HYPOTHYROIDISM: ICD-10-CM

## 2022-05-23 DIAGNOSIS — M81.0 AGE-RELATED OSTEOPOROSIS WITHOUT CURRENT PATHOLOGICAL FRACTURE: ICD-10-CM

## 2022-05-23 DIAGNOSIS — E23.0 HYPOPITUITARISM (H): Primary | ICD-10-CM

## 2022-05-23 DIAGNOSIS — C73 PAPILLARY THYROID CARCINOMA (H): ICD-10-CM

## 2022-05-23 DIAGNOSIS — R63.4 WEIGHT LOSS: ICD-10-CM

## 2022-05-23 DIAGNOSIS — D35.3 BENIGN NEOPLASM OF PITUITARY GLAND AND CRANIOPHARYNGEAL DUCT (POUCH) (H): ICD-10-CM

## 2022-05-23 PROBLEM — M85.9 LOW BONE DENSITY: Status: RESOLVED | Noted: 2018-06-20 | Resolved: 2022-05-23

## 2022-05-23 PROCEDURE — 99215 OFFICE O/P EST HI 40 MIN: CPT | Mod: 95

## 2022-05-23 PROCEDURE — 99417 PROLNG OP E/M EACH 15 MIN: CPT

## 2022-05-23 RX ORDER — LEVOTHYROXINE SODIUM 137 UG/1
137 TABLET ORAL DAILY
Qty: 90 TABLET | Refills: 4 | Status: SHIPPED | OUTPATIENT
Start: 2022-05-23 | End: 2022-11-07 | Stop reason: DRUGHIGH

## 2022-05-23 NOTE — NURSING NOTE
Provider was able to contact patient before rooming could be complete.     Shaye MCKEON Virtual Visit Facilitator 1:31 PM May 23, 2022

## 2022-05-23 NOTE — PROGRESS NOTES
Endocrinology clinic visit note.     Papillary thyroid carcinoma  4.8 cm right, bilateral node positive. He has been treated with neck operation x 3 (total thyroidectomy, right lateral neck dissection;  Resection retrosternal mediastinal masses) 131I x 2 (cummulative dose 346.4 mCi) His last 131I TBS (2008) post therapy scan raised question of ? lung mets and also ? met above left kidney. Progressive cervical adenopathy has been treated in the past with ETOH and has been progressive since then.   .     We have biopsy proven positive LN involvement in the neck (FNAB 2018 and again 11/2021)  which we have not treated so far.     I have long suspected he has lung mets but we haven't clearly proven this.    In the last 8 months he has had duplication of care also seeing endocrinology and onocology at [facility] where multiple tests continue to be ordered.  I have been informed by his PCP Dr Alvarez that he has elected for  palliative approach in conversations there .  I can see this is also documented on the record on the 2/15/2022 clinic note.  Sone of what was discussed today also supports this plan.    I have discussed the recent test results with Nazanin which appear to show stability by Tg and imaging report.     Postsurgical hypothyroidism.  On LT4 137 mcg/day  due to thyroidectomy plus hypopituitarism.   TSH should be kept low to avoid stimulating the thyroid cancer.  Since he has hypopituitarism the TSH may not rise even if he is systemically hypothyroid.       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  He has already had 3 neck operations and ETOh to cervical adenopathy.  The 2 LNs likely correlate to the high FDG regions on the PET.   It is possible these are the same LNs that were treated with ETOH in the past (vs others in the same vicinity - it is hard to say)  We have discussed this many times before.       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   On LT4 and HC only.    Usually we would treat to high normal free T4 target. 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 The tumor has been significantly de bulked and He has completed XRT for  persistent tumor-. Tumor mass is stable on  MRI through 10/24/18.  Continue periodic imaging of the sella, treat the hypopituitarism medically.   Pituitary MRI could be repeated anytime but this is low priority compared with his other problems.    Osteoporosis on 8/20221 DXA ; Bone loss on alendronate since 2012.  Is he really getting the alendronate or is this a sign of another problem we are missing .  Per the record he has been on it since 2011.  I suggested stopping it and his wife said he hadn't been on it > 5 years. Later she said he might not take it when they go to [place].  Stop alendronate now.  Next DXA could be after 8/2023     frailty -history of falls .    Weight loss is of concern .  Poor po intake/ aspiration risk .  We touched on feeding tube option today and it was declined.     Aspiration risk per recent studies. He already follows with pulmonary, Dr Haro.  As above.     Lung nodules differential per radiologist is fungal or atypical pneumonia - can't exclude mets . He already follows with pulmonary, Dr Haro.     Deafness, Language barrier - both of these , combined with? Dementia, are barriers to his optimal care.   Nazanin notes he isn't communicating which is also what I have observed in clinic for years.      ? Lytic bone lesions in the past -stable on serial imaging --repeat SPEP    Due to the COVID 19 pandemic this visit was a telephone  visit. The patient/s wife gave verbal consent for the visit today.    I have independently reviewed and interpreted  labs, imaging as indicated.     Chart review/prep time 1  1269-1933 AM   Visit Start time call to  service [phone] [phone] ; call to patient 1300 ;got voice mail.   disappeared. 2nd call to  services- Jocelyne [id number]; Reached them 1308.   Visit Stop time  1347   _83_ minutes spent on the date of the encounter doing chart review, history and exam, documentation and further activities as noted above.  E& M [id number]     Corina Potts MD      Cc/ HPI: Mr Peres presents today along with his wife Naznain.  He was last seen by me 12/2021. Since then, he was also seen by [facility] Endocriologist Dr Yosi Beltran on 12/15/2021 (there is an encounter but I do not see a note on care everywhere) and medical oncologist Dr Yahir Bueno . I have reviewed the 1/3/2022 note which planned for [facility] one liquidd biopsy.In the last 8 months he has had duplication of care also seeing endocrinology and onocology at [facility].  I have been informed by his PCP Dr Alvarez that he has elected for  palliative approach in conversations there .  I can see this is also documented on the record on the 2/15/2022 clinic note. Nazanin did all the talking today. She  says that 2 weeks ago went to hospital to check on the thyroid cancer. He had a CT of the neck and chest.  He also had labs measured then.  I have reports but not images.      He has a history of multiple complicated endocrine issues, including thyroid cancer, surgical hypothyroidism, osteoporosis, pituitary macroadenoma with partial hypopituitarism and history of DI.   See my 4/9/18 note for his detailed history.     We know he has persistent/recurrent/ progressive thyroid cancer in the neck, biopsy proven 4/18 and again on 11/2021.     Papillary thyroid cancer 4.8 cm left, bilateral node positive (MACIS 6.88 minimum, pT3, pN1a (8), pMx, Stage III or IV). His course has included several operations and several 131I treatments  11/27/07  thyroidectomy  153.4 mCi 131I in 6/08. The radioiodine  was complicated by acute sialadenitis.   12/08  193 mCi 131I (with Thyrogen stimulation). post therapy scan activity in the thyroid bed, lung base on the right and also superior left kidney region   11/3/09  right selective neck dissection levels 2, 3 and 4, removing many positive LNs.   4/16/14 FNAB of right level 6 and 7 cervical lymph nodes-  positive for papillary thyroid cancer. Needle wash Tg  high on both samples. He had  hoarseness within one hour after the FNAB procedure. He was treated with cymetra on 5/12/14 and he restarted thickened liquids.    6/3/14  ETOH ablation procedure of the  2 LNs    1/31/17  trasnscervical resection of retrosternal mediastinal lesions.  A cystic lesion was removed and drained.  Surgical path  benign thymic tissue.  FNAB 4/23/18: + PTC, needle wash Tg 2082 ng/ml .    FNAB 11/12/2021 [facility] [id number] LN right  Supraclavicular fossa  FNAB: _ papillary thyroid Ca.      2.  Osteoporosis.   Alendronate has been prescribed since 2012.   DXA 8/4/2021 showed osteoporosis , lowest T-score -2.6 and bone loss right hip compared with 2019.       Labs   4/9/18: Tg 18.2, FRANK < 0.4; TSH  ,0.01, free T4 1.52, Na 139, K 4.4;   4/23/18 FNAB right level 6 and right level 7 LN - + PTC  6/20/18: Tg 12.4, FRANK < 0.4, TSH < 0.01, free T4 1.6 -   11/27/18: Tg 16.5, FRANK  0.4, TSH < 0.01, free T4 1.46  4/22/19: Tg 22, FRANK < 0.4, TSH < 0.01, free T4 1.51, Ca 8.4, albumin 3.3, phos 3.1, creatinine 1.03, glucose 122, vitamin D 22  7/23/19: Tg 32.1, FRANK < 0.4, TSH < 0.01   11/4/2020 TSH 5.78, free T4 0.8  8/2/2021 Tg 62, FRANK < 0.4, TSH < 0.01, free T4 1.68   11/4/2021 [facility] Tg 109 ng/dl , FRANK < 1, TSH not measured.  Na 141 , K 3.5, glucose 123, Ca 8.4, creatinine 0.85  12/6/2021 Tg 80, TSH <0.01, free T4 1.63, HgbA1c 5, SPEP polyclonal increase in gamma - no monoclonal  1/11/2022 Hgb 12.3, WBC 5500, platelets 166K  3/18/2022 Covid pcr negative    5/5/2022 Tg 65.9, FRANK < 1    Images:   4/12/18  PET/CT .  Right low neck /superior mediastinal high FDG lateral and more midline. The paratracheal mass is somewhat posterior and below the level of the clavicle (read as 1.4 cm, larger than before, SUB 68), but above the sternum  Tiny focus of fdg left lung \  8/29/18 CT chest: previously spiculted 1.7 x 1.5 cm nodule now 7 x 5 mm , ? Atelectasis.  Scattered upper lobe reticulonodular opacities as seen on prior CT, several more conspicuous, up to 4 mm, ? Infection .  To my eye I also note right level 7 neck mass 1.6 x 2 with some airway mass effect (this was 1.4 x 1.5 on 9/17 CT). This is also likely  the right level 7 mass we have bene following on US.    10/24/18 MRI brain residual mass 1.4 x 0.6 x 0.9 cm, extending into left cavernous sinus.  Stalk deviated to the left, thickened to 5 mm; optic chiasm atrophic  10/15/19 neck US compared with 4/25/19 , 11/27/18,  6/20/18 and  4/16/18:   Right level 6 thyroid bed 1.6 x 1.1 x 1.1 (was   1.5 x 1.1 x 1.2 ;  1.2 x 0.9 x 1.2; 1.2 x 01 x 1.1 ;  1.3 x 0.95 x 1.2 cm - suspicious; 1.2 x 0.93 x 0.9 ;  FNAB 4/23/18: + PTC, needle wash Tg 2082 ng/ml  Right level 7 # 1 1.7 x 1.6 x  1.7 cm (was  2 x 1.9 x 2.1 ;  1.8 x 1.5 x 1.8 ; 1.8 x 1.6 x 1.8 cm ; 1.4 x 1.5 x 1.5 cm; 1.1 x 1.2 x 1 -FNAB 4/23/18 + PTC, needle wash Tg 430 ng/ml  Left level 4  0.5 x 0.4 x 0.9 cm  (was 0.5 x 0.4 x 1;  0.4 x 0.6 x 0.9  0.3 x 0.6 x 1.1 ;  0.3 x 0.8 x 1 cm ; 0.6 x 0.3 x 0.8 )  Left level 4 # 2 0.3 x  0.5 x 0.4 cm  (was 0.4 x 0.4 x 0.6;  0.4 x0.6 x 0.6 ; 0.5 x 0.7 x 0.7 ; 0.8 x 0.5 x 0.6 cm;  0.5 x 0.5 x 1 )  Left level 4 # 3 0.4 x0.6 x 0.4 - ? new  4/25/19 DXA: lowest T-score -2.3 right femoral neck; no change in BMD compared with 2018.   4/25/19 chest CT: increase in centrilobular nodules posterior RUL-- I had discussed this with Dr Paz Magana after our last appt; today I have reviewed the images -right level 7 mass indents trachea slightly.     10/15/19 chest CT: no significant change   10/13/2020 CT Chest -right neck mass 1.4 x 1.8 cm ; new 4 mm lung nodule posterior RUL   8/4/2/2021 DXA lowest T-score -2.6 right femoral neck ; significant bone loss right total hip  8/4/2021 neck US 8/4/2021 :  Compared with 10/15/19, 4/25/19 , 11/27/18,  6/20/18 and  4/16/18:   Right level 7 # 1 3.1 x 1.4 x 1.8 cm - now showing clear confluence of past right level 6 1.6 x 1.1 x 1.1 and right level 7 #1  1.7 x 1.6 x  1.7 cm- both are known PTC on past FNAB from 4/23/2018  Right level 7 # 2 0.9 x 0.8 x 1  Right level 7 # 3 0.7 x 0.4 x 0.8 cm   Left level 4   0.3 x 0.5 x 0.8 cm  was  0.5 x 0.4 x 0.9 cm ; 0.5 x 0.4 x 1;  0.4 x 0.6 x 0.9  0.3 x 0.6 x 1.1 ;  0.3 x 0.8 x 1 cm ; 0.6 x 0.3 x 0.8 )  Left level 4 # 2 0.6 x 0.5 x 0.7 cm was 0.3 x  0.5 x 0.4 cm;  0.4 x 0.4 x 0.6;  0.4 x0.6 x 0.6 ; 0.5 x 0.7 x 0.7 ; 0.8 x 0.5 x 0.6 cm;  0.5 x 0.5 x 1 )  Left level 4 # 3 0.5 x 0.5 x 0.7 cm  was 0.4 x0.6 x 0.4 -  9/28/2021 CT chest without contrast:  Right lung tree in bud opacities with multiple new associated solid nodules up to 6 mm (? Atypical infection).  Previously described left lung nodules resolved.    11/10/2021 outside PET  compared with 8/4/2021 neck US :  High FDG activity low neck /thyroid bed region midline (we don't have good US images /labeling of this region , I am not sure of the US correlate for this), right lateral (clavicle level; corresponds to right level 7 # 1 3.1 x 1.4 x 1.8 cm  ) and right posterior thyroid bed (corresponds to right level 7 # 2  0.9 x 0.8 x 1  12/2/2021 Modified barium swallow - silent aspiration with thin, slightly thick and mildly thickened liquids.   Presbyesophagus.       ROS: (per his wife)  No worse than before  Continued more weight loss-- down to about 110#;   Sometimes appetite not so good  Some coughing with eating; slow in swallowing;  Voice is a bit harsh like before  Sleep at night requires sleeping pill.  Sleeping a few hours.    Cardiac: negative   Respiratory: a lot better - he breathes smoother than before when he had nose blockage.  Now he can breathe from his nose.   GI: a lot of constipation due to so many medications.    Usually no nocturia with sleeping pill otherwise 4-5 times/night  No falling  Worried about the weight loss; They do not agree with the idea of feeding tube.    Too weak now to have tube in; if the tube has a problem and needs to insert again then he would   He is hardly talking now.  I don' t think his brain is working very well. There is not much conversation going on.  Usually it is must his wife talking.  If he doesn't understand, then I start writing.  He understands more when she writes;   He has difficulty writing now.    2 weeks ago went to hospital to check on the thyroid cancer.     Past Medical History:   Diagnosis Date     Arthritis      BPH (benign prostatic hyperplasia)      COPD (chronic obstructive pulmonary disease) (H)      Depression      Hyperlipidemia      Hypertension     no current meds     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     Hypovitaminosis D      Multiple pulmonary nodules 2009     Osteopenia 2011     Papillary thyroid carcinoma (H) 2007    4.8 cm, right, node positive;      Pituitary macroadenoma with extrasellar extension (H) 2007    causing obstructive hydrocephalus     Post-surgical hypothyroidism 2007     Uncomplicated asthma      Vocal cord paralysis 2014    right     Xerostomia 2010    due to radiation exposures     Past Surgical History:   Procedure Laterality Date     cymetra injection       ESOPHAGOSCOPY, GASTROSCOPY, DUODENOSCOPY (EGD), COMBINED  6/20/2013    Procedure: COMBINED ESOPHAGOSCOPY, GASTROSCOPY, DUODENOSCOPY (EGD), BIOPSY SINGLE OR MULTIPLE;  gastroscopy;  Surgeon: Leslie Guadarrama MD;  Location: [facility]     ESOPHAGOSCOPY, GASTROSCOPY, DUODENOSCOPY (EGD), COMBINED N/A 9/20/2019    Procedure: ESOPHAGOGASTRODUODENOSCOPY (EGD);  Surgeon: Gilberto Gibson  DO Que;  Location:  GI     ESOPHAGOSCOPY, GASTROSCOPY, DUODENOSCOPY (EGD), COMBINED N/A 10/16/2020    Procedure: ESOPHAGOGASTRODUODENOSCOPY, WITH BIOPSY;  Surgeon: Bartolome Granda MD;  Location:  GI     HERNIA REPAIR Right      lipoma resection chest wall Right 11/09     PHACOEMULSIFICATION CLEAR CORNEA WITH STANDARD INTRAOCULAR LENS IMPLANT Left 5/7/2018    Procedure: PHACOEMULSIFICATION CLEAR CORNEA WITH STANDARD INTRAOCULAR LENS IMPLANT;  LEFT PHACOEMULSIFICATION CLEAR CORNEA WITH STANDARD INTRAOCULAR LENS IMPLANT ;  Surgeon: Nadiya Allen MD;  Location:  [facility]     PHACOEMULSIFICATION CLEAR CORNEA WITH STANDARD INTRAOCULAR LENS IMPLANT Right 8/21/2018    Procedure: PHACOEMULSIFICATION CLEAR CORNEA WITH STANDARD INTRAOCULAR LENS IMPLANT;  RIGHT EYE PHACOEMULSIFICATION CLEAR CORNEA WITH STANDARD INTRAOCULAR LENS IMPLANT;  Surgeon: Nadiya Allen MD;  Location:  [facility]     right selective neck dissection level 2, 3, 4  11/3/09     right  shunt placement  6/28/07     THORACIC SURGERY  Fidencio 3 2017     THYMECTOMY N/A 1/3/2017    Procedure: THYMECTOMY;  Surgeon: Ernesto Armstrong MD;  Location: [facility] OR     THYROIDECTOMY  11/27/07     TRANSCERVICAL EXTENDED MEDIASTINAL LYMPHADENECTOMY N/A 1/3/2017    Procedure: TRANSCERVICAL EXTENDED MEDIASTINAL LYMPHADENECTOMY;  Surgeon: Ernesto Armstrong MD;  Location: [facility] OR     transnasal endoscopic resection of pituitary adenoma  8/13/2007     Current Outpatient Medications   Medication Sig Dispense Refill     albuterol (PROAIR HFA) 108 (90 Base) MCG/ACT inhaler Inhale 2 puffs into the lungs every 4 hours as needed for shortness of breath / dyspnea or wheezing 3 Inhaler 11     alendronate (FOSAMAX) 70 MG tablet Take 1 tablet (70 mg) by mouth every 7 days 32 tablet 1     amLODIPine (NORVASC) 2.5 MG tablet TAKE 1 TABLET(2.5 MG) BY MOUTH DAILY 90 tablet 0     docusate sodium (COLACE) 100 MG capsule Take 100 mg by mouth 2 times daily as  needed for constipation       guaiFENesin (MUCINEX) 600 MG 12 hr tablet Take 2 tablets (1,200 mg) by mouth 2 times daily 60 tablet 3     guaiFENesin (MUCINEX) 600 MG 12 hr tablet Take 1 tablet (600 mg) by mouth 2 times daily 14 tablet 0     hydrocortisone (CORTEF) 10 MG tablet 10 mg AM and 5 mg afternoon 150 tablet 4     levothyroxine (SYNTHROID/LEVOTHROID) 137 MCG tablet Take 1 tablet (137 mcg) by mouth daily 90 tablet 4     magic mouthwash (ENTER INGREDIENTS IN COMMENTS) suspension Swish and spit 5-10 mLs in mouth every 6 hours as needed 30 ml of Benadryl (12.5 mg/5 ml), 60 ml Maalox, 30 ml Viscous Lidocaine 120 mL 0     psyllium (METAMUCIL/KONSYL) capsule Take 1 capsule by mouth 2 times daily 180 capsule 3     senna-docusate (SENOKOT-S/PERICOLACE) 8.6-50 MG tablet Take 1 tablet by mouth 2 times daily as needed for constipation 60 tablet 0     tamsulosin (FLOMAX) 0.4 MG capsule Take 0.4 mg by mouth daily       traZODone (DESYREL) 100 MG tablet Take 2 tablets (200 mg) by mouth At Bedtime 60 tablet 3     umeclidinium-vilanterol (ANORO ELLIPTA) 62.5-25 MCG/INH oral inhaler Inhale 1 puff into the lungs daily        Social History     Tobacco Use     Smoking status: Former Smoker     Packs/day: 0.50     Years: 5.00     Pack years: 2.50     Types: Cigarettes     Start date: 1976     Quit date: 2001     Years since quittin.3     Smokeless tobacco: Never Used   Substance Use Topics     Alcohol use: No     Drug use: No     Spends day: resting at home; sometimes goes for walk. Sometimes goes for exercise, walking down the staircase and practicing mj chi downstairs. He can do a lot of things on his own, bathroom, eating, etc.    Is Nazanin getting enough support at home to care for Travis? She notes again it is just the 2 of them.  Sometimes son and daughter help.   They continue to see Dr Julia Magana.     GENERAL  BP Readings from Last 1 Encounters:   22 136/72      Pulse Readings from Last 1 Encounters:  "  03/18/22 74      Resp Readings from Last 1 Encounters:   03/18/22 18      Temp Readings from Last 1 Encounters:   03/18/22 97.9  F (36.6  C)      SpO2 Readings from Last 1 Encounters:   03/18/22 100%      Wt Readings from Last 1 Encounters:   03/18/22 54.9 kg (121 lb)      Ht Readings from Last 1 Encounters:   12/06/21 1.676 m (5' 6\")     GENERAL si   There were no vitals taken for this visit.  BP Readings from Last 1 Encounters:   03/18/22 136/72      Pulse Readings from Last 1 Encounters:   03/18/22 74      Resp Readings from Last 1 Encounters:   03/18/22 18      Temp Readings from Last 1 Encounters:   03/18/22 97.9  F (36.6  C)      SpO2 Readings from Last 1 Encounters:   03/18/22 100%      Wt Readings from Last 1 Encounters:   03/18/22 54.9 kg (121 lb)      Ht Readings from Last 1 Encounters:   12/06/21 1.676 m (5' 6\")       CT NECK SOFT TISSUE W    Anatomical Region Laterality Modality   NECK -- Computed Tomography       Impression    1. Similar-appearing infrahyoid neck hyperenhancing lesions along the right supraclavicular region, right paratracheal region and anterior strap musculature. These correspond to FDG avid mass is noted on prior PET/CT 11/10/2021 and worrisome for metastatic disease. Overall appearance is similar to that of the PET-CT given differences in technique.   2. No definite new lesion identified.   3. Scattered tree-in-bud opacities and nodularity within the right upper lobe. Findings may represent infectious or inflammatory process versus focal miliary metastasis.   4. Postsurgical changes to the sphenoid sinuses related to transsphenoidal sellar mass resection. Partial visualization of right parietal ventriculostomy catheter.       Please note that all CT scans at this facility use dose modulation, iterative reconstruction, and/or weight-based dosing when appropriate to reduce radiation dose to as low as reasonably achievable.     Dictated by Albert Garcia MD @ 5/5/2022 1:44:36 PM "     (Electronically Signed)    Narrative    For Patients:  As a result of the 21st Century Cures Act, medical imaging exams and procedure reports are released immediately into your electronic medical record.  You may view this report before your referring provider.  If you have questions, please contact your health care provider.       INDICATION:   Papillary thyroid carcinoma follow-up. History of suprasellar mass status post resection.     TECHNIQUE:   CT of the neck soft tissues performed with IV contrast.   Contrast:  80 cc Omnipaque 350.     COMPARISON:   PET/CT 11/10/2021.     FINDINGS:   The nasopharynx, oropharynx and hypopharynx appear unremarkable. The supraglottic, glottic and infraglottic spaces are preserved.     The parotid and submandibular glands appear atrophic. The thyroid gland has been resected.     There is a hyperenhancing nodule within the right supraclavicular region just lateral to the right thyroidectomy bed, measuring 1.0 cm (series 2, image 78). Additional larger hyperenhancing lesion along the right thyroidectomy bed measuring up to 3.4 cm. Additional lesion along the right paratracheal space which measures up to 1.9 cm. There is a lesion along the anterior thyroid cartilage and within the strap musculature measuring up to 1.0 cm. These lesions correspond to FDG avid masses noted on the prior PET/CT, worrisome for metastatic disease. Overall appearance is similar to that of the PET-CT given differences in technique.     Partially visualized right parietal approach ventriculostomy catheter. Bilateral pseudophakia.     Stable postsurgical changes with opacification of the sphenoid sinus related to transsphenoidal sellar mass resection.     Scattered tree-in-bud opacities with nodularity identified within the right upper lobe.     Mild spondylosis of the cervical spine.       Anatomical Region Laterality Modality   CHEST -- Computed Tomography   THORAX -- --   HEART -- --        Impression    1. 1.5 cm upper right paratracheal mass/lymph node and 3 cm mass/lymph node conglomerate along the dorsal margin of the medial right clavicle corresponding to FDG avid foci seen on 11/10/2021 PET-CT.   2. Patchy areas of bronchiolitis in the right lung, more extensive than on comparison PET-CT. Chronic collapse of the right middle lobe is unchanged.       Please note that all CT scans at this facility use dose modulation, iterative reconstruction, and/or weight-based dosing when appropriate to reduce radiation dose to as low as reasonably achievable.     Dictated by Hank Henriquez MD @ 5/5/2022 3:41:17 PM     (Electronically Signed)    Narrative    For Patients:  As a result of the 21st Century Cures Act, medical imaging exams and procedure reports are released immediately into your electronic medical record.  You may view this report before your referring provider.  If you have questions, please contact your health care provider.       HISTORY:   Papillary thyroid carcinoma.     TECHNIQUE:   CT chest with IV contrast. 80 mL Omnipaque 350 IV.     COMPARISON:   PET-CT 11/10/2021. Chest CT 09/20/2018.     FINDINGS:   Lungs: Chronic collapse of the right middle lobe is unchanged. Several new areas of clustered centrilobular nodules and tree-in-bud opacities in the right upper and lower lobes. Previously present nodules are not significantly changed. Large calcified granuloma in the left upper lobe is unchanged. No pleural effusion or pneumothorax.     Mediastinum:1.5 x 1 cm mass/lymph node along the right side of the trachea in the upper mediastinum corresponding to FDG avid focus seen on CT. More superiorly and anteriorly along the dorsal margin of the medial right clavicle is an approximately 3 x 1.5 cm soft tissue process corresponding to area of FDG uptake on PET-CT. Aorta aortic atherosclerosis. Coronary artery calcifications. No pericardial effusion.     Lymph nodes: Calcified mediastinal and  subcarinal lymph nodes.     Musculoskeletal: Degenerative changes of the spine. No lytic or blastic bone lesions.     Upper abdomen: Left renal cyst. Cholecystectomy.

## 2022-05-23 NOTE — LETTER
5/23/2022       RE: Travis Peres  [address]  [facility] [id number]     Dear Colleague,    Thank you for referring your patient, Travis Peres, to the [facility] [place] at [facility]. Please see a copy of my visit note below.    Endocrinology clinic visit note.     Papillary thyroid carcinoma  4.8 cm right, bilateral node positive. He has been treated with neck operation x 3 (total thyroidectomy, right lateral neck dissection;  Resection retrosternal mediastinal masses) 131I x 2 (cummulative dose 346.4 mCi) His last 131I TBS (2008) post therapy scan raised question of ? lung mets and also ? met above left kidney. Progressive cervical adenopathy has been treated in the past with ETOH and has been progressive since then.   .     We have biopsy proven positive LN involvement in the neck (FNAB 2018 and again 11/2021)  which we have not treated so far.     I have long suspected he has lung mets but we haven't clearly proven this.    In the last 8 months he has had duplication of care also seeing endocrinology and onocology at [facility] where multiple tests continue to be ordered.  I have been informed by his PCP Dr Alvarez that he has elected for  palliative approach in conversations there .  I can see this is also documented on the record on the 2/15/2022 clinic note.  Sonjustin of what was discussed today also supports this plan.    I have discussed the recent test results with Nazanin which appear to show stability by Tg and imaging report.     Postsurgical hypothyroidism.  On LT4 137 mcg/day  due to thyroidectomy plus hypopituitarism.   TSH should be kept low to avoid stimulating the thyroid cancer.  Since he has hypopituitarism the TSH may not rise even if he is systemically hypothyroid.       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  He has  already had 3 neck operations and ETOh to cervical adenopathy.  The 2 LNs likely correlate to the high FDG regions on the PET.   It is possible these are the same LNs that were treated with ETOH in the past (vs others in the same vicinity - it is hard to say)  We have discussed this many times before.      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   On LT4 and HC only.    Usually we would treat to high normal free T4 target. 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 The tumor has been significantly de bulked and He has completed XRT for  persistent tumor-. Tumor mass is stable on  MRI through 10/24/18.  Continue periodic imaging of the sella, treat the hypopituitarism medically.   Pituitary MRI could be repeated anytime but this is low priority compared with his other problems.    Osteoporosis on 8/20221 DXA ; Bone loss on alendronate since 2012.  Is he really getting the alendronate or is this a sign of another problem we are missing .  Per the record he has been on it since 2011.  I suggested stopping it and his wife said he hadn't been on it > 5 years. Later she said he might not take it when they go to [place].  Stop alendronate now.  Next DXA could be after 8/2023     frailty -history of falls .    Weight loss is of concern .  Poor po intake/ aspiration risk .  We touched on feeding tube option today and it was declined.     Aspiration risk per recent studies. He already follows with Dr Tahir martins.  As above.     Lung nodules differential per radiologist is fungal or atypical pneumonia - can't exclude mets . He already follows with Dr Tahir martins.     Deafness, Language barrier - both of these , combined with? Dementia, are barriers to his optimal care.   Nazanin notes he isn't communicating which  is also what I have observed in clinic for years.      ? Lytic bone lesions in the past -stable on serial imaging --repeat SPEP    Due to the COVID 19 pandemic this visit was a telephone  visit. The patient/s wife gave verbal consent for the visit today.    I have independently reviewed and interpreted labs, imaging as indicated.     Chart review/prep time 1  6897-9240 AM   Visit Start time call to  service [address] [phone] ; call to patient 1300 ;got voice mail.   disappeared. 2nd call to  services- Jocelyne [phone]; Reached them 1308.   Visit Stop time  1347   _83_ minutes spent on the date of the encounter doing chart review, history and exam, documentation and further activities as noted above.  E& M [id number]     Corina Potts MD      Cc/ HPI: Mr Peres presents today along with his wife Nazanin.  He was last seen by me 12/2021. Since then, he was also seen by [facility] Endocriologist Dr Yosi Beltran on 12/15/2021 (there is an encounter but I do not see a note on care everywhere) and medical oncologist Dr Yahir Bueno . I have reviewed the 1/3/2022 note which planned for [facility] one liquidd biopsy.In the last 8 months he has had duplication of care also seeing endocrinology and onocology at [facility].  I have been informed by his PCP Dr Alvarez that he has elected for  palliative approach in conversations there .  I can see this is also documented on the record on the 2/15/2022 clinic note. Nazanin did all the talking today. She  says that 2 weeks ago went to hospital to check on the thyroid cancer. He had a CT of the neck and chest.  He also had labs measured then.  I have reports but not images.      He has a history of multiple complicated endocrine issues, including thyroid cancer, surgical hypothyroidism, osteoporosis, pituitary macroadenoma with partial hypopituitarism and history of DI.   See my 4/9/18 note for his detailed history.     We know he has  persistent/recurrent/ progressive thyroid cancer in the neck, biopsy proven 4/18 and again on 11/2021.     Papillary thyroid cancer 4.8 cm left, bilateral node positive (MACIS 6.88 minimum, pT3, pN1a (8), pMx, Stage III or IV). His course has included several operations and several 131I treatments  11/27/07 thyroidectomy  153.4 mCi 131I in 6/08. The radioiodine  was complicated by acute sialadenitis.   12/08  193 mCi 131I (with Thyrogen stimulation). post therapy scan activity in the thyroid bed, lung base on the right and also superior left kidney region   11/3/09  right selective neck dissection levels 2, 3 and 4, removing many positive LNs.   4/16/14 FNAB of right level 6 and 7 cervical lymph nodes-  positive for papillary thyroid cancer. Needle wash Tg  high on both samples. He had  hoarseness within one hour after the FNAB procedure. He was treated with cymetra on 5/12/14 and he restarted thickened liquids.    6/3/14  ETOH ablation procedure of the  2 LNs    1/31/17  trasnscervical resection of retrosternal mediastinal lesions.  A cystic lesion was removed and drained.  Surgical path  benign thymic tissue.  FNAB 4/23/18: + PTC, needle wash Tg 2082 ng/ml .    FNAB 11/12/2021 [facility] [id number] LN right  Supraclavicular fossa  FNAB: _ papillary thyroid Ca.      2.  Osteoporosis.   Alendronate has been prescribed since 2012.   DXA 8/4/2021 showed osteoporosis , lowest T-score -2.6 and bone loss right hip compared with 2019.       Labs   4/9/18: Tg 18.2, FRANK < 0.4; TSH  ,0.01, free T4 1.52, Na 139, K 4.4;   4/23/18 FNAB right level 6 and right level 7 LN - + PTC  6/20/18: Tg 12.4, FRANK < 0.4, TSH < 0.01, free T4 1.6 -   11/27/18: Tg 16.5, FRANK  0.4, TSH < 0.01, free T4 1.46  4/22/19: Tg 22, FRANK < 0.4, TSH < 0.01, free T4 1.51, Ca 8.4, albumin 3.3, phos 3.1, creatinine 1.03, glucose 122, vitamin D 22  7/23/19: Tg 32.1, FRANK < 0.4, TSH < 0.01   11/4/2020 TSH 5.78, free T4 0.8  8/2/2021 Tg 62, FRANK < 0.4, TSH < 0.01, free  T4 1.68   11/4/2021 [facility] Tg 109 ng/dl , FRANK < 1, TSH not measured.  Na 141 , K 3.5, glucose 123, Ca 8.4, creatinine 0.85  12/6/2021 Tg 80, TSH <0.01, free T4 1.63, HgbA1c 5, SPEP polyclonal increase in gamma - no monoclonal  1/11/2022 Hgb 12.3, WBC 5500, platelets 166K  3/18/2022 Covid pcr negative   5/5/2022 Tg 65.9, FRANK < 1    Images:   4/12/18  PET/CT .  Right low neck /superior mediastinal high FDG lateral and more midline. The paratracheal mass is somewhat posterior and below the level of the clavicle (read as 1.4 cm, larger than before, SUB 68), but above the sternum  Tiny focus of fdg left lung \  8/29/18 CT chest: previously spiculted 1.7 x 1.5 cm nodule now 7 x 5 mm , ? Atelectasis.  Scattered upper lobe reticulonodular opacities as seen on prior CT, several more conspicuous, up to 4 mm, ? Infection .  To my eye I also note right level 7 neck mass 1.6 x 2 with some airway mass effect (this was 1.4 x 1.5 on 9/17 CT). This is also likely  the right level 7 mass we have bene following on US.    10/24/18 MRI brain residual mass 1.4 x 0.6 x 0.9 cm, extending into left cavernous sinus.  Stalk deviated to the left, thickened to 5 mm; optic chiasm atrophic  10/15/19 neck US compared with 4/25/19 , 11/27/18,  6/20/18 and  4/16/18:   Right level 6 thyroid bed 1.6 x 1.1 x 1.1 (was   1.5 x 1.1 x 1.2 ;  1.2 x 0.9 x 1.2; 1.2 x 01 x 1.1 ;  1.3 x 0.95 x 1.2 cm - suspicious; 1.2 x 0.93 x 0.9 ;  FNAB 4/23/18: + PTC, needle wash Tg 2082 ng/ml  Right level 7 # 1 1.7 x 1.6 x  1.7 cm (was  2 x 1.9 x 2.1 ;  1.8 x 1.5 x 1.8 ; 1.8 x 1.6 x 1.8 cm ; 1.4 x 1.5 x 1.5 cm; 1.1 x 1.2 x 1 -FNAB 4/23/18 + PTC, needle wash Tg 430 ng/ml  Left level 4  0.5 x 0.4 x 0.9 cm  (was 0.5 x 0.4 x 1;  0.4 x 0.6 x 0.9  0.3 x 0.6 x 1.1 ;  0.3 x 0.8 x 1 cm ; 0.6 x 0.3 x 0.8 )  Left level 4 # 2 0.3 x  0.5 x 0.4 cm  (was 0.4 x 0.4 x 0.6;  0.4 x0.6 x 0.6 ; 0.5 x 0.7 x 0.7 ; 0.8 x 0.5 x 0.6 cm;  0.5 x 0.5 x 1 )  Left level 4 # 3 0.4 x0.6 x 0.4 - ?  new  4/25/19 DXA: lowest T-score -2.3 right femoral neck; no change in BMD compared with 2018.   4/25/19 chest CT: increase in centrilobular nodules posterior RUL-- I had discussed this with Dr Paz Magana after our last appt; today I have reviewed the images -right level 7 mass indents trachea slightly.    10/15/19 chest CT: no significant change   10/13/2020 CT Chest -right neck mass 1.4 x 1.8 cm ; new 4 mm lung nodule posterior RUL   8/4/2/2021 DXA lowest T-score -2.6 right femoral neck ; significant bone loss right total hip  8/4/2021 neck US 8/4/2021 :  Compared with 10/15/19, 4/25/19 , 11/27/18,  6/20/18 and  4/16/18:   Right level 7 # 1 3.1 x 1.4 x 1.8 cm - now showing clear confluence of past right level 6 1.6 x 1.1 x 1.1 and right level 7 #1  1.7 x 1.6 x  1.7 cm- both are known PTC on past FNAB from 4/23/2018  Right level 7 # 2 0.9 x 0.8 x 1  Right level 7 # 3 0.7 x 0.4 x 0.8 cm   Left level 4   0.3 x 0.5 x 0.8 cm  was  0.5 x 0.4 x 0.9 cm ; 0.5 x 0.4 x 1;  0.4 x 0.6 x 0.9  0.3 x 0.6 x 1.1 ;  0.3 x 0.8 x 1 cm ; 0.6 x 0.3 x 0.8 )  Left level 4 # 2 0.6 x 0.5 x 0.7 cm was 0.3 x  0.5 x 0.4 cm;  0.4 x 0.4 x 0.6;  0.4 x0.6 x 0.6 ; 0.5 x 0.7 x 0.7 ; 0.8 x 0.5 x 0.6 cm;  0.5 x 0.5 x 1 )  Left level 4 # 3 0.5 x 0.5 x 0.7 cm  was 0.4 x0.6 x 0.4 -  9/28/2021 CT chest without contrast:  Right lung tree in bud opacities with multiple new associated solid nodules up to 6 mm (? Atypical infection).  Previously described left lung nodules resolved.    11/10/2021 outside PET  compared with 8/4/2021 neck US :  High FDG activity low neck /thyroid bed region midline (we don't have good US images /labeling of this region , I am not sure of the US correlate for this), right lateral (clavicle level; corresponds to right level 7 # 1 3.1 x 1.4 x 1.8 cm  ) and right posterior thyroid bed (corresponds to right level 7 # 2  0.9 x 0.8 x 1  12/2/2021 Modified barium swallow - silent aspiration with thin, slightly thick and mildly  thickened liquids.   Presbyesophagus.       ROS: (per his wife)  No worse than before  Continued more weight loss-- down to about 110#;   Sometimes appetite not so good  Some coughing with eating; slow in swallowing;  Voice is a bit harsh like before  Sleep at night requires sleeping pill.  Sleeping a few hours.   Cardiac: negative   Respiratory: a lot better - he breathes smoother than before when he had nose blockage.  Now he can breathe from his nose.   GI: a lot of constipation due to so many medications.    Usually no nocturia with sleeping pill otherwise 4-5 times/night  No falling  Worried about the weight loss; They do not agree with the idea of feeding tube.    Too weak now to have tube in; if the tube has a problem and needs to insert again then he would   He is hardly talking now.  I don' t think his brain is working very well. There is not much conversation going on.  Usually it is must his wife talking.  If he doesn't understand, then I start writing.  He understands more when she writes;   He has difficulty writing now.    2 weeks ago went to hospital to check on the thyroid cancer.     Past Medical History:   Diagnosis Date     Arthritis      BPH (benign prostatic hyperplasia)      COPD (chronic obstructive pulmonary disease) (H)      Depression      Hyperlipidemia      Hypertension     no current meds     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     Hypovitaminosis D      Multiple pulmonary nodules 2009     Osteopenia 2011     Papillary thyroid carcinoma (H) 2007    4.8 cm, right, node positive;      Pituitary macroadenoma with extrasellar extension (H) 2007    causing obstructive hydrocephalus     Post-surgical hypothyroidism 2007     Uncomplicated asthma      Vocal cord paralysis 2014    right     Xerostomia 2010    due to radiation exposures     Past Surgical History:   Procedure Laterality Date     cymetra injection       ESOPHAGOSCOPY, GASTROSCOPY, DUODENOSCOPY (EGD), COMBINED  6/20/2013     Procedure: COMBINED ESOPHAGOSCOPY, GASTROSCOPY, DUODENOSCOPY (EGD), BIOPSY SINGLE OR MULTIPLE;  gastroscopy;  Surgeon: Leslie Guadarrama MD;  Location:  GI     ESOPHAGOSCOPY, GASTROSCOPY, DUODENOSCOPY (EGD), COMBINED N/A 9/20/2019    Procedure: ESOPHAGOGASTRODUODENOSCOPY (EGD);  Surgeon: Gilberto Gibson DO;  Location:  GI     ESOPHAGOSCOPY, GASTROSCOPY, DUODENOSCOPY (EGD), COMBINED N/A 10/16/2020    Procedure: ESOPHAGOGASTRODUODENOSCOPY, WITH BIOPSY;  Surgeon: Bartolome Granda MD;  Location:  GI     HERNIA REPAIR Right      lipoma resection chest wall Right 11/09     PHACOEMULSIFICATION CLEAR CORNEA WITH STANDARD INTRAOCULAR LENS IMPLANT Left 5/7/2018    Procedure: PHACOEMULSIFICATION CLEAR CORNEA WITH STANDARD INTRAOCULAR LENS IMPLANT;  LEFT PHACOEMULSIFICATION CLEAR CORNEA WITH STANDARD INTRAOCULAR LENS IMPLANT ;  Surgeon: Nadiya Allen MD;  Location:  [facility]     PHACOEMULSIFICATION CLEAR CORNEA WITH STANDARD INTRAOCULAR LENS IMPLANT Right 8/21/2018    Procedure: PHACOEMULSIFICATION CLEAR CORNEA WITH STANDARD INTRAOCULAR LENS IMPLANT;  RIGHT EYE PHACOEMULSIFICATION CLEAR CORNEA WITH STANDARD INTRAOCULAR LENS IMPLANT;  Surgeon: Nadiya Allen MD;  Location:  [facility]     right selective neck dissection level 2, 3, 4  11/3/09     right  shunt placement  6/28/07     THORACIC SURGERY  Fidencio 3 2017     THYMECTOMY N/A 1/3/2017    Procedure: THYMECTOMY;  Surgeon: Ernesto Armstrong MD;  Location: [facility] OR     THYROIDECTOMY  11/27/07     TRANSCERVICAL EXTENDED MEDIASTINAL LYMPHADENECTOMY N/A 1/3/2017    Procedure: TRANSCERVICAL EXTENDED MEDIASTINAL LYMPHADENECTOMY;  Surgeon: Ernesto Armstrong MD;  Location: [facility] OR     transnasal endoscopic resection of pituitary adenoma  8/13/2007     Current Outpatient Medications   Medication Sig Dispense Refill     albuterol (PROAIR HFA) 108 (90 Base) MCG/ACT inhaler Inhale 2 puffs into the lungs every 4 hours as needed for shortness of  breath / dyspnea or wheezing 3 Inhaler 11     alendronate (FOSAMAX) 70 MG tablet Take 1 tablet (70 mg) by mouth every 7 days 32 tablet 1     amLODIPine (NORVASC) 2.5 MG tablet TAKE 1 TABLET(2.5 MG) BY MOUTH DAILY 90 tablet 0     docusate sodium (COLACE) 100 MG capsule Take 100 mg by mouth 2 times daily as needed for constipation       guaiFENesin (MUCINEX) 600 MG 12 hr tablet Take 2 tablets (1,200 mg) by mouth 2 times daily 60 tablet 3     guaiFENesin (MUCINEX) 600 MG 12 hr tablet Take 1 tablet (600 mg) by mouth 2 times daily 14 tablet 0     hydrocortisone (CORTEF) 10 MG tablet 10 mg AM and 5 mg afternoon 150 tablet 4     levothyroxine (SYNTHROID/LEVOTHROID) 137 MCG tablet Take 1 tablet (137 mcg) by mouth daily 90 tablet 4     magic mouthwash (ENTER INGREDIENTS IN COMMENTS) suspension Swish and spit 5-10 mLs in mouth every 6 hours as needed 30 ml of Benadryl (12.5 mg/5 ml), 60 ml Maalox, 30 ml Viscous Lidocaine 120 mL 0     psyllium (METAMUCIL/KONSYL) capsule Take 1 capsule by mouth 2 times daily 180 capsule 3     senna-docusate (SENOKOT-S/PERICOLACE) 8.6-50 MG tablet Take 1 tablet by mouth 2 times daily as needed for constipation 60 tablet 0     tamsulosin (FLOMAX) 0.4 MG capsule Take 0.4 mg by mouth daily       traZODone (DESYREL) 100 MG tablet Take 2 tablets (200 mg) by mouth At Bedtime 60 tablet 3     umeclidinium-vilanterol (ANORO ELLIPTA) 62.5-25 MCG/INH oral inhaler Inhale 1 puff into the lungs daily        Social History     Tobacco Use     Smoking status: Former Smoker     Packs/day: 0.50     Years: 5.00     Pack years: 2.50     Types: Cigarettes     Start date: 1976     Quit date: 2001     Years since quittin.3     Smokeless tobacco: Never Used   Substance Use Topics     Alcohol use: No     Drug use: No     Spends day: resting at home; sometimes goes for walk. Sometimes goes for exercise, walking down the staircase and practicing mj chi downstairs. He can do a lot of things on his own,  "bathroom, eating, etc.    Is Nazanin getting enough support at home to care for Travis? She notes again it is just the 2 of them.  Sometimes son and daughter help.   They continue to see Dr Julia Magana.     GENERAL  BP Readings from Last 1 Encounters:   03/18/22 136/72      Pulse Readings from Last 1 Encounters:   03/18/22 74      Resp Readings from Last 1 Encounters:   03/18/22 18      Temp Readings from Last 1 Encounters:   03/18/22 97.9  F (36.6  C)      SpO2 Readings from Last 1 Encounters:   03/18/22 100%      Wt Readings from Last 1 Encounters:   03/18/22 54.9 kg (121 lb)      Ht Readings from Last 1 Encounters:   12/06/21 1.676 m (5' 6\")     GENERAL si   There were no vitals taken for this visit.  BP Readings from Last 1 Encounters:   03/18/22 136/72      Pulse Readings from Last 1 Encounters:   03/18/22 74      Resp Readings from Last 1 Encounters:   03/18/22 18      Temp Readings from Last 1 Encounters:   03/18/22 97.9  F (36.6  C)      SpO2 Readings from Last 1 Encounters:   03/18/22 100%      Wt Readings from Last 1 Encounters:   03/18/22 54.9 kg (121 lb)      Ht Readings from Last 1 Encounters:   12/06/21 1.676 m (5' 6\")       CT NECK SOFT TISSUE W    Anatomical Region Laterality Modality   NECK -- Computed Tomography       Impression    1. Similar-appearing infrahyoid neck hyperenhancing lesions along the right supraclavicular region, right paratracheal region and anterior strap musculature. These correspond to FDG avid mass is noted on prior PET/CT 11/10/2021 and worrisome for metastatic disease. Overall appearance is similar to that of the PET-CT given differences in technique.   2. No definite new lesion identified.   3. Scattered tree-in-bud opacities and nodularity within the right upper lobe. Findings may represent infectious or inflammatory process versus focal miliary metastasis.   4. Postsurgical changes to the sphenoid sinuses related to transsphenoidal sellar mass resection. Partial " visualization of right parietal ventriculostomy catheter.       Please note that all CT scans at this facility use dose modulation, iterative reconstruction, and/or weight-based dosing when appropriate to reduce radiation dose to as low as reasonably achievable.     Dictated by Albert Garcia MD @ 5/5/2022 1:44:36 PM     (Electronically Signed)    Narrative    For Patients:  As a result of the 21st Century Cures Act, medical imaging exams and procedure reports are released immediately into your electronic medical record.  You may view this report before your referring provider.  If you have questions, please contact your health care provider.       INDICATION:   Papillary thyroid carcinoma follow-up. History of suprasellar mass status post resection.     TECHNIQUE:   CT of the neck soft tissues performed with IV contrast.   Contrast:  80 cc Omnipaque 350.     COMPARISON:   PET/CT 11/10/2021.     FINDINGS:   The nasopharynx, oropharynx and hypopharynx appear unremarkable. The supraglottic, glottic and infraglottic spaces are preserved.     The parotid and submandibular glands appear atrophic. The thyroid gland has been resected.     There is a hyperenhancing nodule within the right supraclavicular region just lateral to the right thyroidectomy bed, measuring 1.0 cm (series 2, image 78). Additional larger hyperenhancing lesion along the right thyroidectomy bed measuring up to 3.4 cm. Additional lesion along the right paratracheal space which measures up to 1.9 cm. There is a lesion along the anterior thyroid cartilage and within the strap musculature measuring up to 1.0 cm. These lesions correspond to FDG avid masses noted on the prior PET/CT, worrisome for metastatic disease. Overall appearance is similar to that of the PET-CT given differences in technique.     Partially visualized right parietal approach ventriculostomy catheter. Bilateral pseudophakia.     Stable postsurgical changes with opacification of the  sphenoid sinus related to transsphenoidal sellar mass resection.     Scattered tree-in-bud opacities with nodularity identified within the right upper lobe.     Mild spondylosis of the cervical spine.       Anatomical Region Laterality Modality   CHEST -- Computed Tomography   THORAX -- --   HEART -- --       Impression    1. 1.5 cm upper right paratracheal mass/lymph node and 3 cm mass/lymph node conglomerate along the dorsal margin of the medial right clavicle corresponding to FDG avid foci seen on 11/10/2021 PET-CT.   2. Patchy areas of bronchiolitis in the right lung, more extensive than on comparison PET-CT. Chronic collapse of the right middle lobe is unchanged.       Please note that all CT scans at this facility use dose modulation, iterative reconstruction, and/or weight-based dosing when appropriate to reduce radiation dose to as low as reasonably achievable.     Dictated by aHnk Henriquez MD @ 5/5/2022 3:41:17 PM     (Electronically Signed)    Narrative    For Patients:  As a result of the 21st Century Cures Act, medical imaging exams and procedure reports are released immediately into your electronic medical record.  You may view this report before your referring provider.  If you have questions, please contact your health care provider.       HISTORY:   Papillary thyroid carcinoma.     TECHNIQUE:   CT chest with IV contrast. 80 mL Omnipaque 350 IV.     COMPARISON:   PET-CT 11/10/2021. Chest CT 09/20/2018.     FINDINGS:   Lungs: Chronic collapse of the right middle lobe is unchanged. Several new areas of clustered centrilobular nodules and tree-in-bud opacities in the right upper and lower lobes. Previously present nodules are not significantly changed. Large calcified granuloma in the left upper lobe is unchanged. No pleural effusion or pneumothorax.     Mediastinum:1.5 x 1 cm mass/lymph node along the right side of the trachea in the upper mediastinum corresponding to FDG avid focus seen on CT. More  superiorly and anteriorly along the dorsal margin of the medial right clavicle is an approximately 3 x 1.5 cm soft tissue process corresponding to area of FDG uptake on PET-CT. Aorta aortic atherosclerosis. Coronary artery calcifications. No pericardial effusion.     Lymph nodes: Calcified mediastinal and subcarinal lymph nodes.     Musculoskeletal: Degenerative changes of the spine. No lytic or blastic bone lesions.     Upper abdomen: Left renal cyst. Cholecystectomy.

## 2022-06-07 DIAGNOSIS — H35.3221 EXUDATIVE AGE-RELATED MACULAR DEGENERATION OF LEFT EYE WITH ACTIVE CHOROIDAL NEOVASCULARIZATION (H): Primary | ICD-10-CM

## 2022-06-08 ENCOUNTER — OFFICE VISIT (OUTPATIENT)
Dept: OPHTHALMOLOGY | Facility: CLINIC | Age: 82
End: 2022-06-08
Attending: OPHTHALMOLOGY
Payer: COMMERCIAL

## 2022-06-08 DIAGNOSIS — H35.052 CHOROIDAL NEOVASCULAR MEMBRANE, LEFT: Primary | ICD-10-CM

## 2022-06-08 DIAGNOSIS — H35.3221 EXUDATIVE AGE-RELATED MACULAR DEGENERATION OF LEFT EYE WITH ACTIVE CHOROIDAL NEOVASCULARIZATION (H): ICD-10-CM

## 2022-06-08 PROCEDURE — 92134 CPTRZ OPH DX IMG PST SGM RTA: CPT | Mod: 26 | Performed by: OPHTHALMOLOGY

## 2022-06-08 PROCEDURE — 92134 CPTRZ OPH DX IMG PST SGM RTA: CPT | Performed by: OPHTHALMOLOGY

## 2022-06-08 PROCEDURE — 92012 INTRM OPH EXAM EST PATIENT: CPT | Mod: GC | Performed by: OPHTHALMOLOGY

## 2022-06-08 PROCEDURE — G0463 HOSPITAL OUTPT CLINIC VISIT: HCPCS | Mod: 25

## 2022-06-08 RX ORDER — LIDOCAINE HYDROCHLORIDE 20 MG/ML
0.5 INJECTION, SOLUTION EPIDURAL; INFILTRATION; INTRACAUDAL; PERINEURAL ONCE
Status: ACTIVE | OUTPATIENT
Start: 2022-06-08

## 2022-06-08 ASSESSMENT — TONOMETRY
IOP_METHOD: TONOPEN
OD_IOP_MMHG: 13
OS_IOP_MMHG: 14

## 2022-06-08 ASSESSMENT — CONF VISUAL FIELD
OD_NORMAL: 1
METHOD: COUNTING FINGERS
OS_NORMAL: 1

## 2022-06-08 ASSESSMENT — VISUAL ACUITY
METHOD: SNELLEN - LINEAR
OS_SC: 8'/200
OD_SC+: -2
OD_SC: 20/40

## 2022-06-08 ASSESSMENT — SLIT LAMP EXAM - LIDS
COMMENTS: NORMAL
COMMENTS: NORMAL

## 2022-06-08 ASSESSMENT — CUP TO DISC RATIO
OD_RATIO: 0.5
OS_RATIO: 0.65

## 2022-06-08 ASSESSMENT — EXTERNAL EXAM - LEFT EYE: OS_EXAM: NORMAL

## 2022-06-08 ASSESSMENT — EXTERNAL EXAM - RIGHT EYE: OD_EXAM: NORMAL

## 2022-06-08 NOTE — PROGRESS NOTES
Cc: follow up choroidal neovascular membrane left eye   Interval history: Vision stable. Denies floaters, flashing lights   HPI: reports far vision stable, difficulty with near vision, does not have reading glasses   Ok with resident injections. 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   - Weekly Amsler Grid use was discussed and training performed.   - A diet of leafy green vegetables was encouraged.   - Return precautions were given.   - weekly amsler grid, follow up in 6 months    # PVD OU   - RT/RD precautions    # S/p CEIOL both eyes with posterior capsular opacity right eye   -status post yag 8.26.2020 left eye s/p yag 12-2-20 right eye   - monitor     # Dry eye syndrome   artificial tears  As needed   Warm compresses     Return in 6 mo w DFE, OCT macula  .  Johnny Barron MD  Vitreoretinal Surgery Fellow  [facility]   ~~~~~~~~~~~~~~~~~~~~~~~~~~~~~~~~~~   Complete documentation of historical and exam elements from today's encounter can be found in the full encounter summary report (not reduplicated in this progress note).  I personally obtained the chief complaint(s) and history of present illness.  I confirmed and edited as necessary the review of systems, past medical/surgical history, family history, social history, and examination findings as  documented by others; and I examined the patient myself.  I personally reviewed the relevant tests, images, and reports as documented above.  I personally reviewed the ophthalmic test(s) associated with this encounter, agree with the interpretation(s) as documented by the resident/fellow, and have edited the corresponding report(s) as necessary.   I formulated and edited as necessary the assessment and plan and discussed the findings and management plan with the patient and family    Nadiya Allen MD   of Ophthalmology.  Retina Service   Department of Ophthalmology and Visual Neurosciences   [facility]  Phone: [phone]   Fax: [phone]

## 2022-06-08 NOTE — NURSING NOTE
Chief Complaints and History of Present Illnesses   Patient presents with     Macular Degeneration Follow Up     Chief Complaint(s) and History of Present Illness(es)     Macular Degeneration Follow Up     Laterality: both eyes    Quality: blurred vision    Frequency: constantly    Timing: throughout the day    Course: gradually worsening    Associated symptoms: tearing and itching.  Negative for eye pain and floaters    Pain scale: 0/10              Comments     Exam assisted with Cantonese phone  and . Pt is very hard of hearing so  is translating what she understands from phone  and speaking very loudly into patients right ear.  Pt expresses that VA is worse since last visit.  Wendy Benton, LUIZ COT 10:20 AM 06/08/2022

## 2022-06-28 ENCOUNTER — PATIENT OUTREACH (OUTPATIENT)
Dept: ENDOCRINOLOGY | Facility: CLINIC | Age: 82
End: 2022-06-28

## 2022-06-28 NOTE — LETTER
June 28, 2022          Travis Peres  [address] [id number]        Dear Travis Peres:    We recently reviewed your medical records from [facility] and found that you are due for an appointment with Dr. Corina Potts in November.  Our office has attempted to reach you via telephone and left a message.    At your earliest convenience, please call the clinic at [phone] to arrange the recommended exam and possible testing.  Please kindly mention this letter when calling so that your appointment(s) can be accurately scheduled.      Sincerely,       The Clinic Staff        Medical Record Number:  [id number]

## 2022-06-28 NOTE — PROGRESS NOTES
Attempted to reach patient to schedule follow up in the Endocrinology Clinic.  No answer and no VM (assistance Cantonese ) and Letter mailed.     Schedule with Dr. Corina Potts in Nov. OK to use cancer LALY slot per check out notes.

## 2022-09-26 ENCOUNTER — OFFICE VISIT (OUTPATIENT)
Dept: URGENT CARE | Facility: URGENT CARE | Age: 82
End: 2022-09-26
Payer: COMMERCIAL

## 2022-09-26 VITALS
TEMPERATURE: 97.9 F | WEIGHT: 117.2 LBS | HEART RATE: 99 BPM | BODY MASS INDEX: 18.92 KG/M2 | SYSTOLIC BLOOD PRESSURE: 154 MMHG | OXYGEN SATURATION: 96 % | DIASTOLIC BLOOD PRESSURE: 80 MMHG

## 2022-09-26 DIAGNOSIS — R07.0 THROAT PAIN: Primary | ICD-10-CM

## 2022-09-26 DIAGNOSIS — R05.9 COUGH: ICD-10-CM

## 2022-09-26 DIAGNOSIS — B00.1 COLD SORE: ICD-10-CM

## 2022-09-26 DIAGNOSIS — R35.0 URINARY FREQUENCY: ICD-10-CM

## 2022-09-26 LAB
ALBUMIN UR-MCNC: NEGATIVE MG/DL
APPEARANCE UR: CLEAR
BACTERIA #/AREA URNS HPF: ABNORMAL /HPF
BILIRUB UR QL STRIP: NEGATIVE
COLOR UR AUTO: YELLOW
DEPRECATED S PYO AG THROAT QL EIA: NEGATIVE
GLUCOSE UR STRIP-MCNC: NEGATIVE MG/DL
GROUP A STREP BY PCR: NOT DETECTED
HGB UR QL STRIP: ABNORMAL
KETONES UR STRIP-MCNC: NEGATIVE MG/DL
LEUKOCYTE ESTERASE UR QL STRIP: NEGATIVE
NITRATE UR QL: NEGATIVE
PH UR STRIP: 6 [PH] (ref 5–7)
RBC #/AREA URNS AUTO: ABNORMAL /HPF
SARS-COV-2 RNA RESP QL NAA+PROBE: NEGATIVE
SP GR UR STRIP: <=1.005 (ref 1–1.03)
UROBILINOGEN UR STRIP-ACNC: 0.2 E.U./DL
WBC #/AREA URNS AUTO: ABNORMAL /HPF

## 2022-09-26 PROCEDURE — U0005 INFEC AGEN DETEC AMPLI PROBE: HCPCS | Performed by: FAMILY MEDICINE

## 2022-09-26 PROCEDURE — 99214 OFFICE O/P EST MOD 30 MIN: CPT | Mod: CS | Performed by: FAMILY MEDICINE

## 2022-09-26 PROCEDURE — U0003 INFECTIOUS AGENT DETECTION BY NUCLEIC ACID (DNA OR RNA); SEVERE ACUTE RESPIRATORY SYNDROME CORONAVIRUS 2 (SARS-COV-2) (CORONAVIRUS DISEASE [COVID-19]), AMPLIFIED PROBE TECHNIQUE, MAKING USE OF HIGH THROUGHPUT TECHNOLOGIES AS DESCRIBED BY CMS-2020-01-R: HCPCS | Performed by: FAMILY MEDICINE

## 2022-09-26 PROCEDURE — 81001 URINALYSIS AUTO W/SCOPE: CPT | Performed by: FAMILY MEDICINE

## 2022-09-26 PROCEDURE — 87651 STREP A DNA AMP PROBE: CPT | Performed by: FAMILY MEDICINE

## 2022-09-26 RX ORDER — BENZONATATE 100 MG/1
100 CAPSULE ORAL 3 TIMES DAILY PRN
Qty: 21 CAPSULE | Refills: 0 | Status: SHIPPED | OUTPATIENT
Start: 2022-09-26 | End: 2022-10-03

## 2022-09-26 RX ORDER — ACYCLOVIR 400 MG/1
400 TABLET ORAL EVERY 8 HOURS
Qty: 15 TABLET | Refills: 0 | Status: SHIPPED | OUTPATIENT
Start: 2022-09-26 | End: 2023-08-09

## 2022-09-26 NOTE — PROGRESS NOTES
SUBJECTIVE: Travis Peres is a 81 year old male presenting with a chief complaint of sores on lips with a st and cough for a few days.  Also has had urinary frequency.     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         Fenofibrate Hives     Lisinopril Cough     Losartan Cough     Niacin      Simvastatin Cramps     Social History     Tobacco Use     Smoking status: Former Smoker     Packs/day: 0.50     Years: 5.00     Pack years: 2.50     Types: Cigarettes     Start date: 1976     Quit date: 2001     Years since quittin.6     Smokeless tobacco: Never Used   Substance Use Topics     Alcohol use: No       ROS:  SKIN: no rash  GI: no vomiting    OBJECTIVE:  BP (!) 154/80 (BP Location: Right arm, Patient Position: Sitting, Cuff Size: Adult Regular)   Pulse 99   Temp 97.9  F (36.6  C) (Oral)   Wt 53.2 kg (117 lb 3.2 oz)   SpO2 96%   BMI 18.92 kg/m  GENERAL APPEARANCE: healthy, alert and no distress  EYES: EOMI,  PERRL, conjunctiva clear  HENT: ear canals and TM's normal.  Nose and mouth without ulcers, erythema or lesions  RESP: lungs clear to auscultation - no rales, rhonchi or wheezes  ABDOMEN:  soft, nontender, no HSM or masses and bowel sounds normal  NEURO:  Normal strength and tone, sensory exam grossly normal,  normal speech and mentation  SKIN: cold sores upper lip      ICD-10-CM    1. Throat pain  R07.0 Streptococcus A Rapid Screen w/Reflex to PCR - Clinic Collect     UA macro with reflex to Microscopic and Culture - Clinc Collect     Group A Streptococcus PCR Throat Swab     Urine Microscopic   2. Urinary frequency  R35.0 Adult Urology  Referral   3. Cold sore  B00.1 acyclovir (ZOVIRAX) 400 MG tablet   4. Cough  R05.9 benzonatate (TESSALON) 100 MG capsule     Symptomatic; Yes; 9/23/2022 COVID-19 Virus (Coronavirus) by PCR       Fluids/Rest, f/u if worse/not any better

## 2022-11-07 ENCOUNTER — VIRTUAL VISIT (OUTPATIENT)
Dept: ENDOCRINOLOGY | Facility: CLINIC | Age: 82
End: 2022-11-07
Payer: COMMERCIAL

## 2022-11-07 DIAGNOSIS — C73 PAPILLARY THYROID CARCINOMA (H): ICD-10-CM

## 2022-11-07 DIAGNOSIS — M81.0 AGE-RELATED OSTEOPOROSIS WITHOUT CURRENT PATHOLOGICAL FRACTURE: ICD-10-CM

## 2022-11-07 DIAGNOSIS — D35.3 BENIGN NEOPLASM OF PITUITARY GLAND AND CRANIOPHARYNGEAL DUCT (POUCH) (H): ICD-10-CM

## 2022-11-07 DIAGNOSIS — E89.0 POSTSURGICAL HYPOTHYROIDISM: Primary | ICD-10-CM

## 2022-11-07 DIAGNOSIS — D35.2 BENIGN NEOPLASM OF PITUITARY GLAND AND CRANIOPHARYNGEAL DUCT (POUCH) (H): ICD-10-CM

## 2022-11-07 DIAGNOSIS — E23.0 HYPOPITUITARISM (H): ICD-10-CM

## 2022-11-07 PROCEDURE — 99215 OFFICE O/P EST HI 40 MIN: CPT | Mod: 95

## 2022-11-07 RX ORDER — LEVOTHYROXINE SODIUM 125 UG/1
125 TABLET ORAL DAILY
Qty: 90 TABLET | Refills: 4 | Status: SHIPPED | OUTPATIENT
Start: 2022-11-07

## 2022-11-07 NOTE — PATIENT INSTRUCTIONS
Reduce levothyroxine to 125 mcg/day  Labs in February 2023    Next bone density August 2023  MRI pituitary in August 2023

## 2022-11-07 NOTE — PROGRESS NOTES
Travis is a 82 year old who is being evaluated via a billable telephone visit.      What phone number would you like to be contacted at? [phone]  How would you like to obtain your AVS? Mail a copy   Venessa Russell    Endocrinology telephone visit  note.     Postsurgical hypothyroidism.  On LT4 137 mcg/day (2.57 mcg/kg/day using weight 117).  Reduce to 125 mcg/day  due to thyroidectomy plus hypopituitarism.   TSH should be kept low to avoid stimulating the thyroid cancer.  Since he has hypopituitarism the TSH may not rise even if he is systemically hypothyroid.     Reduce levothyroxine to 125 mc/gday  Repeat labs February 2023    Hypopituitarism (H)   Hypopituitary with hypogonadotropic hypogonadism, probable secondary hypothyroidism, and secondary adrenal insufficiency, probable GH deficiency.   On LT4 and HC only.    Usually we would treat to high normal free T4 target.     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 The tumor has been significantly de bulked and He has completed XRT for  persistent tumor-. Tumor mass is stable on  MRI through 11/7/2020.    Continue periodic imaging of the sella, treat the hypopituitarism medically.   Pituitary MRI  8/2023 and see me afterwards    Osteoporosis on 8/20221 DXA ; Bone loss on alendronate since 2012. Alendronate stopped 5/2022.    Next DXA  8/2023 and see me afterwards    Papillary thyroid carcinoma  4.8 cm right, bilateral node positive. He has been treated with neck operation x 3 (total thyroidectomy, right lateral neck dissection;  Resection retrosternal mediastinal masses) 131I x 2 (cummulative dose 346.4 mCi) His last 131I TBS (2008) post therapy scan raised question of ? lung mets and also ? met above left kidney. Progressive cervical adenopathy has been treated in the past with ETOH and has been progressive since then.   .     We have biopsy proven positive LN involvement in the neck (FNAB  2018 and again 11/2021)  which we have not treated so far.   I have long suspected he has lung mets but we haven't clearly proven this.    Progression by Tg but not by imaging   He now mainly following with [facility] Oncology Dr Bueno for this.     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  He has already had 3 neck operations and ETOh to cervical adenopathy.  The 2 LNs likely correlate to the high FDG regions on the PET.   It is possible these are the same LNs that were treated with ETOH in the past (vs others in the same vicinity - it is hard to say)  Not discussed today    Deafness, Language barrier - both of these , combined with? Dementia, are barriers to his optimal care.       Due to the COVID 19 pandemic this visit was a telephone visit. The patient gave verbal consent for the visit today.    I have independently reviewed and interpreted labs, imaging as indicated.     Distant Location (provider location):  Off-site  Mode of Communication:  Video Conference via Momail  Chart review/prep time 1  [id number]  Visit Start time doximity 1632 call to  services.  Claribel 3610.  Call to patient 1634:   Visit Stop time  1654   _54_ minutes spent on the date of the encounter doing chart review, history and exam, documentation and further activities as noted above.  E & M [id number]    Corina Potts MD      Cc/ HPI: Mr Peres presents today along with his wife Nazanin at his side ( he is on the phone with her this time).  He was last seen by me 5/2022. We discontinued alendronate that day.  Since then, he was also seen by [facility] medical oncologist Dr Yahir Bueno who has taken over on primarily following for the thyroid cancer.  She is ordering frequent CT with contrast, last performed at [facility] 8/4/22.  She is also ordering Tg tumor markers.  Her 8/8/2022 note states that [facility] one liquid biopsy has identified  potential future targets.  I am unable to find the results of this study.   I am again reminded of last discussion with his  PCP Dr Alvarez that he has elected for  palliative approach in conversations there .  I can see this is also documented on the record on the 2/15/2022 clinic note.     Nazanin did all the talking again  today, though I may have heard Mr Peres make a few utterances when she interacted with him.     He has a history of multiple complicated endocrine issues, including thyroid cancer, surgical hypothyroidism, osteoporosis, pituitary macroadenoma with partial hypopituitarism and history of DI.   See my 4/9/18 note for his detailed history.     We know he has persistent/recurrent/ progressive thyroid cancer in the neck, biopsy proven 4/18 and again on 11/2021.     Papillary thyroid cancer 4.8 cm left, bilateral node positive (MACIS 6.88 minimum, pT3, pN1a (8), pMx, Stage III or IV). His course has included several operations and several 131I treatments  11/27/07 thyroidectomy  153.4 mCi 131I in 6/08. The radioiodine  was complicated by acute sialadenitis.   12/08  193 mCi 131I (with Thyrogen stimulation). post therapy scan activity in the thyroid bed, lung base on the right and also superior left kidney region   11/3/09  right selective neck dissection levels 2, 3 and 4, removing many positive LNs.   4/16/14 FNAB of right level 6 and 7 cervical lymph nodes-  positive for papillary thyroid cancer. Needle wash Tg  high on both samples. He had  hoarseness within one hour after the FNAB procedure. He was treated with cymetra on 5/12/14 and he restarted thickened liquids.    6/3/14  ETOH ablation procedure of the  2 LNs    1/31/17  trasnscervical resection of retrosternal mediastinal lesions.  A cystic lesion was removed and drained.  Surgical path  benign thymic tissue.  FNAB 4/23/18: + PTC, needle wash Tg 2082 ng/ml .    FNAB 11/12/2021 [facility] [id number] LN right  Supraclavicular fossa  FNAB: _  papillary thyroid Ca.      2.  Osteoporosis.   Alendronate [id number]. Now on drug holiday .     DXA 8/4/2021 showed osteoporosis , lowest T-score -2.6 and bone loss right hip compared with 2019.  Next DXA 8/4/2023 or therefater.      Labs   4/9/18: Tg 18.2, FRANK < 0.4; TSH  ,0.01, free T4 1.52, Na 139, K 4.4;   4/23/18 FNAB right level 6 and right level 7 LN - + PTC  6/20/18: Tg 12.4, FRANK < 0.4, TSH < 0.01, free T4 1.6 -   11/27/18: Tg 16.5, FRANK  0.4, TSH < 0.01, free T4 1.46  4/22/19: Tg 22, FRANK < 0.4, TSH < 0.01, free T4 1.51, Ca 8.4, albumin 3.3, phos 3.1, creatinine 1.03, glucose 122, vitamin D 22  7/23/19: Tg 32.1, FRANK < 0.4, TSH < 0.01   11/4/2020 TSH 5.78, free T4 0.8  8/2/2021 Tg 62, FRANK < 0.4, TSH < 0.01, free T4 1.68   10/18/21 free T4 1.45  11/4/2021 [facility] Tg 109 ng/dl , FRANK < 1, TSH not measured.  Na 141 , K 3.5, glucose 123, Ca 8.4, creatinine 0.85  12/6/2021 Tg 80, TSH <0.01, free T4 1.63, HgbA1c 5, SPEP polyclonal increase in gamma - no monoclonal  1/11/2022 Hgb 12.3, WBC 5500, platelets 166K  3/18/2022 Covid pcr negative   5/5/2022 [facility] Tg 65.9, FRANK < 1  8/4/22 [facility] Tg 91.4, RFANK < 1    Images:   4/12/18  PET/CT .  Right low neck /superior mediastinal high FDG lateral and more midline. The paratracheal mass is somewhat posterior and below the level of the clavicle (read as 1.4 cm, larger than before, SUB 68), but above the sternum  Tiny focus of fdg left lung \  8/29/18 CT chest: previously spiculted 1.7 x 1.5 cm nodule now 7 x 5 mm , ? Atelectasis.  Scattered upper lobe reticulonodular opacities as seen on prior CT, several more conspicuous, up to 4 mm, ? Infection .  To my eye I also note right level 7 neck mass 1.6 x 2 with some airway mass effect (this was 1.4 x 1.5 on 9/17 CT). This is also likely  the right level 7 mass we have bene following on US.    10/24/18 MRI brain residual mass 1.4 x 0.6 x 0.9 cm, extending into left cavernous sinus.  Stalk deviated to the left, thickened to 5 mm;  optic chiasm atrophic  10/15/19 neck US compared with 4/25/19 , 11/27/18,  6/20/18 and  4/16/18:   Right level 6 thyroid bed 1.6 x 1.1 x 1.1 (was   1.5 x 1.1 x 1.2 ;  1.2 x 0.9 x 1.2; 1.2 x 01 x 1.1 ;  1.3 x 0.95 x 1.2 cm - suspicious; 1.2 x 0.93 x 0.9 ;  FNAB 4/23/18: + PTC, needle wash Tg 2082 ng/ml  Right level 7 # 1 1.7 x 1.6 x  1.7 cm (was  2 x 1.9 x 2.1 ;  1.8 x 1.5 x 1.8 ; 1.8 x 1.6 x 1.8 cm ; 1.4 x 1.5 x 1.5 cm; 1.1 x 1.2 x 1 -FNAB 4/23/18 + PTC, needle wash Tg 430 ng/ml  Left level 4  0.5 x 0.4 x 0.9 cm  (was 0.5 x 0.4 x 1;  0.4 x 0.6 x 0.9  0.3 x 0.6 x 1.1 ;  0.3 x 0.8 x 1 cm ; 0.6 x 0.3 x 0.8 )  Left level 4 # 2 0.3 x  0.5 x 0.4 cm  (was 0.4 x 0.4 x 0.6;  0.4 x0.6 x 0.6 ; 0.5 x 0.7 x 0.7 ; 0.8 x 0.5 x 0.6 cm;  0.5 x 0.5 x 1 )  Left level 4 # 3 0.4 x0.6 x 0.4 - ? new  4/25/19 DXA: lowest T-score -2.3 right femoral neck; no change in BMD compared with 2018.   4/25/19 chest CT: increase in centrilobular nodules posterior RUL-- I had discussed this with Dr Paz Magana after our last appt; today I have reviewed the images -right level 7 mass indents trachea slightly.    10/15/19 chest CT: no significant change   10/13/2020 CT Chest -right neck mass 1.4 x 1.8 cm ; new 4 mm lung nodule posterior RUL   8/4/2/2021 DXA lowest T-score -2.6 right femoral neck ; significant bone loss right total hip  8/4/2021 neck US 8/4/2021 :  Compared with 10/15/19, 4/25/19 , 11/27/18,  6/20/18 and  4/16/18:   Right level 7 # 1 3.1 x 1.4 x 1.8 cm - now showing clear confluence of past right level 6 1.6 x 1.1 x 1.1 and right level 7 #1  1.7 x 1.6 x  1.7 cm- both are known PTC on past FNAB from 4/23/2018  Right level 7 # 2 0.9 x 0.8 x 1  Right level 7 # 3 0.7 x 0.4 x 0.8 cm   Left level 4   0.3 x 0.5 x 0.8 cm  was  0.5 x 0.4 x 0.9 cm ; 0.5 x 0.4 x 1;  0.4 x 0.6 x 0.9  0.3 x 0.6 x 1.1 ;  0.3 x 0.8 x 1 cm ; 0.6 x 0.3 x 0.8 )  Left level 4 # 2 0.6 x 0.5 x 0.7 cm was 0.3 x  0.5 x 0.4 cm;  0.4 x 0.4 x 0.6;  0.4 x0.6 x 0.6 ; 0.5 x 0.7 x  0.7 ; 0.8 x 0.5 x 0.6 cm;  0.5 x 0.5 x 1 )  Left level 4 # 3 0.5 x 0.5 x 0.7 cm  was 0.4 x0.6 x 0.4 -  9/28/2021 CT chest without contrast:  Right lung tree in bud opacities with multiple new associated solid nodules up to 6 mm (? Atypical infection).  Previously described left lung nodules resolved.    11/10/2021 outside PET  compared with 8/4/2021 neck US :  High FDG activity low neck /thyroid bed region midline (we don't have good US images /labeling of this region , I am not sure of the US correlate for this), right lateral (clavicle level; corresponds to right level 7 # 1 3.1 x 1.4 x 1.8 cm  ) and right posterior thyroid bed (corresponds to right level 7 # 2  0.9 x 0.8 x 1  12/2/2021 Modified barium swallow - silent aspiration with thin, slightly thick and mildly thickened liquids.   Presbyesophagus.       ROS: (per his wife)  Current weight is 120  Eating up and down - sometimes no appetite  Sleep at night not good ; can't fall asleep;   No recent falling  GI: no longer has constipation   No pain  Wife takes care of him all day-- she is trying to be sure he doesn't fall   Still understands more when she writes; he also writes answers  He doesn't talk much --   No headaches  Getting CT and labs at [facility] soon   Getting better physically--     Past Medical History:   Diagnosis Date     Arthritis      BPH (benign prostatic hyperplasia)      COPD (chronic obstructive pulmonary disease) (H)      Depression      Hyperlipidemia      Hypertension     no current meds     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     Hypovitaminosis D      Multiple pulmonary nodules 2009     Osteopenia 2011     Papillary thyroid carcinoma (H) 2007    4.8 cm, right, node positive;      Pituitary macroadenoma with extrasellar extension (H) 2007    causing obstructive hydrocephalus     Post-surgical hypothyroidism 2007     Uncomplicated asthma      Vocal cord paralysis 2014    right     Xerostomia 2010    due to radiation exposures      Past Surgical History:   Procedure Laterality Date     cymetra injection       ESOPHAGOSCOPY, GASTROSCOPY, DUODENOSCOPY (EGD), COMBINED  6/20/2013    Procedure: COMBINED ESOPHAGOSCOPY, GASTROSCOPY, DUODENOSCOPY (EGD), BIOPSY SINGLE OR MULTIPLE;  gastroscopy;  Surgeon: Leslie Guadarrama MD;  Location:  GI     ESOPHAGOSCOPY, GASTROSCOPY, DUODENOSCOPY (EGD), COMBINED N/A 9/20/2019    Procedure: ESOPHAGOGASTRODUODENOSCOPY (EGD);  Surgeon: Gilberto Gibson DO;  Location:  GI     ESOPHAGOSCOPY, GASTROSCOPY, DUODENOSCOPY (EGD), COMBINED N/A 10/16/2020    Procedure: ESOPHAGOGASTRODUODENOSCOPY, WITH BIOPSY;  Surgeon: Bartolome Granda MD;  Location:  GI     HERNIA REPAIR Right      lipoma resection chest wall Right 11/09     PHACOEMULSIFICATION CLEAR CORNEA WITH STANDARD INTRAOCULAR LENS IMPLANT Left 5/7/2018    Procedure: PHACOEMULSIFICATION CLEAR CORNEA WITH STANDARD INTRAOCULAR LENS IMPLANT;  LEFT PHACOEMULSIFICATION CLEAR CORNEA WITH STANDARD INTRAOCULAR LENS IMPLANT ;  Surgeon: Nadiya Allen MD;  Location:  [facility]     PHACOEMULSIFICATION CLEAR CORNEA WITH STANDARD INTRAOCULAR LENS IMPLANT Right 8/21/2018    Procedure: PHACOEMULSIFICATION CLEAR CORNEA WITH STANDARD INTRAOCULAR LENS IMPLANT;  RIGHT EYE PHACOEMULSIFICATION CLEAR CORNEA WITH STANDARD INTRAOCULAR LENS IMPLANT;  Surgeon: Nadiya Allen MD;  Location:  [facility]     right selective neck dissection level 2, 3, 4  11/3/09     right  shunt placement  6/28/07     THORACIC SURGERY  Fidencio 3 2017     THYMECTOMY N/A 1/3/2017    Procedure: THYMECTOMY;  Surgeon: Ernesto Armstrong MD;  Location: [facility] OR     THYROIDECTOMY  11/27/07     TRANSCERVICAL EXTENDED MEDIASTINAL LYMPHADENECTOMY N/A 1/3/2017    Procedure: TRANSCERVICAL EXTENDED MEDIASTINAL LYMPHADENECTOMY;  Surgeon: Ernesto Armstrong MD;  Location: [facility] OR     transnasal endoscopic resection of pituitary adenoma  8/13/2007     Current Outpatient Medications   Medication  Sig Dispense Refill     acyclovir (ZOVIRAX) 400 MG tablet Take 1 tablet (400 mg) by mouth every 8 hours for 5 days 15 tablet 0     albuterol (PROAIR HFA) 108 (90 Base) MCG/ACT inhaler Inhale 2 puffs into the lungs every 4 hours as needed for shortness of breath / dyspnea or wheezing 3 Inhaler 11     amLODIPine (NORVASC) 2.5 MG tablet TAKE 1 TABLET(2.5 MG) BY MOUTH DAILY 90 tablet 0     docusate sodium (COLACE) 100 MG capsule Take 100 mg by mouth 2 times daily as needed for constipation       guaiFENesin (MUCINEX) 600 MG 12 hr tablet Take 2 tablets (1,200 mg) by mouth 2 times daily 60 tablet 3     guaiFENesin (MUCINEX) 600 MG 12 hr tablet Take 1 tablet (600 mg) by mouth 2 times daily 14 tablet 0     hydrocortisone (CORTEF) 10 MG tablet 10 mg AM and 5 mg afternoon 150 tablet 4     levothyroxine (SYNTHROID/LEVOTHROID) 137 MCG tablet Take 1 tablet (137 mcg) by mouth daily 90 tablet 4     magic mouthwash (ENTER INGREDIENTS IN COMMENTS) suspension Swish and spit 5-10 mLs in mouth every 6 hours as needed 30 ml of Benadryl (12.5 mg/5 ml), 60 ml Maalox, 30 ml Viscous Lidocaine 120 mL 0     senna-docusate (SENOKOT-S/PERICOLACE) 8.6-50 MG tablet Take 1 tablet by mouth 2 times daily as needed for constipation 60 tablet 0     tamsulosin (FLOMAX) 0.4 MG capsule Take 0.4 mg by mouth daily       traZODone (DESYREL) 100 MG tablet Take 2 tablets (200 mg) by mouth At Bedtime 60 tablet 3     umeclidinium-vilanterol (ANORO ELLIPTA) 62.5-25 MCG/INH oral inhaler Inhale 1 puff into the lungs daily        Social History     Tobacco Use     Smoking status: Former     Packs/day: 0.50     Years: 5.00     Pack years: 2.50     Types: Cigarettes     Start date: 1976     Quit date: 2001     Years since quittin.7     Smokeless tobacco: Never   Substance Use Topics     Alcohol use: No     Drug use: No     Walking every [place] 30 minutes in the mall ;  He is not swimming due to covid concerns; he does weight lifting and running machine and  "playing basketball (I repeatedly askd this question to clarify this unbelievable history       GENERAL  BP Readings from Last 1 Encounters:   09/26/22 (!) 154/80      Pulse Readings from Last 1 Encounters:   09/26/22 99      Resp Readings from Last 1 Encounters:   03/18/22 18      Temp Readings from Last 1 Encounters:   09/26/22 97.9  F (36.6  C) (Oral)      SpO2 Readings from Last 1 Encounters:   09/26/22 96%      Wt Readings from Last 1 Encounters:   09/26/22 53.2 kg (117 lb 3.2 oz)      Ht Readings from Last 1 Encounters:   12/06/21 1.676 m (5' 6\")     GENERAL si   There were no vitals taken for this visit.  BP Readings from Last 1 Encounters:   09/26/22 (!) 154/80      Pulse Readings from Last 1 Encounters:   09/26/22 99      Resp Readings from Last 1 Encounters:   03/18/22 18      Temp Readings from Last 1 Encounters:   09/26/22 97.9  F (36.6  C) (Oral)      SpO2 Readings from Last 1 Encounters:   09/26/22 96%      Wt Readings from Last 1 Encounters:   09/26/22 53.2 kg (117 lb 3.2 oz)      Ht Readings from Last 1 Encounters:   12/06/21 1.676 m (5' 6\")         Mekhi Gay MD - 08/04/2022   Indication:   Papillary thyroid carcinoma.   Technique:   CT of the chest. Coronal/sagittal reconstruction images. 80 cc of Omnipaque 350 IV.   Comparison:   CT of the chest, 05/05/2022. PET-CT, 11/10/2021.   Findings:   The prior CT scan from 05/05/2022 demonstrated masses present at stations 1R/2R. No significant interval change on the current examination. The largest lesion is seen at 1R on image 19, series 2, which measures 14 millimeters in dimension. Degenerative noncalcific plaque in the thoracic aortic arch. There is no pleural or pericardial effusion. Nonenlarged lymph nodes at stations 4R, 4 L common 7. No station 10R 10 L lymphadenopathy. No central pulmonary emboli. No evidence for an acute aortic syndrome. There is collapse of the right middle lobe, which is a stable finding from 05/05/2022. The lung " windows demonstrate centrilobular pulmonary micro nodules, which spared the pleural surfaces. This is well seen in the right upper lobe, posterior segment, image 43, series 2. Similar findings were present on the study from 05/05/2022. No resorptive atelectasis or endobronchial mass is seen. Calcified granuloma in the left upper lobe, stable from previous, which measures 12 millimeters in   dimension. There is no pneumothorax.   Evaluation the upper abdomen demonstrates a benign left renal cyst. There is an adjacent, exophytic, 8 millimeter lesion on image 119, which is unchanged. There is no upper abdominal lymphadenopathy or ascites. Gallbladder appears surgically absent. Spleen size is normal. No pancreatic glandular atrophy.   The bone windows demonstrate degenerative disc disease. No suspicious bone lesions are seen. Vertebral body heights are maintained on sagittal reconstruction images.   Impression:   1. Masses at stations 1R/2R appear stable from 05/05/2022.   2. There is no station 4R/4L, 7, or 10R/10L lymphadenopathy.   3. Centrilobular pulmonary micro nodules, which spare the pleural surfaces, similar to previous. Differential diagnosis favors either infectious or noninfectious bronchiolitis. If not already obtained, consider correlation with sputum culture.    Ed Cornejo MD - 08/04/2022     INDICATION:   Papillary thyroid carcinoma.  TECHNIQUE:   CT images acquired through the neck following intravenous contrast.  COMPARISON:   CT neck 05/05/2022, PET-CT 11/10/2021.   FINDINGS:   Postsurgical changes of thyroidectomy. Hyperenhancing 1.2 cm nodule within the right supraclavicular region anterior to the right sternocleidomastoid muscle is not significantly changed (series 2, image 76). Stable elongated right paratracheal lesion measuring 2.1 cm (series 2, image 83). Stable enhancing lesion within the left parasagittal strap musculature measuring 1.2 cm (series 2, image 78). No new enhancing  nodules or lymphadenopathy.   The nasopharynx, oropharynx, hypopharynx, and larynx are widely patent without enhancing lesions.   No enhancing lesions in the oral cavity floor of mouth.   Fatty infiltration of the parotid glands. The submandibular glands are unremarkable.   Mild atherosclerotic calcifications at the left carotid bifurcation.   Stable right occipital approach shunt catheter extending into the left frontal horn. Stable ventricular system. No concerning pathologic intracranial enhancement.   Postsurgical changes of transsphenoidal approach sellar mass resection, including nasal cavity exenteration. Persistent opacification of the right sphenoid sinus. The mastoid air cells are clear.   Multilevel cervical spondylosis. No aggressive osseous lesions.   Scattered tree-in-bud opacities and small nodules within the upper lobe of the right lung, better characterized on concurrent chest CT.   IMPRESSION:   1. Enhancing lesions within the right supraclavicular region, left strap muscles, and right paratracheal region are not significantly changed compared to 05/05/2022 and most compatible with metastases. No enlarging or new enhancing lesions.   2. Postsurgical changes of thyroidectomy.   3. Scattered tree-in-bud opacities and small nodules within the upper lobe of the right lung, better characterized on concurrent chest CT.

## 2022-11-07 NOTE — LETTER
11/7/2022       RE: Travis Peres  [address]  [facility] [address]     Dear Colleague,    Thank you for referring your patient, Travis Peres, to the [facility] ENDOCRINOLOGY CLINIC [place] at [facility]. Please see a copy of my visit note below.    Travis is a 82 year old who is being evaluated via a billable telephone visit.      What phone number would you like to be contacted at? [phone]  How would you like to obtain your AVS? Mail a copy   Venessa Russell    Endocrinology telephone visit  note.     Postsurgical hypothyroidism.  On LT4 137 mcg/day (2.57 mcg/kg/day using weight 117).  Reduce to 125 mcg/day  due to thyroidectomy plus hypopituitarism.   TSH should be kept low to avoid stimulating the thyroid cancer.  Since he has hypopituitarism the TSH may not rise even if he is systemically hypothyroid.     Reduce levothyroxine to 125 mc/gday  Repeat labs February 2023    Hypopituitarism (H)   Hypopituitary with hypogonadotropic hypogonadism, probable secondary hypothyroidism, and secondary adrenal insufficiency, probable GH deficiency.   On LT4 and HC only.    Usually we would treat to high normal free T4 target.     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 The tumor has been significantly de bulked and He has completed XRT for  persistent tumor-. Tumor mass is stable on  MRI through 11/7/2020.    Continue periodic imaging of the sella, treat the hypopituitarism medically.   Pituitary MRI  8/2023 and see me afterwards    Osteoporosis on 8/20221 DXA ; Bone loss on alendronate since 2012. Alendronate stopped 5/2022.    Next DXA  8/2023 and see me afterwards    Papillary thyroid carcinoma  4.8 cm right, bilateral node positive. He has been treated with neck operation x 3 (total thyroidectomy, right lateral neck dissection;  Resection retrosternal mediastinal masses) 131I x 2  (cummulative dose 346.4 mCi) His last 131I TBS (2008) post therapy scan raised question of ? lung mets and also ? met above left kidney. Progressive cervical adenopathy has been treated in the past with ETOH and has been progressive since then.   .     We have biopsy proven positive LN involvement in the neck (FNAB 2018 and again 11/2021)  which we have not treated so far.   I have long suspected he has lung mets but we haven't clearly proven this.    Progression by Tg but not by imaging   He now mainly following with [facility] Dr Bueno for this.     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  He has already had 3 neck operations and ETOh to cervical adenopathy.  The 2 LNs likely correlate to the high FDG regions on the PET.   It is possible these are the same LNs that were treated with ETOH in the past (vs others in the same vicinity - it is hard to say)  Not discussed today    Deafness, Language barrier - both of these , combined with? Dementia, are barriers to his optimal care.       Due to the COVID 19 pandemic this visit was a telephone visit. The patient gave verbal consent for the visit today.    I have independently reviewed and interpreted labs, imaging as indicated.     Distant Location (provider location):  Off-site  Mode of Communication:  Video Conference via 8bit  Chart review/prep time 1  [id number]  Visit Start time doximity 1632 call to  services.  Claribel 3610.  Call to patient 1634:   Visit Stop time  1654   _54_ minutes spent on the date of the encounter doing chart review, history and exam, documentation and further activities as noted above.  E & M [id number]    Corina Potts MD      Cc/ HPI: Mr Peres presents today along with his wife Nazanin at his side ( he is on the phone with her this time).  He was last seen by me 5/2022. We discontinued alendronate that day.  Since then, he was also  seen by [facility] medical oncologist Dr Yahir Bueno who has taken over on primarily following for the thyroid cancer.  She is ordering frequent CT with contrast, last performed at [facility] 8/4/22.  She is also ordering Tg tumor markers.  Her 8/8/2022 note states that [facility] one liquid biopsy has identified potential future targets.  I am unable to find the results of this study.   I am again reminded of last discussion with his  PCP Dr Alvarez that he has elected for  palliative approach in conversations there .  I can see this is also documented on the record on the 2/15/2022 clinic note.     Nazanin did all the talking again  today, though I may have heard Mr Peres make a few utterances when she interacted with him.     He has a history of multiple complicated endocrine issues, including thyroid cancer, surgical hypothyroidism, osteoporosis, pituitary macroadenoma with partial hypopituitarism and history of DI.   See my 4/9/18 note for his detailed history.     We know he has persistent/recurrent/ progressive thyroid cancer in the neck, biopsy proven 4/18 and again on 11/2021.     Papillary thyroid cancer 4.8 cm left, bilateral node positive (MACIS 6.88 minimum, pT3, pN1a (8), pMx, Stage III or IV). His course has included several operations and several 131I treatments  11/27/07 thyroidectomy  153.4 mCi 131I in 6/08. The radioiodine  was complicated by acute sialadenitis.   12/08  193 mCi 131I (with Thyrogen stimulation). post therapy scan activity in the thyroid bed, lung base on the right and also superior left kidney region   11/3/09  right selective neck dissection levels 2, 3 and 4, removing many positive LNs.   4/16/14 FNAB of right level 6 and 7 cervical lymph nodes-  positive for papillary thyroid cancer. Needle wash Tg  high on both samples. He had  hoarseness within one hour after the FNAB procedure. He was treated with cymetra on 5/12/14 and he restarted thickened liquids.    6/3/14  ETOH  ablation procedure of the  2 LNs    1/31/17  trasnscervical resection of retrosternal mediastinal lesions.  A cystic lesion was removed and drained.  Surgical path  benign thymic tissue.  FNAB 4/23/18: + PTC, needle wash Tg 2082 ng/ml .    FNAB 11/12/2021 [facility] [id number] LN right  Supraclavicular fossa  FNAB: _ papillary thyroid Ca.      2.  Osteoporosis.   Alendronate [id number]. Now on drug holiday .     DXA 8/4/2021 showed osteoporosis , lowest T-score -2.6 and bone loss right hip compared with 2019.  Next DXA 8/4/2023 or therefater.      Labs   4/9/18: Tg 18.2, FRANK < 0.4; TSH  ,0.01, free T4 1.52, Na 139, K 4.4;   4/23/18 FNAB right level 6 and right level 7 LN - + PTC  6/20/18: Tg 12.4, FRANK < 0.4, TSH < 0.01, free T4 1.6 -   11/27/18: Tg 16.5, FRANK  0.4, TSH < 0.01, free T4 1.46  4/22/19: Tg 22, FRANK < 0.4, TSH < 0.01, free T4 1.51, Ca 8.4, albumin 3.3, phos 3.1, creatinine 1.03, glucose 122, vitamin D 22  7/23/19: Tg 32.1, FRANK < 0.4, TSH < 0.01   11/4/2020 TSH 5.78, free T4 0.8  8/2/2021 Tg 62, FRANK < 0.4, TSH < 0.01, free T4 1.68   10/18/21 free T4 1.45  11/4/2021 [facility] Tg 109 ng/dl , FRANK < 1, TSH not measured.  Na 141 , K 3.5, glucose 123, Ca 8.4, creatinine 0.85  12/6/2021 Tg 80, TSH <0.01, free T4 1.63, HgbA1c 5, SPEP polyclonal increase in gamma - no monoclonal  1/11/2022 Hgb 12.3, WBC 5500, platelets 166K  3/18/2022 Covid pcr negative   5/5/2022 [facility] Tg 65.9, FRANK < 1  8/4/22 [facility] Tg 91.4, FRANK < 1    Images:   4/12/18  PET/CT .  Right low neck /superior mediastinal high FDG lateral and more midline. The paratracheal mass is somewhat posterior and below the level of the clavicle (read as 1.4 cm, larger than before, SUB 68), but above the sternum  Tiny focus of fdg left lung \  8/29/18 CT chest: previously spiculted 1.7 x 1.5 cm nodule now 7 x 5 mm , ? Atelectasis.  Scattered upper lobe reticulonodular opacities as seen on prior CT, several more conspicuous, up to 4 mm, ? Infection .  To my eye I also  note right level 7 neck mass 1.6 x 2 with some airway mass effect (this was 1.4 x 1.5 on 9/17 CT). This is also likely  the right level 7 mass we have bene following on US.    10/24/18 MRI brain residual mass 1.4 x 0.6 x 0.9 cm, extending into left cavernous sinus.  Stalk deviated to the left, thickened to 5 mm; optic chiasm atrophic  10/15/19 neck US compared with 4/25/19 , 11/27/18,  6/20/18 and  4/16/18:   Right level 6 thyroid bed 1.6 x 1.1 x 1.1 (was   1.5 x 1.1 x 1.2 ;  1.2 x 0.9 x 1.2; 1.2 x 01 x 1.1 ;  1.3 x 0.95 x 1.2 cm - suspicious; 1.2 x 0.93 x 0.9 ;  FNAB 4/23/18: + PTC, needle wash Tg 2082 ng/ml  Right level 7 # 1 1.7 x 1.6 x  1.7 cm (was  2 x 1.9 x 2.1 ;  1.8 x 1.5 x 1.8 ; 1.8 x 1.6 x 1.8 cm ; 1.4 x 1.5 x 1.5 cm; 1.1 x 1.2 x 1 -FNAB 4/23/18 + PTC, needle wash Tg 430 ng/ml  Left level 4  0.5 x 0.4 x 0.9 cm  (was 0.5 x 0.4 x 1;  0.4 x 0.6 x 0.9  0.3 x 0.6 x 1.1 ;  0.3 x 0.8 x 1 cm ; 0.6 x 0.3 x 0.8 )  Left level 4 # 2 0.3 x  0.5 x 0.4 cm  (was 0.4 x 0.4 x 0.6;  0.4 x0.6 x 0.6 ; 0.5 x 0.7 x 0.7 ; 0.8 x 0.5 x 0.6 cm;  0.5 x 0.5 x 1 )  Left level 4 # 3 0.4 x0.6 x 0.4 - ? new  4/25/19 DXA: lowest T-score -2.3 right femoral neck; no change in BMD compared with 2018.   4/25/19 chest CT: increase in centrilobular nodules posterior RUL-- I had discussed this with Dr Paz Magana after our last appt; today I have reviewed the images -right level 7 mass indents trachea slightly.    10/15/19 chest CT: no significant change   10/13/2020 CT Chest -right neck mass 1.4 x 1.8 cm ; new 4 mm lung nodule posterior RUL   8/4/2/2021 DXA lowest T-score -2.6 right femoral neck ; significant bone loss right total hip  8/4/2021 neck US 8/4/2021 :  Compared with 10/15/19, 4/25/19 , 11/27/18,  6/20/18 and  4/16/18:   Right level 7 # 1 3.1 x 1.4 x 1.8 cm - now showing clear confluence of past right level 6 1.6 x 1.1 x 1.1 and right level 7 #1  1.7 x 1.6 x  1.7 cm- both are known PTC on past FNAB from 4/23/2018  Right level 7 # 2  0.9 x 0.8 x 1  Right level 7 # 3 0.7 x 0.4 x 0.8 cm   Left level 4   0.3 x 0.5 x 0.8 cm  was  0.5 x 0.4 x 0.9 cm ; 0.5 x 0.4 x 1;  0.4 x 0.6 x 0.9  0.3 x 0.6 x 1.1 ;  0.3 x 0.8 x 1 cm ; 0.6 x 0.3 x 0.8 )  Left level 4 # 2 0.6 x 0.5 x 0.7 cm was 0.3 x  0.5 x 0.4 cm;  0.4 x 0.4 x 0.6;  0.4 x0.6 x 0.6 ; 0.5 x 0.7 x 0.7 ; 0.8 x 0.5 x 0.6 cm;  0.5 x 0.5 x 1 )  Left level 4 # 3 0.5 x 0.5 x 0.7 cm  was 0.4 x0.6 x 0.4 -  9/28/2021 CT chest without contrast:  Right lung tree in bud opacities with multiple new associated solid nodules up to 6 mm (? Atypical infection).  Previously described left lung nodules resolved.    11/10/2021 outside PET  compared with 8/4/2021 neck US :  High FDG activity low neck /thyroid bed region midline (we don't have good US images /labeling of this region , I am not sure of the US correlate for this), right lateral (clavicle level; corresponds to right level 7 # 1 3.1 x 1.4 x 1.8 cm  ) and right posterior thyroid bed (corresponds to right level 7 # 2  0.9 x 0.8 x 1  12/2/2021 Modified barium swallow - silent aspiration with thin, slightly thick and mildly thickened liquids.   Presbyesophagus.       ROS: (per his wife)  Current weight is 120  Eating up and down - sometimes no appetite  Sleep at night not good ; can't fall asleep;   No recent falling  GI: no longer has constipation   No pain  Wife takes care of him all day-- she is trying to be sure he doesn't fall   Still understands more when she writes; he also writes answers  He doesn't talk much --   No headaches  Getting CT and labs at [facility] soon   Getting better physically--     Past Medical History:   Diagnosis Date     Arthritis      BPH (benign prostatic hyperplasia)      COPD (chronic obstructive pulmonary disease) (H)      Depression      Hyperlipidemia      Hypertension     no current meds     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     Hypovitaminosis D      Multiple pulmonary nodules 2009     Osteopenia 2011     Papillary  thyroid carcinoma (H) 2007    4.8 cm, right, node positive;      Pituitary macroadenoma with extrasellar extension (H) 2007    causing obstructive hydrocephalus     Post-surgical hypothyroidism 2007     Uncomplicated asthma      Vocal cord paralysis 2014    right     Xerostomia 2010    due to radiation exposures     Past Surgical History:   Procedure Laterality Date     cymetra injection       ESOPHAGOSCOPY, GASTROSCOPY, DUODENOSCOPY (EGD), COMBINED  6/20/2013    Procedure: COMBINED ESOPHAGOSCOPY, GASTROSCOPY, DUODENOSCOPY (EGD), BIOPSY SINGLE OR MULTIPLE;  gastroscopy;  Surgeon: Leslie Guadarrama MD;  Location:  GI     ESOPHAGOSCOPY, GASTROSCOPY, DUODENOSCOPY (EGD), COMBINED N/A 9/20/2019    Procedure: ESOPHAGOGASTRODUODENOSCOPY (EGD);  Surgeon: Gilberto Gibson DO;  Location:  GI     ESOPHAGOSCOPY, GASTROSCOPY, DUODENOSCOPY (EGD), COMBINED N/A 10/16/2020    Procedure: ESOPHAGOGASTRODUODENOSCOPY, WITH BIOPSY;  Surgeon: Bartolome Granda MD;  Location:  GI     HERNIA REPAIR Right      lipoma resection chest wall Right 11/09     PHACOEMULSIFICATION CLEAR CORNEA WITH STANDARD INTRAOCULAR LENS IMPLANT Left 5/7/2018    Procedure: PHACOEMULSIFICATION CLEAR CORNEA WITH STANDARD INTRAOCULAR LENS IMPLANT;  LEFT PHACOEMULSIFICATION CLEAR CORNEA WITH STANDARD INTRAOCULAR LENS IMPLANT ;  Surgeon: Nadiya Allen MD;  Location:  EC     PHACOEMULSIFICATION CLEAR CORNEA WITH STANDARD INTRAOCULAR LENS IMPLANT Right 8/21/2018    Procedure: PHACOEMULSIFICATION CLEAR CORNEA WITH STANDARD INTRAOCULAR LENS IMPLANT;  RIGHT EYE PHACOEMULSIFICATION CLEAR CORNEA WITH STANDARD INTRAOCULAR LENS IMPLANT;  Surgeon: Nadiya Allen MD;  Location:  EC     right selective neck dissection level 2, 3, 4  11/3/09     right  shunt placement  6/28/07     THORACIC SURGERY  Fidencio 3 2017     THYMECTOMY N/A 1/3/2017    Procedure: THYMECTOMY;  Surgeon: Ernesto Armstrong MD;  Location: [facility] OR     THYROIDECTOMY   11/27/07     TRANSCERVICAL EXTENDED MEDIASTINAL LYMPHADENECTOMY N/A 1/3/2017    Procedure: TRANSCERVICAL EXTENDED MEDIASTINAL LYMPHADENECTOMY;  Surgeon: Ernesto Armstrong MD;  Location: [facility] OR     transnasal endoscopic resection of pituitary adenoma  8/13/2007     Current Outpatient Medications   Medication Sig Dispense Refill     acyclovir (ZOVIRAX) 400 MG tablet Take 1 tablet (400 mg) by mouth every 8 hours for 5 days 15 tablet 0     albuterol (PROAIR HFA) 108 (90 Base) MCG/ACT inhaler Inhale 2 puffs into the lungs every 4 hours as needed for shortness of breath / dyspnea or wheezing 3 Inhaler 11     amLODIPine (NORVASC) 2.5 MG tablet TAKE 1 TABLET(2.5 MG) BY MOUTH DAILY 90 tablet 0     docusate sodium (COLACE) 100 MG capsule Take 100 mg by mouth 2 times daily as needed for constipation       guaiFENesin (MUCINEX) 600 MG 12 hr tablet Take 2 tablets (1,200 mg) by mouth 2 times daily 60 tablet 3     guaiFENesin (MUCINEX) 600 MG 12 hr tablet Take 1 tablet (600 mg) by mouth 2 times daily 14 tablet 0     hydrocortisone (CORTEF) 10 MG tablet 10 mg AM and 5 mg afternoon 150 tablet 4     levothyroxine (SYNTHROID/LEVOTHROID) 137 MCG tablet Take 1 tablet (137 mcg) by mouth daily 90 tablet 4     magic mouthwash (ENTER INGREDIENTS IN COMMENTS) suspension Swish and spit 5-10 mLs in mouth every 6 hours as needed 30 ml of Benadryl (12.5 mg/5 ml), 60 ml Maalox, 30 ml Viscous Lidocaine 120 mL 0     senna-docusate (SENOKOT-S/PERICOLACE) 8.6-50 MG tablet Take 1 tablet by mouth 2 times daily as needed for constipation 60 tablet 0     tamsulosin (FLOMAX) 0.4 MG capsule Take 0.4 mg by mouth daily       traZODone (DESYREL) 100 MG tablet Take 2 tablets (200 mg) by mouth At Bedtime 60 tablet 3     umeclidinium-vilanterol (ANORO ELLIPTA) 62.5-25 MCG/INH oral inhaler Inhale 1 puff into the lungs daily        Social History     Tobacco Use     Smoking status: Former     Packs/day: 0.50     Years: 5.00     Pack years: 2.50     Types:  "Cigarettes     Start date: 1976     Quit date: 2001     Years since quittin.7     Smokeless tobacco: Never   Substance Use Topics     Alcohol use: No     Drug use: No     Walking every day 30 minutes in the mall ;  He is not swimming due to covid concerns; he does weight lifting and running machine and playing basketball (I repeatedly askd this question to clarify this unbelievable history       GENERAL  BP Readings from Last 1 Encounters:   22 (!) 154/80      Pulse Readings from Last 1 Encounters:   22 99      Resp Readings from Last 1 Encounters:   22 18      Temp Readings from Last 1 Encounters:   22 97.9  F (36.6  C) (Oral)      SpO2 Readings from Last 1 Encounters:   22 96%      Wt Readings from Last 1 Encounters:   22 53.2 kg (117 lb 3.2 oz)      Ht Readings from Last 1 Encounters:   21 1.676 m (5' 6\")     GENERAL si   There were no vitals taken for this visit.  BP Readings from Last 1 Encounters:   22 (!) 154/80      Pulse Readings from Last 1 Encounters:   22 99      Resp Readings from Last 1 Encounters:   22 18      Temp Readings from Last 1 Encounters:   22 97.9  F (36.6  C) (Oral)      SpO2 Readings from Last 1 Encounters:   22 96%      Wt Readings from Last 1 Encounters:   22 53.2 kg (117 lb 3.2 oz)      Ht Readings from Last 1 Encounters:   21 1.676 m (5' 6\")         Mekhi Gay MD - 2022   Indication:   Papillary thyroid carcinoma.   Technique:   CT of the chest. Coronal/sagittal reconstruction images. 80 cc of Omnipaque 350 IV.   Comparison:   CT of the chest, 2022. PET-CT, 11/10/2021.   Findings:   The prior CT scan from 2022 demonstrated masses present at stations 1R/2R. No significant interval change on the current examination. The largest lesion is seen at 1R on image 19, series 2, which measures 14 millimeters in dimension. Degenerative noncalcific plaque in the thoracic " aortic arch. There is no pleural or pericardial effusion. Nonenlarged lymph nodes at stations 4R, 4 L common 7. No station 10R 10 L lymphadenopathy. No central pulmonary emboli. No evidence for an acute aortic syndrome. There is collapse of the right middle lobe, which is a stable finding from 05/05/2022. The lung windows demonstrate centrilobular pulmonary micro nodules, which spared the pleural surfaces. This is well seen in the right upper lobe, posterior segment, image 43, series 2. Similar findings were present on the study from 05/05/2022. No resorptive atelectasis or endobronchial mass is seen. Calcified granuloma in the left upper lobe, stable from previous, which measures 12 millimeters in   dimension. There is no pneumothorax.   Evaluation the upper abdomen demonstrates a benign left renal cyst. There is an adjacent, exophytic, 8 millimeter lesion on image 119, which is unchanged. There is no upper abdominal lymphadenopathy or ascites. Gallbladder appears surgically absent. Spleen size is normal. No pancreatic glandular atrophy.   The bone windows demonstrate degenerative disc disease. No suspicious bone lesions are seen. Vertebral body heights are maintained on sagittal reconstruction images.   Impression:   1. Masses at stations 1R/2R appear stable from 05/05/2022.   2. There is no station 4R/4L, 7, or 10R/10L lymphadenopathy.   3. Centrilobular pulmonary micro nodules, which spare the pleural surfaces, similar to previous. Differential diagnosis favors either infectious or noninfectious bronchiolitis. If not already obtained, consider correlation with sputum culture.    Ed Cornejo MD - 08/04/2022     INDICATION:   Papillary thyroid carcinoma.  TECHNIQUE:   CT images acquired through the neck following intravenous contrast.  COMPARISON:   CT neck 05/05/2022, PET-CT 11/10/2021.   FINDINGS:   Postsurgical changes of thyroidectomy. Hyperenhancing 1.2 cm nodule within the right supraclavicular  region anterior to the right sternocleidomastoid muscle is not significantly changed (series 2, image 76). Stable elongated right paratracheal lesion measuring 2.1 cm (series 2, image 83). Stable enhancing lesion within the left parasagittal strap musculature measuring 1.2 cm (series 2, image 78). No new enhancing nodules or lymphadenopathy.   The nasopharynx, oropharynx, hypopharynx, and larynx are widely patent without enhancing lesions.   No enhancing lesions in the oral cavity floor of mouth.   Fatty infiltration of the parotid glands. The submandibular glands are unremarkable.   Mild atherosclerotic calcifications at the left carotid bifurcation.   Stable right occipital approach shunt catheter extending into the left frontal horn. Stable ventricular system. No concerning pathologic intracranial enhancement.   Postsurgical changes of transsphenoidal approach sellar mass resection, including nasal cavity exenteration. Persistent opacification of the right sphenoid sinus. The mastoid air cells are clear.   Multilevel cervical spondylosis. No aggressive osseous lesions.   Scattered tree-in-bud opacities and small nodules within the upper lobe of the right lung, better characterized on concurrent chest CT.   IMPRESSION:   1. Enhancing lesions within the right supraclavicular region, left strap muscles, and right paratracheal region are not significantly changed compared to 05/05/2022 and most compatible with metastases. No enlarging or new enhancing lesions.   2. Postsurgical changes of thyroidectomy.   3. Scattered tree-in-bud opacities and small nodules within the upper lobe of the right lung, better characterized on concurrent chest CT.     Corina Potts MD

## 2022-11-07 NOTE — NURSING NOTE
Patient declined individual allergy and medication review by support staff because pt didn't understand the question about allergies and what medications the pt was taking.     Venessa Russell

## 2022-11-09 ENCOUNTER — TELEPHONE (OUTPATIENT)
Dept: ENDOCRINOLOGY | Facility: CLINIC | Age: 82
End: 2022-11-09

## 2022-11-29 DIAGNOSIS — H35.052 CHOROIDAL NEOVASCULAR MEMBRANE, LEFT: Primary | ICD-10-CM

## 2023-02-08 ENCOUNTER — OFFICE VISIT (OUTPATIENT)
Dept: OPHTHALMOLOGY | Facility: CLINIC | Age: 83
End: 2023-02-08
Attending: OPHTHALMOLOGY
Payer: COMMERCIAL

## 2023-02-08 DIAGNOSIS — H35.052 CHOROIDAL NEOVASCULAR MEMBRANE, LEFT: ICD-10-CM

## 2023-02-08 PROCEDURE — 99214 OFFICE O/P EST MOD 30 MIN: CPT | Performed by: OPHTHALMOLOGY

## 2023-02-08 PROCEDURE — G0463 HOSPITAL OUTPT CLINIC VISIT: HCPCS | Mod: 25

## 2023-02-08 PROCEDURE — 92134 CPTRZ OPH DX IMG PST SGM RTA: CPT | Performed by: OPHTHALMOLOGY

## 2023-02-08 ASSESSMENT — CUP TO DISC RATIO
OD_RATIO: 0.5
OS_RATIO: 0.65

## 2023-02-08 ASSESSMENT — TONOMETRY
IOP_METHOD: TONOPEN
OS_IOP_MMHG: 15
OD_IOP_MMHG: 16

## 2023-02-08 ASSESSMENT — VISUAL ACUITY
OD_SC: 20/50
OS_PH_SC: 20/500
OD_PH_SC+: +3
METHOD: SNELLEN - LINEAR
OD_PH_SC: 20/40
OS_SC: 20/600

## 2023-02-08 ASSESSMENT — CONF VISUAL FIELD: OS_INFERIOR_NASAL_RESTRICTION: 1

## 2023-02-08 ASSESSMENT — EXTERNAL EXAM - RIGHT EYE: OD_EXAM: NORMAL

## 2023-02-08 ASSESSMENT — EXTERNAL EXAM - LEFT EYE: OS_EXAM: NORMAL

## 2023-02-08 ASSESSMENT — SLIT LAMP EXAM - LIDS
COMMENTS: NORMAL
COMMENTS: NORMAL

## 2023-02-08 NOTE — NURSING NOTE
Chief Complaints and History of Present Illnesses   Patient presents with     Follow Up     Macular degeneration      Chief Complaint(s) and History of Present Illness(es)     Follow Up            Laterality: both eyes    Associated symptoms: Negative for eye pain, flashes and floaters    Pain scale: 0/10    Comments: Macular degeneration          Comments    Eye meds: none    No eye pain today.  No concerns of vision either eye.    JONES Quiroga 2/8/2023 11:14 AM

## 2023-02-08 NOTE — PROGRESS NOTES
Cc: follow up choroidal neovascular membrane left eye   Interval history: Vision stable. 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   - Weekly Amsler Grid use was discussed and training performed.   - A diet of leafy green vegetables was encouraged.   - Return precautions were given.   - weekly amsler grid, follow up in 6 months    # PVD OU   - RT/RD precautions    # S/p CEIOL both eyes with posterior capsular opacity right eye   -status post yag 8.26.2020 left eye s/p yag 12-2-20 right eye   - monitor     # Dry eye syndrome   artificial tears  As needed   Warm compresses     Stable exam     Return in 6 mo w DFE, OCT macula  .~~~~~~~~~~~~~~~~~~~~~~~~~~~~~~~~~~   Complete documentation of historical and exam elements from today's encounter can be found in the full encounter summary report (not reduplicated in this progress note).  I personally obtained the chief complaint(s) and history of present illness.  I confirmed and edited as necessary the review of systems, past medical/surgical history, family history, social history, and examination findings as documented by others; and I examined the  patient myself.  I personally reviewed the relevant tests, images, and reports as documented above.  I formulated and edited as necessary the assessment and plan and discussed the findings and management plan with the patient and family    Nadiya Allen MD   of Ophthalmology.  Retina Service   Department of Ophthalmology and Visual Neurosciences   [facility]  Phone: [phone]   Fax: [phone]

## 2023-04-24 DIAGNOSIS — C73 PAPILLARY THYROID CARCINOMA (H): ICD-10-CM

## 2023-04-24 DIAGNOSIS — E23.0 HYPOPITUITARISM (H): ICD-10-CM

## 2023-04-24 DIAGNOSIS — E89.0 POSTSURGICAL HYPOTHYROIDISM: ICD-10-CM

## 2023-04-24 DIAGNOSIS — R91.8 PULMONARY NODULES: ICD-10-CM

## 2023-04-24 DIAGNOSIS — M85.9 LOW BONE DENSITY: ICD-10-CM

## 2023-04-24 DIAGNOSIS — R93.89 ABNORMAL FINDING ON IMAGING: ICD-10-CM

## 2023-04-24 RX ORDER — HYDROCORTISONE 10 MG/1
TABLET ORAL
Qty: 150 TABLET | Refills: 4 | Status: SHIPPED | OUTPATIENT
Start: 2023-04-24

## 2023-05-28 ENCOUNTER — OFFICE VISIT (OUTPATIENT)
Dept: URGENT CARE | Facility: URGENT CARE | Age: 83
End: 2023-05-28
Payer: COMMERCIAL

## 2023-05-28 ENCOUNTER — HOSPITAL ENCOUNTER (EMERGENCY)
Facility: CLINIC | Age: 83
Discharge: HOME | End: 2023-05-28
Attending: EMERGENCY MEDICINE | Admitting: EMERGENCY MEDICINE
Payer: COMMERCIAL

## 2023-05-28 VITALS
RESPIRATION RATE: 16 BRPM | HEART RATE: 68 BPM | OXYGEN SATURATION: 96 % | DIASTOLIC BLOOD PRESSURE: 73 MMHG | TEMPERATURE: 97.3 F | BODY MASS INDEX: 18.88 KG/M2 | SYSTOLIC BLOOD PRESSURE: 131 MMHG | WEIGHT: 117 LBS

## 2023-05-28 VITALS
SYSTOLIC BLOOD PRESSURE: 142 MMHG | OXYGEN SATURATION: 97 % | HEART RATE: 89 BPM | DIASTOLIC BLOOD PRESSURE: 70 MMHG | TEMPERATURE: 97.2 F | RESPIRATION RATE: 18 BRPM

## 2023-05-28 DIAGNOSIS — R51.9 ACUTE NONINTRACTABLE HEADACHE, UNSPECIFIED HEADACHE TYPE: ICD-10-CM

## 2023-05-28 DIAGNOSIS — H10.33 ACUTE CONJUNCTIVITIS OF BOTH EYES, UNSPECIFIED ACUTE CONJUNCTIVITIS TYPE: ICD-10-CM

## 2023-05-28 DIAGNOSIS — H57.13 EYE PAIN, BILATERAL: ICD-10-CM

## 2023-05-28 DIAGNOSIS — H10.13 ALLERGIC CONJUNCTIVITIS, BILATERAL: ICD-10-CM

## 2023-05-28 DIAGNOSIS — H53.9 VISION CHANGES: Primary | ICD-10-CM

## 2023-05-28 PROCEDURE — 250N000009 HC RX 250

## 2023-05-28 PROCEDURE — 250N000009 HC RX 250: Performed by: PHYSICIAN ASSISTANT

## 2023-05-28 PROCEDURE — 99284 EMERGENCY DEPT VISIT MOD MDM: CPT

## 2023-05-28 PROCEDURE — 99214 OFFICE O/P EST MOD 30 MIN: CPT | Performed by: EMERGENCY MEDICINE

## 2023-05-28 RX ORDER — OLOPATADINE HYDROCHLORIDE 2 MG/ML
1 SOLUTION/ DROPS OPHTHALMIC DAILY
Qty: 2.5 ML | Refills: 0 | Status: SHIPPED | OUTPATIENT
Start: 2023-05-28

## 2023-05-28 RX ORDER — ERYTHROMYCIN 5 MG/G
0.5 OINTMENT OPHTHALMIC AT BEDTIME
Qty: 1 G | Refills: 0 | Status: SHIPPED | OUTPATIENT
Start: 2023-05-28

## 2023-05-28 RX ORDER — PROPARACAINE HYDROCHLORIDE 5 MG/ML
2 SOLUTION/ DROPS OPHTHALMIC ONCE
Status: COMPLETED | OUTPATIENT
Start: 2023-05-28 | End: 2023-05-28

## 2023-05-28 RX ADMIN — FLUORESCEIN SODIUM 1 STRIP: 1 STRIP OPHTHALMIC at 16:00

## 2023-05-28 RX ADMIN — PROPARACAINE HYDROCHLORIDE 2 DROP: 5 SOLUTION/ DROPS OPHTHALMIC at 16:49

## 2023-05-28 RX ADMIN — FLUORESCEIN SODIUM 1 STRIP: 1 STRIP OPHTHALMIC at 16:05

## 2023-05-28 ASSESSMENT — VISUAL ACUITY
OS: HAND MOTION
OS: HAND MOTION
OD: 20/50
OD: 20/40

## 2023-05-28 ASSESSMENT — ACTIVITIES OF DAILY LIVING (ADL): ADLS_ACUITY_SCORE: 35

## 2023-05-28 NOTE — ED TRIAGE NOTES
Patient sent to ED from urgent care to be further evaluated today. Patient reports 4 days of bi lateral eye redness, burning, tearing and itchiness. Scleras red, beverley-orbital redness and itchiness as well. Denies sneezing or allergy hx. Patient denies headache. He reports blurred vision to both eyes.

## 2023-05-28 NOTE — ED PROVIDER NOTES
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  He states the symptoms have remained persistent since then without improvement.  Patient denies any recent fever, chills, congestion, rhinorrhea, sinus pressure, ear pain, pain with eye movements, mattering, photophobia, vision changes, headache, or cough.  Of note, he is not a contact lens wearer and denies any history of allergies.  He has no known sick contacts at home.       Independent Historian:   The patient's wife speaks loudly to communicate with the patient following cantonese  communication.    Review of External Notes:   5/28/23: UC unable to be viewed as it is yet to be signed.  No other external notes applicable to eye history    Medications:    erythromycin (ROMYCIN) 5 MG/GM ophthalmic ointment  olopatadine (PATADAY) 0.2 % ophthalmic solution  acyclovir (ZOVIRAX) 400 MG tablet  albuterol (PROAIR HFA) 108 (90 Base) MCG/ACT inhaler  amLODIPine (NORVASC) 2.5 MG tablet  docusate sodium (COLACE) 100 MG capsule  guaiFENesin (MUCINEX) 600 MG 12 hr tablet  guaiFENesin (MUCINEX) 600 MG 12 hr tablet  hydrocortisone (CORTEF) 10 MG tablet  levothyroxine (SYNTHROID/LEVOTHROID) 125 MCG tablet  magic mouthwash (ENTER INGREDIENTS IN COMMENTS) suspension  senna-docusate (SENOKOT-S/PERICOLACE) 8.6-50 MG tablet  tamsulosin (FLOMAX) 0.4 MG capsule  traZODone (DESYREL) 100 MG tablet  umeclidinium-vilanterol (ANORO ELLIPTA) 62.5-25 MCG/INH oral inhaler        Past Medical History:    Past Medical History:   Diagnosis Date     Arthritis      BPH (benign prostatic  hyperplasia)      COPD (chronic obstructive pulmonary disease) (H)      Depression      Hyperlipidemia      Hypertension      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     Hypovitaminosis D      Multiple pulmonary nodules 2009     Osteopenia 2011     Papillary thyroid carcinoma (H) 2007     Pituitary macroadenoma with extrasellar extension (H) 2007     Post-surgical hypothyroidism 2007     Uncomplicated asthma      Vocal cord paralysis 2014     Xerostomia 2010       Past Surgical History:    Past Surgical History:   Procedure Laterality Date     cymetra injection       ESOPHAGOSCOPY, GASTROSCOPY, DUODENOSCOPY (EGD), COMBINED  6/20/2013    Procedure: COMBINED ESOPHAGOSCOPY, GASTROSCOPY, DUODENOSCOPY (EGD), BIOPSY SINGLE OR MULTIPLE;  gastroscopy;  Surgeon: Leslie Guadarrama MD;  Location:  GI     ESOPHAGOSCOPY, GASTROSCOPY, DUODENOSCOPY (EGD), COMBINED N/A 9/20/2019    Procedure: ESOPHAGOGASTRODUODENOSCOPY (EGD);  Surgeon: Gilberto Gibson DO;  Location:  GI     ESOPHAGOSCOPY, GASTROSCOPY, DUODENOSCOPY (EGD), COMBINED N/A 10/16/2020    Procedure: ESOPHAGOGASTRODUODENOSCOPY, WITH BIOPSY;  Surgeon: Bartolome Granda MD;  Location:  GI     HERNIA REPAIR Right      lipoma resection chest wall Right 11/09     PHACOEMULSIFICATION CLEAR CORNEA WITH STANDARD INTRAOCULAR LENS IMPLANT Left 5/7/2018    Procedure: PHACOEMULSIFICATION CLEAR CORNEA WITH STANDARD INTRAOCULAR LENS IMPLANT;  LEFT PHACOEMULSIFICATION CLEAR CORNEA WITH STANDARD INTRAOCULAR LENS IMPLANT ;  Surgeon: Nadiya Allen MD;  Location:  EC     PHACOEMULSIFICATION CLEAR CORNEA WITH STANDARD INTRAOCULAR LENS IMPLANT Right 8/21/2018    Procedure: PHACOEMULSIFICATION CLEAR CORNEA WITH STANDARD INTRAOCULAR LENS IMPLANT;  RIGHT EYE PHACOEMULSIFICATION CLEAR CORNEA WITH STANDARD INTRAOCULAR LENS IMPLANT;  Surgeon: Nadiya Allen MD;  Location:  EC     right selective neck dissection level 2, 3, 4  11/3/09     right   shunt placement  6/28/07     THORACIC SURGERY  Fidencio 3 2017     THYMECTOMY N/A 1/3/2017    Procedure: THYMECTOMY;  Surgeon: Ernesto Armstrong MD;  Location: [facility] OR     THYROIDECTOMY  11/27/07     TRANSCERVICAL EXTENDED MEDIASTINAL LYMPHADENECTOMY N/A 1/3/2017    Procedure: TRANSCERVICAL EXTENDED MEDIASTINAL LYMPHADENECTOMY;  Surgeon: Ernesto Armstrong MD;  Location: [facility] OR     transnasal endoscopic resection of pituitary adenoma  8/13/2007        Physical Exam     Patient Vitals for the past 24 hrs:   BP Temp Temp src Pulse Resp SpO2 Weight   05/28/23 1125 131/73 97.3  F (36.3  C) Temporal 68 16 96 % 53.1 kg (117 lb)        Physical Exam  General: Alert, appears well-developed and well-nourished. Cooperative.     In moderate distress. Consistently rubbing eyes during exam.  HEENT:  Head:  Atraumatic  Ears:  External ears are normal   Eyes:   Conjunctival injection bilaterally. Mild erythema surrounding bilateral eyes, likely secondary to eye itching. Minimal cobblestoning of lower eyelids. EOM are normal. No scleral icterus.    Slit lamp: Conjunctival injection without corneal involvement. No fluorescein uptake.   Tonopen shows pressures of 13 for the right eye and 15 for the left eye.     Pupils are equal, round, and reactive to light.   Neck:   Normal range of motion. Neck supple.  MS:  Normal range of motion. No edema.  Skin:  Warm and dry.  No rash or lesions noted.  Neuro: Alert. Normal strength. GCS: 15  Psych:  Normal mood and affect.  Emergency Department Course     Procedures   None    Emergency Department Course & Assessments:       Interventions:  Medications   proparacaine (ALCAINE) 0.5 % ophthalmic solution 2 drop (2 drops Both Eyes $Given 5/28/23 3417)   fluorescein (FUL-NAKIA) ophthalmic strip 1 strip (1 strip Both Eyes $Given by Other 5/28/23 1600)   fluorescein (FUL-NAKIA) ophthalmic strip 1 strip (1 strip Left Eye $Given 5/28/23 1605)        Assessments:  1426: Initial evaluation and  assessment  1620: Slit-lamp exam performed, and discussed plan for discharge with eyedrops with the patient who is in agreement with this.    Independent Interpretation (X-rays, CTs, rhythm strip):  None    Consultations/Discussion of Management or Tests:  None        Social Determinants of Health affecting care:   Language barrier, hard of hearing    Disposition:  The patient was discharged to home.     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  A broad differential diagnosis was considered including bacterial conjunctivitis, viral conjunctivitis, allergic conjunctivitis, foreign body, corneal abrasion, corneal ulcer, herpes ophthalmicus, orbital cellulitis, etc.  Slit-lamp exam does not reveal any fluorescein uptake and shows conjunctival injection bilaterally.  The patient has a history of vision loss, and this appears to be unchanged today.  Bull-Pen reveals pressure within normal limits of bilateral eyes, reducing the likelihood of glaucoma.  There are no other concerning findings in the patient's history or physical exam, and symptoms are most likely the result of allergic conjunctivitis. The patient is not a contact lens wearer. We discussed the plan for discharge to home with antihistamine and antibiotic drops and the patient is in agreement with this.  Prescriptions for these medications were sent to the patient's pharmacy and he was advised that he can also use cool compresses for symptom management as well.  He was advised to follow-up with ophthalmology within the next week for reevaluation and to ensure resolution of symptoms.  He should return to the emergency department if he develops  any new or worsening symptoms in the interim.  The patient was in understanding of this plan and all questions were answered.      Diagnosis:    ICD-10-CM    1. Allergic conjunctivitis, bilateral  H10.13            Discharge Medications:  Discharge Medication List as of 5/28/2023  4:37 PM      START taking these medications    Details   erythromycin (ROMYCIN) 5 MG/GM ophthalmic ointment Place 0.5 inches into both eyes At BedtimeDisp-1 g, R-0Local Print      olopatadine (PATADAY) 0.2 % ophthalmic solution Place 0.05 mLs (1 drop) into both eyes daily, Disp-2.5 mL, R-0, Local Print                Yael Botello PA-C  5/28/2023

## 2023-05-28 NOTE — ED PROVIDER NOTES
History     Chief Complaint:  Eye Problem       HPI   Travis Peres is a 82 year old male presenting with eye pain and itchiness.  He was referred from the urgent care and I reviewed their notes.  I saw the patient in conjunction with Yael Botello PA-C.    Cantonese  was utilized and the patient relays chronic vision loss in the left eye      Independent Historian:   Spouse/Partner - They report The above    Review of External Notes:   Outpatient urgent care notes were reviewed      Medications:    acyclovir (ZOVIRAX) 400 MG tablet  albuterol (PROAIR HFA) 108 (90 Base) MCG/ACT inhaler  amLODIPine (NORVASC) 2.5 MG tablet  docusate sodium (COLACE) 100 MG capsule  guaiFENesin (MUCINEX) 600 MG 12 hr tablet  guaiFENesin (MUCINEX) 600 MG 12 hr tablet  hydrocortisone (CORTEF) 10 MG tablet  levothyroxine (SYNTHROID/LEVOTHROID) 125 MCG tablet  magic mouthwash (ENTER INGREDIENTS IN COMMENTS) suspension  senna-docusate (SENOKOT-S/PERICOLACE) 8.6-50 MG tablet  tamsulosin (FLOMAX) 0.4 MG capsule  traZODone (DESYREL) 100 MG tablet  umeclidinium-vilanterol (ANORO ELLIPTA) 62.5-25 MCG/INH oral inhaler        Past Medical History:    Past Medical History:   Diagnosis Date     Arthritis      BPH (benign prostatic hyperplasia)      COPD (chronic obstructive pulmonary disease) (H)      Depression      Hyperlipidemia      Hypertension      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     Hypovitaminosis D      Multiple pulmonary nodules 2009     Osteopenia 2011     Papillary thyroid carcinoma (H) 2007     Pituitary macroadenoma with extrasellar extension (H) 2007     Post-surgical hypothyroidism 2007     Uncomplicated asthma      Vocal cord paralysis 2014     Xerostomia 2010       Past Surgical History:    Past Surgical History:   Procedure Laterality Date     cymetra injection       ESOPHAGOSCOPY, GASTROSCOPY, DUODENOSCOPY (EGD), COMBINED  6/20/2013    Procedure: COMBINED ESOPHAGOSCOPY, GASTROSCOPY, DUODENOSCOPY  (EGD), BIOPSY SINGLE OR MULTIPLE;  gastroscopy;  Surgeon: Leslie Guadarrama MD;  Location:  GI     ESOPHAGOSCOPY, GASTROSCOPY, DUODENOSCOPY (EGD), COMBINED N/A 9/20/2019    Procedure: ESOPHAGOGASTRODUODENOSCOPY (EGD);  Surgeon: Gilberto Gibson DO;  Location:  GI     ESOPHAGOSCOPY, GASTROSCOPY, DUODENOSCOPY (EGD), COMBINED N/A 10/16/2020    Procedure: ESOPHAGOGASTRODUODENOSCOPY, WITH BIOPSY;  Surgeon: Bartolome Granda MD;  Location:  GI     HERNIA REPAIR Right      lipoma resection chest wall Right 11/09     PHACOEMULSIFICATION CLEAR CORNEA WITH STANDARD INTRAOCULAR LENS IMPLANT Left 5/7/2018    Procedure: PHACOEMULSIFICATION CLEAR CORNEA WITH STANDARD INTRAOCULAR LENS IMPLANT;  LEFT PHACOEMULSIFICATION CLEAR CORNEA WITH STANDARD INTRAOCULAR LENS IMPLANT ;  Surgeon: Nadiya Allen MD;  Location:  EC     PHACOEMULSIFICATION CLEAR CORNEA WITH STANDARD INTRAOCULAR LENS IMPLANT Right 8/21/2018    Procedure: PHACOEMULSIFICATION CLEAR CORNEA WITH STANDARD INTRAOCULAR LENS IMPLANT;  RIGHT EYE PHACOEMULSIFICATION CLEAR CORNEA WITH STANDARD INTRAOCULAR LENS IMPLANT;  Surgeon: Nadiya Allen MD;  Location:  EC     right selective neck dissection level 2, 3, 4  11/3/09     right  shunt placement  6/28/07     THORACIC SURGERY  Fidencio 3 2017     THYMECTOMY N/A 1/3/2017    Procedure: THYMECTOMY;  Surgeon: Ernesto Armstrong MD;  Location: [facility] OR     THYROIDECTOMY  11/27/07     TRANSCERVICAL EXTENDED MEDIASTINAL LYMPHADENECTOMY N/A 1/3/2017    Procedure: TRANSCERVICAL EXTENDED MEDIASTINAL LYMPHADENECTOMY;  Surgeon: Ernesto Armstrong MD;  Location: [facility] OR     transnasal endoscopic resection of pituitary adenoma  8/13/2007        Physical Exam     Patient Vitals for the past 24 hrs:   BP Temp Temp src Pulse Resp SpO2 Weight   05/28/23 1125 131/73 97.3  F (36.3  C) Temporal 68 16 96 % 53.1 kg (117 lb)        Physical Exam  Cardiovascular: Regular rate and rhythm  Lungs: No  respiratory distress lungs are clear to auscultation  HEENT: Sclera is injected, conjunctiva is inflamed, there is no drainage, slit-lamp examination demonstrates no signs of anterior chamber abnormality, no cell or flare, no signs of corneal abrasion.  Intraocular pressure on the right is 13 on the left is 15  Skin: The skin around the orbits is erythematous but the patient is constantly rubbing his eyes I think it secondary to this    Emergency Department Course   Emergency Department Course & Assessments:    Interventions:  Medications - No data to display     Assessments:  Patient was evaluated at the bedside    Independent Interpretation (X-rays, CTs, rhythm strip):  None    Consultations/Discussion of Management or Tests:  Discussed eyedrop options with our clinical pharmacist       Social Determinants of Health affecting care:   None    Disposition:  The patient was discharged to home.     Impression & Plan    Medical Decision Making:  Findings are most consistent with an allergic conjunctivitis, we will empirically treat for an underlying bacterial conjunctivitis given the patient's discomfort.  There is no signs of glaucoma, corneal abrasion, globe injury, orbital cellulitis or more concerning illness.  Patient will follow-up with ophthalmology and return if new symptoms develop.    Diagnosis:    ICD-10-CM    1. Allergic conjunctivitis, bilateral  H10.13            Discharge Medications:  Discharge Medication List as of 5/28/2023  4:37 PM      START taking these medications    Details   erythromycin (ROMYCIN) 5 MG/GM ophthalmic ointment Place 0.5 inches into both eyes At BedtimeDisp-1 g, R-0Local Print      olopatadine (PATADAY) 0.2 % ophthalmic solution Place 0.05 mLs (1 drop) into both eyes daily, Disp-2.5 mL, R-0, Local Print                Negrito Yin MD  5/28/2023   Trigger, Negrito Nicholson,*      Annamaria, Negrito Nicholson MD  05/28/23 9024

## 2023-05-28 NOTE — PROGRESS NOTES
Assessment & Plan     Diagnosis:  (H53.9) Vision changes  (primary encounter diagnosis)    (R51.9) Acute nonintractable headache, unspecified headache type    (H57.13) Eye pain, bilateral    (H10.33) Acute conjunctivitis of both eyes, unspecified acute conjunctivitis type      Medical Decision Making:  Travis Peres is a 82 year old male who presents for evaluation of a red eye.  A broad differential diagnosis was considered including bacterial conjunctivitis, viral conjunctivitis, foreign body, corneal abrasion, chemical vs allergic conjunctivitis, corneal ulcer, HSV, herpes zoster ophthalmicus, endophthalmitis, orbital cellulitis, etc.  Signs and symptoms concerning for preseptal cellulitis, he does have pain with extraocular movements.  Unfortunately, we do not have a Bull-Pen, slit-lamp, CT/MRI imaging modalities or other means to rule out more concerning etiologies such as acute angle-closure glaucoma, preseptal cellulitis/orbital cellulitis.  Wife is concerned as he complains of his vision being blurry with this, there has not been obvious discharge from the eyes.  Therefore I directed the patient to go to the ER now for further evaluation. Questions answered.    Luis Manuel Neumann PA-C  [facility]    Subjective     Travis Peres is a 82 year old male who presents to clinic today for the following health issues:  Chief Complaint   Patient presents with     Eye Problem     Bilateral eye redness and draining        HPI  Patient states for last few days he been experiencing bilateral eye pain, redness and draining.  There has been pain with movement of the eyes and blurry vision, this is getting progressively worse over the last few days.  Wife notes he has not been tearing, having significant discharge from the eyes or crust in the morning.  Wife is concerned as he been complaining of headaches and with vision changes. No fevers, recent head or neck injuries, numbness or weakness, new slurred speech or  confusion.    Review of Systems    See HPI    Objective      Vitals: BP (!) 142/70   Pulse 89   Temp 97.2  F (36.2  C) (Tympanic)   Resp 18   SpO2 97%       Patient Vitals for the past 24 hrs:   BP Temp Temp src Pulse Resp SpO2   05/28/23 1009 (!) 142/70 97.2  F (36.2  C) Tympanic 89 18 97 %       Vital signs reviewed by: Luis Manuel Neumann PA-C    Physical Exam   Constitutional: Patient is alert and cooperative. No acute distress.  Eyes: Conjunctivae injected bilaterally, there is mild perioral orbital swelling and erythema.  Extraocular movements intact, he notes pain with movement. PERRL.  Cardiovascular: Regular rate and rhythm  Pulmonary/Chest: Lungs are clear to auscultation throughout. Effort normal. No respiratory distress. No wheezes, rales or rhonchi.  Neurological: Alert and oriented x3. CN 2-12 intact.  Skin: No rash noted on visualized skin.  Psychiatric:The patient has a normal mood and affect.       Luis Manuel Neumann PA-C, May 28, 2023

## 2023-05-28 NOTE — ED NOTES
Bed: FT04  Expected date:   Expected time:   Means of arrival:   Comments:  Triage when clean- Travis

## 2023-07-26 DIAGNOSIS — H35.3221 EXUDATIVE AGE-RELATED MACULAR DEGENERATION OF LEFT EYE WITH ACTIVE CHOROIDAL NEOVASCULARIZATION (H): Primary | ICD-10-CM

## 2023-08-08 ENCOUNTER — ANCILLARY PROCEDURE (OUTPATIENT)
Dept: MRI IMAGING | Facility: CLINIC | Age: 83
End: 2023-08-08
Payer: COMMERCIAL

## 2023-08-08 ENCOUNTER — ANCILLARY PROCEDURE (OUTPATIENT)
Dept: BONE DENSITY | Facility: CLINIC | Age: 83
End: 2023-08-08
Payer: COMMERCIAL

## 2023-08-08 DIAGNOSIS — D35.3 BENIGN NEOPLASM OF PITUITARY GLAND AND CRANIOPHARYNGEAL DUCT (POUCH) (H): ICD-10-CM

## 2023-08-08 DIAGNOSIS — M81.0 AGE-RELATED OSTEOPOROSIS WITHOUT CURRENT PATHOLOGICAL FRACTURE: ICD-10-CM

## 2023-08-08 DIAGNOSIS — D35.2 BENIGN NEOPLASM OF PITUITARY GLAND AND CRANIOPHARYNGEAL DUCT (POUCH) (H): ICD-10-CM

## 2023-08-08 DIAGNOSIS — E23.0 HYPOPITUITARISM (H): ICD-10-CM

## 2023-08-08 DIAGNOSIS — E89.0 POSTSURGICAL HYPOTHYROIDISM: ICD-10-CM

## 2023-08-08 DIAGNOSIS — C73 PAPILLARY THYROID CARCINOMA (H): ICD-10-CM

## 2023-08-08 PROCEDURE — 70553 MRI BRAIN STEM W/O & W/DYE: CPT | Mod: GC | Performed by: STUDENT IN AN ORGANIZED HEALTH CARE EDUCATION/TRAINING PROGRAM

## 2023-08-08 PROCEDURE — A9585 GADOBUTROL INJECTION: HCPCS | Mod: JZ | Performed by: STUDENT IN AN ORGANIZED HEALTH CARE EDUCATION/TRAINING PROGRAM

## 2023-08-08 PROCEDURE — 77080 DXA BONE DENSITY AXIAL: CPT | Performed by: INTERNAL MEDICINE

## 2023-08-08 RX ORDER — GADOBUTROL 604.72 MG/ML
7.5 INJECTION INTRAVENOUS ONCE
Status: COMPLETED | OUTPATIENT
Start: 2023-08-08 | End: 2023-08-08

## 2023-08-08 RX ADMIN — GADOBUTROL 5 ML: 604.72 INJECTION INTRAVENOUS at 14:23

## 2023-08-08 NOTE — LETTER
Patient:  Travis Peres  :   1940  MRN:     [id number]        Mr.Hau MARY Peres  [address] [id number]        2023    Dear ,    We are writing to inform you of your test results.  The pituitary is stable in appearance.  Please schedule follow up appt with me ([phone] to schedule).  You were last seen 2022.        Resulted Orders   MR Pituitary w/o & w Contrast    Narrative    Brain/ Pituitary MRI without and with contrast    History: evaluate pituitary mass; Hypopituitarism (H); Benign neoplasm  of pituitary gland and craniopharyngeal duct (pouch) (H); Benign  neoplasm of pituitary gland and craniopharyngeal duct (pouch) (H).  ICD-10: Hypopituitarism (H); Benign neoplasm of pituitary gland and  craniopharyngeal duct (pouch) (H); Benign neoplasm of pituitary gland  and craniopharyngeal duct (pouch) (H)    Comparison:  MRI 2020     Technique: Axial diffusion and FLAIR images of the whole brain  obtained without intravenous contrast. Sagittal T1 and T2-weighted,  coronal T2-weighted, coronal T1-weighted images with focus on the  sella were obtained without intravenous contrast. Post intravenous  contrast using gadolinium coronal and sagittal T1-weighted images were  obtained focused on the sella. Dynamic postcontrast coronal  T1-weighted images were also obtained.    Contrast: 5 mL Gadavist    Findings:      Sella: Evolving postoperative changes of transsphenoidal pituitary  mass resection. Similar appearing residual abnormal enhancing soft  tissue in primarily the left half of the sella which also involves the  infundibulum and left cavernous sinus. Due to morphology exact  comparison is difficult. Smaller nonenhancing tissue in the right half  of the sella which is presumably scar tissue. No evidence of right  cavernosal sinus extension.    Remainder of Brain: Ventricular shunt reservoir causing significant  metallic artifact over the right posterior cerebral  hemisphere.  Diffusion-weighted images limited but grossly normal. 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 Mild-to-moderate cerebral atrophy. There  is some increased T2 signal intensity around the shunt catheter,  similar to that seen on prior exam. Minimal increased signal intensity  is also seen in the left osman, likely gliotic change. Brain parenchyma  otherwise appears within normal limits. Vascular structures are patent  to the skull base.      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 Smaller residual relatively nonenhancing tissue in right half  of the sella presumably fibrotic change, continued attention on follow  up recommended.    I have personally reviewed the examination and initial interpretation  and I agree with the findings.    SURENDRA ROSAS MD         SYSTEM ID:  [id number]       Sincerely,    Corina Potts MD    [phone]  1940

## 2023-08-08 NOTE — DISCHARGE INSTRUCTIONS
MRI Contrast Discharge Instructions    The IV contrast you received today will pass out of your body in your  urine. This will happen in the next 24 hours. You will not feel this process.  Your urine will not change color.    Drink at least 4 extra glasses of water or juice today (unless your doctor  has restricted your fluids). This reduces the stress on your kidneys.  You may take your regular medicines.    If you are on dialysis: It is best to have dialysis today.    If you have a reaction: Most reactions happen right away. If you have  any new symptoms after leaving the hospital (such as hives or swelling),  call your hospital at the correct number below. Or call your family doctor.  If you have breathing distress or wheezing, call 911.    Special instructions: ***    I have read and understand the above information.    Signature:______________________________________ Date:___________    Staff:__________________________________________ Date:___________     Time:__________    [place] [facility] Departments:    ___[place]: [phone]  ___[facility]: [phone]  ___[place]: [phone] ___[facility]: [phone]  ___[facility]: [phone]  ___[facility]: [phone]  ___[phone]  ___[facility]: [phone]  ___[phone]

## 2023-08-09 ENCOUNTER — OFFICE VISIT (OUTPATIENT)
Dept: OPHTHALMOLOGY | Facility: CLINIC | Age: 83
End: 2023-08-09
Attending: OPHTHALMOLOGY
Payer: COMMERCIAL

## 2023-08-09 DIAGNOSIS — H35.3221 EXUDATIVE AGE-RELATED MACULAR DEGENERATION OF LEFT EYE WITH ACTIVE CHOROIDAL NEOVASCULARIZATION (H): ICD-10-CM

## 2023-08-09 PROCEDURE — 99214 OFFICE O/P EST MOD 30 MIN: CPT | Mod: GC | Performed by: OPHTHALMOLOGY

## 2023-08-09 PROCEDURE — 92134 CPTRZ OPH DX IMG PST SGM RTA: CPT | Performed by: OPHTHALMOLOGY

## 2023-08-09 PROCEDURE — G0463 HOSPITAL OUTPT CLINIC VISIT: HCPCS | Performed by: OPHTHALMOLOGY

## 2023-08-09 ASSESSMENT — CONF VISUAL FIELD
OS_INFERIOR_NASAL_RESTRICTION: 0
OD_SUPERIOR_NASAL_RESTRICTION: 0
OS_INFERIOR_TEMPORAL_RESTRICTION: 0
OD_INFERIOR_NASAL_RESTRICTION: 0
OS_NORMAL: 1
OS_SUPERIOR_NASAL_RESTRICTION: 0
OS_SUPERIOR_TEMPORAL_RESTRICTION: 0
OD_SUPERIOR_TEMPORAL_RESTRICTION: 3
OD_INFERIOR_TEMPORAL_RESTRICTION: 0

## 2023-08-09 ASSESSMENT — VISUAL ACUITY
OD_SC+: -2
OS_SC: 20/600
OD_SC: 20/25
METHOD: SNELLEN - LINEAR

## 2023-08-09 ASSESSMENT — SLIT LAMP EXAM - LIDS
COMMENTS: NORMAL
COMMENTS: NORMAL

## 2023-08-09 ASSESSMENT — CUP TO DISC RATIO
OS_RATIO: 0.65
OD_RATIO: 0.5

## 2023-08-09 ASSESSMENT — TONOMETRY
OD_IOP_MMHG: 12
IOP_METHOD: TONOPEN
OS_IOP_MMHG: 13

## 2023-08-09 ASSESSMENT — EXTERNAL EXAM - RIGHT EYE: OD_EXAM: NORMAL

## 2023-08-09 ASSESSMENT — EXTERNAL EXAM - LEFT EYE: OS_EXAM: NORMAL

## 2023-08-09 NOTE — PROGRESS NOTES
Cc: follow up choroidal neovascular membrane left eye   Interval history: Pt states eyes are doing good, no pain, flashes, or floaters. 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 Likes subconj lido    OCULAR IMAGING  OCT Mac 08/09/23  Right eye: few small drusen - stable   Left eye: subfoveal non active CNVM without subretinal fluid / +intraretinal fluid slightly increased. 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   - Weekly Amsler Grid use was discussed and training performed.   - A diet of leafy green vegetables was encouraged.   - Return precautions were given.   - weekly amsler grid, follow up in 6 months      # PVD OU   - RT/RD precautions    # S/p CEIOL both eyes with posterior capsular opacity right eye   -status post yag 8.26.2020 left eye s/p yag 12-2-20 right eye   - monitor     # Dry eye syndrome   artificial tears  As needed   Warm compresses     Stable exam     Return in 6 mo w DFE, OCT macula    Asad Montague MD MPH  Vitreoretinal Fellow PGY-6  [facility]   .~~~~~~~~~~~~~~~~~~~~~~~~~~~~~~~~~~   Complete documentation of historical and exam elements from today's encounter can be found in the full encounter summary report (not reduplicated in this progress note).  I personally obtained the chief complaint(s) and history of  present illness.  I confirmed and edited as necessary the review of systems, past medical/surgical history, family history, social history, and examination findings as documented by others; and I examined the patient myself.  I personally reviewed the relevant tests, images, and reports as documented above.  I formulated and edited as necessary the assessment and plan and discussed the findings and management plan with the patient and family    Nadiya Allen MD   of Ophthalmology.  Retina Service   Department of Ophthalmology and Visual Neurosciences   [facility]  Phone: [phone]   Fax: [phone]

## 2023-08-09 NOTE — NURSING NOTE
Chief Complaints and History of Present Illnesses   Patient presents with    Follow Up     6 month follow up     Chief Complaint(s) and History of Present Illness(es)       Follow Up              Associated symptoms: Negative for eye pain, flashes and floaters    Comments: 6 month follow up              Comments    Pt states eyes are doing good, no pain, flashes, or floaters. No change in vision  Pt only using artifical tears    Kaylah HANNON 10:44 AM August 9, 2023

## 2023-10-23 ENCOUNTER — OFFICE VISIT (OUTPATIENT)
Dept: URGENT CARE | Facility: URGENT CARE | Age: 83
End: 2023-10-23
Payer: COMMERCIAL

## 2023-10-23 VITALS
DIASTOLIC BLOOD PRESSURE: 68 MMHG | WEIGHT: 128.4 LBS | OXYGEN SATURATION: 95 % | BODY MASS INDEX: 20.72 KG/M2 | TEMPERATURE: 97.7 F | SYSTOLIC BLOOD PRESSURE: 123 MMHG | HEART RATE: 75 BPM

## 2023-10-23 DIAGNOSIS — R07.0 THROAT PAIN: Primary | ICD-10-CM

## 2023-10-23 DIAGNOSIS — L23.9 ALLERGIC CONTACT DERMATITIS, UNSPECIFIED TRIGGER: ICD-10-CM

## 2023-10-23 DIAGNOSIS — J06.9 UPPER RESPIRATORY TRACT INFECTION, UNSPECIFIED TYPE: ICD-10-CM

## 2023-10-23 LAB
DEPRECATED S PYO AG THROAT QL EIA: NEGATIVE
GROUP A STREP BY PCR: NOT DETECTED

## 2023-10-23 PROCEDURE — 87651 STREP A DNA AMP PROBE: CPT | Performed by: PHYSICIAN ASSISTANT

## 2023-10-23 PROCEDURE — 99214 OFFICE O/P EST MOD 30 MIN: CPT | Performed by: PHYSICIAN ASSISTANT

## 2023-10-23 RX ORDER — LORATADINE 10 MG/1
10 TABLET ORAL DAILY
Qty: 10 TABLET | Refills: 0 | Status: SHIPPED | OUTPATIENT
Start: 2023-10-23

## 2023-10-23 RX ORDER — TRIAMCINOLONE ACETONIDE 1 MG/G
OINTMENT TOPICAL 2 TIMES DAILY
Qty: 60 G | Refills: 0 | Status: SHIPPED | OUTPATIENT
Start: 2023-10-23

## 2023-10-23 RX ORDER — ACETAMINOPHEN 500 MG
500-1000 TABLET ORAL EVERY 6 HOURS PRN
Qty: 30 TABLET | Refills: 0 | Status: SHIPPED | OUTPATIENT
Start: 2023-10-23

## 2023-10-23 NOTE — PROGRESS NOTES
Assessment & Plan     Throat pain    You had a strep test done today that was neg.  We will perform a strep culture and if any positive results come back we will call you and call in antibiotics.  At this time, this appears to be a viral infection and requires symptomatic treatment.  Most viral sore throats will resolve with in a few days.  If your throat pain worsens then please be  rechecked in the  or by your PCP.    Strep neg, culture pending  Tylenol for pain  Phenol throat spray for throat pain    - Streptococcus A Rapid Screen w/Reflex to PCR - Clinic Collect  - Group A Streptococcus PCR Throat Swab  - phenol (CHLORASEPTIC) 1.4 % spray  Dispense: 296 mL; Refill: 0  - acetaminophen (TYLENOL) 500 MG tablet  Dispense: 30 tablet; Refill: 0    Upper respiratory tract infection, unspecified type    You have been diagnosed with a viral URI.  A viral respiratory infection is an infection of the nose, sinuses, or throat caused by a virus. Colds and the flu are common types of viral respiratory infections.  The symptoms of a viral respiratory infection often start quickly. They include a fever, sore throat, and runny nose. You may also just not feel well. Or you may not want to eat much.  Most viral infections can be treated with home care. This may include drinking lots of fluids and taking over-the-counter pain medicine. You will probably feel better in 4 to 10 days.  Antibiotics are not used to treat a viral infection. Antibiotics don't kill viruses, so they won't help cure a viral illness.    - loratadine (CLARITIN) 10 MG tablet  Dispense: 10 tablet; Refill: 0    Allergic contact dermatitis, unspecified trigger    Contact dermatitis is caused by exposure to a substance that irritates your skin or triggers an allergic reaction. The substance could be one of thousands of known allergens and irritants. Often people have irritant and allergic reactions at the same time. Irritant contact dermatitis is the most  common type  Contact dermatitis is not passed from person to person.  General care  Stay cool, anything warm or hot can cause itching to be worse.  Cold compresses for 30 minutes 3 times a day.  For severe itching in a small area, apply an ice pack wrapped in a thin towel.     - loratadine (CLARITIN) 10 MG tablet  Dispense: 10 tablet; Refill: 0  - triamcinolone (KENALOG) 0.1 % external ointment  Dispense: 60 g; Refill: 0     At today's visit with Travis Peres , we discussed results, diagnosis, medications and formulated a plan.  We also discussed red flags for immediate return to clinic/ER, as well as indications for follow up with PCP if not improved in 3 days. Patient understood and agreed to plan. Travis Peres was discharged with stable vitals and has no further questions.       No follow-ups on file.    Marquez Camargo, [facility], PA-C  [facility] Robles is a 83 year old, presenting for the following health issues:  Fever (Since this morning), Pharyngitis (Since this morning.), and Derm Problem (X2 days. Itchy Rash to chest area)      HPI   Review of Systems   Constitutional, HEENT, cardiovascular, pulmonary, GI, , musculoskeletal, neuro, skin, endocrine and psych systems are negative, except as otherwise noted.      Objective    /68   Pulse 75   Temp 97.7  F (36.5  C) (Tympanic)   Wt 58.2 kg (128 lb 6.4 oz)   SpO2 95%   BMI 20.72 kg/m    Body mass index is 20.72 kg/m .  Physical Exam   GENERAL: healthy, alert and no distress  EYES: Eyes grossly normal to inspection, PERRL and conjunctivae and sclerae normal  HENT: ear canals and TM's normal, nose and mouth without ulcers or lesions  NECK: no adenopathy, no asymmetry, masses, or scars and thyroid normal to palpation  RESP: lungs clear to auscultation - no rales, rhonchi or wheezes  CV: regular rate and rhythm, normal S1 S2, no S3 or S4, no murmur, click or rub, no peripheral edema and peripheral pulses strong  MS: no  gross musculoskeletal defects noted, no edema  SKIN: Positive for left anterior chest erythematous, macular rash, in the shape of a rectangle  NEURO: Normal strength and tone, mentation intact and speech normal  PSYCH: mentation appears normal, affect normal/bright        Results for orders placed or performed in visit on 10/23/23   Streptococcus A Rapid Screen w/Reflex to PCR - Clinic Collect     Status: Normal    Specimen: Throat; Swab   Result Value Ref Range    Group A Strep antigen Negative Negative

## 2023-12-08 NOTE — TELEPHONE ENCOUNTER
"LVM Via Cantonese speaking  with request for Pt/spouse to contact clinic to review 2 letters from Dr Potts in detail and answer questions.  and clinic number provided.   Daisy Abad RN on 8/12/2021 at 9:53 AM         RE    I see something went wrong again on the letters I sent him that I wanted you to read them along with .  There are actually 2 letters.  One is dated 8/4 and one is dated 8/5.  Please read them the 8/4 and 8/5 letters if you didn't already do this.  See if you can get an answer to the questions I ask on the 8/5/2021 letter.  Let me know if they want another visit to discuss which I recommend be a virtual visit since we can't hear each other with the [facility]  bot anyway.  I think the communication (speech volume and clarity) may be more clear if we can use virtual method.    Thanks  Corina Potts MD    Letter of 08/04/2021:   \"August 2, 2021     Dear ,        We are writing to inform you of your test results from today.  Some tests are still pending.  I recommend to reduce the levothyroxine from 150 to 137 mcg/day.  I have submitted new Rx to your pharmacy.\"     Letter of 08/05/2021:   August 5, 2021     Dear ,     We are writing to inform you of your test results.     The right neck mass that we know is thyroid cancer is larger based on the radiologists report.  To my eye, the 2 right neck masses we previously biopsied now have fused into one, so overall the size we see now includes the 2 masses we have previously been watching.    Do we consider surgical treatment for this now or not?  In the past we didn't treat out of concern for making your overall health status worse.  We need to again consider your overall health goals and priorities.  What are they now?  We didn't discuss this much at the last appointment and perhaps we need another appointment to discuss this.  This could be a virtual appointment since our  was " virtual anyway even in the face to face appointment and given the hearing issues.       The bone density shows osteoporosis and loss of bone at the right hip, despite the alendronate treatment.   We might wish to consider stronger treatment for the osteoporosis.       20

## 2024-01-17 NOTE — TELEPHONE ENCOUNTER
- continue PPI     Talked to pts wife and scheduled Travis on 12/6 at 5pm. Transferred the call to ENT to schedule follow up with Gregoria

## 2024-05-14 DIAGNOSIS — H35.3221 EXUDATIVE AGE-RELATED MACULAR DEGENERATION OF LEFT EYE WITH ACTIVE CHOROIDAL NEOVASCULARIZATION (H): Primary | ICD-10-CM

## 2024-06-18 NOTE — ED NOTES
DATE:  1/4/2017   TIME OF RECEIPT FROM LAB:  0062  LAB TEST:  Lactic acid  LAB VALUE:  5.3  RESULTS GIVEN WITH READ-BACK TO (PROVIDER):  Dr. Grayson  TIME LAB VALUE REPORTED TO PROVIDER:   8227     Home regimen: Cymbalta and Neurontin continue

## (undated) DEVICE — EYE KNIFE SLIT XSTAR VISITEC 2.5MM 45DEG BEVEL UP 373725

## (undated) DEVICE — PACK CATARACT CUSTOM SO DALE SEY32CTFCX

## (undated) DEVICE — EYE NDL RETROBULBAR 25GA 1.5" 581275

## (undated) DEVICE — LINEN TOWEL PACK X5 5464

## (undated) DEVICE — GLOVE PROTEXIS MICRO 6.0  2D73PM60

## (undated) DEVICE — GOWN LG DISP 9515

## (undated) DEVICE — EYE SOL BSS 15ML BOTTLE 65079515

## (undated) DEVICE — EYE SHIELD PLASTIC

## (undated) DEVICE — SU ETHILON 10-0 CS160-6 12" 9000G

## (undated) DEVICE — GLOVE PROTEXIS MICRO 7.0  2D73PM70

## (undated) DEVICE — GLOVE PROTEXIS MICRO 7.5  2D73PM75

## (undated) DEVICE — DRSG GAUZE 4X4" 3033

## (undated) DEVICE — EYE SOL BSS 500ML

## (undated) DEVICE — Device

## (undated) DEVICE — EYE TIP IRRIGATION & ASPIRATION POLYMER 35D BENT 8065751511

## (undated) DEVICE — EYE PACK CUSTOM ANTERIOR 30DEG TIP CENTURION PPK6682-04

## (undated) DEVICE — TAPE MICROPORE 1"X1.5YD 1530S-1

## (undated) DEVICE — EYE PACK BVI READYPAK KIT #2

## (undated) DEVICE — TAPE MICROPORE 2"X1.5YD 1530S-2

## (undated) RX ORDER — FENTANYL CITRATE 50 UG/ML
INJECTION, SOLUTION INTRAMUSCULAR; INTRAVENOUS
Status: DISPENSED
Start: 2020-10-16

## (undated) RX ORDER — LIDOCAINE HYDROCHLORIDE 10 MG/ML
INJECTION, SOLUTION EPIDURAL; INFILTRATION; INTRACAUDAL; PERINEURAL
Status: DISPENSED
Start: 2018-05-07

## (undated) RX ORDER — DEXAMETHASONE SODIUM PHOSPHATE 4 MG/ML
INJECTION, SOLUTION INTRA-ARTICULAR; INTRALESIONAL; INTRAMUSCULAR; INTRAVENOUS; SOFT TISSUE
Status: DISPENSED
Start: 2018-08-21

## (undated) RX ORDER — CEFAZOLIN SODIUM 500 MG/2.2ML
INJECTION, POWDER, FOR SOLUTION INTRAMUSCULAR; INTRAVENOUS
Status: DISPENSED
Start: 2018-08-21

## (undated) RX ORDER — LIDOCAINE HYDROCHLORIDE 10 MG/ML
INJECTION, SOLUTION EPIDURAL; INFILTRATION; INTRACAUDAL; PERINEURAL
Status: DISPENSED
Start: 2018-04-23

## (undated) RX ORDER — TETRACAINE HYDROCHLORIDE 5 MG/ML
SOLUTION OPHTHALMIC
Status: DISPENSED
Start: 2018-05-07

## (undated) RX ORDER — TETRACAINE HYDROCHLORIDE 5 MG/ML
SOLUTION OPHTHALMIC
Status: DISPENSED
Start: 2018-08-21

## (undated) RX ORDER — WATER 10 ML/10ML
INJECTION INTRAMUSCULAR; INTRAVENOUS; SUBCUTANEOUS
Status: DISPENSED
Start: 2018-05-07

## (undated) RX ORDER — LIDOCAINE HYDROCHLORIDE 10 MG/ML
INJECTION, SOLUTION EPIDURAL; INFILTRATION; INTRACAUDAL; PERINEURAL
Status: DISPENSED
Start: 2018-08-21

## (undated) RX ORDER — DEXAMETHASONE SODIUM PHOSPHATE 4 MG/ML
INJECTION, SOLUTION INTRA-ARTICULAR; INTRALESIONAL; INTRAMUSCULAR; INTRAVENOUS; SOFT TISSUE
Status: DISPENSED
Start: 2018-05-07

## (undated) RX ORDER — WATER 10 ML/10ML
INJECTION INTRAMUSCULAR; INTRAVENOUS; SUBCUTANEOUS
Status: DISPENSED
Start: 2018-08-21

## (undated) RX ORDER — FENTANYL CITRATE 50 UG/ML
INJECTION, SOLUTION INTRAMUSCULAR; INTRAVENOUS
Status: DISPENSED
Start: 2018-05-07

## (undated) RX ORDER — ONDANSETRON 2 MG/ML
INJECTION INTRAMUSCULAR; INTRAVENOUS
Status: DISPENSED
Start: 2018-08-21